# Patient Record
Sex: FEMALE | Race: WHITE | NOT HISPANIC OR LATINO | Employment: OTHER | URBAN - METROPOLITAN AREA
[De-identification: names, ages, dates, MRNs, and addresses within clinical notes are randomized per-mention and may not be internally consistent; named-entity substitution may affect disease eponyms.]

---

## 2017-01-30 ENCOUNTER — OFFICE VISIT (OUTPATIENT)
Dept: ORTHOPEDICS | Facility: CLINIC | Age: 77
End: 2017-01-30
Payer: MEDICARE

## 2017-01-30 VITALS
WEIGHT: 144 LBS | BODY MASS INDEX: 26.5 KG/M2 | DIASTOLIC BLOOD PRESSURE: 67 MMHG | SYSTOLIC BLOOD PRESSURE: 140 MMHG | HEIGHT: 62 IN | HEART RATE: 86 BPM

## 2017-01-30 DIAGNOSIS — M17.12 ARTHRITIS OF KNEE, LEFT: Primary | ICD-10-CM

## 2017-01-30 DIAGNOSIS — M17.11 ARTHRITIS OF KNEE, RIGHT: ICD-10-CM

## 2017-01-30 PROCEDURE — 20610 DRAIN/INJ JOINT/BURSA W/O US: CPT | Mod: 50,S$GLB,, | Performed by: ORTHOPAEDIC SURGERY

## 2017-01-30 PROCEDURE — 99213 OFFICE O/P EST LOW 20 MIN: CPT | Mod: 25,S$GLB,, | Performed by: ORTHOPAEDIC SURGERY

## 2017-01-30 RX ORDER — TRIAMCINOLONE ACETONIDE 40 MG/ML
40 INJECTION, SUSPENSION INTRA-ARTICULAR; INTRAMUSCULAR
Status: DISCONTINUED | OUTPATIENT
Start: 2017-01-30 | End: 2017-01-30 | Stop reason: HOSPADM

## 2017-01-30 RX ADMIN — TRIAMCINOLONE ACETONIDE 40 MG: 40 INJECTION, SUSPENSION INTRA-ARTICULAR; INTRAMUSCULAR at 04:01

## 2017-01-30 NOTE — PROGRESS NOTES
Past Medical History   Diagnosis Date    Breast cancer     Cancer      BREAST     Diabetes mellitus, type 2     GERD (gastroesophageal reflux disease)     Hypertension     Recurrent urinary tract infection     Vertigo        Past Surgical History   Procedure Laterality Date    Hemorrhoid surgery  1968    Hysterectomy  1970    Tumor removal Left 1994     EAR    Mastectomy Right 1994    Mastectomy Left 2013     BREAST CANCER       Current Outpatient Prescriptions   Medication Sig    alprazolam (XANAX) 0.5 MG tablet Take 0.5 mg by mouth 3 (three) times daily.    amlodipine (NORVASC) 5 MG tablet Take 5 mg by mouth once daily.    atenolol (TENORMIN) 50 MG tablet Take 50 mg by mouth once daily.    mirabegron (MYRBETRIQ) 50 mg Tb24 Take 1 tablet (50 mg total) by mouth once daily.    SIMVASTATIN (ZOCOR ORAL) Take by mouth.    TRIAMTERENE-HYDROCHLOROTHIAZID ORAL Take 75 mg by mouth once daily.    warfarin (COUMADIN) 1 MG tablet Take 1 mg by mouth once daily.     No current facility-administered medications for this visit.        Review of patient's allergies indicates:   Allergen Reactions    Iodine and iodide containing products     Ditropan [oxybutynin chloride] Palpitations and Other (See Comments)     Made patient weak       Family History   Problem Relation Age of Onset    Cancer Mother     Cancer Sister     Heart disease Brother     Cancer Maternal Grandmother        Social History     Social History    Marital status:      Spouse name: N/A    Number of children: N/A    Years of education: N/A     Occupational History    Not on file.     Social History Main Topics    Smoking status: Never Smoker    Smokeless tobacco: Never Used    Alcohol use No    Drug use: No    Sexual activity: Not on file     Other Topics Concern    Not on file     Social History Narrative       Chief Complaint:   Chief Complaint   Patient presents with    Left Knee - Pain    Right Knee - Pain        Consulting Physician: No ref. provider found    History of present illness: This is a 68-year-old woman with a complaint of bilateral knee pain.  We completed her Euflexxa series and she reports she was feeling better but is starting to have some pain again. As much as 7/10 with activities. Both equal.    Review of Systems:    Constitution: Denies chills, fever, and sweats.    HENT: Denies headaches or blurry vision.    Cardiovascular: Denies chest pain or irregular heart beat.    Respiratory: Denies cough or shortness of breath.    Gastrointestinal: Denies abdominal pain, nausea, or vomiting.    Musculoskeletal:  Denies muscle cramps.    Neurological: Denies dizziness or focal weakness.    Psychiatric/Behavioral: Normal mental status.    Hematologic/Lymphatic: Denies bleeding problem or easy bruising/bleeding.    Skin: Denies rash or suspicious lesions.      Examination:    Vital Signs:    Vitals:    01/30/17 0951   BP: (!) 140/67   Pulse: 86       Body mass index is 26.34 kg/(m^2).    This a well-developed, well nourished patient in no acute distress.    Alert and oriented and cooperative to examination.       Physical Exam: Left Knee Exam    Gait   Near normal    Skin  Rash:   None  Scars:   None    Inspection  Erythema:  None  Ecchymosis:  None  Effusion:  None  Masses:  None  Lymphadenopathy: None    Range of Motion: 0 to 130°    Medial Joint : mild  Lateral Joint : No    Patellofemoral Tenderness: Yes  Patellofemoral Crepitus: Yes    Strength:  5/5    Pulses:  Palpable  Sensation:  Intact    Right Knee Exam    Gait   Near normal    Skin  Rash:   None  Scars:   None    Inspection  Erythema:  None  Ecchymosis:  None  Effusion:  None  Masses:  None  Lymphadenopathy: None    Range of Motion: 0 to 130°    Medial Joint : mild  Lateral Joint : No     Patellofemoral Tenderness: Yes  Patellofemoral  Crepitus: Yes    Strength:  5/5    Pulses:  Palpable  Sensation:  Intact          Imaging:     Assessment: Arthritis of knee, left  -     Large Joint Aspiration/Injection    Arthritis of knee, right  -     Large Joint Aspiration/Injection        Plan:  Euflexxa series was successful in relieving her pain.  We will go ahead and give her a steroid injection today so she can attend a [employer] [place] ball this weekend.  We'll then see her back in 2 weeks' time to start another Euflexxa series.    DISCLAIMER: This note may have been dictated using voice recognition software and may contain grammatical errors.     NOTE: Consult report sent to referring provider via [employer] EMR.

## 2017-01-30 NOTE — MR AVS SNAPSHOT
[facility]  [address] [place] [address]  Phone: [phone]  Fax: [phone]                  Ursula Rodríguez   2017 10:00 AM   Office Visit    Description:  Female : 1940   Provider:  Gus Rajput MD   Department:  [facility]           Reason for Visit     Left Knee - Pain     Right Knee - Pain           Diagnoses this Visit        Comments    Arthritis of knee, left    -  Primary     Arthritis of knee, right                To Do List           Future Appointments        Provider Department Dept Phone    2017 8:30 AM Gus Rajput MD [facility] [phone]      Goals (5 Years of Data)     None      [facility]     [facility] On Call Nurse Care Line -  Assistance  Registered nurses in the [facility] On Call Center provide clinical advisement, health education, appointment booking, and other advisory services.  Call for this free service at [phone].             Medications           Message regarding Medications     Verify the changes and/or additions to your medication regime listed below are the same as discussed with your clinician today.  If any of these changes or additions are incorrect, please notify your healthcare provider.             Verify that the below list of medications is an accurate representation of the medications you are currently taking.  If none reported, the list may be blank. If incorrect, please contact your healthcare provider. Carry this list with you in case of emergency.           Current Medications     alprazolam (XANAX) 0.5 MG tablet Take 0.5 mg by mouth 3 (three) times daily.    amlodipine (NORVASC) 5 MG tablet Take 5 mg by mouth once daily.    atenolol (TENORMIN) 50 MG tablet Take 50 mg by mouth once daily.    mirabegron (MYRBETRIQ) 50 mg Tb24 Take 1 tablet (50 mg total) by mouth once daily.    SIMVASTATIN (ZOCOR ORAL) Take by mouth.    TRIAMTERENE-HYDROCHLOROTHIAZID  "ORAL Take 75 mg by mouth once daily.    warfarin (COUMADIN) 1 MG tablet Take 1 mg by mouth once daily.           Clinical Reference Information           Vital Signs - Last Recorded  Most recent update: 1/30/2017  9:52 AM by Brenda Hill LPN    BP Pulse Ht Wt BMI    (!) 140/67 86 5' 2" (1.575 m) 65.3 kg (144 lb) 26.34 kg/m2      Blood Pressure          Most Recent Value    BP  (!)  140/67      Allergies as of 1/30/2017     Iodine And Iodide Containing Products    Ditropan [Oxybutynin Chloride]      Immunizations Administered on Date of Encounter - 1/30/2017     None      Orders Placed During Today's Visit      Normal Orders This Visit    Large Joint Aspiration/Injection       "

## 2017-01-30 NOTE — PROCEDURES
Large Joint Aspiration/Injection  Date/Time: 1/30/2017 4:13 PM  Performed by: PALOMO RODRIGUEZ  Authorized by: PALOMO RODRIGUEZ     Consent Done?:  Yes (Verbal)  Indications:  Pain  Timeout: Prior to procedure the correct patient, procedure, and site was verified      Location:  Knee  Site:  R knee and L knee  Prep: Patient was prepped and draped in usual sterile fashion    Approach:  Anteromedial  Medications:  40 mg triamcinolone acetonide 40 mg/mL; 40 mg triamcinolone acetonide 40 mg/mL

## 2017-02-13 ENCOUNTER — OFFICE VISIT (OUTPATIENT)
Dept: ORTHOPEDICS | Facility: CLINIC | Age: 77
End: 2017-02-13
Payer: MEDICARE

## 2017-02-13 VITALS
WEIGHT: 144 LBS | SYSTOLIC BLOOD PRESSURE: 143 MMHG | HEIGHT: 62 IN | HEART RATE: 73 BPM | DIASTOLIC BLOOD PRESSURE: 82 MMHG | BODY MASS INDEX: 26.5 KG/M2

## 2017-02-13 DIAGNOSIS — M17.12 ARTHRITIS OF KNEE, LEFT: Primary | ICD-10-CM

## 2017-02-13 DIAGNOSIS — M17.11 ARTHRITIS OF KNEE, RIGHT: ICD-10-CM

## 2017-02-13 PROCEDURE — 99499 UNLISTED E&M SERVICE: CPT | Mod: S$GLB,,, | Performed by: ORTHOPAEDIC SURGERY

## 2017-02-13 PROCEDURE — 20610 DRAIN/INJ JOINT/BURSA W/O US: CPT | Mod: 50,S$GLB,, | Performed by: ORTHOPAEDIC SURGERY

## 2017-02-13 RX ORDER — HYALURONATE SODIUM 20 MG/2 ML
20 SYRINGE (ML) INTRAARTICULAR
Status: DISCONTINUED | OUTPATIENT
Start: 2017-02-13 | End: 2017-02-13 | Stop reason: HOSPADM

## 2017-02-13 RX ADMIN — Medication 20 MG: at 09:02

## 2017-02-13 NOTE — PROGRESS NOTES
Past Medical History   Diagnosis Date    Breast cancer     Cancer      BREAST     Diabetes mellitus, type 2     GERD (gastroesophageal reflux disease)     Hypertension     Recurrent urinary tract infection     Vertigo        Past Surgical History   Procedure Laterality Date    Hemorrhoid surgery  1968    Hysterectomy  1970    Tumor removal Left 1994     EAR    Mastectomy Right 1994    Mastectomy Left 2013     BREAST CANCER       Current Outpatient Prescriptions   Medication Sig    alprazolam (XANAX) 0.5 MG tablet Take 0.5 mg by mouth 3 (three) times daily.    amlodipine (NORVASC) 5 MG tablet Take 5 mg by mouth once daily.    atenolol (TENORMIN) 50 MG tablet Take 50 mg by mouth once daily.    mirabegron (MYRBETRIQ) 50 mg Tb24 Take 1 tablet (50 mg total) by mouth once daily.    SIMVASTATIN (ZOCOR ORAL) Take by mouth.    TRIAMTERENE-HYDROCHLOROTHIAZID ORAL Take 75 mg by mouth once daily.    warfarin (COUMADIN) 1 MG tablet Take 1 mg by mouth once daily.     No current facility-administered medications for this visit.        Review of patient's allergies indicates:   Allergen Reactions    Iodine and iodide containing products     Ditropan [oxybutynin chloride] Palpitations and Other (See Comments)     Made patient weak       Family History   Problem Relation Age of Onset    Cancer Mother     Cancer Sister     Heart disease Brother     Cancer Maternal Grandmother        Social History     Social History    Marital status:      Spouse name: N/A    Number of children: N/A    Years of education: N/A     Occupational History    Not on file.     Social History Main Topics    Smoking status: Never Smoker    Smokeless tobacco: Never Used    Alcohol use No    Drug use: No    Sexual activity: Not on file     Other Topics Concern    Not on file     Social History Narrative       Chief Complaint:   No chief complaint on file.      Consulting Physician: Gus Rajput MD    History of  present illness: This is a 68-year-old woman with a complaint of bilateral knee pain.  We completed her previous Euflexxa series and she reports she was feeling better. 1/10 today after steroid.    Review of Systems:    Constitution: Denies chills, fever, and sweats.    HENT: Denies headaches or blurry vision.    Cardiovascular: Denies chest pain or irregular heart beat.    Respiratory: Denies cough or shortness of breath.    Gastrointestinal: Denies abdominal pain, nausea, or vomiting.    Musculoskeletal:  Denies muscle cramps.    Neurological: Denies dizziness or focal weakness.    Psychiatric/Behavioral: Normal mental status.    Hematologic/Lymphatic: Denies bleeding problem or easy bruising/bleeding.    Skin: Denies rash or suspicious lesions.      Examination:    Vital Signs:    Vitals:    02/13/17 0848   BP: (!) 143/82   Pulse: 73       Body mass index is 26.34 kg/(m^2).    This a well-developed, well nourished patient in no acute distress.    Alert and oriented and cooperative to examination.       Physical Exam: Left Knee Exam    Gait   Near normal    Skin  Rash:   None  Scars:   None    Inspection  Erythema:  None  Ecchymosis:  None  Effusion:  None  Masses:  None  Lymphadenopathy: None    Range of Motion: 0 to 130°    Medial Joint : mild  Lateral Joint : No    Patellofemoral Tenderness: Yes  Patellofemoral Crepitus: Yes    Strength:  5/5    Pulses:  Palpable  Sensation:  Intact    Right Knee Exam    Gait   Near normal    Skin  Rash:   None  Scars:   None    Inspection  Erythema:  None  Ecchymosis:  None  Effusion:  None  Masses:  None  Lymphadenopathy: None    Range of Motion: 0 to 130°    Medial Joint : mild  Lateral Joint : No     Patellofemoral Tenderness: Yes  Patellofemoral Crepitus: Yes    Strength:  5/5    Pulses:  Palpable  Sensation:  Intact          Imaging:     Assessment: Arthritis of knee, left  -     Large Joint Aspiration/Injection    Arthritis of  knee, right  -     Large Joint Aspiration/Injection        Plan:  Euflexxa series.    DISCLAIMER: This note may have been dictated using voice recognition software and may contain grammatical errors.     NOTE: Consult report sent to referring provider via [employer] EMR.

## 2017-02-13 NOTE — MR AVS SNAPSHOT
[facility]  [address] [place] [address]  Phone: [phone]  Fax: [phone]                  Ursula Rodríguez   2017 8:30 AM   Office Visit    Description:  Female : 1940   Provider:  Gus Rajput MD   Department:  [facility]           Diagnoses this Visit        Comments    Arthritis of knee, left    -  Primary     Arthritis of knee, right                To Do List           Future Appointments        Provider Department Dept Phone    2017 8:45 AM Gus Rajput MD [facility] [phone]      Goals (5 Years of Data)     None      [facility]     [facility] Nurse Care Line -  Assistance  Registered nurses in the [facility] On Call Center provide clinical advisement, health education, appointment booking, and other advisory services.  Call for this free service at [phone].             Medications           Message regarding Medications     Verify the changes and/or additions to your medication regime listed below are the same as discussed with your clinician today.  If any of these changes or additions are incorrect, please notify your healthcare provider.             Verify that the below list of medications is an accurate representation of the medications you are currently taking.  If none reported, the list may be blank. If incorrect, please contact your healthcare provider. Carry this list with you in case of emergency.           Current Medications     alprazolam (XANAX) 0.5 MG tablet Take 0.5 mg by mouth 3 (three) times daily.    amlodipine (NORVASC) 5 MG tablet Take 5 mg by mouth once daily.    atenolol (TENORMIN) 50 MG tablet Take 50 mg by mouth once daily.    mirabegron (MYRBETRIQ) 50 mg Tb24 Take 1 tablet (50 mg total) by mouth once daily.    SIMVASTATIN (ZOCOR ORAL) Take by mouth.    TRIAMTERENE-HYDROCHLOROTHIAZID ORAL Take 75 mg by mouth once daily.    warfarin (COUMADIN) 1 MG tablet  "Take 1 mg by mouth once daily.           Clinical Reference Information           Your Vitals Were     BP Pulse Height Weight BMI    143/82 73 5' 2" (1.575 m) 65.3 kg (144 lb) 26.34 kg/m2      Blood Pressure          Most Recent Value    BP  (!)  143/82      Allergies as of 2/13/2017     Iodine And Iodide Containing Products    Ditropan [Oxybutynin Chloride]      Immunizations Administered on Date of Encounter - 2/13/2017     None      Orders Placed During Today's Visit      Normal Orders This Visit    Large Joint Aspiration/Injection       Language Assistance Services     ATTENTION: Language assistance services are available, free of charge. Please call [phone].      ATENCIÓN: Si habla español, tiene a ziegler disposición servicios gratuitos de asistencia lingüística. Llame al [phone].     CHÚ Ý: N?u b?n nói Ti?ng Vi?t, có các d?ch v? h? tr? ngôn ng? mi?n phí dành cho b?n. G?i s? [phone].         [facility] complies with applicable Federal civil rights laws and does not discriminate on the basis of race, color, national origin, age, disability, or sex.        "

## 2017-02-13 NOTE — PROCEDURES
Large Joint Aspiration/Injection  Date/Time: 2/13/2017 9:48 AM  Performed by: PALOMO RODRIGUEZ  Authorized by: PALOMO RODRIGUEZ     Consent Done?:  Yes (Verbal)  Indications:  Pain  Timeout: Prior to procedure the correct patient, procedure, and site was verified      Location:  Knee  Site:  R knee and L knee  Prep: Patient was prepped and draped in usual sterile fashion    Approach:  Anterolateral  Medications:  20 mg EUFLEXXA 10 mg/mL; 20 mg EUFLEXXA 10 mg/mL

## 2017-02-20 ENCOUNTER — OFFICE VISIT (OUTPATIENT)
Dept: ORTHOPEDICS | Facility: CLINIC | Age: 77
End: 2017-02-20
Payer: MEDICARE

## 2017-02-20 VITALS — HEIGHT: 62 IN | BODY MASS INDEX: 26.5 KG/M2 | WEIGHT: 144 LBS

## 2017-02-20 DIAGNOSIS — M17.12 ARTHRITIS OF KNEE, LEFT: Primary | ICD-10-CM

## 2017-02-20 DIAGNOSIS — M17.11 ARTHRITIS OF KNEE, RIGHT: ICD-10-CM

## 2017-02-20 PROCEDURE — 99499 UNLISTED E&M SERVICE: CPT | Mod: S$GLB,,, | Performed by: ORTHOPAEDIC SURGERY

## 2017-02-20 PROCEDURE — 20610 DRAIN/INJ JOINT/BURSA W/O US: CPT | Mod: 50,S$GLB,, | Performed by: ORTHOPAEDIC SURGERY

## 2017-02-20 RX ORDER — HYALURONATE SODIUM 20 MG/2 ML
20 SYRINGE (ML) INTRAARTICULAR
Status: DISCONTINUED | OUTPATIENT
Start: 2017-02-20 | End: 2017-02-20 | Stop reason: HOSPADM

## 2017-02-20 RX ADMIN — Medication 20 MG: at 09:02

## 2017-02-20 NOTE — PROCEDURES
Large Joint Aspiration/Injection  Date/Time: 2/20/2017 9:47 AM  Performed by: PALOMO RODRIGUEZ  Authorized by: PALOMO RODRIGUEZ     Consent Done?:  Yes (Verbal)  Indications:  Pain  Timeout: Prior to procedure the correct patient, procedure, and site was verified      Location:  Knee  Site:  R knee and L knee  Prep: Patient was prepped and draped in usual sterile fashion    Approach:  Anterolateral  Medications:  20 mg EUFLEXXA 10 mg/mL; 20 mg EUFLEXXA 10 mg/mL

## 2017-02-20 NOTE — PROGRESS NOTES
Past Medical History   Diagnosis Date    Breast cancer     Cancer      BREAST     Diabetes mellitus, type 2     GERD (gastroesophageal reflux disease)     Hypertension     Recurrent urinary tract infection     Vertigo        Past Surgical History   Procedure Laterality Date    Hemorrhoid surgery  1968    Hysterectomy  1970    Tumor removal Left 1994     EAR    Mastectomy Right 1994    Mastectomy Left 2013     BREAST CANCER       Current Outpatient Prescriptions   Medication Sig    alprazolam (XANAX) 0.5 MG tablet Take 0.5 mg by mouth 3 (three) times daily.    amlodipine (NORVASC) 5 MG tablet Take 5 mg by mouth once daily.    atenolol (TENORMIN) 50 MG tablet Take 50 mg by mouth once daily.    mirabegron (MYRBETRIQ) 50 mg Tb24 Take 1 tablet (50 mg total) by mouth once daily.    SIMVASTATIN (ZOCOR ORAL) Take by mouth.    TRIAMTERENE-HYDROCHLOROTHIAZID ORAL Take 75 mg by mouth once daily.    warfarin (COUMADIN) 1 MG tablet Take 1 mg by mouth once daily.     No current facility-administered medications for this visit.        Review of patient's allergies indicates:   Allergen Reactions    Iodine and iodide containing products     Ditropan [oxybutynin chloride] Palpitations and Other (See Comments)     Made patient weak       Family History   Problem Relation Age of Onset    Cancer Mother     Cancer Sister     Heart disease Brother     Cancer Maternal Grandmother        Social History     Social History    Marital status:      Spouse name: N/A    Number of children: N/A    Years of education: N/A     Occupational History    Not on file.     Social History Main Topics    Smoking status: Never Smoker    Smokeless tobacco: Never Used    Alcohol use No    Drug use: No    Sexual activity: Not on file     Other Topics Concern    Not on file     Social History Narrative       Chief Complaint:   Chief Complaint   Patient presents with    Left Knee - Pain    Right Knee - Pain    Knee  Pain     EUFLEXXA 2 OF 3 BILAT KNEE    Knee Pain       Consulting Physician: No ref. provider found    History of present illness: This is a 68-year-old woman with a complaint of bilateral knee pain.  We completed her previous Euflexxa series and she reports she was feeling better. Pain is 0/10 today.    Review of Systems:    Constitution: Denies chills, fever, and sweats.    HENT: Denies headaches or blurry vision.    Cardiovascular: Denies chest pain or irregular heart beat.    Respiratory: Denies cough or shortness of breath.    Gastrointestinal: Denies abdominal pain, nausea, or vomiting.    Musculoskeletal:  Denies muscle cramps.    Neurological: Denies dizziness or focal weakness.    Psychiatric/Behavioral: Normal mental status.    Hematologic/Lymphatic: Denies bleeding problem or easy bruising/bleeding.    Skin: Denies rash or suspicious lesions.      Examination:    Vital Signs:    There were no vitals filed for this visit.    Body mass index is 26.34 kg/(m^2).    This a well-developed, well nourished patient in no acute distress.    Alert and oriented and cooperative to examination.       Physical Exam: Left Knee Exam    Gait   Near normal    Skin  Rash:   None  Scars:   None    Inspection  Erythema:  None  Ecchymosis:  None  Effusion:  None  Masses:  None  Lymphadenopathy: None    Range of Motion: 0 to 130°    Medial Joint : mild  Lateral Joint : No    Patellofemoral Tenderness: Yes  Patellofemoral Crepitus: Yes    Strength:  5/5    Pulses:  Palpable  Sensation:  Intact    Right Knee Exam    Gait   Near normal    Skin  Rash:   None  Scars:   None    Inspection  Erythema:  None  Ecchymosis:  None  Effusion:  None  Masses:  None  Lymphadenopathy: None    Range of Motion: 0 to 130°    Medial Joint : mild  Lateral Joint : No     Patellofemoral Tenderness: Yes  Patellofemoral Crepitus: Yes    Strength:  5/5    Pulses:  Palpable  Sensation:  Intact          Imaging:      Assessment: Arthritis of knee, left  -     Large Joint Aspiration/Injection    Arthritis of knee, right  -     Large Joint Aspiration/Injection        Plan:  Euflexxa series.    DISCLAIMER: This note may have been dictated using voice recognition software and may contain grammatical errors.     NOTE: Consult report sent to referring provider via [employer] EMR.

## 2017-02-27 ENCOUNTER — OFFICE VISIT (OUTPATIENT)
Dept: ORTHOPEDICS | Facility: CLINIC | Age: 77
End: 2017-02-27
Payer: MEDICARE

## 2017-02-27 VITALS — HEIGHT: 62 IN | BODY MASS INDEX: 26.5 KG/M2 | WEIGHT: 144 LBS

## 2017-02-27 DIAGNOSIS — M17.11 ARTHRITIS OF KNEE, RIGHT: Primary | ICD-10-CM

## 2017-02-27 DIAGNOSIS — M17.12 ARTHRITIS OF KNEE, LEFT: ICD-10-CM

## 2017-02-27 PROCEDURE — 99499 UNLISTED E&M SERVICE: CPT | Mod: S$GLB,,, | Performed by: ORTHOPAEDIC SURGERY

## 2017-02-27 PROCEDURE — 20610 DRAIN/INJ JOINT/BURSA W/O US: CPT | Mod: 50,S$GLB,, | Performed by: ORTHOPAEDIC SURGERY

## 2017-02-27 RX ORDER — HYALURONATE SODIUM 20 MG/2 ML
20 SYRINGE (ML) INTRAARTICULAR
Status: DISCONTINUED | OUTPATIENT
Start: 2017-02-27 | End: 2017-02-27 | Stop reason: HOSPADM

## 2017-02-27 RX ADMIN — Medication 20 MG: at 10:02

## 2017-02-27 NOTE — PROCEDURES
Large Joint Aspiration/Injection  Date/Time: 2/27/2017 10:10 AM  Performed by: PALOMO RODRIGUEZ  Authorized by: PALOMO RODRIGUEZ     Consent Done?:  Yes (Verbal)  Indications:  Pain  Timeout: Prior to procedure the correct patient, procedure, and site was verified      Location:  Knee  Site:  R knee and L knee  Prep: Patient was prepped and draped in usual sterile fashion    Approach:  Anterolateral  Medications:  20 mg EUFLEXXA 10 mg/mL; 20 mg EUFLEXXA 10 mg/mL

## 2017-02-27 NOTE — PROGRESS NOTES
Past Medical History:   Diagnosis Date    Breast cancer     Cancer     BREAST     Diabetes mellitus, type 2     GERD (gastroesophageal reflux disease)     Hypertension     Recurrent urinary tract infection     Vertigo        Past Surgical History:   Procedure Laterality Date    HEMORRHOID SURGERY  1968    HYSTERECTOMY  1970    MASTECTOMY Right 1994    MASTECTOMY Left 2013    BREAST CANCER    TUMOR REMOVAL Left 1994    EAR       Current Outpatient Prescriptions   Medication Sig    alprazolam (XANAX) 0.5 MG tablet Take 0.5 mg by mouth 3 (three) times daily.    amlodipine (NORVASC) 5 MG tablet Take 5 mg by mouth once daily.    atenolol (TENORMIN) 50 MG tablet Take 50 mg by mouth once daily.    mirabegron (MYRBETRIQ) 50 mg Tb24 Take 1 tablet (50 mg total) by mouth once daily.    SIMVASTATIN (ZOCOR ORAL) Take by mouth.    TRIAMTERENE-HYDROCHLOROTHIAZID ORAL Take 75 mg by mouth once daily.    warfarin (COUMADIN) 1 MG tablet Take 1 mg by mouth once daily.     No current facility-administered medications for this visit.        Review of patient's allergies indicates:   Allergen Reactions    Iodine and iodide containing products     Ditropan [oxybutynin chloride] Palpitations and Other (See Comments)     Made patient weak       Family History   Problem Relation Age of Onset    Cancer Mother     Cancer Sister     Heart disease Brother     Cancer Maternal Grandmother        Social History     Social History    Marital status:      Spouse name: N/A    Number of children: N/A    Years of education: N/A     Occupational History    Not on file.     Social History Main Topics    Smoking status: Never Smoker    Smokeless tobacco: Never Used    Alcohol use No    Drug use: No    Sexual activity: Not on file     Other Topics Concern    Not on file     Social History Narrative       Chief Complaint:   Chief Complaint   Patient presents with    Knee Pain     euflexxa 3 of 3 bilat knees        Consulting Physician: No ref. provider found    History of present illness: This is a 68-year-old woman with a complaint of bilateral knee pain.  We completed her previous Euflexxa series and she reports she was feeling better. Pain is 0/10 today.    Review of Systems:    Constitution: Denies chills, fever, and sweats.    HENT: Denies headaches or blurry vision.    Cardiovascular: Denies chest pain or irregular heart beat.    Respiratory: Denies cough or shortness of breath.    Gastrointestinal: Denies abdominal pain, nausea, or vomiting.    Musculoskeletal:  Denies muscle cramps.    Neurological: Denies dizziness or focal weakness.    Psychiatric/Behavioral: Normal mental status.    Hematologic/Lymphatic: Denies bleeding problem or easy bruising/bleeding.    Skin: Denies rash or suspicious lesions.      Examination:    Vital Signs:    There were no vitals filed for this visit.    Body mass index is 26.34 kg/(m^2).    This a well-developed, well nourished patient in no acute distress.    Alert and oriented and cooperative to examination.       Physical Exam: Left Knee Exam    Gait   Near normal    Skin  Rash:   None  Scars:   None    Inspection  Erythema:  None  Ecchymosis:  None  Effusion:  None  Masses:  None  Lymphadenopathy: None    Range of Motion: 0 to 130°    Medial Joint : mild  Lateral Joint : No    Patellofemoral Tenderness: Yes  Patellofemoral Crepitus: Yes    Strength:  5/5    Pulses:  Palpable  Sensation:  Intact    Right Knee Exam    Gait   Near normal    Skin  Rash:   None  Scars:   None    Inspection  Erythema:  None  Ecchymosis:  None  Effusion:  None  Masses:  None  Lymphadenopathy: None    Range of Motion: 0 to 130°    Medial Joint : mild  Lateral Joint : No     Patellofemoral Tenderness: Yes  Patellofemoral Crepitus: Yes    Strength:  5/5    Pulses:  Palpable  Sensation:  Intact          Imaging:     Assessment: Arthritis of knee, right  -      Large Joint Aspiration/Injection    Arthritis of knee, left  -     Large Joint Aspiration/Injection        Plan:  Euflexxa series.    DISCLAIMER: This note may have been dictated using voice recognition software and may contain grammatical errors.     NOTE: Consult report sent to referring provider via [employer] EMR.

## 2017-06-01 ENCOUNTER — TELEPHONE (OUTPATIENT)
Dept: ORTHOPEDICS | Facility: CLINIC | Age: 77
End: 2017-06-01

## 2017-06-01 NOTE — TELEPHONE ENCOUNTER
----- Message from Rhett Lozoya sent at 6/1/2017  8:14 AM CDT -----  Contact: patient walked in   Patient is requesting an appointment for an injection as soon as possible.  (Due to the restraints of the upcoming BSL schedule) is hoping to be squeezed in on Monday.  She will be in the hospital for blood work on Monday morning. Plz call.

## 2017-06-01 NOTE — TELEPHONE ENCOUNTER
Called and left voicemail advising that I would work her into Monday's clinic at 9am.  Appt scheduled.

## 2017-06-02 ENCOUNTER — TELEPHONE (OUTPATIENT)
Dept: ORTHOPEDICS | Facility: CLINIC | Age: 77
End: 2017-06-02

## 2017-06-02 NOTE — TELEPHONE ENCOUNTER
Spoke to patient and confirmed that she has an appt for 6/5/17 at 915am at the [facility]. Patient stated understanding.

## 2017-06-02 NOTE — TELEPHONE ENCOUNTER
----- Message from Urvashi Echevarria sent at 6/2/2017  2:38 PM CDT -----  Please call patient in reference to an injection in her knee on Monday.  Call [phone].

## 2017-06-05 ENCOUNTER — OFFICE VISIT (OUTPATIENT)
Dept: ORTHOPEDICS | Facility: CLINIC | Age: 77
End: 2017-06-05
Payer: MEDICARE

## 2017-06-05 VITALS
DIASTOLIC BLOOD PRESSURE: 75 MMHG | WEIGHT: 143.94 LBS | HEART RATE: 75 BPM | HEIGHT: 62 IN | SYSTOLIC BLOOD PRESSURE: 129 MMHG | BODY MASS INDEX: 26.49 KG/M2

## 2017-06-05 DIAGNOSIS — M17.11 ARTHRITIS OF KNEE, RIGHT: Primary | ICD-10-CM

## 2017-06-05 DIAGNOSIS — M17.12 ARTHRITIS OF KNEE, LEFT: ICD-10-CM

## 2017-06-05 PROCEDURE — 20610 DRAIN/INJ JOINT/BURSA W/O US: CPT | Mod: 50,S$GLB,, | Performed by: ORTHOPAEDIC SURGERY

## 2017-06-05 PROCEDURE — 99213 OFFICE O/P EST LOW 20 MIN: CPT | Mod: 25,S$GLB,, | Performed by: ORTHOPAEDIC SURGERY

## 2017-06-05 RX ORDER — TRIAMCINOLONE ACETONIDE 40 MG/ML
60 INJECTION, SUSPENSION INTRA-ARTICULAR; INTRAMUSCULAR
Status: DISCONTINUED | OUTPATIENT
Start: 2017-06-05 | End: 2017-06-05 | Stop reason: HOSPADM

## 2017-06-05 RX ORDER — TRIAMCINOLONE ACETONIDE 40 MG/ML
40 INJECTION, SUSPENSION INTRA-ARTICULAR; INTRAMUSCULAR
Status: DISCONTINUED | OUTPATIENT
Start: 2017-06-05 | End: 2017-06-05 | Stop reason: HOSPADM

## 2017-06-05 RX ADMIN — TRIAMCINOLONE ACETONIDE 60 MG: 40 INJECTION, SUSPENSION INTRA-ARTICULAR; INTRAMUSCULAR at 05:06

## 2017-06-05 RX ADMIN — TRIAMCINOLONE ACETONIDE 40 MG: 40 INJECTION, SUSPENSION INTRA-ARTICULAR; INTRAMUSCULAR at 05:06

## 2017-06-05 NOTE — PROGRESS NOTES
Past Medical History:   Diagnosis Date    Breast cancer     Cancer     BREAST     Diabetes mellitus, type 2     GERD (gastroesophageal reflux disease)     Hypertension     Recurrent urinary tract infection     Vertigo        Past Surgical History:   Procedure Laterality Date    HEMORRHOID SURGERY  1968    HYSTERECTOMY  1970    MASTECTOMY Right 1994    MASTECTOMY Left 2013    BREAST CANCER    TUMOR REMOVAL Left 1994    EAR       Current Outpatient Prescriptions   Medication Sig    alprazolam (XANAX) 0.5 MG tablet Take 0.5 mg by mouth 3 (three) times daily.    amlodipine (NORVASC) 5 MG tablet Take 5 mg by mouth once daily.    atenolol (TENORMIN) 50 MG tablet Take 50 mg by mouth once daily.    mirabegron (MYRBETRIQ) 50 mg Tb24 Take 1 tablet (50 mg total) by mouth once daily.    SIMVASTATIN (ZOCOR ORAL) Take by mouth.    TRIAMTERENE-HYDROCHLOROTHIAZID ORAL Take 75 mg by mouth once daily.    warfarin (COUMADIN) 1 MG tablet Take 1 mg by mouth once daily.     No current facility-administered medications for this visit.        Review of patient's allergies indicates:   Allergen Reactions    Iodine and iodide containing products     Ditropan [oxybutynin chloride] Palpitations and Other (See Comments)     Made patient weak       Family History   Problem Relation Age of Onset    Cancer Mother     Cancer Sister     Heart disease Brother     Cancer Maternal Grandmother        Social History     Social History    Marital status:      Spouse name: N/A    Number of children: N/A    Years of education: N/A     Occupational History    Not on file.     Social History Main Topics    Smoking status: Never Smoker    Smokeless tobacco: Never Used    Alcohol use No    Drug use: No    Sexual activity: Not on file     Other Topics Concern    Not on file     Social History Narrative    No narrative on file       Chief Complaint:   Chief Complaint   Patient presents with    Left Knee - Pain    Right  Knee - Pain       Consulting Physician: No ref. provider found    History of present illness: This is a 68-year-old woman with a complaint of bilateral knee pain.  We completed her previous Euflexxa series and she reports she was feeling better.  She puts her pain at a 10 out of 10 today since the injections wore off.      Review of Systems:    Constitution: Denies chills, fever, and sweats.    HENT: Denies headaches or blurry vision.    Cardiovascular: Denies chest pain or irregular heart beat.    Respiratory: Denies cough or shortness of breath.    Gastrointestinal: Denies abdominal pain, nausea, or vomiting.    Musculoskeletal:  Denies muscle cramps.    Neurological: Denies dizziness or focal weakness.    Psychiatric/Behavioral: Normal mental status.    Hematologic/Lymphatic: Denies bleeding problem or easy bruising/bleeding.    Skin: Denies rash or suspicious lesions.      Examination:    Vital Signs:    Vitals:    06/05/17 0920   BP: 129/75   Pulse: 75       Body mass index is 26.33 kg/m².    This a well-developed, well nourished patient in no acute distress.    Alert and oriented and cooperative to examination.       Physical Exam: Left Knee Exam    Gait   Near normal    Skin  Rash:   None  Scars:   None    Inspection  Erythema:  None  Ecchymosis:  None  Effusion:  None  Masses:  None  Lymphadenopathy: None    Range of Motion: 0 to 130°    Medial Joint : mild  Lateral Joint : No    Patellofemoral Tenderness: Yes  Patellofemoral Crepitus: Yes    Strength:  5/5    Pulses:  Palpable  Sensation:  Intact    Right Knee Exam    Gait   Near normal    Skin  Rash:   None  Scars:   None    Inspection  Erythema:  None  Ecchymosis:  None  Effusion:  None  Masses:  None  Lymphadenopathy: None    Range of Motion: 0 to 130°    Medial Joint : mild  Lateral Joint : No     Patellofemoral Tenderness: Yes  Patellofemoral  Crepitus: Yes    Strength:  5/5    Pulses:  Palpable  Sensation:  Intact          Imaging:     Assessment: Arthritis of knee, right  -     Large Joint Aspiration/Injection    Arthritis of knee, left  -     Large Joint Aspiration/Injection        Plan: We will give her bilateral steroid injections today to hopefully control her pain until she can have her next Euflexxa series.    DISCLAIMER: This note may have been dictated using voice recognition software and may contain grammatical errors.     NOTE: Consult report sent to referring provider via [employer] EMR.

## 2017-06-05 NOTE — PROCEDURES
Large Joint Aspiration/Injection  Date/Time: 6/5/2017 5:12 PM  Performed by: PALOMO RODRIGUEZ  Authorized by: PALOMO RODRIGUEZ     Consent Done?:  Yes (Verbal)  Indications:  Pain  Timeout: Prior to procedure the correct patient, procedure, and site was verified      Location:  Knee  Site:  R knee and L knee  Prep: Patient was prepped and draped in usual sterile fashion    Approach:  Anteromedial  Medications:  40 mg triamcinolone acetonide 40 mg/mL; 60 mg triamcinolone acetonide 40 mg/mL

## 2017-08-29 ENCOUNTER — TELEPHONE (OUTPATIENT)
Dept: ORTHOPEDICS | Facility: CLINIC | Age: 77
End: 2017-08-29

## 2017-08-29 NOTE — TELEPHONE ENCOUNTER
----- Message from Melanie Palacios sent at 8/29/2017  4:09 PM CDT -----  Contact: pt, [phone]  Would like to reschedule inj appt had Monday  Call back on # [phone]  thanks

## 2017-09-11 ENCOUNTER — OFFICE VISIT (OUTPATIENT)
Dept: ORTHOPEDICS | Facility: CLINIC | Age: 77
End: 2017-09-11
Payer: MEDICARE

## 2017-09-11 VITALS — BODY MASS INDEX: 26.49 KG/M2 | WEIGHT: 143.94 LBS | HEIGHT: 62 IN

## 2017-09-11 DIAGNOSIS — M17.11 ARTHRITIS OF KNEE, RIGHT: Primary | ICD-10-CM

## 2017-09-11 DIAGNOSIS — M17.12 ARTHRITIS OF KNEE, LEFT: ICD-10-CM

## 2017-09-11 PROCEDURE — 1159F MED LIST DOCD IN RCRD: CPT | Mod: S$GLB,,, | Performed by: ORTHOPAEDIC SURGERY

## 2017-09-11 PROCEDURE — 99213 OFFICE O/P EST LOW 20 MIN: CPT | Mod: 25,S$GLB,, | Performed by: ORTHOPAEDIC SURGERY

## 2017-09-11 PROCEDURE — 20610 DRAIN/INJ JOINT/BURSA W/O US: CPT | Mod: 50,S$GLB,, | Performed by: ORTHOPAEDIC SURGERY

## 2017-09-11 PROCEDURE — 1125F AMNT PAIN NOTED PAIN PRSNT: CPT | Mod: S$GLB,,, | Performed by: ORTHOPAEDIC SURGERY

## 2017-09-11 RX ADMIN — Medication 20 MG: at 10:09

## 2017-09-14 RX ORDER — HYALURONATE SODIUM 20 MG/2 ML
20 SYRINGE (ML) INTRAARTICULAR
Status: DISCONTINUED | OUTPATIENT
Start: 2017-09-11 | End: 2017-09-15 | Stop reason: HOSPADM

## 2017-09-15 NOTE — PROGRESS NOTES
Past Medical History:   Diagnosis Date    Breast cancer     Cancer     BREAST     Diabetes mellitus, type 2     GERD (gastroesophageal reflux disease)     Hypertension     Recurrent urinary tract infection     Vertigo        Past Surgical History:   Procedure Laterality Date    HEMORRHOID SURGERY  1968    HYSTERECTOMY  1970    MASTECTOMY Right 1994    MASTECTOMY Left 2013    BREAST CANCER    TUMOR REMOVAL Left 1994    EAR       Current Outpatient Prescriptions   Medication Sig    alprazolam (XANAX) 0.5 MG tablet Take 0.5 mg by mouth 3 (three) times daily.    amlodipine (NORVASC) 5 MG tablet Take 5 mg by mouth once daily.    atenolol (TENORMIN) 50 MG tablet Take 50 mg by mouth once daily.    mirabegron (MYRBETRIQ) 50 mg Tb24 Take 1 tablet (50 mg total) by mouth once daily.    SIMVASTATIN (ZOCOR ORAL) Take by mouth.    TRIAMTERENE-HYDROCHLOROTHIAZID ORAL Take 75 mg by mouth once daily.    warfarin (COUMADIN) 1 MG tablet Take 1 mg by mouth once daily.     No current facility-administered medications for this visit.        Review of patient's allergies indicates:   Allergen Reactions    Iodine and iodide containing products     Ditropan [oxybutynin chloride] Palpitations and Other (See Comments)     Made patient weak       Family History   Problem Relation Age of Onset    Cancer Mother     Cancer Sister     Heart disease Brother     Cancer Maternal Grandmother        Social History     Social History    Marital status:      Spouse name: N/A    Number of children: N/A    Years of education: N/A     Occupational History    Not on file.     Social History Main Topics    Smoking status: Never Smoker    Smokeless tobacco: Never Used    Alcohol use No    Drug use: No    Sexual activity: Not on file     Other Topics Concern    Not on file     Social History Narrative    No narrative on file       Chief Complaint:   Chief Complaint   Patient presents with    Injections     Euflexxa #1  Bilateral knee       Consulting Physician: No ref. provider found    History of present illness: This is a 68-year-old woman with a complaint of bilateral knee pain.  We completed her previous Euflexxa series and she reports she was feeling better.  She puts her pain at a 8 out of 10 today since the injections wore off.      Review of Systems:    Constitution: Denies chills, fever, and sweats.    HENT: Denies headaches or blurry vision.    Cardiovascular: Denies chest pain or irregular heart beat.    Respiratory: Denies cough or shortness of breath.    Gastrointestinal: Denies abdominal pain, nausea, or vomiting.    Musculoskeletal:  Denies muscle cramps.    Neurological: Denies dizziness or focal weakness.    Psychiatric/Behavioral: Normal mental status.    Hematologic/Lymphatic: Denies bleeding problem or easy bruising/bleeding.    Skin: Denies rash or suspicious lesions.      Examination:    Vital Signs:    There were no vitals filed for this visit.    Body mass index is 26.33 kg/m².    This a well-developed, well nourished patient in no acute distress.    Alert and oriented and cooperative to examination.       Physical Exam: Left Knee Exam    Gait   Near normal    Skin  Rash:   None  Scars:   None    Inspection  Erythema:  None  Ecchymosis:  None  Effusion:  None  Masses:  None  Lymphadenopathy: None    Range of Motion: 0 to 130°    Medial Joint : mild  Lateral Joint : No    Patellofemoral Tenderness: Yes  Patellofemoral Crepitus: Yes    Strength:  5/5    Pulses:  Palpable  Sensation:  Intact    Right Knee Exam    Gait   Near normal    Skin  Rash:   None  Scars:   None    Inspection  Erythema:  None  Ecchymosis:  None  Effusion:  None  Masses:  None  Lymphadenopathy: None    Range of Motion: 0 to 130°    Medial Joint : mild  Lateral Joint : No     Patellofemoral Tenderness: Yes  Patellofemoral  Crepitus: Yes    Strength:  5/5    Pulses:  Palpable  Sensation:  Intact          Imaging:     Assessment: Arthritis of knee, right  -     Large Joint Aspiration/Injection    Arthritis of knee, left  -     Large Joint Aspiration/Injection        Plan: Euflexxa series.    DISCLAIMER: This note may have been dictated using voice recognition software and may contain grammatical errors.     NOTE: Consult report sent to referring provider via [employer] EMR.

## 2017-09-15 NOTE — PROCEDURES
Large Joint Aspiration/Injection  Date/Time: 9/11/2017 10:59 PM  Performed by: PALOMO RODRIGUEZ  Authorized by: PALOMO RODRIGUEZ     Consent Done?:  Yes (Verbal)  Indications:  Pain  Timeout: Prior to procedure the correct patient, procedure, and site was verified      Location:  Knee  Site:  R knee and L knee  Prep: Patient was prepped and draped in usual sterile fashion    Approach:  Anteromedial  Medications:  20 mg EUFLEXXA 10 mg/mL(mw 2.4 -3.6 million); 20 mg EUFLEXXA 10 mg/mL(mw 2.4 -3.6 million)

## 2017-09-18 ENCOUNTER — OFFICE VISIT (OUTPATIENT)
Dept: ORTHOPEDICS | Facility: CLINIC | Age: 77
End: 2017-09-18
Payer: MEDICARE

## 2017-09-18 VITALS — WEIGHT: 143.94 LBS | BODY MASS INDEX: 26.49 KG/M2 | HEIGHT: 62 IN

## 2017-09-18 DIAGNOSIS — M17.12 ARTHRITIS OF KNEE, LEFT: ICD-10-CM

## 2017-09-18 DIAGNOSIS — M17.11 ARTHRITIS OF KNEE, RIGHT: Primary | ICD-10-CM

## 2017-09-18 PROCEDURE — 99499 UNLISTED E&M SERVICE: CPT | Mod: S$GLB,,, | Performed by: ORTHOPAEDIC SURGERY

## 2017-09-18 PROCEDURE — 20610 DRAIN/INJ JOINT/BURSA W/O US: CPT | Mod: 50,S$GLB,, | Performed by: ORTHOPAEDIC SURGERY

## 2017-09-18 RX ORDER — HYALURONATE SODIUM 20 MG/2 ML
20 SYRINGE (ML) INTRAARTICULAR
Status: DISCONTINUED | OUTPATIENT
Start: 2017-09-18 | End: 2017-09-19 | Stop reason: HOSPADM

## 2017-09-18 RX ADMIN — Medication 20 MG: at 11:09

## 2017-09-19 NOTE — PROCEDURES
Large Joint Aspiration/Injection  Date/Time: 9/18/2017 11:16 PM  Performed by: PALOMO RODRIGUEZ  Authorized by: PALOMO RODRIGUEZ     Consent Done?:  Yes (Verbal)  Indications:  Pain  Timeout: Prior to procedure the correct patient, procedure, and site was verified      Location:  Knee  Site:  R knee and L knee  Prep: Patient was prepped and draped in usual sterile fashion    Approach:  Anterolateral  Medications:  20 mg EUFLEXXA 10 mg/mL(mw 2.4 -3.6 million); 20 mg EUFLEXXA 10 mg/mL(mw 2.4 -3.6 million)

## 2017-09-19 NOTE — PROGRESS NOTES
Past Medical History:   Diagnosis Date    Breast cancer     Cancer     BREAST     Diabetes mellitus, type 2     GERD (gastroesophageal reflux disease)     Hypertension     Recurrent urinary tract infection     Vertigo        Past Surgical History:   Procedure Laterality Date    HEMORRHOID SURGERY  1968    HYSTERECTOMY  1970    MASTECTOMY Right 1994    MASTECTOMY Left 2013    BREAST CANCER    TUMOR REMOVAL Left 1994    EAR       Current Outpatient Prescriptions   Medication Sig    alprazolam (XANAX) 0.5 MG tablet Take 0.5 mg by mouth 3 (three) times daily.    amlodipine (NORVASC) 5 MG tablet Take 5 mg by mouth once daily.    atenolol (TENORMIN) 50 MG tablet Take 50 mg by mouth once daily.    mirabegron (MYRBETRIQ) 50 mg Tb24 Take 1 tablet (50 mg total) by mouth once daily.    SIMVASTATIN (ZOCOR ORAL) Take by mouth.    TRIAMTERENE-HYDROCHLOROTHIAZID ORAL Take 75 mg by mouth once daily.    warfarin (COUMADIN) 1 MG tablet Take 1 mg by mouth once daily.     No current facility-administered medications for this visit.        Review of patient's allergies indicates:   Allergen Reactions    Iodine and iodide containing products     Ditropan [oxybutynin chloride] Palpitations and Other (See Comments)     Made patient weak       Family History   Problem Relation Age of Onset    Cancer Mother     Cancer Sister     Heart disease Brother     Cancer Maternal Grandmother        Social History     Social History    Marital status:      Spouse name: N/A    Number of children: N/A    Years of education: N/A     Occupational History    Not on file.     Social History Main Topics    Smoking status: Never Smoker    Smokeless tobacco: Never Used    Alcohol use No    Drug use: No    Sexual activity: Not on file     Other Topics Concern    Not on file     Social History Narrative    No narrative on file       Chief Complaint:   Chief Complaint   Patient presents with    Injections     EUFLEXXA  2/3 BILATERAL KNEE       Consulting Physician: No ref. provider found    History of present illness: This is a 68-year-old woman with a complaint of bilateral knee pain.    Review of Systems:    Constitution: Denies chills, fever, and sweats.    HENT: Denies headaches or blurry vision.    Cardiovascular: Denies chest pain or irregular heart beat.    Respiratory: Denies cough or shortness of breath.    Gastrointestinal: Denies abdominal pain, nausea, or vomiting.    Musculoskeletal:  Denies muscle cramps.    Neurological: Denies dizziness or focal weakness.    Psychiatric/Behavioral: Normal mental status.    Hematologic/Lymphatic: Denies bleeding problem or easy bruising/bleeding.    Skin: Denies rash or suspicious lesions.      Examination:    Vital Signs:    There were no vitals filed for this visit.    Body mass index is 26.33 kg/m².    This a well-developed, well nourished patient in no acute distress.    Alert and oriented and cooperative to examination.       Physical Exam: Left Knee Exam    Gait   Near normal    Skin  Rash:   None  Scars:   None    Inspection  Erythema:  None  Ecchymosis:  None  Effusion:  None  Masses:  None  Lymphadenopathy: None    Range of Motion: 0 to 130°    Medial Joint : mild  Lateral Joint : No    Patellofemoral Tenderness: Yes  Patellofemoral Crepitus: Yes    Strength:  5/5    Pulses:  Palpable  Sensation:  Intact    Right Knee Exam    Gait   Near normal    Skin  Rash:   None  Scars:   None    Inspection  Erythema:  None  Ecchymosis:  None  Effusion:  None  Masses:  None  Lymphadenopathy: None    Range of Motion: 0 to 130°    Medial Joint : mild  Lateral Joint : No     Patellofemoral Tenderness: Yes  Patellofemoral Crepitus: Yes    Strength:  5/5    Pulses:  Palpable  Sensation:  Intact          Imaging:     Assessment: Arthritis of knee, right  -     Large Joint Aspiration/Injection    Arthritis of knee, left  -     Large Joint  Aspiration/Injection        Plan: Euflexxa series.    DISCLAIMER: This note may have been dictated using voice recognition software and may contain grammatical errors.     NOTE: Consult report sent to referring provider via [employer] EMR.

## 2017-09-25 ENCOUNTER — OFFICE VISIT (OUTPATIENT)
Dept: ORTHOPEDICS | Facility: CLINIC | Age: 77
End: 2017-09-25
Payer: MEDICARE

## 2017-09-25 VITALS — WEIGHT: 143.94 LBS | HEIGHT: 62 IN | BODY MASS INDEX: 26.49 KG/M2

## 2017-09-25 DIAGNOSIS — M17.12 ARTHRITIS OF KNEE, LEFT: ICD-10-CM

## 2017-09-25 DIAGNOSIS — M17.11 ARTHRITIS OF KNEE, RIGHT: Primary | ICD-10-CM

## 2017-09-25 PROCEDURE — 99499 UNLISTED E&M SERVICE: CPT | Mod: S$GLB,,, | Performed by: ORTHOPAEDIC SURGERY

## 2017-09-25 PROCEDURE — 20610 DRAIN/INJ JOINT/BURSA W/O US: CPT | Mod: 50,S$GLB,, | Performed by: ORTHOPAEDIC SURGERY

## 2017-09-25 RX ADMIN — Medication 20 MG: at 10:09

## 2017-09-27 RX ORDER — HYALURONATE SODIUM 20 MG/2 ML
20 SYRINGE (ML) INTRAARTICULAR
Status: DISCONTINUED | OUTPATIENT
Start: 2017-09-25 | End: 2017-09-28 | Stop reason: HOSPADM

## 2017-09-28 NOTE — PROGRESS NOTES
Past Medical History:   Diagnosis Date    Breast cancer     Cancer     BREAST     Diabetes mellitus, type 2     GERD (gastroesophageal reflux disease)     Hypertension     Recurrent urinary tract infection     Vertigo        Past Surgical History:   Procedure Laterality Date    HEMORRHOID SURGERY  1968    HYSTERECTOMY  1970    MASTECTOMY Right 1994    MASTECTOMY Left 2013    BREAST CANCER    TUMOR REMOVAL Left 1994    EAR       Current Outpatient Prescriptions   Medication Sig    alprazolam (XANAX) 0.5 MG tablet Take 0.5 mg by mouth 3 (three) times daily.    amlodipine (NORVASC) 5 MG tablet Take 5 mg by mouth once daily.    atenolol (TENORMIN) 50 MG tablet Take 50 mg by mouth once daily.    mirabegron (MYRBETRIQ) 50 mg Tb24 Take 1 tablet (50 mg total) by mouth once daily.    SIMVASTATIN (ZOCOR ORAL) Take by mouth.    TRIAMTERENE-HYDROCHLOROTHIAZID ORAL Take 75 mg by mouth once daily.    warfarin (COUMADIN) 1 MG tablet Take 1 mg by mouth once daily.     No current facility-administered medications for this visit.        Review of patient's allergies indicates:   Allergen Reactions    Iodine and iodide containing products     Ditropan [oxybutynin chloride] Palpitations and Other (See Comments)     Made patient weak       Family History   Problem Relation Age of Onset    Cancer Mother     Cancer Sister     Heart disease Brother     Cancer Maternal Grandmother        Social History     Social History    Marital status:      Spouse name: N/A    Number of children: N/A    Years of education: N/A     Occupational History    Not on file.     Social History Main Topics    Smoking status: Never Smoker    Smokeless tobacco: Never Used    Alcohol use No    Drug use: No    Sexual activity: Not on file     Other Topics Concern    Not on file     Social History Narrative    No narrative on file       Chief Complaint:   Chief Complaint   Patient presents with    Injections     EUFLEXXA  3/3 BILATERAL KNEE       Consulting Physician: No ref. provider found    History of present illness: This is a 68-year-old woman with a complaint of bilateral knee pain.    Review of Systems:    Constitution: Denies chills, fever, and sweats.    HENT: Denies headaches or blurry vision.    Cardiovascular: Denies chest pain or irregular heart beat.    Respiratory: Denies cough or shortness of breath.    Gastrointestinal: Denies abdominal pain, nausea, or vomiting.    Musculoskeletal:  Denies muscle cramps.    Neurological: Denies dizziness or focal weakness.    Psychiatric/Behavioral: Normal mental status.    Hematologic/Lymphatic: Denies bleeding problem or easy bruising/bleeding.    Skin: Denies rash or suspicious lesions.      Examination:    Vital Signs:    There were no vitals filed for this visit.    Body mass index is 26.33 kg/m².    This a well-developed, well nourished patient in no acute distress.    Alert and oriented and cooperative to examination.       Physical Exam: Left Knee Exam    Gait   Near normal    Skin  Rash:   None  Scars:   None    Inspection  Erythema:  None  Ecchymosis:  None  Effusion:  None  Masses:  None  Lymphadenopathy: None    Range of Motion: 0 to 130°    Medial Joint : mild  Lateral Joint : No    Patellofemoral Tenderness: Yes  Patellofemoral Crepitus: Yes    Strength:  5/5    Pulses:  Palpable  Sensation:  Intact    Right Knee Exam    Gait   Near normal    Skin  Rash:   None  Scars:   None    Inspection  Erythema:  None  Ecchymosis:  None  Effusion:  None  Masses:  None  Lymphadenopathy: None    Range of Motion: 0 to 130°    Medial Joint : mild  Lateral Joint : No     Patellofemoral Tenderness: Yes  Patellofemoral Crepitus: Yes    Strength:  5/5    Pulses:  Palpable  Sensation:  Intact          Imaging:     Assessment: Arthritis of knee, right  -     Large Joint Aspiration/Injection    Arthritis of knee, left  -     Large Joint  Aspiration/Injection        Plan: Euflexxa series.    DISCLAIMER: This note may have been dictated using voice recognition software and may contain grammatical errors.     NOTE: Consult report sent to referring provider via [employer] EMR.

## 2017-09-28 NOTE — PROCEDURES
Large Joint Aspiration/Injection  Date/Time: 9/25/2017 10:31 PM  Performed by: PALOMO RODRIGUEZ  Authorized by: PALOMO RODRIGUEZ     Consent Done?:  Yes (Verbal)  Indications:  Pain  Timeout: Prior to procedure the correct patient, procedure, and site was verified      Location:  Knee  Site:  R knee and L knee  Prep: Patient was prepped and draped in usual sterile fashion    Approach:  Anteromedial  Medications:  20 mg EUFLEXXA 10 mg/mL(mw 2.4 -3.6 million); 20 mg EUFLEXXA 10 mg/mL(mw 2.4 -3.6 million)

## 2017-12-11 ENCOUNTER — TELEPHONE (OUTPATIENT)
Dept: HEMATOLOGY/ONCOLOGY | Facility: CLINIC | Age: 77
End: 2017-12-11

## 2017-12-11 DIAGNOSIS — R45.89 ANXIETY ABOUT HEALTH: ICD-10-CM

## 2017-12-11 DIAGNOSIS — R45.89 ANXIETY ABOUT HEALTH: Primary | ICD-10-CM

## 2017-12-11 RX ORDER — ALPRAZOLAM 0.5 MG/1
TABLET ORAL
Qty: 60 TABLET | Refills: 5 | Status: SHIPPED | OUTPATIENT
Start: 2017-12-11 | End: 2017-12-11 | Stop reason: SDUPTHER

## 2017-12-11 RX ORDER — ALPRAZOLAM 0.5 MG/1
TABLET ORAL
Qty: 60 TABLET | Refills: 5 | Status: SHIPPED | OUTPATIENT
Start: 2017-12-11 | End: 2019-08-28

## 2018-02-22 DIAGNOSIS — M25.562 PAIN IN BOTH KNEES, UNSPECIFIED CHRONICITY: Primary | ICD-10-CM

## 2018-02-22 DIAGNOSIS — M25.561 PAIN IN BOTH KNEES, UNSPECIFIED CHRONICITY: Primary | ICD-10-CM

## 2018-02-26 ENCOUNTER — OFFICE VISIT (OUTPATIENT)
Dept: ORTHOPEDICS | Facility: CLINIC | Age: 78
End: 2018-02-26
Payer: MEDICARE

## 2018-02-26 VITALS — HEIGHT: 62 IN | WEIGHT: 143.94 LBS | BODY MASS INDEX: 26.49 KG/M2

## 2018-02-26 DIAGNOSIS — M17.11 ARTHRITIS OF KNEE, RIGHT: Primary | ICD-10-CM

## 2018-02-26 DIAGNOSIS — M17.12 ARTHRITIS OF KNEE, LEFT: ICD-10-CM

## 2018-02-26 PROCEDURE — 20610 DRAIN/INJ JOINT/BURSA W/O US: CPT | Mod: 50,S$GLB,, | Performed by: ORTHOPAEDIC SURGERY

## 2018-02-26 PROCEDURE — 99213 OFFICE O/P EST LOW 20 MIN: CPT | Mod: 25,S$GLB,, | Performed by: ORTHOPAEDIC SURGERY

## 2018-02-26 PROCEDURE — 1159F MED LIST DOCD IN RCRD: CPT | Mod: S$GLB,,, | Performed by: ORTHOPAEDIC SURGERY

## 2018-02-26 RX ORDER — TRIAMCINOLONE ACETONIDE 40 MG/ML
40 INJECTION, SUSPENSION INTRA-ARTICULAR; INTRAMUSCULAR
Status: DISCONTINUED | OUTPATIENT
Start: 2018-02-26 | End: 2018-02-26 | Stop reason: HOSPADM

## 2018-02-26 RX ADMIN — TRIAMCINOLONE ACETONIDE 40 MG: 40 INJECTION, SUSPENSION INTRA-ARTICULAR; INTRAMUSCULAR at 12:02

## 2018-02-26 NOTE — PROCEDURES
Large Joint Aspiration/Injection  Date/Time: 2/26/2018 12:03 PM  Performed by: PALOMO RODRIGUEZ  Authorized by: PALOMO RODRIGUEZ     Consent Done?:  Yes (Verbal)  Indications:  Pain  Timeout: Prior to procedure the correct patient, procedure, and site was verified      Location:  Knee  Site:  R knee and L knee  Prep: Patient was prepped and draped in usual sterile fashion    Approach:  Anteromedial  Medications:  40 mg triamcinolone acetonide 40 mg/mL; 40 mg triamcinolone acetonide 40 mg/mL

## 2018-02-26 NOTE — PROGRESS NOTES
Past Medical History:   Diagnosis Date    Breast cancer     Cancer     BREAST     Diabetes mellitus, type 2     GERD (gastroesophageal reflux disease)     Hypertension     Recurrent urinary tract infection     Vertigo        Past Surgical History:   Procedure Laterality Date    HEMORRHOID SURGERY  1968    HYSTERECTOMY  1970    MASTECTOMY Right 1994    MASTECTOMY Left 2013    BREAST CANCER    TUMOR REMOVAL Left 1994    EAR       Current Outpatient Prescriptions   Medication Sig    amlodipine (NORVASC) 5 MG tablet Take 5 mg by mouth once daily.    atenolol (TENORMIN) 50 MG tablet Take 50 mg by mouth once daily.    SIMVASTATIN (ZOCOR ORAL) Take by mouth.    TRIAMTERENE-HYDROCHLOROTHIAZID ORAL Take 75 mg by mouth once daily.    warfarin (COUMADIN) 1 MG tablet Take 1 mg by mouth once daily.    ALPRAZolam (XANAX) 0.5 MG tablet TAKE ONE TABLET BY MOUTH TWICE DAILY AS NEEDED FOR ANXIETY    mirabegron (MYRBETRIQ) 50 mg Tb24 Take 1 tablet (50 mg total) by mouth once daily.     No current facility-administered medications for this visit.        Review of patient's allergies indicates:   Allergen Reactions    Iodine and iodide containing products     Ditropan [oxybutynin chloride] Palpitations and Other (See Comments)     Made patient weak       Family History   Problem Relation Age of Onset    Cancer Mother     Cancer Sister     Heart disease Brother     Cancer Maternal Grandmother        Social History     Social History    Marital status:      Spouse name: N/A    Number of children: N/A    Years of education: N/A     Occupational History    Not on file.     Social History Main Topics    Smoking status: Never Smoker    Smokeless tobacco: Never Used    Alcohol use No    Drug use: No    Sexual activity: Not on file     Other Topics Concern    Not on file     Social History Narrative    No narrative on file       Chief Complaint:   Chief Complaint   Patient presents with    Knee Pain      BILATERAL KNEE PAIN       Consulting Physician: No ref. provider found    History of present illness: This is a 68-year-old woman with a complaint of bilateral knee pain. Previous injections helped. Pain has started to return 8/10. No injury. Worse with use.    Review of Systems:    Constitution: Denies chills, fever, and sweats.    HENT: Denies headaches or blurry vision.    Cardiovascular: Denies chest pain or irregular heart beat.    Respiratory: Denies cough or shortness of breath.    Gastrointestinal: Denies abdominal pain, nausea, or vomiting.    Musculoskeletal:  Denies muscle cramps.    Neurological: Denies dizziness or focal weakness.    Psychiatric/Behavioral: Normal mental status.    Hematologic/Lymphatic: Denies bleeding problem or easy bruising/bleeding.    Skin: Denies rash or suspicious lesions.      Examination:    Vital Signs:    There were no vitals filed for this visit.    Body mass index is 26.33 kg/m².    This a well-developed, well nourished patient in no acute distress.    Alert and oriented and cooperative to examination.       Physical Exam: Left Knee Exam    Gait   Near normal    Skin  Rash:   None  Scars:   None    Inspection  Erythema:  None  Ecchymosis:  None  Effusion:  None  Masses:  None  Lymphadenopathy: None    Range of Motion: 0 to 130°    Medial Joint : mild  Lateral Joint : No    Patellofemoral Tenderness: Yes  Patellofemoral Crepitus: Yes    Strength:  5/5    Pulses:  Palpable  Sensation:  Intact    Right Knee Exam    Gait   Near normal    Skin  Rash:   None  Scars:   None    Inspection  Erythema:  None  Ecchymosis:  None  Effusion:  None  Masses:  None  Lymphadenopathy: None    Range of Motion: 0 to 130°    Medial Joint : mild  Lateral Joint : No     Patellofemoral Tenderness: Yes  Patellofemoral Crepitus: Yes    Strength:  5/5    Pulses:  Palpable  Sensation:  Intact          Imaging: XR ordered and reviewed personally today  shows moderate to severe DJD changes bilaterally.     Assessment: Arthritis of knee, right  -     Large Joint Aspiration/Injection    Arthritis of knee, left  -     Large Joint Aspiration/Injection        Plan: We will do steroid injections today and start PT. Back in 6 weeks for Euflexxa series.    DISCLAIMER: This note may have been dictated using voice recognition software and may contain grammatical errors.     NOTE: Consult report sent to referring provider via [employer].

## 2018-02-28 ENCOUNTER — OFFICE VISIT (OUTPATIENT)
Dept: HEMATOLOGY/ONCOLOGY | Facility: CLINIC | Age: 78
End: 2018-02-28
Payer: MEDICARE

## 2018-02-28 ENCOUNTER — TELEPHONE (OUTPATIENT)
Dept: HEMATOLOGY/ONCOLOGY | Facility: CLINIC | Age: 78
End: 2018-02-28

## 2018-02-28 VITALS
WEIGHT: 138.88 LBS | RESPIRATION RATE: 18 BRPM | TEMPERATURE: 99 F | SYSTOLIC BLOOD PRESSURE: 130 MMHG | BODY MASS INDEX: 25.41 KG/M2 | DIASTOLIC BLOOD PRESSURE: 77 MMHG | HEART RATE: 58 BPM

## 2018-02-28 DIAGNOSIS — Z85.3 HISTORY OF BILATERAL BREAST CANCER: Primary | ICD-10-CM

## 2018-02-28 PROCEDURE — 99214 OFFICE O/P EST MOD 30 MIN: CPT | Mod: ,,, | Performed by: INTERNAL MEDICINE

## 2018-02-28 RX ORDER — LISINOPRIL 10 MG/1
10 TABLET ORAL DAILY
COMMUNITY
End: 2019-08-28

## 2018-03-01 NOTE — PROGRESS NOTES
[facility] History & Physical    Subjective:      Patient ID:   Ursula Rodríguez  78 y.o. female  1940  [place]      Chief Complaint:   Breast cancer follow up    HPI:  78 y.o. female with diagnosis of R Breast cancer,2013.  R breast partial mastectomy followed by [facility].  Sentinal node +.  Stage II dx.  CTA x's 3/4, arimidex, Tamoxifen 2015.    Hx of L breast cancer , had partial mastectomy and reconstruction.    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  .  On coumadin prophylaxis, Dr. Dunlap.    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  Hx HTN, DM, GERD,cholesterol, DJD, CVA, TIA.    Appendectomy, Partial hystectomy, L knee surgery several times, hernia repair, Tumor removed L ear drum .    Allergy iodine, IVP dye.    Dad COPD, DM.  Mom Breast cancer, DM, increased HR.  Sister DM.  Brother DM, PVD.  Sister colon cancer, heart dx.  Sister Parkinson's Dx.  Daughter colon cancer.    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   LN +.  M3    ROS:   GEN: normal without any fever, night sweats or weight loss  HEENT: See HPI  CV: normal with no CP, SOB, PND, BUSTOS or orthopnea  PULM: normal with no SOB, cough, hemoptysis, sputum or pleuritic pain  GI: normal with no abdominal pain, nausea, vomiting, constipation, diarrhea, melanotic stools, BRBPR, or hematemesis  : normal with no hematuria, dysuria  BREAST: See HPI  SKIN: normal with no rash, erythema, bruising, or swelling     Past Medical History:   Diagnosis Date    Breast cancer     Cancer     BREAST     Diabetes mellitus, type 2     GERD (gastroesophageal reflux disease)     Hypertension     Recurrent urinary tract infection     Vertigo      Past Surgical History:   Procedure Laterality Date    HEMORRHOID SURGERY  1968    HYSTERECTOMY  1970    MASTECTOMY Right 1994    MASTECTOMY Left     BREAST CANCER    TUMOR REMOVAL Left     EAR       Review of patient's allergies indicates:   Allergen Reactions    Iodine and iodide containing products     Ditropan  [oxybutynin chloride] Palpitations and Other (See Comments)     Made patient weak     Social History     Social History    Marital status:      Spouse name: N/A    Number of children: N/A    Years of education: N/A     Occupational History    Not on file.     Social History Main Topics    Smoking status: Never Smoker    Smokeless tobacco: Never Used    Alcohol use No    Drug use: No    Sexual activity: Not on file     Other Topics Concern    Not on file     Social History Narrative    No narrative on file         Current Outpatient Prescriptions:     lisinopril 10 MG tablet, Take 10 mg by mouth once daily., Disp: , Rfl:     ALPRAZolam (XANAX) 0.5 MG tablet, TAKE ONE TABLET BY MOUTH TWICE DAILY AS NEEDED FOR ANXIETY, Disp: 60 tablet, Rfl: 5    amlodipine (NORVASC) 5 MG tablet, Take 5 mg by mouth once daily., Disp: , Rfl:     atenolol (TENORMIN) 50 MG tablet, Take 50 mg by mouth once daily., Disp: , Rfl:     mirabegron (MYRBETRIQ) 50 mg Tb24, Take 1 tablet (50 mg total) by mouth once daily., Disp: 30 tablet, Rfl: 11    SIMVASTATIN (ZOCOR ORAL), Take by mouth., Disp: , Rfl:     TRIAMTERENE-HYDROCHLOROTHIAZID ORAL, Take 75 mg by mouth once daily., Disp: , Rfl:     warfarin (COUMADIN) 1 MG tablet, Take 1 mg by mouth once daily., Disp: , Rfl:           Objective:   Vitals:  Blood pressure 130/77, pulse (!) 58, temperature 98.6 °F (37 °C), resp. rate 18, weight 63 kg (138 lb 14.4 oz).    Physical Examination:   GEN: no apparent distress, comfortable  HEAD: atraumatic and normocephalic  EYES: no pallor, no icterus  ENT:  no pharyngeal erythema, external ears WNL; no nasal discharge  NECK: no masses, thyroid normal, trachea midline, no LAD/LN's, supple  CV: RRR with no murmur; normal pulse; normal S1 and S2; no pedal edema  CHEST: Normal respiratory effort; CTAB; normal breath sounds; no wheeze or crackles  ABDOM: nontender and nondistended; soft; normal bowel sounds; no  "rebound/guarding  MUSC/Skeletal: ROM normal; no crepitus; joints normal; no deformities   EXTREM: no clubbing, cyanosis, inflammation or swelling  SKIN: no rashes, lesions, ulcers, petechiae   : no león  NEURO: grossly intact; motor/sensory WNL;  no tremors  PSYCH: normal mood, affect and behavior  LYMPH: normal cervical, supraclavicular, axillary and groin LN's  BREASTS: L 'breast" post op change, without palpable mass.  R chest NT, post operative change, no palpable mass      Radiology/Diagnostic Studies:    Call [facility] for last mamm.      Assessment:   (1) 78 y.o. female with diagnosis of  Bilateral breast cancer, S/P treatment as above.  Observe for now.  RTC 9 months.                   "

## 2018-03-21 DIAGNOSIS — M25.562 KNEE PAIN, LEFT: Primary | ICD-10-CM

## 2018-03-21 DIAGNOSIS — M25.561 KNEE PAIN, RIGHT: ICD-10-CM

## 2018-03-21 DIAGNOSIS — M19.90 ARTHRITIS: ICD-10-CM

## 2018-03-26 ENCOUNTER — OFFICE VISIT (OUTPATIENT)
Dept: ORTHOPEDICS | Facility: CLINIC | Age: 78
End: 2018-03-26
Payer: MEDICARE

## 2018-03-26 VITALS — BODY MASS INDEX: 25.55 KG/M2 | WEIGHT: 138.88 LBS | HEIGHT: 62 IN

## 2018-03-26 DIAGNOSIS — M17.11 ARTHRITIS OF KNEE, RIGHT: Primary | ICD-10-CM

## 2018-03-26 DIAGNOSIS — M17.12 ARTHRITIS OF KNEE, LEFT: ICD-10-CM

## 2018-03-26 PROCEDURE — 20610 DRAIN/INJ JOINT/BURSA W/O US: CPT | Mod: 50,S$GLB,, | Performed by: ORTHOPAEDIC SURGERY

## 2018-03-26 PROCEDURE — 99499 UNLISTED E&M SERVICE: CPT | Mod: S$GLB,,, | Performed by: ORTHOPAEDIC SURGERY

## 2018-03-26 RX ORDER — HYALURONATE SODIUM 20 MG/2 ML
20 SYRINGE (ML) INTRAARTICULAR
Status: DISCONTINUED | OUTPATIENT
Start: 2018-03-26 | End: 2018-03-26 | Stop reason: HOSPADM

## 2018-03-26 RX ADMIN — Medication 20 MG: at 02:03

## 2018-03-26 NOTE — PROGRESS NOTES
Past Medical History:   Diagnosis Date    Breast cancer     Cancer     BREAST     Diabetes mellitus, type 2     GERD (gastroesophageal reflux disease)     Hypertension     Recurrent urinary tract infection     Vertigo        Past Surgical History:   Procedure Laterality Date    HEMORRHOID SURGERY  1968    HYSTERECTOMY  1970    MASTECTOMY Right 1994    MASTECTOMY Left 2013    BREAST CANCER    TUMOR REMOVAL Left 1994    EAR       Current Outpatient Prescriptions   Medication Sig    amlodipine (NORVASC) 5 MG tablet Take 5 mg by mouth once daily.    atenolol (TENORMIN) 50 MG tablet Take 50 mg by mouth once daily.    lisinopril 10 MG tablet Take 10 mg by mouth once daily.    SIMVASTATIN (ZOCOR ORAL) Take by mouth.    TRIAMTERENE-HYDROCHLOROTHIAZID ORAL Take 75 mg by mouth once daily.    warfarin (COUMADIN) 1 MG tablet Take 1 mg by mouth once daily.    ALPRAZolam (XANAX) 0.5 MG tablet TAKE ONE TABLET BY MOUTH TWICE DAILY AS NEEDED FOR ANXIETY    mirabegron (MYRBETRIQ) 50 mg Tb24 Take 1 tablet (50 mg total) by mouth once daily.     No current facility-administered medications for this visit.        Review of patient's allergies indicates:   Allergen Reactions    Iodine and iodide containing products     Ditropan [oxybutynin chloride] Palpitations and Other (See Comments)     Made patient weak       Family History   Problem Relation Age of Onset    Cancer Mother     Cancer Sister     Heart disease Brother     Cancer Maternal Grandmother        Social History     Social History    Marital status:      Spouse name: N/A    Number of children: N/A    Years of education: N/A     Occupational History    Not on file.     Social History Main Topics    Smoking status: Never Smoker    Smokeless tobacco: Never Used    Alcohol use No    Drug use: No    Sexual activity: Not on file     Other Topics Concern    Not on file     Social History Narrative    No narrative on file       Chief  Complaint:   Chief Complaint   Patient presents with    Injections     EUFLEXXA 1/3 BILATERAL KNEE       Consulting Physician: No ref. provider found    History of present illness: This is a 68-year-old woman with a complaint of bilateral knee pain. Previous injections helped. Pain has started to return 8/10. No injury. Worse with use.    Review of Systems:    Constitution: Denies chills, fever, and sweats.    HENT: Denies headaches or blurry vision.    Cardiovascular: Denies chest pain or irregular heart beat.    Respiratory: Denies cough or shortness of breath.    Gastrointestinal: Denies abdominal pain, nausea, or vomiting.    Musculoskeletal:  Denies muscle cramps.    Neurological: Denies dizziness or focal weakness.    Psychiatric/Behavioral: Normal mental status.    Hematologic/Lymphatic: Denies bleeding problem or easy bruising/bleeding.    Skin: Denies rash or suspicious lesions.      Examination:    Vital Signs:    There were no vitals filed for this visit.    Body mass index is 25.4 kg/m².    This a well-developed, well nourished patient in no acute distress.    Alert and oriented and cooperative to examination.       Physical Exam: Left Knee Exam    Gait   Near normal    Skin  Rash:   None  Scars:   None    Inspection  Erythema:  None  Ecchymosis:  None  Effusion:  None  Masses:  None  Lymphadenopathy: None    Range of Motion: 0 to 130°    Medial Joint : mild  Lateral Joint : No    Patellofemoral Tenderness: Yes  Patellofemoral Crepitus: Yes    Strength:  5/5    Pulses:  Palpable  Sensation:  Intact    Right Knee Exam    Gait   Near normal    Skin  Rash:   None  Scars:   None    Inspection  Erythema:  None  Ecchymosis:  None  Effusion:  None  Masses:  None  Lymphadenopathy: None    Range of Motion: 0 to 130°    Medial Joint : mild  Lateral Joint : No     Patellofemoral Tenderness: Yes  Patellofemoral  Crepitus: Yes    Strength:  5/5    Pulses:  Palpable  Sensation:  Intact          Imaging: XR ordered and reviewed personally today shows moderate to severe DJD changes bilaterally.     Assessment: Arthritis of knee, right  -     Large Joint Aspiration/Injection    Arthritis of knee, left  -     Large Joint Aspiration/Injection        Plan: Euflexxa series.    DISCLAIMER: This note may have been dictated using voice recognition software and may contain grammatical errors.     NOTE: Consult report sent to referring provider via [employer].

## 2018-04-03 ENCOUNTER — CLINICAL SUPPORT (OUTPATIENT)
Dept: REHABILITATION | Facility: HOSPITAL | Age: 78
End: 2018-04-03
Attending: ORTHOPAEDIC SURGERY
Payer: MEDICARE

## 2018-04-03 DIAGNOSIS — M25.561 CHRONIC PAIN OF BOTH KNEES: ICD-10-CM

## 2018-04-03 DIAGNOSIS — M25.669 DECREASED RANGE OF MOTION (ROM) OF KNEE: ICD-10-CM

## 2018-04-03 DIAGNOSIS — M25.562 CHRONIC PAIN OF BOTH KNEES: ICD-10-CM

## 2018-04-03 DIAGNOSIS — G89.29 CHRONIC PAIN OF BOTH KNEES: ICD-10-CM

## 2018-04-03 PROCEDURE — 97110 THERAPEUTIC EXERCISES: CPT

## 2018-04-03 NOTE — PROGRESS NOTES
"                                                    Physical Therapy Daily Treatment Note   Name: Ursula Rodríguez  Clinic Number: [id number]    Evaluation Date: 3-6-18  Visit #:8 / 12  Authorization period Expiration: 12/31/18  Plan of Care Expiration: 4-  Precautions: falls    Time In: 8:26 AM  Time Out: 9:15am  Total 1:1 Treatment Time: 45 min    Diagnosis:   Encounter Diagnoses   Name Primary?    Decreased range of motion (ROM) of knee     Chronic pain of both knees      Physician: Gus Rajput MD  Treatment Orders: PT Eval and Treat    Subjective   Pt reports: she was able to ride in [employer] to and from [place] over holidays without pain.    Pain Scale:  0/10 currently in bilat. knees      Objective     Discussed Plan of Care with patient: Yes    Ursula received therapeutic exercises to develop strength and ROM for 45  minutes including:  QS 2 x 15  SAQ 2 x 15, 1.1/2 #  SLR's 2 x 15, 1.1/2#  Heel slides 2 x 10  Isometric Add x 30 with ball  Stand: Hip Abd-Add with 1/1/2#, Hip Ext., Ham Curls 2 x 15  Seated: knee ext, with 1 1/2 # 2 x 15  Step ups/downs 4# step x 10  Nustep, Lvl 1 x 15 min.    AROM: R knee 0-122, L knee 0-115  Strength: 5/5 with MMT for Quads/Hamstrings     Written Home Exercises Provided: Reviewed HEP with additional 1/2 #, Issued and reviewed HEP including Qs, Hs, SAQ, SLR, Isometric Add., sit and supine, Standing: Ham curls, Hip Extension, Hip Abduction with suggested with resistive weights.  Pt demo good understanding of the education provided. Ursula demonstrated good return demonstration of activities.       Education provided re: Reviewed maintaining full knee extension with SLR's, use of hand rails for steps, Updated HEP.  No spiritual or educational barriers to learning provided      Assessment   Patient was able to progress with 4" steps without pain or instability noted. Pain has reduced with standing exercises and ROM activities. She is keeping up with her HEP well with " good compliance, suggested increased resistance at home with her program to match her therapy.  Patient in agreement.  Ursula is progressing well towards her goals and no updates to goals at this time. Will benefit from con't skilled care.    Anticipated barriers to physical therapy: none  Pt's spiritual, cultural and educational needs considered and pt agreeable to plan of care and goals.    Short Term:  1. Improved Left knee AROM -10 to 0.  Met 4/3/2018  2. Reduction of pain from 5/10 to 0/10. Met 4/3/2018    Long Term:  1. Improved stability bilat. Knees with steps with use of railing.  (progressing not met)  2. Independent HEP with good compliance. Met 4/3/2018    Plan   Continue with established Plan of Care towards Physical Therapy goals.       RO CAICEDO

## 2018-04-05 ENCOUNTER — LAB VISIT (OUTPATIENT)
Dept: LAB | Facility: HOSPITAL | Age: 78
End: 2018-04-05
Attending: INTERNAL MEDICINE
Payer: MEDICARE

## 2018-04-05 ENCOUNTER — CLINICAL SUPPORT (OUTPATIENT)
Dept: INFUSION THERAPY | Facility: HOSPITAL | Age: 78
End: 2018-04-05
Attending: INTERNAL MEDICINE
Payer: MEDICARE

## 2018-04-05 VITALS
TEMPERATURE: 97 F | DIASTOLIC BLOOD PRESSURE: 65 MMHG | WEIGHT: 138 LBS | SYSTOLIC BLOOD PRESSURE: 145 MMHG | BODY MASS INDEX: 25.24 KG/M2 | HEART RATE: 65 BPM

## 2018-04-05 DIAGNOSIS — Z79.01 ANTICOAGULATED ON COUMADIN: ICD-10-CM

## 2018-04-05 DIAGNOSIS — C50.912 MALIGNANT NEOPLASM OF BOTH BREASTS IN FEMALE, ESTROGEN RECEPTOR NEGATIVE, UNSPECIFIED SITE OF BREAST: ICD-10-CM

## 2018-04-05 DIAGNOSIS — Z17.1 MALIGNANT NEOPLASM OF BOTH BREASTS IN FEMALE, ESTROGEN RECEPTOR NEGATIVE, UNSPECIFIED SITE OF BREAST: Primary | ICD-10-CM

## 2018-04-05 DIAGNOSIS — Z17.1 MALIGNANT NEOPLASM OF BOTH BREASTS IN FEMALE, ESTROGEN RECEPTOR NEGATIVE, UNSPECIFIED SITE OF BREAST: ICD-10-CM

## 2018-04-05 DIAGNOSIS — C50.911 MALIGNANT NEOPLASM OF BOTH BREASTS IN FEMALE, ESTROGEN RECEPTOR NEGATIVE, UNSPECIFIED SITE OF BREAST: Primary | ICD-10-CM

## 2018-04-05 DIAGNOSIS — C50.911 MALIGNANT NEOPLASM OF BOTH BREASTS IN FEMALE, ESTROGEN RECEPTOR NEGATIVE, UNSPECIFIED SITE OF BREAST: ICD-10-CM

## 2018-04-05 DIAGNOSIS — C50.912 MALIGNANT NEOPLASM OF BOTH BREASTS IN FEMALE, ESTROGEN RECEPTOR NEGATIVE, UNSPECIFIED SITE OF BREAST: Primary | ICD-10-CM

## 2018-04-05 LAB
BASOPHILS # BLD AUTO: 0.03 K/UL
BASOPHILS NFR BLD: 0.4 %
DIFFERENTIAL METHOD: ABNORMAL
EOSINOPHIL # BLD AUTO: 0.1 K/UL
EOSINOPHIL NFR BLD: 1.4 %
ERYTHROCYTE [DISTWIDTH] IN BLOOD BY AUTOMATED COUNT: 15.2 %
HCT VFR BLD AUTO: 42.1 %
HGB BLD-MCNC: 13.9 G/DL
IMM GRANULOCYTES # BLD AUTO: 0.05 K/UL
IMM GRANULOCYTES NFR BLD AUTO: 0.6 %
INR PPP: 1.4
LYMPHOCYTES # BLD AUTO: 0.8 K/UL
LYMPHOCYTES NFR BLD: 10 %
MCH RBC QN AUTO: 27.7 PG
MCHC RBC AUTO-ENTMCNC: 33 G/DL
MCV RBC AUTO: 84 FL
MONOCYTES # BLD AUTO: 0.5 K/UL
MONOCYTES NFR BLD: 6.3 %
NEUTROPHILS # BLD AUTO: 6.7 K/UL
NEUTROPHILS NFR BLD: 81.3 %
NRBC BLD-RTO: 0 /100 WBC
PLATELET # BLD AUTO: 178 K/UL
PMV BLD AUTO: 9.7 FL
PROTHROMBIN TIME: 16.4 SEC
RBC # BLD AUTO: 5.02 M/UL
WBC # BLD AUTO: 8.28 K/UL

## 2018-04-05 PROCEDURE — 85025 COMPLETE CBC W/AUTO DIFF WBC: CPT

## 2018-04-05 PROCEDURE — 36592 COLLECT BLOOD FROM PICC: CPT

## 2018-04-05 PROCEDURE — 36415 COLL VENOUS BLD VENIPUNCTURE: CPT

## 2018-04-05 PROCEDURE — 85610 PROTHROMBIN TIME: CPT

## 2018-04-05 PROCEDURE — 96523 IRRIG DRUG DELIVERY DEVICE: CPT

## 2018-04-05 RX ORDER — HEPARIN SODIUM,PORCINE/PF 10 UNIT/ML
5 SYRINGE (ML) INTRAVENOUS
Status: DISCONTINUED | OUTPATIENT
Start: 2018-04-05 | End: 2019-08-28

## 2018-04-05 RX ORDER — HEPARIN SODIUM,PORCINE/PF 10 UNIT/ML
SYRINGE (ML) INTRAVENOUS
Status: DISCONTINUED
Start: 2018-04-05 | End: 2018-04-05 | Stop reason: HOSPADM

## 2018-04-05 RX ORDER — SODIUM CHLORIDE 0.9 % (FLUSH) 0.9 %
10 SYRINGE (ML) INJECTION
Status: DISCONTINUED | OUTPATIENT
Start: 2018-04-05 | End: 2019-08-28

## 2018-04-05 RX ADMIN — Medication 10 ML: at 09:04

## 2018-04-05 RX ADMIN — Medication 50 UNITS: at 09:04

## 2018-04-05 NOTE — LETTER
April 5, 2018      Gus Rajput MD  [address] [place] [id number]           [facility]  [address] [place] [address]  Phone: [phone]  Fax: [phone]          Patient: Ursula Rodríguez   MR Number: [id number]   YOB: 1940   Date of Visit: 4/5/2018       Dear Dr. Gus Rajput:    Thank you for referring Ursula Rodríguez to me for evaluation. Attached you will find relevant portions of my assessment and plan of care.    If you have questions, please do not hesitate to call me. I look forward to following Ursula Rodríguez along with you.    Sincerely,    Anju Jacobson RN    Enclosure  CC:  No Recipients    If you would like to receive this communication electronically, please contact [email or web address] or [phone] to request more information on [employer] [employer] access.    For providers and/or their staff who would like to refer a patient to [facility], please contact us through our one-stop-shop provider referral line, [facility], at [phone].    If you feel you have received this communication in error or would no longer like to receive these types of communications, please e-mail [email or web address]

## 2018-04-05 NOTE — PROGRESS NOTES
0830 Patient ambulated to opt vital signs taken. Accessed left chest infusaport with lyles neede and using steril techinque flushed with 10cc heplock flush good blood return labs drawn for pt inr and cbc. Flushed with 10cc heplock flush and 5 cc heparin. Removed lyles needle dressing applied to site. pateint left ambulating to private auto by self 1100

## 2018-04-06 ENCOUNTER — CLINICAL SUPPORT (OUTPATIENT)
Dept: REHABILITATION | Facility: HOSPITAL | Age: 78
End: 2018-04-06
Attending: ORTHOPAEDIC SURGERY
Payer: MEDICARE

## 2018-04-06 DIAGNOSIS — G89.29 CHRONIC PAIN OF BOTH KNEES: ICD-10-CM

## 2018-04-06 DIAGNOSIS — M25.561 CHRONIC PAIN OF BOTH KNEES: ICD-10-CM

## 2018-04-06 DIAGNOSIS — M25.562 CHRONIC PAIN OF BOTH KNEES: ICD-10-CM

## 2018-04-06 DIAGNOSIS — M25.669 DECREASED RANGE OF MOTION (ROM) OF KNEE: ICD-10-CM

## 2018-04-06 PROCEDURE — 97110 THERAPEUTIC EXERCISES: CPT

## 2018-04-06 NOTE — PROGRESS NOTES
Physical Therapy Daily Treatment Note   Name: Ursula Rodríguez  Clinic Number: [id number]    Treatment Date: 4-6-2018  Visit #:9 / 12  Authorization period Expiration: 12/31/18  Plan of Care Expiration: 4-  Precautions: falls    Time In: 8:00 AM  Time Out: 9:55  Total 1:1 Treatment Time: 55 min    Diagnosis:   Encounter Diagnoses   Name Primary?    Decreased range of motion (ROM) of knee     Chronic pain of both knees      Physician: Gus Rajput MD  Treatment Orders: PT Eval and Treat    Subjective   Patient reports min twinge in Right knee.    Pain Scale:  1/10 currently in bilat. Knees pre therapy; post therapy 0/10      Objective     Discussed Plan of Care with patient: Yes    Ursula received therapeutic exercises to develop strength and ROM for 45  minutes including:  QS 2 x 15  SAQ 2 x 15, 1.1/2 #  SLR's 2 x 15, 1.1/2#  Heel slides 2 x 10  Isometric Add x 30 with ball  Stand: Hip Abd-Add with 1/1/2#, Hip Ext., Ham Curls 2 x 15  Seated: knee ext, with 1 1/2 # 2 x 15  Step ups/downs 4# step x 10  Nustep, Lvl 2  x 15 min.    AROM: R knee 0-125 degrees, L knee 0-115 degrees  Strength: 5/5 with MMT for Quads/Hamstrings     Written Home Exercises Provided: Reviewed HEP with additional 1/2 #, Issued and reviewed HEP including Qs, Hs, SAQ, SLR, Isometric Add., sit and supine, Standing: Ham curls, Hip Extension, Hip Abduction with suggested with resistive weights.  Pt demo good understanding of the education provided. Ursula demonstrated good return demonstration of activities.     Education provided re: Reviewed maintaining full knee extension with SLR's, use of hand rails for steps, Updated HEP.  No spiritual or educational barriers to learning provided      Assessment   Ursula is making good progress with decreased pain with stairs at home per patient. She continues to have bilat. Shortened step lengths while walking, however, patient states it is  because of her vestibular problem and that she is walking to maintain head more neutral position and is being careful not to move too quickly.    Anticipated barriers to physical therapy: none  Pt's spiritual, cultural and educational needs considered and pt agreeable to plan of care and goals.    Short Term:  1. Improved Left knee AROM -10 to 0.  Met 4/3/2018  2. Reduction of pain from 5/10 to 0/10. Met 4/3/2018  3. AROM 0-125 degrees   Long Term:  1. Improved stability bilat. Knees with steps with use of railing.  (progressing not met)  2. Independent HEP with good compliance. Met 4/3/2018    Plan   Continue with established Plan of Care towards Physical Therapy goals.   Preparing for discharge next week. Patient returns to [facility] Mon. 4/9/2018. Will fax progress note.  Hali Conley PT    Plan   Continue with established Plan of Care towards Physical Therapy goals.   Discussed Plan of Care with patient: Yes    Hali Conley PT

## 2018-04-09 ENCOUNTER — OFFICE VISIT (OUTPATIENT)
Dept: ORTHOPEDICS | Facility: CLINIC | Age: 78
End: 2018-04-09
Payer: MEDICARE

## 2018-04-09 VITALS — BODY MASS INDEX: 25.4 KG/M2 | WEIGHT: 138 LBS | HEIGHT: 62 IN

## 2018-04-09 DIAGNOSIS — M17.12 ARTHRITIS OF KNEE, LEFT: ICD-10-CM

## 2018-04-09 DIAGNOSIS — M17.11 ARTHRITIS OF KNEE, RIGHT: Primary | ICD-10-CM

## 2018-04-09 PROCEDURE — 99499 UNLISTED E&M SERVICE: CPT | Mod: S$PBB,,, | Performed by: ORTHOPAEDIC SURGERY

## 2018-04-09 PROCEDURE — 99212 OFFICE O/P EST SF 10 MIN: CPT | Mod: 25,PBBFAC | Performed by: ORTHOPAEDIC SURGERY

## 2018-04-09 PROCEDURE — 20610 DRAIN/INJ JOINT/BURSA W/O US: CPT | Mod: 51,S$PBB,RT, | Performed by: ORTHOPAEDIC SURGERY

## 2018-04-09 PROCEDURE — 20610 DRAIN/INJ JOINT/BURSA W/O US: CPT | Mod: 50,PBBFAC | Performed by: ORTHOPAEDIC SURGERY

## 2018-04-09 PROCEDURE — 99999 PR PBB SHADOW E&M-EST. PATIENT-LVL II: CPT | Mod: 25,PBBFAC,, | Performed by: ORTHOPAEDIC SURGERY

## 2018-04-09 RX ADMIN — Medication 20 MG: at 11:04

## 2018-04-10 ENCOUNTER — CLINICAL SUPPORT (OUTPATIENT)
Dept: REHABILITATION | Facility: HOSPITAL | Age: 78
End: 2018-04-10
Attending: ORTHOPAEDIC SURGERY
Payer: MEDICARE

## 2018-04-10 DIAGNOSIS — M25.669 DECREASED RANGE OF MOTION (ROM) OF KNEE: ICD-10-CM

## 2018-04-10 DIAGNOSIS — M25.561 CHRONIC PAIN OF BOTH KNEES: ICD-10-CM

## 2018-04-10 DIAGNOSIS — G89.29 CHRONIC PAIN OF BOTH KNEES: ICD-10-CM

## 2018-04-10 DIAGNOSIS — M25.562 CHRONIC PAIN OF BOTH KNEES: ICD-10-CM

## 2018-04-10 PROCEDURE — 97110 THERAPEUTIC EXERCISES: CPT

## 2018-04-10 NOTE — PROGRESS NOTES
Physical Therapy Daily Treatment Note   Name: Ursula Rodríguez  Clinic Number: [id number]    Treatment Date: 4-  Visit #:10 / 12  Authorization period Expiration: 12/31/18  Plan of Care Expiration: 4-  Precautions: falls    Time In: 9:03 AM  Time Out: 10:05  Total 1:1 Treatment Time: 60    Diagnosis:   Encounter Diagnoses   Name Primary?    Decreased range of motion (ROM) of knee     Chronic pain of both knees      Physician: Gus Rajput MD  Treatment Orders: PT Eval and Treat    Subjective   Patient reports knees feel pretty good this morning; due for her knee injections on Mon.    Pain Scale: 0/10     Objective     Discussed Plan of Care with patient: Yes    Ursula received therapeutic exercises to develop strength and ROM for 45  minutes including:  QS 2 x 15  SAQ 2 x 15, 1.1/2 #  SLR's 2 x 15, 1.1/2#  Heel slides 2 x 10  Isometric Add x 30 with ball  Stand: Hip Abd-Add with 1/1/2#, Hip Ext., Ham Curls 2 x 15  Seated: knee ext, with 1 1/2 # 2 x 15  Step ups/downs 4# step x 10  Nustep, Lvl 2  x 15 min.    AROM: R knee 0-120 degrees L knee  0-120 degrees  Strength: 5/5 with MMT for Quads/Hamstrings     Written Home Exercises Provided: Reviewed HEP with additional 1/2 #, Issued and reviewed HEP including Qs, Hs, SAQ, SLR, Isometric Add., sit and supine, Standing: Ham curls, Hip Extension, Hip Abduction with suggested with resistive weights.  Pt demo good understanding of the education provided. Ursula demonstrated good return demonstration of activities.     Education provided re: Reviewed maintaining full knee extension with SLR's, use of hand rails for steps, Updated HEP.  No spiritual or educational barriers to learning provided      Assessment   Ursula is making good progress with decreased pain with stairs at home per patient. She continues to have bilat. Shortened step lengths while walking, however, patient states it is because of her  vestibular problem and that she is walking to maintain head more neutral position and is being careful not to move too quickly.    Anticipated barriers to physical therapy: none  Pt's spiritual, cultural and educational needs considered and pt agreeable to plan of care and goals.    Short Term:  1. Improved Left knee AROM -10 to 0.  Met 4/3/2018  2. Reduction of pain from 5/10 to 0/10. Met 4/3/2018  3. AROM 0-125 degrees bilat. Knees in 2 weeks  Long Term:  1. Improved stability bilat. Knees with steps with use of railing.  (progressing not met)  2. Independent HEP with good compliance. Met 4/3/2018    Plan   Continue with established Plan of Care towards Physical Therapy goals.   Preparing for discharge next week. Patient returns to MD Mon. 4/9/2018. Will fax progress note.  Hali Conley PT    Plan   Continue with established Plan of Care towards Physical Therapy goals.   Discussed Plan of Care with patient: Yes    Hali Conley PT

## 2018-04-12 RX ORDER — HYALURONATE SODIUM 20 MG/2 ML
20 SYRINGE (ML) INTRAARTICULAR
Status: DISCONTINUED | OUTPATIENT
Start: 2018-04-09 | End: 2018-04-12 | Stop reason: HOSPADM

## 2018-04-12 NOTE — PROGRESS NOTES
Past Medical History:   Diagnosis Date    Breast cancer     Cancer     BREAST     Diabetes mellitus, type 2     GERD (gastroesophageal reflux disease)     Hypertension     Recurrent urinary tract infection     Vertigo        Past Surgical History:   Procedure Laterality Date    HEMORRHOID SURGERY  1968    HYSTERECTOMY  1970    MASTECTOMY Right 1994    MASTECTOMY Left 2013    BREAST CANCER    TUMOR REMOVAL Left 1994    EAR       Current Outpatient Prescriptions   Medication Sig    amlodipine (NORVASC) 5 MG tablet Take 5 mg by mouth once daily.    atenolol (TENORMIN) 50 MG tablet Take 50 mg by mouth once daily.    lisinopril 10 MG tablet Take 10 mg by mouth once daily.    SIMVASTATIN (ZOCOR ORAL) Take by mouth.    TRIAMTERENE-HYDROCHLOROTHIAZID ORAL Take 75 mg by mouth once daily.    warfarin (COUMADIN) 1 MG tablet Take 1 mg by mouth once daily.    ALPRAZolam (XANAX) 0.5 MG tablet TAKE ONE TABLET BY MOUTH TWICE DAILY AS NEEDED FOR ANXIETY    mirabegron (MYRBETRIQ) 50 mg Tb24 Take 1 tablet (50 mg total) by mouth once daily.     Current Facility-Administered Medications   Medication    heparin, porcine (PF) injection 50 Units    sodium chloride 0.9% flush 10 mL       Review of patient's allergies indicates:   Allergen Reactions    Iodine and iodide containing products     Ditropan [oxybutynin chloride] Palpitations and Other (See Comments)     Made patient weak       Family History   Problem Relation Age of Onset    Cancer Mother     Cancer Sister     Heart disease Brother     Cancer Maternal Grandmother        Social History     Social History    Marital status:      Spouse name: N/A    Number of children: N/A    Years of education: N/A     Occupational History    Not on file.     Social History Main Topics    Smoking status: Never Smoker    Smokeless tobacco: Never Used    Alcohol use No    Drug use: No    Sexual activity: Not on file     Other Topics Concern    Not on file      Social History Narrative    No narrative on file       Chief Complaint:   Chief Complaint   Patient presents with    Injections     EUFLEXXA 2/3 BILATERAL KNEE       Consulting Physician: No ref. provider found    History of present illness: This is a 68-year-old woman with a complaint of bilateral knee pain. Previous injections helped. Pain has started to return 8/10. No injury. Worse with use.    Review of Systems:    Constitution: Denies chills, fever, and sweats.    HENT: Denies headaches or blurry vision.    Cardiovascular: Denies chest pain or irregular heart beat.    Respiratory: Denies cough or shortness of breath.    Gastrointestinal: Denies abdominal pain, nausea, or vomiting.    Musculoskeletal:  Denies muscle cramps.    Neurological: Denies dizziness or focal weakness.    Psychiatric/Behavioral: Normal mental status.    Hematologic/Lymphatic: Denies bleeding problem or easy bruising/bleeding.    Skin: Denies rash or suspicious lesions.      Examination:    Vital Signs:    There were no vitals filed for this visit.    Body mass index is 25.24 kg/m².    This a well-developed, well nourished patient in no acute distress.    Alert and oriented and cooperative to examination.       Physical Exam: Left Knee Exam    Gait   Near normal    Skin  Rash:   None  Scars:   None    Inspection  Erythema:  None  Ecchymosis:  None  Effusion:  None  Masses:  None  Lymphadenopathy: None    Range of Motion: 0 to 130°    Medial Joint : mild  Lateral Joint : No    Patellofemoral Tenderness: Yes  Patellofemoral Crepitus: Yes    Strength:  5/5    Pulses:  Palpable  Sensation:  Intact    Right Knee Exam    Gait   Near normal    Skin  Rash:   None  Scars:   None    Inspection  Erythema:  None  Ecchymosis:  None  Effusion:  None  Masses:  None  Lymphadenopathy: None    Range of Motion: 0 to 130°    Medial Joint : mild  Lateral Joint : No     Patellofemoral  Tenderness: Yes  Patellofemoral Crepitus: Yes    Strength:  5/5    Pulses:  Palpable  Sensation:  Intact          Imaging: 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     Assessment: Arthritis of knee, right  -     Large Joint Aspiration/Injection    Arthritis of knee, left  -     Large Joint Aspiration/Injection        Plan: Euflexxa series.    DISCLAIMER: This note may have been dictated using voice recognition software and may contain grammatical errors.     NOTE: Consult report sent to referring provider via [employer] EMR.

## 2018-04-12 NOTE — PROCEDURES
Large Joint Aspiration/Injection  Date/Time: 4/9/2018 11:20 AM  Performed by: PALOMO RODRIGUEZ  Authorized by: PALOMO RODRIGUEZ     Consent Done?:  Yes (Verbal)  Indications:  Pain  Timeout: Prior to procedure the correct patient, procedure, and site was verified      Location:  Knee  Site:  R knee and L knee  Prep: Patient was prepped and draped in usual sterile fashion    Approach:  Anteromedial  Medications:  20 mg EUFLEXXA 10 mg/mL(mw 2.4 -3.6 million); 20 mg EUFLEXXA 10 mg/mL(mw 2.4 -3.6 million)

## 2018-04-16 ENCOUNTER — OFFICE VISIT (OUTPATIENT)
Dept: ORTHOPEDICS | Facility: CLINIC | Age: 78
End: 2018-04-16
Payer: MEDICARE

## 2018-04-16 VITALS — HEIGHT: 62 IN | BODY MASS INDEX: 25.4 KG/M2 | WEIGHT: 138 LBS

## 2018-04-16 DIAGNOSIS — M17.12 ARTHRITIS OF KNEE, LEFT: ICD-10-CM

## 2018-04-16 DIAGNOSIS — M17.11 ARTHRITIS OF KNEE, RIGHT: Primary | ICD-10-CM

## 2018-04-16 PROCEDURE — 20610 DRAIN/INJ JOINT/BURSA W/O US: CPT | Mod: 50,PBBFAC | Performed by: ORTHOPAEDIC SURGERY

## 2018-04-16 PROCEDURE — 99999 PR PBB SHADOW E&M-EST. PATIENT-LVL II: CPT | Mod: 25,PBBFAC,, | Performed by: ORTHOPAEDIC SURGERY

## 2018-04-16 PROCEDURE — 99499 UNLISTED E&M SERVICE: CPT | Mod: S$PBB,,, | Performed by: ORTHOPAEDIC SURGERY

## 2018-04-16 PROCEDURE — 99212 OFFICE O/P EST SF 10 MIN: CPT | Mod: 25,PBBFAC | Performed by: ORTHOPAEDIC SURGERY

## 2018-04-16 RX ORDER — HYALURONATE SODIUM 20 MG/2 ML
20 SYRINGE (ML) INTRAARTICULAR
Status: DISCONTINUED | OUTPATIENT
Start: 2018-04-16 | End: 2018-04-16 | Stop reason: HOSPADM

## 2018-04-16 RX ADMIN — Medication 20 MG: at 10:04

## 2018-04-16 NOTE — PROGRESS NOTES
Past Medical History:   Diagnosis Date    Breast cancer     Cancer     BREAST     Diabetes mellitus, type 2     GERD (gastroesophageal reflux disease)     Hypertension     Recurrent urinary tract infection     Vertigo        Past Surgical History:   Procedure Laterality Date    HEMORRHOID SURGERY  1968    HYSTERECTOMY  1970    MASTECTOMY Right 1994    MASTECTOMY Left 2013    BREAST CANCER    TUMOR REMOVAL Left 1994    EAR       Current Outpatient Prescriptions   Medication Sig    amlodipine (NORVASC) 5 MG tablet Take 5 mg by mouth once daily.    atenolol (TENORMIN) 50 MG tablet Take 50 mg by mouth once daily.    lisinopril 10 MG tablet Take 10 mg by mouth once daily.    SIMVASTATIN (ZOCOR ORAL) Take by mouth.    TRIAMTERENE-HYDROCHLOROTHIAZID ORAL Take 75 mg by mouth once daily.    warfarin (COUMADIN) 1 MG tablet Take 1 mg by mouth once daily.    ALPRAZolam (XANAX) 0.5 MG tablet TAKE ONE TABLET BY MOUTH TWICE DAILY AS NEEDED FOR ANXIETY    mirabegron (MYRBETRIQ) 50 mg Tb24 Take 1 tablet (50 mg total) by mouth once daily.     Current Facility-Administered Medications   Medication    heparin, porcine (PF) injection 50 Units    sodium chloride 0.9% flush 10 mL       Review of patient's allergies indicates:   Allergen Reactions    Iodine and iodide containing products     Ditropan [oxybutynin chloride] Palpitations and Other (See Comments)     Made patient weak       Family History   Problem Relation Age of Onset    Cancer Mother     Cancer Sister     Heart disease Brother     Cancer Maternal Grandmother        Social History     Social History    Marital status:      Spouse name: N/A    Number of children: N/A    Years of education: N/A     Occupational History    Not on file.     Social History Main Topics    Smoking status: Never Smoker    Smokeless tobacco: Never Used    Alcohol use No    Drug use: No    Sexual activity: Not on file     Other Topics Concern    Not on file      Social History Narrative    No narrative on file       Chief Complaint:   Chief Complaint   Patient presents with    Injections     EUFLEXXA 3/3 BILATERAL KNEE       Consulting Physician: No ref. provider found    History of present illness: This is a 68-year-old woman with a complaint of bilateral knee pain. Previous injections helped. Pain has started to return 8/10. No injury. Worse with use.    Review of Systems:    Constitution: Denies chills, fever, and sweats.    HENT: Denies headaches or blurry vision.    Cardiovascular: Denies chest pain or irregular heart beat.    Respiratory: Denies cough or shortness of breath.    Gastrointestinal: Denies abdominal pain, nausea, or vomiting.    Musculoskeletal:  Denies muscle cramps.    Neurological: Denies dizziness or focal weakness.    Psychiatric/Behavioral: Normal mental status.    Hematologic/Lymphatic: Denies bleeding problem or easy bruising/bleeding.    Skin: Denies rash or suspicious lesions.      Examination:    Vital Signs:    There were no vitals filed for this visit.    Body mass index is 25.24 kg/m².    This a well-developed, well nourished patient in no acute distress.    Alert and oriented and cooperative to examination.       Physical Exam: Left Knee Exam    Gait   Near normal    Skin  Rash:   None  Scars:   None    Inspection  Erythema:  None  Ecchymosis:  None  Effusion:  None  Masses:  None  Lymphadenopathy: None    Range of Motion: 0 to 130°    Medial Joint : mild  Lateral Joint : No    Patellofemoral Tenderness: Yes  Patellofemoral Crepitus: Yes    Strength:  5/5    Pulses:  Palpable  Sensation:  Intact    Right Knee Exam    Gait   Near normal    Skin  Rash:   None  Scars:   None    Inspection  Erythema:  None  Ecchymosis:  None  Effusion:  None  Masses:  None  Lymphadenopathy: None    Range of Motion: 0 to 130°    Medial Joint : mild  Lateral Joint : No     Patellofemoral  Tenderness: Yes  Patellofemoral Crepitus: Yes    Strength:  5/5    Pulses:  Palpable  Sensation:  Intact          Imaging: 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     Assessment: Arthritis of knee, right  -     Large Joint Aspiration/Injection    Arthritis of knee, left  -     Large Joint Aspiration/Injection        Plan: Euflexxa series. PT helping so will continue.    DISCLAIMER: This note may have been dictated using voice recognition software and may contain grammatical errors.     NOTE: Consult report sent to referring provider via [employer] EMR.

## 2018-04-16 NOTE — PROCEDURES
Large Joint Aspiration/Injection  Date/Time: 4/16/2018 10:16 AM  Performed by: PALOMO RODRIGUEZ  Authorized by: PALOMO RODRIGUEZ     Consent Done?:  Yes (Verbal)  Indications:  Pain  Timeout: Prior to procedure the correct patient, procedure, and site was verified      Location:  Knee  Site:  R knee and L knee  Prep: Patient was prepped and draped in usual sterile fashion    Approach:  Anteromedial  Medications:  20 mg EUFLEXXA 10 mg/mL(mw 2.4 -3.6 million); 20 mg EUFLEXXA 10 mg/mL(mw 2.4 -3.6 million)

## 2018-04-17 ENCOUNTER — CLINICAL SUPPORT (OUTPATIENT)
Dept: REHABILITATION | Facility: HOSPITAL | Age: 78
End: 2018-04-17
Attending: ORTHOPAEDIC SURGERY
Payer: MEDICARE

## 2018-04-17 DIAGNOSIS — M25.669 DECREASED RANGE OF MOTION (ROM) OF KNEE: ICD-10-CM

## 2018-04-17 PROCEDURE — 97110 THERAPEUTIC EXERCISES: CPT

## 2018-04-17 NOTE — PROGRESS NOTES
Physical Therapy Daily Treatment Note   Name: Ursula Rodríguez  Clinic Number: [id number]    Treatment Date: 4-  Visit #:11 / 12  Authorization period Expiration: 12/31/18  Plan of Care Expiration: 4-  Precautions: falls    Time In: 9:00 AM  Time Out: 9:45  Total 1:1 Treatment Time: 45 min    Diagnosis:   Encounter Diagnosis   Name Primary?    Decreased range of motion (ROM) of knee      Physician: Gus Rajput MD  Treatment Orders: PT Eval and Treat    Subjective   Patient reports  Receiving injection to both knees yesterday; today; reports being able to walk up steps alternating feet now vs one at a time.  Pain Scale: 0/10     Objective     Discussed Plan of Care with patient: Yes    Ursula received therapeutic exercises to develop strength and ROM for 45  minutes including:  QS 2 x 15  SAQ 2 x 15, 1.1/2 #  SLR's 2 x 15, 1.1/2#  Heel slides 2 x 10  Isometric Add x 30 with ball  Stand: Hip Abd-Add with 1/1/2#, Hip Ext., Ham Curls 2 x 15  Seated: knee ext, with 1 1/2 # 2 x 15  Step ups/downs 4# step x 10  Nustep, Lvl 2  x 15 min.    Reassesed edema margarito. Circumferentially Right midpatella 40 cm vs. Left 38.5 cm    AROM: R knee 0-120 degrees L knee  0-120 degrees  Strength: 5/5 with MMT for Quads/Hamstrings     Written Home Exercises Provided: Reviewed HEP with additional 1/2 #, Issued and reviewed HEP including Qs, HS, SAQ, SLR, Isometric Add., sit and supine, Standing: Ham curls, Hip Extension, Hip Abduction with suggested with resistive weights.  Pt demo good understanding of the education provided. Ursula demonstrated good return demonstration of activities.     Education provided re: Reviewed maintaining full knee extension with SLR's, use of hand rails for steps, Updated HEP.  No spiritual or educational barriers to learning provided      Assessment   Ursula is making good progress with decreased pain with stairs at home per patient. She  continues to have bilat. Shortened step lengths while walking, however, patient states it is because of her vestibular problem and that she is walking to maintain head more neutral position and is being careful not to move too quickly. Encouraged patient to exercise her left leg as well as she has arthritis in both knees.  Patient verbalized understanding. She still is search for resistive weights for home use. Noting min edema remaining (see above).    Anticipated barriers to physical therapy: none  Pt's spiritual, cultural and educational needs considered and pt agreeable to plan of care and goals.    Short Term:  1. Improved Left knee AROM -10 to 0.  Met 4/3/2018  2. Reduction of pain from 5/10 to 0/10. Met 4/3/2018  3. AROM 0-125 degrees bilat. Knees in 2 weeks  4. Resolution of edema in Right knee in 3 weeks.  Long Term:  1. Improved stability bilat. Knees with steps with use of railing.  (progressing not met)  2. Independent HEP with good compliance. Met 4/3/2018    Plan   Continue with established Plan of Care towards Physical Therapy goals.   Received new orders from Dr. Rajput for continued PT for 6 additional weeks at 1-2 times weekly.  Hali Conley PT    Plan   Continue with established Plan of Care towards Physical Therapy goals.   Discussed Plan of Care with patient: Yes    Hali Conley PT

## 2018-04-20 ENCOUNTER — CLINICAL SUPPORT (OUTPATIENT)
Dept: REHABILITATION | Facility: HOSPITAL | Age: 78
End: 2018-04-20
Attending: ORTHOPAEDIC SURGERY
Payer: MEDICARE

## 2018-04-20 DIAGNOSIS — M25.561 CHRONIC PAIN OF BOTH KNEES: ICD-10-CM

## 2018-04-20 DIAGNOSIS — M25.562 CHRONIC PAIN OF BOTH KNEES: ICD-10-CM

## 2018-04-20 DIAGNOSIS — M25.669 DECREASED RANGE OF MOTION (ROM) OF KNEE: ICD-10-CM

## 2018-04-20 DIAGNOSIS — G89.29 CHRONIC PAIN OF BOTH KNEES: ICD-10-CM

## 2018-04-20 PROCEDURE — 97110 THERAPEUTIC EXERCISES: CPT

## 2018-04-20 NOTE — PROGRESS NOTES
Physical Therapy Daily Treatment Note   Name: Ursula Rodríguez  Clinic Number: [id number]    Treatment Date: 4-  Visit #:12 / 12  Authorization period Expiration: 12/31/18  Plan of Care Expiration: 4-  Precautions: falls    Time In: 9:00 AM  Time Out: 9:45  Total 1:1 Treatment Time: 45 min    Diagnosis:   Encounter Diagnoses   Name Primary?    Decreased range of motion (ROM) of knee     Chronic pain of both knees      Physician: Gus Rajput MD  Treatment Orders: PT Eval and Treat    Subjective   Patient reports  No pain i'm feeling just fine today. Every no and then my legs will swell  Pain Scale: 0/10     Objective     Discussed Plan of Care with patient: Yes    Ursula received therapeutic exercises to develop strength and ROM for 45  minutes including:  QS 2 x 15  SAQ 2 x 15, 1.1/2 #  SLR's 2 x 15, 1.1/2#  Heel slides 2 x 10  Isometric Add x 30 with ball  Stand: Hip Abd-Add with 1/1/2#, Hip Ext., Ham Curls 2 x 15  Seated: knee ext, with 1 1/2 # 2 x 15  Step ups/downs 4# step x 10  Nustep, Lvl 2  x 15 min.      Education provided re: Reviewed maintaining full knee extension with SLR's, use of hand rails for steps, Updated HEP.  No spiritual or educational barriers to learning provided      Assessment   Ursula was able to complete all therex without complaints of pain or complications with B knees. Pt states no pain post treatment session. Min VC's for correct technique. Gait appeared stable throughout course of treatment continue to progress per pt tolerance.     Anticipated barriers to physical therapy: none  Pt's spiritual, cultural and educational needs considered and pt agreeable to plan of care and goals.    Short Term:  1. Improved Left knee AROM -10 to 0.  Met 4/3/2018  2. Reduction of pain from 5/10 to 0/10. Met 4/3/2018  3. AROM 0-125 degrees bilat. Knees in 2 weeks  4. Resolution of edema in Right knee in 3 weeks.  Long Term:  1.  Improved stability bilat. Knees with steps with use of railing.  (progressing not met)  2. Independent HEP with good compliance. Met 4/3/2018    Plan   Continue with established Plan of Care towards Physical Therapy goals.   Received new orders from Dr. Rajput for continued PT for 6 additional weeks at 1-2 times weekly.  Emir Carlos PTA    Plan   Continue with established Plan of Care towards Physical Therapy goals.   Discussed Plan of Care with patient: Yes    Emir Carlos PTA

## 2018-04-25 ENCOUNTER — CLINICAL SUPPORT (OUTPATIENT)
Dept: REHABILITATION | Facility: HOSPITAL | Age: 78
End: 2018-04-25
Attending: ORTHOPAEDIC SURGERY
Payer: MEDICARE

## 2018-04-25 DIAGNOSIS — G89.29 CHRONIC PAIN OF BOTH KNEES: ICD-10-CM

## 2018-04-25 DIAGNOSIS — M25.562 CHRONIC PAIN OF BOTH KNEES: ICD-10-CM

## 2018-04-25 DIAGNOSIS — M25.561 CHRONIC PAIN OF BOTH KNEES: ICD-10-CM

## 2018-04-25 PROCEDURE — 97110 THERAPEUTIC EXERCISES: CPT

## 2018-04-25 NOTE — PROGRESS NOTES
Physical Therapy Daily Treatment Note   Name: Ursula Rodríguez  Clinic Number: [id number]    Treatment Date: 4-  Visit #:12 / 12  Authorization period Expiration: 12/31/18  Plan of Care Expiration: 4-  Precautions: falls    Time In: 8:00 AM  Time Out: 9:05  Total 1:1 Treatment Time: 45 min    Diagnosis:   No diagnosis found.  Physician: Gus Rajput MD  Treatment Orders: PT Eval and Treat    Subjective   Patient reports  A little pain/weakness feeling in R knee. Nothing new, it's overall better than it used to be.   Pain Scale: 0/10     Objective     Discussed Plan of Care with patient: Yes    Ursula received therapeutic exercises to develop strength and ROM for 45  minutes including:  QS 2 x 15  SAQ 2 x 15, 1.1/2 #  SLR's 2 x 15, 1.1/2#  Heel slides 2 x 10  Isometric Add x 30 with ball  Stand: Hip Abd-Add with 1/1/2#, Hip Ext., Ham Curls 2 x 15  Seated: knee ext, with 1 1/2 # 2 x 15  Step ups/downs 4# step x 10  Nustep, Lvl 2  x 15 min.      Education provided re: Reviewed maintaining full knee extension with SLR's, use of hand rails for steps, Updated HEP.  No spiritual or educational barriers to learning provided      Assessment   Ursula was able to complete all therex without complaints of pain or complications with B knees. Pt states no pain post treatment session. Min VC's for correct technique. Gait appeared stable throughout course of treatment continue to progress per pt tolerance. However, during step ups on R LE pt did report fatigue/weakness feeling in quad. Pt states no pain upon departure.     Anticipated barriers to physical therapy: none  Pt's spiritual, cultural and educational needs considered and pt agreeable to plan of care and goals.    Short Term:  1. Improved Left knee AROM -10 to 0.  Met 4/3/2018  2. Reduction of pain from 5/10 to 0/10. Met 4/3/2018  3. AROM 0-125 degrees bilat. Knees in 2 weeks  4. Resolution of edema in  Right knee in 3 weeks.  Long Term:  1. Improved stability bilat. Knees with steps with use of railing.  (progressing not met)  2. Independent HEP with good compliance. Met 4/3/2018    Plan   Continue with established Plan of Care towards Physical Therapy goals.   Received new orders from Dr. Rajput for continued PT for 6 additional weeks at 1-2 times weekly.  Emir Carlos PTA    Plan   Continue with established Plan of Care towards Physical Therapy goals.   Discussed Plan of Care with patient: Yes    Emir Carlos PTA

## 2018-04-30 ENCOUNTER — CLINICAL SUPPORT (OUTPATIENT)
Dept: REHABILITATION | Facility: HOSPITAL | Age: 78
End: 2018-04-30
Attending: ORTHOPAEDIC SURGERY
Payer: MEDICARE

## 2018-04-30 DIAGNOSIS — G89.29 CHRONIC PAIN OF BOTH KNEES: ICD-10-CM

## 2018-04-30 DIAGNOSIS — M25.561 CHRONIC PAIN OF BOTH KNEES: ICD-10-CM

## 2018-04-30 DIAGNOSIS — M25.562 CHRONIC PAIN OF BOTH KNEES: ICD-10-CM

## 2018-04-30 DIAGNOSIS — M25.669 DECREASED RANGE OF MOTION (ROM) OF KNEE: ICD-10-CM

## 2018-04-30 PROCEDURE — 97110 THERAPEUTIC EXERCISES: CPT

## 2018-04-30 NOTE — PROGRESS NOTES
Physical Therapy Daily Treatment Note   Name: Ursula Rodríguez  Clinic Number: [id number]    Treatment Date: 4-  Visit #:  Authorization period Expiration: 12/31/18  Plan of Care Expiration: 4-  Precautions: falls    Time In: 9:00 AM  Time Out: 10:05  Total 1:1 Treatment Time: 50 min    Diagnosis:   Encounter Diagnoses   Name Primary?    Decreased range of motion (ROM) of knee     Chronic pain of both knees      Physician: Gus Rajput MD  Treatment Orders: PT Eval and Treat    Subjective   Patient reports  A little pain/weakness feeling in R knee. Nothing new, it's overall better than it used to be.   Pain Scale: 0/10     Objective     Discussed Plan of Care with patient: Yes    Ursula received therapeutic exercises to develop strength and ROM for 50  minutes including:  QS 2 x 15  SAQ 2 x 15, 1.1/2 #  SLR's 2 x 15, 1.1/2#  Heel slides 2 x 10  Isometric Add x 30 with ball  Stand: Hip Abd-Add with 1/1/2#, Hip Ext., Ham Curls 2 x 15  Seated: knee ext, with 1 1/2 # 2 x 15  Step ups/downs 4# step 2 x 10  Nustep, Lvl 2  x 15 min.  Squats at mat: 2 x 10        Education provided re: Reviewed maintaining full knee extension with SLR's, use of hand rails for steps, Updated HEP.  No spiritual or educational barriers to learning provided      Assessment   Ursula reports no pain with exercises or with daily activities. Able to increase reps with step ups and progress with squats at mat. VC's for correct technique with therex no complications or pain reported post treatment session. B ROM appears WNL with no apparent deficits.     Anticipated barriers to physical therapy: none  Pt's spiritual, cultural and educational needs considered and pt agreeable to plan of care and goals.    Short Term:  1. Improved Left knee AROM -10 to 0.  Met 4/3/2018  2. Reduction of pain from 5/10 to 0/10. Met 4/3/2018  3. AROM 0-125 degrees bilat. Knees in 2 weeks  4.  Resolution of edema in Right knee in 3 weeks.  Long Term:  1. Improved stability bilat. Knees with steps with use of railing.  (progressing not met)  2. Independent HEP with good compliance. Met 4/3/2018    Plan   Continue with established Plan of Care towards Physical Therapy goals.   Received new orders from Dr. Rajput for continued PT for 6 additional weeks at 1-2 times weekly.  Emir Carlos PTA    Plan   Continue with established Plan of Care towards Physical Therapy goals.   Discussed Plan of Care with patient: Yes    Emir Carlos PTA

## 2018-05-03 ENCOUNTER — INFUSION (OUTPATIENT)
Dept: INFUSION THERAPY | Facility: HOSPITAL | Age: 78
End: 2018-05-03
Attending: ORTHOPAEDIC SURGERY
Payer: MEDICARE

## 2018-05-03 VITALS
HEART RATE: 64 BPM | HEIGHT: 61 IN | TEMPERATURE: 98 F | SYSTOLIC BLOOD PRESSURE: 147 MMHG | DIASTOLIC BLOOD PRESSURE: 65 MMHG | WEIGHT: 138 LBS | BODY MASS INDEX: 26.06 KG/M2

## 2018-05-03 DIAGNOSIS — Z79.01 ANTICOAGULATED ON COUMADIN: Primary | ICD-10-CM

## 2018-05-03 LAB
INR PPP: 2.1
PROTHROMBIN TIME: 23.9 SEC

## 2018-05-03 PROCEDURE — 96523 IRRIG DRUG DELIVERY DEVICE: CPT

## 2018-05-03 PROCEDURE — 36592 COLLECT BLOOD FROM PICC: CPT

## 2018-05-03 PROCEDURE — 96375 TX/PRO/DX INJ NEW DRUG ADDON: CPT

## 2018-05-03 PROCEDURE — 85610 PROTHROMBIN TIME: CPT

## 2018-05-03 PROCEDURE — 96374 THER/PROPH/DIAG INJ IV PUSH: CPT

## 2018-05-03 RX ORDER — HEPARIN 100 UNIT/ML
5 SYRINGE INTRAVENOUS
Status: DISCONTINUED | OUTPATIENT
Start: 2018-05-03 | End: 2019-08-28

## 2018-05-03 NOTE — PROGRESS NOTES
0850 PATIENT AMBULATED TO OPT VITAL SIGNS TAKEN. MYRANDA RN  0900 PATIENT HERE FOR LABS AND INFUSAPORT FLUSH. ACCESSED LEFT CHEST INFUSAPORT WITH STERILE TECHNIQUE AND MONSALVE NEEDLE GOOD BLOOD RETURN FLUSHED WITH 10 CC HEPLOCK FLUSH. LABS DRAWN FLUSHED WITH 10 CC HEPLOCK FLUSH AND AND 5CC HEPARIN. REMOVED MONSALVE NEEDLE AND DRESSING PLACED TO SITE. MYRANDA RN  0919 PATIENT LEFT AMBULATING TO PRIVATE AUTO ALONE. MYRANDA FISCHER

## 2018-05-03 NOTE — PROGRESS NOTES
"Physical Therapy Plan Of Care      Ursula Rodríguez 1940  MRN: [id number]    Today's date: 5/3/2018  Diagnosis: OA bilateral knees  Onset Date: Referred 4/6/2018    Area To Be Treated: bilat. knees  Precautions: none    Treatment Plan:    Frequency: 1-2 x/week    Duration: 4-6 weeks    Recheck: 30 days    Discussed Plan of Care with patient: Yes    Active Range of Motion: Right Knee 0 to 125 degrees Left knee 0-120 degrees      Lower Extremity Strength:  Right Quads 4/5 Hamstrings 4/5; Left Quads 4/5 Hamstrings 4/5      Gait: Patient ambulates with wide based gait without assistive devices secondary to vestibular problem she has. Have discussed this with her and she feels more stable maintaining her gait wider than normal to assist with her intermittant vertigo.      Goals:  Short Term (to be achieved in 3 weeks):  1. Improve strength of bilateral Quads, Hamstrings to 4+/5 with improved stair ascension/descension with 1 handed railing support  2. Improved dynamic stability on smooth and uneven surfaces.  3. Independent HEP with good compliance,    Long Term (to be achieved by 6 weeks)  1. Improved strength of bilat. Quads/Hamstrings to 5/5 for normalized gait with reciprocal stairs  2. Improved Left knee AROM 0-125 degrees with improved functional ADL's  3. Return to PLOA including ADL's at discharge    Assessment:  Patient continues to be motivated with her therapy at clinic. She continues to have abnormal gait secondary to her intermittant vertigo with increased base of support and lateral shifting to both sides.  Requires single leg steps with multiple stairs secondary to feeling "unsure of her Left knee support"      Hali Conley, PT  Signature of Therapist    I certify the need for these services furnished under this plan of treatment and while under my care.______________________________                           Physician/Referring Practitioner  Date of Signature:    "

## 2018-05-04 ENCOUNTER — CLINICAL SUPPORT (OUTPATIENT)
Dept: REHABILITATION | Facility: HOSPITAL | Age: 78
End: 2018-05-04
Attending: ORTHOPAEDIC SURGERY
Payer: MEDICARE

## 2018-05-04 DIAGNOSIS — G89.29 CHRONIC PAIN OF BOTH KNEES: ICD-10-CM

## 2018-05-04 DIAGNOSIS — M25.561 CHRONIC PAIN OF BOTH KNEES: ICD-10-CM

## 2018-05-04 DIAGNOSIS — M25.562 CHRONIC PAIN OF BOTH KNEES: ICD-10-CM

## 2018-05-04 PROCEDURE — 97110 THERAPEUTIC EXERCISES: CPT

## 2018-05-04 NOTE — PROGRESS NOTES
Physical Therapy Daily Treatment Note   Name: Ursula Rodríguez  Clinic Number: [id number]    Treatment Date: 4-  Visit #:2/12  Authorization period Expiration: 12/31/18  Plan of Care Expiration: 4-  Precautions: falls    Time In: 8:50 AM  Time Out: 09:50  Total 1:1 Treatment Time: 50 min    Diagnosis:   No diagnosis found.  Physician: Gus Rajput MD  Treatment Orders: PT Eval and Treat    Subjective   Patient reports  A little pain/weakness feeling in R knee. Nothing new, it's overall better than it used to be.   Pain Scale: 0/10     Objective     Discussed Plan of Care with patient: Yes    Ursula received therapeutic exercises to develop strength and ROM for 50  minutes including:  QS 2 x 15  SAQ 2 x 15, 1.1/2 #  SLR's 2 x 15, 1.1/2#  Heel slides 2 x 10  Isometric Add x 30 with ball  Stand: Hip Abd-Add with 1/1/2#, Hip Ext., Ham Curls, SLR  2 x 15  Seated: knee ext, with 1 1/2 # 2 x 15  Step ups/downs 4# step and Lateral 2 x 10  Nustep, Lvl 2  x 15 min.  Squats at mat: 2 x 10  Resisted abd with RTB in hooklying: 2 x 10        Education provided re: Reviewed maintaining full knee extension with SLR's, use of hand rails for steps, Updated HEP.  No spiritual or educational barriers to learning provided      Assessment   Ursula reports no pain with exercises or with daily activities. Able to progress PT with lateral step ups, SLR in standing, and resisted abd with no complications or reports of pain. Pt progressing well with PT showing increase in strength and stability. Pt reports decreased pain and instability with stairs. No deficits noted.     Anticipated barriers to physical therapy: none  Pt's spiritual, cultural and educational needs considered and pt agreeable to plan of care and goals.    Short Term:  1. Improved Left knee AROM -10 to 0.  Met 4/3/2018  2. Reduction of pain from 5/10 to 0/10. Met 4/3/2018  3. AROM 0-125 degrees bilat. Knees  in 2 weeks  4. Resolution of edema in Right knee in 3 weeks.  Long Term:  1. Improved stability bilat. Knees with steps with use of railing.  (progressing not met)  2. Independent HEP with good compliance. Met 4/3/2018    Plan   Continue with established Plan of Care towards Physical Therapy goals.   Received new orders from Dr. Rajput for continued PT for 6 additional weeks at 1-2 times weekly.  Emir Carlos PTA    Plan   Continue with established Plan of Care towards Physical Therapy goals.   Discussed Plan of Care with patient: Yes    Emir Carlos PTA

## 2018-05-07 ENCOUNTER — CLINICAL SUPPORT (OUTPATIENT)
Dept: REHABILITATION | Facility: HOSPITAL | Age: 78
End: 2018-05-07
Attending: ORTHOPAEDIC SURGERY
Payer: MEDICARE

## 2018-05-07 DIAGNOSIS — G89.29 CHRONIC PAIN OF BOTH KNEES: ICD-10-CM

## 2018-05-07 DIAGNOSIS — M25.561 CHRONIC PAIN OF BOTH KNEES: ICD-10-CM

## 2018-05-07 DIAGNOSIS — M25.562 CHRONIC PAIN OF BOTH KNEES: ICD-10-CM

## 2018-05-07 PROCEDURE — 97110 THERAPEUTIC EXERCISES: CPT

## 2018-05-07 NOTE — PROGRESS NOTES
Physical Therapy Daily Treatment Note   Name: Ursula Rodríguez  Clinic Number: [id number]    Treatment Date: 4-  Visit #:3/12  Authorization period Expiration: 12/31/18  Plan of Care Expiration: 4-  Precautions: falls    Time In: 8:55 AM  Time Out: 09:50  Total 1:1 Treatment Time: 50 min    Diagnosis:   No diagnosis found.  Physician: Gus Rajput MD  Treatment Orders: PT Eval and Treat    Subjective   Patient reports no pain and no complications coming off steps on porch and no pain functionally throughout the day. Doing much better.   Pain Scale: 0/10     Objective     Discussed Plan of Care with patient: Yes    Ursula received therapeutic exercises to develop strength and ROM for 50  minutes including:  QS 2 x 15  SAQ 2 x 15, 2.1/2 #  SLR's 2 x 15, 1.1/2#  Heel slides 2 x 10  Isometric Add x 30 with ball  Stand: Hip Abd-Add with 1/1/2#, Hip Ext., Ham Curls, SLR  2 x 15  Seated: knee ext, with 2 1/2 # 2 x 15  Step ups/downs 4# step and Lateral 2 x 10  Nustep, Lvl 2  x 15 min.  Squats at mat: 2 x 10  Resisted abd with RTB in hooklying: 2 x 10        Education provided re: Reviewed maintaining full knee extension with SLR's, use of hand rails for steps, Updated HEP.  No spiritual or educational barriers to learning provided      Assessment   Ursula reports no pain with treatment session or with daily functional activities. Able to complete all therex without complications, fatigue, or complaints of pain. Able to increase resistance with select therex see flow sheet above. Continue to progress per pt tolerance. All therex done bilaterally.     Anticipated barriers to physical therapy: none  Pt's spiritual, cultural and educational needs considered and pt agreeable to plan of care and goals.    Short Term:  1. Improved Left knee AROM -10 to 0.  Met 4/3/2018  2. Reduction of pain from 5/10 to 0/10. Met 4/3/2018  3. AROM 0-125 degrees bilat. Knees in  2 weeks  4. Resolution of edema in Right knee in 3 weeks.  Long Term:  1. Improved stability bilat. Knees with steps with use of railing.  (progressing not met)  2. Independent HEP with good compliance. Met 4/3/2018    Plan   Continue with established Plan of Care towards Physical Therapy goals.   Received new orders from Dr. Rajput for continued PT for 6 additional weeks at 1-2 times weekly.  Emir Carlos PTA    Plan   Continue with established Plan of Care towards Physical Therapy goals.   Discussed Plan of Care with patient: Yes    Emir Carlos PTA

## 2018-05-11 ENCOUNTER — CLINICAL SUPPORT (OUTPATIENT)
Dept: REHABILITATION | Facility: HOSPITAL | Age: 78
End: 2018-05-11
Attending: ORTHOPAEDIC SURGERY
Payer: MEDICARE

## 2018-05-11 DIAGNOSIS — M25.562 CHRONIC PAIN OF BOTH KNEES: ICD-10-CM

## 2018-05-11 DIAGNOSIS — M25.561 CHRONIC PAIN OF BOTH KNEES: ICD-10-CM

## 2018-05-11 DIAGNOSIS — G89.29 CHRONIC PAIN OF BOTH KNEES: ICD-10-CM

## 2018-05-11 PROCEDURE — 97110 THERAPEUTIC EXERCISES: CPT

## 2018-05-11 NOTE — PROGRESS NOTES
Physical Therapy Daily Treatment Note   Name: Ursula Rodríguez  Clinic Number: [id number]    Treatment Date: 4-  Visit #:4/12  Authorization period Expiration: 12/31/18  Plan of Care Expiration: 4-  Precautions: falls    Time In: 8:55 AM  Time Out: 09:50  Total 1:1 Treatment Time: 50 min    Diagnosis:   No diagnosis found.  Physician: Gus Rajput MD  Treatment Orders: PT Eval and Treat    Subjective   Patient reports no pain and no complications coming off steps on porch and no pain functionally throughout the day. Doing much better.   Pain Scale: 0/10     Objective     Discussed Plan of Care with patient: Yes    Ursula received therapeutic exercises to develop strength and ROM for 50  minutes including:  QS 2 x 15  SAQ 2 x 15, 2.1/2 #  SLR's 2 x 15, 1.1/2#  Heel slides 2 x 10  Isometric Add x 30 with ball  Stand: Hip Abd-Add with 1/1/2#, Hip Ext., Ham Curls, SLR  2 x 15  Seated: knee ext, with 2 1/2 # 2 x 15  Step ups/downs 4' step and Lateral 2 x 10 with 4#  Nustep, Lvl 2  x 15 min.  Squats at mat: 2 x 10  Resisted abd with RTB in hooklying: 2 x 15         Education provided re: Reviewed maintaining full knee extension with SLR's, use of hand rails for steps, Updated HEP.  No spiritual or educational barriers to learning provided      Assessment   Ursula able to complete all aforementioned exercises without complaints or reports of pain. VC's to promote correct technique with therex. Pt able to progress with exercises increasing resistance and reps with select therex see flow sheet above. Pt reports being pain free upon departure.     Anticipated barriers to physical therapy: none  Pt's spiritual, cultural and educational needs considered and pt agreeable to plan of care and goals.    Short Term:  1. Improved Left knee AROM -10 to 0.  Met 4/3/2018  2. Reduction of pain from 5/10 to 0/10. Met 4/3/2018  3. AROM 0-125 degrees bilat. Knees in 2  weeks  4. Resolution of edema in Right knee in 3 weeks.  Long Term:  1. Improved stability bilat. Knees with steps with use of railing.  (progressing not met)  2. Independent HEP with good compliance. Met 4/3/2018    Plan   Continue with established Plan of Care towards Physical Therapy goals.   Received new orders from Dr. Rajput for continued PT for 6 additional weeks at 1-2 times weekly.  Emir Carlos PTA    Plan   Continue with established Plan of Care towards Physical Therapy goals.   Discussed Plan of Care with patient: Yes    Emir Carlos PTA

## 2018-05-14 ENCOUNTER — CLINICAL SUPPORT (OUTPATIENT)
Dept: REHABILITATION | Facility: HOSPITAL | Age: 78
End: 2018-05-14
Attending: ORTHOPAEDIC SURGERY
Payer: MEDICARE

## 2018-05-14 DIAGNOSIS — M25.561 CHRONIC PAIN OF BOTH KNEES: ICD-10-CM

## 2018-05-14 DIAGNOSIS — G89.29 CHRONIC PAIN OF BOTH KNEES: ICD-10-CM

## 2018-05-14 DIAGNOSIS — M25.562 CHRONIC PAIN OF BOTH KNEES: ICD-10-CM

## 2018-05-14 PROCEDURE — 97110 THERAPEUTIC EXERCISES: CPT

## 2018-05-14 NOTE — PROGRESS NOTES
Physical Therapy Daily Treatment Note   Name: Ursula Rodríguez  Clinic Number: [id number]    Treatment Date: 4-  Visit #:5/12  Authorization period Expiration: 12/31/18  Plan of Care Expiration: 4-  Precautions: falls    Time In: 8:55 AM  Time Out: 09:50  Total 1:1 Treatment Time: 50 min    Diagnosis:   No diagnosis found.  Physician: Gus Rajput MD  Treatment Orders: PT Eval and Treat    Subjective   Patient states no pain throughout the day, went grocery shopping this morning so im a little tired but no pain and legs are feeling stronger.   Pain Scale: 0/10     Objective     Discussed Plan of Care with patient: Yes    Ursula received therapeutic exercises to develop strength and ROM for 50  minutes including:  QS 2 x 15  SAQ 2 x 15, 2.1/2 #  SLR's 2 x 15, 1.1/2#  Heel slides 2 x 10  Isometric Add x 30 with ball  Stand: Hip Abd-Add with 1/1/2#, Hip Ext., Ham Curls, SLR  2 x 15  Seated: knee ext, with 2 1/2 # 2 x 15  Step ups/downs 4' step and Lateral 2 x 10 with 4#  Nustep, Lvl 2  x 15 min.  Squats at mat: 2 x 10  Resisted abd with RTB in hooklying: 2 x 15         Education provided re: Reviewed maintaining full knee extension with SLR's, use of hand rails for steps, Updated HEP.  No spiritual or educational barriers to learning provided      Assessment   Ursula has shown great improvement with PT meeting established goals and experiencing no symptoms or complications with ADL's or BDL's throughout her daily routine. Continue to progress as tolerated and should be reassessed by supervising PT.     Anticipated barriers to physical therapy: none  Pt's spiritual, cultural and educational needs considered and pt agreeable to plan of care and goals.    Short Term:  1. Improved Left knee AROM -10 to 0.  Met 4/3/2018  2. Reduction of pain from 5/10 to 0/10. Met 4/3/2018  3. AROM 0-125 degrees bilat. Knees in 2 weeks  4. Resolution of edema in Right  knee in 3 weeks.  Long Term:  1. Improved stability bilat. Knees with steps with use of railing.  (progressing not met)  2. Independent HEP with good compliance. Met 4/3/2018    Plan   Continue with established Plan of Care towards Physical Therapy goals.   Received new orders from Dr. Rajput for continued PT for 6 additional weeks at 1-2 times weekly.  Emir Carlos PTA    Plan   Continue with established Plan of Care towards Physical Therapy goals.   Discussed Plan of Care with patient: Yes    Emir Carlos PTA

## 2018-05-18 ENCOUNTER — CLINICAL SUPPORT (OUTPATIENT)
Dept: REHABILITATION | Facility: HOSPITAL | Age: 78
End: 2018-05-18
Attending: ORTHOPAEDIC SURGERY
Payer: MEDICARE

## 2018-05-18 DIAGNOSIS — G89.29 CHRONIC PAIN OF BOTH KNEES: ICD-10-CM

## 2018-05-18 DIAGNOSIS — M25.562 CHRONIC PAIN OF BOTH KNEES: ICD-10-CM

## 2018-05-18 DIAGNOSIS — M25.561 CHRONIC PAIN OF BOTH KNEES: ICD-10-CM

## 2018-05-18 DIAGNOSIS — M25.669 DECREASED RANGE OF MOTION (ROM) OF KNEE: ICD-10-CM

## 2018-05-18 PROCEDURE — 97110 THERAPEUTIC EXERCISES: CPT

## 2018-05-18 NOTE — PROGRESS NOTES
Physical Therapy Daily Treatment Note   Name: Ursula Rodríguez  Clinic Number: [id number]    Treatment Date: 4-  Visit #:6/12  Authorization period Expiration: 12/31/18  Plan of Care Expiration: 4-  Precautions: falls    Time In: 8:05 AM  Time Out: 09:00  Total 1:1 Treatment Time: 50 min    Diagnosis:   Encounter Diagnoses   Name Primary?    Decreased range of motion (ROM) of knee     Chronic pain of both knees      Physician: Gus Rajput MD  Treatment Orders: PT Eval and Treat    Subjective   Patient states: feel good, would like to keep coming because it's making me feel better and stronger. havent had any knee buckling or anything.   Pain Scale: 0/10     Objective     Discussed Plan of Care with patient: Yes    Ursula received therapeutic exercises to develop strength and ROM for 50  minutes including:  QS 2 x 15  SAQ 2 x 15, 2.1/2 #  SLR's 2 x 15, 1.1/2#  Heel slides 2 x 10  Isometric Add x 30 with ball  Stand: Hip Abd-Add with 1/1/2#, Hip Ext., Ham Curls, SLR  2 x 15  Seated: knee ext, with 2 1/2 # 2 x 15  Step ups/downs 4' step and Lateral 2 x 10 with 4#  Nustep, Lvl 2  x 15 min.  Squats at mat: 2 x 10  Resisted abd with RTB in hooklying: 2 x 15         Education provided re: Reviewed maintaining full knee extension with SLR's, use of hand rails for steps, Updated HEP.  No spiritual or educational barriers to learning provided      Assessment   Ursula able to complete all therex without complications or reports of pain. Continues to require min VC's for technique with exercises. Pt is progressing well toward established goals.     Anticipated barriers to physical therapy: none  Pt's spiritual, cultural and educational needs considered and pt agreeable to plan of care and goals.    Short Term:  1. Improved Left knee AROM -10 to 0.  Met 4/3/2018  2. Reduction of pain from 5/10 to 0/10. Met 4/3/2018  3. AROM 0-125 degrees bilat. Knees in 2  weeks  4. Resolution of edema in Right knee in 3 weeks.  Long Term:  1. Improved stability bilat. Knees with steps with use of railing.  (progressing not met)  2. Independent HEP with good compliance. Met 4/3/2018        Plan   Continue with established Plan of Care towards Physical Therapy goals.   Discussed Plan of Care with patient: Yes    Emir Carlos, PTA

## 2018-05-21 ENCOUNTER — CLINICAL SUPPORT (OUTPATIENT)
Dept: REHABILITATION | Facility: HOSPITAL | Age: 78
End: 2018-05-21
Attending: ORTHOPAEDIC SURGERY
Payer: MEDICARE

## 2018-05-21 DIAGNOSIS — M25.669 DECREASED RANGE OF MOTION (ROM) OF KNEE: ICD-10-CM

## 2018-05-21 DIAGNOSIS — G89.29 CHRONIC PAIN OF BOTH KNEES: ICD-10-CM

## 2018-05-21 DIAGNOSIS — M25.562 CHRONIC PAIN OF BOTH KNEES: ICD-10-CM

## 2018-05-21 DIAGNOSIS — M25.561 CHRONIC PAIN OF BOTH KNEES: ICD-10-CM

## 2018-05-21 NOTE — PROGRESS NOTES
Physical Therapy Daily Treatment Note   Name: Ursula Rodríguez  Clinic Number: [id number]    Treatment Date: 5/  Visit #:7/12  Authorization period Expiration: 12/31/18  Plan of Care Expiration: 6/08/-2018  Precautions: falls    Time In: 10:40 AM  Time Out: 11:30 am  Total 1:1 Treatment Time: 70min    Diagnosis:   Encounter Diagnoses   Name Primary?    Decreased range of motion (ROM) of knee     Chronic pain of both knees      Physician: Gus Rajput MD  Treatment Orders: PT Eval and Treat    Subjective     Pain Scale: 0/10     Objective     Discussed Plan of Care with patient: Yes    Ursula received therapeutic exercises to develop strength and ROM for 50  minutes including:  QS 2 x 15  SAQ 2 x 15, 2.1/2 #  SLR's 2 x 15, 1.1/2#  Heel slides 2 x 10  Isometric Add x 30 with ball  Stand: Hip Abd-Add with 1/1/2#, Hip Ext., Ham Curls, SLR  2 x 15  Seated: knee ext, with 2 1/2 # 2 x 15  Step ups/downs 4' step and Lateral 2 x 10 with 4#  Nustep, Lvl 2  x 15 min.  Squats at mat: 2 x 10  Resisted abd with RTB in hooklying: 2 x 15         Education provided re: Reviewed maintaining full knee extension with SLR's, use of hand rails for steps, Updated HEP.  No spiritual or educational barriers to learning provided      Assessment   Patient able to take alternate steps now ascending and descending steps with min bilateral hand supports on railings without pain or strain. AROM of bilaeral knees is 0-120 degrees. No edema noted; no warmth noted. 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    Anticipated barriers to physical therapy: none  Pt's spiritual, cultural and educational needs considered and pt agreeable to plan of care and goals.    Short Term:  1. Improved Left knee AROM -10 to 0.  Met 4/3/2018  2. Reduction of pain from 5/10 to 0/10. Met 4/3/2018  3.  AROM 0-125 degrees bilat. Knees in 2 weeks  4. Resolution of edema in Right knee in 3 weeks. Met 5/21/2018  Long Term:  1. Improved stability bilat. Knees with steps with use of railing.  (progressing not met)  2. Independent HEP with good compliance. Met 4/3/2018        Plan   Continue with established Plan of Care towards Physical Therapy goals.   Discussed Plan of Care with patient: Yes    Hali Conley, PT

## 2018-06-01 ENCOUNTER — INFUSION (OUTPATIENT)
Dept: INFUSION THERAPY | Facility: HOSPITAL | Age: 78
End: 2018-06-01
Attending: ORTHOPAEDIC SURGERY
Payer: MEDICARE

## 2018-06-01 VITALS
RESPIRATION RATE: 18 BRPM | DIASTOLIC BLOOD PRESSURE: 60 MMHG | SYSTOLIC BLOOD PRESSURE: 134 MMHG | HEART RATE: 68 BPM | TEMPERATURE: 97 F

## 2018-06-01 DIAGNOSIS — D68.318: Primary | ICD-10-CM

## 2018-06-01 LAB
BASOPHILS # BLD AUTO: 0.03 K/UL
BASOPHILS NFR BLD: 0.4 %
DIFFERENTIAL METHOD: ABNORMAL
EOSINOPHIL # BLD AUTO: 0.3 K/UL
EOSINOPHIL NFR BLD: 3.5 %
ERYTHROCYTE [DISTWIDTH] IN BLOOD BY AUTOMATED COUNT: 13.6 %
HCT VFR BLD AUTO: 37.7 %
HGB BLD-MCNC: 12.8 G/DL
IMM GRANULOCYTES # BLD AUTO: 0.03 K/UL
IMM GRANULOCYTES NFR BLD AUTO: 0.4 %
INR PPP: 2
LYMPHOCYTES # BLD AUTO: 1 K/UL
LYMPHOCYTES NFR BLD: 13.3 %
MCH RBC QN AUTO: 29 PG
MCHC RBC AUTO-ENTMCNC: 34 G/DL
MCV RBC AUTO: 86 FL
MONOCYTES # BLD AUTO: 0.6 K/UL
MONOCYTES NFR BLD: 8.1 %
NEUTROPHILS # BLD AUTO: 5.3 K/UL
NEUTROPHILS NFR BLD: 74.3 %
NRBC BLD-RTO: 0 /100 WBC
PLATELET # BLD AUTO: 164 K/UL
PMV BLD AUTO: 10.1 FL
PROTHROMBIN TIME: 22.9 SEC
RBC # BLD AUTO: 4.41 M/UL
WBC # BLD AUTO: 7.14 K/UL

## 2018-06-01 PROCEDURE — 36591 DRAW BLOOD OFF VENOUS DEVICE: CPT

## 2018-06-01 PROCEDURE — 85025 COMPLETE CBC W/AUTO DIFF WBC: CPT

## 2018-06-01 PROCEDURE — 36415 COLL VENOUS BLD VENIPUNCTURE: CPT

## 2018-06-01 PROCEDURE — 99211 OFF/OP EST MAY X REQ PHY/QHP: CPT | Mod: 25

## 2018-06-01 PROCEDURE — 85610 PROTHROMBIN TIME: CPT

## 2018-06-01 RX ORDER — HEPARIN SODIUM,PORCINE/PF 10 UNIT/ML
5 SYRINGE (ML) INTRAVENOUS
Status: DISCONTINUED | OUTPATIENT
Start: 2018-06-01 | End: 2019-08-28

## 2018-06-01 NOTE — PROGRESS NOTES
0938 PT PRESENTED TO THE [facility] FOR BLOOD DRAW AND PORT FLUSH. PT IS AWAKE ALERT AND ORIENTED x3, COOPERATIVE WITH STAFF.  1016 LCW PORT ACCESSED PER PROTOCOL/FLUSHED WITH 10CC NS, 10CC BLOOD WITHDREW AND DISCARDED/10CC BLOOD WITHDREW AND HANDED OFF TO /PORT FLUSHED WITH 10CC NS THEN 5 CC HEP FLUSH, 10U/1ML/MONSALVE NEEDLE REMOVED AND DRSG APPLIED TO SITE. PT LUIS WELL.  1026 PT LEFT THE [facility]PT VIA AMB TO POV/HOME.

## 2018-07-02 ENCOUNTER — INFUSION (OUTPATIENT)
Dept: INFUSION THERAPY | Facility: HOSPITAL | Age: 78
End: 2018-07-02
Attending: ORTHOPAEDIC SURGERY
Payer: MEDICARE

## 2018-07-02 VITALS
TEMPERATURE: 97 F | SYSTOLIC BLOOD PRESSURE: 134 MMHG | OXYGEN SATURATION: 100 % | HEART RATE: 63 BPM | RESPIRATION RATE: 18 BRPM | DIASTOLIC BLOOD PRESSURE: 63 MMHG

## 2018-07-02 DIAGNOSIS — C50.911 BILATERAL MALIGNANT NEOPLASM OF BREAST IN FEMALE, UNSPECIFIED ESTROGEN RECEPTOR STATUS, UNSPECIFIED SITE OF BREAST: ICD-10-CM

## 2018-07-02 DIAGNOSIS — M25.561 PAIN IN BOTH KNEES, UNSPECIFIED CHRONICITY: ICD-10-CM

## 2018-07-02 DIAGNOSIS — Z79.01 ANTICOAGULATED ON COUMADIN: Primary | ICD-10-CM

## 2018-07-02 DIAGNOSIS — C50.912 BILATERAL MALIGNANT NEOPLASM OF BREAST IN FEMALE, UNSPECIFIED ESTROGEN RECEPTOR STATUS, UNSPECIFIED SITE OF BREAST: ICD-10-CM

## 2018-07-02 DIAGNOSIS — M25.562 PAIN IN BOTH KNEES, UNSPECIFIED CHRONICITY: ICD-10-CM

## 2018-07-02 LAB
BASOPHILS # BLD AUTO: 0.02 K/UL
BASOPHILS NFR BLD: 0.3 %
DIFFERENTIAL METHOD: ABNORMAL
EOSINOPHIL # BLD AUTO: 0.2 K/UL
EOSINOPHIL NFR BLD: 2.5 %
ERYTHROCYTE [DISTWIDTH] IN BLOOD BY AUTOMATED COUNT: 12.8 %
HCT VFR BLD AUTO: 39.4 %
HGB BLD-MCNC: 13.5 G/DL
IMM GRANULOCYTES # BLD AUTO: 0.03 K/UL
IMM GRANULOCYTES NFR BLD AUTO: 0.5 %
INR PPP: 2.9
LYMPHOCYTES # BLD AUTO: 0.8 K/UL
LYMPHOCYTES NFR BLD: 14.2 %
MCH RBC QN AUTO: 28.4 PG
MCHC RBC AUTO-ENTMCNC: 34.3 G/DL
MCV RBC AUTO: 83 FL
MONOCYTES # BLD AUTO: 0.5 K/UL
MONOCYTES NFR BLD: 8.6 %
NEUTROPHILS # BLD AUTO: 4.4 K/UL
NEUTROPHILS NFR BLD: 73.9 %
NRBC BLD-RTO: 0 /100 WBC
PLATELET # BLD AUTO: 173 K/UL
PMV BLD AUTO: 10.1 FL
PROTHROMBIN TIME: 32.7 SEC
RBC # BLD AUTO: 4.75 M/UL
WBC # BLD AUTO: 5.93 K/UL

## 2018-07-02 PROCEDURE — 99211 OFF/OP EST MAY X REQ PHY/QHP: CPT | Mod: 25

## 2018-07-02 PROCEDURE — 85610 PROTHROMBIN TIME: CPT

## 2018-07-02 PROCEDURE — 85025 COMPLETE CBC W/AUTO DIFF WBC: CPT

## 2018-07-02 PROCEDURE — 36591 DRAW BLOOD OFF VENOUS DEVICE: CPT

## 2018-07-02 NOTE — PROGRESS NOTES
1055 TO [facility]LO[facility] AMBULATORY FOR LT. CHESTWALL PORT FLUSH AND LAB (CBC,  PT/INR) AAO X 3. DENIES PROBLEMS AT PRESENT. AALIYAH ROSS  1113 LT. CHESTWALL PORT ACCESSED AS PER PROTOCOL AND LAB COLLECTED. FLUSHED AND MONSALVE REMOVED. TOLERATED WELL. CODY RN  1115 D/C HOME VIA POV. CONDITION GOOD. DENIES PROBLEMS UPON D/C. AALIYAH ROSS

## 2018-07-05 DIAGNOSIS — M54.50 LOW BACK PAIN: ICD-10-CM

## 2018-07-10 ENCOUNTER — HOSPITAL ENCOUNTER (OUTPATIENT)
Dept: RADIOLOGY | Facility: HOSPITAL | Age: 78
Discharge: HOME | End: 2018-07-10
Attending: INTERNAL MEDICINE
Payer: MEDICARE

## 2018-07-10 DIAGNOSIS — M54.50 LOW BACK PAIN: ICD-10-CM

## 2018-07-10 PROCEDURE — 72148 MRI LUMBAR SPINE W/O DYE: CPT | Mod: 26,,, | Performed by: RADIOLOGY

## 2018-07-10 PROCEDURE — 72148 MRI LUMBAR SPINE W/O DYE: CPT | Mod: TC

## 2018-08-01 ENCOUNTER — INFUSION (OUTPATIENT)
Dept: INFUSION THERAPY | Facility: HOSPITAL | Age: 78
End: 2018-08-01
Attending: ORTHOPAEDIC SURGERY
Payer: MEDICARE

## 2018-08-01 ENCOUNTER — LAB VISIT (OUTPATIENT)
Dept: LAB | Facility: HOSPITAL | Age: 78
End: 2018-08-01
Attending: INTERNAL MEDICINE
Payer: MEDICARE

## 2018-08-01 DIAGNOSIS — Z95.828 PORT-A-CATH IN PLACE: Primary | ICD-10-CM

## 2018-08-01 DIAGNOSIS — Z95.828 PORT-A-CATH IN PLACE: ICD-10-CM

## 2018-08-01 DIAGNOSIS — Z79.01 LONG TERM (CURRENT) USE OF ANTICOAGULANTS: Primary | ICD-10-CM

## 2018-08-01 LAB
BASOPHILS # BLD AUTO: 0.04 K/UL
BASOPHILS NFR BLD: 0.6 %
DIFFERENTIAL METHOD: ABNORMAL
EOSINOPHIL # BLD AUTO: 0.2 K/UL
EOSINOPHIL NFR BLD: 2.2 %
ERYTHROCYTE [DISTWIDTH] IN BLOOD BY AUTOMATED COUNT: 12.5 %
HCT VFR BLD AUTO: 40.7 %
HGB BLD-MCNC: 13.8 G/DL
IMM GRANULOCYTES # BLD AUTO: 0.02 K/UL
IMM GRANULOCYTES NFR BLD AUTO: 0.3 %
INR PPP: 3.2
LYMPHOCYTES # BLD AUTO: 0.8 K/UL
LYMPHOCYTES NFR BLD: 10.9 %
MCH RBC QN AUTO: 27.9 PG
MCHC RBC AUTO-ENTMCNC: 33.9 G/DL
MCV RBC AUTO: 82 FL
MONOCYTES # BLD AUTO: 0.5 K/UL
MONOCYTES NFR BLD: 7.3 %
NEUTROPHILS # BLD AUTO: 5.6 K/UL
NEUTROPHILS NFR BLD: 78.7 %
NRBC BLD-RTO: 0 /100 WBC
PLATELET # BLD AUTO: 172 K/UL
PMV BLD AUTO: 10.4 FL
PROTHROMBIN TIME: 35.9 SEC
RBC # BLD AUTO: 4.94 M/UL
WBC # BLD AUTO: 7.16 K/UL

## 2018-08-01 PROCEDURE — 85025 COMPLETE CBC W/AUTO DIFF WBC: CPT

## 2018-08-01 PROCEDURE — 96523 IRRIG DRUG DELIVERY DEVICE: CPT

## 2018-08-01 PROCEDURE — 36415 COLL VENOUS BLD VENIPUNCTURE: CPT

## 2018-08-01 PROCEDURE — 85610 PROTHROMBIN TIME: CPT

## 2018-08-01 PROCEDURE — 99211 OFF/OP EST MAY X REQ PHY/QHP: CPT | Mod: 25

## 2018-08-06 ENCOUNTER — TELEPHONE (OUTPATIENT)
Dept: ORTHOPEDICS | Facility: CLINIC | Age: 78
End: 2018-08-06

## 2018-08-06 NOTE — TELEPHONE ENCOUNTER
----- Message from Domenico Singer sent at 8/6/2018  8:59 AM CDT -----  Contact: Pt  The Pt is requesting a call back regarding knees are bothersome and wants shots in knees. Please call Pt to advise.     Call Back#: [phone]  Thanks

## 2018-08-06 NOTE — TELEPHONE ENCOUNTER
RETURNED PATIENT'S CALL. APPT SCHEDULED. PATIENT VOICED UNDERSTANDING OF APPT DATE, TIME, AND LOCATION.

## 2018-08-07 DIAGNOSIS — M25.562 PAIN IN BOTH KNEES, UNSPECIFIED CHRONICITY: Primary | ICD-10-CM

## 2018-08-07 DIAGNOSIS — M25.561 PAIN IN BOTH KNEES, UNSPECIFIED CHRONICITY: Primary | ICD-10-CM

## 2018-08-08 ENCOUNTER — OFFICE VISIT (OUTPATIENT)
Dept: ORTHOPEDICS | Facility: CLINIC | Age: 78
End: 2018-08-08
Payer: MEDICARE

## 2018-08-08 ENCOUNTER — HOSPITAL ENCOUNTER (OUTPATIENT)
Dept: RADIOLOGY | Facility: HOSPITAL | Age: 78
Discharge: HOME | End: 2018-08-08
Attending: ORTHOPAEDIC SURGERY
Payer: MEDICARE

## 2018-08-08 VITALS
HEIGHT: 61 IN | SYSTOLIC BLOOD PRESSURE: 153 MMHG | WEIGHT: 138 LBS | BODY MASS INDEX: 26.06 KG/M2 | HEART RATE: 75 BPM | DIASTOLIC BLOOD PRESSURE: 67 MMHG

## 2018-08-08 DIAGNOSIS — M25.562 PAIN IN BOTH KNEES, UNSPECIFIED CHRONICITY: ICD-10-CM

## 2018-08-08 DIAGNOSIS — M70.51 PES ANSERINUS BURSITIS OF BOTH KNEES: ICD-10-CM

## 2018-08-08 DIAGNOSIS — M71.21 BAKER CYST, RIGHT: ICD-10-CM

## 2018-08-08 DIAGNOSIS — M70.52 PES ANSERINUS BURSITIS OF BOTH KNEES: ICD-10-CM

## 2018-08-08 DIAGNOSIS — M17.12 PRIMARY LOCALIZED OSTEOARTHROSIS OF LEFT LOWER LEG: ICD-10-CM

## 2018-08-08 DIAGNOSIS — M25.561 PAIN IN BOTH KNEES, UNSPECIFIED CHRONICITY: ICD-10-CM

## 2018-08-08 DIAGNOSIS — M17.11 PRIMARY LOCALIZED OSTEOARTHROSIS OF RIGHT LOWER LEG: Primary | ICD-10-CM

## 2018-08-08 PROCEDURE — 99213 OFFICE O/P EST LOW 20 MIN: CPT | Mod: 25,PBBFAC | Performed by: ORTHOPAEDIC SURGERY

## 2018-08-08 PROCEDURE — 20551 NJX 1 TENDON ORIGIN/INSJ: CPT | Mod: 59,S$PBB,RT, | Performed by: ORTHOPAEDIC SURGERY

## 2018-08-08 PROCEDURE — 20610 DRAIN/INJ JOINT/BURSA W/O US: CPT | Mod: 50,PBBFAC | Performed by: ORTHOPAEDIC SURGERY

## 2018-08-08 PROCEDURE — 99999 PR PBB SHADOW E&M-EST. PATIENT-LVL III: CPT | Mod: 25,PBBFAC,, | Performed by: ORTHOPAEDIC SURGERY

## 2018-08-08 PROCEDURE — 73564 X-RAY EXAM KNEE 4 OR MORE: CPT | Mod: TC,50,FY

## 2018-08-08 PROCEDURE — 20551 NJX 1 TENDON ORIGIN/INSJ: CPT | Mod: PBBFAC | Performed by: ORTHOPAEDIC SURGERY

## 2018-08-08 PROCEDURE — 73564 X-RAY EXAM KNEE 4 OR MORE: CPT | Mod: 26,50,, | Performed by: RADIOLOGY

## 2018-08-08 PROCEDURE — 99214 OFFICE O/P EST MOD 30 MIN: CPT | Mod: 25,S$PBB,, | Performed by: ORTHOPAEDIC SURGERY

## 2018-08-08 RX ORDER — TRIAMCINOLONE ACETONIDE 40 MG/ML
40 INJECTION, SUSPENSION INTRA-ARTICULAR; INTRAMUSCULAR
Status: DISCONTINUED | OUTPATIENT
Start: 2018-08-08 | End: 2018-08-08 | Stop reason: HOSPADM

## 2018-08-08 RX ADMIN — Medication 20 MG: at 10:08

## 2018-08-08 RX ADMIN — TRIAMCINOLONE ACETONIDE 40 MG: 40 INJECTION, SUSPENSION INTRA-ARTICULAR; INTRAMUSCULAR at 10:08

## 2018-08-08 NOTE — PROCEDURES
Tendon Origin  Date/Time: 8/8/2018 10:21 AM  Performed by: SUJEY PIERRE  Authorized by: SUJEY PIERRE     Timeout: prior to procedure the correct patient, procedure, and site was verified    Indications:  Pain and diagnostic evaluation  Site marked: the procedure site was marked    Timeout: prior to procedure the correct patient, procedure, and site was verified    Prep: patient was prepped and draped in usual sterile fashion    Ultrasonic Guidance for Needle Placement?: No    Needle size:  22 G  Approach:  Anteromedial  Medications:  40 mg triamcinolone acetonide 40 mg/mL  Patient tolerance:  Patient tolerated the procedure well with no immediate complications    Pes anserine insertion left knee injection.

## 2018-08-08 NOTE — PROCEDURES
Tendon Origin  Date/Time: 8/8/2018 10:20 AM  Performed by: SUJEY PIERRE  Authorized by: SUJEY PIERRE     Timeout: prior to procedure the correct patient, procedure, and site was verified    Indications:  Pain and diagnostic evaluation  Site marked: the procedure site was marked    Timeout: prior to procedure the correct patient, procedure, and site was verified    Prep: patient was prepped and draped in usual sterile fashion    Ultrasonic Guidance for Needle Placement?: No    Needle size:  22 G  Approach:  Anteromedial  Medications:  40 mg triamcinolone acetonide 40 mg/mL  Patient tolerance:  Patient tolerated the procedure well with no immediate complications    Pes insertion right knee injection.

## 2018-08-08 NOTE — PROGRESS NOTES
"Subjective:      Patient ID: Ursula Rodríguez is a 78 y.o. female.    Chief Complaint: Pain of the Left Knee and Pain of the Right Knee    HPI:  Ms. Rodríguez is an 78-year-old female who returns today with complaints of recurrent pain in both of her knees, right greater than left.  She stated her symptoms exacerbated approximately 1 week ago.  She has a long history of knee pain since 2009 of insidious onset.  Flexing her knee increases the pain. She also gets start-up pain.  Choco Barrera improves the symptoms. She denied swelling or locking but stated she has giving way.  She stated, When I get symptomatic my knee feels unstable and wants to give way."  She cannot take NSAIDs as she is on chronic anti coagulant.  She has done physical therapy in the past with help.  She does not wear brace.  She has had injections the last 1 in February.  She stated she usually gets viscosupplementation.    ROS:  New diagnosis/surgery/prescriptions since last visit on 04/16/2018:  Degenerative spine.      Objective:    Physical Exam:  General: AAOx3.  No acute distress  Vascular:  Pulses intact and equal bilaterally.  Capillary refill at 3 seconds and equal bilaterally  Neurologic:  Pinprick and soft touch intact and equal bilaterally  Integment:  No ecchymosis, no errythema  Extremity:  Knee:  Extension/flexion equal bilaterally 0/118 degrees. Mild varus alignment both knees.  Mild effusion both knees.  Crepitus with motion both knees.  Mildly positive patellar load/compression both knees.  Negative patella apprehension/relocation both knees.  Mild increased excursion with valgus stressing both knees right greater than left.  Baker cyst right knee. Excursion equal bilaterally with Lachman/drawer.  Positive joint line tenderness both knees right greater than left.  Mild tenderness over the MCL both knees.  Rebekah negative both knees.  Toughkenamon positive both knees.  Tender at the anserine insertion both knees.  Swelling at the " anserine insertion both knees.  Radiography:  Personally reviewed x-rays of both knees completed on 08/08/2018 which showed severe tricompartmental arthritic changes with the right knee worse than the left and mild depression of the medial tibial plateau of the right knee.  There is also bone-on-bone articulation with osteophyte formation.        Assessment:       Impression:     1. Primary localized osteoarthrosis of right lower leg    2. Primary localized osteoarthrosis of left lower leg    3. Pes anserinus bursitis of both knees    4.      Baker cyst, right knee.      Plan:       1.  Discussed physical examination and radiographic findings with the patient. Ursula understands that she has advanced arthritis in both of her knees and pes anserine bursitis.  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    2.  Since the patient gets viscosupplementation offered Euflexxa to both knees, she elected to proceed.  3.  Steroid injection to the anserine insertion of both knees, she elected proceed.  4.  Arthritis braces to both knees, a referral was given to the patient.  5.  Discussed possible referral to physical therapy since she recently finished it would prefer to wait until she becomes more symptomatic.  6.  Home exercises were reinforced.  7.  Would not recommend NSAIDs as the patient is on chronic anticoagulants.  8.  Follow up in 1 week for Euflexxa injection 2 to both knees.

## 2018-08-08 NOTE — PROCEDURES
Large Joint Aspiration/Injection  Date/Time: 8/8/2018 10:18 AM  Performed by: SUJEY PIERRE  Authorized by: SUJEY PIERRE     Consent Done?:  Yes (Verbal)  Indications:  Pain, joint swelling and diagnostic evaluation  Procedure site marked: Yes    Timeout: Prior to procedure the correct patient, procedure, and site was verified      Location:  Knee  Site:  R knee and L knee  Prep: Patient was prepped and draped in usual sterile fashion    Ultrasonic Guidance for needle placement: No  Needle size:  22 G  Approach:  Anterolateral  Medications:  20 mg sodium hyaluronate (EUFLEXXA) 10 mg/mL(mw 2.4 -3.6 million)  Patient tolerance:  Patient tolerated the procedure well with no immediate complications

## 2018-08-13 ENCOUNTER — OFFICE VISIT (OUTPATIENT)
Dept: ORTHOPEDICS | Facility: CLINIC | Age: 78
End: 2018-08-13
Payer: MEDICARE

## 2018-08-13 DIAGNOSIS — M17.11 PRIMARY OSTEOARTHRITIS OF RIGHT KNEE: Primary | ICD-10-CM

## 2018-08-13 DIAGNOSIS — M17.12 PRIMARY OSTEOARTHRITIS OF LEFT KNEE: ICD-10-CM

## 2018-08-13 PROCEDURE — 99999 PR PBB SHADOW E&M-EST. PATIENT-LVL II: CPT | Mod: PBBFAC,,, | Performed by: ORTHOPAEDIC SURGERY

## 2018-08-13 PROCEDURE — 20610 DRAIN/INJ JOINT/BURSA W/O US: CPT | Mod: 50,PBBFAC | Performed by: ORTHOPAEDIC SURGERY

## 2018-08-13 PROCEDURE — 99212 OFFICE O/P EST SF 10 MIN: CPT | Mod: PBBFAC,25 | Performed by: ORTHOPAEDIC SURGERY

## 2018-08-13 PROCEDURE — 99499 UNLISTED E&M SERVICE: CPT | Mod: S$PBB,,, | Performed by: ORTHOPAEDIC SURGERY

## 2018-08-13 RX ADMIN — Medication 20 MG: at 10:08

## 2018-08-16 PROBLEM — M17.11 PRIMARY OSTEOARTHRITIS OF RIGHT KNEE: Status: ACTIVE | Noted: 2018-08-16

## 2018-08-16 PROBLEM — M17.12 PRIMARY OSTEOARTHRITIS OF LEFT KNEE: Status: ACTIVE | Noted: 2018-08-16

## 2018-08-16 NOTE — PROGRESS NOTES
Subjective:      Patient ID: Ursula Rodríguez is a 78 y.o. female.    Chief Complaint: Pain of the Left Knee and Pain of the Right Knee    HPI:  Ms. Rodríguez returns today for injection #2 of Euflexxa in a series of 3.  She stated that the injections given on 08/08/2018 were helping.    ROS:  No new diagnosis/surgery/prescriptions since last visit on 08/08/2018.      Objective:    Physical Exam:  General: AAOx3.  No acute distress  Vascular:  Pulses intact and equal bilaterally.  Capillary refill less than 3 seconds and equal bilaterally  Neurologic:  Pinprick and soft touch intact and equal bilaterally  Integment:  No ecchymosis, no errythema  Extremity: Knee:  Extension/flexion equal bilaterally 0/118 degrees. Mild varus alignment both knees.  Mild effusion both knees.  Crepitus with motion both knees.  Mildly positive patellar load/compression both knees.  Negative patella apprehension/relocation both knees.  Mild increased excursion with valgus stressing both knees right greater than left.  Baker cyst right knee. Excursion equal bilaterally with Lachman/drawer.  Mild joint line tenderness both knees right greater than left.  Mild tenderness over the MCL both knees.  Rebekah negative both knees.  Ciaran mildly6 positive both knees.  Nontender at the anserine insertion both knees.  Radiography:  No new x-rays done today.        Assessment:       Impression:     1. Primary osteoarthritis of right knee    2. Primary osteoarthritis of left knee          Plan:       1.  Discussed physical examination  findings with the patient. Ursula understands that she she is here for her 2nd Euflexxa injection to both her knees in a series of 3.  2. Offered Euflexxa to both knees, she elected to proceed.  3.  Continue with home exercises.  4.  Continue with NSAIDs per PCM.  5.  Brace were discussed again.  6.  Follow-up in 1 week for 3rd Euflexxa injection into each knee in her series of 3.

## 2018-08-16 NOTE — PROCEDURES
Large Joint Aspiration/Injection: R knee, L knee  Date/Time: 8/13/2018 10:19 AM  Performed by: Valerio Chew DO  Authorized by: Valerio Chew, DO     Consent Done?:  Yes (Verbal)  Indications:  Pain, joint swelling and diagnostic evaluation  Procedure site marked: Yes    Timeout: Prior to procedure the correct patient, procedure, and site was verified      Location:  Knee  Site:  R knee and L knee  Prep: Patient was prepped and draped in usual sterile fashion    Ultrasonic Guidance for needle placement: No  Needle size:  22 G  Approach:  Anterolateral  Medications:  20 mg sodium hyaluronate (EUFLEXXA) 10 mg/mL(mw 2.4 -3.6 million)  Patient tolerance:  Patient tolerated the procedure well with no immediate complications

## 2018-08-20 ENCOUNTER — OFFICE VISIT (OUTPATIENT)
Dept: ORTHOPEDICS | Facility: CLINIC | Age: 78
End: 2018-08-20
Payer: MEDICARE

## 2018-08-20 VITALS — HEIGHT: 61 IN | BODY MASS INDEX: 26.06 KG/M2 | WEIGHT: 138 LBS

## 2018-08-20 DIAGNOSIS — M17.12 PRIMARY OSTEOARTHRITIS OF LEFT KNEE: Primary | ICD-10-CM

## 2018-08-20 DIAGNOSIS — M17.11 PRIMARY OSTEOARTHRITIS OF RIGHT KNEE: ICD-10-CM

## 2018-08-20 PROCEDURE — 20610 DRAIN/INJ JOINT/BURSA W/O US: CPT | Mod: 50,PBBFAC | Performed by: ORTHOPAEDIC SURGERY

## 2018-08-20 PROCEDURE — 99212 OFFICE O/P EST SF 10 MIN: CPT | Mod: PBBFAC | Performed by: ORTHOPAEDIC SURGERY

## 2018-08-20 PROCEDURE — 99499 UNLISTED E&M SERVICE: CPT | Mod: S$PBB,,, | Performed by: ORTHOPAEDIC SURGERY

## 2018-08-20 PROCEDURE — 99999 PR PBB SHADOW E&M-EST. PATIENT-LVL II: CPT | Mod: PBBFAC,,, | Performed by: ORTHOPAEDIC SURGERY

## 2018-08-20 RX ADMIN — Medication 20 MG: at 10:08

## 2018-08-23 NOTE — PROGRESS NOTES
Subjective:      Patient ID: Ursula Rodríguez is a 78 y.o. female.    Chief Complaint: Injections (EUFLEXXA 3/3 BILATERAL KNEE)    HPI:  Ms. Rodríguez returns today for her 3rd Euflexxa injection to both of her knees.  She stated that her knees are feeling better and her less painful.  The injection is appear to be helping her.    ROS:  No new diagnosis/surgery/prescriptions since last visit on 08/13/2018.      Objective:    Physical Exam:  General: AAOx3.  No acute distress  Vascular:  Pulses intact and equal bilaterally.  Capillary refill less than 3 seconds and equal bilaterally  Neurologic:  Pinprick and soft touch intact and equal bilaterally  Integment:  No ecchymosis, no errythema  Extremity:  Knee:  Extension/flexion equal bilaterally 0/118 degrees. Mild varus alignment both knees.  Mild effusion both knees.  Crepitus with motion both knees.  Mildly positive patellar load/compression both knees.  Negative patella apprehension/relocation both knees.  Mild increased excursion with valgus stressing both knees right greater than left.  Baker cyst right knee. Excursion equal bilaterally with Lachman/drawer.  Mild joint line tenderness both knees right greater than left.  Mild tenderness over the MCL both knees.  Rebekah negative both knees.  Smith mildly positive both knees.  Nontender at the anserine insertion both knees.  Radiography:  No x-ray done today.        Assessment:       Impression:   1.  Tricompartment arthritis, right knee.  2.  Tricompartmental arthritis, left knee.      Plan:       1.  Discussed physical examination with the patient and since she is improving with her previous Euflexxa injections will offer the 3rd injection in the series of 3.     2.  Offered 3rd Euflexxa injection in series of 3 to both the patient's knees, she elected to proceed.  3.  Continue with bracing.  4.  Continue with home exercises.  5.  Follow up p.r.n..

## 2018-08-23 NOTE — PROCEDURES
Large Joint Aspiration/Injection: R knee, L knee  Date/Time: 8/20/2018 10:44 AM  Performed by: Valerio Chew DO  Authorized by: Valerio Chew, DO     Consent Done?:  Yes (Verbal)  Indications:  Pain, joint swelling and diagnostic evaluation  Procedure site marked: Yes    Timeout: Prior to procedure the correct patient, procedure, and site was verified      Location:  Knee  Site:  R knee and L knee  Prep: Patient was prepped and draped in usual sterile fashion    Ultrasonic Guidance for needle placement: No  Needle size:  22 G  Approach:  Anterolateral  Medications:  20 mg sodium hyaluronate (EUFLEXXA) 10 mg/mL(mw 2.4 -3.6 million)  Patient tolerance:  Patient tolerated the procedure well with no immediate complications

## 2018-08-31 ENCOUNTER — LAB VISIT (OUTPATIENT)
Dept: LAB | Facility: HOSPITAL | Age: 78
End: 2018-08-31
Attending: INTERNAL MEDICINE
Payer: MEDICARE

## 2018-08-31 ENCOUNTER — INFUSION (OUTPATIENT)
Dept: INFUSION THERAPY | Facility: HOSPITAL | Age: 78
End: 2018-08-31
Attending: ORTHOPAEDIC SURGERY
Payer: MEDICARE

## 2018-08-31 VITALS
SYSTOLIC BLOOD PRESSURE: 150 MMHG | TEMPERATURE: 96 F | HEART RATE: 65 BPM | OXYGEN SATURATION: 100 % | WEIGHT: 135 LBS | RESPIRATION RATE: 17 BRPM | BODY MASS INDEX: 25.49 KG/M2 | HEIGHT: 61 IN | DIASTOLIC BLOOD PRESSURE: 70 MMHG

## 2018-08-31 DIAGNOSIS — D68.318 FACTOR VIII INHIBITOR DISORDER: Primary | ICD-10-CM

## 2018-08-31 DIAGNOSIS — Z79.01 LONG TERM (CURRENT) USE OF ANTICOAGULANTS: ICD-10-CM

## 2018-08-31 LAB
ERYTHROCYTE [DISTWIDTH] IN BLOOD BY AUTOMATED COUNT: 13.2 %
HCT VFR BLD AUTO: 41.8 %
HGB BLD-MCNC: 14.5 G/DL
INR PPP: 1.9
MCH RBC QN AUTO: 28.2 PG
MCHC RBC AUTO-ENTMCNC: 34.7 G/DL
MCV RBC AUTO: 81 FL
PLATELET # BLD AUTO: 147 K/UL
PMV BLD AUTO: 9.7 FL
PROTHROMBIN TIME: 21 SEC
RBC # BLD AUTO: 5.15 M/UL
WBC # BLD AUTO: 9.27 K/UL

## 2018-08-31 PROCEDURE — 85027 COMPLETE CBC AUTOMATED: CPT

## 2018-08-31 PROCEDURE — 36415 COLL VENOUS BLD VENIPUNCTURE: CPT

## 2018-08-31 PROCEDURE — 85610 PROTHROMBIN TIME: CPT

## 2018-08-31 NOTE — PROGRESS NOTES
1052 PT PRESENTED TO THE [facility] FOR PORT ACCESS/LAB DRAW AND PORT FLUSH. PT IS AAOx3/COOPERATIVE AND PLEASANT.  1135 LAB PRESENT/10CC WASTE OBTAINED FROM PORT AND DISCARDED, BLOOD OBTAINED AND HANDED OFF TO [facility]/LAB. PORT FLUSHED PER PROTOCOL/LINE CLAMPED AND MONSALVE NEEDLE REMOVED/TIP INTACT. BANDAID PLACED OVER SITE. PT LUIS WELL. PT LEFT THE [facility] DEPT VIA AMB TO POV/HOME.

## 2018-09-10 DIAGNOSIS — M81.0 OSTEOPOROSIS: Primary | ICD-10-CM

## 2018-09-11 ENCOUNTER — HOSPITAL ENCOUNTER (OUTPATIENT)
Dept: RADIOLOGY | Facility: HOSPITAL | Age: 78
Discharge: HOME | End: 2018-09-11
Attending: INTERNAL MEDICINE
Payer: MEDICARE

## 2018-09-11 DIAGNOSIS — M81.0 OSTEOPOROSIS: ICD-10-CM

## 2018-09-11 PROCEDURE — 77080 DXA BONE DENSITY AXIAL: CPT | Mod: TC

## 2018-09-11 PROCEDURE — 77080 DXA BONE DENSITY AXIAL: CPT | Mod: 26,,, | Performed by: RADIOLOGY

## 2018-10-05 ENCOUNTER — INFUSION (OUTPATIENT)
Dept: INFUSION THERAPY | Facility: HOSPITAL | Age: 78
End: 2018-10-05
Attending: INTERNAL MEDICINE
Payer: MEDICARE

## 2018-10-05 VITALS — BODY MASS INDEX: 24.84 KG/M2 | HEIGHT: 62 IN | WEIGHT: 135 LBS

## 2018-10-05 VITALS
OXYGEN SATURATION: 98 % | DIASTOLIC BLOOD PRESSURE: 68 MMHG | HEART RATE: 76 BPM | SYSTOLIC BLOOD PRESSURE: 145 MMHG | RESPIRATION RATE: 20 BRPM | TEMPERATURE: 97 F

## 2018-10-05 DIAGNOSIS — D63.0: ICD-10-CM

## 2018-10-05 DIAGNOSIS — Z85.3 HISTORY OF BILATERAL BREAST CANCER: ICD-10-CM

## 2018-10-05 DIAGNOSIS — D05.10: ICD-10-CM

## 2018-10-05 DIAGNOSIS — D68.318 FACTOR VIII INHIBITOR DISORDER: Primary | ICD-10-CM

## 2018-10-05 LAB
BASOPHILS # BLD AUTO: 0.04 K/UL
BASOPHILS NFR BLD: 0.5 %
DIFFERENTIAL METHOD: ABNORMAL
EOSINOPHIL # BLD AUTO: 0.4 K/UL
EOSINOPHIL NFR BLD: 4.5 %
ERYTHROCYTE [DISTWIDTH] IN BLOOD BY AUTOMATED COUNT: 14.1 %
HCT VFR BLD AUTO: 40 %
HGB BLD-MCNC: 14 G/DL
IMM GRANULOCYTES # BLD AUTO: 0.06 K/UL
IMM GRANULOCYTES NFR BLD AUTO: 0.8 %
INR PPP: 2.1
LYMPHOCYTES # BLD AUTO: 0.8 K/UL
LYMPHOCYTES NFR BLD: 9.9 %
MCH RBC QN AUTO: 28.1 PG
MCHC RBC AUTO-ENTMCNC: 35 G/DL
MCV RBC AUTO: 80 FL
MONOCYTES # BLD AUTO: 0.8 K/UL
MONOCYTES NFR BLD: 10.4 %
NEUTROPHILS # BLD AUTO: 5.9 K/UL
NEUTROPHILS NFR BLD: 73.9 %
NRBC BLD-RTO: 0 /100 WBC
PLATELET # BLD AUTO: 152 K/UL
PMV BLD AUTO: 9.4 FL
PROTHROMBIN TIME: 23.3 SEC
RBC # BLD AUTO: 4.98 M/UL
WBC # BLD AUTO: 7.95 K/UL

## 2018-10-05 PROCEDURE — 99211 OFF/OP EST MAY X REQ PHY/QHP: CPT | Mod: 25

## 2018-10-05 PROCEDURE — 85025 COMPLETE CBC W/AUTO DIFF WBC: CPT

## 2018-10-05 PROCEDURE — A4216 STERILE WATER/SALINE, 10 ML: HCPCS | Performed by: INTERNAL MEDICINE

## 2018-10-05 PROCEDURE — 85610 PROTHROMBIN TIME: CPT

## 2018-10-05 PROCEDURE — 36592 COLLECT BLOOD FROM PICC: CPT

## 2018-10-05 PROCEDURE — 96523 IRRIG DRUG DELIVERY DEVICE: CPT

## 2018-10-05 PROCEDURE — 25000003 PHARM REV CODE 250: Performed by: INTERNAL MEDICINE

## 2018-10-05 PROCEDURE — 63600175 PHARM REV CODE 636 W HCPCS: Performed by: ORTHOPAEDIC SURGERY

## 2018-10-05 RX ORDER — SODIUM CHLORIDE 0.9 % (FLUSH) 0.9 %
10 SYRINGE (ML) INJECTION
Status: DISCONTINUED | OUTPATIENT
Start: 2018-10-05 | End: 2018-10-05 | Stop reason: HOSPADM

## 2018-10-05 RX ORDER — HEPARIN SODIUM,PORCINE/PF 10 UNIT/ML
10 SYRINGE (ML) INTRAVENOUS
Status: CANCELLED
Start: 2018-10-05

## 2018-10-05 RX ORDER — HEPARIN SODIUM,PORCINE/PF 10 UNIT/ML
10 SYRINGE (ML) INTRAVENOUS
Status: DISCONTINUED | OUTPATIENT
Start: 2018-10-05 | End: 2018-10-05 | Stop reason: HOSPADM

## 2018-10-05 RX ORDER — SODIUM CHLORIDE 0.9 % (FLUSH) 0.9 %
10 SYRINGE (ML) INJECTION
Status: CANCELLED | OUTPATIENT
Start: 2018-10-05

## 2018-10-05 RX ADMIN — HEPARIN, PORCINE (PF) 10 UNIT/ML INTRAVENOUS SYRINGE 50 UNITS: at 09:10

## 2018-10-05 RX ADMIN — Medication 10 ML: at 09:10

## 2018-10-05 NOTE — PROGRESS NOTES
PT TO [facility] FOR PORT FLUSH. VS STABLE. NAD NOTED.   0920 LEFT CW  PORT ACCESSED X 1 ATTEMPT USING STERILE TECHNIQUE. IMMEDIATE BLOOD RETURN NOTED. 10ML BLOOD WASTED PRIOR TO DRAWING LABS. 10ML BLOOD COLLECTED FOR CBC/PT/INR. FOLLOWED BY 10ML SALINE FLUSH AND 5O UNIT HEPARIN FLUSH. PT TOLERATED WELL. BANDAID APPLIED TO SITE.

## 2018-10-08 DIAGNOSIS — R41.3 MEMORY LOSS: Primary | ICD-10-CM

## 2018-10-12 ENCOUNTER — HOSPITAL ENCOUNTER (OUTPATIENT)
Dept: RADIOLOGY | Facility: HOSPITAL | Age: 78
Discharge: HOME | End: 2018-10-12
Attending: INTERNAL MEDICINE
Payer: MEDICARE

## 2018-10-12 DIAGNOSIS — R41.3 MEMORY LOSS: ICD-10-CM

## 2018-10-12 PROCEDURE — 70450 CT HEAD/BRAIN W/O DYE: CPT | Mod: TC

## 2018-10-12 PROCEDURE — 70450 CT HEAD/BRAIN W/O DYE: CPT | Mod: 26,,, | Performed by: RADIOLOGY

## 2018-11-09 ENCOUNTER — INFUSION (OUTPATIENT)
Dept: INFUSION THERAPY | Facility: HOSPITAL | Age: 78
End: 2018-11-09
Payer: MEDICARE

## 2018-11-09 ENCOUNTER — LAB VISIT (OUTPATIENT)
Dept: LAB | Facility: HOSPITAL | Age: 78
End: 2018-11-09
Attending: INTERNAL MEDICINE
Payer: MEDICARE

## 2018-11-09 VITALS
SYSTOLIC BLOOD PRESSURE: 136 MMHG | HEART RATE: 76 BPM | RESPIRATION RATE: 16 BRPM | DIASTOLIC BLOOD PRESSURE: 63 MMHG | TEMPERATURE: 99 F

## 2018-11-09 DIAGNOSIS — Z85.3 HISTORY OF BILATERAL BREAST CANCER: ICD-10-CM

## 2018-11-09 DIAGNOSIS — Z79.01 ANTICOAGULATED ON COUMADIN: ICD-10-CM

## 2018-11-09 DIAGNOSIS — Z85.3 HISTORY OF BILATERAL BREAST CANCER: Primary | ICD-10-CM

## 2018-11-09 LAB
BASOPHILS # BLD AUTO: 0.03 K/UL
BASOPHILS NFR BLD: 0.4 %
DIFFERENTIAL METHOD: ABNORMAL
EOSINOPHIL # BLD AUTO: 0.2 K/UL
EOSINOPHIL NFR BLD: 2.6 %
ERYTHROCYTE [DISTWIDTH] IN BLOOD BY AUTOMATED COUNT: 14.6 %
HCT VFR BLD AUTO: 40.6 %
HGB BLD-MCNC: 13.6 G/DL
IMM GRANULOCYTES # BLD AUTO: 0.04 K/UL
IMM GRANULOCYTES NFR BLD AUTO: 0.5 %
INR PPP: 2.4
LYMPHOCYTES # BLD AUTO: 0.8 K/UL
LYMPHOCYTES NFR BLD: 10.8 %
MCH RBC QN AUTO: 27.6 PG
MCHC RBC AUTO-ENTMCNC: 33.5 G/DL
MCV RBC AUTO: 83 FL
MONOCYTES # BLD AUTO: 0.7 K/UL
MONOCYTES NFR BLD: 9 %
NEUTROPHILS # BLD AUTO: 5.7 K/UL
NEUTROPHILS NFR BLD: 76.7 %
NRBC BLD-RTO: 0 /100 WBC
PLATELET # BLD AUTO: 188 K/UL
PMV BLD AUTO: 9.9 FL
PROTHROMBIN TIME: 26.4 SEC
RBC # BLD AUTO: 4.92 M/UL
WBC # BLD AUTO: 7.43 K/UL

## 2018-11-09 PROCEDURE — 85610 PROTHROMBIN TIME: CPT

## 2018-11-09 PROCEDURE — 85025 COMPLETE CBC W/AUTO DIFF WBC: CPT

## 2018-11-09 PROCEDURE — 36592 COLLECT BLOOD FROM PICC: CPT

## 2018-11-09 PROCEDURE — 96523 IRRIG DRUG DELIVERY DEVICE: CPT

## 2018-11-09 PROCEDURE — 36415 COLL VENOUS BLD VENIPUNCTURE: CPT

## 2018-11-09 NOTE — PROGRESS NOTES
0930: Patient arrived to [facility] ambulatory, alert and alone.  No pain or other complaints today.  Patient is here for a port flush and lab draw today.  See flowsheets for documentation.

## 2018-12-14 ENCOUNTER — INFUSION (OUTPATIENT)
Dept: INFUSION THERAPY | Facility: HOSPITAL | Age: 78
End: 2018-12-14
Payer: MEDICARE

## 2018-12-14 VITALS
DIASTOLIC BLOOD PRESSURE: 68 MMHG | HEART RATE: 68 BPM | BODY MASS INDEX: 24.83 KG/M2 | WEIGHT: 134.94 LBS | RESPIRATION RATE: 21 BRPM | TEMPERATURE: 98 F | HEIGHT: 62 IN | SYSTOLIC BLOOD PRESSURE: 151 MMHG

## 2018-12-14 DIAGNOSIS — Z79.01 ANTICOAGULANT LONG-TERM USE: Primary | ICD-10-CM

## 2018-12-14 DIAGNOSIS — C50.919 MALIGNANT NEOPLASM OF FEMALE BREAST, UNSPECIFIED ESTROGEN RECEPTOR STATUS, UNSPECIFIED LATERALITY, UNSPECIFIED SITE OF BREAST: Primary | ICD-10-CM

## 2018-12-14 LAB
INR PPP: 2.1
PROTHROMBIN TIME: 23.7 SEC

## 2018-12-14 PROCEDURE — 85610 PROTHROMBIN TIME: CPT

## 2018-12-14 PROCEDURE — 36591 DRAW BLOOD OFF VENOUS DEVICE: CPT

## 2018-12-14 PROCEDURE — 36415 COLL VENOUS BLD VENIPUNCTURE: CPT

## 2018-12-14 RX ORDER — HEPARIN SODIUM,PORCINE/PF 10 UNIT/ML
50 SYRINGE (ML) INTRAVENOUS
Status: ACTIVE | OUTPATIENT
Start: 2018-12-14 | End: 2019-05-13

## 2019-01-25 ENCOUNTER — INFUSION (OUTPATIENT)
Dept: INFUSION THERAPY | Facility: HOSPITAL | Age: 79
End: 2019-01-25
Payer: MEDICARE

## 2019-01-25 ENCOUNTER — LAB VISIT (OUTPATIENT)
Dept: LAB | Facility: HOSPITAL | Age: 79
End: 2019-01-25
Attending: INTERNAL MEDICINE
Payer: MEDICARE

## 2019-01-25 VITALS
SYSTOLIC BLOOD PRESSURE: 128 MMHG | HEIGHT: 62 IN | TEMPERATURE: 98 F | BODY MASS INDEX: 24.83 KG/M2 | HEART RATE: 64 BPM | RESPIRATION RATE: 19 BRPM | DIASTOLIC BLOOD PRESSURE: 61 MMHG | WEIGHT: 134.94 LBS

## 2019-01-25 DIAGNOSIS — D68.9 BLOOD COAGULATION DISORDER: ICD-10-CM

## 2019-01-25 DIAGNOSIS — Z85.3 HISTORY OF BILATERAL BREAST CANCER: Primary | ICD-10-CM

## 2019-01-25 DIAGNOSIS — D68.318 CIRCULATING ANTICOAGULANT DISORDER: Primary | ICD-10-CM

## 2019-01-25 LAB
BASOPHILS # BLD AUTO: 0.03 K/UL
BASOPHILS NFR BLD: 0.4 %
DIFFERENTIAL METHOD: ABNORMAL
EOSINOPHIL # BLD AUTO: 0.2 K/UL
EOSINOPHIL NFR BLD: 1.9 %
ERYTHROCYTE [DISTWIDTH] IN BLOOD BY AUTOMATED COUNT: 12.9 %
HCT VFR BLD AUTO: 42.7 %
HGB BLD-MCNC: 14.5 G/DL
IMM GRANULOCYTES # BLD AUTO: 0.03 K/UL
IMM GRANULOCYTES NFR BLD AUTO: 0.4 %
INR PPP: 2.3
LYMPHOCYTES # BLD AUTO: 1 K/UL
LYMPHOCYTES NFR BLD: 12.5 %
MCH RBC QN AUTO: 27.4 PG
MCHC RBC AUTO-ENTMCNC: 34 G/DL
MCV RBC AUTO: 81 FL
MONOCYTES # BLD AUTO: 0.6 K/UL
MONOCYTES NFR BLD: 7.6 %
NEUTROPHILS # BLD AUTO: 6.5 K/UL
NEUTROPHILS NFR BLD: 77.2 %
NRBC BLD-RTO: 0 /100 WBC
PLATELET # BLD AUTO: 189 K/UL
PMV BLD AUTO: 10 FL
PROTHROMBIN TIME: 26.3 SEC
RBC # BLD AUTO: 5.29 M/UL
WBC # BLD AUTO: 8.34 K/UL

## 2019-01-25 PROCEDURE — 63600175 PHARM REV CODE 636 W HCPCS: Performed by: INTERNAL MEDICINE

## 2019-01-25 PROCEDURE — 85025 COMPLETE CBC W/AUTO DIFF WBC: CPT

## 2019-01-25 PROCEDURE — 36415 COLL VENOUS BLD VENIPUNCTURE: CPT

## 2019-01-25 PROCEDURE — 36591 DRAW BLOOD OFF VENOUS DEVICE: CPT

## 2019-01-25 PROCEDURE — 85610 PROTHROMBIN TIME: CPT

## 2019-01-25 RX ORDER — HEPARIN SODIUM,PORCINE/PF 10 UNIT/ML
10 SYRINGE (ML) INTRAVENOUS
Status: DISCONTINUED | OUTPATIENT
Start: 2019-01-25 | End: 2019-08-28

## 2019-01-25 RX ADMIN — HEPARIN, PORCINE (PF) 10 UNIT/ML INTRAVENOUS SYRINGE 50 UNITS: at 09:01

## 2019-01-30 ENCOUNTER — OFFICE VISIT (OUTPATIENT)
Dept: HEMATOLOGY/ONCOLOGY | Facility: CLINIC | Age: 79
End: 2019-01-30
Payer: MEDICARE

## 2019-01-30 VITALS
HEART RATE: 75 BPM | BODY MASS INDEX: 26.06 KG/M2 | DIASTOLIC BLOOD PRESSURE: 62 MMHG | RESPIRATION RATE: 20 BRPM | WEIGHT: 142.5 LBS | SYSTOLIC BLOOD PRESSURE: 147 MMHG | TEMPERATURE: 97 F

## 2019-01-30 DIAGNOSIS — G63 NEUROPATHY DUE TO MEDICAL CONDITION: ICD-10-CM

## 2019-01-30 DIAGNOSIS — D50.9 MICROCYTIC HYPOCHROMIC ANEMIA: ICD-10-CM

## 2019-01-30 DIAGNOSIS — R41.3 MEMORY CHANGE: Primary | ICD-10-CM

## 2019-01-30 DIAGNOSIS — E53.1 PYRIDOXINE DEFICIENCY: ICD-10-CM

## 2019-01-30 PROCEDURE — 99214 OFFICE O/P EST MOD 30 MIN: CPT | Mod: ,,, | Performed by: INTERNAL MEDICINE

## 2019-01-30 PROCEDURE — 99214 PR OFFICE/OUTPT VISIT, EST, LEVL IV, 30-39 MIN: ICD-10-PCS | Mod: ,,, | Performed by: INTERNAL MEDICINE

## 2019-01-30 NOTE — LETTER
January 30, 2019      Jameel Dunlap MD  [address]  [facility] [place] [address]           [facility]  [address] [place] [address]  [address] [place] [id number]  Phone: [phone]  Fax: [phone]          Patient: Ursula Rodríguez   MR Number: [id number]   YOB: 1940   Date of Visit: 1/30/2019       Dear Dr. Jameel Dunlap:    Thank you for referring Ursula Rodríguez to me for evaluation. Attached you will find relevant portions of my assessment and plan of care.    If you have questions, please do not hesitate to call me. I look forward to following Ursula Rodríguez along with you.    Sincerely,    STEFFI Garcia MD    Enclosure  CC:  No Recipients    If you would like to receive this communication electronically, please contact [email or web address] or [phone] to request more information on [facility] access.    For providers and/or their staff who would like to refer a patient to [facility], please contact us through our one-stop-shop provider referral line, [facility], at [phone].    If you feel you have received this communication in error or would no longer like to receive these types of communications, please e-mail [email or web address]

## 2019-01-31 ENCOUNTER — TELEPHONE (OUTPATIENT)
Dept: ORTHOPEDICS | Facility: CLINIC | Age: 79
End: 2019-01-31

## 2019-01-31 ENCOUNTER — NURSE TRIAGE (OUTPATIENT)
Dept: ADMINISTRATIVE | Facility: CLINIC | Age: 79
End: 2019-01-31

## 2019-01-31 NOTE — TELEPHONE ENCOUNTER
----- Message from Keely Rodríguez sent at 1/31/2019 12:14 PM CST -----  Contact: Patient  Patient is calling to see if she can come in to have injections done in her knees.  Call Back#[phone]  Thanks

## 2019-01-31 NOTE — PROGRESS NOTES
[facility] History & Physical    Subjective:      Patient ID:   Ursula Rodríguez  79 y.o. female  1940  [place]      Chief Complaint:   Breast cancer follow up    HPI:  79 y.o. female with diagnosis of R Breast cancer,2013.  R breast partial mastectomy followed by [facility].  Sentinal node +.  Stage II dx.  CTA x's 3/4, arimidex, Tamoxifen 2015.    Hx of L breast cancer , had partial mastectomy and reconstruction.    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  .  On coumadin prophylaxis, Dr. Dunlap.    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  Hx HTN, DM, GERD,cholesterol, DJD, CVA, TIA.    Appendectomy, Partial hystectomy, L knee surgery several times, hernia repair, Tumor removed L ear drum .    Allergy iodine, IVP dye.    Dad COPD, DM.  Mom Breast cancer, DM, increased HR.  Sister DM.  Brother DM, PVD.  Sister colon cancer, heart dx.  Sister Parkinson's Dx.  Daughter colon cancer.    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   LN +.  M3    ROS:   GEN: normal without any fever, night sweats or weight loss  HEENT: See HPI  CV: normal with no CP, SOB, PND, BUSTOS or orthopnea  PULM: normal with no SOB, cough, hemoptysis, sputum or pleuritic pain  GI: normal with no abdominal pain, nausea, vomiting, constipation, diarrhea, melanotic stools, BRBPR, or hematemesis  : normal with no hematuria, dysuria  BREAST: See HPI  SKIN: normal with no rash, erythema, bruising, or swelling     Past Medical History:   Diagnosis Date    Breast cancer     Cancer     BREAST     Diabetes mellitus, type 2     GERD (gastroesophageal reflux disease)     Hypertension     Recurrent urinary tract infection     Vertigo      Past Surgical History:   Procedure Laterality Date    HEMORRHOID SURGERY  1968    HYSTERECTOMY  1970    MASTECTOMY Right 1994    MASTECTOMY Left     BREAST CANCER    TUMOR REMOVAL Left     EAR       Review of patient's allergies indicates:   Allergen Reactions    Iodine and iodide containing products     Ditropan  [oxybutynin chloride] Palpitations and Other (See Comments)     Made patient weak     Social History     Socioeconomic History    Marital status:      Spouse name: Not on file    Number of children: Not on file    Years of education: Not on file    Highest education level: Not on file   Social Needs    Financial resource strain: Not on file    Food insecurity - worry: Not on file    Food insecurity - inability: Not on file    Transportation needs - medical: Not on file    Transportation needs - non-medical: Not on file   Occupational History    Not on file   Tobacco Use    Smoking status: Never Smoker    Smokeless tobacco: Never Used   Substance and Sexual Activity    Alcohol use: No    Drug use: No    Sexual activity: Not on file   Other Topics Concern    Not on file   Social History Narrative    Not on file         Current Outpatient Medications:     amlodipine (NORVASC) 5 MG tablet, Take 5 mg by mouth once daily., Disp: , Rfl:     atenolol (TENORMIN) 50 MG tablet, Take 50 mg by mouth once daily., Disp: , Rfl:     lisinopril 10 MG tablet, Take 10 mg by mouth once daily., Disp: , Rfl:     multivitamin capsule, Take 1 capsule by mouth once daily., Disp: , Rfl:     SIMVASTATIN (ZOCOR ORAL), Take by mouth., Disp: , Rfl:     TRIAMTERENE-HYDROCHLOROTHIAZID ORAL, Take 75 mg by mouth once daily., Disp: , Rfl:     warfarin (COUMADIN) 1 MG tablet, Take 1 mg by mouth once daily., Disp: , Rfl:     ALPRAZolam (XANAX) 0.5 MG tablet, TAKE ONE TABLET BY MOUTH TWICE DAILY AS NEEDED FOR ANXIETY, Disp: 60 tablet, Rfl: 5    mirabegron (MYRBETRIQ) 50 mg Tb24, Take 1 tablet (50 mg total) by mouth once daily., Disp: 30 tablet, Rfl: 11    Current Facility-Administered Medications:     heparin, porcine (PF) 100 unit/mL injection flush 500 Units, 5 mL, Intravenous, PRN, Gus Rajput MD    heparin, porcine (PF) injection 10 Units, 10 Units, Intravenous, Q30 Days, Jameel Dunlap MD, 50 Units at  "01/25/19 0916    heparin, porcine (PF) injection 50 Units, 5 mL, Intravenous, PRN, Jameel Dunlap MD, 50 Units at 04/05/18 0955    heparin, porcine (PF) injection 50 Units, 5 mL, Intravenous, PRN, Gus Rajput MD, 50 Units at 10/05/18 0921    heparin, porcine (PF) injection 50 Units, 50 Units, Intravenous, Q30 Days, Dejon Villaseñor MD    sodium chloride 0.9% flush 10 mL, 10 mL, Intravenous, PRN, Jameel Dunlap MD, 10 mL at 10/05/18 0920          Objective:   Vitals:  Blood pressure (!) 147/62, pulse 75, temperature 97.3 °F (36.3 °C), resp. rate 20, weight 64.6 kg (142 lb 8 oz).    Physical Examination:   GEN: no apparent distress, comfortable  HEAD: atraumatic and normocephalic  EYES: no pallor, no icterus  ENT:  no pharyngeal erythema, external ears WNL; no nasal discharge  NECK: no masses, thyroid normal, trachea midline, no LAD/LN's, supple  CV: RRR with no murmur; normal pulse; normal S1 and S2; no pedal edema  CHEST: Normal respiratory effort; CTAB; normal breath sounds; no wheeze or crackles  ABDOM: nontender and nondistended; soft;  no rebound/guarding  MUSC/Skeletal: ROM normal; no crepitus; joints normal; no deformities   EXTREM: no clubbing, cyanosis, inflammation or swelling  SKIN: no rashes, lesions, ulcers, petechiae   : no cvat  NEURO: grossly intact; motor/sensory WNL;  no tremors  PSYCH: normal mood, affect and behavior  LYMPH: normal cervical, supraclavicular, axillary and groin LN's  BREASTS: L 'breast" post op change, without palpable mass.  R chest NT, post operative change, no palpable mass    CAT head small vessel dx.  MRI of L/S spine DDD, CARLITAD.      Assessment:   (1) 79 y.o. female with diagnosis of  Bilateral breast cancer, S/P treatment as above.  Observe for now.  RTC 9 months.                   "

## 2019-01-31 NOTE — TELEPHONE ENCOUNTER
Pt called, appointment made with Dr. Chew for bilateral knee pain for Monday Feb 11th @ 0900. Pt verbalizes understanding of date, time and place

## 2019-02-01 ENCOUNTER — HOSPITAL ENCOUNTER (EMERGENCY)
Facility: HOSPITAL | Age: 79
Discharge: HOME | End: 2019-02-01
Attending: INTERNAL MEDICINE
Payer: MEDICARE

## 2019-02-01 VITALS
BODY MASS INDEX: 24.84 KG/M2 | DIASTOLIC BLOOD PRESSURE: 73 MMHG | HEIGHT: 62 IN | SYSTOLIC BLOOD PRESSURE: 143 MMHG | WEIGHT: 135 LBS | OXYGEN SATURATION: 97 % | RESPIRATION RATE: 20 BRPM | HEART RATE: 78 BPM | TEMPERATURE: 98 F

## 2019-02-01 DIAGNOSIS — L03.032 ABSCESS OR CELLULITIS, TOE, LEFT: Primary | ICD-10-CM

## 2019-02-01 DIAGNOSIS — L02.612 ABSCESS OR CELLULITIS, TOE, LEFT: Primary | ICD-10-CM

## 2019-02-01 DIAGNOSIS — R52 PAIN: ICD-10-CM

## 2019-02-01 PROCEDURE — 96372 THER/PROPH/DIAG INJ SC/IM: CPT

## 2019-02-01 PROCEDURE — 87186 SC STD MICRODIL/AGAR DIL: CPT

## 2019-02-01 PROCEDURE — 87077 CULTURE AEROBIC IDENTIFY: CPT

## 2019-02-01 PROCEDURE — 63600175 PHARM REV CODE 636 W HCPCS: Performed by: INTERNAL MEDICINE

## 2019-02-01 PROCEDURE — 73630 X-RAY EXAM OF FOOT: CPT | Mod: 26,LT,, | Performed by: RADIOLOGY

## 2019-02-01 PROCEDURE — 73630 X-RAY EXAM OF FOOT: CPT | Mod: TC,FY,LT

## 2019-02-01 PROCEDURE — 87070 CULTURE OTHR SPECIMN AEROBIC: CPT

## 2019-02-01 PROCEDURE — 99283 EMERGENCY DEPT VISIT LOW MDM: CPT | Mod: 25

## 2019-02-01 PROCEDURE — 73630 XR FOOT COMPLETE 3 VIEW LEFT: ICD-10-PCS | Mod: 26,LT,, | Performed by: RADIOLOGY

## 2019-02-01 RX ORDER — CEFTRIAXONE 1 G/1
1 INJECTION, POWDER, FOR SOLUTION INTRAMUSCULAR; INTRAVENOUS
Status: COMPLETED | OUTPATIENT
Start: 2019-02-01 | End: 2019-02-01

## 2019-02-01 RX ORDER — TRAMADOL HYDROCHLORIDE 50 MG/1
50 TABLET ORAL EVERY 6 HOURS PRN
Qty: 12 TABLET | Refills: 0 | Status: SHIPPED | OUTPATIENT
Start: 2019-02-01 | End: 2019-08-28

## 2019-02-01 RX ADMIN — CEFTRIAXONE SODIUM 1 G: 1 INJECTION, POWDER, FOR SOLUTION INTRAMUSCULAR; INTRAVENOUS at 09:02

## 2019-02-01 NOTE — ED PROVIDER NOTES
Encounter Date: 2/1/2019       History     Chief Complaint   Patient presents with    Foot Pain     Patient presents with redness tenderness swelling and drainage from her distal left 5th toe.  She has seen her primary care doctor who placed her on antibiotics which she started yesterday.  She came in today due to the drainage. No fever chills sweats no pain or redness going up her foot or leg.          Review of patient's allergies indicates:   Allergen Reactions    Iodine and iodide containing products     Ditropan [oxybutynin chloride] Palpitations and Other (See Comments)     Made patient weak     Past Medical History:   Diagnosis Date    Breast cancer     Cancer     BREAST     Diabetes mellitus, type 2     GERD (gastroesophageal reflux disease)     Hypertension     Recurrent urinary tract infection     Vertigo      Past Surgical History:   Procedure Laterality Date    HEMORRHOID SURGERY  1968    HYSTERECTOMY  1970    MASTECTOMY Right 1994    MASTECTOMY Left 2013    BREAST CANCER    TUMOR REMOVAL Left 1994    EAR     Family History   Problem Relation Age of Onset    Cancer Mother     Hypertension Mother     Heart disease Mother     No Known Problems Sister     Diabetes Brother     Hypertension Brother     Cancer Maternal Grandmother     Lung disease Father     Heart disease Father     Diabetes Father     Cancer Brother     Diabetes Brother     Hypertension Brother     Stroke Brother     Hypertension Brother     Stroke Brother      Social History     Tobacco Use    Smoking status: Never Smoker    Smokeless tobacco: Never Used   Substance Use Topics    Alcohol use: No    Drug use: No     Review of Systems   Constitutional: Negative for fever.   HENT: Negative for sore throat.    Respiratory: Negative for shortness of breath.    Cardiovascular: Negative for chest pain.   Gastrointestinal: Negative for nausea.   Genitourinary: Negative for dysuria.   Musculoskeletal: Negative for  back pain.   Skin: Negative for rash.   Neurological: Negative for weakness.   Hematological: Does not bruise/bleed easily.   All other systems reviewed and are negative.      Physical Exam     Initial Vitals [02/01/19 0916]   BP Pulse Resp Temp SpO2   (!) 143/73 78 20 98.4 °F (36.9 °C) 97 %      MAP       --         Physical Exam    Nursing note and vitals reviewed.  Constitutional: Vital signs are normal. She appears well-developed and well-nourished. She is active and cooperative.   HENT:   Head: Normocephalic and atraumatic.   Eyes: Conjunctivae, EOM and lids are normal. Pupils are equal, round, and reactive to light. Lids are everted and swept, no foreign bodies found.   Neck: Trachea normal, normal range of motion and full passive range of motion without pain. Neck supple.   Cardiovascular: Normal rate, regular rhythm, S1 normal, S2 normal, normal heart sounds, intact distal pulses and normal pulses.  No extrasystoles are present.    Pulmonary/Chest: Breath sounds normal.   Abdominal: Soft. Normal appearance and bowel sounds are normal.   Musculoskeletal: Normal range of motion.        Feet:    Left toe is red hot swollen tender primarily from the distal joint to the tip.  She is draining a purulent discharge from the subungual area.  Further pus was not able to be expressed.  She has no lymphangitis or cellulitis of the foot.   Neurological: She is alert. She has normal reflexes. GCS eye subscore is 4. GCS verbal subscore is 5. GCS motor subscore is 6.   Skin: Skin is warm, dry and intact. Capillary refill takes less than 2 seconds.   Psychiatric: She has a normal mood and affect. Her speech is normal and behavior is normal. Cognition and memory are normal.         ED Course   Procedures  Labs Reviewed   CULTURE, AEROBIC  (SPECIFY SOURCE)          Imaging Results          X-Ray Foot Complete Left (Final result)  Result time 02/01/19 10:33:30    Final result by Kailash Acosta MD (02/01/19 10:33:30)                  Impression:      1. Soft tissue swelling of the 5th toe without underlying bony fracture.  2. Mild bone demineralization.      Electronically signed by: Kailash Acosta  Date:    02/01/2019  Time:    10:33             Narrative:    EXAMINATION:  XR FOOT COMPLETE 3 VIEW LEFT    CLINICAL HISTORY:  . Pain, unspecified    TECHNIQUE:  AP, lateral, and oblique views of the right foot were performed.    COMPARISON:  None    FINDINGS:  Mild soft tissue swelling of the 5th toe.  No underlying bony fracture.    The joint spaces are preserved.  Suspected small bony ossicle adjacent to the proximal head of the proximal phalanx of the 1st toe.    Tarsal bones are intact.  Normal tarsometatarsal alignment.    Mild bone demineralization.                              X-Rays:   Independently Interpreted Readings:   Other Readings:  X-ray shows no sign of bony infection or fracture.    Medical Decision Making:   Clinical Tests:   Radiological Study: Ordered and Reviewed  ED Management:  Patient presents with cellulitis of the left toe.  X-rays were negative. She was given Rocephin.  Culture was taken.  She is continue her antibiotics from her PCP in follow-up.                      Clinical Impression:   The primary encounter diagnosis was Abscess or cellulitis, toe, left. A diagnosis of Pain was also pertinent to this visit.      Disposition:   Disposition: Discharged  Condition: Stable                        Mario Payne MD  02/01/19 1300

## 2019-02-01 NOTE — TELEPHONE ENCOUNTER
Reason for Disposition   Caller has medication question, adult has minor symptoms, caller declines triage, and triager answers question    Answer Assessment - Initial Assessment Questions  Daughter reported pt cut her toe while trying to cut toenails a couple of days ago. Today toe was red/looked infected.  ordered antibiotic over the phone. Tonight toe throbbing/painful. She is on coumadin. Wants to know if she can take norco with the coumadin.    Protocols used: ST MEDICATION QUESTION CALL-A-AH

## 2019-02-04 LAB — BACTERIA SPEC AEROBE CULT: NORMAL

## 2019-02-11 ENCOUNTER — OFFICE VISIT (OUTPATIENT)
Dept: ORTHOPEDICS | Facility: CLINIC | Age: 79
End: 2019-02-11
Payer: MEDICARE

## 2019-02-11 VITALS — HEIGHT: 62 IN | WEIGHT: 134.94 LBS | BODY MASS INDEX: 24.83 KG/M2

## 2019-02-11 DIAGNOSIS — M23.52 CHRONIC INSTABILITY OF LEFT KNEE: ICD-10-CM

## 2019-02-11 DIAGNOSIS — M17.11 PRIMARY OSTEOARTHRITIS OF RIGHT KNEE: Primary | ICD-10-CM

## 2019-02-11 DIAGNOSIS — M17.12 PRIMARY OSTEOARTHRITIS OF LEFT KNEE: ICD-10-CM

## 2019-02-11 DIAGNOSIS — M23.51 CHRONIC INSTABILITY OF RIGHT KNEE: ICD-10-CM

## 2019-02-11 DIAGNOSIS — M71.20 BAKER CYST, UNSPECIFIED LATERALITY: ICD-10-CM

## 2019-02-11 DIAGNOSIS — M17.0 PRIMARY OSTEOARTHRITIS OF BOTH KNEES: Primary | ICD-10-CM

## 2019-02-11 PROCEDURE — 99214 PR OFFICE/OUTPT VISIT, EST, LEVL IV, 30-39 MIN: ICD-10-PCS | Mod: 25,S$PBB,, | Performed by: ORTHOPAEDIC SURGERY

## 2019-02-11 PROCEDURE — 99999 PR PBB SHADOW E&M-EST. PATIENT-LVL II: ICD-10-PCS | Mod: PBBFAC,,, | Performed by: ORTHOPAEDIC SURGERY

## 2019-02-11 PROCEDURE — 20610 LARGE JOINT ASPIRATION/INJECTION: R KNEE, L KNEE: ICD-10-PCS | Mod: 50,S$PBB,, | Performed by: ORTHOPAEDIC SURGERY

## 2019-02-11 PROCEDURE — 20610 DRAIN/INJ JOINT/BURSA W/O US: CPT | Mod: 50,PBBFAC | Performed by: ORTHOPAEDIC SURGERY

## 2019-02-11 PROCEDURE — 99999 PR PBB SHADOW E&M-EST. PATIENT-LVL II: CPT | Mod: PBBFAC,,, | Performed by: ORTHOPAEDIC SURGERY

## 2019-02-11 PROCEDURE — 99214 OFFICE O/P EST MOD 30 MIN: CPT | Mod: 25,S$PBB,, | Performed by: ORTHOPAEDIC SURGERY

## 2019-02-11 PROCEDURE — 99212 OFFICE O/P EST SF 10 MIN: CPT | Mod: PBBFAC,25 | Performed by: ORTHOPAEDIC SURGERY

## 2019-02-11 RX ORDER — TRIAMCINOLONE ACETONIDE 40 MG/ML
40 INJECTION, SUSPENSION INTRA-ARTICULAR; INTRAMUSCULAR
Status: DISCONTINUED | OUTPATIENT
Start: 2019-02-11 | End: 2019-02-11 | Stop reason: HOSPADM

## 2019-02-11 RX ADMIN — TRIAMCINOLONE ACETONIDE 40 MG: 40 INJECTION, SUSPENSION INTRA-ARTICULAR; INTRAMUSCULAR at 09:02

## 2019-02-11 NOTE — PROGRESS NOTES
Subjective:      Patient ID: Ursula Rodríguez is a 79 y.o. female.    Chief Complaint: Pain of the Left Knee and Pain of the Right Knee    HPI: Ms. Rodríguez returns today with complaints of recurrent pain in both of her knees.  She was given Euflexxa on 08/20/2018 which gave her relief until approximately 3 weeks ago.  She is now having recurrent pain and swelling in her knees.  She is not wearing braces.  She cannot take NSAIDs because she is on chronic anticoagulation.  Walking exacerbates her pain.    ROS:  New diagnosis/surgery/prescriptions since last office visit on 08/20/2018 left great toe ulcer.  Review of Systems   Constitution: Negative for chills and fever.   HENT: Negative for congestion.    Eyes: Negative for blurred vision.   Cardiovascular: Negative for chest pain.   Respiratory: Negative for cough.    Endocrine: Negative for polydipsia.   Hematologic/Lymphatic: Bruises/bleeds easily.   Skin: Negative for itching.   Musculoskeletal: Positive for joint pain.   Gastrointestinal: Negative for constipation and diarrhea.   Genitourinary: Negative for nocturia.   Neurological: Negative for seizures.   Psychiatric/Behavioral: Negative for substance abuse.   Allergic/Immunologic: Negative for environmental allergies.         Objective:      Physical Exam:   General: AAOx3.  No acute distress  Vascular:  Pulses intact and equal bilaterally.  Capillary refill less than 3 seconds and equal bilaterally  Neurologic:  Pinprick and soft touch intact and equal bilaterally  Integment:  No ecchymosis, no errythema  Extremity:  Knee:  Extension/flexion equal bilaterally 1/118 degrees. Crepitus with motion both knees.  Mild effusion both knees.  Mildly positive patellar load/compression both knees.  Negative patella apprehension/relocation both knees.  Varus/valgus stressing equal bilaterally with endpoint.  Lachman's/drawer equal bilaterally with endpoint.  Small Baker cyst both knees.  Positive joint line tenderness  both knees.  Rebekah negative both knees.  Ciaran positive both knees.  Nontender at the anserine insertion both knees.  No swelling at the anserine insertion both knees.  Radiography:  No new x-ray done today.      Assessment:       Impression:   1.  Arthritis, both knees.  2.  Baker cyst, both knees.      Plan:       1.  Discussed physical examination with the patient. She understands she has arthritis in both of her knees.  She could consider either steroid or viscosupplementation, but with viscosupplementation she will need to be submitted for preauthorization before can be given. She stated she would like a steroid injection until viscosupplementation is available because viscosupplementation gives her good long-term relief.  Explained the patient will have the office submit for preauthorization for viscosupplementation.  2.  Offered a steroid injection of both knees, she elected to proceed.  3.  Brace wear discussed with the patient.  4.  Home exercises to include quadriceps and hamstring strengthening were reinforced with the patient.  5.  Discussed possible physical therapy she will hold off for now.  6.  Would not recommend this patient take NSAIDs as she is on chronic anticoagulants.  7.  [facility] portal was discussed with the patient and information was given.  The patient was encouraged to use the [facility] portal for all future encounters.  8.  Follow up when viscosupplementation is approved/preauthorized for injection.

## 2019-02-11 NOTE — PROCEDURES
Large Joint Aspiration/Injection: R knee, L knee  Date/Time: 2/11/2019 9:39 AM  Performed by: Valerio Chew DO  Authorized by: Valerio Chew, DO     Consent Done?:  Yes (Verbal)  Indications:  Pain, joint swelling and diagnostic evaluation  Procedure site marked: Yes    Timeout: Prior to procedure the correct patient, procedure, and site was verified      Location:  Knee  Site:  R knee and L knee  Prep: Patient was prepped and draped in usual sterile fashion    Ultrasonic Guidance for needle placement: No  Needle size:  22 G  Approach:  Anterolateral  Medications:  40 mg triamcinolone acetonide 40 mg/mL; 40 mg triamcinolone acetonide 40 mg/mL  Patient tolerance:  Patient tolerated the procedure well with no immediate complications

## 2019-02-19 ENCOUNTER — OFFICE VISIT (OUTPATIENT)
Dept: PODIATRY | Facility: CLINIC | Age: 79
End: 2019-02-19
Payer: MEDICARE

## 2019-02-19 VITALS
TEMPERATURE: 97 F | SYSTOLIC BLOOD PRESSURE: 141 MMHG | HEIGHT: 62 IN | RESPIRATION RATE: 18 BRPM | DIASTOLIC BLOOD PRESSURE: 77 MMHG | BODY MASS INDEX: 24.84 KG/M2 | HEART RATE: 67 BPM | WEIGHT: 135 LBS | OXYGEN SATURATION: 99 %

## 2019-02-19 DIAGNOSIS — Z92.29 HISTORY OF COUMADIN THERAPY: ICD-10-CM

## 2019-02-19 DIAGNOSIS — S90.222A SUBUNGUAL HEMATOMA OF FIFTH TOE OF LEFT FOOT, INITIAL ENCOUNTER: Primary | ICD-10-CM

## 2019-02-19 PROCEDURE — 99213 OFFICE O/P EST LOW 20 MIN: CPT | Mod: PBBFAC | Performed by: PODIATRIST

## 2019-02-19 PROCEDURE — 99999 PR PBB SHADOW E&M-EST. PATIENT-LVL III: CPT | Mod: PBBFAC,,, | Performed by: PODIATRIST

## 2019-02-19 PROCEDURE — 99999 PR PBB SHADOW E&M-EST. PATIENT-LVL III: ICD-10-PCS | Mod: PBBFAC,,, | Performed by: PODIATRIST

## 2019-02-19 PROCEDURE — 99203 PR OFFICE/OUTPT VISIT, NEW, LEVL III, 30-44 MIN: ICD-10-PCS | Mod: S$PBB,,, | Performed by: PODIATRIST

## 2019-02-19 PROCEDURE — 99203 OFFICE O/P NEW LOW 30 MIN: CPT | Mod: S$PBB,,, | Performed by: PODIATRIST

## 2019-02-23 NOTE — PROGRESS NOTES
Subjective:      Patient ID: Ursula Rodríguez is a 79 y.o. female.    Chief Complaint: Follow-up (hospital follow up)  Patient presents with friend/family member for follow-up infection left 5th digit.  She was referred to our office by the ED, visit 02/01.  She states about a week prior to that visit she had done a lot of walking, did not realize there was any injury to the toe until it was draining.  [place] started her on antibiotics. She then had increased pain and after not sleeping for 3 nights went to the ED. States they took x-rays, culture and gave her a shot of Rocephin.  Since then she completed antibiotics from her PCP.  They are concerned the toe is still black on the tip, but drainage has resolved, swelling has gone down little to no pain.   Patient reports she takes Coumadin and PCP has been having a difficult time getting it adjusted, have been high for a few months      ROS     Constitutional    Pleasant, well-nourished, no distress, well oriented    Eyes         GLASSES    Cardiovascular          No chest pain, no shortness of breath    Respiratory           No cough, no congestion     Musculoskeletal        No muscle aches, no arthralgias/joint pain, no back pain, no swelling in the extremities            Objective:      Physical Exam  Vascular         Arterial Pulses Right: posterior tibialis 1/4, dorsalis pedis 1/4, normal CFT   Arterial Pulses Left: posterior tibialis 1/4, dorsalis pedis 1/4, normal CFT   No lower extremity edema bilateral   Pedal skin temperature and color are normal bilateral     Integumentary   Distal 5th digit left foot including nail and under this area is black due to dried blood.  Patient had a subungual hematoma  due to prolonged walking which eventually drained, no bogginess or fluctuance today.  There is discolored hyperkeratotic tissue encompassing the distal 5th digit.  When debriding this area there is healthy pink tissue underneath, nail remains well attached  for now, no further evidence of abscess or cellulitis at this time.  Skin is in good condition        Neurological   Gross sensation intact, denies burning or tingling in feet    Musculoskeletal   Muscle Strength/Testing and Tone:  Intact-excellent for age,  normal tone bilateral   Joints, Bones, and Muscles: Limited ROM midfoot   Consistent with osteoarthritis related to age.  Mild arthritic and degenerative changes        Walks well unassisted    Radiographs  X-Ray Foot Complete Left   Details     Reading Physician Reading Date Result Priority   Kailash Acosta MD 2/1/2019       Narrative     EXAMINATION:  XR FOOT COMPLETE 3 VIEW LEFT    CLINICAL HISTORY:  . Pain, unspecified    TECHNIQUE:  AP, lateral, and oblique views of the right foot were performed.    COMPARISON:  None    FINDINGS:  Mild soft tissue swelling of the 5th toe.  No underlying bony fracture.    The joint spaces are preserved.  Suspected small bony ossicle adjacent to the proximal head of the proximal phalanx of the 1st toe.    Tarsal bones are intact.  Normal tarsometatarsal alignment.    Mild bone demineralization.      Impression       1. Soft tissue swelling of the 5th toe without underlying bony fracture.  2. Mild bone demineralization.                 Contains abnormal data Protime-INR    Ref Range & Units 4wk ago 2mo ago 3mo ago   Prothrombin Time 9.0 - 12.5 sec 26.3 Abnormally high   23.7 Abnormally high   26.4 Abnormally high     INR 0.8 - 1.2 2.3 Abnormally high   2.1 Abnormally high  CM 2.4 Abnormally high                   Assessment:       Encounter Diagnoses   Name Primary?    Subungual hematoma of fifth toe of left foot, initial encounter Yes    History of Coumadin therapy          Plan:       Ursula was seen today for follow-up.    Diagnoses and all orders for this visit:    Subungual hematoma of fifth toe of left foot, initial encounter    History of Coumadin therapy        Reviewed x-rays and reassured patient no fracture or  bony injury.  Explained due to a prolonged walking week prior to noticing infection she most likely developed a blood blister under the nail, which happens quickly on the feet and commonly fills with blood especially if PT/INR are evelvted on coumadin.   Explained this is the reason for dark/jackson color. Advised when it eventually drained and she was placed on oral antibiotics as well as Rocephin injection the abscsess and infection resolved well.  Pointed out to patient there is evidence of good healing and healthy tissue underneath, however since this involves the nail I cannot all be debrided or remove all the discoloration, will need to be done slowly over time.  Explained nail is attached at this time but may come off on its own.  Instructed patient to check daily, keep the area clean and dry, no wrapping, no antibiotic ointment.  Explained this area may need to be trimmed or debrided in a few weeks similar to a callus once it starts to peel, otherwise allow it to come off on its own and contact the office immediately with any concerns.  Patient was in understanding and agreement with treatment plan  I counseled the patient on her conditions, their implications and medical management.  Ingrown nails were removed hallux/bilateral.  Bacitracin, gauze, Coban applied.  Instructed patient to leave dressing intact until the morning, if sore apply antibiotic ointment and a soft dressing during the day, soak in warm water and Epsom salts in the evening.  Instructed patient to perform this treatment daily until any remaining redness, swelling or pain have resolved.  Explained condition should be completely resolved and pain-free in a few days.  Instructed patient to contact the office with worsening symptoms or not pain-free in 1 week.  Total face-to-face time, exam, assessment, treatment, discussion, documentation 30 minutes, more than half this time spent on consultation and coordination of care.  Follow up as  needed      This note was created using [employer] voice recognition software that occasionally misinterpreted phrases or words.

## 2019-03-04 ENCOUNTER — OFFICE VISIT (OUTPATIENT)
Dept: ORTHOPEDICS | Facility: CLINIC | Age: 79
End: 2019-03-04
Payer: MEDICARE

## 2019-03-04 ENCOUNTER — LAB VISIT (OUTPATIENT)
Dept: LAB | Facility: HOSPITAL | Age: 79
End: 2019-03-04
Attending: INTERNAL MEDICINE
Payer: MEDICARE

## 2019-03-04 ENCOUNTER — INFUSION (OUTPATIENT)
Dept: INFUSION THERAPY | Facility: HOSPITAL | Age: 79
End: 2019-03-04
Attending: INTERNAL MEDICINE
Payer: MEDICARE

## 2019-03-04 ENCOUNTER — TELEPHONE (OUTPATIENT)
Dept: HEMATOLOGY/ONCOLOGY | Facility: CLINIC | Age: 79
End: 2019-03-04

## 2019-03-04 VITALS
TEMPERATURE: 97 F | SYSTOLIC BLOOD PRESSURE: 144 MMHG | RESPIRATION RATE: 20 BRPM | HEART RATE: 69 BPM | DIASTOLIC BLOOD PRESSURE: 67 MMHG

## 2019-03-04 VITALS — BODY MASS INDEX: 24.83 KG/M2 | WEIGHT: 134.94 LBS | HEIGHT: 62 IN

## 2019-03-04 DIAGNOSIS — D50.9 IRON DEFICIENCY ANEMIA, UNSPECIFIED: ICD-10-CM

## 2019-03-04 DIAGNOSIS — G63 POLYNEUROPATHY IN OTHER DISEASES CLASSIFIED ELSEWHERE: ICD-10-CM

## 2019-03-04 DIAGNOSIS — Z85.3 HISTORY OF BILATERAL BREAST CANCER: Primary | ICD-10-CM

## 2019-03-04 DIAGNOSIS — M17.0 PRIMARY OSTEOARTHRITIS OF BOTH KNEES: Primary | ICD-10-CM

## 2019-03-04 DIAGNOSIS — E53.1 VITAMIN B6 DEFICIENCY: ICD-10-CM

## 2019-03-04 DIAGNOSIS — R41.3 MEMORY LOSS: Primary | ICD-10-CM

## 2019-03-04 LAB
BASOPHILS # BLD AUTO: 0.03 K/UL
BASOPHILS NFR BLD: 0.5 %
DIFFERENTIAL METHOD: ABNORMAL
EOSINOPHIL # BLD AUTO: 0.2 K/UL
EOSINOPHIL NFR BLD: 2.9 %
ERYTHROCYTE [DISTWIDTH] IN BLOOD BY AUTOMATED COUNT: 14 %
FERRITIN SERPL-MCNC: 287 NG/ML
FOLATE SERPL-MCNC: 15.1 NG/ML
HCT VFR BLD AUTO: 39.4 %
HGB BLD-MCNC: 13.3 G/DL
IMM GRANULOCYTES # BLD AUTO: 0.04 K/UL
IMM GRANULOCYTES NFR BLD AUTO: 0.6 %
INR PPP: 3.2
LYMPHOCYTES # BLD AUTO: 1.1 K/UL
LYMPHOCYTES NFR BLD: 15.8 %
MCH RBC QN AUTO: 27.6 PG
MCHC RBC AUTO-ENTMCNC: 33.8 G/DL
MCV RBC AUTO: 82 FL
MONOCYTES # BLD AUTO: 0.6 K/UL
MONOCYTES NFR BLD: 9.6 %
NEUTROPHILS # BLD AUTO: 4.7 K/UL
NEUTROPHILS NFR BLD: 70.6 %
NRBC BLD-RTO: 0 /100 WBC
PLATELET # BLD AUTO: 173 K/UL
PMV BLD AUTO: 9.8 FL
PROTHROMBIN TIME: 35.5 SEC
RBC # BLD AUTO: 4.82 M/UL
VIT B12 SERPL-MCNC: 836 PG/ML
WBC # BLD AUTO: 6.64 K/UL

## 2019-03-04 PROCEDURE — 82607 VITAMIN B-12: CPT

## 2019-03-04 PROCEDURE — 20610 LARGE JOINT ASPIRATION/INJECTION: R KNEE, L KNEE: ICD-10-PCS | Mod: 50,S$PBB,, | Performed by: ORTHOPAEDIC SURGERY

## 2019-03-04 PROCEDURE — 85610 PROTHROMBIN TIME: CPT

## 2019-03-04 PROCEDURE — 20610 DRAIN/INJ JOINT/BURSA W/O US: CPT | Mod: 50,PBBFAC | Performed by: ORTHOPAEDIC SURGERY

## 2019-03-04 PROCEDURE — 96523 IRRIG DRUG DELIVERY DEVICE: CPT

## 2019-03-04 PROCEDURE — 25000003 PHARM REV CODE 250: Performed by: INTERNAL MEDICINE

## 2019-03-04 PROCEDURE — 82728 ASSAY OF FERRITIN: CPT

## 2019-03-04 PROCEDURE — A4216 STERILE WATER/SALINE, 10 ML: HCPCS | Performed by: INTERNAL MEDICINE

## 2019-03-04 PROCEDURE — 99999 PR PBB SHADOW E&M-EST. PATIENT-LVL II: CPT | Mod: PBBFAC,,, | Performed by: ORTHOPAEDIC SURGERY

## 2019-03-04 PROCEDURE — 85025 COMPLETE CBC W/AUTO DIFF WBC: CPT

## 2019-03-04 PROCEDURE — 99499 NO LOS: ICD-10-PCS | Mod: S$PBB,,, | Performed by: ORTHOPAEDIC SURGERY

## 2019-03-04 PROCEDURE — 99212 OFFICE O/P EST SF 10 MIN: CPT | Mod: PBBFAC,25 | Performed by: ORTHOPAEDIC SURGERY

## 2019-03-04 PROCEDURE — 36415 COLL VENOUS BLD VENIPUNCTURE: CPT

## 2019-03-04 PROCEDURE — 84207 ASSAY OF VITAMIN B-6: CPT

## 2019-03-04 PROCEDURE — 99999 PR PBB SHADOW E&M-EST. PATIENT-LVL II: ICD-10-PCS | Mod: PBBFAC,,, | Performed by: ORTHOPAEDIC SURGERY

## 2019-03-04 PROCEDURE — 82746 ASSAY OF FOLIC ACID SERUM: CPT

## 2019-03-04 PROCEDURE — 63600175 PHARM REV CODE 636 W HCPCS: Performed by: INTERNAL MEDICINE

## 2019-03-04 PROCEDURE — 99499 UNLISTED E&M SERVICE: CPT | Mod: S$PBB,,, | Performed by: ORTHOPAEDIC SURGERY

## 2019-03-04 RX ORDER — SODIUM CHLORIDE 0.9 % (FLUSH) 0.9 %
10 SYRINGE (ML) INJECTION
Status: CANCELLED | OUTPATIENT
Start: 2019-03-04

## 2019-03-04 RX ORDER — SODIUM CHLORIDE 0.9 % (FLUSH) 0.9 %
10 SYRINGE (ML) INJECTION
Status: COMPLETED | OUTPATIENT
Start: 2019-03-04 | End: 2019-03-04

## 2019-03-04 RX ORDER — HEPARIN SODIUM,PORCINE/PF 10 UNIT/ML
10 SYRINGE (ML) INTRAVENOUS
Status: DISCONTINUED | OUTPATIENT
Start: 2019-03-04 | End: 2019-03-04 | Stop reason: HOSPADM

## 2019-03-04 RX ORDER — HEPARIN SODIUM,PORCINE/PF 10 UNIT/ML
10 SYRINGE (ML) INTRAVENOUS
Status: CANCELLED
Start: 2019-03-04

## 2019-03-04 RX ADMIN — Medication 10 ML: at 10:03

## 2019-03-04 RX ADMIN — Medication 48 MG: at 12:03

## 2019-03-04 RX ADMIN — HEPARIN, PORCINE (PF) 10 UNIT/ML INTRAVENOUS SYRINGE 10 UNITS: at 10:03

## 2019-03-04 NOTE — PROCEDURES
Large Joint Aspiration/Injection: R knee, L knee  Date/Time: 3/4/2019 12:43 PM  Performed by: Valerio Chew DO  Authorized by: Valerio Chew, DO     Consent Done?:  Yes (Verbal)  Indications:  Pain, joint swelling and diagnostic evaluation  Procedure site marked: Yes    Timeout: Prior to procedure the correct patient, procedure, and site was verified      Location:  Knee  Site:  R knee and L knee  Prep: Patient was prepped and draped in usual sterile fashion    Ultrasonic Guidance for needle placement: No  Needle size:  22 G  Approach:  Anterolateral  Medications:  48 mg hylan g-f 20 48 mg/6 mL; 48 mg hylan g-f 20 48 mg/6 mL  Patient tolerance:  Patient tolerated the procedure well with no immediate complications

## 2019-03-04 NOTE — PROGRESS NOTES
Subjective:      Patient ID: Ursula Rodríguez is a 79 y.o. female.    Chief Complaint: Injections (Synvisc-One Bilateral Knees)    HPI: Ms. Rodríguez returns today with complaints of recurrent pain in both of her knees.  She stated her symptoms have been present for approximately 1 week.  At her last visit on 02/11/2019 she was given a steroid injection which has helped.  She typically gets viscosupplementation which gives her 6 months of relief.  She is here today to proceed with viscosupplementation to both of her knees.    ROS:  No new diagnosis/surgery/prescriptions since last office visit on 02/11/2019.  Constitution: Negative for chills and fever.   HENT: Negative for congestion.    Eyes: Negative for blurred vision.   Cardiovascular: Negative for chest pain.   Respiratory: Negative for cough.    Endocrine: Negative for polydipsia.   Hematologic/Lymphatic: Bruises/bleeds easily.   Skin: Negative for itching.   Musculoskeletal: Positive for joint pain.   Gastrointestinal: Negative for constipation and diarrhea.   Genitourinary: Negative for nocturia.   Neurological: Negative for seizures.   Psychiatric/Behavioral: Negative for substance abuse.   Allergic/Immunologic: Negative for environmental allergies.       Objective:      Physical Exam:   General: AAOx3.  No acute distress  Vascular:  Pulses intact and equal bilaterally.  Capillary refill less than 3 seconds and equal bilaterally  Neurologic:  Pinprick and soft touch intact and equal bilaterally  Integment:  No ecchymosis, no errythema  Extremity: Knee:  Extension/flexion equal bilaterally 1/118 degrees. Crepitus with motion both knees.  Mild effusion both knees.  Mildly positive patellar load/compression both knees.  Negative patella apprehension/relocation both knees.  Varus/valgus stressing equal bilaterally with endpoint.  Lachman's/drawer equal bilaterally with endpoint.  Small Baker cyst both knees.  Positive joint line tenderness both knees.   Rebekah negative both knees.  Dallam positive both knees.  Nontender at the anserine insertion both knees.  No swelling at the anserine insertion both knees.  Radiography:  No new x-rays done today.      Assessment:       Impression:  Tricompartmental arthritis, both knees.      Plan:       1.  Discussed physical examination with the patient. She can continue with conservative management or consider surgery. She stated that since viscosupplementation helps she would prefer to have viscosupplementation again.  2.  Offered Synvisc one to both knees, she elected to proceed.  3.  Take NSAIDs as tolerated and allowed by PCM.  4.  Continue with home exercises previously discussed.  5.  [facility] portal was discussed with the patient and information was given.  The patient was encouraged to use the [facility] portal for all future encounters.  6.  Follow up p.r.n.

## 2019-03-05 NOTE — TELEPHONE ENCOUNTER
Tell her inr is slightly high at 3.2, Dr. Dunlap regulates her coumadin.  Send or call the INR to Dr. Dunlap.  Hold her coumadin x's 1 day, till she hears from Dr. Dunlap.    Also blood counts and vitamen B12, folate and iron levels   Returned NL.  B6 is pending.    RTC 9 months as planned.

## 2019-03-06 ENCOUNTER — TELEPHONE (OUTPATIENT)
Dept: HEMATOLOGY/ONCOLOGY | Facility: CLINIC | Age: 79
End: 2019-03-06

## 2019-03-06 NOTE — TELEPHONE ENCOUNTER
Spoke to pt letting her know inr is slightly high at 3.2, Dr. Dunlap regulates her coumadin.  Send or call the INR to Dr. Dunlap.  Hold her coumadin x's 1 day, till she hears from Dr. Dunlap.     Also blood counts and vitamen B12, folate and iron levels   Returned NL.  B6 is pending.     RTC 9 months as planned.

## 2019-03-11 LAB — PYRIDOXAL SERPL-MCNC: 17 UG/L (ref 5–50)

## 2019-03-31 ENCOUNTER — HOSPITAL ENCOUNTER (EMERGENCY)
Facility: HOSPITAL | Age: 79
Discharge: HOME | End: 2019-04-01
Attending: FAMILY MEDICINE
Payer: MEDICARE

## 2019-03-31 DIAGNOSIS — E03.9 HYPOTHYROIDISM, UNSPECIFIED TYPE: ICD-10-CM

## 2019-03-31 DIAGNOSIS — E87.6 HYPOKALEMIA: Primary | ICD-10-CM

## 2019-03-31 LAB
ANION GAP SERPL CALC-SCNC: 10 MMOL/L (ref 8–16)
BUN SERPL-MCNC: 34 MG/DL (ref 8–23)
CALCIUM SERPL-MCNC: 8.8 MG/DL (ref 8.7–10.5)
CHLORIDE SERPL-SCNC: 100 MMOL/L (ref 95–110)
CO2 SERPL-SCNC: 25 MMOL/L (ref 23–29)
CREAT SERPL-MCNC: 1.5 MG/DL (ref 0.5–1.4)
EST. GFR  (AFRICAN AMERICAN): 37.4 ML/MIN/1.73 M^2
EST. GFR  (NON AFRICAN AMERICAN): 32.4 ML/MIN/1.73 M^2
GLUCOSE SERPL-MCNC: 160 MG/DL (ref 70–110)
INR PPP: 1.8 (ref 0.8–1.2)
MAGNESIUM SERPL-MCNC: 1.6 MG/DL (ref 1.6–2.6)
POTASSIUM SERPL-SCNC: 2.6 MMOL/L (ref 3.5–5.1)
PROTHROMBIN TIME: 20.5 SEC (ref 9–12.5)
SODIUM SERPL-SCNC: 135 MMOL/L (ref 136–145)
T4 FREE SERPL-MCNC: 1.11 NG/DL (ref 0.71–1.51)
TSH SERPL DL<=0.005 MIU/L-ACNC: 8.06 UIU/ML (ref 0.34–5.6)

## 2019-03-31 PROCEDURE — 80048 BASIC METABOLIC PNL TOTAL CA: CPT

## 2019-03-31 PROCEDURE — 85610 PROTHROMBIN TIME: CPT

## 2019-03-31 PROCEDURE — 84443 ASSAY THYROID STIM HORMONE: CPT

## 2019-03-31 PROCEDURE — 96365 THER/PROPH/DIAG IV INF INIT: CPT

## 2019-03-31 PROCEDURE — 96366 THER/PROPH/DIAG IV INF ADDON: CPT

## 2019-03-31 PROCEDURE — 96361 HYDRATE IV INFUSION ADD-ON: CPT

## 2019-03-31 PROCEDURE — 63600175 PHARM REV CODE 636 W HCPCS: Performed by: FAMILY MEDICINE

## 2019-03-31 PROCEDURE — 25000003 PHARM REV CODE 250

## 2019-03-31 PROCEDURE — 96375 TX/PRO/DX INJ NEW DRUG ADDON: CPT

## 2019-03-31 PROCEDURE — 84439 ASSAY OF FREE THYROXINE: CPT

## 2019-03-31 PROCEDURE — 25000003 PHARM REV CODE 250: Performed by: FAMILY MEDICINE

## 2019-03-31 PROCEDURE — 99284 EMERGENCY DEPT VISIT MOD MDM: CPT | Mod: 25

## 2019-03-31 PROCEDURE — 83735 ASSAY OF MAGNESIUM: CPT

## 2019-03-31 RX ORDER — POTASSIUM CHLORIDE 7.45 MG/ML
10 INJECTION INTRAVENOUS
Status: COMPLETED | OUTPATIENT
Start: 2019-03-31 | End: 2019-03-31

## 2019-03-31 RX ORDER — POTASSIUM CHLORIDE 7.45 MG/ML
10 INJECTION INTRAVENOUS
Status: COMPLETED | OUTPATIENT
Start: 2019-03-31 | End: 2019-04-01

## 2019-03-31 RX ORDER — LANOLIN ALCOHOL/MO/W.PET/CERES
CREAM (GRAM) TOPICAL
Status: COMPLETED
Start: 2019-03-31 | End: 2019-03-31

## 2019-03-31 RX ORDER — VITS A,C,E/LUTEIN/MINERALS 300MCG-200
200 TABLET ORAL
Status: COMPLETED | OUTPATIENT
Start: 2019-03-31 | End: 2019-03-31

## 2019-03-31 RX ORDER — POTASSIUM CHLORIDE 14.9 MG/ML
20 INJECTION INTRAVENOUS
Status: DISCONTINUED | OUTPATIENT
Start: 2019-03-31 | End: 2019-03-31

## 2019-03-31 RX ADMIN — SODIUM CHLORIDE 250 ML: 9 INJECTION, SOLUTION INTRAVENOUS at 09:03

## 2019-03-31 RX ADMIN — Medication 200 MG: at 11:03

## 2019-03-31 RX ADMIN — MAGNESIUM OXIDE TAB 400 MG (241.3 MG ELEMENTAL MG) 200 MG: 400 (241.3 MG) TAB at 11:03

## 2019-03-31 RX ADMIN — POTASSIUM CHLORIDE 10 MEQ: 7.46 INJECTION, SOLUTION INTRAVENOUS at 11:03

## 2019-03-31 RX ADMIN — POTASSIUM CHLORIDE 10 MEQ: 7.46 INJECTION, SOLUTION INTRAVENOUS at 10:03

## 2019-04-01 VITALS
BODY MASS INDEX: 24.66 KG/M2 | DIASTOLIC BLOOD PRESSURE: 65 MMHG | RESPIRATION RATE: 12 BRPM | TEMPERATURE: 98 F | WEIGHT: 134 LBS | SYSTOLIC BLOOD PRESSURE: 119 MMHG | OXYGEN SATURATION: 97 % | HEART RATE: 57 BPM | HEIGHT: 62 IN

## 2019-04-01 VITALS
HEART RATE: 75 BPM | RESPIRATION RATE: 18 BRPM | TEMPERATURE: 98 F | HEIGHT: 62 IN | DIASTOLIC BLOOD PRESSURE: 85 MMHG | BODY MASS INDEX: 24.84 KG/M2 | OXYGEN SATURATION: 99 % | WEIGHT: 135 LBS | SYSTOLIC BLOOD PRESSURE: 146 MMHG

## 2019-04-01 DIAGNOSIS — R13.10 SWALLOWING PROBLEM: Primary | ICD-10-CM

## 2019-04-01 LAB
ALBUMIN SERPL BCP-MCNC: 4.6 G/DL (ref 3.5–5.2)
ALP SERPL-CCNC: 74 U/L (ref 55–135)
ALT SERPL W/O P-5'-P-CCNC: 26 U/L (ref 10–44)
ANION GAP SERPL CALC-SCNC: 15 MMOL/L (ref 8–16)
AST SERPL-CCNC: 33 U/L (ref 10–40)
BILIRUB SERPL-MCNC: 0.7 MG/DL (ref 0.1–1)
BUN SERPL-MCNC: 32 MG/DL (ref 8–23)
CALCIUM SERPL-MCNC: 9.3 MG/DL (ref 8.7–10.5)
CHLORIDE SERPL-SCNC: 99 MMOL/L (ref 95–110)
CO2 SERPL-SCNC: 20 MMOL/L (ref 23–29)
CREAT SERPL-MCNC: 1.6 MG/DL (ref 0.5–1.4)
EST. GFR  (AFRICAN AMERICAN): 34.6 ML/MIN/1.73 M^2
EST. GFR  (NON AFRICAN AMERICAN): 30 ML/MIN/1.73 M^2
GLUCOSE SERPL-MCNC: 174 MG/DL (ref 70–110)
MAGNESIUM SERPL-MCNC: 1.8 MG/DL (ref 1.6–2.6)
POTASSIUM SERPL-SCNC: 3 MMOL/L (ref 3.5–5.1)
POTASSIUM SERPL-SCNC: 3.2 MMOL/L (ref 3.5–5.1)
PROT SERPL-MCNC: 7.1 G/DL (ref 6–8.4)
SODIUM SERPL-SCNC: 134 MMOL/L (ref 136–145)

## 2019-04-01 PROCEDURE — 83735 ASSAY OF MAGNESIUM: CPT

## 2019-04-01 PROCEDURE — 36415 COLL VENOUS BLD VENIPUNCTURE: CPT

## 2019-04-01 PROCEDURE — 80053 COMPREHEN METABOLIC PANEL: CPT

## 2019-04-01 PROCEDURE — 99283 EMERGENCY DEPT VISIT LOW MDM: CPT

## 2019-04-01 PROCEDURE — 63600175 PHARM REV CODE 636 W HCPCS: Performed by: FAMILY MEDICINE

## 2019-04-01 PROCEDURE — 84132 ASSAY OF SERUM POTASSIUM: CPT

## 2019-04-01 RX ORDER — HEPARIN SODIUM,PORCINE/PF 10 UNIT/ML
SYRINGE (ML) INTRAVENOUS
Status: DISCONTINUED
Start: 2019-04-01 | End: 2019-04-01 | Stop reason: HOSPADM

## 2019-04-01 RX ORDER — HEPARIN SODIUM,PORCINE/PF 10 UNIT/ML
10 SYRINGE (ML) INTRAVENOUS
Status: COMPLETED | OUTPATIENT
Start: 2019-04-01 | End: 2019-04-01

## 2019-04-01 RX ORDER — POTASSIUM CHLORIDE 750 MG/1
10 TABLET, EXTENDED RELEASE ORAL DAILY
Qty: 14 TABLET | Refills: 0 | Status: SHIPPED | OUTPATIENT
Start: 2019-04-01 | End: 2019-09-23

## 2019-04-01 RX ADMIN — HEPARIN, PORCINE (PF) 10 UNIT/ML INTRAVENOUS SYRINGE 10 UNITS: at 02:04

## 2019-04-01 NOTE — ED NOTES
Spoke with House Supervisor and EDMD regarding Heplock order. EDMD states she will look into it. Pt unsure of the dose of Heparin used

## 2019-04-01 NOTE — ED NOTES
Patient is resting comfortably. resp e/u. sr per cm. Potassium infusing w/o s/s of iv related complications. nad noted. Awaiting dipso. Will continue to monitor

## 2019-04-01 NOTE — ED NOTES
Port-a-cath accessed using sterile technique.  Pt tolerated well. Good flush and blood return noted.

## 2019-04-01 NOTE — ED PROVIDER NOTES
Encounter Date: 4/1/2019       History     Chief Complaint   Patient presents with    Dysphagia     PATIENT REPORTS DIFFICULTY SWALLOWING TODAY, STATED HERE LAST NIGHT FOR SIMILAR SYMPTOMS     HPI  Review of patient's allergies indicates:   Allergen Reactions    Iodine and iodide containing products     Ditropan [oxybutynin chloride] Palpitations and Other (See Comments)     Made patient weak     Past Medical History:   Diagnosis Date    Breast cancer     Cancer     BREAST     Diabetes mellitus, type 2     Factor V Leiden     GERD (gastroesophageal reflux disease)     Hypertension     Recurrent urinary tract infection     Vertigo      Past Surgical History:   Procedure Laterality Date    HEMORRHOID SURGERY  1968    HYSTERECTOMY  1970    MASTECTOMY Right 1994    MASTECTOMY Left 2013    BREAST CANCER    TUMOR REMOVAL Left 1994    EAR     Family History   Problem Relation Age of Onset    Cancer Mother     Hypertension Mother     Heart disease Mother     No Known Problems Sister     Diabetes Brother     Hypertension Brother     Cancer Maternal Grandmother     Lung disease Father     Heart disease Father     Diabetes Father     Cancer Brother     Diabetes Brother     Hypertension Brother     Stroke Brother     Hypertension Brother     Stroke Brother      Social History     Tobacco Use    Smoking status: Never Smoker    Smokeless tobacco: Never Used   Substance Use Topics    Alcohol use: No    Drug use: No     Review of Systems    Physical Exam     Initial Vitals [04/01/19 1538]   BP Pulse Resp Temp SpO2   (!) 146/85 75 18 98.1 °F (36.7 °C) 99 %      MAP       --         Physical Exam    ED Course   Procedures  Labs Reviewed   COMPREHENSIVE METABOLIC PANEL - Abnormal; Notable for the following components:       Result Value    Sodium 134 (*)     Potassium 3.0 (*)     CO2 20 (*)     Glucose 174 (*)     BUN, Bld 32 (*)     Creatinine 1.6 (*)     eGFR if  34.6 (*)     eGFR  if non  30.0 (*)     All other components within normal limits   MAGNESIUM          Imaging Results    None                               Clinical Impression:   {Add your Clinical Impression here. If you haven't documented one yet, please pend the note, finalize a Clinical Impression, and refresh your note before signing.:[id number]}

## 2019-04-01 NOTE — ED NOTES
Pt ambulatory to restroom with assistance. nad noted. No complaints verbalized at this time. Will continue to monitor

## 2019-04-01 NOTE — ED PROVIDER NOTES
Encounter Date: 3/31/2019       History     Chief Complaint   Patient presents with    Hypertension     reports 180/82 at home, took Clonidine at 1630, B/P 124/80, Patient thinks she has been slurring her speach, dry mouth, tingling in face, feels like she has a helmet on head, no pain     Pt states her BP was elevated today so she began taking clonidine 0.1 every 2 hours for a total of 4 pills over the course of the day. She now has severe dry mouth and dizziness.         Review of patient's allergies indicates:   Allergen Reactions    Iodine and iodide containing products     Ditropan [oxybutynin chloride] Palpitations and Other (See Comments)     Made patient weak     Past Medical History:   Diagnosis Date    Breast cancer     Cancer     BREAST     Diabetes mellitus, type 2     GERD (gastroesophageal reflux disease)     Hypertension     Recurrent urinary tract infection     Vertigo      Past Surgical History:   Procedure Laterality Date    HEMORRHOID SURGERY  1968    HYSTERECTOMY  1970    MASTECTOMY Right 1994    MASTECTOMY Left 2013    BREAST CANCER    TUMOR REMOVAL Left 1994    EAR     Family History   Problem Relation Age of Onset    Cancer Mother     Hypertension Mother     Heart disease Mother     No Known Problems Sister     Diabetes Brother     Hypertension Brother     Cancer Maternal Grandmother     Lung disease Father     Heart disease Father     Diabetes Father     Cancer Brother     Diabetes Brother     Hypertension Brother     Stroke Brother     Hypertension Brother     Stroke Brother      Social History     Tobacco Use    Smoking status: Never Smoker    Smokeless tobacco: Never Used   Substance Use Topics    Alcohol use: No    Drug use: No     Review of Systems   Constitutional: Negative.    HENT:        Dry mouth   Respiratory: Negative.    Cardiovascular: Negative.    Gastrointestinal: Negative.    Neurological: Positive for dizziness.   Psychiatric/Behavioral:  Negative.        Physical Exam     Initial Vitals [03/31/19 2027]   BP Pulse Resp Temp SpO2   124/80 68 20 98.1 °F (36.7 °C) 98 %      MAP       --         Physical Exam    Nursing note and vitals reviewed.  Constitutional: She appears well-developed and well-nourished. She is not diaphoretic. No distress.   HENT:   Head: Normocephalic and atraumatic.   Mucus membranes dry   Eyes: Conjunctivae and EOM are normal. Pupils are equal, round, and reactive to light.   Neck: Normal range of motion. Neck supple.   Cardiovascular: Normal rate, regular rhythm, normal heart sounds and intact distal pulses. Exam reveals no gallop and no friction rub.    No murmur heard.  Pulmonary/Chest: Breath sounds normal. No respiratory distress. She has no wheezes. She has no rales.   Abdominal: Soft. Bowel sounds are normal. She exhibits no distension. There is no tenderness.   Musculoskeletal: Normal range of motion. She exhibits no edema.   Neurological: She is alert and oriented to person, place, and time. She has normal strength.   Skin: Skin is warm and dry. Capillary refill takes less than 2 seconds. No rash noted. No erythema.   Psychiatric: She has a normal mood and affect. Her behavior is normal. Judgment and thought content normal.         ED Course   Procedures  Labs Reviewed   PROTIME-INR   BASIC METABOLIC PANEL         Labs Reviewed   PROTIME-INR - Abnormal; Notable for the following components:       Result Value    Prothrombin Time 20.5 (*)     INR 1.8 (*)     All other components within normal limits   BASIC METABOLIC PANEL - Abnormal; Notable for the following components:    Sodium 135 (*)     Potassium 2.6 (*)     Glucose 160 (*)     BUN, Bld 34 (*)     Creatinine 1.5 (*)     eGFR if  37.4 (*)     eGFR if non  32.4 (*)     All other components within normal limits    Narrative:       potassium of 2.6 critical result(s) called and verbal readback   obtained from Deepika Angulo [email or web address],  03/31/2019 22:04   TSH - Abnormal; Notable for the following components:    TSH 8.065 (*)     All other components within normal limits   POTASSIUM - Abnormal; Notable for the following components:    Potassium 3.2 (*)     All other components within normal limits   MAGNESIUM   MAGNESIUM   TSH   T4, FREE       Imaging Results    None                               Clinical Impression:       ICD-10-CM ICD-9-CM   1. Hypokalemia E87.6 276.8   2. Hypothyroidism, unspecified type E03.9 244.9                                Bruna Byrne MD  04/01/19 0131

## 2019-04-01 NOTE — DISCHARGE INSTRUCTIONS
Follow up with Dr. Dunlap in the next couple days to have potassium and magnesium rechecked. You will also need to discuss with him your TSH (thyroid test) and start medication for this.   Return to the ER at any time if condition changes, worsens, or if new symptoms develop. If you have to take clonidine for elevated blood pressure, do not take more than 3 doses per day.

## 2019-04-02 NOTE — ED PROVIDER NOTES
"Encounter Date: 4/1/2019       History     Chief Complaint   Patient presents with    Dysphagia     PATIENT REPORTS DIFFICULTY SWALLOWING TODAY, STATED HERE LAST NIGHT FOR SIMILAR SYMPTOMS     Ursula Rodríguez is a 79 y.o female with PMHx including DM, GERD, hypertension, recurrent UTI and vertigo. She presents to ED with episode of difficulty swallowing.   Patient was at attempting to eat cornbread and drinking milk. She states "I had a hard time getting it down". She has not experienced another episode.  She denies difficulty swallowing at this time. She does not have foreign body sensation in throat.  Patient was seen in ED last night for elevated BP, dizziness and dry mouth. She was diagnosed with Hypokalemia. She was given Mag and Potassium prior to discharge  Patient was concerned that the episode of difficulty swallow may be a side effect of the mediations she was given        Review of patient's allergies indicates:   Allergen Reactions    Iodine and iodide containing products     Ditropan [oxybutynin chloride] Palpitations and Other (See Comments)     Made patient weak     Past Medical History:   Diagnosis Date    Breast cancer     Cancer     BREAST     Diabetes mellitus, type 2     Factor V Leiden     GERD (gastroesophageal reflux disease)     Hypertension     Recurrent urinary tract infection     Vertigo      Past Surgical History:   Procedure Laterality Date    HEMORRHOID SURGERY  1968    HYSTERECTOMY  1970    MASTECTOMY Right 1994    MASTECTOMY Left 2013    BREAST CANCER    TUMOR REMOVAL Left 1994    EAR     Family History   Problem Relation Age of Onset    Cancer Mother     Hypertension Mother     Heart disease Mother     No Known Problems Sister     Diabetes Brother     Hypertension Brother     Cancer Maternal Grandmother     Lung disease Father     Heart disease Father     Diabetes Father     Cancer Brother     Diabetes Brother     Hypertension Brother     Stroke Brother  "    Hypertension Brother     Stroke Brother      Social History     Tobacco Use    Smoking status: Never Smoker    Smokeless tobacco: Never Used   Substance Use Topics    Alcohol use: No    Drug use: No     Review of Systems   Constitutional: Negative for activity change, appetite change, chills, diaphoresis, fatigue, fever and unexpected weight change.   HENT: Negative for congestion, ear discharge, facial swelling, postnasal drip, rhinorrhea, sinus pressure, sinus pain, sneezing and sore throat.    Respiratory: Negative for cough and shortness of breath.    Cardiovascular: Negative for chest pain.   Gastrointestinal: Negative for abdominal distention, abdominal pain and nausea.   Genitourinary: Negative for dysuria.   Musculoskeletal: Negative for back pain.   Skin: Negative for rash.   Neurological: Negative for dizziness, facial asymmetry, weakness, light-headedness, numbness and headaches.   Hematological: Does not bruise/bleed easily.   All other systems reviewed and are negative.      Physical Exam     Initial Vitals [04/01/19 1538]   BP Pulse Resp Temp SpO2   (!) 146/85 75 18 98.1 °F (36.7 °C) 99 %      MAP       --         Physical Exam    Nursing note and vitals reviewed.  Constitutional: She appears well-developed and well-nourished.   HENT:   Head: Normocephalic.   Right Ear: External ear normal.   Left Ear: External ear normal.   Mouth/Throat: Oropharynx is clear and moist.   Eyes: Pupils are equal, round, and reactive to light.   Neck: Normal range of motion.   Cardiovascular: Normal rate, regular rhythm and normal heart sounds.   Pulmonary/Chest: Breath sounds normal.   Musculoskeletal: Normal range of motion.   Neurological: She is alert and oriented to person, place, and time.   Skin: Skin is warm and dry.   Psychiatric: She has a normal mood and affect. Her behavior is normal. Judgment and thought content normal.         ED Course   Procedures  Labs Reviewed   COMPREHENSIVE METABOLIC PANEL -  "Abnormal; Notable for the following components:       Result Value    Sodium 134 (*)     Potassium 3.0 (*)     CO2 20 (*)     Glucose 174 (*)     BUN, Bld 32 (*)     Creatinine 1.6 (*)     eGFR if  34.6 (*)     eGFR if non  30.0 (*)     All other components within normal limits   MAGNESIUM          Imaging Results    None          Medical Decision Making:   Initial Assessment:   Patient with episode ofdifficulty swallowing.   Patient was at attempting to eat cornbread and drinking milk. She states "I had a hard time getting it down". She has not experienced another episode.  She denies difficulty swallowing at this time. She does not have foreign body sensation in throat.  Patient was seen in ED last night for elevated BP, dizziness and dry mouth. She was diagnosed with Hypokalemia. She was given Mag and Potassium prior to discharge  Patient was concerned that the episode of difficulty swallow may be a side effect of the mediations she was given    Patient asymptomatic at this time. Normal examination findings      Differential Diagnosis:   Food bolus  Medication side effect  Hypokalemia  Allergic reaction  Clinical Tests:   Lab Tests: Ordered  ED Management:  Labs obtained    Patient is talkative. She is drinking fluids without diff    Discussed physical exam findings with patient  No acute emergent medical condition identified at this time to warrant further testing/diagnostics.  Plan to discharge patient home with instructions per AVS.  Follow-up with PCP in 2-5 days or return to ED if symptoms worsen.  Patient verbalized agreement to discharge treatment plan.                      Clinical Impression:       ICD-10-CM ICD-9-CM   1. Swallowing problem R13.10 787.20                                Margret Walton NP  04/02/19 1738    "

## 2019-04-03 ENCOUNTER — INFUSION (OUTPATIENT)
Dept: INFUSION THERAPY | Facility: HOSPITAL | Age: 79
End: 2019-04-03
Attending: INTERNAL MEDICINE
Payer: MEDICARE

## 2019-04-03 ENCOUNTER — LAB VISIT (OUTPATIENT)
Dept: LAB | Facility: HOSPITAL | Age: 79
End: 2019-04-03
Attending: INTERNAL MEDICINE
Payer: MEDICARE

## 2019-04-03 VITALS
DIASTOLIC BLOOD PRESSURE: 62 MMHG | SYSTOLIC BLOOD PRESSURE: 135 MMHG | HEART RATE: 97 BPM | TEMPERATURE: 97 F | RESPIRATION RATE: 20 BRPM

## 2019-04-03 DIAGNOSIS — R41.3 MEMORY LOSS: ICD-10-CM

## 2019-04-03 DIAGNOSIS — D50.9 IRON DEFICIENCY ANEMIA, UNSPECIFIED: ICD-10-CM

## 2019-04-03 DIAGNOSIS — C50.919 MALIGNANT NEOPLASM OF BREAST (FEMALE): Primary | ICD-10-CM

## 2019-04-03 LAB
BASOPHILS # BLD AUTO: 0.04 K/UL (ref 0–0.2)
BASOPHILS NFR BLD: 0.6 % (ref 0–1.9)
DIFFERENTIAL METHOD: ABNORMAL
EOSINOPHIL # BLD AUTO: 0.2 K/UL (ref 0–0.5)
EOSINOPHIL NFR BLD: 2.5 % (ref 0–8)
ERYTHROCYTE [DISTWIDTH] IN BLOOD BY AUTOMATED COUNT: 13.9 % (ref 11.5–14.5)
FERRITIN SERPL-MCNC: 232 NG/ML (ref 20–300)
HCT VFR BLD AUTO: 38.4 % (ref 37–48.5)
HGB BLD-MCNC: 13.1 G/DL (ref 12–16)
IMM GRANULOCYTES # BLD AUTO: 0.03 K/UL (ref 0–0.04)
IMM GRANULOCYTES NFR BLD AUTO: 0.4 % (ref 0–0.5)
INR PPP: 2.4 (ref 0.8–1.2)
LYMPHOCYTES # BLD AUTO: 0.8 K/UL (ref 1–4.8)
LYMPHOCYTES NFR BLD: 12 % (ref 18–48)
MAGNESIUM SERPL-MCNC: 1.7 MG/DL (ref 1.6–2.6)
MCH RBC QN AUTO: 28.2 PG (ref 27–31)
MCHC RBC AUTO-ENTMCNC: 34.1 G/DL (ref 32–36)
MCV RBC AUTO: 83 FL (ref 82–98)
MONOCYTES # BLD AUTO: 0.5 K/UL (ref 0.3–1)
MONOCYTES NFR BLD: 7.6 % (ref 4–15)
NEUTROPHILS # BLD AUTO: 5.2 K/UL (ref 1.8–7.7)
NEUTROPHILS NFR BLD: 76.9 % (ref 38–73)
NRBC BLD-RTO: 0 /100 WBC
PLATELET # BLD AUTO: 207 K/UL (ref 150–350)
PMV BLD AUTO: 10.1 FL (ref 9.2–12.9)
POTASSIUM SERPL-SCNC: 3.1 MMOL/L (ref 3.5–5.1)
PROTHROMBIN TIME: 26.6 SEC (ref 9–12.5)
RBC # BLD AUTO: 4.65 M/UL (ref 4–5.4)
WBC # BLD AUTO: 6.82 K/UL (ref 3.9–12.7)

## 2019-04-03 PROCEDURE — 25000003 PHARM REV CODE 250: Performed by: INTERNAL MEDICINE

## 2019-04-03 PROCEDURE — 83735 ASSAY OF MAGNESIUM: CPT

## 2019-04-03 PROCEDURE — 63600175 PHARM REV CODE 636 W HCPCS: Performed by: ORTHOPAEDIC SURGERY

## 2019-04-03 PROCEDURE — 82728 ASSAY OF FERRITIN: CPT

## 2019-04-03 PROCEDURE — 84132 ASSAY OF SERUM POTASSIUM: CPT

## 2019-04-03 PROCEDURE — 85025 COMPLETE CBC W/AUTO DIFF WBC: CPT

## 2019-04-03 PROCEDURE — 36592 COLLECT BLOOD FROM PICC: CPT

## 2019-04-03 PROCEDURE — 36415 COLL VENOUS BLD VENIPUNCTURE: CPT

## 2019-04-03 PROCEDURE — A4216 STERILE WATER/SALINE, 10 ML: HCPCS | Performed by: INTERNAL MEDICINE

## 2019-04-03 PROCEDURE — 85610 PROTHROMBIN TIME: CPT

## 2019-04-03 RX ADMIN — HEPARIN, PORCINE (PF) 10 UNIT/ML INTRAVENOUS SYRINGE 50 UNITS: at 09:04

## 2019-04-03 RX ADMIN — Medication 10 ML: at 09:04

## 2019-04-05 ENCOUNTER — INFUSION (OUTPATIENT)
Dept: INFUSION THERAPY | Facility: HOSPITAL | Age: 79
End: 2019-04-05
Attending: INTERNAL MEDICINE
Payer: MEDICARE

## 2019-04-05 VITALS
OXYGEN SATURATION: 100 % | HEART RATE: 83 BPM | TEMPERATURE: 97 F | DIASTOLIC BLOOD PRESSURE: 68 MMHG | SYSTOLIC BLOOD PRESSURE: 155 MMHG | RESPIRATION RATE: 17 BRPM

## 2019-04-05 DIAGNOSIS — E87.6 HYPOKALEMIA DUE TO LOSS OF POTASSIUM: Primary | ICD-10-CM

## 2019-04-05 LAB
ANION GAP SERPL CALC-SCNC: 11 MMOL/L (ref 8–16)
BUN SERPL-MCNC: 26 MG/DL (ref 8–23)
CALCIUM SERPL-MCNC: 9.2 MG/DL (ref 8.7–10.5)
CHLORIDE SERPL-SCNC: 103 MMOL/L (ref 95–110)
CO2 SERPL-SCNC: 23 MMOL/L (ref 23–29)
CREAT SERPL-MCNC: 1.4 MG/DL (ref 0.5–1.4)
EST. GFR  (AFRICAN AMERICAN): 40.6 ML/MIN/1.73 M^2
EST. GFR  (NON AFRICAN AMERICAN): 35.3 ML/MIN/1.73 M^2
GLUCOSE SERPL-MCNC: 163 MG/DL (ref 70–110)
POTASSIUM SERPL-SCNC: 3.2 MMOL/L (ref 3.5–5.1)
SODIUM SERPL-SCNC: 137 MMOL/L (ref 136–145)

## 2019-04-05 PROCEDURE — 36415 COLL VENOUS BLD VENIPUNCTURE: CPT

## 2019-04-05 PROCEDURE — 80048 BASIC METABOLIC PNL TOTAL CA: CPT

## 2019-04-05 PROCEDURE — 96523 IRRIG DRUG DELIVERY DEVICE: CPT

## 2019-04-05 PROCEDURE — 36591 DRAW BLOOD OFF VENOUS DEVICE: CPT

## 2019-04-05 NOTE — NURSING
0822 patient to opt vital signs taken RB RN  08 accessed left chest infusaport using sterile technique with lyles needle. Good blood return, wasted 10cc. Received 3cc blood for labs. Blood sent to lab. Flushed infusaport with 10cc heplock flush and 5cc heparin removed lyles needle and dressing applied to site. RB RN  0876 Patient left ambulating alone to private auto. MYRANDA WHITMORE

## 2019-05-03 ENCOUNTER — INFUSION (OUTPATIENT)
Dept: INFUSION THERAPY | Facility: HOSPITAL | Age: 79
End: 2019-05-03
Attending: INTERNAL MEDICINE
Payer: MEDICARE

## 2019-05-03 ENCOUNTER — LAB VISIT (OUTPATIENT)
Dept: LAB | Facility: HOSPITAL | Age: 79
End: 2019-05-03
Attending: INTERNAL MEDICINE
Payer: MEDICARE

## 2019-05-03 VITALS
TEMPERATURE: 98 F | HEART RATE: 66 BPM | DIASTOLIC BLOOD PRESSURE: 64 MMHG | SYSTOLIC BLOOD PRESSURE: 141 MMHG | RESPIRATION RATE: 20 BRPM

## 2019-05-03 DIAGNOSIS — Z79.899 NEED FOR PROPHYLACTIC CHEMOTHERAPY: ICD-10-CM

## 2019-05-03 DIAGNOSIS — I51.9 MYXEDEMA HEART DISEASE: ICD-10-CM

## 2019-05-03 DIAGNOSIS — M25.50 PAIN IN JOINT, MULTIPLE SITES: ICD-10-CM

## 2019-05-03 DIAGNOSIS — E03.9 MYXEDEMA HEART DISEASE: ICD-10-CM

## 2019-05-03 DIAGNOSIS — R53.83 FATIGUE: Primary | ICD-10-CM

## 2019-05-03 DIAGNOSIS — Q24.9: ICD-10-CM

## 2019-05-03 DIAGNOSIS — Z01.811 PRE-OP CHEST EXAM: ICD-10-CM

## 2019-05-03 DIAGNOSIS — Z85.3 HISTORY OF BILATERAL BREAST CANCER: Primary | ICD-10-CM

## 2019-05-03 LAB
ANION GAP SERPL CALC-SCNC: 11 MMOL/L (ref 8–16)
BASOPHILS # BLD AUTO: 0.05 K/UL (ref 0–0.2)
BASOPHILS NFR BLD: 0.6 % (ref 0–1.9)
BUN SERPL-MCNC: 29 MG/DL (ref 8–23)
CALCIUM SERPL-MCNC: 10.2 MG/DL (ref 8.7–10.5)
CHLORIDE SERPL-SCNC: 104 MMOL/L (ref 95–110)
CO2 SERPL-SCNC: 21 MMOL/L (ref 23–29)
CREAT SERPL-MCNC: 1.3 MG/DL (ref 0.5–1.4)
DIFFERENTIAL METHOD: ABNORMAL
EOSINOPHIL # BLD AUTO: 0.2 K/UL (ref 0–0.5)
EOSINOPHIL NFR BLD: 2.8 % (ref 0–8)
ERYTHROCYTE [DISTWIDTH] IN BLOOD BY AUTOMATED COUNT: 13.2 % (ref 11.5–14.5)
EST. GFR  (AFRICAN AMERICAN): 44.5 ML/MIN/1.73 M^2
EST. GFR  (NON AFRICAN AMERICAN): 38.6 ML/MIN/1.73 M^2
GLUCOSE SERPL-MCNC: 149 MG/DL (ref 70–110)
HCT VFR BLD AUTO: 41.9 % (ref 37–48.5)
HGB BLD-MCNC: 13.8 G/DL (ref 12–16)
IMM GRANULOCYTES # BLD AUTO: 0.05 K/UL (ref 0–0.04)
IMM GRANULOCYTES NFR BLD AUTO: 0.6 % (ref 0–0.5)
INR PPP: 2.5 (ref 0.8–1.2)
LYMPHOCYTES # BLD AUTO: 0.7 K/UL (ref 1–4.8)
LYMPHOCYTES NFR BLD: 9.1 % (ref 18–48)
MCH RBC QN AUTO: 27.8 PG (ref 27–31)
MCHC RBC AUTO-ENTMCNC: 32.9 G/DL (ref 32–36)
MCV RBC AUTO: 85 FL (ref 82–98)
MONOCYTES # BLD AUTO: 0.6 K/UL (ref 0.3–1)
MONOCYTES NFR BLD: 7.9 % (ref 4–15)
NEUTROPHILS # BLD AUTO: 6.3 K/UL (ref 1.8–7.7)
NEUTROPHILS NFR BLD: 79 % (ref 38–73)
NRBC BLD-RTO: 0 /100 WBC
PLATELET # BLD AUTO: 193 K/UL (ref 150–350)
PMV BLD AUTO: 10.2 FL (ref 9.2–12.9)
POTASSIUM SERPL-SCNC: 4.3 MMOL/L (ref 3.5–5.1)
PROTHROMBIN TIME: 28.1 SEC (ref 9–12.5)
RBC # BLD AUTO: 4.96 M/UL (ref 4–5.4)
SODIUM SERPL-SCNC: 136 MMOL/L (ref 136–145)
T4 FREE SERPL-MCNC: 2.08 NG/DL (ref 0.71–1.51)
TSH SERPL DL<=0.005 MIU/L-ACNC: 0.35 UIU/ML (ref 0.34–5.6)
WBC # BLD AUTO: 7.93 K/UL (ref 3.9–12.7)

## 2019-05-03 PROCEDURE — 36415 COLL VENOUS BLD VENIPUNCTURE: CPT

## 2019-05-03 PROCEDURE — A4216 STERILE WATER/SALINE, 10 ML: HCPCS | Performed by: INTERNAL MEDICINE

## 2019-05-03 PROCEDURE — 36591 DRAW BLOOD OFF VENOUS DEVICE: CPT

## 2019-05-03 PROCEDURE — 85025 COMPLETE CBC W/AUTO DIFF WBC: CPT

## 2019-05-03 PROCEDURE — 63600175 PHARM REV CODE 636 W HCPCS: Performed by: ORTHOPAEDIC SURGERY

## 2019-05-03 PROCEDURE — 25000003 PHARM REV CODE 250: Performed by: INTERNAL MEDICINE

## 2019-05-03 PROCEDURE — 85610 PROTHROMBIN TIME: CPT

## 2019-05-03 PROCEDURE — 84443 ASSAY THYROID STIM HORMONE: CPT

## 2019-05-03 PROCEDURE — 84439 ASSAY OF FREE THYROXINE: CPT

## 2019-05-03 PROCEDURE — 80048 BASIC METABOLIC PNL TOTAL CA: CPT

## 2019-05-03 RX ORDER — SODIUM CHLORIDE 0.9 % (FLUSH) 0.9 %
10 SYRINGE (ML) INJECTION
Status: COMPLETED | OUTPATIENT
Start: 2019-05-03 | End: 2019-05-03

## 2019-05-03 RX ORDER — HEPARIN SODIUM,PORCINE/PF 10 UNIT/ML
10 SYRINGE (ML) INTRAVENOUS
Status: DISCONTINUED | OUTPATIENT
Start: 2019-05-03 | End: 2019-05-03 | Stop reason: HOSPADM

## 2019-05-03 RX ORDER — SODIUM CHLORIDE 0.9 % (FLUSH) 0.9 %
10 SYRINGE (ML) INJECTION
Status: CANCELLED | OUTPATIENT
Start: 2019-05-03

## 2019-05-03 RX ORDER — HEPARIN SODIUM,PORCINE/PF 10 UNIT/ML
10 SYRINGE (ML) INTRAVENOUS
Status: CANCELLED
Start: 2019-05-03

## 2019-05-03 RX ADMIN — Medication 10 ML: at 08:05

## 2019-05-03 RX ADMIN — HEPARIN, PORCINE (PF) 10 UNIT/ML INTRAVENOUS SYRINGE 50 UNITS: at 08:05

## 2019-06-03 ENCOUNTER — INFUSION (OUTPATIENT)
Dept: INFUSION THERAPY | Facility: HOSPITAL | Age: 79
End: 2019-06-03
Attending: INTERNAL MEDICINE
Payer: MEDICARE

## 2019-06-03 ENCOUNTER — LAB VISIT (OUTPATIENT)
Dept: LAB | Facility: HOSPITAL | Age: 79
End: 2019-06-03
Attending: INTERNAL MEDICINE
Payer: MEDICARE

## 2019-06-03 VITALS
SYSTOLIC BLOOD PRESSURE: 113 MMHG | HEART RATE: 65 BPM | DIASTOLIC BLOOD PRESSURE: 54 MMHG | RESPIRATION RATE: 18 BRPM | TEMPERATURE: 97 F

## 2019-06-03 DIAGNOSIS — Z85.3 HISTORY OF BILATERAL BREAST CANCER: Primary | ICD-10-CM

## 2019-06-03 DIAGNOSIS — C50.919 MALIGNANT NEOPLASM OF BREAST (FEMALE): Primary | ICD-10-CM

## 2019-06-03 DIAGNOSIS — E11.9 DIABETES MELLITUS WITHOUT COMPLICATION: ICD-10-CM

## 2019-06-03 DIAGNOSIS — J44.89 OBSTRUCTIVE CHRONIC BRONCHITIS WITHOUT EXACERBATION: ICD-10-CM

## 2019-06-03 DIAGNOSIS — D64.9 ANEMIA, UNSPECIFIED: ICD-10-CM

## 2019-06-03 DIAGNOSIS — I48.91 ATRIAL FIBRILLATION: ICD-10-CM

## 2019-06-03 LAB
BASOPHILS # BLD AUTO: 0.03 K/UL (ref 0–0.2)
BASOPHILS NFR BLD: 0.5 % (ref 0–1.9)
DIFFERENTIAL METHOD: ABNORMAL
EOSINOPHIL # BLD AUTO: 0.2 K/UL (ref 0–0.5)
EOSINOPHIL NFR BLD: 3.7 % (ref 0–8)
ERYTHROCYTE [DISTWIDTH] IN BLOOD BY AUTOMATED COUNT: 12.9 % (ref 11.5–14.5)
HCT VFR BLD AUTO: 40.7 % (ref 37–48.5)
HGB BLD-MCNC: 13.2 G/DL (ref 12–16)
IMM GRANULOCYTES # BLD AUTO: 0.02 K/UL (ref 0–0.04)
IMM GRANULOCYTES NFR BLD AUTO: 0.3 % (ref 0–0.5)
INR PPP: 2.2 (ref 0.8–1.2)
LYMPHOCYTES # BLD AUTO: 0.6 K/UL (ref 1–4.8)
LYMPHOCYTES NFR BLD: 9.1 % (ref 18–48)
MCH RBC QN AUTO: 27.6 PG (ref 27–31)
MCHC RBC AUTO-ENTMCNC: 32.4 G/DL (ref 32–36)
MCV RBC AUTO: 85 FL (ref 82–98)
MONOCYTES # BLD AUTO: 0.5 K/UL (ref 0.3–1)
MONOCYTES NFR BLD: 8.3 % (ref 4–15)
NEUTROPHILS # BLD AUTO: 4.9 K/UL (ref 1.8–7.7)
NEUTROPHILS NFR BLD: 78.1 % (ref 38–73)
NRBC BLD-RTO: 0 /100 WBC
PLATELET # BLD AUTO: 187 K/UL (ref 150–350)
PMV BLD AUTO: 10.1 FL (ref 9.2–12.9)
PROTHROMBIN TIME: 24.2 SEC (ref 9–12.5)
RBC # BLD AUTO: 4.79 M/UL (ref 4–5.4)
WBC # BLD AUTO: 6.23 K/UL (ref 3.9–12.7)

## 2019-06-03 PROCEDURE — 85025 COMPLETE CBC W/AUTO DIFF WBC: CPT

## 2019-06-03 PROCEDURE — 25000003 PHARM REV CODE 250: Performed by: INTERNAL MEDICINE

## 2019-06-03 PROCEDURE — A4216 STERILE WATER/SALINE, 10 ML: HCPCS | Performed by: INTERNAL MEDICINE

## 2019-06-03 PROCEDURE — 96523 IRRIG DRUG DELIVERY DEVICE: CPT

## 2019-06-03 PROCEDURE — 63600175 PHARM REV CODE 636 W HCPCS: Performed by: INTERNAL MEDICINE

## 2019-06-03 PROCEDURE — 85610 PROTHROMBIN TIME: CPT

## 2019-06-03 RX ORDER — HEPARIN SODIUM,PORCINE/PF 10 UNIT/ML
10 SYRINGE (ML) INTRAVENOUS
Status: CANCELLED
Start: 2019-06-03

## 2019-06-03 RX ORDER — SODIUM CHLORIDE 0.9 % (FLUSH) 0.9 %
10 SYRINGE (ML) INJECTION
Status: CANCELLED | OUTPATIENT
Start: 2019-06-03

## 2019-06-03 RX ORDER — SODIUM CHLORIDE 0.9 % (FLUSH) 0.9 %
10 SYRINGE (ML) INJECTION
Status: COMPLETED | OUTPATIENT
Start: 2019-06-03 | End: 2019-06-03

## 2019-06-03 RX ORDER — HEPARIN SODIUM,PORCINE/PF 10 UNIT/ML
10 SYRINGE (ML) INTRAVENOUS
Status: DISCONTINUED | OUTPATIENT
Start: 2019-06-03 | End: 2019-06-03 | Stop reason: HOSPADM

## 2019-06-03 RX ADMIN — Medication 10 ML: at 09:06

## 2019-06-03 RX ADMIN — HEPARIN, PORCINE (PF) 10 UNIT/ML INTRAVENOUS SYRINGE 50 UNITS: at 09:06

## 2019-06-26 ENCOUNTER — LAB VISIT (OUTPATIENT)
Dept: LAB | Facility: HOSPITAL | Age: 79
End: 2019-06-26
Attending: INTERNAL MEDICINE
Payer: MEDICARE

## 2019-06-26 DIAGNOSIS — R19.7 DIARRHEA OF PRESUMED INFECTIOUS ORIGIN: Primary | ICD-10-CM

## 2019-06-26 LAB
C DIFF GDH STL QL: NEGATIVE
C DIFF TOX A+B STL QL IA: NEGATIVE

## 2019-06-26 PROCEDURE — 87449 NOS EACH ORGANISM AG IA: CPT

## 2019-07-01 ENCOUNTER — INFUSION (OUTPATIENT)
Dept: INFUSION THERAPY | Facility: HOSPITAL | Age: 79
End: 2019-07-01
Payer: MEDICARE

## 2019-07-01 ENCOUNTER — LAB VISIT (OUTPATIENT)
Dept: LAB | Facility: HOSPITAL | Age: 79
End: 2019-07-01
Attending: INTERNAL MEDICINE
Payer: MEDICARE

## 2019-07-01 VITALS
RESPIRATION RATE: 18 BRPM | HEART RATE: 68 BPM | TEMPERATURE: 98 F | DIASTOLIC BLOOD PRESSURE: 60 MMHG | SYSTOLIC BLOOD PRESSURE: 127 MMHG

## 2019-07-01 DIAGNOSIS — Z85.3 HISTORY OF BILATERAL BREAST CANCER: Primary | ICD-10-CM

## 2019-07-01 DIAGNOSIS — D68.318 HEMORRHAGIC DISORDER DUE TO INTRINSIC CIRCULATING ANTICOAGULANTS: Primary | ICD-10-CM

## 2019-07-01 DIAGNOSIS — Z79.899 HISTORY OF LONG-TERM TREATMENT WITH HIGH-RISK MEDICATION: ICD-10-CM

## 2019-07-01 LAB
INR PPP: 1.8 (ref 0.8–1.2)
PROTHROMBIN TIME: 20.1 SEC (ref 9–12.5)
T3 SERPL-MCNC: 109 NG/DL (ref 60–180)
T4 FREE SERPL-MCNC: 1.81 NG/DL (ref 0.71–1.51)
TSH SERPL DL<=0.005 MIU/L-ACNC: 0.04 UIU/ML (ref 0.34–5.6)

## 2019-07-01 PROCEDURE — A4216 STERILE WATER/SALINE, 10 ML: HCPCS | Performed by: INTERNAL MEDICINE

## 2019-07-01 PROCEDURE — 25000003 PHARM REV CODE 250: Performed by: INTERNAL MEDICINE

## 2019-07-01 PROCEDURE — 36415 COLL VENOUS BLD VENIPUNCTURE: CPT

## 2019-07-01 PROCEDURE — 85610 PROTHROMBIN TIME: CPT

## 2019-07-01 PROCEDURE — 84439 ASSAY OF FREE THYROXINE: CPT

## 2019-07-01 PROCEDURE — 99211 OFF/OP EST MAY X REQ PHY/QHP: CPT

## 2019-07-01 PROCEDURE — 63600175 PHARM REV CODE 636 W HCPCS: Performed by: ORTHOPAEDIC SURGERY

## 2019-07-01 PROCEDURE — 96523 IRRIG DRUG DELIVERY DEVICE: CPT

## 2019-07-01 PROCEDURE — 84480 ASSAY TRIIODOTHYRONINE (T3): CPT

## 2019-07-01 PROCEDURE — 84443 ASSAY THYROID STIM HORMONE: CPT

## 2019-07-01 RX ORDER — SODIUM CHLORIDE 0.9 % (FLUSH) 0.9 %
10 SYRINGE (ML) INJECTION
Status: COMPLETED | OUTPATIENT
Start: 2019-07-01 | End: 2019-07-01

## 2019-07-01 RX ORDER — SODIUM CHLORIDE 0.9 % (FLUSH) 0.9 %
10 SYRINGE (ML) INJECTION
Status: CANCELLED | OUTPATIENT
Start: 2019-07-01

## 2019-07-01 RX ORDER — HEPARIN SODIUM,PORCINE/PF 10 UNIT/ML
10 SYRINGE (ML) INTRAVENOUS
Status: DISCONTINUED | OUTPATIENT
Start: 2019-07-01 | End: 2019-07-01 | Stop reason: HOSPADM

## 2019-07-01 RX ORDER — HEPARIN SODIUM,PORCINE/PF 10 UNIT/ML
10 SYRINGE (ML) INTRAVENOUS
Status: CANCELLED
Start: 2019-07-01

## 2019-07-01 RX ADMIN — HEPARIN, PORCINE (PF) 10 UNIT/ML INTRAVENOUS SYRINGE 50 UNITS: at 09:07

## 2019-07-01 RX ADMIN — Medication 10 ML: at 09:07

## 2019-07-04 ENCOUNTER — HOSPITAL ENCOUNTER (EMERGENCY)
Facility: HOSPITAL | Age: 79
Discharge: HOME | End: 2019-07-04
Attending: FAMILY MEDICINE
Payer: MEDICARE

## 2019-07-04 VITALS
WEIGHT: 139.13 LBS | TEMPERATURE: 98 F | RESPIRATION RATE: 19 BRPM | HEIGHT: 62 IN | HEART RATE: 66 BPM | BODY MASS INDEX: 25.6 KG/M2 | OXYGEN SATURATION: 97 % | SYSTOLIC BLOOD PRESSURE: 129 MMHG | DIASTOLIC BLOOD PRESSURE: 58 MMHG

## 2019-07-04 DIAGNOSIS — R07.9 CHEST PAIN: ICD-10-CM

## 2019-07-04 DIAGNOSIS — E83.42 HYPOMAGNESEMIA: Primary | ICD-10-CM

## 2019-07-04 LAB
ALBUMIN SERPL BCP-MCNC: 4 G/DL (ref 3.5–5.2)
ALP SERPL-CCNC: 56 U/L (ref 55–135)
ALT SERPL W/O P-5'-P-CCNC: 24 U/L (ref 10–44)
ANION GAP SERPL CALC-SCNC: 9 MMOL/L (ref 8–16)
AST SERPL-CCNC: 24 U/L (ref 10–40)
BASOPHILS # BLD AUTO: 0.02 K/UL (ref 0–0.2)
BASOPHILS NFR BLD: 0.2 % (ref 0–1.9)
BILIRUB SERPL-MCNC: 0.8 MG/DL (ref 0.1–1)
BNP SERPL-MCNC: 45 PG/ML (ref 0–99)
BUN SERPL-MCNC: 49 MG/DL (ref 8–23)
CALCIUM SERPL-MCNC: 9.8 MG/DL (ref 8.7–10.5)
CHLORIDE SERPL-SCNC: 107 MMOL/L (ref 95–110)
CO2 SERPL-SCNC: 21 MMOL/L (ref 23–29)
CREAT SERPL-MCNC: 1.7 MG/DL (ref 0.5–1.4)
DIFFERENTIAL METHOD: ABNORMAL
EOSINOPHIL # BLD AUTO: 0.3 K/UL (ref 0–0.5)
EOSINOPHIL NFR BLD: 3.1 % (ref 0–8)
ERYTHROCYTE [DISTWIDTH] IN BLOOD BY AUTOMATED COUNT: 13.2 % (ref 11.5–14.5)
EST. GFR  (AFRICAN AMERICAN): 32.1 ML/MIN/1.73 M^2
EST. GFR  (NON AFRICAN AMERICAN): 27.9 ML/MIN/1.73 M^2
GLUCOSE SERPL-MCNC: 145 MG/DL (ref 70–110)
HCT VFR BLD AUTO: 36.6 % (ref 37–48.5)
HGB BLD-MCNC: 12.1 G/DL (ref 12–16)
IMM GRANULOCYTES # BLD AUTO: 0.03 K/UL (ref 0–0.04)
IMM GRANULOCYTES NFR BLD AUTO: 0.3 % (ref 0–0.5)
INR PPP: 1.5 (ref 0.8–1.2)
LYMPHOCYTES # BLD AUTO: 0.7 K/UL (ref 1–4.8)
LYMPHOCYTES NFR BLD: 8.1 % (ref 18–48)
MAGNESIUM SERPL-MCNC: 1.5 MG/DL (ref 1.6–2.6)
MCH RBC QN AUTO: 27.6 PG (ref 27–31)
MCHC RBC AUTO-ENTMCNC: 33.1 G/DL (ref 32–36)
MCV RBC AUTO: 84 FL (ref 82–98)
MONOCYTES # BLD AUTO: 0.6 K/UL (ref 0.3–1)
MONOCYTES NFR BLD: 6.5 % (ref 4–15)
NEUTROPHILS # BLD AUTO: 7 K/UL (ref 1.8–7.7)
NEUTROPHILS NFR BLD: 81.8 % (ref 38–73)
NRBC BLD-RTO: 0 /100 WBC
PLATELET # BLD AUTO: 174 K/UL (ref 150–350)
PMV BLD AUTO: 9.7 FL (ref 9.2–12.9)
POTASSIUM SERPL-SCNC: 4.3 MMOL/L (ref 3.5–5.1)
PROT SERPL-MCNC: 6.4 G/DL (ref 6–8.4)
PROTHROMBIN TIME: 17.3 SEC (ref 9–12.5)
RBC # BLD AUTO: 4.38 M/UL (ref 4–5.4)
SODIUM SERPL-SCNC: 137 MMOL/L (ref 136–145)
T4 FREE SERPL-MCNC: 1.88 NG/DL (ref 0.71–1.51)
TROPONIN I SERPL DL<=0.01 NG/ML-MCNC: 0.02 NG/ML (ref 0.02–0.5)
TSH SERPL DL<=0.005 MIU/L-ACNC: 0.06 UIU/ML (ref 0.34–5.6)
WBC # BLD AUTO: 8.6 K/UL (ref 3.9–12.7)

## 2019-07-04 PROCEDURE — 85610 PROTHROMBIN TIME: CPT

## 2019-07-04 PROCEDURE — 80053 COMPREHEN METABOLIC PANEL: CPT

## 2019-07-04 PROCEDURE — 71045 X-RAY EXAM CHEST 1 VIEW: CPT | Mod: 26,,, | Performed by: RADIOLOGY

## 2019-07-04 PROCEDURE — 25000003 PHARM REV CODE 250: Performed by: FAMILY MEDICINE

## 2019-07-04 PROCEDURE — 71045 X-RAY EXAM CHEST 1 VIEW: CPT | Mod: TC,FY

## 2019-07-04 PROCEDURE — 96365 THER/PROPH/DIAG IV INF INIT: CPT

## 2019-07-04 PROCEDURE — 96366 THER/PROPH/DIAG IV INF ADDON: CPT

## 2019-07-04 PROCEDURE — 84484 ASSAY OF TROPONIN QUANT: CPT

## 2019-07-04 PROCEDURE — 83880 ASSAY OF NATRIURETIC PEPTIDE: CPT

## 2019-07-04 PROCEDURE — 85025 COMPLETE CBC W/AUTO DIFF WBC: CPT

## 2019-07-04 PROCEDURE — 93010 EKG 12-LEAD: ICD-10-PCS | Mod: ,,, | Performed by: INTERNAL MEDICINE

## 2019-07-04 PROCEDURE — 93010 ELECTROCARDIOGRAM REPORT: CPT | Mod: ,,, | Performed by: INTERNAL MEDICINE

## 2019-07-04 PROCEDURE — 96361 HYDRATE IV INFUSION ADD-ON: CPT

## 2019-07-04 PROCEDURE — 99285 EMERGENCY DEPT VISIT HI MDM: CPT | Mod: 25

## 2019-07-04 PROCEDURE — 71045 XR CHEST AP PORTABLE: ICD-10-PCS | Mod: 26,,, | Performed by: RADIOLOGY

## 2019-07-04 PROCEDURE — 93005 ELECTROCARDIOGRAM TRACING: CPT

## 2019-07-04 PROCEDURE — 84439 ASSAY OF FREE THYROXINE: CPT

## 2019-07-04 PROCEDURE — 63600175 PHARM REV CODE 636 W HCPCS: Performed by: FAMILY MEDICINE

## 2019-07-04 PROCEDURE — 84443 ASSAY THYROID STIM HORMONE: CPT

## 2019-07-04 PROCEDURE — 83735 ASSAY OF MAGNESIUM: CPT

## 2019-07-04 RX ORDER — MAGNESIUM SULFATE HEPTAHYDRATE 40 MG/ML
2 INJECTION, SOLUTION INTRAVENOUS
Status: COMPLETED | OUTPATIENT
Start: 2019-07-04 | End: 2019-07-04

## 2019-07-04 RX ORDER — MAGNESIUM SULFATE HEPTAHYDRATE 40 MG/ML
INJECTION, SOLUTION INTRAVENOUS
Status: COMPLETED
Start: 2019-07-04 | End: 2019-07-04

## 2019-07-04 RX ORDER — ASPIRIN 325 MG
325 TABLET ORAL
Status: DISCONTINUED | OUTPATIENT
Start: 2019-07-04 | End: 2019-07-04

## 2019-07-04 RX ADMIN — SODIUM CHLORIDE 250 ML: 9 INJECTION, SOLUTION INTRAVENOUS at 04:07

## 2019-07-04 RX ADMIN — MAGNESIUM SULFATE IN WATER 2 G: 40 INJECTION, SOLUTION INTRAVENOUS at 05:07

## 2019-07-04 NOTE — ED PROVIDER NOTES
"Encounter Date: 7/4/2019       History     Chief Complaint   Patient presents with    Palpitations    Loss of Consciousness     Patient complains of 2 episodes of syncope this evening hours associated with palpitations and heart pain.  Patient states she has had syncope in the past, she attributes this to her recent diagnosis of hypothyroidism. States that she recently had an adjustment up of her levothyroxine. States that the syncopal episodes were very brief, preceeded by chest pain and palpitations. States she felt "weird" upon awakening, cannot further elaborate. Pt is currently asymptomatic in the ED. Denies any diaphoresis or nausea. Denies any history of CAD.        Review of patient's allergies indicates:   Allergen Reactions    Iodine and iodide containing products     Ditropan [oxybutynin chloride] Palpitations and Other (See Comments)     Made patient weak     Past Medical History:   Diagnosis Date    Breast cancer     Cancer     BREAST     Diabetes mellitus, type 2     Factor V Leiden     GERD (gastroesophageal reflux disease)     Hypertension     Recurrent urinary tract infection     Vertigo      Past Surgical History:   Procedure Laterality Date    HEMORRHOID SURGERY  1968    HYSTERECTOMY  1970    MASTECTOMY Right 1994    MASTECTOMY Left 2013    BREAST CANCER    TUMOR REMOVAL Left 1994    EAR     Family History   Problem Relation Age of Onset    Cancer Mother     Hypertension Mother     Heart disease Mother     No Known Problems Sister     Diabetes Brother     Hypertension Brother     Cancer Maternal Grandmother     Lung disease Father     Heart disease Father     Diabetes Father     Cancer Brother     Diabetes Brother     Hypertension Brother     Stroke Brother     Hypertension Brother     Stroke Brother      Social History     Tobacco Use    Smoking status: Never Smoker    Smokeless tobacco: Never Used   Substance Use Topics    Alcohol use: No    Drug use: No "     Review of Systems   Respiratory: Negative for shortness of breath.    Cardiovascular: Positive for chest pain and palpitations.   Neurological: Positive for syncope.   All other systems reviewed and are negative.      Physical Exam     Initial Vitals [07/04/19 0147]   BP Pulse Resp Temp SpO2   (!) 115/57 70 19 97.9 °F (36.6 °C) 100 %      MAP       --         Physical Exam    Nursing note and vitals reviewed.  Constitutional: She appears well-developed and well-nourished. She is not diaphoretic. No distress.   HENT:   Head: Normocephalic and atraumatic.   Eyes: Conjunctivae and EOM are normal. Pupils are equal, round, and reactive to light.   Neck: Normal range of motion. Neck supple.   Cardiovascular: Normal rate, regular rhythm, normal heart sounds and intact distal pulses. Exam reveals no gallop and no friction rub.    No murmur heard.  Pulmonary/Chest: Breath sounds normal. No respiratory distress. She has no wheezes. She has no rales.   Abdominal: Soft. Bowel sounds are normal. She exhibits no distension. There is no tenderness.   Musculoskeletal: Normal range of motion. She exhibits no edema.   Neurological: She is alert and oriented to person, place, and time. She has normal strength.   Skin: Skin is warm and dry. Capillary refill takes less than 2 seconds. No rash noted. No erythema.   Psychiatric: She has a normal mood and affect. Her behavior is normal. Judgment and thought content normal.         ED Course   Procedures  Labs Reviewed - No data to display  EKG Readings: (Independently Interpreted)   Initial Reading: No STEMI. Rhythm: Normal Sinus Rhythm. Ectopy: No Ectopy. Conduction: LBBB. Other Impression: no old EKG available for comparison       Imaging Results    None          Medical Decision Making:   ED Management:  Care of this patient was transferred to Dr. Payne at 0600. Disposition per him.         Labs Reviewed   CBC W/ AUTO DIFFERENTIAL - Abnormal; Notable for the following components:        Result Value    Hematocrit 36.6 (*)     Lymph # 0.7 (*)     Gran% 81.8 (*)     Lymph% 8.1 (*)     All other components within normal limits   COMPREHENSIVE METABOLIC PANEL - Abnormal; Notable for the following components:    CO2 21 (*)     Glucose 145 (*)     BUN, Bld 49 (*)     Creatinine 1.7 (*)     eGFR if  32.1 (*)     eGFR if non  27.9 (*)     All other components within normal limits   TSH - Abnormal; Notable for the following components:    TSH 0.055 (*)     All other components within normal limits   MAGNESIUM - Abnormal; Notable for the following components:    Magnesium 1.5 (*)     All other components within normal limits   PROTIME-INR - Abnormal; Notable for the following components:    Prothrombin Time 17.3 (*)     INR 1.5 (*)     All other components within normal limits   T4, FREE - Abnormal; Notable for the following components:    Free T4 1.88 (*)     All other components within normal limits   TROPONIN I   B-TYPE NATRIURETIC PEPTIDE                 ED Course as of Jul 04 1802   Thu Jul 04, 2019   0453 Results of labs d/w pt and family member, she has had no further symptoms since arrival. States she drinks on average 3 bottles of water daily, states she has increased UOP due to medications.  States she is feeling some better after fluid bolus.   Will do orthostatics and ambulate her, replace mag     [MD]   8011 Pt ambulated in hallway, did experience dizziness. Will proceed with orthostatics.     [MD]      ED Course User Index  [MD] Bruna Byrne MD     Clinical Impression:       ICD-10-CM ICD-9-CM   1. Hypomagnesemia E83.42 275.2   2. Chest pain R07.9 786.50                                Bruna Byrne MD  07/04/19 1802

## 2019-07-04 NOTE — ED TRIAGE NOTES
"Pt to ED with c/o "passing out" x 2 episodes within a 20 minute time frame. Pt reports palpitations & belching a lot while trying to go to sleep.   "

## 2019-07-29 ENCOUNTER — HOSPITAL ENCOUNTER (EMERGENCY)
Facility: HOSPITAL | Age: 79
Discharge: HOME | End: 2019-07-29
Attending: EMERGENCY MEDICINE
Payer: MEDICARE

## 2019-07-29 VITALS
HEIGHT: 62 IN | OXYGEN SATURATION: 99 % | TEMPERATURE: 98 F | SYSTOLIC BLOOD PRESSURE: 135 MMHG | HEART RATE: 68 BPM | DIASTOLIC BLOOD PRESSURE: 60 MMHG | WEIGHT: 142 LBS | BODY MASS INDEX: 26.13 KG/M2 | RESPIRATION RATE: 13 BRPM

## 2019-07-29 DIAGNOSIS — R55 SYNCOPE: Primary | ICD-10-CM

## 2019-07-29 DIAGNOSIS — Z79.01 CHRONIC ANTICOAGULATION: ICD-10-CM

## 2019-07-29 DIAGNOSIS — R82.81 BACTERIURIA WITH PYURIA: ICD-10-CM

## 2019-07-29 DIAGNOSIS — R82.71 BACTERIURIA WITH PYURIA: ICD-10-CM

## 2019-07-29 LAB
ALBUMIN SERPL BCP-MCNC: 4.1 G/DL (ref 3.5–5.2)
ALP SERPL-CCNC: 67 U/L (ref 55–135)
ALT SERPL W/O P-5'-P-CCNC: 22 U/L (ref 10–44)
ANION GAP SERPL CALC-SCNC: 10 MMOL/L (ref 8–16)
AST SERPL-CCNC: 24 U/L (ref 10–40)
BACTERIA #/AREA URNS HPF: ABNORMAL /HPF
BASOPHILS # BLD AUTO: 0.04 K/UL (ref 0–0.2)
BASOPHILS NFR BLD: 0.4 % (ref 0–1.9)
BILIRUB SERPL-MCNC: 0.7 MG/DL (ref 0.1–1)
BILIRUB UR QL STRIP: NEGATIVE
BUN SERPL-MCNC: 45 MG/DL (ref 8–23)
CALCIUM SERPL-MCNC: 9.7 MG/DL (ref 8.7–10.5)
CHLORIDE SERPL-SCNC: 101 MMOL/L (ref 95–110)
CLARITY UR: CLEAR
CO2 SERPL-SCNC: 20 MMOL/L (ref 23–29)
COLOR UR: YELLOW
CREAT SERPL-MCNC: 1.9 MG/DL (ref 0.5–1.4)
DIFFERENTIAL METHOD: ABNORMAL
EOSINOPHIL # BLD AUTO: 0.3 K/UL (ref 0–0.5)
EOSINOPHIL NFR BLD: 3.4 % (ref 0–8)
ERYTHROCYTE [DISTWIDTH] IN BLOOD BY AUTOMATED COUNT: 13.8 % (ref 11.5–14.5)
EST. GFR  (AFRICAN AMERICAN): 28.1 ML/MIN/1.73 M^2
EST. GFR  (NON AFRICAN AMERICAN): 24.4 ML/MIN/1.73 M^2
GLUCOSE SERPL-MCNC: 182 MG/DL (ref 70–110)
GLUCOSE UR QL STRIP: NEGATIVE
HCT VFR BLD AUTO: 36.3 % (ref 37–48.5)
HGB BLD-MCNC: 12.1 G/DL (ref 12–16)
HGB UR QL STRIP: NEGATIVE
IMM GRANULOCYTES # BLD AUTO: 0.04 K/UL (ref 0–0.04)
IMM GRANULOCYTES NFR BLD AUTO: 0.4 % (ref 0–0.5)
INR PPP: 1.5 (ref 0.8–1.2)
KETONES UR QL STRIP: NEGATIVE
LEUKOCYTE ESTERASE UR QL STRIP: ABNORMAL
LYMPHOCYTES # BLD AUTO: 0.7 K/UL (ref 1–4.8)
LYMPHOCYTES NFR BLD: 7.1 % (ref 18–48)
MAGNESIUM SERPL-MCNC: 1.8 MG/DL (ref 1.6–2.6)
MCH RBC QN AUTO: 27.3 PG (ref 27–31)
MCHC RBC AUTO-ENTMCNC: 33.3 G/DL (ref 32–36)
MCV RBC AUTO: 82 FL (ref 82–98)
MICROSCOPIC COMMENT: ABNORMAL
MONOCYTES # BLD AUTO: 0.7 K/UL (ref 0.3–1)
MONOCYTES NFR BLD: 7.2 % (ref 4–15)
NEUTROPHILS # BLD AUTO: 7.5 K/UL (ref 1.8–7.7)
NEUTROPHILS NFR BLD: 81.5 % (ref 38–73)
NITRITE UR QL STRIP: NEGATIVE
NRBC BLD-RTO: 0 /100 WBC
PH UR STRIP: 7 [PH] (ref 5–8)
PLATELET # BLD AUTO: 203 K/UL (ref 150–350)
PMV BLD AUTO: 9.5 FL (ref 9.2–12.9)
POTASSIUM SERPL-SCNC: 4.3 MMOL/L (ref 3.5–5.1)
PROT SERPL-MCNC: 6.6 G/DL (ref 6–8.4)
PROT UR QL STRIP: NEGATIVE
PROTHROMBIN TIME: 16.9 SEC (ref 9–12.5)
RBC # BLD AUTO: 4.43 M/UL (ref 4–5.4)
RBC #/AREA URNS HPF: 2 /HPF (ref 0–4)
SODIUM SERPL-SCNC: 131 MMOL/L (ref 136–145)
SP GR UR STRIP: 1.01 (ref 1–1.03)
SQUAMOUS #/AREA URNS HPF: 2 /HPF
URN SPEC COLLECT METH UR: ABNORMAL
UROBILINOGEN UR STRIP-ACNC: NEGATIVE EU/DL
WBC # BLD AUTO: 9.16 K/UL (ref 3.9–12.7)
WBC #/AREA URNS HPF: 8 /HPF (ref 0–5)

## 2019-07-29 PROCEDURE — 63600175 PHARM REV CODE 636 W HCPCS: Performed by: EMERGENCY MEDICINE

## 2019-07-29 PROCEDURE — 83735 ASSAY OF MAGNESIUM: CPT

## 2019-07-29 PROCEDURE — 99284 EMERGENCY DEPT VISIT MOD MDM: CPT | Mod: 25

## 2019-07-29 PROCEDURE — 85610 PROTHROMBIN TIME: CPT

## 2019-07-29 PROCEDURE — 93005 ELECTROCARDIOGRAM TRACING: CPT

## 2019-07-29 PROCEDURE — 85025 COMPLETE CBC W/AUTO DIFF WBC: CPT

## 2019-07-29 PROCEDURE — 96374 THER/PROPH/DIAG INJ IV PUSH: CPT

## 2019-07-29 PROCEDURE — 80053 COMPREHEN METABOLIC PANEL: CPT

## 2019-07-29 PROCEDURE — 81000 URINALYSIS NONAUTO W/SCOPE: CPT

## 2019-07-29 RX ORDER — CEPHALEXIN 500 MG/1
500 CAPSULE ORAL EVERY 8 HOURS
Qty: 21 CAPSULE | Refills: 0 | Status: SHIPPED | OUTPATIENT
Start: 2019-07-29 | End: 2019-08-05

## 2019-07-29 RX ORDER — HEPARIN SODIUM 5000 [USP'U]/ML
5000 INJECTION, SOLUTION INTRAVENOUS; SUBCUTANEOUS
Status: COMPLETED | OUTPATIENT
Start: 2019-07-29 | End: 2019-07-29

## 2019-07-29 RX ADMIN — HEPARIN SODIUM 5000 UNITS: 5000 INJECTION, SOLUTION INTRAVENOUS; SUBCUTANEOUS at 10:07

## 2019-07-30 ENCOUNTER — HOSPITAL ENCOUNTER (OUTPATIENT)
Dept: CARDIOLOGY | Facility: HOSPITAL | Age: 79
Discharge: HOME | End: 2019-07-30
Attending: INTERNAL MEDICINE
Payer: MEDICARE

## 2019-07-30 DIAGNOSIS — R55 SYNCOPE AND COLLAPSE: ICD-10-CM

## 2019-07-30 DIAGNOSIS — R41.3 MEMORY LOSS: Primary | ICD-10-CM

## 2019-07-30 DIAGNOSIS — R00.1 BRADYCARDIA: ICD-10-CM

## 2019-07-30 DIAGNOSIS — R00.1 BRADYCARDIA: Primary | ICD-10-CM

## 2019-07-30 DIAGNOSIS — G93.40 ENCEPHALOPATHY: ICD-10-CM

## 2019-07-30 PROCEDURE — 93225 XTRNL ECG REC<48 HRS REC: CPT

## 2019-07-30 NOTE — DISCHARGE INSTRUCTIONS
Potassium 4.3  Magnesium 1.8  Urine was suggestive of potential infection  Treatment with Keflex 500 mg 3 times daily for 7 days recommended      Discussed with Dr. Dunlap    Follow-up in the next days    Return as needed for any acute worsening

## 2019-07-30 NOTE — ED PROVIDER NOTES
Encounter Date: 7/29/2019       History     Chief Complaint   Patient presents with    Loss of Consciousness     79-year-old female reports an episode in around the 6:00 p.m. time frame where while sitting in a recliner she became unresponsive for moments and had some sensation of a squeezing sensation about the top of her body -   The episode resolved without her leaving the recliner - and without any noted seizure activity by the spouse  She did not have any urinary or bowel incontinence  She denies any trauma  Denies any antecedent chest pain shortness of breath palpitations    She is anticoagulated chronically for factor 5 Leiden  Denies any blood loss  Reports loose stool  Denies any obvious melena  Denies hematuria or dysuria  Denies nausea vomiting    She denies any change in strength sensation mentation or gait    She does take any evening dose beta blocker - however the above-noted episode was not timed with the intake of the beta-blocker    She is placed in bed 3  She is on the cardiac monitor revealed normal sinus rhythm without ectopy - in the 70s  She is normotensive at this time        Current Facility-Administered Medications:     heparin, porcine (PF) 100 unit/mL injection flush 500 Units, 5 mL, Intravenous, PRN, Gus Rajput MD    heparin, porcine (PF) injection 10 Units, 10 Units, Intravenous, Q30 Days, Jameel Dunlap MD, 50 Units at 01/25/19 0916    heparin, porcine (PF) injection 50 Units, 5 mL, Intravenous, PRN, Jameel Dunlap MD, 50 Units at 04/05/18 0955    heparin, porcine (PF) injection 50 Units, 5 mL, Intravenous, PRN, Gus Rajput MD, 50 Units at 07/01/19 0915    sodium chloride 0.9% flush 10 mL, 10 mL, Intravenous, PRN, Jameel Dunlap MD, 10 mL at 04/03/19 0920    Current Outpatient Medications:     ALPRAZolam (XANAX) 0.5 MG tablet, TAKE ONE TABLET BY MOUTH TWICE DAILY AS NEEDED FOR ANXIETY, Disp: 60 tablet, Rfl: 5    amlodipine (NORVASC) 5 MG tablet,  Take 5 mg by mouth once daily., Disp: , Rfl:     atenolol (TENORMIN) 50 MG tablet, Take 50 mg by mouth once daily., Disp: , Rfl:     cephALEXin (KEFLEX) 500 MG capsule, Take 1 capsule (500 mg total) by mouth every 8 (eight) hours. for 7 days, Disp: 21 capsule, Rfl: 0    lisinopril 10 MG tablet, Take 10 mg by mouth once daily., Disp: , Rfl:     mirabegron (MYRBETRIQ) 50 mg Tb24, Take 1 tablet (50 mg total) by mouth once daily., Disp: 30 tablet, Rfl: 11    multivitamin capsule, Take 1 capsule by mouth once daily., Disp: , Rfl:     potassium chloride (KLOR-CON) 10 MEQ TbSR, Take 1 tablet (10 mEq total) by mouth once daily., Disp: 14 tablet, Rfl: 0    SIMVASTATIN (ZOCOR ORAL), Take by mouth., Disp: , Rfl:     traMADol (ULTRAM) 50 mg tablet, Take 1 tablet (50 mg total) by mouth every 6 (six) hours as needed for Pain., Disp: 12 tablet, Rfl: 0    TRIAMTERENE-HYDROCHLOROTHIAZID ORAL, Take 75 mg by mouth once daily., Disp: , Rfl:     warfarin (COUMADIN) 1 MG tablet, Take 1 mg by mouth once daily., Disp: , Rfl:     Review of patient's allergies indicates:   Allergen Reactions    Iodine and iodide containing products     Ditropan [oxybutynin chloride] Palpitations and Other (See Comments)     Made patient weak     Past Medical History:   Diagnosis Date    Breast cancer     Cancer     BREAST     Diabetes mellitus, type 2     Factor V Leiden     GERD (gastroesophageal reflux disease)     Hypertension     Recurrent urinary tract infection     Vertigo      Past Surgical History:   Procedure Laterality Date    HEMORRHOID SURGERY  1968    HYSTERECTOMY  1970    MASTECTOMY Right 1994    MASTECTOMY Left 2013    BREAST CANCER    TUMOR REMOVAL Left 1994    EAR     Family History   Problem Relation Age of Onset    Cancer Mother     Hypertension Mother     Heart disease Mother     No Known Problems Sister     Diabetes Brother     Hypertension Brother     Cancer Maternal Grandmother     Lung disease Father      Heart disease Father     Diabetes Father     Cancer Brother     Diabetes Brother     Hypertension Brother     Stroke Brother     Hypertension Brother     Stroke Brother      Social History     Tobacco Use    Smoking status: Never Smoker    Smokeless tobacco: Never Used   Substance Use Topics    Alcohol use: No    Drug use: No     Review of Systems   Constitutional: Negative.    HENT: Negative.    Respiratory: Negative.    Cardiovascular: Negative.    Gastrointestinal: Negative.    Musculoskeletal: Negative.    Skin: Negative.    Neurological: Positive for syncope. Negative for dizziness, tremors, seizures, facial asymmetry, speech difficulty, weakness, light-headedness, numbness and headaches.   Psychiatric/Behavioral: Negative for confusion.   All other systems reviewed and are negative.      Physical Exam     Initial Vitals [07/29/19 1841]   BP Pulse Resp Temp SpO2   119/86 80 20 98.2 °F (36.8 °C) 97 %      MAP       --         Physical Exam    Nursing note and vitals reviewed.  Constitutional: She appears well-developed and well-nourished. She is not diaphoretic. No distress.   HENT:   Head: Normocephalic and atraumatic.   Nose: Nose normal.   Mouth/Throat: Oropharynx is clear and moist. No oropharyngeal exudate.   Eyes: Conjunctivae and EOM are normal. Pupils are equal, round, and reactive to light. Right eye exhibits no discharge. Left eye exhibits no discharge. No scleral icterus.   Neck: Normal range of motion. Neck supple.   Cardiovascular: Normal rate, regular rhythm, normal heart sounds and intact distal pulses. Exam reveals no gallop and no friction rub.    No murmur heard.  Pulmonary/Chest: Breath sounds normal. No respiratory distress. She has no wheezes. She has no rhonchi. She has no rales.   Abdominal: Soft. Bowel sounds are normal. She exhibits no distension and no mass. There is no tenderness. There is no rebound and no guarding.   Musculoskeletal: Normal range of motion. She  exhibits no edema or tenderness.   Lymphadenopathy:     She has no cervical adenopathy.   Neurological: She is alert and oriented to person, place, and time. She has normal strength. No cranial nerve deficit or sensory deficit.   Skin: Skin is warm and dry. Capillary refill takes less than 2 seconds. No rash noted. No erythema. No pallor.   Psychiatric: She has a normal mood and affect. Her behavior is normal. Judgment and thought content normal.         ED Course   Procedures  Labs Reviewed   CBC W/ AUTO DIFFERENTIAL - Abnormal; Notable for the following components:       Result Value    Hematocrit 36.3 (*)     Lymph # 0.7 (*)     Gran% 81.5 (*)     Lymph% 7.1 (*)     All other components within normal limits   COMPREHENSIVE METABOLIC PANEL - Abnormal; Notable for the following components:    Sodium 131 (*)     CO2 20 (*)     Glucose 182 (*)     BUN, Bld 45 (*)     Creatinine 1.9 (*)     eGFR if  28.1 (*)     eGFR if non  24.4 (*)     All other components within normal limits   PROTIME-INR - Abnormal; Notable for the following components:    Prothrombin Time 16.9 (*)     INR 1.5 (*)     All other components within normal limits   URINALYSIS, REFLEX TO URINE CULTURE - Abnormal; Notable for the following components:    Leukocytes, UA 1+ (*)     All other components within normal limits    Narrative:     Preferred Collection Type->Urine, Clean Catch   URINALYSIS MICROSCOPIC - Abnormal; Notable for the following components:    WBC, UA 8 (*)     All other components within normal limits    Narrative:     Preferred Collection Type->Urine, Clean Catch   MAGNESIUM   MAGNESIUM     Admission on 07/29/2019, Discharged on 07/29/2019   Component Date Value Ref Range Status    WBC 07/29/2019 9.16  3.90 - 12.70 K/uL Final    RBC 07/29/2019 4.43  4.00 - 5.40 M/uL Final    Hemoglobin 07/29/2019 12.1  12.0 - 16.0 g/dL Final    Hematocrit 07/29/2019 36.3* 37.0 - 48.5 % Final    Mean Corpuscular  Volume 07/29/2019 82  82 - 98 fL Final    Mean Corpuscular Hemoglobin 07/29/2019 27.3  27.0 - 31.0 pg Final    Mean Corpuscular Hemoglobin Conc 07/29/2019 33.3  32.0 - 36.0 g/dL Final    RDW 07/29/2019 13.8  11.5 - 14.5 % Final    Platelets 07/29/2019 203  150 - 350 K/uL Final    MPV 07/29/2019 9.5  9.2 - 12.9 fL Final    Immature Granulocytes 07/29/2019 0.4  0.0 - 0.5 % Final    Gran # (ANC) 07/29/2019 7.5  1.8 - 7.7 K/uL Final    Immature Grans (Abs) 07/29/2019 0.04  0.00 - 0.04 K/uL Final    Comment: Mild elevation in immature granulocytes is non specific and   can be seen in a variety of conditions including stress response,   acute inflammation, trauma and pregnancy. Correlation with other   laboratory and clinical findings is essential.      Lymph # 07/29/2019 0.7* 1.0 - 4.8 K/uL Final    Mono # 07/29/2019 0.7  0.3 - 1.0 K/uL Final    Eos # 07/29/2019 0.3  0.0 - 0.5 K/uL Final    Baso # 07/29/2019 0.04  0.00 - 0.20 K/uL Final    nRBC 07/29/2019 0  0 /100 WBC Final    Gran% 07/29/2019 81.5* 38.0 - 73.0 % Final    Lymph% 07/29/2019 7.1* 18.0 - 48.0 % Final    Mono% 07/29/2019 7.2  4.0 - 15.0 % Final    Eosinophil% 07/29/2019 3.4  0.0 - 8.0 % Final    Basophil% 07/29/2019 0.4  0.0 - 1.9 % Final    Differential Method 07/29/2019 Automated   Final    Sodium 07/29/2019 131* 136 - 145 mmol/L Final    Potassium 07/29/2019 4.3  3.5 - 5.1 mmol/L Final    Chloride 07/29/2019 101  95 - 110 mmol/L Final    CO2 07/29/2019 20* 23 - 29 mmol/L Final    Glucose 07/29/2019 182* 70 - 110 mg/dL Final    BUN, Bld 07/29/2019 45* 8 - 23 mg/dL Final    Creatinine 07/29/2019 1.9* 0.5 - 1.4 mg/dL Final    Calcium 07/29/2019 9.7  8.7 - 10.5 mg/dL Final    Total Protein 07/29/2019 6.6  6.0 - 8.4 g/dL Final    Albumin 07/29/2019 4.1  3.5 - 5.2 g/dL Final    Total Bilirubin 07/29/2019 0.7  0.1 - 1.0 mg/dL Final    Comment: For infants and newborns, interpretation of results should be based  on gestational age,  weight and in agreement with clinical  observations.  Premature Infant recommended reference ranges:  Up to 24 hours.............<8.0 mg/dL  Up to 48 hours............<12.0 mg/dL  3-5 days..................<15.0 mg/dL  6-29 days.................<15.0 mg/dL      Alkaline Phosphatase 07/29/2019 67  55 - 135 U/L Final    AST 07/29/2019 24  10 - 40 U/L Final    ALT 07/29/2019 22  10 - 44 U/L Final    Anion Gap 07/29/2019 10  8 - 16 mmol/L Final    eGFR if  07/29/2019 28.1* >60 mL/min/1.73 m^2 Final    eGFR if non African American 07/29/2019 24.4* >60 mL/min/1.73 m^2 Final    Comment: Calculation used to obtain the estimated glomerular filtration  rate (eGFR) is the CKD-EPI equation.       Prothrombin Time 07/29/2019 16.9* 9.0 - 12.5 sec Final    INR 07/29/2019 1.5* 0.8 - 1.2 Final    Comment: Coumadin Therapy:  2.0 - 3.0 for INR for all indicators except mechanical heart valves  and antiphospholipid syndromes which should use 2.5 - 3.5.      Specimen UA 07/29/2019 Urine, Unspecified   Final    Color, UA 07/29/2019 Yellow  Yellow, Straw, Breanne Final    Appearance, UA 07/29/2019 Clear  Clear Final    pH, UA 07/29/2019 7.0  5.0 - 8.0 Final    Specific Gravity, UA 07/29/2019 1.010  1.005 - 1.030 Final    Protein, UA 07/29/2019 Negative  Negative Final    Comment: Recommend a 24 hour urine protein or a urine   protein/creatinine ratio if globulin induced proteinuria is  clinically suspected.      Glucose, UA 07/29/2019 Negative  Negative Final    Ketones, UA 07/29/2019 Negative  Negative Final    Bilirubin (UA) 07/29/2019 Negative  Negative Final    Occult Blood UA 07/29/2019 Negative  Negative Final    Nitrite, UA 07/29/2019 Negative  Negative Final    Urobilinogen, UA 07/29/2019 Negative  Negative EU/dL Final    Leukocytes, UA 07/29/2019 1+* Negative Final    RBC, UA 07/29/2019 2  0 - 4 /hpf Final    WBC, UA 07/29/2019 8* 0 - 5 /hpf Final    Bacteria 07/29/2019 Occasional  None-Occ  /hpf Final    Squam Epithel, UA 07/29/2019 2  /hpf Final    Microscopic Comment 07/29/2019 SEE COMMENT   Final    Comment: Other formed elements not mentioned in the report are not   present in the microscopic examination.       Magnesium 07/29/2019 1.8  1.6 - 2.6 mg/dL Final       EKG Readings: (Independently Interpreted)   Previous EKG: Compared with most recent EKG Previous EKG Date: 7/4/2019.   1941    Normal sinus rhythm 77 beats per minute  Left axis deviation  Left bundle branch block  No change from that of July 4, 2019       Imaging Results    None          Medical Decision Making:   Clinical Tests:   Lab Tests: Ordered and Reviewed  Medical Tests: Ordered and Reviewed  ED Management:  Patient has stable exam  Stable EKG  Unremarkable labs  Normal cardiac monitoring during her 3 hr ER observation    She was found to have incidental potential UTI    I have discussed her care and evaluation with her primary care provider Dr. Dunlap    At this time patient appears to be stable for discharge -     She will contact Dr. Dunlap in the morning for close follow-up understanding to return should she have any acute worsening    Other:   I have discussed this case with another health care provider.       <> Summary of the Discussion: Dr. Dunlap                      Clinical Impression:       ICD-10-CM ICD-9-CM   1. Syncope R55 780.2   2. Bacteriuria with pyuria N39.0 791.9   3. Chronic anticoagulation Z79.01 V58.61                                Gus Mcdonald MD  07/29/19 2216

## 2019-07-31 ENCOUNTER — HOSPITAL ENCOUNTER (OUTPATIENT)
Dept: RADIOLOGY | Facility: HOSPITAL | Age: 79
Discharge: HOME | End: 2019-07-31
Attending: INTERNAL MEDICINE
Payer: MEDICARE

## 2019-07-31 DIAGNOSIS — R41.3 MEMORY LOSS: ICD-10-CM

## 2019-07-31 DIAGNOSIS — R55 SYNCOPE AND COLLAPSE: ICD-10-CM

## 2019-07-31 PROCEDURE — 70551 MRI BRAIN WITHOUT CONTRAST: ICD-10-PCS | Mod: 26,,, | Performed by: RADIOLOGY

## 2019-07-31 PROCEDURE — 70551 MRI BRAIN STEM W/O DYE: CPT | Mod: 26,,, | Performed by: RADIOLOGY

## 2019-07-31 PROCEDURE — 70551 MRI BRAIN STEM W/O DYE: CPT | Mod: TC

## 2019-08-05 ENCOUNTER — LAB VISIT (OUTPATIENT)
Dept: LAB | Facility: HOSPITAL | Age: 79
End: 2019-08-05
Attending: INTERNAL MEDICINE
Payer: MEDICARE

## 2019-08-05 ENCOUNTER — INFUSION (OUTPATIENT)
Dept: INFUSION THERAPY | Facility: HOSPITAL | Age: 79
End: 2019-08-05
Payer: MEDICARE

## 2019-08-05 VITALS
HEART RATE: 73 BPM | DIASTOLIC BLOOD PRESSURE: 54 MMHG | RESPIRATION RATE: 16 BRPM | SYSTOLIC BLOOD PRESSURE: 108 MMHG | OXYGEN SATURATION: 100 % | TEMPERATURE: 96 F

## 2019-08-05 DIAGNOSIS — E03.9 HYPOTHYROIDISM: Primary | ICD-10-CM

## 2019-08-05 LAB
T4 FREE SERPL-MCNC: 1.45 NG/DL (ref 0.71–1.51)
TSH SERPL DL<=0.005 MIU/L-ACNC: 0.26 UIU/ML (ref 0.34–5.6)

## 2019-08-05 PROCEDURE — 96523 IRRIG DRUG DELIVERY DEVICE: CPT

## 2019-08-05 PROCEDURE — 84443 ASSAY THYROID STIM HORMONE: CPT

## 2019-08-05 PROCEDURE — 36415 COLL VENOUS BLD VENIPUNCTURE: CPT

## 2019-08-05 PROCEDURE — 36592 COLLECT BLOOD FROM PICC: CPT

## 2019-08-05 PROCEDURE — 84439 ASSAY OF FREE THYROXINE: CPT

## 2019-08-05 NOTE — NURSING
0800 PT PRESENTED TO THE OPT DEPT FOR LAB DRAW AND PORT FLUSH/PT IS AAOx3/COOPERATIVE AND PLEASANT WITH STAFF. PT STATES THAT SHE WAS IN OUT EMERGENCY ROOM Monday OF LAST WEEK AND HAD A CBC DRAWN/PT USUALLY HAS A CBC DONE Q3MO. T3 TOTAL WILL BE DONE TODAY. DBRN.

## 2019-08-09 NOTE — PROCEDURES
DATE OF STUDY:  08/08/2019    CLINICAL INDICATIONS:  Palpitations and syncope.    FINDINGS:  Holter monitor analysis shows a minimum heart rate of 54,  maximum heart rate of 108, average heart rate of 68.  There were 35 PVCs.   There were 53 SVCs.  There was no significant pause, but the longest R-R  was 2 seconds.    INTERPRETATION:  1.  Mild sinus bradycardia and mild sinus tachycardia.  2.  No significant PVCs were noted with a history of the patient having  palpitations.  They were only 35.  3.  Basically normal Holter monitor.        ROSMERY/IN dd: 08/08/2019 07:05:35 (CDT)   td: 08/08/2019 16:28:58 (CDT)  Doc ID #[id number]   Job ID #[id number]    CC:

## 2019-08-28 ENCOUNTER — OFFICE VISIT (OUTPATIENT)
Dept: FAMILY MEDICINE | Facility: CLINIC | Age: 79
End: 2019-08-28
Payer: MEDICARE

## 2019-08-28 VITALS
HEART RATE: 107 BPM | SYSTOLIC BLOOD PRESSURE: 137 MMHG | TEMPERATURE: 99 F | WEIGHT: 139.38 LBS | RESPIRATION RATE: 12 BRPM | DIASTOLIC BLOOD PRESSURE: 67 MMHG | BODY MASS INDEX: 25.65 KG/M2 | HEIGHT: 62 IN | OXYGEN SATURATION: 93 %

## 2019-08-28 DIAGNOSIS — R73.09 ELEVATED GLUCOSE: ICD-10-CM

## 2019-08-28 DIAGNOSIS — E78.2 MIXED HYPERLIPIDEMIA: ICD-10-CM

## 2019-08-28 DIAGNOSIS — Z79.899 HIGH RISK MEDICATION USE: ICD-10-CM

## 2019-08-28 DIAGNOSIS — R53.1 WEAKNESS: ICD-10-CM

## 2019-08-28 DIAGNOSIS — E03.9 ACQUIRED HYPOTHYROIDISM: ICD-10-CM

## 2019-08-28 DIAGNOSIS — I10 ESSENTIAL HYPERTENSION: ICD-10-CM

## 2019-08-28 DIAGNOSIS — N39.0 URINARY TRACT INFECTION WITHOUT HEMATURIA, SITE UNSPECIFIED: ICD-10-CM

## 2019-08-28 DIAGNOSIS — Z76.89 ENCOUNTER TO ESTABLISH CARE: Primary | ICD-10-CM

## 2019-08-28 DIAGNOSIS — R00.0 RAPID HEART BEAT: ICD-10-CM

## 2019-08-28 DIAGNOSIS — D64.9 LOW HEMOGLOBIN: ICD-10-CM

## 2019-08-28 PROCEDURE — 99205 OFFICE O/P NEW HI 60 MIN: CPT | Mod: S$GLB,,, | Performed by: NURSE PRACTITIONER

## 2019-08-28 PROCEDURE — 99205 PR OFFICE/OUTPT VISIT, NEW, LEVL V, 60-74 MIN: ICD-10-PCS | Mod: S$GLB,,, | Performed by: NURSE PRACTITIONER

## 2019-08-28 RX ORDER — TRIAMTERENE/HYDROCHLOROTHIAZID 37.5-25 MG
1 TABLET ORAL DAILY
Qty: 30 TABLET | Refills: 11 | Status: SHIPPED | OUTPATIENT
Start: 2019-08-28 | End: 2019-10-14

## 2019-08-28 RX ORDER — METOPROLOL TARTRATE 25 MG/1
25 TABLET, FILM COATED ORAL 2 TIMES DAILY
Qty: 60 TABLET | Refills: 11 | Status: SHIPPED | OUTPATIENT
Start: 2019-08-28 | End: 2020-06-25 | Stop reason: ALTCHOICE

## 2019-08-28 RX ORDER — LOSARTAN POTASSIUM 50 MG/1
50 TABLET ORAL DAILY
Refills: 3 | COMMUNITY
Start: 2019-08-11 | End: 2019-08-28

## 2019-08-28 RX ORDER — SPIRONOLACTONE 50 MG/1
50 TABLET, FILM COATED ORAL EVERY MORNING
Refills: 3 | COMMUNITY
Start: 2019-08-06 | End: 2019-08-28

## 2019-08-28 RX ORDER — ESZOPICLONE 2 MG/1
1 TABLET, FILM COATED ORAL NIGHTLY PRN
Refills: 5 | COMMUNITY
Start: 2019-08-22 | End: 2019-09-17

## 2019-08-28 RX ORDER — TRAZODONE HYDROCHLORIDE 100 MG/1
100 TABLET ORAL NIGHTLY
Refills: 3 | COMMUNITY
Start: 2019-08-13 | End: 2019-08-28

## 2019-08-28 RX ORDER — LEVOTHYROXINE SODIUM 100 UG/1
100 TABLET ORAL DAILY
COMMUNITY
End: 2019-08-28

## 2019-08-28 RX ORDER — LEVOTHYROXINE SODIUM 100 UG/1
50 CAPSULE ORAL DAILY
COMMUNITY
End: 2019-10-14 | Stop reason: SDUPTHER

## 2019-08-28 NOTE — PROGRESS NOTES
Subjective:       Patient ID: Ursula Rodríguez is a 79 y.o. female.    Chief Complaint: Establish Care (Patient states she feels unwell; is experiencing itching and skin darkness as side-effect of medication since March 31 2019.); Extremity Weakness (Dehydration); Tremors; and Sore Throat (Sometimes hurts to swallow (not constant) - since March 2019)      New patient presents to [facility]. Reports feeling bad the past few months with a lot of medication changes made by previous PCP. Prior to med changes felt good, unknown reasons for changes. Reports weakness, dizzy and feeling dehydrated. C/o very dry mouth and difficulty swallowing sometimes all x 3 months. Chronic coumadin therapy for factor V with INR not theraputic. Does report heart palpations with chest pressure when HR is elevated, denies h/o afib    Med changes made with pt requesting to resume previous regimen. Added metoprolol for palpations.     Past Medical History:   Diagnosis Date    Anemia 1995    Breast cancer 1995    LEFT DIAGNOSED FIRST    Breast cancer 2013    Right     Factor V Leiden 1994    Hyperlipidemia 2017    Hypertension 2017    Hypothyroidism 2019    Osteoporosis     UNKNOWN DATE OF DX    Recurrent urinary tract infection     Vertigo 1994       Past Surgical History:   Procedure Laterality Date    HEMORRHOID SURGERY  1968    HYSTERECTOMY  1970    MASTECTOMY Right 1994    MASTECTOMY Left 2013    BREAST CANCER    TUMOR REMOVAL Left 1994    EAR        Social History     Socioeconomic History    Marital status:      Spouse name: Not on file    Number of children: Not on file    Years of education: Not on file    Highest education level: Master's degree (e.g., MA, MS, [place], MEd, MSW, ANDRE)   Occupational History    Occupation: Phd x 3-   Social Needs    Financial resource strain: Not on file    Food insecurity:     Worry: Not on file     Inability: Not on file    Transportation needs:     Medical: Not on file      Non-medical: Not on file   Tobacco Use    Smoking status: Never Smoker    Smokeless tobacco: Never Used   Substance and Sexual Activity    Alcohol use: No     Comment: SPECIAL OCCASIONS LIKE ZACHERY    Drug use: No    Sexual activity: Not Currently   Lifestyle    Physical activity:     Days per week: Not on file     Minutes per session: Not on file    Stress: Not on file   Relationships    Social connections:     Talks on phone: Not on file     Gets together: Not on file     Attends Confucianism service: Not on file     Active member of club or organization: Not on file     Attends meetings of clubs or organizations: Not on file     Relationship status: Not on file   Other Topics Concern    Not on file   Social History Narrative    Not on file       Family History   Problem Relation Age of Onset    Cancer Mother     Hypertension Mother     Heart disease Mother     No Known Problems Sister     Diabetes Brother     Hypertension Brother     Cancer Maternal Grandmother     Lung disease Father     Heart disease Father     Diabetes Father     Cancer Brother     Diabetes Brother     Hypertension Brother     Stroke Brother     Hypertension Brother     Stroke Brother        Review of patient's allergies indicates:   Allergen Reactions    Iodine and iodide containing products     Ditropan [oxybutynin chloride] Palpitations and Other (See Comments)     Made patient weak          Current Outpatient Medications:     amlodipine (NORVASC) 5 MG tablet, Take 5 mg by mouth once daily., Disp: , Rfl:     eszopiclone (LUNESTA) 2 MG Tab, TAKE 1 TABLET BY MOUTH EVERY DAY AT BEDTIME AS NEEDED FOR SLEEP, Disp: , Rfl: 5    levothyroxine (SYNTHROID) 50 MCG tablet, Take 50 mcg by mouth once daily., Disp: , Rfl:     multivitamin capsule, Take 1 capsule by mouth once daily., Disp: , Rfl:     potassium chloride (KLOR-CON) 10 MEQ TbSR, Take 1 tablet (10 mEq total) by mouth once daily. (Patient taking differently:  Take 10 mEq by mouth once daily. ), Disp: 14 tablet, Rfl: 0    simvastatin (ZOCOR) 20 MG tablet, Take 20 mg by mouth every evening. , Disp: , Rfl:     warfarin (COUMADIN) 1 MG tablet, Take 1 mg by mouth 2 (two) times daily., Disp: , Rfl:     metoprolol tartrate (LOPRESSOR) 25 MG tablet, Take 1 tablet (25 mg total) by mouth 2 (two) times daily., Disp: 60 tablet, Rfl: 11    triamterene-hydrochlorothiazide 37.5-25 mg (MAXZIDE-25) 37.5-25 mg per tablet, Take 1 tablet by mouth once daily., Disp: 30 tablet, Rfl: 11  No current facility-administered medications for this visit.     HPI  Review of Systems   Constitutional: Positive for fatigue.        Dehydration   HENT: Positive for trouble swallowing.    Eyes: Negative.    Respiratory: Negative.  Negative for shortness of breath.    Cardiovascular: Positive for chest pain, palpitations and leg swelling.   Gastrointestinal: Negative.    Endocrine: Negative.    Genitourinary: Positive for dysuria and frequency.   Musculoskeletal: Negative.    Skin: Negative.    Allergic/Immunologic: Negative.    Neurological: Positive for syncope.        Balance problems    Hematological: Negative.    Psychiatric/Behavioral: Negative.        Objective:      Physical Exam   Constitutional: She is oriented to person, place, and time. She appears well-developed and well-nourished.   HENT:   Head: Normocephalic and atraumatic.   Mouth/Throat: Oropharynx is clear and moist.   Eyes: Pupils are equal, round, and reactive to light. Conjunctivae are normal. No scleral icterus.   Neck: Normal range of motion. Neck supple. No thyromegaly present.   Cardiovascular: Normal rate and regular rhythm.   Murmur heard.  Pulmonary/Chest: Effort normal and breath sounds normal. No respiratory distress.   Abdominal: Soft. Bowel sounds are normal. She exhibits no distension. There is no tenderness.   Musculoskeletal: Normal range of motion. She exhibits no edema.   Neurological: She is alert and oriented to  person, place, and time. No sensory deficit. She exhibits normal muscle tone.   Skin: Skin is warm. Capillary refill takes less than 2 seconds. No rash noted.   Psychiatric: She has a normal mood and affect. Her behavior is normal. Judgment and thought content normal.   Nursing note and vitals reviewed.      Assessment:       1. Encounter to establish care    2. Low hemoglobin    3. Weakness    4. Essential hypertension    5. Rapid heart beat    6. Elevated glucose    7. Acquired hypothyroidism    8. High risk medication use    9. Mixed hyperlipidemia    10. Urinary tract infection without hematuria, site unspecified        Plan:     1- stop aldactone and resume Maxzide 37.5/25  2- stop atenolol and start metoprolol twice daily for rapid heart sensation and blood pressure  3- INR with dose most likely to be increased  4- stop cozaar  5- log home BP   6- fasting labs on  8/2/19 and occult for trending downward H/H     Encounter to establish care    Low hemoglobin  -     Occult blood x 1, stool; Future; Expected date: 08/28/2019  -     CBC auto differential; Future; Expected date: 08/28/2019    Weakness  -     Occult blood x 1, stool; Future; Expected date: 08/28/2019  -     CBC auto differential; Future; Expected date: 08/28/2019    Essential hypertension  -     Cancel: CBC auto differential; Future; Expected date: 08/28/2019  -     Cancel: Comprehensive metabolic panel; Future; Expected date: 08/28/2019  -     Magnesium; Future; Expected date: 08/28/2019    Rapid heart beat  -     Cancel: CBC auto differential; Future; Expected date: 08/28/2019  -     Cancel: Comprehensive metabolic panel; Future; Expected date: 08/28/2019    Elevated glucose  -     Hemoglobin A1c; Future; Expected date: 08/28/2019  -     Cancel: CBC auto differential; Future; Expected date: 08/28/2019  -     Cancel: Comprehensive metabolic panel; Future; Expected date: 08/28/2019    Acquired hypothyroidism    High risk medication use  -     Cancel:  CBC auto differential; Future; Expected date: 08/28/2019  -     Cancel: Comprehensive metabolic panel; Future; Expected date: 08/28/2019  -     Vitamin D; Future; Expected date: 08/28/2019    Mixed hyperlipidemia  -     Cancel: CBC auto differential; Future; Expected date: 08/28/2019  -     Cancel: Comprehensive metabolic panel; Future; Expected date: 08/28/2019  -     Lipid panel; Future; Expected date: 08/28/2019    Urinary tract infection without hematuria, site unspecified  -     Urine culture; Future; Expected date: 08/28/2019    Other orders  -     triamterene-hydrochlorothiazide 37.5-25 mg (MAXZIDE-25) 37.5-25 mg per tablet; Take 1 tablet by mouth once daily.  Dispense: 30 tablet; Refill: 11  -     metoprolol tartrate (LOPRESSOR) 25 MG tablet; Take 1 tablet (25 mg total) by mouth 2 (two) times daily.  Dispense: 60 tablet; Refill: 11        Risks, benefits, and side effects were discussed with the patient. All questions were answered to the fullest satisfaction of the patient, and pt verbalized understanding and agreement to treatment plan. Pt was to call with any new or worsening symptoms, or present to the ER.

## 2019-08-28 NOTE — PATIENT INSTRUCTIONS
1- stop aldactone and resume Maxzide 37.5/25  2- stop atenolol and start metoprolol twice daily for rapid heart sensation and blood pressure  3- INR with dose most likely to be increased  4- stop cozaar  5- log home BP   6- fasting labs on  8/2/19 and occult for trending downward H/H

## 2019-09-03 ENCOUNTER — INFUSION (OUTPATIENT)
Dept: INFUSION THERAPY | Facility: HOSPITAL | Age: 79
End: 2019-09-03
Payer: MEDICARE

## 2019-09-03 ENCOUNTER — LAB VISIT (OUTPATIENT)
Dept: LAB | Facility: HOSPITAL | Age: 79
End: 2019-09-03
Attending: NURSE PRACTITIONER
Payer: MEDICARE

## 2019-09-03 ENCOUNTER — TELEPHONE (OUTPATIENT)
Dept: FAMILY MEDICINE | Facility: CLINIC | Age: 79
End: 2019-09-03

## 2019-09-03 VITALS
BODY MASS INDEX: 25.4 KG/M2 | HEART RATE: 75 BPM | SYSTOLIC BLOOD PRESSURE: 124 MMHG | TEMPERATURE: 98 F | OXYGEN SATURATION: 98 % | DIASTOLIC BLOOD PRESSURE: 87 MMHG | HEIGHT: 62 IN | WEIGHT: 138 LBS | RESPIRATION RATE: 18 BRPM

## 2019-09-03 DIAGNOSIS — R73.09 ELEVATED GLUCOSE: ICD-10-CM

## 2019-09-03 DIAGNOSIS — R53.1 WEAKNESS: ICD-10-CM

## 2019-09-03 DIAGNOSIS — D68.51 FACTOR V LEIDEN: ICD-10-CM

## 2019-09-03 DIAGNOSIS — Z92.29 HISTORY OF COUMADIN THERAPY: Primary | ICD-10-CM

## 2019-09-03 DIAGNOSIS — N39.0 URINARY TRACT INFECTION WITHOUT HEMATURIA, SITE UNSPECIFIED: ICD-10-CM

## 2019-09-03 DIAGNOSIS — E78.2 MIXED HYPERLIPIDEMIA: ICD-10-CM

## 2019-09-03 DIAGNOSIS — Z85.3 HISTORY OF BILATERAL BREAST CANCER: Primary | ICD-10-CM

## 2019-09-03 DIAGNOSIS — Z79.899 HIGH RISK MEDICATION USE: ICD-10-CM

## 2019-09-03 DIAGNOSIS — D64.9 LOW HEMOGLOBIN: ICD-10-CM

## 2019-09-03 DIAGNOSIS — I10 ESSENTIAL HYPERTENSION: ICD-10-CM

## 2019-09-03 LAB
25(OH)D3+25(OH)D2 SERPL-MCNC: 46 NG/ML (ref 30–96)
BASOPHILS # BLD AUTO: 0.02 K/UL (ref 0–0.2)
BASOPHILS NFR BLD: 0.3 % (ref 0–1.9)
CHOLEST SERPL-MCNC: 128 MG/DL (ref 120–199)
CHOLEST/HDLC SERPL: 3.4 {RATIO} (ref 2–5)
DIFFERENTIAL METHOD: ABNORMAL
EOSINOPHIL # BLD AUTO: 0.3 K/UL (ref 0–0.5)
EOSINOPHIL NFR BLD: 4.8 % (ref 0–8)
ERYTHROCYTE [DISTWIDTH] IN BLOOD BY AUTOMATED COUNT: 14 % (ref 11.5–14.5)
ESTIMATED AVG GLUCOSE: 111 MG/DL (ref 68–131)
HBA1C MFR BLD HPLC: 5.5 % (ref 4.5–6.2)
HCT VFR BLD AUTO: 37.2 % (ref 37–48.5)
HDLC SERPL-MCNC: 38 MG/DL (ref 40–75)
HDLC SERPL: 29.7 % (ref 20–50)
HGB BLD-MCNC: 12.6 G/DL (ref 12–16)
IMM GRANULOCYTES # BLD AUTO: 0.02 K/UL (ref 0–0.04)
IMM GRANULOCYTES NFR BLD AUTO: 0.3 % (ref 0–0.5)
LDLC SERPL CALC-MCNC: 62.2 MG/DL (ref 63–159)
LYMPHOCYTES # BLD AUTO: 0.5 K/UL (ref 1–4.8)
LYMPHOCYTES NFR BLD: 8.6 % (ref 18–48)
MAGNESIUM SERPL-MCNC: 1.6 MG/DL (ref 1.6–2.6)
MCH RBC QN AUTO: 27.6 PG (ref 27–31)
MCHC RBC AUTO-ENTMCNC: 33.9 G/DL (ref 32–36)
MCV RBC AUTO: 82 FL (ref 82–98)
MONOCYTES # BLD AUTO: 0.7 K/UL (ref 0.3–1)
MONOCYTES NFR BLD: 11.3 % (ref 4–15)
NEUTROPHILS # BLD AUTO: 4.5 K/UL (ref 1.8–7.7)
NEUTROPHILS NFR BLD: 74.7 % (ref 38–73)
NONHDLC SERPL-MCNC: 90 MG/DL
NRBC BLD-RTO: 0 /100 WBC
PLATELET # BLD AUTO: 199 K/UL (ref 150–350)
PMV BLD AUTO: 9.3 FL (ref 9.2–12.9)
RBC # BLD AUTO: 4.56 M/UL (ref 4–5.4)
TRIGL SERPL-MCNC: 139 MG/DL (ref 30–150)
WBC # BLD AUTO: 6.04 K/UL (ref 3.9–12.7)

## 2019-09-03 PROCEDURE — 82306 VITAMIN D 25 HYDROXY: CPT

## 2019-09-03 PROCEDURE — 87086 URINE CULTURE/COLONY COUNT: CPT

## 2019-09-03 PROCEDURE — 36592 COLLECT BLOOD FROM PICC: CPT

## 2019-09-03 PROCEDURE — 36415 COLL VENOUS BLD VENIPUNCTURE: CPT

## 2019-09-03 PROCEDURE — A4216 STERILE WATER/SALINE, 10 ML: HCPCS | Performed by: INTERNAL MEDICINE

## 2019-09-03 PROCEDURE — 80061 LIPID PANEL: CPT

## 2019-09-03 PROCEDURE — 25000003 PHARM REV CODE 250: Performed by: INTERNAL MEDICINE

## 2019-09-03 PROCEDURE — 83735 ASSAY OF MAGNESIUM: CPT

## 2019-09-03 PROCEDURE — 63600175 PHARM REV CODE 636 W HCPCS: Performed by: INTERNAL MEDICINE

## 2019-09-03 PROCEDURE — 96523 IRRIG DRUG DELIVERY DEVICE: CPT

## 2019-09-03 PROCEDURE — 85025 COMPLETE CBC W/AUTO DIFF WBC: CPT

## 2019-09-03 PROCEDURE — 83036 HEMOGLOBIN GLYCOSYLATED A1C: CPT

## 2019-09-03 RX ORDER — HEPARIN SODIUM,PORCINE/PF 10 UNIT/ML
10 SYRINGE (ML) INTRAVENOUS
Status: CANCELLED
Start: 2019-09-03

## 2019-09-03 RX ORDER — SODIUM CHLORIDE 0.9 % (FLUSH) 0.9 %
10 SYRINGE (ML) INJECTION
Status: CANCELLED | OUTPATIENT
Start: 2019-09-03

## 2019-09-03 RX ORDER — HEPARIN SODIUM,PORCINE/PF 10 UNIT/ML
10 SYRINGE (ML) INTRAVENOUS
Status: DISCONTINUED | OUTPATIENT
Start: 2019-09-03 | End: 2019-09-03 | Stop reason: HOSPADM

## 2019-09-03 RX ORDER — SODIUM CHLORIDE 0.9 % (FLUSH) 0.9 %
10 SYRINGE (ML) INJECTION
Status: COMPLETED | OUTPATIENT
Start: 2019-09-03 | End: 2019-09-03

## 2019-09-03 RX ADMIN — Medication 10 ML: at 09:09

## 2019-09-03 RX ADMIN — HEPARIN, PORCINE (PF) 10 UNIT/ML INTRAVENOUS SYRINGE 10 UNITS: at 09:09

## 2019-09-03 NOTE — NURSING
0907 Lcw port accessed maintaining sterile technique x 2 attempts. 1st attempt failure related to needle positioning. 20 g 3/4 inch lyles placed on second attempt with positive blood return. Labs collected and 10 ml saline flushed followed by 1 ml/10 units of heparin. Site unremarkable. No swelling or redness noted.   0920 20 g lyles deaccessed from lcw port. Bandaid placed over site. AMI   0953 Pt walked to lab for urine collection and discharge. AMI

## 2019-09-03 NOTE — TELEPHONE ENCOUNTER
"Spoke with pt. Informed pt lab results have not been resulted yet. Pt verbalized an understanding. States "Would you please let her know that I am taking my coumadin. And I am taking it M, W, F 3mg/day T, Th, Sat, Sun 2mg/day. I just want to make sure I am okay and I am taking the correct dose". Informed NP would be notified.     ----- Message from Kiki Rodriguez sent at 9/3/2019  3:40 PM CDT -----  Type:  Test Results    Who Called:  self  Name of Test (Lab/Mammo/Etc):  Labs   Date of Test:  09/03   Ordering Provider:  Mirtha   Where the test was performed:  [facility]   Best Call Back Number:  [phone] (home)     Additional Information:  Requesting results      "

## 2019-09-04 ENCOUNTER — TELEPHONE (OUTPATIENT)
Dept: INFUSION THERAPY | Facility: HOSPITAL | Age: 79
End: 2019-09-04

## 2019-09-04 LAB
BACTERIA UR CULT: NORMAL
BACTERIA UR CULT: NORMAL

## 2019-09-04 NOTE — TELEPHONE ENCOUNTER
"Received phone call regarding pt/inr that wasn't drawn yesterday. Pt states,"There is a standing order from Dr. De La Rosa." Explained to patient that there were no orders for a pt/inr during her appointment,however, her new pcp Mirtha placed a standing order last night for every two weeks. Pt verbalizes understanding and wishes to come in the am for a lab draw.   "

## 2019-09-04 NOTE — TELEPHONE ENCOUNTER
Notified patient of results and INR ordered. She stated another doctor ordered it. Also stated her skin has been itching, her heart rate is elevated and feels weak.     BP this morning -   6am   -- /70  and  HR 80  10am -- /75  and  HR 96  11am -- /75  and  HR 95    Patient feels like it might be Synthroid mixed with BP meds causing this.  She is asking what should she do?  Please advise ty

## 2019-09-04 NOTE — TELEPHONE ENCOUNTER
Please notify pt labs drawn 9/3 are all stable and her occult blood was negative.    Please apologize to her but they did not draw her INR. I deeply apologize. I have entered a standing order for her INR for one year.

## 2019-09-04 NOTE — TELEPHONE ENCOUNTER
Please let pt know her VSS and we did make medication changes. I do not believe her itching is from an interaction of synthroid and BP medication.     Normal BP , 130/80  Normal HR ,     Who is managing her INR??

## 2019-09-05 ENCOUNTER — INFUSION (OUTPATIENT)
Dept: INFUSION THERAPY | Facility: HOSPITAL | Age: 79
End: 2019-09-05
Payer: MEDICARE

## 2019-09-05 ENCOUNTER — LAB VISIT (OUTPATIENT)
Dept: LAB | Facility: HOSPITAL | Age: 79
End: 2019-09-05
Attending: NURSE PRACTITIONER
Payer: MEDICARE

## 2019-09-05 ENCOUNTER — TELEPHONE (OUTPATIENT)
Dept: HEMATOLOGY/ONCOLOGY | Facility: CLINIC | Age: 79
End: 2019-09-05

## 2019-09-05 DIAGNOSIS — Z79.01 LONG TERM (CURRENT) USE OF ANTICOAGULANTS: Primary | ICD-10-CM

## 2019-09-05 DIAGNOSIS — D68.51 FACTOR V LEIDEN: ICD-10-CM

## 2019-09-05 DIAGNOSIS — Z92.29 HISTORY OF COUMADIN THERAPY: ICD-10-CM

## 2019-09-05 LAB
INR PPP: 2.2 (ref 0.8–1.2)
PROTHROMBIN TIME: 23.6 SEC (ref 9–12.5)

## 2019-09-05 PROCEDURE — 36592 COLLECT BLOOD FROM PICC: CPT

## 2019-09-05 PROCEDURE — 36415 COLL VENOUS BLD VENIPUNCTURE: CPT

## 2019-09-05 PROCEDURE — 85610 PROTHROMBIN TIME: CPT

## 2019-09-05 NOTE — NURSING
0758 Pt ambulatory to infusion chair 11 for pt/inr lab draw from port. Pt denies any complaints. Pt reports feeling well this am.   0805 Rt chest wall port accessed maintaining sterile technique with 20 g 3/4 in lyles needle. Mask,gown,gloves,and hair net worn for procedure. Pt placed in mask. Positive blood return noted. 10 ml saline flushed then 10 ml blood discarded. Lab collected. Line flushed with additional 10 ml saline followed by 10 units of heparin locking solution. DS  0806 Port edges stabilized and 20 g lyles needle removed without difficulty. No bleeding noted. Band aid placed over site. DS  0808 Future appointments printed and discussed with patient and lab order provided for future reference. Explained to patient I would contact her new pcp regarding monthly lab orders. Infusion clinic number provided to patient in case she needed assistance or had further questions throughout her treatment.  Pt verbalizes understanding. DS  0810 Pt ambulated from infusion clinic for discharge with . AMI

## 2019-09-05 NOTE — TELEPHONE ENCOUNTER
Please notify pt I would like to manage her INR here at [facility]. INR is WNL continue same dose of MWF3mg nightly and /[id number] nightly    Carissa, can you please contact Anatoly Porter and ask how do I enter a standing order to have her port flushed after each INR draw?  I only entered for [id number] in the event we would be testing more often yet she will return next month.

## 2019-09-05 NOTE — TELEPHONE ENCOUNTER
----- Message from Jazmyne Covarrubias sent at 9/5/2019 10:46 AM CDT -----  Pt called and stated Dr. Dunlap is no longer managing her coumadin.  He has made too many errrors she stated.  She wanted to know if Dr. Garcia could manage.  She had lab done today and results are in  Current dosage, MWF 3mg per day , T, TH and S and SPANN 2mg per day    Pharmacy:  [facility]# [phone]

## 2019-09-05 NOTE — TELEPHONE ENCOUNTER
Pt notified of response and voiced understanding.  Pt states Dr. Dunlap was managing her INR. However, she is no longer seeing this provider.  Pt asked if you could manage her INR or does she need to ask Dr. Garcia, her oncologist.  Please advise.  Pt had INR drawn this  morning.

## 2019-09-05 NOTE — TELEPHONE ENCOUNTER
----- Message from Vicenta Barron sent at 9/5/2019  2:49 PM CDT -----  Pt had pt/inr today and is supposed to get them done every 2 weeks but her next one is esvin at [place] just shy of 2 weeks out on the 18th. Should the pt keep that or grisel all her appts to be exactly 2 weeks apart?

## 2019-09-06 ENCOUNTER — TELEPHONE (OUTPATIENT)
Dept: FAMILY MEDICINE | Facility: CLINIC | Age: 79
End: 2019-09-06

## 2019-09-06 RX ORDER — SIMVASTATIN 20 MG/1
20 TABLET, FILM COATED ORAL NIGHTLY
Qty: 90 TABLET | Refills: 3 | Status: SHIPPED | OUTPATIENT
Start: 2019-09-06 | End: 2020-08-17

## 2019-09-06 RX ORDER — ESZOPICLONE 3 MG/1
3 TABLET, FILM COATED ORAL NIGHTLY
Qty: 30 TABLET | Refills: 0 | OUTPATIENT
Start: 2019-09-06 | End: 2019-10-06

## 2019-09-06 NOTE — TELEPHONE ENCOUNTER
Spoke to patient this morning regarding INR.  Advised she had spoke to Dr. Padgett who will see her in October and already told her to continue the same dosage until his visit.

## 2019-09-06 NOTE — TELEPHONE ENCOUNTER
Please notify pt refill sent for simvastatin yet request to increase lunesta denied d/t age. I do recommend melatonin gummies OTC as these may help.

## 2019-09-06 NOTE — TELEPHONE ENCOUNTER
Called patient to discuss information sent by HARINDER Shannon, including how to proceed with taking her INR.  Patient stated that she has an appointment with Dr. Padgett, Oncologist, in October 2019 and he is going to go ahead and monitor her INR, as well as order the port flushing to be done after every draw.  Patient did request refill of Simvastatin and a refill/increase of Lunesta to assist her in sleeping through the night.  Advised patient that I would let HARINDER Shannon, know and advise her if I need any other information from her to get the medications sent to the pharmacy.  Patient verbalized understanding.

## 2019-09-06 NOTE — TELEPHONE ENCOUNTER
----- Message from Yesenia Richter NP sent at 9/5/2019  5:37 PM CDT -----  Report INR therapeutic WNL continue same dose and repeat in one month.

## 2019-09-06 NOTE — NURSING
Contacted pt pcp Mirtha Holland and Carissa Segundo via [employer] regarding future therapy plan. Informed that pt was getting monthly labs and port flushes prior to switching providers. Staff will look into situation and update plan accordingly.

## 2019-09-10 LAB
OHS CV EVENT MONITOR DAY: 0
OHS CV HOLTER LENGTH DECIMAL HOURS: 23.98
OHS CV HOLTER LENGTH HOURS: 23
OHS CV HOLTER LENGTH MINUTES: 59

## 2019-09-11 ENCOUNTER — PATIENT OUTREACH (OUTPATIENT)
Dept: ADMINISTRATIVE | Facility: HOSPITAL | Age: 79
End: 2019-09-11

## 2019-09-16 ENCOUNTER — INFUSION (OUTPATIENT)
Dept: INFUSION THERAPY | Facility: HOSPITAL | Age: 79
End: 2019-09-16
Attending: INTERNAL MEDICINE
Payer: MEDICARE

## 2019-09-16 ENCOUNTER — LAB VISIT (OUTPATIENT)
Dept: LAB | Facility: HOSPITAL | Age: 79
End: 2019-09-16
Attending: INTERNAL MEDICINE
Payer: MEDICARE

## 2019-09-16 VITALS
DIASTOLIC BLOOD PRESSURE: 63 MMHG | OXYGEN SATURATION: 100 % | HEART RATE: 80 BPM | TEMPERATURE: 97 F | RESPIRATION RATE: 18 BRPM | SYSTOLIC BLOOD PRESSURE: 142 MMHG

## 2019-09-16 DIAGNOSIS — D68.51 FACTOR V LEIDEN: ICD-10-CM

## 2019-09-16 DIAGNOSIS — Z92.29 HISTORY OF COUMADIN THERAPY: ICD-10-CM

## 2019-09-16 DIAGNOSIS — Z85.3 HISTORY OF BILATERAL BREAST CANCER: Primary | ICD-10-CM

## 2019-09-16 LAB
INR PPP: 2.5 (ref 0.8–1.2)
PROTHROMBIN TIME: 26.8 SEC (ref 9–12.5)

## 2019-09-16 PROCEDURE — 36592 COLLECT BLOOD FROM PICC: CPT

## 2019-09-16 PROCEDURE — 25000003 PHARM REV CODE 250: Performed by: INTERNAL MEDICINE

## 2019-09-16 PROCEDURE — 36415 COLL VENOUS BLD VENIPUNCTURE: CPT

## 2019-09-16 PROCEDURE — A4216 STERILE WATER/SALINE, 10 ML: HCPCS | Performed by: INTERNAL MEDICINE

## 2019-09-16 PROCEDURE — 85610 PROTHROMBIN TIME: CPT

## 2019-09-16 PROCEDURE — 63600175 PHARM REV CODE 636 W HCPCS: Performed by: INTERNAL MEDICINE

## 2019-09-16 RX ORDER — HEPARIN SODIUM,PORCINE/PF 10 UNIT/ML
10 SYRINGE (ML) INTRAVENOUS
Status: CANCELLED
Start: 2019-09-16

## 2019-09-16 RX ORDER — SODIUM CHLORIDE 0.9 % (FLUSH) 0.9 %
10 SYRINGE (ML) INJECTION
Status: CANCELLED | OUTPATIENT
Start: 2019-09-16

## 2019-09-16 RX ORDER — SODIUM CHLORIDE 0.9 % (FLUSH) 0.9 %
10 SYRINGE (ML) INJECTION
Status: COMPLETED | OUTPATIENT
Start: 2019-09-16 | End: 2019-09-16

## 2019-09-16 RX ORDER — HEPARIN SODIUM,PORCINE/PF 10 UNIT/ML
10 SYRINGE (ML) INTRAVENOUS
Status: DISCONTINUED | OUTPATIENT
Start: 2019-09-16 | End: 2019-09-16 | Stop reason: HOSPADM

## 2019-09-16 RX ADMIN — HEPARIN, PORCINE (PF) 10 UNIT/ML INTRAVENOUS SYRINGE 10 UNITS: at 07:09

## 2019-09-16 RX ADMIN — Medication 10 ML: at 07:09

## 2019-09-17 ENCOUNTER — TELEPHONE (OUTPATIENT)
Dept: HEMATOLOGY/ONCOLOGY | Facility: CLINIC | Age: 79
End: 2019-09-17

## 2019-09-17 ENCOUNTER — PATIENT MESSAGE (OUTPATIENT)
Dept: FAMILY MEDICINE | Facility: CLINIC | Age: 79
End: 2019-09-17

## 2019-09-17 DIAGNOSIS — Z79.01 ENCOUNTER FOR MONITORING COUMADIN THERAPY: Primary | ICD-10-CM

## 2019-09-17 DIAGNOSIS — Z51.81 ENCOUNTER FOR MONITORING COUMADIN THERAPY: Primary | ICD-10-CM

## 2019-09-17 DIAGNOSIS — E03.9 ACQUIRED HYPOTHYROIDISM: ICD-10-CM

## 2019-09-17 RX ORDER — ESZOPICLONE 2 MG/1
2 TABLET, FILM COATED ORAL NIGHTLY
COMMUNITY
End: 2019-09-23

## 2019-09-17 NOTE — TELEPHONE ENCOUNTER
----- Message from Michelle Gottlieb sent at 9/17/2019  9:03 AM CDT -----  The patient called to get the results of her PT INR that was done yesterday at [place]. Please call her back at [phone].

## 2019-09-17 NOTE — TELEPHONE ENCOUNTER
NP talked with patient about why she repeated her INR. Standing order for INR changed to monthly and TSH/T4 added to be done with the next INR in October. MAR updated instructed to continue to norvasc bid and request to increase lunesta denied d/t age and recommended melatonin.

## 2019-09-23 ENCOUNTER — OFFICE VISIT (OUTPATIENT)
Dept: FAMILY MEDICINE | Facility: CLINIC | Age: 79
End: 2019-09-23
Payer: MEDICARE

## 2019-09-23 ENCOUNTER — NURSE TRIAGE (OUTPATIENT)
Dept: ADMINISTRATIVE | Facility: CLINIC | Age: 79
End: 2019-09-23

## 2019-09-23 ENCOUNTER — LAB VISIT (OUTPATIENT)
Dept: LAB | Facility: HOSPITAL | Age: 79
End: 2019-09-23
Attending: FAMILY MEDICINE
Payer: MEDICARE

## 2019-09-23 ENCOUNTER — TELEPHONE (OUTPATIENT)
Dept: FAMILY MEDICINE | Facility: CLINIC | Age: 79
End: 2019-09-23

## 2019-09-23 ENCOUNTER — INFUSION (OUTPATIENT)
Dept: INFUSION THERAPY | Facility: HOSPITAL | Age: 79
End: 2019-09-23
Payer: MEDICARE

## 2019-09-23 VITALS
WEIGHT: 135.38 LBS | OXYGEN SATURATION: 94 % | HEIGHT: 62 IN | HEART RATE: 101 BPM | SYSTOLIC BLOOD PRESSURE: 154 MMHG | RESPIRATION RATE: 13 BRPM | TEMPERATURE: 98 F | DIASTOLIC BLOOD PRESSURE: 81 MMHG | BODY MASS INDEX: 24.91 KG/M2

## 2019-09-23 DIAGNOSIS — Z76.89 ENCOUNTER TO ESTABLISH CARE: Primary | ICD-10-CM

## 2019-09-23 DIAGNOSIS — R53.1 WEAKNESS: ICD-10-CM

## 2019-09-23 DIAGNOSIS — E03.9 ACQUIRED HYPOTHYROIDISM: ICD-10-CM

## 2019-09-23 DIAGNOSIS — R20.2 TINGLING: ICD-10-CM

## 2019-09-23 DIAGNOSIS — E87.6 HYPOKALEMIA: Primary | ICD-10-CM

## 2019-09-23 LAB
ANION GAP SERPL CALC-SCNC: 14 MMOL/L (ref 8–16)
BASOPHILS # BLD AUTO: 0.03 K/UL (ref 0–0.2)
BASOPHILS NFR BLD: 0.3 % (ref 0–1.9)
BUN SERPL-MCNC: 24 MG/DL (ref 8–23)
CALCIUM SERPL-MCNC: 9.7 MG/DL (ref 8.7–10.5)
CHLORIDE SERPL-SCNC: 96 MMOL/L (ref 95–110)
CO2 SERPL-SCNC: 26 MMOL/L (ref 23–29)
CREAT SERPL-MCNC: 1.1 MG/DL (ref 0.5–1.4)
DIFFERENTIAL METHOD: ABNORMAL
EOSINOPHIL # BLD AUTO: 0.1 K/UL (ref 0–0.5)
EOSINOPHIL NFR BLD: 1.6 % (ref 0–8)
ERYTHROCYTE [DISTWIDTH] IN BLOOD BY AUTOMATED COUNT: 13.3 % (ref 11.5–14.5)
EST. GFR  (AFRICAN AMERICAN): 54.4 ML/MIN/1.73 M^2
EST. GFR  (NON AFRICAN AMERICAN): 47.2 ML/MIN/1.73 M^2
GLUCOSE SERPL-MCNC: 166 MG/DL (ref 70–110)
HCT VFR BLD AUTO: 40.7 % (ref 37–48.5)
HGB BLD-MCNC: 13.9 G/DL (ref 12–16)
IMM GRANULOCYTES # BLD AUTO: 0.05 K/UL (ref 0–0.04)
IMM GRANULOCYTES NFR BLD AUTO: 0.6 % (ref 0–0.5)
LYMPHOCYTES # BLD AUTO: 0.7 K/UL (ref 1–4.8)
LYMPHOCYTES NFR BLD: 8.1 % (ref 18–48)
MAGNESIUM SERPL-MCNC: 1.6 MG/DL (ref 1.6–2.6)
MCH RBC QN AUTO: 27.7 PG (ref 27–31)
MCHC RBC AUTO-ENTMCNC: 34.2 G/DL (ref 32–36)
MCV RBC AUTO: 81 FL (ref 82–98)
MONOCYTES # BLD AUTO: 0.7 K/UL (ref 0.3–1)
MONOCYTES NFR BLD: 8.1 % (ref 4–15)
NEUTROPHILS # BLD AUTO: 7.3 K/UL (ref 1.8–7.7)
NEUTROPHILS NFR BLD: 81.3 % (ref 38–73)
NRBC BLD-RTO: 0 /100 WBC
PLATELET # BLD AUTO: 241 K/UL (ref 150–350)
PMV BLD AUTO: 9.7 FL (ref 9.2–12.9)
POTASSIUM SERPL-SCNC: 2.6 MMOL/L (ref 3.5–5.1)
RBC # BLD AUTO: 5.02 M/UL (ref 4–5.4)
SODIUM SERPL-SCNC: 136 MMOL/L (ref 136–145)
T4 FREE SERPL-MCNC: 1.85 NG/DL (ref 0.71–1.51)
TSH SERPL DL<=0.005 MIU/L-ACNC: 0.97 UIU/ML (ref 0.34–5.6)
WBC # BLD AUTO: 9.01 K/UL (ref 3.9–12.7)

## 2019-09-23 PROCEDURE — 84439 ASSAY OF FREE THYROXINE: CPT

## 2019-09-23 PROCEDURE — 99214 PR OFFICE/OUTPT VISIT, EST, LEVL IV, 30-39 MIN: ICD-10-PCS | Mod: S$GLB,,, | Performed by: FAMILY MEDICINE

## 2019-09-23 PROCEDURE — 36592 COLLECT BLOOD FROM PICC: CPT

## 2019-09-23 PROCEDURE — 36415 COLL VENOUS BLD VENIPUNCTURE: CPT

## 2019-09-23 PROCEDURE — 85025 COMPLETE CBC W/AUTO DIFF WBC: CPT

## 2019-09-23 PROCEDURE — 99214 OFFICE O/P EST MOD 30 MIN: CPT | Mod: S$GLB,,, | Performed by: FAMILY MEDICINE

## 2019-09-23 PROCEDURE — 80048 BASIC METABOLIC PNL TOTAL CA: CPT

## 2019-09-23 PROCEDURE — 84443 ASSAY THYROID STIM HORMONE: CPT

## 2019-09-23 PROCEDURE — 83735 ASSAY OF MAGNESIUM: CPT

## 2019-09-23 RX ORDER — POTASSIUM CHLORIDE 3 G/15ML
40 SOLUTION ORAL DAILY
Qty: 100 ML | Refills: 0 | Status: SHIPPED | OUTPATIENT
Start: 2019-09-23 | End: 2019-09-24 | Stop reason: CLARIF

## 2019-09-23 NOTE — PATIENT INSTRUCTIONS
1. Stop maxzide and potassium  2. Check BP daily, record   3. If BP is >150/90 consistently, let us know

## 2019-09-23 NOTE — NURSING
1010 Pt arrived for lab draw from port. Pt sent from upstairs clinic. Lcw port accessed using sterile technique with 20 g 3/4 in lyles needle. 10 ml blood wasted and then 10ml blood drawn for lab. Pt tolerated well. DS

## 2019-09-23 NOTE — PROGRESS NOTES
"[facility] - Clinic Note    Subjective      Ms. Rodríguez is a 79 y.o. female who presents to clinic for Establish Care (Dry mouth) and Tingling (Body tingling and keeping patient up at night.)    Patient is new to me.  She comes to establish care.  She has a history of breast cancer twice in 1995 and 2013.  She has had bilateral mastectomies.  She also has a history of acquired hypothyroidism that was discovered in March of 2019.  She also has a history of hypertension and has had several medication changes over the last month.  She is currently taking amlodipine 5 mg b.i.d., Maxzide 37.5-25 daily, metoprolol.  She also has a history of factor 5 Leiden and she is on warfarin.  Her INR is therapeutic and it was checked on 09/16/2019.    Patient reports that for the last several months she has had issues with weakness, fatigue, a "vibration" felt in her body as almost like tingling.  She has had several medication changes for the last several months.  Her levothyroxine was dropped to 50 mcg because her TSH was low.  She has had issues with electrolytes also in the past and has had to have replacement.  She has had dry mouth for the last several months as well.  She also had issues with diarrhea but this has stopped over the last week.  Now she is having some issues of constipation.    [facility] Ursula has a past medical history of Anemia (1995), Breast cancer (1995), Breast cancer (2013), Factor V Leiden (1994), Hyperlipidemia (2017), Hypertension (2017), Hypothyroidism (2019), Osteoporosis, Recurrent urinary tract infection, and Vertigo (1994).   PSX Ursula has a past surgical history that includes Hemorrhoid surgery (1968); Hysterectomy (1970); Tumor removal (Left, 1994); Mastectomy (Right, 1994); and Mastectomy (Left, 2013).    Ursula's family history includes Cancer in her brother, maternal grandmother, and mother; Diabetes in her brother, brother, and father; Heart disease in her father and mother; " "Hypertension in her brother, brother, brother, and mother; Lung disease in her father; No Known Problems in her sister; Stroke in her brother and brother.   ANUJ Vergara reports that she has never smoked. She has never used smokeless tobacco. She reports that she does not drink alcohol or use drugs.   JAC Vergara is allergic to iodine and iodide containing products and ditropan [oxybutynin chloride].   JAYME Vergara has a current medication list which includes the following prescription(s): amlodipine, levothyroxine, metoprolol tartrate, multivitamin, potassium chloride, simvastatin, triamterene-hydrochlorothiazide 37.5-25 mg, and warfarin.     Review of Systems   Constitutional: Negative for chills and fever.   Respiratory: Negative for cough and shortness of breath.    Cardiovascular: Negative for chest pain.   Gastrointestinal: Positive for constipation. Negative for abdominal pain, blood in stool, diarrhea, nausea and vomiting.   Musculoskeletal: Negative for myalgias.   Neurological: Positive for weakness. Negative for headaches.     Objective     BP (!) 154/81 (BP Location: Left arm, Patient Position: Sitting, BP Method: Medium (Automatic))   Pulse 101   Temp 98 °F (36.7 °C) (Oral)   Resp 13   Ht 5' 2" (1.575 m)   Wt 61.4 kg (135 lb 6.4 oz)   SpO2 (!) 94%   BMI 24.76 kg/m²     Physical Exam   Constitutional: She appears well-developed and well-nourished. No distress.   HENT:   Mouth/Throat: Mucous membranes are dry.   Eyes: Right eye exhibits no discharge. Left eye exhibits no discharge.   Cardiovascular: Normal rate, regular rhythm and normal heart sounds.   Pulmonary/Chest: Effort normal and breath sounds normal. She has no wheezes. She has no rales.   Neurological: She is alert.   Skin: Skin is warm and dry.   Psychiatric: She has a normal mood and affect.   Nursing note and vitals reviewed.     Assessment/Plan     1. Encounter to establish care     2. Weakness  CBC auto differential    Basic metabolic panel "    Magnesium   3. Tingling  CBC auto differential    Basic metabolic panel    Magnesium   4. Acquired hypothyroidism  TSH    T4, free     Dry mucous membranes, weakness, tingling.  Could be related to electrolyte disturbance versus dehydration from diarrhea and diuretic use.  Blood pressure at home is reportedly in the 120s to 130s systolic.  Will stop Maxzide and have patient check blood pressures at home and report back.  Will also check CBC, BMP, magnesium.  Some symptoms may be related to thyroid dysfunction.  Will check TSH and free T4 to assess use of Synthroid.  Patient is currently taking 50 mcg.    Follow up in about 1 week (around 9/30/2019) for BP follow up.    Future Appointments   Date Time Provider Department Center   9/23/2019 12:45 PM [facility], LABORATORY [facility]   9/30/2019  8:00 AM [facility] OP [facility]UT[facility]   9/30/2019  9:40 AM Debra Sutton MD [facility]   10/7/2019  9:15 AM [facility], OP THERAPEUTICS [facility]   10/14/2019  8:00 AM [facility], OP THERAPEUTICS 10 [facility]   10/28/2019  8:00 AM [facility]   10/31/2019 10:15 AM STEFFI Garcia MD [facility] P HEMON [facility]   11/4/2019  9:15 AM [facility], OP THERAPEUTICS 10 [facility]   11/11/2019  8:00 [facility]   12/2/2019  9:15 [facility]       Vicente Sutton MD  Family Medicine [facility]

## 2019-09-23 NOTE — TELEPHONE ENCOUNTER
Patient's voice mail full.  Spoke with daughter who is emergency contact.  Potassium is low at 2.6.  Will replace with 40 mEq daily x4 days and recheck on Friday.  TSH has normalized.  Will continue levothyroxine for now.

## 2019-09-23 NOTE — TELEPHONE ENCOUNTER
----- Message from Michelle Bernal sent at 9/23/2019  2:16 PM CDT -----  Type: Needs Medical Advice    Who Called:  Patient  Best Call Back Number: [phone]  Additional Information: Patient stated [facility] does not have medication: potassium chloride 40 mEq/15 mL Liqd/needs advice/patient has been without for two weeks/please call back to advise.

## 2019-09-24 RX ORDER — POTASSIUM CHLORIDE 20 MEQ/1
40 TABLET, EXTENDED RELEASE ORAL DAILY
Qty: 8 TABLET | Refills: 0 | Status: SHIPPED | OUTPATIENT
Start: 2019-09-24 | End: 2019-09-28

## 2019-09-24 NOTE — TELEPHONE ENCOUNTER
Patient states she doen't know If I can do anything for her now and hung up.    Reason for Disposition   Caller hangs up   Caller hangs up    Additional Information   Negative: Caller is angry or rude (e.g., hangs up, verbally abusive, yelling)   Negative: Violent behavior, or threatening to physically hurt or kill someone   Negative: [1] Caller is very confused AND [2] no other adult (e.g., friend or family member) available   Negative: Sounds like a life-threatening emergency to the triager   Negative: Child abuse suspected   Negative: Suicide Concerns   Negative: Patient sounds very sick or weak to the triager   Negative: [1] [place] worried caller AND [2] second call within 4 hours about the same medical problem   Negative: [1] [place] worried caller AND [2] third call within 48 hours about the same medical problem   Negative: [1] [place] worried caller AND [2] can't be reassured by triager   Negative: [1] Caller demands to speak with the PCP AND [2] about sick adult (or sick caller)   Negative: [1] Angry or rude caller AND [2] doesn't respond to 5 minutes of triager counseling AND [3] sick adult (or caller)   Negative: [1] Caller mainly has complaints about past medical care, billing, etc AND [2] has mild symptoms or is well   Negative: Difficult caller responded to phone counseling   Negative: Caller is requesting health information that triager feels is unethical or illegal   Negative: [1] Caller continues to be verbally abusive AND [2] after triager sets BOUNDARIES AND [3] Triager ends call    Protocols used: NO CONTACT OR DUPLICATE CONTACT CALL-A-AH, DIFFICULT CALLER-A-AH

## 2019-09-24 NOTE — TELEPHONE ENCOUNTER
I spoke to the patient and informed her that her medication was changed from liquid to pills (potassium). She stated the she will go to the pharmacy to  her medication on Thursday.

## 2019-09-27 ENCOUNTER — LAB VISIT (OUTPATIENT)
Dept: LAB | Facility: HOSPITAL | Age: 79
End: 2019-09-27
Attending: FAMILY MEDICINE
Payer: MEDICARE

## 2019-09-27 ENCOUNTER — TELEPHONE (OUTPATIENT)
Dept: FAMILY MEDICINE | Facility: CLINIC | Age: 79
End: 2019-09-27

## 2019-09-27 ENCOUNTER — INFUSION (OUTPATIENT)
Dept: INFUSION THERAPY | Facility: HOSPITAL | Age: 79
End: 2019-09-27
Payer: MEDICARE

## 2019-09-27 DIAGNOSIS — E87.6 HYPOKALEMIA: ICD-10-CM

## 2019-09-27 DIAGNOSIS — Z51.81 ENCOUNTER FOR MONITORING COUMADIN THERAPY: Primary | ICD-10-CM

## 2019-09-27 DIAGNOSIS — Z79.899 HIGH RISK MEDICATIONS (NOT ANTICOAGULANTS) LONG-TERM USE: Primary | ICD-10-CM

## 2019-09-27 DIAGNOSIS — Z79.01 LONG TERM (CURRENT) USE OF ANTICOAGULANTS: ICD-10-CM

## 2019-09-27 DIAGNOSIS — Z85.3 HISTORY OF BILATERAL BREAST CANCER: ICD-10-CM

## 2019-09-27 DIAGNOSIS — Z79.01 ENCOUNTER FOR MONITORING COUMADIN THERAPY: Primary | ICD-10-CM

## 2019-09-27 DIAGNOSIS — Z79.01 LONG TERM CURRENT USE OF ANTICOAGULANT: ICD-10-CM

## 2019-09-27 LAB
INR PPP: 5 (ref 0.8–1.2)
POTASSIUM SERPL-SCNC: 3.6 MMOL/L (ref 3.5–5.1)
PROTHROMBIN TIME: 52 SEC (ref 9–12.5)

## 2019-09-27 PROCEDURE — 85610 PROTHROMBIN TIME: CPT

## 2019-09-27 PROCEDURE — 36592 COLLECT BLOOD FROM PICC: CPT

## 2019-09-27 PROCEDURE — 84132 ASSAY OF SERUM POTASSIUM: CPT

## 2019-09-27 PROCEDURE — 36415 COLL VENOUS BLD VENIPUNCTURE: CPT

## 2019-09-27 RX ORDER — SODIUM CHLORIDE 0.9 % (FLUSH) 0.9 %
10 SYRINGE (ML) INJECTION
Status: DISCONTINUED | OUTPATIENT
Start: 2019-09-27 | End: 2019-09-27 | Stop reason: HOSPADM

## 2019-09-27 RX ORDER — HEPARIN SODIUM,PORCINE/PF 10 UNIT/ML
10 SYRINGE (ML) INTRAVENOUS
Status: CANCELLED
Start: 2019-09-27

## 2019-09-27 RX ORDER — SODIUM CHLORIDE 0.9 % (FLUSH) 0.9 %
10 SYRINGE (ML) INJECTION
Status: CANCELLED | OUTPATIENT
Start: 2019-09-27

## 2019-09-27 NOTE — TELEPHONE ENCOUNTER
Patient notified of PT/INR results.   PT 52  INR 5    Per Dr. Sutton, patient is to hold Coumadin today (Friday 9/27/19) and tomorrow (Saturday 9/28/19)  Start Sunday 9/29/19 with regular dose of 2mg coumadin. She is to have PT/INR blood drawn again Monday 9/30/2019 at [facility] lab. Patient voiced understanding.

## 2019-09-27 NOTE — TELEPHONE ENCOUNTER
Patient stated she is not bleeding except a minor nose bleed. She stated her iron and RBC were low before. She stated she has bowel problems and dehydrated. She asked if she needed to do anything about her potassium level? Patient is concerned because of it being Friday and wants to make sure she is ok. Thank you

## 2019-09-27 NOTE — TELEPHONE ENCOUNTER
----- Message from Yousuf Babcock sent at 9/27/2019  1:40 PM CDT -----  Type:  Patient Returning Call    Who Called:  Patient   Who Left Message for Patient:  Yisel  Does the patient know what this is regarding?:  Test results  Best Call Back Number:  [phone]  Additional Information:

## 2019-09-30 ENCOUNTER — INFUSION (OUTPATIENT)
Dept: INFUSION THERAPY | Facility: HOSPITAL | Age: 79
End: 2019-09-30
Attending: INTERNAL MEDICINE
Payer: MEDICARE

## 2019-09-30 ENCOUNTER — LAB VISIT (OUTPATIENT)
Dept: LAB | Facility: HOSPITAL | Age: 79
End: 2019-09-30
Attending: NURSE PRACTITIONER
Payer: MEDICARE

## 2019-09-30 ENCOUNTER — OFFICE VISIT (OUTPATIENT)
Dept: FAMILY MEDICINE | Facility: CLINIC | Age: 79
End: 2019-09-30
Payer: MEDICARE

## 2019-09-30 VITALS
HEIGHT: 62 IN | HEART RATE: 100 BPM | DIASTOLIC BLOOD PRESSURE: 78 MMHG | RESPIRATION RATE: 16 BRPM | WEIGHT: 135.19 LBS | OXYGEN SATURATION: 96 % | SYSTOLIC BLOOD PRESSURE: 158 MMHG | BODY MASS INDEX: 24.88 KG/M2

## 2019-09-30 DIAGNOSIS — Z51.81 ENCOUNTER FOR MONITORING COUMADIN THERAPY: ICD-10-CM

## 2019-09-30 DIAGNOSIS — R53.1 WEAKNESS: ICD-10-CM

## 2019-09-30 DIAGNOSIS — R30.0 DYSURIA: ICD-10-CM

## 2019-09-30 DIAGNOSIS — Z12.11 SCREENING FOR COLON CANCER: ICD-10-CM

## 2019-09-30 DIAGNOSIS — R20.2 TINGLING: Primary | ICD-10-CM

## 2019-09-30 DIAGNOSIS — Z79.01 ENCOUNTER FOR MONITORING COUMADIN THERAPY: ICD-10-CM

## 2019-09-30 LAB
INR PPP: 2.1 (ref 0.8–1.2)
PROTHROMBIN TIME: 22.1 SEC (ref 9–12.5)

## 2019-09-30 PROCEDURE — 99214 OFFICE O/P EST MOD 30 MIN: CPT | Mod: S$GLB,,, | Performed by: FAMILY MEDICINE

## 2019-09-30 PROCEDURE — 36592 COLLECT BLOOD FROM PICC: CPT

## 2019-09-30 PROCEDURE — 99214 PR OFFICE/OUTPT VISIT, EST, LEVL IV, 30-39 MIN: ICD-10-PCS | Mod: S$GLB,,, | Performed by: FAMILY MEDICINE

## 2019-09-30 PROCEDURE — 36415 COLL VENOUS BLD VENIPUNCTURE: CPT

## 2019-09-30 PROCEDURE — 85610 PROTHROMBIN TIME: CPT

## 2019-09-30 NOTE — PROGRESS NOTES
[facility] - Clinic Note    Subjective      Ms. Rodríguez is a 79 y.o. female who presents to clinic for Hypertension    Patient presents for blood pressure follow-up.  She was seen in clinic 1 week ago.  She was having issues with electrolyte disturbance.  Her potassium was low.  Her Maxzide was stopped.  She reports that her blood pressure has been up and down since her last visit.  Nothing above 140s systolic but she has been having some issues with her heart rate going up.  She was supposed to be taking metoprolol but she has not started taking this.    A potassium recheck on Friday was within normal limits after taking potassium supplementation.  Her INR however was supratherapeutic at 5.  She held her Friday and Saturday doses and resumed her normal dose yesterday.  INR checked today is 2.1.    She also reports that she is feeling some dysuria as well as some of the tingling sensation from last week has returned.  This had resolved after her potassium supplementation.    [facility] Ursula has a past medical history of Anemia (1995), Breast cancer (1995), Breast cancer (2013), Factor V Leiden (1994), Hyperlipidemia (2017), Hypertension (2017), Hypothyroidism (2019), Osteoporosis, Recurrent urinary tract infection, and Vertigo (1994).   [facility] Ursula has a past surgical history that includes Hemorrhoid surgery (1968); Hysterectomy (1970); Tumor removal (Left, 1994); Mastectomy (Right, 1994); and Mastectomy (Left, 2013).    Ursula's family history includes Cancer in her brother, maternal grandmother, and mother; Diabetes in her brother, brother, and father; Heart disease in her father and mother; Hypertension in her brother, brother, brother, and mother; Lung disease in her father; No Known Problems in her sister; Stroke in her brother and brother.    Ursula reports that she has never smoked. She has never used smokeless tobacco. She reports that she does not drink alcohol or use drugs.   JAC Vergara is allergic  "to iodine and iodide containing products and ditropan [oxybutynin chloride].   JAYME Vergara has a current medication list which includes the following prescription(s): amlodipine, levothyroxine, multivitamin, simvastatin, triamterene-hydrochlorothiazide 37.5-25 mg, warfarin, and metoprolol tartrate.     Review of Systems   Constitutional: Negative for chills and fever.   Eyes: Negative for visual disturbance.   Cardiovascular: Negative for chest pain.   Genitourinary: Positive for dysuria.   Neurological: Negative for headaches.     Objective     BP (!) 158/78 (BP Location: Right arm, Patient Position: Sitting, BP Method: Large (Automatic))   Pulse 100   Resp 16   Ht 5' 2" (1.575 m)   Wt 61.3 kg (135 lb 3.2 oz)   SpO2 96%   BMI 24.73 kg/m²     Physical Exam   Constitutional: She appears well-developed and well-nourished. No distress.   Eyes: Right eye exhibits no discharge. Left eye exhibits no discharge.   Cardiovascular: Normal rate, regular rhythm and normal heart sounds.   Pulmonary/Chest: Effort normal and breath sounds normal. She has no wheezes. She has no rales.   Neurological: She is alert.   Skin: Skin is warm and dry.   Psychiatric: She has a normal mood and affect.   Nursing note and vitals reviewed.     Assessment/Plan     1. Tingling  Magnesium    CBC auto differential    Basic metabolic panel   2. Weakness  CBC auto differential    Basic metabolic panel   3. Screening for colon cancer  Ambulatory referral to General Surgery   4. Dysuria  Urinalysis, Reflex to Urine Culture Urine, Clean Catch    Urinalysis, Reflex to Urine Culture Urine, Clean Catch     1. Resume normal dosage of warfarin (3 mg MWF, 2 mg TuThSS)  2. Start metoprolol twice a day  3. Continue monitoring BP  4. Check INR on Friday (10/4)  5. Check urinalysis  6. Check potassium, magnesium, kidney function  7.  Referral placed for colon cancer screening    Follow up in about 2 weeks (around 10/14/2019) for Follow-up.    Future " Appointments   Date Time Provider Department Center   10/1/2019  9:50 AM [facility], LABORATORY [facility] LAB [facility]   10/7/2019  9:15 AM [facility], OP [facility]UTICS [facility]   10[facility]14/2019  [facility]   10/14/2019  9:00 AM Debra Sutton MD [facility]   10/28/2019  8:00 AM [facility], OP THERAPEUTICS 10 [facility]   10/31/2019 10:15 AM STEFFI Garcia MD [facility] P HEMON [facility]   11/4/2019  9:15 AM [facility], OP THERAPEUTICS 10 [facility]   11/11/2019  8:00 [facility]   12/2/2019  9:15 AM [facility], OP THERAPEUTICS 10 [facility]       Vicente Sutton MD  Family Medicine [facility]

## 2019-09-30 NOTE — PATIENT INSTRUCTIONS
1. Resume normal dosage of warfarin (3 mg MWF, 2 mg TuThSS)  2. Start metoprolol twice a day  3. Continue monitoring BP  4. Check INR on Friday (10/4)  5. Check urinalysis

## 2019-10-01 ENCOUNTER — LAB VISIT (OUTPATIENT)
Dept: LAB | Facility: HOSPITAL | Age: 79
End: 2019-10-01
Attending: FAMILY MEDICINE
Payer: MEDICARE

## 2019-10-01 ENCOUNTER — TELEPHONE (OUTPATIENT)
Dept: FAMILY MEDICINE | Facility: CLINIC | Age: 79
End: 2019-10-01

## 2019-10-01 ENCOUNTER — INFUSION (OUTPATIENT)
Dept: INFUSION THERAPY | Facility: HOSPITAL | Age: 79
End: 2019-10-01
Attending: INTERNAL MEDICINE
Payer: MEDICARE

## 2019-10-01 VITALS
SYSTOLIC BLOOD PRESSURE: 146 MMHG | HEART RATE: 70 BPM | RESPIRATION RATE: 16 BRPM | OXYGEN SATURATION: 100 % | DIASTOLIC BLOOD PRESSURE: 68 MMHG | TEMPERATURE: 97 F

## 2019-10-01 DIAGNOSIS — E87.6 HYPOKALEMIA: Primary | ICD-10-CM

## 2019-10-01 DIAGNOSIS — R20.2 TINGLING: ICD-10-CM

## 2019-10-01 DIAGNOSIS — R53.1 WEAKNESS: ICD-10-CM

## 2019-10-01 LAB
ANION GAP SERPL CALC-SCNC: 12 MMOL/L (ref 8–16)
BASOPHILS # BLD AUTO: 0.04 K/UL (ref 0–0.2)
BASOPHILS NFR BLD: 0.6 % (ref 0–1.9)
BUN SERPL-MCNC: 20 MG/DL (ref 8–23)
CALCIUM SERPL-MCNC: 9.4 MG/DL (ref 8.7–10.5)
CHLORIDE SERPL-SCNC: 95 MMOL/L (ref 95–110)
CO2 SERPL-SCNC: 25 MMOL/L (ref 23–29)
CREAT SERPL-MCNC: 1.3 MG/DL (ref 0.5–1.4)
DIFFERENTIAL METHOD: ABNORMAL
EOSINOPHIL # BLD AUTO: 0.3 K/UL (ref 0–0.5)
EOSINOPHIL NFR BLD: 4.9 % (ref 0–8)
ERYTHROCYTE [DISTWIDTH] IN BLOOD BY AUTOMATED COUNT: 13.3 % (ref 11.5–14.5)
EST. GFR  (AFRICAN AMERICAN): 44.5 ML/MIN/1.73 M^2
EST. GFR  (NON AFRICAN AMERICAN): 38.6 ML/MIN/1.73 M^2
GLUCOSE SERPL-MCNC: 125 MG/DL (ref 70–110)
HCT VFR BLD AUTO: 39.9 % (ref 37–48.5)
HGB BLD-MCNC: 13.4 G/DL (ref 12–16)
IMM GRANULOCYTES # BLD AUTO: 0.03 K/UL (ref 0–0.04)
IMM GRANULOCYTES NFR BLD AUTO: 0.5 % (ref 0–0.5)
LYMPHOCYTES # BLD AUTO: 0.8 K/UL (ref 1–4.8)
LYMPHOCYTES NFR BLD: 12.2 % (ref 18–48)
MAGNESIUM SERPL-MCNC: 1.5 MG/DL (ref 1.6–2.6)
MCH RBC QN AUTO: 27.2 PG (ref 27–31)
MCHC RBC AUTO-ENTMCNC: 33.6 G/DL (ref 32–36)
MCV RBC AUTO: 81 FL (ref 82–98)
MONOCYTES # BLD AUTO: 0.6 K/UL (ref 0.3–1)
MONOCYTES NFR BLD: 8.8 % (ref 4–15)
NEUTROPHILS # BLD AUTO: 4.7 K/UL (ref 1.8–7.7)
NEUTROPHILS NFR BLD: 73 % (ref 38–73)
NRBC BLD-RTO: 0 /100 WBC
PLATELET # BLD AUTO: 226 K/UL (ref 150–350)
PMV BLD AUTO: 9.2 FL (ref 9.2–12.9)
POTASSIUM SERPL-SCNC: 2.8 MMOL/L (ref 3.5–5.1)
RBC # BLD AUTO: 4.92 M/UL (ref 4–5.4)
SODIUM SERPL-SCNC: 132 MMOL/L (ref 136–145)
WBC # BLD AUTO: 6.49 K/UL (ref 3.9–12.7)

## 2019-10-01 PROCEDURE — 83735 ASSAY OF MAGNESIUM: CPT

## 2019-10-01 PROCEDURE — 85025 COMPLETE CBC W/AUTO DIFF WBC: CPT

## 2019-10-01 PROCEDURE — 80048 BASIC METABOLIC PNL TOTAL CA: CPT

## 2019-10-01 PROCEDURE — 36592 COLLECT BLOOD FROM PICC: CPT

## 2019-10-01 PROCEDURE — 36415 COLL VENOUS BLD VENIPUNCTURE: CPT

## 2019-10-01 RX ORDER — POTASSIUM CHLORIDE 20 MEQ/1
TABLET, EXTENDED RELEASE ORAL
Qty: 30 TABLET | Refills: 0 | Status: SHIPPED | OUTPATIENT
Start: 2019-10-01 | End: 2019-10-07 | Stop reason: SDUPTHER

## 2019-10-01 NOTE — NURSING
"0956 PT PRESENTED TO THE OPT DEPT VIA AMB FOR FASTING BLOOD WORK TO B E DRAWN FROM [facility] PORT/PT IS AAOx3/PLEASANT AND COOPERATIVE WITH STAFF. DBRN.  1032 PORT ACCESSED PER POLICY WITH 20G 1" MONSALVE NEEDLE/PORT FLUSHED WITH 10CC NS THEN A 10CC WASTE WAS REMOVED FROM PORT/DISCARDED/5CC BLOOD WITHDREW AND HANDED OFF TO BAKARI/PHLEBOTOMIST FROM LAB/LINE FLUSHED WITH 10CC NS THEN 5CC HEP FLUSH/LINE LOCKED AND MONSALVE NEEDLE WITHDREW AND DISCARDED/BANDAID PLACED OVER PORT. PT LUIS WELL. DBRN.  1033 PT LEFT THE OPT DEPT VIA AMB TO POV/HOME. DBRN  "

## 2019-10-01 NOTE — TELEPHONE ENCOUNTER
----- Message from Debra Sutton MD sent at 10/1/2019 12:37 PM CDT -----  Potassium is low again. Magnesium is also low. Replace potassium with 40 mEq x 4 days then continue 20 mEq daily. I will send this to the pharmacy. Will recheck on Monday with INR check. Also take OTC multivitamin that contains magnesium.

## 2019-10-07 ENCOUNTER — TELEPHONE (OUTPATIENT)
Dept: FAMILY MEDICINE | Facility: CLINIC | Age: 79
End: 2019-10-07

## 2019-10-07 ENCOUNTER — LAB VISIT (OUTPATIENT)
Dept: LAB | Facility: HOSPITAL | Age: 79
End: 2019-10-07
Attending: FAMILY MEDICINE
Payer: MEDICARE

## 2019-10-07 ENCOUNTER — INFUSION (OUTPATIENT)
Dept: INFUSION THERAPY | Facility: HOSPITAL | Age: 79
End: 2019-10-07
Payer: MEDICARE

## 2019-10-07 DIAGNOSIS — Z79.01 ENCOUNTER FOR MONITORING COUMADIN THERAPY: Primary | ICD-10-CM

## 2019-10-07 DIAGNOSIS — E87.6 HYPOKALEMIA: ICD-10-CM

## 2019-10-07 DIAGNOSIS — Z51.81 ENCOUNTER FOR MONITORING COUMADIN THERAPY: Primary | ICD-10-CM

## 2019-10-07 DIAGNOSIS — R30.0 DYSURIA: ICD-10-CM

## 2019-10-07 DIAGNOSIS — Z79.01 ENCOUNTER FOR MONITORING COUMADIN THERAPY: ICD-10-CM

## 2019-10-07 DIAGNOSIS — Z51.81 ENCOUNTER FOR MONITORING COUMADIN THERAPY: ICD-10-CM

## 2019-10-07 DIAGNOSIS — R30.0 DYSURIA: Primary | ICD-10-CM

## 2019-10-07 LAB
BILIRUB UR QL STRIP: NEGATIVE
CLARITY UR: ABNORMAL
COLOR UR: YELLOW
GLUCOSE UR QL STRIP: NEGATIVE
HGB UR QL STRIP: NEGATIVE
INR PPP: 4.1 (ref 0.8–1.2)
KETONES UR QL STRIP: NEGATIVE
LEUKOCYTE ESTERASE UR QL STRIP: ABNORMAL
MICROSCOPIC COMMENT: NORMAL
NITRITE UR QL STRIP: NEGATIVE
PH UR STRIP: 7 [PH] (ref 5–8)
POTASSIUM SERPL-SCNC: 3.3 MMOL/L (ref 3.5–5.1)
PROT UR QL STRIP: NEGATIVE
PROTHROMBIN TIME: 42.8 SEC (ref 9–12.5)
SP GR UR STRIP: 1.01 (ref 1–1.03)
SQUAMOUS #/AREA URNS HPF: 2 /HPF
URN SPEC COLLECT METH UR: ABNORMAL
UROBILINOGEN UR STRIP-ACNC: NEGATIVE EU/DL
WBC #/AREA URNS HPF: 3 /HPF (ref 0–5)

## 2019-10-07 PROCEDURE — 84132 ASSAY OF SERUM POTASSIUM: CPT

## 2019-10-07 PROCEDURE — 36592 COLLECT BLOOD FROM PICC: CPT

## 2019-10-07 PROCEDURE — 85610 PROTHROMBIN TIME: CPT

## 2019-10-07 PROCEDURE — 36415 COLL VENOUS BLD VENIPUNCTURE: CPT

## 2019-10-07 PROCEDURE — 81000 URINALYSIS NONAUTO W/SCOPE: CPT

## 2019-10-07 RX ORDER — POTASSIUM CHLORIDE 20 MEQ/1
TABLET, EXTENDED RELEASE ORAL
Qty: 30 TABLET | Refills: 0 | Status: SHIPPED | OUTPATIENT
Start: 2019-10-07 | End: 2019-11-14

## 2019-10-07 NOTE — TELEPHONE ENCOUNTER
----- Message from Debra Sutton MD sent at 10/7/2019  1:15 PM CDT -----  Urinalysis within normal limits.

## 2019-10-07 NOTE — PROGRESS NOTES
Pt in clinic for blood draw from port to check pt/inr and potassium. Port site unremarkable. Sterile technique maintained. Pt tolerated well. Pt has no further questions or concerns at this time. shannan

## 2019-10-07 NOTE — TELEPHONE ENCOUNTER
----- Message from Debra Sutton MD sent at 10/7/2019  1:14 PM CDT -----  INR still elevated. Hold today's dose. Restart tomorrow.  New schedule: Sunday 2mg, Monday 2mg, Tues 2mg, Wednesday 2mg, Thurs 2mg, Friday 3mg, Saturday 2 mg.  Continue potassium 20 meq daily.  Recheck INR on Friday.  Will discuss at next visit

## 2019-10-11 ENCOUNTER — INFUSION (OUTPATIENT)
Dept: INFUSION THERAPY | Facility: HOSPITAL | Age: 79
End: 2019-10-11
Attending: INTERNAL MEDICINE
Payer: MEDICARE

## 2019-10-11 ENCOUNTER — LAB VISIT (OUTPATIENT)
Dept: LAB | Facility: HOSPITAL | Age: 79
End: 2019-10-11
Attending: FAMILY MEDICINE
Payer: MEDICARE

## 2019-10-11 DIAGNOSIS — Z79.01 ENCOUNTER FOR MONITORING COUMADIN THERAPY: ICD-10-CM

## 2019-10-11 DIAGNOSIS — Z51.81 ENCOUNTER FOR MONITORING COUMADIN THERAPY: ICD-10-CM

## 2019-10-11 LAB
INR PPP: 2.1 (ref 0.8–1.2)
PROTHROMBIN TIME: 22.5 SEC (ref 9–12.5)

## 2019-10-11 PROCEDURE — 36415 COLL VENOUS BLD VENIPUNCTURE: CPT

## 2019-10-11 PROCEDURE — 85610 PROTHROMBIN TIME: CPT

## 2019-10-11 NOTE — NURSING
0815 Pt in opt for lab draw for pt/inr per dr. Oliveros. Pt reports Dr. Garcia instructed her not to have blood drawn from any veins in either arms. Left chest wall port accessed maintaining sterile technique with 20 g lyles. Blood return noted. 10 ml saline flushed followed by 10 ml blood wasted. 6 ml blood drawn for pt/inr. 10 ml pulsatile saline flush performed after lab draw. Lyles needle removed and intact. Bandaid placed over site. Pt tolerated well. Site unremarkable. Mask, gown, gloves, cap removed.

## 2019-10-14 ENCOUNTER — OFFICE VISIT (OUTPATIENT)
Dept: FAMILY MEDICINE | Facility: CLINIC | Age: 79
End: 2019-10-14
Payer: MEDICARE

## 2019-10-14 ENCOUNTER — INFUSION (OUTPATIENT)
Dept: INFUSION THERAPY | Facility: HOSPITAL | Age: 79
End: 2019-10-14
Attending: INTERNAL MEDICINE
Payer: MEDICARE

## 2019-10-14 ENCOUNTER — LAB VISIT (OUTPATIENT)
Dept: LAB | Facility: HOSPITAL | Age: 79
End: 2019-10-14
Attending: FAMILY MEDICINE
Payer: MEDICARE

## 2019-10-14 ENCOUNTER — TELEPHONE (OUTPATIENT)
Dept: FAMILY MEDICINE | Facility: CLINIC | Age: 79
End: 2019-10-14

## 2019-10-14 VITALS
HEART RATE: 93 BPM | OXYGEN SATURATION: 99 % | RESPIRATION RATE: 13 BRPM | DIASTOLIC BLOOD PRESSURE: 72 MMHG | WEIGHT: 136.63 LBS | HEIGHT: 62 IN | TEMPERATURE: 99 F | BODY MASS INDEX: 25.14 KG/M2 | SYSTOLIC BLOOD PRESSURE: 149 MMHG

## 2019-10-14 DIAGNOSIS — Z51.81 ENCOUNTER FOR MONITORING COUMADIN THERAPY: ICD-10-CM

## 2019-10-14 DIAGNOSIS — R20.2 TINGLING: ICD-10-CM

## 2019-10-14 DIAGNOSIS — E03.9 ACQUIRED HYPOTHYROIDISM: ICD-10-CM

## 2019-10-14 DIAGNOSIS — I10 ESSENTIAL HYPERTENSION: Primary | ICD-10-CM

## 2019-10-14 DIAGNOSIS — Z79.01 ENCOUNTER FOR MONITORING COUMADIN THERAPY: ICD-10-CM

## 2019-10-14 LAB
ANION GAP SERPL CALC-SCNC: 12 MMOL/L (ref 8–16)
BUN SERPL-MCNC: 21 MG/DL (ref 8–23)
CALCIUM SERPL-MCNC: 10 MG/DL (ref 8.7–10.5)
CHLORIDE SERPL-SCNC: 101 MMOL/L (ref 95–110)
CO2 SERPL-SCNC: 26 MMOL/L (ref 23–29)
CREAT SERPL-MCNC: 1.2 MG/DL (ref 0.5–1.4)
EST. GFR  (AFRICAN AMERICAN): 49 ML/MIN/1.73 M^2
EST. GFR  (NON AFRICAN AMERICAN): 42.5 ML/MIN/1.73 M^2
GLUCOSE SERPL-MCNC: 127 MG/DL (ref 70–110)
INR PPP: 3.3 (ref 0.8–1.2)
POTASSIUM SERPL-SCNC: 3.1 MMOL/L (ref 3.5–5.1)
PROTHROMBIN TIME: 35 SEC (ref 9–12.5)
SODIUM SERPL-SCNC: 139 MMOL/L (ref 136–145)
T4 FREE SERPL-MCNC: 1.42 NG/DL (ref 0.71–1.51)
TSH SERPL DL<=0.005 MIU/L-ACNC: 0.92 UIU/ML (ref 0.34–5.6)

## 2019-10-14 PROCEDURE — 85610 PROTHROMBIN TIME: CPT

## 2019-10-14 PROCEDURE — 84443 ASSAY THYROID STIM HORMONE: CPT

## 2019-10-14 PROCEDURE — 99214 PR OFFICE/OUTPT VISIT, EST, LEVL IV, 30-39 MIN: ICD-10-PCS | Mod: S$GLB,,, | Performed by: FAMILY MEDICINE

## 2019-10-14 PROCEDURE — 80048 BASIC METABOLIC PNL TOTAL CA: CPT

## 2019-10-14 PROCEDURE — 99214 OFFICE O/P EST MOD 30 MIN: CPT | Mod: S$GLB,,, | Performed by: FAMILY MEDICINE

## 2019-10-14 PROCEDURE — 36415 COLL VENOUS BLD VENIPUNCTURE: CPT

## 2019-10-14 PROCEDURE — 84439 ASSAY OF FREE THYROXINE: CPT

## 2019-10-14 RX ORDER — SPIRONOLACTONE 25 MG/1
12.5 TABLET ORAL DAILY
Qty: 30 TABLET | Refills: 2 | Status: SHIPPED | OUTPATIENT
Start: 2019-10-14 | End: 2020-03-04

## 2019-10-14 RX ORDER — LEVOTHYROXINE SODIUM 100 UG/1
50 CAPSULE ORAL DAILY
Qty: 30 CAPSULE | Refills: 3 | Status: CANCELLED | OUTPATIENT
Start: 2019-10-14

## 2019-10-14 RX ORDER — LEVOTHYROXINE SODIUM 100 UG/1
50 CAPSULE ORAL DAILY
Qty: 30 CAPSULE | Refills: 3 | Status: SHIPPED | OUTPATIENT
Start: 2019-10-14 | End: 2019-10-14 | Stop reason: DRUGHIGH

## 2019-10-14 RX ORDER — LEVOTHYROXINE SODIUM 50 UG/1
50 TABLET ORAL DAILY
Qty: 90 TABLET | Refills: 3 | Status: SHIPPED | OUTPATIENT
Start: 2019-10-14 | End: 2020-10-14 | Stop reason: SDUPTHER

## 2019-10-14 NOTE — TELEPHONE ENCOUNTER
----- Message from Vicente Sutton MD sent at 10/14/2019 12:05 PM CDT -----  Your thyroid function is within normal limits which is great. Continue the current dose of levothyroxine (50 mcg) daily. Your potassium is still low so i'm going to change your medication. Stop taking the potassium supplement and the triamterene-hydrochlorothiazide. We will start spironolactone 12.5 mg daily which should help with the potassium and blood pressure. The INR was also a little elevated at 3.3. We will change the dose schedule to 2mg every day. Recheck INR next Monday unless there is any bleeding then come in or go to the ED. These orders have been placed.

## 2019-10-14 NOTE — TELEPHONE ENCOUNTER
Notified patient of lab results and recommendations. Patient voiced understanding.    Patient stated she will need a refill on Synthroid.   ERROR- note was signed on accident

## 2019-10-14 NOTE — NURSING
0957 Pt in opt for blood draw from port. Pt reports PCP placed several new labs to be drawn today. Pt placed in infusion chair 11 and orders verified. shannan

## 2019-10-14 NOTE — PROGRESS NOTES
[facility] - Clinic Note    Subjective      Ms. Rodríguez is a 79 y.o. female who presents to clinic for Follow-up (HTN); Fatigue; Insomnia; Muscle Pain (Cramping); Anxiety; Other (Swelling near left elbow); Immunizations (Flu shot in office today; Shingles, Pneumo, and Tetanus to be done at patient's pharmacy.); and Dehydration    Patient presents for follow-up.  Her blood pressure has been running in the 130s to 150s over 80s.  She denies any headaches, blurry vision, chest pain. She has been taking metoprolol 25 mg twice a day, amlodipine 5 mg twice a day, triamterene-hydrochlorothiazide 37.5-25 daily.  She has had some issues with her INR for the last couple weeks.  She was started on levothyroxine a few months ago.  She has been taking 50 mcg of levothyroxine daily.  She has had some issues tingling and hypokalemia as well.  She also reports some swelling above her right elbow.  She has a history of bilateral mastectomy.  It is not bothering her but she was just wondering what it was.    [facility] Ursula has a past medical history of Anemia (1995), Breast cancer (1995), Breast cancer (2013), Factor V Leiden (1994), Hyperlipidemia (2017), Hypertension (2017), Hypothyroidism (2019), Osteoporosis, Recurrent urinary tract infection, and Vertigo (1994).   X Ursula has a past surgical history that includes Hemorrhoid surgery (1968); Hysterectomy (1970); Tumor removal (Left, 1994); Mastectomy (Right, 1994); and Mastectomy (Left, 2013).    Ursula's family history includes Cancer in her brother, maternal grandmother, and mother; Diabetes in her brother, brother, and father; Heart disease in her father and mother; Hypertension in her brother, brother, brother, and mother; Lung disease in her father; No Known Problems in her sister; Stroke in her brother and brother.    Ursula reports that she has never smoked. She has never used smokeless tobacco. She reports that she does not drink alcohol or use drugs.   ALG  "Ursula is allergic to iodine and iodide containing products and ditropan [oxybutynin chloride].   JAYME Vergara has a current medication list which includes the following prescription(s): amlodipine, levothyroxine, metoprolol tartrate, multivitamin, potassium chloride sa, simvastatin, triamterene-hydrochlorothiazide 37.5-25 mg, and warfarin.     Review of Systems   Constitutional: Negative for chills and fever.   Eyes: Negative for visual disturbance.   Cardiovascular: Negative for chest pain.   Neurological: Negative for headaches.     Objective     BP (!) 149/72 (BP Location: Left arm, Patient Position: Sitting, BP Method: Medium (Automatic))   Pulse 93   Temp 98.6 °F (37 °C) (Oral)   Resp 13   Ht 5' 2" (1.575 m)   Wt 62 kg (136 lb 9.6 oz)   SpO2 99%   BMI 24.98 kg/m²     Physical Exam   Constitutional: She appears well-developed and well-nourished. No distress.   HENT:   Right Ear: External ear normal.   Left Ear: External ear normal.   Eyes: Right eye exhibits no discharge. Left eye exhibits no discharge.   Neck: Neck supple. No thyromegaly present.   Cardiovascular: Normal rate, regular rhythm and normal heart sounds.   Pulmonary/Chest: Effort normal and breath sounds normal. She has no wheezes. She has no rales.   Neurological: She is alert.   Skin: Skin is warm and dry.   Psychiatric: She has a normal mood and affect.   Nursing note and vitals reviewed.     Assessment/Plan     1. Essential hypertension     2. Acquired hypothyroidism  TSH    T4, free   3. Tingling  Basic metabolic panel   4. Encounter for monitoring Coumadin therapy  Protime-INR     For control of blood pressure.  Continue medications for now.  Will check TSH and T4 for history of hypothyroidism.  Will also check BMP given history of hyperkalemia and tingling.  Will check INR to assess for control.  Swelling is likely secondary to lymphedema.  Will continue to monitor.  Use compression sleeve if tolerated.  Otherwise keep elevated.    Follow " up in about 4 weeks (around 11/11/2019) for Follow-up.    Future Appointments   Date Time Provider Department Center   10/14/2019 10:05 AM [facility], OP THERAPEUTICS 3 [facility]   10/14/2019  1:45 PM [facility], LABORATORY [facility] LAB [facility]   10/28/2019  8:00 AM [facility], OP [facility]UTICS [facility]   11/4/2019  9:15 AM [facility], OP [facility]APEUTICS 10 [facility]   11/11/2019  8:00 AM [facility], OP THERAPEUTICS 10 [facility]   11/13/2019  8:30 AM Carl Conrad MD [facility]   11/14/2019  8:20 AM Vicente Sutton MD [facility] FAMMED [facility]   12/2/2019  9:15 AM [facility], OP THERAPEUTICS 10 [facility]       Vicente Sutton MD  Family Medicine  [facility]

## 2019-10-14 NOTE — TELEPHONE ENCOUNTER
Notified patient of lab results and recommendations. Patient voiced understanding.     Patient stated she will need a refill on Synthroid.

## 2019-10-21 ENCOUNTER — INFUSION (OUTPATIENT)
Dept: INFUSION THERAPY | Facility: HOSPITAL | Age: 79
End: 2019-10-21
Attending: INTERNAL MEDICINE
Payer: MEDICARE

## 2019-10-21 ENCOUNTER — TELEPHONE (OUTPATIENT)
Dept: FAMILY MEDICINE | Facility: CLINIC | Age: 79
End: 2019-10-21

## 2019-10-21 DIAGNOSIS — Z85.3 HISTORY OF BILATERAL BREAST CANCER: Primary | ICD-10-CM

## 2019-10-21 DIAGNOSIS — Z79.01 ENCOUNTER FOR MONITORING COUMADIN THERAPY: Primary | ICD-10-CM

## 2019-10-21 DIAGNOSIS — Z51.81 ENCOUNTER FOR MONITORING COUMADIN THERAPY: Primary | ICD-10-CM

## 2019-10-21 DIAGNOSIS — R20.2 TINGLING: ICD-10-CM

## 2019-10-21 DIAGNOSIS — Z79.01 ENCOUNTER FOR MONITORING COUMADIN THERAPY: ICD-10-CM

## 2019-10-21 DIAGNOSIS — Z51.81 ENCOUNTER FOR MONITORING COUMADIN THERAPY: ICD-10-CM

## 2019-10-21 LAB
ANION GAP SERPL CALC-SCNC: 13 MMOL/L (ref 8–16)
BUN SERPL-MCNC: 17 MG/DL (ref 8–23)
CALCIUM SERPL-MCNC: 9.6 MG/DL (ref 8.7–10.5)
CHLORIDE SERPL-SCNC: 104 MMOL/L (ref 95–110)
CO2 SERPL-SCNC: 23 MMOL/L (ref 23–29)
CREAT SERPL-MCNC: 1.1 MG/DL (ref 0.5–1.4)
EST. GFR  (AFRICAN AMERICAN): 54.4 ML/MIN/1.73 M^2
EST. GFR  (NON AFRICAN AMERICAN): 47.2 ML/MIN/1.73 M^2
GLUCOSE SERPL-MCNC: 128 MG/DL (ref 70–110)
INR PPP: 3 (ref 0.8–1.2)
POTASSIUM SERPL-SCNC: 3.7 MMOL/L (ref 3.5–5.1)
PROTHROMBIN TIME: 31.8 SEC (ref 9–12.5)
SODIUM SERPL-SCNC: 140 MMOL/L (ref 136–145)

## 2019-10-21 PROCEDURE — 36592 COLLECT BLOOD FROM PICC: CPT

## 2019-10-21 PROCEDURE — 25000003 PHARM REV CODE 250: Performed by: INTERNAL MEDICINE

## 2019-10-21 PROCEDURE — 80048 BASIC METABOLIC PNL TOTAL CA: CPT

## 2019-10-21 PROCEDURE — 85610 PROTHROMBIN TIME: CPT

## 2019-10-21 PROCEDURE — A4216 STERILE WATER/SALINE, 10 ML: HCPCS | Performed by: INTERNAL MEDICINE

## 2019-10-21 RX ORDER — HEPARIN SODIUM,PORCINE/PF 10 UNIT/ML
10 SYRINGE (ML) INTRAVENOUS
Status: DISCONTINUED | OUTPATIENT
Start: 2019-10-21 | End: 2019-10-21 | Stop reason: HOSPADM

## 2019-10-21 RX ORDER — SODIUM CHLORIDE 0.9 % (FLUSH) 0.9 %
10 SYRINGE (ML) INJECTION
Status: COMPLETED | OUTPATIENT
Start: 2019-10-21 | End: 2019-10-21

## 2019-10-21 RX ADMIN — Medication 10 ML: at 08:10

## 2019-10-21 NOTE — TELEPHONE ENCOUNTER
Pt. Notified of her result, [facility] filling her norvasc and waiting for Dr Dunlap to sign warfarin orders.

## 2019-10-28 RX ORDER — WARFARIN 1 MG/1
2 TABLET ORAL DAILY
Qty: 180 TABLET | Refills: 0 | Status: SHIPPED | OUTPATIENT
Start: 2019-10-28 | End: 2020-02-10

## 2019-10-31 ENCOUNTER — PATIENT OUTREACH (OUTPATIENT)
Dept: ADMINISTRATIVE | Facility: HOSPITAL | Age: 79
End: 2019-10-31

## 2019-11-04 ENCOUNTER — LAB VISIT (OUTPATIENT)
Dept: LAB | Facility: HOSPITAL | Age: 79
End: 2019-11-04
Attending: FAMILY MEDICINE
Payer: MEDICARE

## 2019-11-04 ENCOUNTER — INFUSION (OUTPATIENT)
Dept: INFUSION THERAPY | Facility: HOSPITAL | Age: 79
End: 2019-11-04
Payer: MEDICARE

## 2019-11-04 DIAGNOSIS — Z79.01 ENCOUNTER FOR MONITORING COUMADIN THERAPY: ICD-10-CM

## 2019-11-04 DIAGNOSIS — Z85.3 HISTORY OF BILATERAL BREAST CANCER: Primary | ICD-10-CM

## 2019-11-04 DIAGNOSIS — Z51.81 ENCOUNTER FOR MONITORING COUMADIN THERAPY: ICD-10-CM

## 2019-11-04 LAB
INR PPP: 3.5 (ref 0.8–1.2)
PROTHROMBIN TIME: 36.7 SEC (ref 9–12.5)

## 2019-11-04 PROCEDURE — 85610 PROTHROMBIN TIME: CPT

## 2019-11-04 PROCEDURE — 36415 COLL VENOUS BLD VENIPUNCTURE: CPT

## 2019-11-04 PROCEDURE — A4216 STERILE WATER/SALINE, 10 ML: HCPCS | Performed by: INTERNAL MEDICINE

## 2019-11-04 PROCEDURE — 36591 DRAW BLOOD OFF VENOUS DEVICE: CPT

## 2019-11-04 PROCEDURE — 25000003 PHARM REV CODE 250: Performed by: INTERNAL MEDICINE

## 2019-11-04 PROCEDURE — 63600175 PHARM REV CODE 636 W HCPCS: Performed by: INTERNAL MEDICINE

## 2019-11-04 RX ORDER — SODIUM CHLORIDE 0.9 % (FLUSH) 0.9 %
10 SYRINGE (ML) INJECTION
Status: CANCELLED | OUTPATIENT
Start: 2019-11-04

## 2019-11-04 RX ORDER — HEPARIN SODIUM,PORCINE/PF 10 UNIT/ML
10 SYRINGE (ML) INTRAVENOUS
Status: CANCELLED
Start: 2019-11-04

## 2019-11-04 RX ORDER — HEPARIN SODIUM,PORCINE/PF 10 UNIT/ML
10 SYRINGE (ML) INTRAVENOUS
Status: DISCONTINUED | OUTPATIENT
Start: 2019-11-04 | End: 2019-11-04 | Stop reason: HOSPADM

## 2019-11-04 RX ORDER — SODIUM CHLORIDE 0.9 % (FLUSH) 0.9 %
10 SYRINGE (ML) INJECTION
Status: COMPLETED | OUTPATIENT
Start: 2019-11-04 | End: 2019-11-04

## 2019-11-04 RX ADMIN — Medication 10 ML: at 08:11

## 2019-11-04 RX ADMIN — HEPARIN, PORCINE (PF) 10 UNIT/ML INTRAVENOUS SYRINGE 10 UNITS: at 08:11

## 2019-11-04 NOTE — PLAN OF CARE
Problem: Adult Inpatient Plan of Care  Goal: Plan of Care Review  Outcome: Ongoing, Progressing     Patient here today for port flush and lab draw. No complaints from patient. Port accessed without difficulty. Labs drawn. Flushed with heparin and deaccessed.

## 2019-11-05 ENCOUNTER — PATIENT MESSAGE (OUTPATIENT)
Dept: FAMILY MEDICINE | Facility: CLINIC | Age: 79
End: 2019-11-05

## 2019-11-05 DIAGNOSIS — Z51.81 ENCOUNTER FOR MONITORING COUMADIN THERAPY: Primary | ICD-10-CM

## 2019-11-05 DIAGNOSIS — Z79.01 ENCOUNTER FOR MONITORING COUMADIN THERAPY: Primary | ICD-10-CM

## 2019-11-08 ENCOUNTER — TELEPHONE (OUTPATIENT)
Dept: FAMILY MEDICINE | Facility: CLINIC | Age: 79
End: 2019-11-08

## 2019-11-08 DIAGNOSIS — Z51.81 ENCOUNTER FOR MONITORING COUMADIN THERAPY: Primary | ICD-10-CM

## 2019-11-08 DIAGNOSIS — Z79.01 ENCOUNTER FOR MONITORING COUMADIN THERAPY: Primary | ICD-10-CM

## 2019-11-08 NOTE — TELEPHONE ENCOUNTER
----- Message from Flavia Dsouza RN sent at 11/8/2019  8:01 AM CST -----  Good morning. Pt has appt at [facility] outpatient therapeutics for 11/11 for lab draw for PT/INR. Pt reports she typically has CBC monthly as well; however, there do not seem to be orders for CBC.

## 2019-11-08 NOTE — TELEPHONE ENCOUNTER
Informed patient lab was ordered and linked to other orders so that all would be drawn at same time. Verbalized understanding, no other questions or concerns at this time.

## 2019-11-11 ENCOUNTER — INFUSION (OUTPATIENT)
Dept: INFUSION THERAPY | Facility: HOSPITAL | Age: 79
End: 2019-11-11
Payer: MEDICARE

## 2019-11-11 VITALS — HEART RATE: 70 BPM | SYSTOLIC BLOOD PRESSURE: 150 MMHG | DIASTOLIC BLOOD PRESSURE: 65 MMHG | TEMPERATURE: 96 F

## 2019-11-11 DIAGNOSIS — Z79.01 LONG TERM (CURRENT) USE OF ANTICOAGULANTS: ICD-10-CM

## 2019-11-11 DIAGNOSIS — Z85.3 HISTORY OF BILATERAL BREAST CANCER: Primary | ICD-10-CM

## 2019-11-11 LAB
INR PPP: 2.3 (ref 0.8–1.2)
PROTHROMBIN TIME: 24.6 SEC (ref 9–12.5)

## 2019-11-11 PROCEDURE — 63600175 PHARM REV CODE 636 W HCPCS: Performed by: INTERNAL MEDICINE

## 2019-11-11 PROCEDURE — A4216 STERILE WATER/SALINE, 10 ML: HCPCS | Performed by: INTERNAL MEDICINE

## 2019-11-11 PROCEDURE — 25000003 PHARM REV CODE 250: Performed by: INTERNAL MEDICINE

## 2019-11-11 PROCEDURE — 85610 PROTHROMBIN TIME: CPT

## 2019-11-11 PROCEDURE — 96523 IRRIG DRUG DELIVERY DEVICE: CPT

## 2019-11-11 RX ORDER — HEPARIN 100 UNIT/ML
500 SYRINGE INTRAVENOUS
Status: CANCELLED | OUTPATIENT
Start: 2019-11-11

## 2019-11-11 RX ORDER — SODIUM CHLORIDE 0.9 % (FLUSH) 0.9 %
10 SYRINGE (ML) INJECTION
Status: COMPLETED | OUTPATIENT
Start: 2019-11-11 | End: 2019-11-11

## 2019-11-11 RX ORDER — SODIUM CHLORIDE 0.9 % (FLUSH) 0.9 %
10 SYRINGE (ML) INJECTION
Status: CANCELLED | OUTPATIENT
Start: 2019-11-11

## 2019-11-11 RX ORDER — HEPARIN SODIUM,PORCINE/PF 10 UNIT/ML
10 SYRINGE (ML) INTRAVENOUS
Status: DISCONTINUED | OUTPATIENT
Start: 2019-11-11 | End: 2019-11-11 | Stop reason: HOSPADM

## 2019-11-11 RX ADMIN — Medication 10 ML: at 08:11

## 2019-11-11 RX ADMIN — HEPARIN, PORCINE (PF) 10 UNIT/ML INTRAVENOUS SYRINGE 10 UNITS: at 08:11

## 2019-11-11 NOTE — PLAN OF CARE
Problem: Adult Inpatient Plan of Care  Goal: Plan of Care Review  Outcome: Ongoing, Progressing    BP (!) 150/65 (Patient Position: Sitting)   Pulse 70   Temp 96.4 °F (35.8 °C)   Pt here today for port flush and labs. VSS. Port accessed without difficulty. Patent with blood return. Labs drawn. Port hep-locked and deaccessed. Ambulates without difficulty.

## 2019-11-12 ENCOUNTER — TELEPHONE (OUTPATIENT)
Dept: HEMATOLOGY/ONCOLOGY | Facility: CLINIC | Age: 79
End: 2019-11-12

## 2019-11-13 ENCOUNTER — OFFICE VISIT (OUTPATIENT)
Dept: SURGERY | Facility: CLINIC | Age: 79
End: 2019-11-13
Payer: MEDICARE

## 2019-11-13 VITALS
HEIGHT: 62 IN | RESPIRATION RATE: 12 BRPM | OXYGEN SATURATION: 96 % | DIASTOLIC BLOOD PRESSURE: 72 MMHG | SYSTOLIC BLOOD PRESSURE: 140 MMHG | BODY MASS INDEX: 25.13 KG/M2 | HEART RATE: 78 BPM | TEMPERATURE: 98 F | WEIGHT: 136.56 LBS

## 2019-11-13 DIAGNOSIS — Z01.818 PRE-OP TESTING: ICD-10-CM

## 2019-11-13 DIAGNOSIS — Z85.3 HISTORY OF BREAST CANCER: ICD-10-CM

## 2019-11-13 DIAGNOSIS — Z80.0 FAMILY HISTORY OF COLON CANCER: Primary | ICD-10-CM

## 2019-11-13 PROCEDURE — 99203 PR OFFICE/OUTPT VISIT, NEW, LEVL III, 30-44 MIN: ICD-10-PCS | Mod: S$GLB,,, | Performed by: SURGERY

## 2019-11-13 PROCEDURE — 99203 OFFICE O/P NEW LOW 30 MIN: CPT | Mod: S$GLB,,, | Performed by: SURGERY

## 2019-11-13 NOTE — PROGRESS NOTES
[facility] H&P Note    Subjective:       Patient ID: Ursula Rodríguez is a 79 y.o. female.    Chief Complaint: Consult (Referral- [facility]- Screening Colonoscopy)    HPI:  Ursula Rodríguez is a 79 y.o. female with history of anemia, breast cancer bilateral status post mastectomies, factor 5 Leiden currently on anticoagulants, hyperlipidemia, hypertension, hypothyroidism presents today as a new patient referral from Vicente Sutton MD for evaluation of family history of colon cancer and a personal history of breast cancer.  Patient stated that she had her last colonoscopy in 2009.  Colonoscopy report reveals no evidence of colon polyps or cancers.  Patient does have a significant family history of colon cancers in a brother as well as a daughter.  Patient has not seen any blood in the stool.  No changes in bowel habits.  No unexplained weight loss.  Patient was told previously that she need a colonoscopy back in the mid 2000 10s, like 2015.  She did not undergo this procedure. She has had 4 previous colonic scopes, last in 2009 which showed no evidence of colon polyps or neoplasms.  Patient had additional colonoscopy 2007 as well as a colonoscopy in 2004.  Patient now presents today as a new patient referral for evaluation of possible colonoscopy.    Past Medical History:   Diagnosis Date    Anemia 1995    Breast cancer 1995    LEFT DIAGNOSED FIRST    Breast cancer 2013    Right     Factor V Leiden 1994    Hyperlipidemia 2017    Hypertension 2017    Hypothyroidism 2019    Osteoporosis     UNKNOWN DATE OF DX    Recurrent urinary tract infection     Vertigo 1994     Past Surgical History:   Procedure Laterality Date    HEMORRHOID SURGERY  1968    HYSTERECTOMY  1970    MASTECTOMY Right 1994    MASTECTOMY Left 2013    BREAST CANCER    TUMOR REMOVAL Left 1994    EAR     Family History   Problem Relation Age of Onset    Cancer Mother     Hypertension Mother     Heart disease Mother      No Known Problems Sister     Diabetes Brother     Hypertension Brother     Cancer Maternal Grandmother     Lung disease Father     Heart disease Father     Diabetes Father     Cancer Brother     Diabetes Brother     Hypertension Brother     Stroke Brother     Hypertension Brother     Stroke Brother      Social History     Socioeconomic History    Marital status:      Spouse name: Not on file    Number of children: Not on file    Years of education: Not on file    Highest education level: Master's degree (e.g., MA, MS, [place], MEd, MSW, ANDRE)   Occupational History    Occupation: Phd x 3-   Social Needs    Financial resource strain: Not on file    Food insecurity:     Worry: Not on file     Inability: Not on file    Transportation needs:     Medical: Not on file     Non-medical: Not on file   Tobacco Use    Smoking status: Never Smoker    Smokeless tobacco: Never Used   Substance and Sexual Activity    Alcohol use: No     Comment: SPECIAL OCCASIONS LIKE ZACHERY    Drug use: No    Sexual activity: Not Currently   Lifestyle    Physical activity:     Days per week: Not on file     Minutes per session: Not on file    Stress: Not on file   Relationships    Social connections:     Talks on phone: Not on file     Gets together: Not on file     Attends Shinto service: Not on file     Active member of club or organization: Not on file     Attends meetings of clubs or organizations: Not on file     Relationship status: Not on file   Other Topics Concern    Not on file   Social History Narrative    Not on file       Current Outpatient Medications   Medication Sig Dispense Refill    amlodipine (NORVASC) 5 MG tablet Take 5 mg by mouth 2 (two) times daily.      levothyroxine (SYNTHROID) 50 MCG tablet Take 1 tablet (50 mcg total) by mouth once daily. 90 tablet 3    metoprolol tartrate (LOPRESSOR) 25 MG tablet Take 1 tablet (25 mg total) by mouth 2 (two) times daily. 60 tablet 11     multivitamin capsule Take 1 capsule by mouth once daily.      simvastatin (ZOCOR) 20 MG tablet Take 1 tablet (20 mg total) by mouth every evening. 90 tablet 3    spironolactone (ALDACTONE) 25 MG tablet Take 0.5 tablets (12.5 mg total) by mouth once daily. 30 tablet 2    warfarin (COUMADIN) 1 MG tablet Take 2 tablets (2 mg total) by mouth Daily. Take 1 tab twice a day 180 tablet 0    potassium chloride SA (K-DUR,KLOR-CON) 20 MEQ tablet Take 1 tablet daily. (Patient not taking: Reported on 11/13/2019) 30 tablet 0     No current facility-administered medications for this visit.      Review of patient's allergies indicates:   Allergen Reactions    Iodine and iodide containing products     Ditropan [oxybutynin chloride] Palpitations and Other (See Comments)     Made patient weak       Review of Systems   Constitutional: Negative for activity change, appetite change, chills and fever.   HENT: Negative for congestion, dental problem and ear discharge.    Eyes: Negative for discharge and itching.   Respiratory: Negative for apnea, choking and chest tightness.    Cardiovascular: Negative for chest pain and leg swelling.   Gastrointestinal: Negative for abdominal distention, abdominal pain, anal bleeding, constipation, diarrhea and nausea.   Endocrine: Negative for cold intolerance and heat intolerance.   Genitourinary: Negative for difficulty urinating and dyspareunia.   Musculoskeletal: Negative for arthralgias and back pain.   Skin: Negative for color change and pallor.   Neurological: Negative for dizziness and facial asymmetry.   Hematological: Negative for adenopathy. Does not bruise/bleed easily.   Psychiatric/Behavioral: Negative for agitation and behavioral problems.       Objective:      Vitals:    11/13/19 0832   BP: (!) 140/72   BP Location: Left arm   Patient Position: Sitting   BP Method: Large (Automatic)   Pulse: 78   Resp: 12   Temp: 98.3 °F (36.8 °C)   TempSrc: Oral   SpO2: 96%   Weight: 62 kg (136 lb  "9.2 oz)   Height: 5' 2" (1.575 m)     Physical Exam   Constitutional: She is oriented to person, place, and time. She appears well-developed and well-nourished. No distress.   HENT:   Head: Normocephalic and atraumatic.   Eyes: Pupils are equal, round, and reactive to light. EOM are normal.   Neck: Normal range of motion. Neck supple. No thyromegaly present.   Cardiovascular: Normal rate and regular rhythm.   Pulmonary/Chest: Effort normal and breath sounds normal.   Abdominal: Soft. Bowel sounds are normal. She exhibits no distension. There is no tenderness.   Musculoskeletal: Normal range of motion. She exhibits no edema or deformity.   Neurological: She is alert and oriented to person, place, and time. No cranial nerve deficit.   Skin: Skin is warm. Capillary refill takes less than 2 seconds. No erythema.   Psychiatric: She has a normal mood and affect. Her behavior is normal.        Assessment:       1. Family history of colon cancer    2. Pre-op testing    3. History of breast cancer        Plan:   Family history of colon cancer    Pre-op testing    History of breast cancer        Medical Decision Making/Counseling:  New patient.    Family history of colon cancer.  Patient with significant family history of colon cancer, and brother as well as a daughter who were diagnosed with colon or rectal cancer.  Patient's last colonoscopy was in 2009.  No symptoms suggesting need for diagnostic colonoscopy at this time.  Long discussion in clinic today regarding her needs/wishes for repeat colonoscopy.  Discussed with the patient that screening colonoscopy is a routine indicated those patients 45 years of age to 80 years of age.  Patient is now beyond the age of 78 years of age.  No symptoms suggesting need for proceeding with diagnostic endoscopy.  Patient was offered colonoscopy today versus continued observation.  I discussed with the patient that ultimately completely her choice at this point.  I discussed with the " patient that she would need to decide whether, if we found anything during colonoscopy, whether she would want treated.  She voiced uncertainties regarding this.  Discussed with the patient to go home, discuss colonoscopy with her family, and call my clinic if she desires for scheduling of colonoscopy.  If this would be the case, patient would need a Lovenox bridge prior to colonoscopy and discontinuation of Coumadin as well as an INR the day of the procedure.    Total clinic time today, face-to-face interaction with patient was 30 min, of which greater than half of that time was spent in face-to-face counseling.    Patient instructed that best way to communicate with my office staff is for patient to get on the [facility] epic patient portal to expedite communication and communication issues that may occur.  Patient was given instructions on how to get on the portal.  I encouraged patient to obtain portal access as well.  Ultimately it is up to the patient to obtain access.  Patient voiced understanding.

## 2019-11-13 NOTE — LETTER
November 13, 2019      Vicente Sutton MD  [address] [place] [address]           [facility]  [address] [place] [address]  Phone: [phone]  Fax: [phone]          Patient: Ursula Rodríguez   MR Number: [id number]   YOB: 1940   Date of Visit: 11/13/2019       Dear Dr. Vicente Sutton:    Thank you for referring Ursula Rodríguez to me for evaluation. Attached you will find relevant portions of my assessment and plan of care.    If you have questions, please do not hesitate to call me. I look forward to following Ursula Rodríguez along with you.    Sincerely,    Carl Conrad MD    Enclosure  CC:  No Recipients    If you would like to receive this communication electronically, please contact [email or web address] or [phone] to request more information on [employer] Link access.    For providers and/or their staff who would like to refer a patient to [facility], please contact us through our one-stop-shop provider referral line, [facility], at [phone].    If you feel you have received this communication in error or would no longer like to receive these types of communications, please e-mail [email or web address]

## 2019-11-14 ENCOUNTER — OFFICE VISIT (OUTPATIENT)
Dept: FAMILY MEDICINE | Facility: CLINIC | Age: 79
End: 2019-11-14
Payer: MEDICARE

## 2019-11-14 VITALS
BODY MASS INDEX: 25.58 KG/M2 | HEIGHT: 62 IN | DIASTOLIC BLOOD PRESSURE: 71 MMHG | WEIGHT: 139 LBS | HEART RATE: 91 BPM | OXYGEN SATURATION: 96 % | SYSTOLIC BLOOD PRESSURE: 129 MMHG | RESPIRATION RATE: 17 BRPM

## 2019-11-14 DIAGNOSIS — Z79.01 ENCOUNTER FOR MONITORING COUMADIN THERAPY: Primary | ICD-10-CM

## 2019-11-14 DIAGNOSIS — Z51.81 ENCOUNTER FOR MONITORING COUMADIN THERAPY: Primary | ICD-10-CM

## 2019-11-14 DIAGNOSIS — M77.9 INFLAMMATION AROUND JOINT: ICD-10-CM

## 2019-11-14 DIAGNOSIS — E87.6 HYPOKALEMIA: ICD-10-CM

## 2019-11-14 PROCEDURE — 99214 OFFICE O/P EST MOD 30 MIN: CPT | Mod: S$GLB,,, | Performed by: FAMILY MEDICINE

## 2019-11-14 PROCEDURE — 99214 PR OFFICE/OUTPT VISIT, EST, LEVL IV, 30-39 MIN: ICD-10-PCS | Mod: S$GLB,,, | Performed by: FAMILY MEDICINE

## 2019-11-14 RX ORDER — LANOLIN ALCOHOL/MO/W.PET/CERES
400 CREAM (GRAM) TOPICAL DAILY
COMMUNITY

## 2019-11-14 RX ORDER — DICLOFENAC SODIUM 10 MG/G
2 GEL TOPICAL 4 TIMES DAILY PRN
Qty: 100 G | Refills: 0 | Status: SHIPPED | OUTPATIENT
Start: 2019-11-14 | End: 2020-02-14

## 2019-11-14 NOTE — PROGRESS NOTES
[facility] - Clinic Note    Subjective      Ms. Rodríguez is a 79 y.o. female who presents to clinic for Follow-up (1 MO )    Patient has been doing well overall.  Her Coumadin levels are now normal.  Will recheck in 2 weeks.  She is taking 2 mg every day except for Sunday which she is taking 1 mg.  She is overall feeling well.  She is taking an over-the-counter potassium and magnesium supplement.  She followed up with Dr. Conrad yesterday about screening colonoscopy.  She elected to monitor instead of undergoing the screening colonoscopy.  She also reports some swelling in her digits.  Denies any pain.    [facility] Ursula has a past medical history of Anemia (1995), Breast cancer (1995), Breast cancer (2013), Factor V Leiden (1994), Hyperlipidemia (2017), Hypertension (2017), Hypothyroidism (2019), Osteoporosis, Recurrent urinary tract infection, and Vertigo (1994).   PSX Ursula has a past surgical history that includes Hemorrhoid surgery (1968); Hysterectomy (1970); Tumor removal (Left, 1994); Mastectomy (Right, 1994); and Mastectomy (Left, 2013).    Ursula's family history includes Cancer in her brother, maternal grandmother, and mother; Diabetes in her brother, brother, and father; Heart disease in her father and mother; Hypertension in her brother, brother, brother, and mother; Lung disease in her father; No Known Problems in her sister; Stroke in her brother and brother.    Ursula reports that she has never smoked. She has never used smokeless tobacco. She reports that she does not drink alcohol or use drugs.   JAC Vergara is allergic to iodine and iodide containing products and ditropan [oxybutynin chloride].   MED Ursula has a current medication list which includes the following prescription(s): amlodipine, levothyroxine, magnesium oxide, metoprolol tartrate, multivitamin, simvastatin, spironolactone, warfarin, and diclofenac sodium.     Review of Systems   Constitutional: Negative for chills and  "fever.   Eyes: Negative for visual disturbance.   Cardiovascular: Negative for chest pain.   Neurological: Negative for dizziness and headaches.     Objective     /71 (BP Location: Left arm, Patient Position: Sitting, BP Method: Medium (Automatic))   Pulse 91   Resp 17   Ht 5' 2" (1.575 m)   Wt 63 kg (139 lb)   SpO2 96%   BMI 25.42 kg/m²     Physical Exam   Constitutional: She appears well-developed and well-nourished. No distress.   HENT:   Right Ear: External ear normal.   Left Ear: External ear normal.   Eyes: Right eye exhibits no discharge. Left eye exhibits no discharge.   Cardiovascular: Normal rate, regular rhythm and normal heart sounds.   Pulmonary/Chest: Effort normal and breath sounds normal. She has no wheezes. She has no rales.   Musculoskeletal:        Right hand: She exhibits normal range of motion. Normal sensation noted. Normal strength noted.        Hands:  Neurological: She is alert.   Skin: Skin is warm and dry.   Psychiatric: She has a normal mood and affect.   Nursing note and vitals reviewed.     Assessment/Plan     1. Encounter for monitoring Coumadin therapy  CBC auto differential    Protime-INR    Protime-INR   2. Hypokalemia  Basic metabolic panel   3. Inflammation around joint  diclofenac sodium (VOLTAREN) 1 % Gel     Overall doing well.  Scheduled INR for 2 weeks.  Then will go to every month.  Will also check CBC and BMP every 3 months.  Will try Voltaren gel and icing for swelling in her fingers.    Follow up in about 3 months (around 2/14/2020) for follow up.    Future Appointments   Date Time Provider Department Center   11/25/2019  8:45 AM Valerio Chew DO [facility] ORTHO[address] [place] [address]   12/2/2019  9:15 AM [facility], OP THERAPEUTICS [facility]   2/14/2020  8:20 AM Vicente Sutton MD [facility]         Vicente Sutton MD  Family Medicine  [facility]          "

## 2019-11-19 DIAGNOSIS — M25.561 PAIN IN BOTH KNEES, UNSPECIFIED CHRONICITY: Primary | ICD-10-CM

## 2019-11-19 DIAGNOSIS — M25.562 PAIN IN BOTH KNEES, UNSPECIFIED CHRONICITY: Primary | ICD-10-CM

## 2019-11-25 ENCOUNTER — INFUSION (OUTPATIENT)
Dept: INFUSION THERAPY | Facility: HOSPITAL | Age: 79
End: 2019-11-25
Attending: FAMILY MEDICINE
Payer: MEDICARE

## 2019-11-25 ENCOUNTER — HOSPITAL ENCOUNTER (OUTPATIENT)
Dept: RADIOLOGY | Facility: HOSPITAL | Age: 79
Discharge: HOME | End: 2019-11-25
Attending: ORTHOPAEDIC SURGERY
Payer: MEDICARE

## 2019-11-25 ENCOUNTER — OFFICE VISIT (OUTPATIENT)
Dept: ORTHOPEDICS | Facility: CLINIC | Age: 79
End: 2019-11-25
Payer: MEDICARE

## 2019-11-25 VITALS
HEART RATE: 79 BPM | SYSTOLIC BLOOD PRESSURE: 134 MMHG | DIASTOLIC BLOOD PRESSURE: 77 MMHG | WEIGHT: 139 LBS | BODY MASS INDEX: 25.58 KG/M2 | HEIGHT: 62 IN

## 2019-11-25 VITALS
TEMPERATURE: 97 F | SYSTOLIC BLOOD PRESSURE: 152 MMHG | OXYGEN SATURATION: 99 % | RESPIRATION RATE: 16 BRPM | DIASTOLIC BLOOD PRESSURE: 69 MMHG | HEART RATE: 85 BPM

## 2019-11-25 DIAGNOSIS — M25.561 PAIN IN BOTH KNEES, UNSPECIFIED CHRONICITY: ICD-10-CM

## 2019-11-25 DIAGNOSIS — Z85.3 HISTORY OF BILATERAL BREAST CANCER: ICD-10-CM

## 2019-11-25 DIAGNOSIS — Z79.01 ENCOUNTER FOR MONITORING COUMADIN THERAPY: Primary | ICD-10-CM

## 2019-11-25 DIAGNOSIS — M25.562 PAIN IN BOTH KNEES, UNSPECIFIED CHRONICITY: ICD-10-CM

## 2019-11-25 DIAGNOSIS — M17.0 PRIMARY OSTEOARTHRITIS OF BOTH KNEES: ICD-10-CM

## 2019-11-25 DIAGNOSIS — M17.0 PRIMARY OSTEOARTHRITIS OF BOTH KNEES: Primary | ICD-10-CM

## 2019-11-25 DIAGNOSIS — Z51.81 ENCOUNTER FOR MONITORING COUMADIN THERAPY: Primary | ICD-10-CM

## 2019-11-25 DIAGNOSIS — Z01.818 PRE-OP EXAM: Primary | ICD-10-CM

## 2019-11-25 LAB
INR PPP: 3.1 (ref 0.8–1.2)
PROTHROMBIN TIME: 32.9 SEC (ref 9–12.5)

## 2019-11-25 PROCEDURE — 99214 PR OFFICE/OUTPT VISIT, EST, LEVL IV, 30-39 MIN: ICD-10-PCS | Mod: 25,S$PBB,, | Performed by: ORTHOPAEDIC SURGERY

## 2019-11-25 PROCEDURE — 63600175 PHARM REV CODE 636 W HCPCS: Performed by: FAMILY MEDICINE

## 2019-11-25 PROCEDURE — 99214 OFFICE O/P EST MOD 30 MIN: CPT | Mod: 25,S$PBB,, | Performed by: ORTHOPAEDIC SURGERY

## 2019-11-25 PROCEDURE — 1159F MED LIST DOCD IN RCRD: CPT | Mod: ,,, | Performed by: ORTHOPAEDIC SURGERY

## 2019-11-25 PROCEDURE — 20610 LARGE JOINT ASPIRATION/INJECTION: R KNEE, L KNEE: ICD-10-PCS | Mod: 50,S$PBB,, | Performed by: ORTHOPAEDIC SURGERY

## 2019-11-25 PROCEDURE — 99999 PR PBB SHADOW E&M-EST. PATIENT-LVL III: CPT | Mod: PBBFAC,,, | Performed by: ORTHOPAEDIC SURGERY

## 2019-11-25 PROCEDURE — 1125F AMNT PAIN NOTED PAIN PRSNT: CPT | Mod: ,,, | Performed by: ORTHOPAEDIC SURGERY

## 2019-11-25 PROCEDURE — 1159F PR MEDICATION LIST DOCUMENTED IN MEDICAL RECORD: ICD-10-PCS | Mod: ,,, | Performed by: ORTHOPAEDIC SURGERY

## 2019-11-25 PROCEDURE — 99213 OFFICE O/P EST LOW 20 MIN: CPT | Mod: PBBFAC,25 | Performed by: ORTHOPAEDIC SURGERY

## 2019-11-25 PROCEDURE — 73562 X-RAY EXAM OF KNEE 3: CPT | Mod: 26,50,, | Performed by: RADIOLOGY

## 2019-11-25 PROCEDURE — 25000003 PHARM REV CODE 250: Performed by: FAMILY MEDICINE

## 2019-11-25 PROCEDURE — 20610 DRAIN/INJ JOINT/BURSA W/O US: CPT | Mod: 50,PBBFAC | Performed by: ORTHOPAEDIC SURGERY

## 2019-11-25 PROCEDURE — 99999 PR PBB SHADOW E&M-EST. PATIENT-LVL III: ICD-10-PCS | Mod: PBBFAC,,, | Performed by: ORTHOPAEDIC SURGERY

## 2019-11-25 PROCEDURE — 85610 PROTHROMBIN TIME: CPT

## 2019-11-25 PROCEDURE — A4216 STERILE WATER/SALINE, 10 ML: HCPCS | Performed by: FAMILY MEDICINE

## 2019-11-25 PROCEDURE — 73562 XR KNEE 3 VIEW BILATERAL: ICD-10-PCS | Mod: 26,50,, | Performed by: RADIOLOGY

## 2019-11-25 PROCEDURE — 36592 COLLECT BLOOD FROM PICC: CPT

## 2019-11-25 PROCEDURE — 73562 X-RAY EXAM OF KNEE 3: CPT | Mod: 50,TC,FY

## 2019-11-25 PROCEDURE — 1125F PR PAIN SEVERITY QUANTIFIED, PAIN PRESENT: ICD-10-PCS | Mod: ,,, | Performed by: ORTHOPAEDIC SURGERY

## 2019-11-25 RX ORDER — HEPARIN 100 UNIT/ML
500 SYRINGE INTRAVENOUS
Status: COMPLETED | OUTPATIENT
Start: 2019-11-25 | End: 2019-11-25

## 2019-11-25 RX ORDER — SODIUM CHLORIDE 0.9 % (FLUSH) 0.9 %
10 SYRINGE (ML) INJECTION
Status: CANCELLED | OUTPATIENT
Start: 2019-11-25

## 2019-11-25 RX ORDER — SODIUM CHLORIDE 0.9 % (FLUSH) 0.9 %
10 SYRINGE (ML) INJECTION
Status: COMPLETED | OUTPATIENT
Start: 2019-11-25 | End: 2019-11-25

## 2019-11-25 RX ORDER — HEPARIN 100 UNIT/ML
500 SYRINGE INTRAVENOUS
Status: CANCELLED | OUTPATIENT
Start: 2019-11-25

## 2019-11-25 RX ADMIN — Medication 48 MG: at 08:11

## 2019-11-25 RX ADMIN — HEPARIN 500 UNITS: 100 SYRINGE at 08:11

## 2019-11-25 RX ADMIN — Medication 10 ML: at 08:11

## 2019-11-26 NOTE — PROCEDURES
Large Joint Aspiration/Injection: R knee, L knee  Date/Time: 11/25/2019 8:45 AM  Performed by: Valerio Chew DO  Authorized by: Valerio Chew, DO     Consent Done?:  Yes (Verbal)  Indications:  Pain, joint swelling and diagnostic evaluation  Procedure site marked: Yes    Timeout: Prior to procedure the correct patient, procedure, and site was verified      Location:  Knee  Site:  R knee and L knee  Prep: Patient was prepped and draped in usual sterile fashion    Needle size:  22 G  Ultrasonic Guidance for needle placement: No  Approach:  Anterolateral  Medications:  48 mg hylan g-f 20 48 mg/6 mL; 48 mg hylan g-f 20 48 mg/6 mL  Patient tolerance:  Patient tolerated the procedure well with no immediate complications

## 2019-11-26 NOTE — PROGRESS NOTES
Subjective:      Patient ID: Ursula Rodríguez is a 79 y.o. female.    Chief Complaint: Pain of the Left Knee and Pain of the Right Knee      HPI: Ms. Rodríguez returns today with complaints of recurrent pain in both her knees.  She stated that the pain began approximately 1 month ago.  She has been getting some giving way also.  She states she has been getting some swelling. Not been wearing a brace and cannot take NSAIDs because she is on blood thinners.  She stated that since her last visit on 03/04/2019 she has had multiple emergency room visits for hypo coli me a and hypomagnesemia.  She stated she is having it worked up by her PCM.    ROS:  New diagnosis/surgery/prescriptions since last office visit on 03/04/2019:  Hypokalemia/hypomagnesemia.  Constitution: Negative for chills and fever.   HENT: Negative for congestion.    Eyes: Negative for blurred vision.   Cardiovascular: Negative for chest pain.   Respiratory: Negative for cough.    Endocrine: Negative for polydipsia.   Hematologic/Lymphatic: Bruises/bleeds easily.   Skin: Negative for itching.   Musculoskeletal: Positive for joint pain.   Gastrointestinal: Negative for constipation and diarrhea.   Genitourinary: Negative for nocturia.   Neurological: Negative for seizures.   Psychiatric/Behavioral: Negative for substance abuse.   Allergic/Immunologic: Negative for environmental allergies.       Objective:      Physical Exam:   General: AAOx3.  No acute distress  Vascular:  Pulses intact and equal bilaterally.  Capillary refill less than 3 seconds and equal bilaterally  Neurologic:  Pinprick and soft touch intact and equal bilaterally  Integment:  No ecchymosis, no errythema  Extremity: Knee:  Extension/flexion equal bilaterally 0/118 degrees. Mild varus alignment both knees.  Mild effusion both knees.  Crepitus with motion both knees.  Mildly positive patellar load/compression both knees.  Negative patella apprehension/relocation both knees.  Mild  increased excursion with valgus stressing both knees right greater than left.  Baker cyst right knee. Excursion equal bilaterally with Lachman/drawer.  Positive joint line tenderness both knees right greater than left.  Mild tenderness over the MCL both knees.  Rebekah negative both knees.  [place] positive both knees.  Nontender at the anserine insertion both knees.   No swelling at the anserine insertion both knees.  Radiography:  Personally reviewed x-rays of both knees completed on 11/25/2019  showed severe tricompartmental arthritic changes with the right knee worse than the left and mild depression of the medial tibial plateau of the right knee.  There is also bone-on-bone articulation with osteophyte formation.      Assessment:       Impression:  Symptomatic tricompartment arthritis both knees      Plan:       1.  Discussed physical examination and radiographic findings with the patient. Ursula understands that she has tricompartmental arthritis in both of her knees.  She could continue with conservative management or consider surgical intervention. She stated she would prefer to continue with conservative management.  2.  Offered Synvisc One to both knees, she elected to proceed.  3.  Brace wear was discussed with the patient.  4.  All NSAIDs per PCM.  5.  Home exercises to include quadriceps and hamstring strengthening were shown discussed with the patient.  6.  Offered referred to physical therapy declined for now.  7.  Treatment of hypokalemia and hypomagnesemia per patient's internist.  8.  [facility] portal was discussed with the patient and information was given.  The patient was encouraged to use the portal for future encounters.  9.  Follow up p.r.n.

## 2019-12-04 ENCOUNTER — OFFICE VISIT (OUTPATIENT)
Dept: FAMILY MEDICINE | Facility: CLINIC | Age: 79
End: 2019-12-04
Payer: MEDICARE

## 2019-12-04 VITALS
DIASTOLIC BLOOD PRESSURE: 75 MMHG | WEIGHT: 139.19 LBS | OXYGEN SATURATION: 97 % | BODY MASS INDEX: 25.62 KG/M2 | RESPIRATION RATE: 16 BRPM | TEMPERATURE: 98 F | HEART RATE: 77 BPM | SYSTOLIC BLOOD PRESSURE: 134 MMHG | HEIGHT: 62 IN

## 2019-12-04 DIAGNOSIS — L30.9 DERMATITIS: Primary | ICD-10-CM

## 2019-12-04 PROCEDURE — 1159F MED LIST DOCD IN RCRD: CPT | Mod: S$GLB,,, | Performed by: FAMILY MEDICINE

## 2019-12-04 PROCEDURE — 1126F AMNT PAIN NOTED NONE PRSNT: CPT | Mod: S$GLB,,, | Performed by: FAMILY MEDICINE

## 2019-12-04 PROCEDURE — 1159F PR MEDICATION LIST DOCUMENTED IN MEDICAL RECORD: ICD-10-PCS | Mod: S$GLB,,, | Performed by: FAMILY MEDICINE

## 2019-12-04 PROCEDURE — 99213 OFFICE O/P EST LOW 20 MIN: CPT | Mod: S$GLB,,, | Performed by: FAMILY MEDICINE

## 2019-12-04 PROCEDURE — 1126F PR PAIN SEVERITY QUANTIFIED, NO PAIN PRESENT: ICD-10-PCS | Mod: S$GLB,,, | Performed by: FAMILY MEDICINE

## 2019-12-04 PROCEDURE — 99213 PR OFFICE/OUTPT VISIT, EST, LEVL III, 20-29 MIN: ICD-10-PCS | Mod: S$GLB,,, | Performed by: FAMILY MEDICINE

## 2019-12-04 RX ORDER — MAG HYDROX/ALUMINUM HYD/SIMETH 200-200-20
SUSPENSION, ORAL (FINAL DOSE FORM) ORAL 2 TIMES DAILY PRN
Qty: 30 G | Refills: 0 | Status: SHIPPED | OUTPATIENT
Start: 2019-12-04 | End: 2020-02-14

## 2019-12-04 NOTE — PROGRESS NOTES
[facility] - Clinic Note    Subjective      Ms. Rodríguez is a 79 y.o. female who presents to clinic for Rash (to face area x a few days)    Patient reports a rash on her scalp for the last few weeks.  This has been itchy.  She has been scratching the area.  She also reports 2 other lesions on her face 1 on her right cheek and 1 on her forehead.  None of these lesions are painful or ulcerated.    [facility] Ursula has a past medical history of Anemia (1995), Breast cancer (1995), Breast cancer (2013), Factor V Leiden (1994), Hyperlipidemia (2017), Hypertension (2017), Hypothyroidism (2019), Osteoporosis, Recurrent urinary tract infection, and Vertigo (1994).   PSXH Ursula has a past surgical history that includes Hemorrhoid surgery (1968); Hysterectomy (1970); Tumor removal (Left, 1994); Mastectomy (Right, 1994); and Mastectomy (Left, 2013).    Ursula's family history includes Cancer in her brother, maternal grandmother, and mother; Diabetes in her brother, brother, and father; Heart disease in her father and mother; Hypertension in her brother, brother, brother, and mother; Lung disease in her father; No Known Problems in her sister; Stroke in her brother and brother.   ANUJ Vergara reports that she has never smoked. She has never used smokeless tobacco. She reports that she does not drink alcohol or use drugs.   JAC Vergara is allergic to iodine and iodide containing products and ditropan [oxybutynin chloride].   JAYME Vergara has a current medication list which includes the following prescription(s): amlodipine, diclofenac sodium, levothyroxine, magnesium oxide, metoprolol tartrate, multivitamin, potassium, simvastatin, spironolactone, warfarin, and hydrocortisone.     Review of Systems   Constitutional: Negative for chills and fever.   Skin: Positive for rash.     Objective     /75 (BP Location: Left arm, Patient Position: Sitting, BP Method: Large (Automatic))   Pulse 77   Temp 98.1 °F (36.7 °C) (Oral)   Resp  "16   Ht 5' 2" (1.575 m)   Wt 63.1 kg (139 lb 3.2 oz)   SpO2 97%   BMI 25.46 kg/m²     Physical Exam   Constitutional: She appears well-developed and well-nourished. No distress.   HENT:   Right Ear: External ear normal.   Left Ear: External ear normal.   Eyes: Right eye exhibits no discharge. Left eye exhibits no discharge.   Cardiovascular: Normal rate, regular rhythm and normal heart sounds.   Pulmonary/Chest: Effort normal and breath sounds normal. She has no wheezes. She has no rales.   Neurological: She is alert.   Skin: Skin is warm and dry.        Psychiatric: She has a normal mood and affect.   Nursing note and vitals reviewed.     Assessment/Plan     1. Dermatitis  hydrocortisone 1 % ointment     Appears to have a dermatitis.  Will treat with hydrocortisone ointment.  Recommended the patient follow up with her dermatologist that she has already established with.    Follow up if symptoms worsen or fail to improve.    Future Appointments   Date Time Provider Department Center   12/9/2019  8:00 AM [facility]   12/23/2019  8:00 AM [facility]   2/14/2020  8:20 AM Vicente Sutton MD [facility]         Vicente Sutton MD  Family Medicine  [facility]          "

## 2019-12-09 ENCOUNTER — INFUSION (OUTPATIENT)
Dept: INFUSION THERAPY | Facility: HOSPITAL | Age: 79
End: 2019-12-09
Attending: FAMILY MEDICINE
Payer: MEDICARE

## 2019-12-09 ENCOUNTER — TELEPHONE (OUTPATIENT)
Dept: FAMILY MEDICINE | Facility: CLINIC | Age: 79
End: 2019-12-09

## 2019-12-09 VITALS — RESPIRATION RATE: 18 BRPM | HEART RATE: 70 BPM | DIASTOLIC BLOOD PRESSURE: 67 MMHG | SYSTOLIC BLOOD PRESSURE: 139 MMHG

## 2019-12-09 DIAGNOSIS — Z51.81 ENCOUNTER FOR MONITORING COUMADIN THERAPY: ICD-10-CM

## 2019-12-09 DIAGNOSIS — Z79.01 ENCOUNTER FOR MONITORING COUMADIN THERAPY: ICD-10-CM

## 2019-12-09 DIAGNOSIS — Z85.3 HISTORY OF BILATERAL BREAST CANCER: Primary | ICD-10-CM

## 2019-12-09 LAB
INR PPP: 3 (ref 0.8–1.2)
PROTHROMBIN TIME: 31.5 SEC (ref 9–12.5)

## 2019-12-09 PROCEDURE — 63600175 PHARM REV CODE 636 W HCPCS: Performed by: FAMILY MEDICINE

## 2019-12-09 PROCEDURE — 85610 PROTHROMBIN TIME: CPT

## 2019-12-09 PROCEDURE — 36591 DRAW BLOOD OFF VENOUS DEVICE: CPT

## 2019-12-09 PROCEDURE — A4216 STERILE WATER/SALINE, 10 ML: HCPCS | Performed by: FAMILY MEDICINE

## 2019-12-09 PROCEDURE — 25000003 PHARM REV CODE 250: Performed by: FAMILY MEDICINE

## 2019-12-09 RX ORDER — SODIUM CHLORIDE 0.9 % (FLUSH) 0.9 %
10 SYRINGE (ML) INJECTION
Status: COMPLETED | OUTPATIENT
Start: 2019-12-09 | End: 2019-12-09

## 2019-12-09 RX ORDER — HEPARIN 100 UNIT/ML
500 SYRINGE INTRAVENOUS
Status: CANCELLED | OUTPATIENT
Start: 2019-12-09

## 2019-12-09 RX ORDER — SODIUM CHLORIDE 0.9 % (FLUSH) 0.9 %
10 SYRINGE (ML) INJECTION
Status: CANCELLED | OUTPATIENT
Start: 2019-12-09

## 2019-12-09 RX ORDER — HEPARIN 100 UNIT/ML
500 SYRINGE INTRAVENOUS
Status: COMPLETED | OUTPATIENT
Start: 2019-12-09 | End: 2019-12-09

## 2019-12-09 RX ADMIN — HEPARIN 500 UNITS: 100 SYRINGE at 08:12

## 2019-12-09 RX ADMIN — Medication 10 ML: at 08:12

## 2019-12-09 NOTE — PLAN OF CARE
Problem: Adult Inpatient Plan of Care  Goal: Plan of Care Review  Outcome: Ongoing, Progressing    /67   Pulse 70   Resp 18   Patient arrived today for port flush. Port located on left chest. Accessed without difficulty, blood return noted and patent. Saline flushed, lab collected, heparin locked. Deaccessed and covered with bandaid. AVS provided. Ambulates per self.   RTC in two weeks for repeat labs.

## 2019-12-09 NOTE — TELEPHONE ENCOUNTER
See lab result note.     ----- Message from Shantel Zaragoza sent at 12/9/2019  9:44 AM CST -----  Contact: Patient  Type:  Patient Returning Call    Who Called:  Patient  Who Left Message for Patient:  n/a  Does the patient know what this is regarding?:  Test results  Best Call Back Number: [phone]

## 2019-12-10 ENCOUNTER — TELEPHONE (OUTPATIENT)
Dept: FAMILY MEDICINE | Facility: CLINIC | Age: 79
End: 2019-12-10

## 2019-12-10 NOTE — TELEPHONE ENCOUNTER
Received message pt was concerned regarding PT/INR lab work that needs to be done. Will contact pt via PP.     ----- Message from Flavia Dsouza RN sent at 12/9/2019  4:10 PM CST -----  My apologies. This is the pt I was referring to earlier.

## 2019-12-23 ENCOUNTER — PATIENT MESSAGE (OUTPATIENT)
Dept: FAMILY MEDICINE | Facility: CLINIC | Age: 79
End: 2019-12-23

## 2019-12-23 ENCOUNTER — INFUSION (OUTPATIENT)
Dept: INFUSION THERAPY | Facility: HOSPITAL | Age: 79
End: 2019-12-23
Attending: FAMILY MEDICINE
Payer: MEDICARE

## 2019-12-23 VITALS
DIASTOLIC BLOOD PRESSURE: 60 MMHG | TEMPERATURE: 98 F | HEART RATE: 73 BPM | SYSTOLIC BLOOD PRESSURE: 138 MMHG | RESPIRATION RATE: 16 BRPM

## 2019-12-23 DIAGNOSIS — Z79.01 ENCOUNTER FOR MONITORING COUMADIN THERAPY: ICD-10-CM

## 2019-12-23 DIAGNOSIS — Z85.3 HISTORY OF BILATERAL BREAST CANCER: ICD-10-CM

## 2019-12-23 DIAGNOSIS — Z79.01 LONG TERM (CURRENT) USE OF ANTICOAGULANTS: Primary | ICD-10-CM

## 2019-12-23 DIAGNOSIS — Z51.81 ENCOUNTER FOR MONITORING COUMADIN THERAPY: ICD-10-CM

## 2019-12-23 LAB
BASOPHILS # BLD AUTO: 0.04 K/UL (ref 0–0.2)
BASOPHILS NFR BLD: 0.6 % (ref 0–1.9)
DIFFERENTIAL METHOD: ABNORMAL
EOSINOPHIL # BLD AUTO: 0.2 K/UL (ref 0–0.5)
EOSINOPHIL NFR BLD: 2.8 % (ref 0–8)
ERYTHROCYTE [DISTWIDTH] IN BLOOD BY AUTOMATED COUNT: 14.2 % (ref 11.5–14.5)
HCT VFR BLD AUTO: 40.6 % (ref 37–48.5)
HGB BLD-MCNC: 13.2 G/DL (ref 12–16)
IMM GRANULOCYTES # BLD AUTO: 0.02 K/UL (ref 0–0.04)
IMM GRANULOCYTES NFR BLD AUTO: 0.3 % (ref 0–0.5)
INR PPP: 2 (ref 0.8–1.2)
LYMPHOCYTES # BLD AUTO: 0.6 K/UL (ref 1–4.8)
LYMPHOCYTES NFR BLD: 9.2 % (ref 18–48)
MCH RBC QN AUTO: 26.3 PG (ref 27–31)
MCHC RBC AUTO-ENTMCNC: 32.5 G/DL (ref 32–36)
MCV RBC AUTO: 81 FL (ref 82–98)
MONOCYTES # BLD AUTO: 0.6 K/UL (ref 0.3–1)
MONOCYTES NFR BLD: 8.4 % (ref 4–15)
NEUTROPHILS # BLD AUTO: 5.4 K/UL (ref 1.8–7.7)
NEUTROPHILS NFR BLD: 78.7 % (ref 38–73)
NRBC BLD-RTO: 0 /100 WBC
PLATELET # BLD AUTO: 196 K/UL (ref 150–350)
PMV BLD AUTO: 9.7 FL (ref 9.2–12.9)
PROTHROMBIN TIME: 21.2 SEC (ref 9–12.5)
RBC # BLD AUTO: 5.01 M/UL (ref 4–5.4)
WBC # BLD AUTO: 6.88 K/UL (ref 3.9–12.7)

## 2019-12-23 PROCEDURE — 63600175 PHARM REV CODE 636 W HCPCS: Performed by: FAMILY MEDICINE

## 2019-12-23 PROCEDURE — 85025 COMPLETE CBC W/AUTO DIFF WBC: CPT

## 2019-12-23 PROCEDURE — A4216 STERILE WATER/SALINE, 10 ML: HCPCS | Performed by: FAMILY MEDICINE

## 2019-12-23 PROCEDURE — 36591 DRAW BLOOD OFF VENOUS DEVICE: CPT

## 2019-12-23 PROCEDURE — 25000003 PHARM REV CODE 250: Performed by: FAMILY MEDICINE

## 2019-12-23 PROCEDURE — 85610 PROTHROMBIN TIME: CPT

## 2019-12-23 RX ORDER — SODIUM CHLORIDE 0.9 % (FLUSH) 0.9 %
10 SYRINGE (ML) INJECTION
Status: CANCELLED | OUTPATIENT
Start: 2019-12-23

## 2019-12-23 RX ORDER — HEPARIN 100 UNIT/ML
500 SYRINGE INTRAVENOUS
Status: CANCELLED | OUTPATIENT
Start: 2019-12-23

## 2019-12-23 RX ORDER — HEPARIN 100 UNIT/ML
500 SYRINGE INTRAVENOUS
Status: COMPLETED | OUTPATIENT
Start: 2019-12-23 | End: 2019-12-23

## 2019-12-23 RX ORDER — SODIUM CHLORIDE 0.9 % (FLUSH) 0.9 %
10 SYRINGE (ML) INJECTION
Status: COMPLETED | OUTPATIENT
Start: 2019-12-23 | End: 2019-12-23

## 2019-12-23 RX ADMIN — Medication 10 ML: at 08:12

## 2019-12-23 RX ADMIN — HEPARIN 500 UNITS: 100 SYRINGE at 08:12

## 2019-12-23 NOTE — PLAN OF CARE
Problem: Adult Inpatient Plan of Care  Goal: Plan of Care Review  Outcome: Ongoing, Progressing    /60 (Patient Position: Sitting)   Pulse 73   Temp 98.1 °F (36.7 °C)   Resp 16   Patient arrived today for port flush. Port located on left chest. Accessed without difficulty, blood return noted and patent. Labs collected. Saline flushed and heparin locked.. Deaccessed and covered with bandaid. AVS provided. Ambulates per self.    Next appt 1/6/20 for PT/INR.

## 2019-12-25 ENCOUNTER — TELEPHONE (OUTPATIENT)
Dept: HEMATOLOGY/ONCOLOGY | Facility: CLINIC | Age: 79
End: 2019-12-25

## 2019-12-26 ENCOUNTER — TELEPHONE (OUTPATIENT)
Dept: HEMATOLOGY/ONCOLOGY | Facility: CLINIC | Age: 79
End: 2019-12-26

## 2020-01-03 ENCOUNTER — TELEPHONE (OUTPATIENT)
Dept: FAMILY MEDICINE | Facility: CLINIC | Age: 80
End: 2020-01-03

## 2020-01-03 NOTE — TELEPHONE ENCOUNTER
----- Message from Flavai Dsouza RN sent at 1/2/2020  4:13 PM CST -----  Will need new referral for OPT. Referral for port flush exp 12/31/19. Thanks!

## 2020-01-06 ENCOUNTER — INFUSION (OUTPATIENT)
Dept: INFUSION THERAPY | Facility: HOSPITAL | Age: 80
End: 2020-01-06
Attending: FAMILY MEDICINE
Payer: MEDICARE

## 2020-01-06 ENCOUNTER — TELEPHONE (OUTPATIENT)
Dept: FAMILY MEDICINE | Facility: CLINIC | Age: 80
End: 2020-01-06

## 2020-01-06 VITALS
SYSTOLIC BLOOD PRESSURE: 138 MMHG | RESPIRATION RATE: 16 BRPM | HEART RATE: 73 BPM | DIASTOLIC BLOOD PRESSURE: 63 MMHG | TEMPERATURE: 98 F

## 2020-01-06 DIAGNOSIS — Z85.3 HISTORY OF BILATERAL BREAST CANCER: ICD-10-CM

## 2020-01-06 DIAGNOSIS — Z79.01 LONG TERM (CURRENT) USE OF ANTICOAGULANTS: Primary | ICD-10-CM

## 2020-01-06 LAB
INR PPP: 2.9 (ref 0.8–1.2)
PROTHROMBIN TIME: 30.9 SEC (ref 9–12.5)

## 2020-01-06 PROCEDURE — 63600175 PHARM REV CODE 636 W HCPCS: Performed by: FAMILY MEDICINE

## 2020-01-06 PROCEDURE — 25000003 PHARM REV CODE 250: Performed by: FAMILY MEDICINE

## 2020-01-06 PROCEDURE — 36591 DRAW BLOOD OFF VENOUS DEVICE: CPT

## 2020-01-06 PROCEDURE — 96523 IRRIG DRUG DELIVERY DEVICE: CPT

## 2020-01-06 PROCEDURE — A4216 STERILE WATER/SALINE, 10 ML: HCPCS | Performed by: FAMILY MEDICINE

## 2020-01-06 PROCEDURE — 85610 PROTHROMBIN TIME: CPT

## 2020-01-06 RX ORDER — HEPARIN 100 UNIT/ML
500 SYRINGE INTRAVENOUS
Status: COMPLETED | OUTPATIENT
Start: 2020-01-06 | End: 2020-01-06

## 2020-01-06 RX ORDER — SODIUM CHLORIDE 0.9 % (FLUSH) 0.9 %
10 SYRINGE (ML) INJECTION
Status: COMPLETED | OUTPATIENT
Start: 2020-01-06 | End: 2020-01-06

## 2020-01-06 RX ORDER — HEPARIN 100 UNIT/ML
500 SYRINGE INTRAVENOUS
Status: CANCELLED | OUTPATIENT
Start: 2020-01-06

## 2020-01-06 RX ORDER — SODIUM CHLORIDE 0.9 % (FLUSH) 0.9 %
10 SYRINGE (ML) INJECTION
Status: CANCELLED | OUTPATIENT
Start: 2020-01-06

## 2020-01-06 RX ADMIN — HEPARIN 500 UNITS: 100 SYRINGE at 08:01

## 2020-01-06 RX ADMIN — Medication 10 ML: at 08:01

## 2020-01-06 NOTE — PLAN OF CARE
Problem: Adult Inpatient Plan of Care  Goal: Plan of Care Review  Outcome: Ongoing, Progressing    /63 (Patient Position: Sitting)   Pulse 73   Temp 97.6 °F (36.4 °C)   Resp 16   Patient arrived today for port flush. Port located on left. Accessed without difficulty, blood return noted and patent. Labs drawn, saline flushed, and heparin locked. Deaccessed and covered with gauze and tape. AVS provided. Ambulates per self.

## 2020-01-06 NOTE — TELEPHONE ENCOUNTER
I called the nurse nurse, Ranjana, and she stated she needs a referral for [facility] OPT for Port Flush. Thanks

## 2020-01-06 NOTE — TELEPHONE ENCOUNTER
Last time they sent me the orders and I signed it. Can we call down to OPT and have them put in the orders? Thanks

## 2020-01-10 ENCOUNTER — TELEPHONE (OUTPATIENT)
Dept: HEMATOLOGY/ONCOLOGY | Facility: CLINIC | Age: 80
End: 2020-01-10

## 2020-02-04 ENCOUNTER — INFUSION (OUTPATIENT)
Dept: INFUSION THERAPY | Facility: HOSPITAL | Age: 80
End: 2020-02-04
Attending: FAMILY MEDICINE
Payer: MEDICARE

## 2020-02-04 VITALS
HEART RATE: 77 BPM | OXYGEN SATURATION: 98 % | RESPIRATION RATE: 18 BRPM | DIASTOLIC BLOOD PRESSURE: 67 MMHG | SYSTOLIC BLOOD PRESSURE: 146 MMHG

## 2020-02-04 DIAGNOSIS — Z51.81 ENCOUNTER FOR MONITORING COUMADIN THERAPY: Primary | ICD-10-CM

## 2020-02-04 DIAGNOSIS — E87.6 HYPOKALEMIA: ICD-10-CM

## 2020-02-04 DIAGNOSIS — Z85.3 HISTORY OF BILATERAL BREAST CANCER: ICD-10-CM

## 2020-02-04 DIAGNOSIS — R73.09 ELEVATED GLUCOSE LEVEL: Primary | ICD-10-CM

## 2020-02-04 DIAGNOSIS — Z79.01 ENCOUNTER FOR MONITORING COUMADIN THERAPY: Primary | ICD-10-CM

## 2020-02-04 LAB
ANION GAP SERPL CALC-SCNC: 8 MMOL/L (ref 8–16)
BASOPHILS # BLD AUTO: 0.04 K/UL (ref 0–0.2)
BASOPHILS NFR BLD: 0.6 % (ref 0–1.9)
BUN SERPL-MCNC: 20 MG/DL (ref 8–23)
CALCIUM SERPL-MCNC: 9.5 MG/DL (ref 8.7–10.5)
CHLORIDE SERPL-SCNC: 107 MMOL/L (ref 95–110)
CO2 SERPL-SCNC: 24 MMOL/L (ref 23–29)
CREAT SERPL-MCNC: 1.1 MG/DL (ref 0.5–1.4)
DIFFERENTIAL METHOD: ABNORMAL
EOSINOPHIL # BLD AUTO: 0.2 K/UL (ref 0–0.5)
EOSINOPHIL NFR BLD: 2.8 % (ref 0–8)
ERYTHROCYTE [DISTWIDTH] IN BLOOD BY AUTOMATED COUNT: 14.5 % (ref 11.5–14.5)
EST. GFR  (AFRICAN AMERICAN): 54 ML/MIN/1.73 M^2
EST. GFR  (NON AFRICAN AMERICAN): 46.9 ML/MIN/1.73 M^2
GLUCOSE SERPL-MCNC: 148 MG/DL (ref 70–110)
HCT VFR BLD AUTO: 41.9 % (ref 37–48.5)
HGB BLD-MCNC: 13.6 G/DL (ref 12–16)
IMM GRANULOCYTES # BLD AUTO: 0.03 K/UL (ref 0–0.04)
IMM GRANULOCYTES NFR BLD AUTO: 0.4 % (ref 0–0.5)
INR PPP: 1.5 (ref 0.8–1.2)
LYMPHOCYTES # BLD AUTO: 0.8 K/UL (ref 1–4.8)
LYMPHOCYTES NFR BLD: 11.4 % (ref 18–48)
MCH RBC QN AUTO: 26.2 PG (ref 27–31)
MCHC RBC AUTO-ENTMCNC: 32.5 G/DL (ref 32–36)
MCV RBC AUTO: 81 FL (ref 82–98)
MONOCYTES # BLD AUTO: 0.5 K/UL (ref 0.3–1)
MONOCYTES NFR BLD: 7.4 % (ref 4–15)
NEUTROPHILS # BLD AUTO: 5.6 K/UL (ref 1.8–7.7)
NEUTROPHILS NFR BLD: 77.4 % (ref 38–73)
NRBC BLD-RTO: 0 /100 WBC
PLATELET # BLD AUTO: 191 K/UL (ref 150–350)
PMV BLD AUTO: 9.7 FL (ref 9.2–12.9)
POTASSIUM SERPL-SCNC: 3.8 MMOL/L (ref 3.5–5.1)
PROTHROMBIN TIME: 16.6 SEC (ref 9–12.5)
RBC # BLD AUTO: 5.19 M/UL (ref 4–5.4)
SODIUM SERPL-SCNC: 139 MMOL/L (ref 136–145)
WBC # BLD AUTO: 7.17 K/UL (ref 3.9–12.7)

## 2020-02-04 PROCEDURE — 36592 COLLECT BLOOD FROM PICC: CPT

## 2020-02-04 PROCEDURE — A4216 STERILE WATER/SALINE, 10 ML: HCPCS | Performed by: FAMILY MEDICINE

## 2020-02-04 PROCEDURE — 63600175 PHARM REV CODE 636 W HCPCS: Performed by: FAMILY MEDICINE

## 2020-02-04 PROCEDURE — 25000003 PHARM REV CODE 250: Performed by: FAMILY MEDICINE

## 2020-02-04 PROCEDURE — 80048 BASIC METABOLIC PNL TOTAL CA: CPT

## 2020-02-04 PROCEDURE — 85610 PROTHROMBIN TIME: CPT

## 2020-02-04 PROCEDURE — 96523 IRRIG DRUG DELIVERY DEVICE: CPT

## 2020-02-04 PROCEDURE — 85025 COMPLETE CBC W/AUTO DIFF WBC: CPT

## 2020-02-04 RX ORDER — HEPARIN 100 UNIT/ML
500 SYRINGE INTRAVENOUS
Status: CANCELLED | OUTPATIENT
Start: 2020-02-04

## 2020-02-04 RX ORDER — HEPARIN 100 UNIT/ML
500 SYRINGE INTRAVENOUS
Status: COMPLETED | OUTPATIENT
Start: 2020-02-04 | End: 2020-02-04

## 2020-02-04 RX ORDER — SODIUM CHLORIDE 0.9 % (FLUSH) 0.9 %
10 SYRINGE (ML) INJECTION
Status: COMPLETED | OUTPATIENT
Start: 2020-02-04 | End: 2020-02-04

## 2020-02-04 RX ORDER — SODIUM CHLORIDE 0.9 % (FLUSH) 0.9 %
10 SYRINGE (ML) INJECTION
Status: CANCELLED | OUTPATIENT
Start: 2020-02-04

## 2020-02-04 RX ADMIN — Medication 10 ML: at 08:02

## 2020-02-04 RX ADMIN — Medication 500 UNITS: at 08:02

## 2020-02-04 NOTE — PLAN OF CARE
Problem: Adult Inpatient Plan of Care  Goal: Plan of Care Review  Outcome: Ongoing, Progressing  Flowsheets (Taken 2/4/2020 0911)  Plan of Care Reviewed With: patient     Problem: Adult Inpatient Plan of Care  Goal: Patient-Specific Goal (Individualization)  Outcome: Ongoing, Progressing  Flowsheets (Taken 2/4/2020 0911)  Individualized Care Needs: none  Anxieties, Fears or Concerns: none  Patient-Specific Goals (Include Timeframe): Pt remain free of infection   Pt tolerated lab draw and port flush. NAD. Site unremarkable. Pt ambulated from  clinic for discharge.

## 2020-02-05 ENCOUNTER — PATIENT MESSAGE (OUTPATIENT)
Dept: FAMILY MEDICINE | Facility: CLINIC | Age: 80
End: 2020-02-05

## 2020-02-05 NOTE — TELEPHONE ENCOUNTER
02/04/2020  2:57pm--- Patient stated she would like you to manage her Coumadin. This is how she is taking it:    ------1 mg Coumadin tablets------    --Friday 2 tablets  --Saturday 2 tablets  --Sunday 3 tablets     Please advise. Thank you

## 2020-02-07 ENCOUNTER — TELEPHONE (OUTPATIENT)
Dept: FAMILY MEDICINE | Facility: CLINIC | Age: 80
End: 2020-02-07

## 2020-02-07 NOTE — TELEPHONE ENCOUNTER
----- Message from Carissa Cespedes MA sent at 2/7/2020  8:51 AM CST -----  You had sent a portal message to this patient regarding her Coumadin.    ----- Message -----  From: Jorge Gonzalez  Sent: 2/7/2020   8:28 AM CST  To: Herman [facility] Staff    Type: Needs Medical Advice    Who Called:  Self   Symptoms (please be specific):  NA   How long has patient had these symptoms:  NA  Pharmacy name and phone #:  Best Call Back Number:  [phone] Additional Information: Patient called asking for an update for dosage changes for coumadin medication.

## 2020-02-10 RX ORDER — WARFARIN 1 MG/1
TABLET ORAL
Qty: 180 TABLET | Refills: 3 | Status: SHIPPED | OUTPATIENT
Start: 2020-02-10 | End: 2020-05-25

## 2020-02-14 ENCOUNTER — OFFICE VISIT (OUTPATIENT)
Dept: FAMILY MEDICINE | Facility: CLINIC | Age: 80
End: 2020-02-14
Payer: MEDICARE

## 2020-02-14 VITALS
BODY MASS INDEX: 25.33 KG/M2 | HEART RATE: 74 BPM | OXYGEN SATURATION: 97 % | SYSTOLIC BLOOD PRESSURE: 127 MMHG | TEMPERATURE: 98 F | RESPIRATION RATE: 16 BRPM | DIASTOLIC BLOOD PRESSURE: 67 MMHG | HEIGHT: 62 IN | WEIGHT: 137.63 LBS

## 2020-02-14 DIAGNOSIS — Z79.01 ENCOUNTER FOR MONITORING COUMADIN THERAPY: ICD-10-CM

## 2020-02-14 DIAGNOSIS — I10 ESSENTIAL HYPERTENSION: Primary | ICD-10-CM

## 2020-02-14 DIAGNOSIS — Z51.81 ENCOUNTER FOR MONITORING COUMADIN THERAPY: ICD-10-CM

## 2020-02-14 DIAGNOSIS — R20.2 TINGLING: ICD-10-CM

## 2020-02-14 PROCEDURE — 99214 PR OFFICE/OUTPT VISIT, EST, LEVL IV, 30-39 MIN: ICD-10-PCS | Mod: S$GLB,,, | Performed by: FAMILY MEDICINE

## 2020-02-14 PROCEDURE — 99214 OFFICE O/P EST MOD 30 MIN: CPT | Mod: S$GLB,,, | Performed by: FAMILY MEDICINE

## 2020-02-14 NOTE — PROGRESS NOTES
[facility] - Clinic Note    Subjective      Ms. Rodríguez is a 80 y.o. female who presents to clinic for Follow-up    Patient reports that overall she is doing well.  Her last INR was 1.5.  She is currently taking 2 mg of Coumadin Monday, Wednesday, Friday, Sunday.  She has taking 3 mg of Coumadin Tuesday, Thursday, Saturday.  She reports that she had some bleeding with her gums this morning with brushing her teeth but that has resolved.  She reports some tingling has returned.  Her last potassium was within normal limits.  She also reports that some of her hair is falling out.    [facility] Ursula has a past medical history of Anemia (1995), Breast cancer (1995), Breast cancer (2013), Factor V Leiden (1994), Hyperlipidemia (2017), Hypertension (2017), Hypothyroidism (2019), Osteoporosis, Recurrent urinary tract infection, and Vertigo (1994).   [facility] Ursula has a past surgical history that includes Hemorrhoid surgery (1968); Hysterectomy (1970); Tumor removal (Left, 1994); Mastectomy (Right, 1994); and Mastectomy (Left, 2013).    Ursula's family history includes Cancer in her brother, maternal grandmother, and mother; Diabetes in her brother, brother, and father; Heart disease in her father and mother; Hypertension in her brother, brother, brother, and mother; Lung disease in her father; No Known Problems in her sister; Stroke in her brother and brother.    Ursula reports that she has never smoked. She has never used smokeless tobacco. She reports that she does not drink alcohol or use drugs.   JAC Vergara is allergic to iodine and iodide containing products and ditropan [oxybutynin chloride].   JAYME Vergara has a current medication list which includes the following prescription(s): amlodipine, levothyroxine, magnesium oxide, metoprolol tartrate, multivitamin, potassium, simvastatin, spironolactone, and warfarin.     Review of Systems   Constitutional: Negative for chills and fever.   Eyes: Negative for visual  "disturbance.   Cardiovascular: Negative for chest pain.   Neurological: Negative for dizziness and headaches.     Objective     /67 (BP Location: Left arm, Patient Position: Sitting, BP Method: Large (Automatic))   Pulse 74   Temp 97.8 °F (36.6 °C) (Oral)   Resp 16   Ht 5' 2" (1.575 m)   Wt 62.4 kg (137 lb 9.6 oz)   SpO2 97%   BMI 25.17 kg/m²     Physical Exam   Constitutional: She appears well-developed and well-nourished. No distress.   HENT:   Right Ear: External ear normal.   Left Ear: External ear normal.   Eyes: Right eye exhibits no discharge. Left eye exhibits no discharge.   Cardiovascular: Normal rate, regular rhythm and normal heart sounds.   Pulmonary/Chest: Effort normal and breath sounds normal. She has no wheezes. She has no rales.   Neurological: She is alert.   Skin: Skin is warm and dry.   Psychiatric: She has a normal mood and affect.   Nursing note and vitals reviewed.     Assessment/Plan     1. Essential hypertension     2. Tingling  TSH    T4, free    Basic metabolic panel   3. Encounter for monitoring Coumadin therapy  Protime-INR     Blood pressure well controlled.  Continue current medications as prescribed.  Patient will come on Monday to have lab work checked as above.    Follow up in about 3 months (around 5/14/2020) for follow up.    Future Appointments   Date Time Provider Department Center   3/2/2020  8:00 AM [facility], OP THERAPEUTICS 10 [facility] OPTHERA [facility]   3/16/2020 11:30 AM STEFFI Garcia MD [facility] HEM ONC [facility]   5/14/2020  8:20 AM Vicente Sutton MD [facility] FAMMED [facility]         Vicente Sutton MD  Family Medicine  [facility]            "

## 2020-02-17 ENCOUNTER — INFUSION (OUTPATIENT)
Dept: INFUSION THERAPY | Facility: HOSPITAL | Age: 80
End: 2020-02-17
Attending: FAMILY MEDICINE
Payer: MEDICARE

## 2020-02-17 DIAGNOSIS — Z79.01 ENCOUNTER FOR MONITORING COUMADIN THERAPY: ICD-10-CM

## 2020-02-17 DIAGNOSIS — R73.09 ELEVATED GLUCOSE LEVEL: Primary | ICD-10-CM

## 2020-02-17 DIAGNOSIS — Z85.3 HISTORY OF BILATERAL BREAST CANCER: ICD-10-CM

## 2020-02-17 DIAGNOSIS — Z51.81 ENCOUNTER FOR MONITORING COUMADIN THERAPY: ICD-10-CM

## 2020-02-17 DIAGNOSIS — R20.2 TINGLING: ICD-10-CM

## 2020-02-17 LAB
ANION GAP SERPL CALC-SCNC: 10 MMOL/L (ref 8–16)
BUN SERPL-MCNC: 20 MG/DL (ref 8–23)
CALCIUM SERPL-MCNC: 9.6 MG/DL (ref 8.7–10.5)
CHLORIDE SERPL-SCNC: 104 MMOL/L (ref 95–110)
CO2 SERPL-SCNC: 25 MMOL/L (ref 23–29)
CREAT SERPL-MCNC: 1.3 MG/DL (ref 0.5–1.4)
EST. GFR  (AFRICAN AMERICAN): 44.1 ML/MIN/1.73 M^2
EST. GFR  (NON AFRICAN AMERICAN): 38.3 ML/MIN/1.73 M^2
ESTIMATED AVG GLUCOSE: 123 MG/DL (ref 68–131)
GLUCOSE SERPL-MCNC: 119 MG/DL (ref 70–110)
HBA1C MFR BLD HPLC: 5.9 % (ref 4.5–6.2)
INR PPP: 4 (ref 0.8–1.2)
POTASSIUM SERPL-SCNC: 4.2 MMOL/L (ref 3.5–5.1)
PROTHROMBIN TIME: 42.3 SEC (ref 9–12.5)
SODIUM SERPL-SCNC: 139 MMOL/L (ref 136–145)
T4 FREE SERPL-MCNC: 1.33 NG/DL (ref 0.71–1.51)
TSH SERPL DL<=0.005 MIU/L-ACNC: 2.03 UIU/ML (ref 0.34–5.6)

## 2020-02-17 PROCEDURE — 80048 BASIC METABOLIC PNL TOTAL CA: CPT

## 2020-02-17 PROCEDURE — 63600175 PHARM REV CODE 636 W HCPCS: Performed by: FAMILY MEDICINE

## 2020-02-17 PROCEDURE — 84443 ASSAY THYROID STIM HORMONE: CPT

## 2020-02-17 PROCEDURE — 83036 HEMOGLOBIN GLYCOSYLATED A1C: CPT

## 2020-02-17 PROCEDURE — 85610 PROTHROMBIN TIME: CPT

## 2020-02-17 PROCEDURE — A4216 STERILE WATER/SALINE, 10 ML: HCPCS | Performed by: FAMILY MEDICINE

## 2020-02-17 PROCEDURE — 84439 ASSAY OF FREE THYROXINE: CPT

## 2020-02-17 PROCEDURE — 25000003 PHARM REV CODE 250: Performed by: FAMILY MEDICINE

## 2020-02-17 PROCEDURE — 36591 DRAW BLOOD OFF VENOUS DEVICE: CPT

## 2020-02-17 RX ORDER — SODIUM CHLORIDE 0.9 % (FLUSH) 0.9 %
10 SYRINGE (ML) INJECTION
Status: COMPLETED | OUTPATIENT
Start: 2020-02-17 | End: 2020-02-17

## 2020-02-17 RX ORDER — HEPARIN 100 UNIT/ML
500 SYRINGE INTRAVENOUS
Status: COMPLETED | OUTPATIENT
Start: 2020-02-17 | End: 2020-02-17

## 2020-02-17 RX ORDER — SODIUM CHLORIDE 0.9 % (FLUSH) 0.9 %
10 SYRINGE (ML) INJECTION
Status: CANCELLED | OUTPATIENT
Start: 2020-02-17

## 2020-02-17 RX ORDER — HEPARIN 100 UNIT/ML
500 SYRINGE INTRAVENOUS
Status: CANCELLED | OUTPATIENT
Start: 2020-02-17

## 2020-02-17 RX ADMIN — Medication 10 ML: at 10:02

## 2020-02-17 RX ADMIN — HEPARIN 500 UNITS: 100 SYRINGE at 10:02

## 2020-02-17 NOTE — PLAN OF CARE
Problem: Adult Inpatient Plan of Care  Goal: Plan of Care Review  Outcome: Ongoing, Progressing  Flowsheets (Taken 2/17/2020 1054)  Plan of Care Reviewed With: patient    Patient arrived today for port flush. Port located on left chest. Accessed without difficulty, blood return noted and patent. Labs collected. Heparin flushed. Deaccessed and covered with bandaid. AVS provided. Ambulates per self.

## 2020-02-18 ENCOUNTER — PATIENT MESSAGE (OUTPATIENT)
Dept: FAMILY MEDICINE | Facility: CLINIC | Age: 80
End: 2020-02-18

## 2020-02-18 ENCOUNTER — TELEPHONE (OUTPATIENT)
Dept: FAMILY MEDICINE | Facility: CLINIC | Age: 80
End: 2020-02-18

## 2020-02-18 DIAGNOSIS — Z79.01 ENCOUNTER FOR MONITORING COUMADIN THERAPY: Primary | ICD-10-CM

## 2020-02-18 DIAGNOSIS — Z51.81 ENCOUNTER FOR MONITORING COUMADIN THERAPY: Primary | ICD-10-CM

## 2020-02-18 NOTE — TELEPHONE ENCOUNTER
02/18/2020 4:29PM Notified patient of results. She is asking when does she need to recheck INR lab? Please advise. Thanks

## 2020-03-02 ENCOUNTER — PATIENT MESSAGE (OUTPATIENT)
Dept: FAMILY MEDICINE | Facility: CLINIC | Age: 80
End: 2020-03-02

## 2020-03-02 ENCOUNTER — INFUSION (OUTPATIENT)
Dept: INFUSION THERAPY | Facility: HOSPITAL | Age: 80
End: 2020-03-02
Attending: FAMILY MEDICINE
Payer: MEDICARE

## 2020-03-02 VITALS
RESPIRATION RATE: 18 BRPM | SYSTOLIC BLOOD PRESSURE: 143 MMHG | OXYGEN SATURATION: 98 % | HEART RATE: 67 BPM | TEMPERATURE: 98 F | DIASTOLIC BLOOD PRESSURE: 67 MMHG

## 2020-03-02 DIAGNOSIS — Z79.01 ENCOUNTER FOR MONITORING COUMADIN THERAPY: Primary | ICD-10-CM

## 2020-03-02 DIAGNOSIS — E87.6 HYPOKALEMIA: ICD-10-CM

## 2020-03-02 DIAGNOSIS — Z51.81 ENCOUNTER FOR MONITORING COUMADIN THERAPY: Primary | ICD-10-CM

## 2020-03-02 DIAGNOSIS — Z85.3 HISTORY OF BILATERAL BREAST CANCER: ICD-10-CM

## 2020-03-02 LAB
BASOPHILS # BLD AUTO: 0.04 K/UL (ref 0–0.2)
BASOPHILS NFR BLD: 0.5 % (ref 0–1.9)
DIFFERENTIAL METHOD: ABNORMAL
EOSINOPHIL # BLD AUTO: 0.2 K/UL (ref 0–0.5)
EOSINOPHIL NFR BLD: 2.8 % (ref 0–8)
ERYTHROCYTE [DISTWIDTH] IN BLOOD BY AUTOMATED COUNT: 14.6 % (ref 11.5–14.5)
HCT VFR BLD AUTO: 43.1 % (ref 37–48.5)
HGB BLD-MCNC: 14 G/DL (ref 12–16)
IMM GRANULOCYTES # BLD AUTO: 0.03 K/UL (ref 0–0.04)
IMM GRANULOCYTES NFR BLD AUTO: 0.4 % (ref 0–0.5)
INR PPP: 4.4 (ref 0.8–1.2)
LYMPHOCYTES # BLD AUTO: 0.7 K/UL (ref 1–4.8)
LYMPHOCYTES NFR BLD: 9.9 % (ref 18–48)
MCH RBC QN AUTO: 26.4 PG (ref 27–31)
MCHC RBC AUTO-ENTMCNC: 32.5 G/DL (ref 32–36)
MCV RBC AUTO: 81 FL (ref 82–98)
MONOCYTES # BLD AUTO: 0.5 K/UL (ref 0.3–1)
MONOCYTES NFR BLD: 7 % (ref 4–15)
NEUTROPHILS # BLD AUTO: 5.9 K/UL (ref 1.8–7.7)
NEUTROPHILS NFR BLD: 79.4 % (ref 38–73)
NRBC BLD-RTO: 0 /100 WBC
PLATELET # BLD AUTO: 217 K/UL (ref 150–350)
PMV BLD AUTO: 9.6 FL (ref 9.2–12.9)
PROTHROMBIN TIME: 46.2 SEC (ref 9–12.5)
RBC # BLD AUTO: 5.3 M/UL (ref 4–5.4)
WBC # BLD AUTO: 7.41 K/UL (ref 3.9–12.7)

## 2020-03-02 PROCEDURE — 63600175 PHARM REV CODE 636 W HCPCS: Performed by: FAMILY MEDICINE

## 2020-03-02 PROCEDURE — 25000003 PHARM REV CODE 250: Performed by: FAMILY MEDICINE

## 2020-03-02 PROCEDURE — 36591 DRAW BLOOD OFF VENOUS DEVICE: CPT

## 2020-03-02 PROCEDURE — 85610 PROTHROMBIN TIME: CPT

## 2020-03-02 PROCEDURE — 96523 IRRIG DRUG DELIVERY DEVICE: CPT

## 2020-03-02 PROCEDURE — A4216 STERILE WATER/SALINE, 10 ML: HCPCS | Performed by: FAMILY MEDICINE

## 2020-03-02 PROCEDURE — 85025 COMPLETE CBC W/AUTO DIFF WBC: CPT

## 2020-03-02 RX ORDER — HEPARIN 100 UNIT/ML
500 SYRINGE INTRAVENOUS
Status: CANCELLED | OUTPATIENT
Start: 2020-03-02

## 2020-03-02 RX ORDER — HEPARIN 100 UNIT/ML
500 SYRINGE INTRAVENOUS
Status: COMPLETED | OUTPATIENT
Start: 2020-03-02 | End: 2020-03-02

## 2020-03-02 RX ORDER — SODIUM CHLORIDE 0.9 % (FLUSH) 0.9 %
10 SYRINGE (ML) INJECTION
Status: CANCELLED | OUTPATIENT
Start: 2020-03-02

## 2020-03-02 RX ORDER — SODIUM CHLORIDE 0.9 % (FLUSH) 0.9 %
10 SYRINGE (ML) INJECTION
Status: COMPLETED | OUTPATIENT
Start: 2020-03-02 | End: 2020-03-02

## 2020-03-02 RX ADMIN — Medication 10 ML: at 08:03

## 2020-03-02 RX ADMIN — HEPARIN 500 UNITS: 100 SYRINGE at 08:03

## 2020-03-02 NOTE — PLAN OF CARE
Pt tolerated lab draw and port flush w/o difficulty. Lcw port site unremarkable. NAD. Pt ambulatory for discharge at this time with no further questions or concerns.

## 2020-03-03 ENCOUNTER — TELEPHONE (OUTPATIENT)
Dept: FAMILY MEDICINE | Facility: CLINIC | Age: 80
End: 2020-03-03

## 2020-03-04 DIAGNOSIS — I10 ESSENTIAL HYPERTENSION: ICD-10-CM

## 2020-03-04 RX ORDER — SPIRONOLACTONE 25 MG/1
12.5 TABLET ORAL DAILY
Qty: 90 TABLET | Refills: 3 | Status: SHIPPED | OUTPATIENT
Start: 2020-03-04 | End: 2020-05-11 | Stop reason: SDUPTHER

## 2020-03-13 ENCOUNTER — TELEPHONE (OUTPATIENT)
Dept: FAMILY MEDICINE | Facility: CLINIC | Age: 80
End: 2020-03-13

## 2020-03-13 ENCOUNTER — INFUSION (OUTPATIENT)
Dept: INFUSION THERAPY | Facility: HOSPITAL | Age: 80
End: 2020-03-13
Attending: FAMILY MEDICINE
Payer: MEDICARE

## 2020-03-13 ENCOUNTER — DOCUMENTATION ONLY (OUTPATIENT)
Dept: INFUSION THERAPY | Facility: HOSPITAL | Age: 80
End: 2020-03-13

## 2020-03-13 DIAGNOSIS — Z85.3 HISTORY OF BILATERAL BREAST CANCER: ICD-10-CM

## 2020-03-13 DIAGNOSIS — Z51.81 ENCOUNTER FOR MONITORING COUMADIN THERAPY: Primary | ICD-10-CM

## 2020-03-13 DIAGNOSIS — Z79.01 ENCOUNTER FOR MONITORING COUMADIN THERAPY: Primary | ICD-10-CM

## 2020-03-13 LAB
INR PPP: 1.2 (ref 0.8–1.2)
PROTHROMBIN TIME: 13.1 SEC (ref 9–12.5)

## 2020-03-13 PROCEDURE — 36592 COLLECT BLOOD FROM PICC: CPT

## 2020-03-13 PROCEDURE — A4216 STERILE WATER/SALINE, 10 ML: HCPCS | Performed by: FAMILY MEDICINE

## 2020-03-13 PROCEDURE — 85610 PROTHROMBIN TIME: CPT

## 2020-03-13 PROCEDURE — 25000003 PHARM REV CODE 250: Performed by: FAMILY MEDICINE

## 2020-03-13 RX ORDER — SODIUM CHLORIDE 0.9 % (FLUSH) 0.9 %
10 SYRINGE (ML) INJECTION
Status: COMPLETED | OUTPATIENT
Start: 2020-03-13 | End: 2020-03-13

## 2020-03-13 RX ORDER — SODIUM CHLORIDE 0.9 % (FLUSH) 0.9 %
10 SYRINGE (ML) INJECTION
Status: CANCELLED | OUTPATIENT
Start: 2020-03-13

## 2020-03-13 RX ORDER — HEPARIN 100 UNIT/ML
500 SYRINGE INTRAVENOUS
Status: DISCONTINUED | OUTPATIENT
Start: 2020-03-13 | End: 2020-03-13 | Stop reason: HOSPADM

## 2020-03-13 RX ORDER — HEPARIN 100 UNIT/ML
500 SYRINGE INTRAVENOUS
Status: CANCELLED | OUTPATIENT
Start: 2020-03-13

## 2020-03-13 RX ADMIN — Medication 10 ML: at 11:03

## 2020-03-13 NOTE — TELEPHONE ENCOUNTER
Inform pt of her lab the provider has ordered for today. Pt states will come in and will leave paperwork  For clearance for surgery with me.

## 2020-03-13 NOTE — TELEPHONE ENCOUNTER
----- Message from Yisel Rubio LPN sent at 3/12/2020  6:14 PM CDT -----      ----- Message -----  From: Michelle Bernal  Sent: 3/12/2020   2:13 PM CDT  To: [facility] Staff    Type: Needs Medical Advice    Who Called:  Patient  Best Call Back Number: [phone]  Additional Information: Patient requesting to speak with nurse (Nnao)has question concerning paperwork for Cataract Surgery and INR/please call back to advise.

## 2020-03-13 NOTE — PROGRESS NOTES
"I sure hate to bother you with this lol     mrn [id number]     She just called zelda out because she claims she has tried reaching you guys for days regarding her "bleeding" and elevated inr. Would you please have someone call and give her further instructions.      No problem       "

## 2020-03-18 ENCOUNTER — PATIENT MESSAGE (OUTPATIENT)
Dept: FAMILY MEDICINE | Facility: CLINIC | Age: 80
End: 2020-03-18

## 2020-03-20 ENCOUNTER — INFUSION (OUTPATIENT)
Dept: INFUSION THERAPY | Facility: HOSPITAL | Age: 80
End: 2020-03-20
Attending: FAMILY MEDICINE
Payer: MEDICARE

## 2020-03-20 VITALS
DIASTOLIC BLOOD PRESSURE: 62 MMHG | RESPIRATION RATE: 16 BRPM | TEMPERATURE: 98 F | HEART RATE: 68 BPM | SYSTOLIC BLOOD PRESSURE: 132 MMHG

## 2020-03-20 DIAGNOSIS — Z85.3 HISTORY OF BILATERAL BREAST CANCER: ICD-10-CM

## 2020-03-20 DIAGNOSIS — Z51.81 ENCOUNTER FOR MONITORING COUMADIN THERAPY: Primary | ICD-10-CM

## 2020-03-20 DIAGNOSIS — Z79.01 ENCOUNTER FOR MONITORING COUMADIN THERAPY: Primary | ICD-10-CM

## 2020-03-20 LAB
INR PPP: 3.5 (ref 0.8–1.2)
PROTHROMBIN TIME: 37.7 SEC (ref 9–12.5)

## 2020-03-20 PROCEDURE — 25000003 PHARM REV CODE 250: Performed by: FAMILY MEDICINE

## 2020-03-20 PROCEDURE — 36591 DRAW BLOOD OFF VENOUS DEVICE: CPT

## 2020-03-20 PROCEDURE — A4216 STERILE WATER/SALINE, 10 ML: HCPCS | Performed by: FAMILY MEDICINE

## 2020-03-20 PROCEDURE — 63600175 PHARM REV CODE 636 W HCPCS: Performed by: FAMILY MEDICINE

## 2020-03-20 PROCEDURE — 85610 PROTHROMBIN TIME: CPT

## 2020-03-20 RX ORDER — SODIUM CHLORIDE 0.9 % (FLUSH) 0.9 %
10 SYRINGE (ML) INJECTION
Status: CANCELLED | OUTPATIENT
Start: 2020-03-20

## 2020-03-20 RX ORDER — HEPARIN 100 UNIT/ML
500 SYRINGE INTRAVENOUS
Status: COMPLETED | OUTPATIENT
Start: 2020-03-20 | End: 2020-03-20

## 2020-03-20 RX ORDER — HEPARIN 100 UNIT/ML
500 SYRINGE INTRAVENOUS
Status: CANCELLED | OUTPATIENT
Start: 2020-03-20

## 2020-03-20 RX ORDER — SODIUM CHLORIDE 0.9 % (FLUSH) 0.9 %
10 SYRINGE (ML) INJECTION
Status: COMPLETED | OUTPATIENT
Start: 2020-03-20 | End: 2020-03-20

## 2020-03-20 RX ADMIN — Medication 10 ML: at 07:03

## 2020-03-20 RX ADMIN — HEPARIN 500 UNITS: 100 SYRINGE at 07:03

## 2020-03-20 NOTE — PLAN OF CARE
Problem: Adult Inpatient Plan of Care  Goal: Plan of Care Review  Outcome: Ongoing, Progressing    /62 (Patient Position: Sitting)   Pulse 68   Temp 98.4 °F (36.9 °C) (Oral)   Resp 16   Patient arrived today for port flush. Port located on left chest. Accessed without difficulty, blood return noted and patent. Labs collected. Saline flushed and heparin locked. Deaccessed and covered with gauze, tape. Ambulates per self.

## 2020-03-27 ENCOUNTER — PATIENT MESSAGE (OUTPATIENT)
Dept: FAMILY MEDICINE | Facility: CLINIC | Age: 80
End: 2020-03-27

## 2020-03-30 ENCOUNTER — INFUSION (OUTPATIENT)
Dept: INFUSION THERAPY | Facility: HOSPITAL | Age: 80
End: 2020-03-30
Attending: FAMILY MEDICINE
Payer: MEDICARE

## 2020-03-30 VITALS
TEMPERATURE: 98 F | DIASTOLIC BLOOD PRESSURE: 65 MMHG | RESPIRATION RATE: 18 BRPM | SYSTOLIC BLOOD PRESSURE: 145 MMHG | HEART RATE: 72 BPM | OXYGEN SATURATION: 97 %

## 2020-03-30 DIAGNOSIS — E87.6 HYPOKALEMIA: Primary | ICD-10-CM

## 2020-03-30 DIAGNOSIS — Z79.01 ENCOUNTER FOR MONITORING COUMADIN THERAPY: ICD-10-CM

## 2020-03-30 DIAGNOSIS — Z85.3 HISTORY OF BILATERAL BREAST CANCER: ICD-10-CM

## 2020-03-30 DIAGNOSIS — Z51.81 ENCOUNTER FOR MONITORING COUMADIN THERAPY: ICD-10-CM

## 2020-03-30 LAB
ANION GAP SERPL CALC-SCNC: 9 MMOL/L (ref 8–16)
BUN SERPL-MCNC: 24 MG/DL (ref 8–23)
CALCIUM SERPL-MCNC: 9.6 MG/DL (ref 8.7–10.5)
CHLORIDE SERPL-SCNC: 104 MMOL/L (ref 95–110)
CO2 SERPL-SCNC: 23 MMOL/L (ref 23–29)
CREAT SERPL-MCNC: 1.1 MG/DL (ref 0.5–1.4)
EST. GFR  (AFRICAN AMERICAN): 54 ML/MIN/1.73 M^2
EST. GFR  (NON AFRICAN AMERICAN): 46.9 ML/MIN/1.73 M^2
GLUCOSE SERPL-MCNC: 128 MG/DL (ref 70–110)
INR PPP: 3.4 (ref 0.8–1.2)
POTASSIUM SERPL-SCNC: 3.8 MMOL/L (ref 3.5–5.1)
PROTHROMBIN TIME: 36.6 SEC (ref 9–12.5)
SODIUM SERPL-SCNC: 136 MMOL/L (ref 136–145)

## 2020-03-30 PROCEDURE — 85610 PROTHROMBIN TIME: CPT

## 2020-03-30 PROCEDURE — 25000003 PHARM REV CODE 250: Performed by: FAMILY MEDICINE

## 2020-03-30 PROCEDURE — 63600175 PHARM REV CODE 636 W HCPCS: Performed by: FAMILY MEDICINE

## 2020-03-30 PROCEDURE — A4216 STERILE WATER/SALINE, 10 ML: HCPCS | Performed by: FAMILY MEDICINE

## 2020-03-30 PROCEDURE — 36592 COLLECT BLOOD FROM PICC: CPT

## 2020-03-30 PROCEDURE — 80048 BASIC METABOLIC PNL TOTAL CA: CPT

## 2020-03-30 RX ORDER — HEPARIN 100 UNIT/ML
500 SYRINGE INTRAVENOUS
Status: CANCELLED | OUTPATIENT
Start: 2020-03-30

## 2020-03-30 RX ORDER — SODIUM CHLORIDE 0.9 % (FLUSH) 0.9 %
10 SYRINGE (ML) INJECTION
Status: CANCELLED | OUTPATIENT
Start: 2020-03-30

## 2020-03-30 RX ORDER — HEPARIN 100 UNIT/ML
500 SYRINGE INTRAVENOUS
Status: COMPLETED | OUTPATIENT
Start: 2020-03-30 | End: 2020-03-30

## 2020-03-30 RX ORDER — SODIUM CHLORIDE 0.9 % (FLUSH) 0.9 %
10 SYRINGE (ML) INJECTION
Status: COMPLETED | OUTPATIENT
Start: 2020-03-30 | End: 2020-03-30

## 2020-03-30 RX ADMIN — Medication 10 ML: at 08:03

## 2020-03-30 RX ADMIN — HEPARIN 500 UNITS: 100 SYRINGE at 08:03

## 2020-03-30 NOTE — PLAN OF CARE
PT tolerated blood draw from lcw port. Positive blood return noted. Site unremarkable. Heparin locked after lab draw. Pt has no further questions or concerns. Pt ambulated from unit in nad.

## 2020-03-31 NOTE — TELEPHONE ENCOUNTER
Discussed coumadin dosing with patient. New schedule: 1mg on T/Th, 2mg on MWFSS. Will recheck next Monday at home.

## 2020-04-01 ENCOUNTER — TELEPHONE (OUTPATIENT)
Dept: FAMILY MEDICINE | Facility: CLINIC | Age: 80
End: 2020-04-01

## 2020-04-01 NOTE — TELEPHONE ENCOUNTER
04/01/2020 4:19PM-- Spoke with patient and she stated she did receive Dr Sutton's message and understands the dose change.        ----- Message from Velma Gillis sent at 4/1/2020 11:58 AM CDT -----  Contact: Mary Anne TUCKER  Type: Needs medical advice      Who Called: [place]  Best Call Back Number:  [phone]  Additional Information: Virginia stated pt INR lab from 3/31/20 is out of range (1.3). Stated to contact patient in regards to result.       Please advise. Thank you

## 2020-04-08 ENCOUNTER — PATIENT MESSAGE (OUTPATIENT)
Dept: FAMILY MEDICINE | Facility: CLINIC | Age: 80
End: 2020-04-08

## 2020-04-13 ENCOUNTER — PATIENT MESSAGE (OUTPATIENT)
Dept: ADMINISTRATIVE | Facility: HOSPITAL | Age: 80
End: 2020-04-13

## 2020-04-14 ENCOUNTER — TELEPHONE (OUTPATIENT)
Dept: ADMINISTRATIVE | Facility: HOSPITAL | Age: 80
End: 2020-04-14

## 2020-04-14 NOTE — TELEPHONE ENCOUNTER
Spoke with pt about request sent through her portal. She was concerned that a 4 week pt/inr with flush had not been scheduled being that it was a standing order. Appt made per standing orders for 04/27/20 @ 0835. Offered a Virtual Visit for her may appt with Dr Sutton. Pt stated that she did not have a smart phone or tablet. I told her a Virtual Video appt could be done. She stated that she would like that. May 14ths appt changed to a Virtual Video appt at this time.

## 2020-04-20 ENCOUNTER — TELEPHONE (OUTPATIENT)
Dept: FAMILY MEDICINE | Facility: CLINIC | Age: 80
End: 2020-04-20

## 2020-04-20 DIAGNOSIS — I10 ESSENTIAL HYPERTENSION: Primary | ICD-10-CM

## 2020-04-20 NOTE — TELEPHONE ENCOUNTER
Patient called via phone and states her INR 1.7 and PT 20.6. Patient states she checks this during the middle of the month and for the past 2 weeks her B/P has been running a bit high. Would like to see if she can get an MRI and EKG done.     --- Message from Malka Bennett sent at 4/20/2020 11:05 AM CDT -----  Contact: self  Type: Needs Medical Advice  Who Called:  self  Symptoms (please be specific):    How long has patient had these symptoms:    Pharmacy name and phone #:    Best Call Back Number: [phone] (home)   Additional Information: Patient is calling to give doctor the results of her INR. Please call patient. Thanks!

## 2020-04-21 RX ORDER — LISINOPRIL 10 MG/1
10 TABLET ORAL DAILY
Qty: 90 TABLET | Refills: 3 | Status: SHIPPED | OUTPATIENT
Start: 2020-04-21 | End: 2020-05-11

## 2020-04-21 NOTE — TELEPHONE ENCOUNTER
The headache could be related to the blood pressure being elevated. You have been taking amlodipine 5 mg twice a day; we probably should add another blood pressure medication to improve your blood pressure and see if the headaches get better before getting an MRI, especially now with the coronavirus. One option we have is lisinopril; have you had any issues with that in the past? I

## 2020-04-21 NOTE — TELEPHONE ENCOUNTER
INR is 1.7 - should go to 13 mg/week ( 1mg on Tuesday, 2mg on MWThFSS). How high has the BP been? What does she want an MRI of?

## 2020-04-21 NOTE — TELEPHONE ENCOUNTER
Spoke with patient, Patient has taken lisinopril in the past with no side effects. States that would be ok to take, uses [facility] [place] pharmacy .  Please advise.

## 2020-04-21 NOTE — TELEPHONE ENCOUNTER
Called patient, states that she has been having a headache on the right side of her head. Requesting MRI to make sure there isnt a problem with her head. BP still running high 153/72 was BP today. Please advise.

## 2020-04-22 ENCOUNTER — NURSE TRIAGE (OUTPATIENT)
Dept: ADMINISTRATIVE | Facility: CLINIC | Age: 80
End: 2020-04-22

## 2020-04-22 NOTE — TELEPHONE ENCOUNTER
Spoke with pt and current B/P reading is 139/78.  Pt had daughter go and p/u medication for B/P stabilization.  Pt states that she will call back if her condition worsens but feels stable at the present time.    No other concerns voiced during this telephone encounter.

## 2020-04-22 NOTE — TELEPHONE ENCOUNTER
Patient called stating her B/P fluctuations 156/8, 154/79, Pulse 80 and that she still has a pressure headache.  Has not taken new medication yet as prescribed by MD yesterday. States she is about to go get medication and would like a call back to see if her symptoms are better or worse. Advised per protocol. Understanding verbalized.    Reason for Disposition   Nursing judgment    Protocols used: ST NO PROTOCOL AVAILABLE - INFORMATION ONLY-A-[place]

## 2020-04-23 DIAGNOSIS — Z79.01 ENCOUNTER FOR MONITORING COUMADIN THERAPY: Primary | ICD-10-CM

## 2020-04-23 DIAGNOSIS — Z51.81 ENCOUNTER FOR MONITORING COUMADIN THERAPY: Primary | ICD-10-CM

## 2020-04-24 ENCOUNTER — LAB VISIT (OUTPATIENT)
Dept: FAMILY MEDICINE | Facility: CLINIC | Age: 80
End: 2020-04-24
Payer: MEDICARE

## 2020-04-24 DIAGNOSIS — Z79.01 ENCOUNTER FOR MONITORING COUMADIN THERAPY: ICD-10-CM

## 2020-04-24 DIAGNOSIS — Z51.81 ENCOUNTER FOR MONITORING COUMADIN THERAPY: ICD-10-CM

## 2020-04-24 PROCEDURE — U0002 COVID-19 LAB TEST NON-CDC: HCPCS

## 2020-04-25 LAB — SARS-COV-2 RNA RESP QL NAA+PROBE: NOT DETECTED

## 2020-04-27 ENCOUNTER — INFUSION (OUTPATIENT)
Dept: INFUSION THERAPY | Facility: HOSPITAL | Age: 80
End: 2020-04-27
Attending: FAMILY MEDICINE
Payer: MEDICARE

## 2020-04-27 VITALS
DIASTOLIC BLOOD PRESSURE: 73 MMHG | SYSTOLIC BLOOD PRESSURE: 169 MMHG | RESPIRATION RATE: 16 BRPM | HEART RATE: 71 BPM | TEMPERATURE: 98 F | OXYGEN SATURATION: 98 %

## 2020-04-27 DIAGNOSIS — E87.6 HYPOKALEMIA: ICD-10-CM

## 2020-04-27 DIAGNOSIS — Z85.3 HISTORY OF BILATERAL BREAST CANCER: ICD-10-CM

## 2020-04-27 DIAGNOSIS — Z51.81 ENCOUNTER FOR MONITORING COUMADIN THERAPY: Primary | ICD-10-CM

## 2020-04-27 DIAGNOSIS — Z79.01 ENCOUNTER FOR MONITORING COUMADIN THERAPY: Primary | ICD-10-CM

## 2020-04-27 LAB
ANION GAP SERPL CALC-SCNC: 8 MMOL/L (ref 8–16)
BUN SERPL-MCNC: 30 MG/DL (ref 8–23)
CALCIUM SERPL-MCNC: 9.5 MG/DL (ref 8.7–10.5)
CHLORIDE SERPL-SCNC: 105 MMOL/L (ref 95–110)
CO2 SERPL-SCNC: 23 MMOL/L (ref 23–29)
CREAT SERPL-MCNC: 1.3 MG/DL (ref 0.5–1.4)
EST. GFR  (AFRICAN AMERICAN): 44.1 ML/MIN/1.73 M^2
EST. GFR  (NON AFRICAN AMERICAN): 38.3 ML/MIN/1.73 M^2
GLUCOSE SERPL-MCNC: 215 MG/DL (ref 70–110)
INR PPP: 3.4 (ref 0.8–1.2)
POTASSIUM SERPL-SCNC: 4.1 MMOL/L (ref 3.5–5.1)
PROTHROMBIN TIME: 36.3 SEC (ref 9–12.5)
SODIUM SERPL-SCNC: 136 MMOL/L (ref 136–145)

## 2020-04-27 PROCEDURE — 63600175 PHARM REV CODE 636 W HCPCS: Performed by: FAMILY MEDICINE

## 2020-04-27 PROCEDURE — 25000003 PHARM REV CODE 250: Performed by: FAMILY MEDICINE

## 2020-04-27 PROCEDURE — A4216 STERILE WATER/SALINE, 10 ML: HCPCS | Performed by: FAMILY MEDICINE

## 2020-04-27 PROCEDURE — 85610 PROTHROMBIN TIME: CPT

## 2020-04-27 PROCEDURE — 80048 BASIC METABOLIC PNL TOTAL CA: CPT

## 2020-04-27 PROCEDURE — 96523 IRRIG DRUG DELIVERY DEVICE: CPT

## 2020-04-27 RX ORDER — SODIUM CHLORIDE 0.9 % (FLUSH) 0.9 %
10 SYRINGE (ML) INJECTION
Status: CANCELLED | OUTPATIENT
Start: 2020-04-27

## 2020-04-27 RX ORDER — SODIUM CHLORIDE 0.9 % (FLUSH) 0.9 %
10 SYRINGE (ML) INJECTION
Status: COMPLETED | OUTPATIENT
Start: 2020-04-27 | End: 2020-04-27

## 2020-04-27 RX ORDER — HEPARIN 100 UNIT/ML
500 SYRINGE INTRAVENOUS
Status: COMPLETED | OUTPATIENT
Start: 2020-04-27 | End: 2020-04-27

## 2020-04-27 RX ORDER — HEPARIN 100 UNIT/ML
500 SYRINGE INTRAVENOUS
Status: CANCELLED | OUTPATIENT
Start: 2020-04-27

## 2020-04-27 RX ADMIN — Medication 10 ML: at 08:04

## 2020-04-27 RX ADMIN — Medication 500 UNITS: at 08:04

## 2020-04-27 NOTE — PLAN OF CARE
Problem: Adult Inpatient Plan of Care  Goal: Plan of Care Review  Outcome: Ongoing, Progressing  Goal: Patient-Specific Goal (Individualization)  Outcome: Ongoing, Progressing  Goal: Absence of Hospital-Acquired Illness or Injury  Outcome: Ongoing, Progressing  Goal: Optimal Comfort and Wellbeing  Outcome: Ongoing, Progressing  Goal: Readiness for Transition of Care  Outcome: Ongoing, Progressing  Goal: Rounds/Family Conference  Outcome: Ongoing, Progressing    BP (!) 169/73 (BP Location: Left arm, Patient Position: Sitting)   Pulse 71   Temp 97.8 °F (36.6 °C) (Oral)   Resp 16   SpO2 98%       Pt arrived ambulatory to OPT for labs/port flush.  Orders reviewed.  Supplies gathered.  LCW port accessed per protocol with 20 gauge, 1 inch [place] needle.  Blood return noted.  10 mL wasted.  Labs collected.  Flushed well with NS and Heparin per protocol.   Needle removed intact.  Bandaid applied to site.  Pt tolerated well.      Pt to RTC in 1 month on 5/25/2020 for repeat labs/port flush.  Pt active on patient portal for appointment information.  Pt ambulatory out of OPT to POV at this time with NAD noted.

## 2020-04-30 ENCOUNTER — PATIENT OUTREACH (OUTPATIENT)
Dept: ADMINISTRATIVE | Facility: HOSPITAL | Age: 80
End: 2020-04-30

## 2020-04-30 NOTE — LETTER
May 11, 2020    Ursula Rodríguez  [address] [place] [address]             [facility] [address]  [facility] [address]  Phone: [phone] Dear Myrna [facility] is committed to your overall health and would like to ensure that you are up to date on your recommended test and/or procedures.   Vicente Sutton MD  has found that your chart shows you may be due for the following:     TETANUS VACCINE due on 01[birth year]03/1956   Shingles Vaccine(1 of 2) due on 01/03/1988   Pneumococcal Vaccine (65+ High/Highest Risk)(1 of 2 - PCV13) due on 01/03/2003     If you have had any of the above done at another facility, please let us know so that we may obtain copies from that facility.  If you have a copy of these records, please provide a copy for us to scan into your chart.  You are welcome to request that the report be faxed to us at  ([phone]).     Otherwise, please schedule these appointments at your earliest convenience by calling [phone] or going to [email or web address].     If you have an upcoming scheduled appointment for the item above, please disregard this letter.     Sincerely,   Your [facility]   MD Ana María Godoy L.P.N. Clinical Care Coordinator   [address] [place] [address], [place] [address] [phone]

## 2020-05-05 ENCOUNTER — PATIENT MESSAGE (OUTPATIENT)
Dept: ADMINISTRATIVE | Facility: HOSPITAL | Age: 80
End: 2020-05-05

## 2020-05-06 ENCOUNTER — PATIENT MESSAGE (OUTPATIENT)
Dept: FAMILY MEDICINE | Facility: CLINIC | Age: 80
End: 2020-05-06

## 2020-05-07 ENCOUNTER — TELEPHONE (OUTPATIENT)
Dept: FAMILY MEDICINE | Facility: CLINIC | Age: 80
End: 2020-05-07

## 2020-05-07 DIAGNOSIS — I10 ESSENTIAL HYPERTENSION: ICD-10-CM

## 2020-05-07 DIAGNOSIS — Z79.01 ENCOUNTER FOR MONITORING COUMADIN THERAPY: Primary | ICD-10-CM

## 2020-05-07 DIAGNOSIS — R20.2 TINGLING: ICD-10-CM

## 2020-05-07 DIAGNOSIS — Z51.81 ENCOUNTER FOR MONITORING COUMADIN THERAPY: Primary | ICD-10-CM

## 2020-05-07 NOTE — TELEPHONE ENCOUNTER
Called patient, patient states that someone from the office returned her call and scheduled her appointment. She has no new concerns at this time.

## 2020-05-11 ENCOUNTER — TELEPHONE (OUTPATIENT)
Dept: FAMILY MEDICINE | Facility: CLINIC | Age: 80
End: 2020-05-11

## 2020-05-11 ENCOUNTER — LAB VISIT (OUTPATIENT)
Dept: LAB | Facility: HOSPITAL | Age: 80
End: 2020-05-11
Attending: FAMILY MEDICINE
Payer: MEDICARE

## 2020-05-11 ENCOUNTER — OFFICE VISIT (OUTPATIENT)
Dept: FAMILY MEDICINE | Facility: CLINIC | Age: 80
End: 2020-05-11
Payer: MEDICARE

## 2020-05-11 ENCOUNTER — INFUSION (OUTPATIENT)
Dept: INFUSION THERAPY | Facility: HOSPITAL | Age: 80
End: 2020-05-11
Attending: FAMILY MEDICINE
Payer: MEDICARE

## 2020-05-11 VITALS
TEMPERATURE: 98 F | DIASTOLIC BLOOD PRESSURE: 71 MMHG | OXYGEN SATURATION: 98 % | HEART RATE: 80 BPM | SYSTOLIC BLOOD PRESSURE: 156 MMHG | RESPIRATION RATE: 15 BRPM | WEIGHT: 138 LBS | BODY MASS INDEX: 25.4 KG/M2 | HEIGHT: 62 IN

## 2020-05-11 DIAGNOSIS — R20.2 TINGLING: ICD-10-CM

## 2020-05-11 DIAGNOSIS — I10 ESSENTIAL HYPERTENSION: ICD-10-CM

## 2020-05-11 DIAGNOSIS — Z79.01 ENCOUNTER FOR MONITORING COUMADIN THERAPY: Primary | ICD-10-CM

## 2020-05-11 DIAGNOSIS — M17.0 PRIMARY OSTEOARTHRITIS OF BOTH KNEES: Primary | ICD-10-CM

## 2020-05-11 DIAGNOSIS — Z51.81 ENCOUNTER FOR MONITORING COUMADIN THERAPY: Primary | ICD-10-CM

## 2020-05-11 DIAGNOSIS — Z79.01 ENCOUNTER FOR MONITORING COUMADIN THERAPY: ICD-10-CM

## 2020-05-11 DIAGNOSIS — Z51.81 ENCOUNTER FOR MONITORING COUMADIN THERAPY: ICD-10-CM

## 2020-05-11 DIAGNOSIS — G44.89 CHRONIC MIXED HEADACHE SYNDROME: ICD-10-CM

## 2020-05-11 LAB
ANION GAP SERPL CALC-SCNC: 10 MMOL/L (ref 8–16)
BASOPHILS # BLD AUTO: 0.05 K/UL (ref 0–0.2)
BASOPHILS NFR BLD: 0.5 % (ref 0–1.9)
BUN SERPL-MCNC: 29 MG/DL (ref 8–23)
CALCIUM SERPL-MCNC: 9.5 MG/DL (ref 8.7–10.5)
CHLORIDE SERPL-SCNC: 103 MMOL/L (ref 95–110)
CO2 SERPL-SCNC: 22 MMOL/L (ref 23–29)
CREAT SERPL-MCNC: 1.3 MG/DL (ref 0.5–1.4)
DIFFERENTIAL METHOD: ABNORMAL
EOSINOPHIL # BLD AUTO: 0.2 K/UL (ref 0–0.5)
EOSINOPHIL NFR BLD: 2.3 % (ref 0–8)
ERYTHROCYTE [DISTWIDTH] IN BLOOD BY AUTOMATED COUNT: 14.5 % (ref 11.5–14.5)
EST. GFR  (AFRICAN AMERICAN): 44.1 ML/MIN/1.73 M^2
EST. GFR  (NON AFRICAN AMERICAN): 38.3 ML/MIN/1.73 M^2
GLUCOSE SERPL-MCNC: 145 MG/DL (ref 70–110)
HCT VFR BLD AUTO: 42.9 % (ref 37–48.5)
HGB BLD-MCNC: 14.4 G/DL (ref 12–16)
IMM GRANULOCYTES # BLD AUTO: 0.02 K/UL (ref 0–0.04)
IMM GRANULOCYTES NFR BLD AUTO: 0.2 % (ref 0–0.5)
INR PPP: 2.6 (ref 0.8–1.2)
INR PPP: 2.6 (ref 0.8–1.2)
LYMPHOCYTES # BLD AUTO: 0.9 K/UL (ref 1–4.8)
LYMPHOCYTES NFR BLD: 9.8 % (ref 18–48)
MCH RBC QN AUTO: 27.5 PG (ref 27–31)
MCHC RBC AUTO-ENTMCNC: 33.6 G/DL (ref 32–36)
MCV RBC AUTO: 82 FL (ref 82–98)
MONOCYTES # BLD AUTO: 0.6 K/UL (ref 0.3–1)
MONOCYTES NFR BLD: 6.5 % (ref 4–15)
NEUTROPHILS # BLD AUTO: 7.5 K/UL (ref 1.8–7.7)
NEUTROPHILS NFR BLD: 80.7 % (ref 38–73)
NRBC BLD-RTO: 0 /100 WBC
PLATELET # BLD AUTO: 214 K/UL (ref 150–350)
PMV BLD AUTO: 9.8 FL (ref 9.2–12.9)
POTASSIUM SERPL-SCNC: 4.2 MMOL/L (ref 3.5–5.1)
PROTHROMBIN TIME: 27.8 SEC (ref 9–12.5)
PROTHROMBIN TIME: 27.8 SEC (ref 9–12.5)
RBC # BLD AUTO: 5.24 M/UL (ref 4–5.4)
SODIUM SERPL-SCNC: 135 MMOL/L (ref 136–145)
TSH SERPL DL<=0.005 MIU/L-ACNC: 2.59 UIU/ML (ref 0.34–5.6)
WBC # BLD AUTO: 9.26 K/UL (ref 3.9–12.7)

## 2020-05-11 PROCEDURE — 85025 COMPLETE CBC W/AUTO DIFF WBC: CPT

## 2020-05-11 PROCEDURE — 80048 BASIC METABOLIC PNL TOTAL CA: CPT

## 2020-05-11 PROCEDURE — 99214 OFFICE O/P EST MOD 30 MIN: CPT | Mod: S$GLB,,, | Performed by: FAMILY MEDICINE

## 2020-05-11 PROCEDURE — 85610 PROTHROMBIN TIME: CPT

## 2020-05-11 PROCEDURE — 36592 COLLECT BLOOD FROM PICC: CPT

## 2020-05-11 PROCEDURE — 99214 PR OFFICE/OUTPT VISIT, EST, LEVL IV, 30-39 MIN: ICD-10-PCS | Mod: S$GLB,,, | Performed by: FAMILY MEDICINE

## 2020-05-11 PROCEDURE — 36415 COLL VENOUS BLD VENIPUNCTURE: CPT

## 2020-05-11 PROCEDURE — 84443 ASSAY THYROID STIM HORMONE: CPT

## 2020-05-11 RX ORDER — SPIRONOLACTONE 25 MG/1
12.5 TABLET ORAL 2 TIMES DAILY
Qty: 90 TABLET | Refills: 3 | Status: SHIPPED | OUTPATIENT
Start: 2020-05-11 | End: 2020-07-22 | Stop reason: SDUPTHER

## 2020-05-11 NOTE — TELEPHONE ENCOUNTER
----- Message from Karla Lindsey sent at 5/11/2020  2:00 PM CDT -----  Contact: pt  Type: Needs Medical Advice  Who Called:  pt  Symptoms (please be specific):    How long has patient had these symptoms:    Pharmacy name and phone #:   Best Call Back Number:[phone] (home)     Additional Information: pt called to report INR  was 1.9, she also stated she would like to speak with someone in provider office because prescriptions where sent to the wrong pharmacy. Changed to [facility] [place] [address]  Phone: [phone]

## 2020-05-11 NOTE — NURSING
Pt arrived for appt to check pt/inr venous vs port draw. Verified with Dr. Sutton. Pt/inr drawn from port and venous.

## 2020-05-13 NOTE — PROGRESS NOTES
[facility] - Clinic Note    Subjective      Ms. Rodríguez is a 80 y.o. female who presents to clinic for Follow-up (Med discussion )    Have been having trouble lately with warfarin titration.  INR has been in a pin down based on different readings from the clinic versus her home fingerstick monitor.  She is currently on 12 mg weekly with 1 mg on Monday and Friday and 2 mg every other day.  She has not been eating differently.  She is not having any other medications other than lisinopril which will be stopping today.  Her dose of Synthroid has been stable.  This morning she had blood drawn from a vein and blood drawn from report to check the INR to see if there is any difference.  When she goes home she will check with her monitor to see any difference.  Her blood pressure has been slightly elevated.  She has been taking the amlodipine 5 mg twice a day.  She continues to have right-sided headache with some right-sided numbness for the last month.  She had an MRI about a year ago which was within normal limits.  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    [facility] Ursula has a past medical history of Anemia (1995), Breast cancer (1995), Breast cancer (2013), Factor V Leiden (1994), Hyperlipidemia (2017), Hypertension (2017), Hypothyroidism (2019), Osteoporosis, Recurrent urinary tract infection, and Vertigo (1994).   [facility] Ursula has a past surgical history that includes Hemorrhoid surgery (1968); Hysterectomy (1970); Tumor removal (Left, 1994); Mastectomy (Right, 1994); and Mastectomy (Left, 2013).    Ursula's family history includes Cancer in her brother, maternal grandmother, and mother; Diabetes in her brother, brother, and father; Heart disease in her father and mother; Hypertension in her brother, brother, brother, and mother; Lung disease in her father; No Known Problems in her sister; Stroke in her brother and brother.    Ursula reports that she has never smoked. She has never used smokeless tobacco.  "She reports that she does not drink alcohol or use drugs.   JAC Vergara is allergic to iodine and iodide containing products and ditropan [oxybutynin chloride].   JAYME Vergara has a current medication list which includes the following prescription(s): amlodipine, levothyroxine, magnesium oxide, metoprolol tartrate, multivitamin, potassium, simvastatin, spironolactone, and warfarin.     Review of Systems   Constitutional: Negative for chills and fever.   Respiratory: Negative for cough.    Cardiovascular: Negative for chest pain.   Neurological: Positive for numbness and headaches.     Objective     BP (!) 156/71 (BP Location: Left arm, Patient Position: Sitting, BP Method: Medium (Automatic))   Pulse 80   Temp 97.7 °F (36.5 °C) (Oral)   Resp 15   Ht 5' 2" (1.575 m)   Wt 62.6 kg (138 lb)   SpO2 98%   BMI 25.24 kg/m²     Physical Exam   Constitutional: She appears well-developed and well-nourished. No distress.   HENT:   Head: Normocephalic and atraumatic.   Right Ear: External ear normal.   Left Ear: External ear normal.   Eyes: Right eye exhibits no discharge. Left eye exhibits no discharge.   Cardiovascular: Normal rate, regular rhythm and normal heart sounds.   Pulmonary/Chest: Effort normal and breath sounds normal. She has no wheezes. She has no rales.   Neurological: She is alert.   Skin: Skin is warm and dry.   Psychiatric: She has a normal mood and affect.   Nursing note and vitals reviewed.     Assessment/Plan     1. Encounter for monitoring Coumadin therapy  Protime-INR   2. Essential hypertension  spironolactone (ALDACTONE) 25 MG tablet   3. Chronic mixed headache syndrome  MRI Brain Without Contrast     Given elevated blood pressure will increase spironolactone to 12.5 mg twice a day and stop lisinopril as patient has not been tolerating this medication.  Given has chronicity of headache and numbness will obtain MRI of her brain for further evaluation.  Will adjust Coumadin dosing based on INR " readings.    Follow up in about 2 months (around 7/11/2020) for follow up.    Future Appointments   Date Time Provider Department Center   5/15/2020  9:00 AM [facility] MRI1 350 LB LIMIT [facility] MRI [facility]   5/18/2020  8:40 AM [facility], LABORATORY [facility] LAB [facility]   5/25/2020  8:00 AM [facility], OP THERAPEUTICS 2 [facility] OPTHERA [facility]   6/1/2020 11:30 AM Valerio Chew,  [facility] ORTHO1 [facility]         Vicente Sutton MD  Family Medicine  [facility]

## 2020-05-14 ENCOUNTER — TELEPHONE (OUTPATIENT)
Dept: FAMILY MEDICINE | Facility: CLINIC | Age: 80
End: 2020-05-14

## 2020-05-14 NOTE — TELEPHONE ENCOUNTER
Dr Sutton, can you clarify the orders for this patient please thank you.        ----- Message from Flavia Dsouza RN sent at 5/14/2020  3:12 PM CDT -----  Good afternoon,   I would like to verify her lab schedule with Dr Sutton. The orders I see are for PT/INR every 4 weeks. CBC every 12 weeks.   For some reason we collected on 5/11 and I have her scheduled for another lab on 5/25.    Thank you!

## 2020-05-15 ENCOUNTER — HOSPITAL ENCOUNTER (OUTPATIENT)
Dept: RADIOLOGY | Facility: HOSPITAL | Age: 80
Discharge: HOME | End: 2020-05-15
Attending: FAMILY MEDICINE
Payer: MEDICARE

## 2020-05-15 DIAGNOSIS — G44.89 CHRONIC MIXED HEADACHE SYNDROME: ICD-10-CM

## 2020-05-15 PROCEDURE — 70551 MRI BRAIN WITHOUT CONTRAST: ICD-10-PCS | Mod: 26,,, | Performed by: RADIOLOGY

## 2020-05-15 PROCEDURE — 70551 MRI BRAIN STEM W/O DYE: CPT | Mod: 26,,, | Performed by: RADIOLOGY

## 2020-05-15 PROCEDURE — 70551 MRI BRAIN STEM W/O DYE: CPT | Mod: TC

## 2020-05-15 NOTE — TELEPHONE ENCOUNTER
It depends on the next INR - we have been having some trouble recently. When it stabilizes it will be once a month.

## 2020-05-18 ENCOUNTER — LAB VISIT (OUTPATIENT)
Dept: LAB | Facility: HOSPITAL | Age: 80
End: 2020-05-18
Attending: FAMILY MEDICINE
Payer: MEDICARE

## 2020-05-18 ENCOUNTER — INFUSION (OUTPATIENT)
Dept: INFUSION THERAPY | Facility: HOSPITAL | Age: 80
End: 2020-05-18
Attending: FAMILY MEDICINE
Payer: MEDICARE

## 2020-05-18 VITALS
SYSTOLIC BLOOD PRESSURE: 169 MMHG | BODY MASS INDEX: 25.4 KG/M2 | RESPIRATION RATE: 16 BRPM | HEIGHT: 62 IN | OXYGEN SATURATION: 98 % | WEIGHT: 138 LBS | HEART RATE: 71 BPM | TEMPERATURE: 98 F | DIASTOLIC BLOOD PRESSURE: 73 MMHG

## 2020-05-18 DIAGNOSIS — Z79.01 ENCOUNTER FOR MONITORING COUMADIN THERAPY: Primary | ICD-10-CM

## 2020-05-18 DIAGNOSIS — Z79.01 ENCOUNTER FOR MONITORING COUMADIN THERAPY: ICD-10-CM

## 2020-05-18 DIAGNOSIS — Z85.3 HISTORY OF BILATERAL BREAST CANCER: ICD-10-CM

## 2020-05-18 DIAGNOSIS — Z51.81 ENCOUNTER FOR MONITORING COUMADIN THERAPY: ICD-10-CM

## 2020-05-18 DIAGNOSIS — Z51.81 ENCOUNTER FOR MONITORING COUMADIN THERAPY: Primary | ICD-10-CM

## 2020-05-18 LAB
INR PPP: 2.6 (ref 0.8–1.2)
INR PPP: 2.9 (ref 0.8–1.2)
PROTHROMBIN TIME: 28.1 SEC (ref 9–12.5)
PROTHROMBIN TIME: 30.6 SEC (ref 9–12.5)
SARS-COV-2 RDRP RESP QL NAA+PROBE: NEGATIVE

## 2020-05-18 PROCEDURE — 25000003 PHARM REV CODE 250: Performed by: FAMILY MEDICINE

## 2020-05-18 PROCEDURE — A4216 STERILE WATER/SALINE, 10 ML: HCPCS | Performed by: FAMILY MEDICINE

## 2020-05-18 PROCEDURE — U0002 COVID-19 LAB TEST NON-CDC: HCPCS

## 2020-05-18 PROCEDURE — 85610 PROTHROMBIN TIME: CPT | Mod: 91

## 2020-05-18 PROCEDURE — 36592 COLLECT BLOOD FROM PICC: CPT

## 2020-05-18 PROCEDURE — 63600175 PHARM REV CODE 636 W HCPCS: Performed by: FAMILY MEDICINE

## 2020-05-18 PROCEDURE — 36415 COLL VENOUS BLD VENIPUNCTURE: CPT

## 2020-05-18 PROCEDURE — 85610 PROTHROMBIN TIME: CPT

## 2020-05-18 RX ORDER — HEPARIN 100 UNIT/ML
500 SYRINGE INTRAVENOUS
Status: CANCELLED | OUTPATIENT
Start: 2020-05-18

## 2020-05-18 RX ORDER — SODIUM CHLORIDE 0.9 % (FLUSH) 0.9 %
10 SYRINGE (ML) INJECTION
Status: CANCELLED | OUTPATIENT
Start: 2020-05-18

## 2020-05-18 RX ORDER — HEPARIN 100 UNIT/ML
500 SYRINGE INTRAVENOUS
Status: COMPLETED | OUTPATIENT
Start: 2020-05-18 | End: 2020-05-18

## 2020-05-18 RX ORDER — SODIUM CHLORIDE 0.9 % (FLUSH) 0.9 %
10 SYRINGE (ML) INJECTION
Status: COMPLETED | OUTPATIENT
Start: 2020-05-18 | End: 2020-05-18

## 2020-05-18 RX ADMIN — Medication 10 ML: at 09:05

## 2020-05-18 RX ADMIN — HEPARIN 500 UNITS: 100 SYRINGE at 09:05

## 2020-05-18 NOTE — PLAN OF CARE
Pt arrived ambulatory to [facility] for lab draw/port flush.  Orders reviewed.  Supplies gathered.  LCW port accessed with 20 gauge, 1 in [place] needle per sterile technique.  Flushed well with 10 mL NS.  Blood return noted.  10 mL blood wasted per protocol.  Additional 10 mL blood drawn for labs.  Needle flushed with 10 mL NS and Heparin per protocol.  Port de-accessed with needle intact.  Bandaid secured to site. Pt tolerated well.  Pt ambulatory out of OPT to POV at this time with all her belongings and NAD noted.

## 2020-05-19 DIAGNOSIS — Z85.3 HISTORY OF BILATERAL BREAST CANCER: Primary | ICD-10-CM

## 2020-05-20 ENCOUNTER — PATIENT MESSAGE (OUTPATIENT)
Dept: ADMINISTRATIVE | Facility: HOSPITAL | Age: 80
End: 2020-05-20

## 2020-05-23 ENCOUNTER — LAB VISIT (OUTPATIENT)
Dept: FAMILY MEDICINE | Facility: CLINIC | Age: 80
End: 2020-05-23
Payer: MEDICARE

## 2020-05-23 DIAGNOSIS — Z85.3 HISTORY OF BILATERAL BREAST CANCER: ICD-10-CM

## 2020-05-23 PROCEDURE — U0003 INFECTIOUS AGENT DETECTION BY NUCLEIC ACID (DNA OR RNA); SEVERE ACUTE RESPIRATORY SYNDROME CORONAVIRUS 2 (SARS-COV-2) (CORONAVIRUS DISEASE [COVID-19]), AMPLIFIED PROBE TECHNIQUE, MAKING USE OF HIGH THROUGHPUT TECHNOLOGIES AS DESCRIBED BY CMS-2020-01-R: HCPCS

## 2020-05-24 LAB — SARS-COV-2 RNA RESP QL NAA+PROBE: NOT DETECTED

## 2020-05-25 ENCOUNTER — INFUSION (OUTPATIENT)
Dept: INFUSION THERAPY | Facility: HOSPITAL | Age: 80
End: 2020-05-25
Attending: FAMILY MEDICINE
Payer: MEDICARE

## 2020-05-25 ENCOUNTER — TELEPHONE (OUTPATIENT)
Dept: FAMILY MEDICINE | Facility: CLINIC | Age: 80
End: 2020-05-25

## 2020-05-25 DIAGNOSIS — Z51.81 ENCOUNTER FOR MONITORING COUMADIN THERAPY: ICD-10-CM

## 2020-05-25 DIAGNOSIS — Z79.01 ENCOUNTER FOR MONITORING COUMADIN THERAPY: ICD-10-CM

## 2020-05-25 LAB
BASOPHILS # BLD AUTO: 0.03 K/UL (ref 0–0.2)
BASOPHILS NFR BLD: 0.4 % (ref 0–1.9)
DIFFERENTIAL METHOD: ABNORMAL
EOSINOPHIL # BLD AUTO: 0.3 K/UL (ref 0–0.5)
EOSINOPHIL NFR BLD: 4.1 % (ref 0–8)
ERYTHROCYTE [DISTWIDTH] IN BLOOD BY AUTOMATED COUNT: 14.4 % (ref 11.5–14.5)
HCT VFR BLD AUTO: 38.7 % (ref 37–48.5)
HGB BLD-MCNC: 13 G/DL (ref 12–16)
IMM GRANULOCYTES # BLD AUTO: 0.04 K/UL (ref 0–0.04)
IMM GRANULOCYTES NFR BLD AUTO: 0.6 % (ref 0–0.5)
INR PPP: 3.3 (ref 0.8–1.2)
LYMPHOCYTES # BLD AUTO: 0.8 K/UL (ref 1–4.8)
LYMPHOCYTES NFR BLD: 11 % (ref 18–48)
MCH RBC QN AUTO: 27.4 PG (ref 27–31)
MCHC RBC AUTO-ENTMCNC: 33.6 G/DL (ref 32–36)
MCV RBC AUTO: 82 FL (ref 82–98)
MONOCYTES # BLD AUTO: 0.6 K/UL (ref 0.3–1)
MONOCYTES NFR BLD: 8.7 % (ref 4–15)
NEUTROPHILS # BLD AUTO: 5.2 K/UL (ref 1.8–7.7)
NEUTROPHILS NFR BLD: 75.2 % (ref 38–73)
NRBC BLD-RTO: 0 /100 WBC
PLATELET # BLD AUTO: 184 K/UL (ref 150–350)
PMV BLD AUTO: 9.3 FL (ref 9.2–12.9)
PROTHROMBIN TIME: 35.1 SEC (ref 9–12.5)
RBC # BLD AUTO: 4.75 M/UL (ref 4–5.4)
WBC # BLD AUTO: 6.9 K/UL (ref 3.9–12.7)

## 2020-05-25 PROCEDURE — 85025 COMPLETE CBC W/AUTO DIFF WBC: CPT

## 2020-05-25 PROCEDURE — 36592 COLLECT BLOOD FROM PICC: CPT

## 2020-05-25 PROCEDURE — 85610 PROTHROMBIN TIME: CPT

## 2020-05-25 RX ORDER — WARFARIN 1 MG/1
TABLET ORAL
Qty: 180 TABLET | Refills: 3 | Status: SHIPPED | OUTPATIENT
Start: 2020-05-25 | End: 2020-06-22 | Stop reason: SDUPTHER

## 2020-05-25 NOTE — TELEPHONE ENCOUNTER
Discussed lab results with patient.  INR at home was 0.8.  INR here is 3.3.  Blood is thick at home.  May have falsely elevated INR here due to port draw.  Will increase dosage to 13 mg per week.  2 mg Monday Wednesday Friday, 1 mg Tuesday Thursday Saturday Sunday.  Recheck INR on Thursday at home.  Will continue to monitor blood pressure.  Patient has been taking clonidine 0.1 mg as needed for blood pressures greater than 160 systolic.

## 2020-05-26 ENCOUNTER — TELEPHONE (OUTPATIENT)
Dept: FAMILY MEDICINE | Facility: CLINIC | Age: 80
End: 2020-05-26

## 2020-06-02 ENCOUNTER — PATIENT OUTREACH (OUTPATIENT)
Dept: ADMINISTRATIVE | Facility: OTHER | Age: 80
End: 2020-06-02

## 2020-06-05 ENCOUNTER — TELEPHONE (OUTPATIENT)
Dept: ORTHOPEDICS | Facility: CLINIC | Age: 80
End: 2020-06-05

## 2020-06-05 NOTE — TELEPHONE ENCOUNTER
----- Message from Maurizio Felipe sent at 6/5/2020  2:17 PM CDT -----  Contact: pt - [phone]  Looking for shots spoke with Rhett

## 2020-06-08 ENCOUNTER — TELEPHONE (OUTPATIENT)
Dept: FAMILY MEDICINE | Facility: CLINIC | Age: 80
End: 2020-06-08

## 2020-06-08 NOTE — TELEPHONE ENCOUNTER
----- Message from Halima Sims sent at 6/8/2020 10:23 AM CDT -----  Contact: pt  Pt states that she needs to talk to the Dr,does blood work at home and it is all out of wack....[phone] (home)

## 2020-06-08 NOTE — TELEPHONE ENCOUNTER
Called pt via phone. Pt states her INR was 4.2 today. Pt states she no longer gets her medication from [facility] but now gets it from [facility]. Patient states she is taking Coumadin 1mg tabs- 2 M,W,F,Sun then taking 3 tabs T, TH, Sat. Pt current med directions is 2mg Monday Wednesday Friday, 1 mg Tuesday Thursday Saturday Sunday. Pt states she will recheck her INR tomorrow.

## 2020-06-09 ENCOUNTER — TELEPHONE (OUTPATIENT)
Dept: FAMILY MEDICINE | Facility: CLINIC | Age: 80
End: 2020-06-09

## 2020-06-09 NOTE — TELEPHONE ENCOUNTER
Patient stated her PT/INR on  06/08/2020 Mon. was 4.2 PT/IRN - held med Mon night  06/09/2020 Tues was 3.7    Coumadin schedule normally is - -   M-W-F-Sat 2mg  T-Th-Sun 3mg    Blood Pressure Today:  8am      169/76    74  9:21am 151/171  84   1/2 klonidine, spironolactone, and metoprolol  Please advise. Thanks  -----------------------------------------------------------------------    ----- Message from Nella Mueller sent at 6/9/2020  9:34 AM CDT -----  Type: Needs Medical Advice  Who Called:  Patient  Best Call Back Number: [phone] (home)     Additional Information: Needs to talk to office concerning the blood work she did this morning. Please call for the details.

## 2020-06-10 ENCOUNTER — PATIENT OUTREACH (OUTPATIENT)
Dept: ADMINISTRATIVE | Facility: OTHER | Age: 80
End: 2020-06-10

## 2020-06-11 ENCOUNTER — TELEPHONE (OUTPATIENT)
Dept: FAMILY MEDICINE | Facility: CLINIC | Age: 80
End: 2020-06-11

## 2020-06-11 RX ORDER — CLONIDINE HYDROCHLORIDE 0.1 MG/1
0.1 TABLET ORAL 2 TIMES DAILY PRN
Qty: 60 TABLET | Refills: 11 | Status: SHIPPED | OUTPATIENT
Start: 2020-06-11 | End: 2021-10-04 | Stop reason: SDUPTHER

## 2020-06-11 NOTE — TELEPHONE ENCOUNTER
----- Message from Phuong Davis LPN sent at 6/11/2020 10:18 AM CDT -----  Contact: patient  Please Advise  ----- Message -----  From: Kia Carrasquillo  Sent: 6/11/2020  10:07 AM CDT  To: [facility] Staff    Type: Needs Medical Advice  Who Called:  patient  Best Call Back Number: [phone]  Additional Information: Patient states Dr. Sutton asked her to call today to speak with him about the thickening of her blood 1.9 reading of INR and the PT is 23.3.  She states she needs to speak only with the doctor.  Please call to advise.  Thanks!

## 2020-06-11 NOTE — TELEPHONE ENCOUNTER
Spoke to patient about INR results.  INR today 1.9.  Will continue current dose of 2 mg every day.  Recheck on Monday.  Patient needs refill of clonidine.

## 2020-06-12 ENCOUNTER — OFFICE VISIT (OUTPATIENT)
Dept: ORTHOPEDICS | Facility: CLINIC | Age: 80
End: 2020-06-12
Payer: MEDICARE

## 2020-06-12 VITALS
RESPIRATION RATE: 18 BRPM | WEIGHT: 138 LBS | TEMPERATURE: 98 F | OXYGEN SATURATION: 97 % | HEIGHT: 62 IN | BODY MASS INDEX: 25.4 KG/M2 | HEART RATE: 86 BPM

## 2020-06-12 DIAGNOSIS — M23.52 CHRONIC INSTABILITY OF LEFT KNEE: ICD-10-CM

## 2020-06-12 DIAGNOSIS — M25.569 KNEE PAIN, UNSPECIFIED CHRONICITY, UNSPECIFIED LATERALITY: ICD-10-CM

## 2020-06-12 DIAGNOSIS — M17.0 PRIMARY OSTEOARTHRITIS OF BOTH KNEES: Primary | ICD-10-CM

## 2020-06-12 DIAGNOSIS — M23.51 CHRONIC INSTABILITY OF RIGHT KNEE: ICD-10-CM

## 2020-06-12 PROCEDURE — 20610 DRAIN/INJ JOINT/BURSA W/O US: CPT | Mod: 50,PBBFAC | Performed by: ORTHOPAEDIC SURGERY

## 2020-06-12 PROCEDURE — 99999 PR PBB SHADOW E&M-EST. PATIENT-LVL III: CPT | Mod: PBBFAC,,, | Performed by: ORTHOPAEDIC SURGERY

## 2020-06-12 PROCEDURE — 99499 UNLISTED E&M SERVICE: CPT | Mod: S$PBB,,, | Performed by: ORTHOPAEDIC SURGERY

## 2020-06-12 PROCEDURE — 99999 PR PBB SHADOW E&M-EST. PATIENT-LVL III: ICD-10-PCS | Mod: PBBFAC,,, | Performed by: ORTHOPAEDIC SURGERY

## 2020-06-12 PROCEDURE — 99499 NO LOS: ICD-10-PCS | Mod: S$PBB,,, | Performed by: ORTHOPAEDIC SURGERY

## 2020-06-12 PROCEDURE — 20610 LARGE JOINT ASPIRATION/INJECTION: BILATERAL KNEE: ICD-10-PCS | Mod: 50,S$PBB,, | Performed by: ORTHOPAEDIC SURGERY

## 2020-06-12 PROCEDURE — 99213 OFFICE O/P EST LOW 20 MIN: CPT | Mod: PBBFAC | Performed by: ORTHOPAEDIC SURGERY

## 2020-06-12 RX ADMIN — Medication 48 MG: at 08:06

## 2020-06-15 NOTE — PROGRESS NOTES
Subjective:      Patient ID: Ursula Rodríguez is a 80 y.o. female.    Chief Complaint: Injections of the Right Knee and Injections of the Left Knee      HPI: Ms. Rodríguez returns today with complaints of recurrent pain in both of her knees.  She stated that the pain began approximately 1 month ago.  She now also has a feeling of giving way which is caused her to fall several times.  She is having some trouble getting up from the toilet due to the tenderness.  She has taken some NSAIDs with help.  She has not recently done physical therapy.  She has not worn braces.  He had injections in the past which helped.    ROS:  No new diagnosis/surgery/prescriptions since last office visit on 11/26/2019  Constitution: Negative for chills and fever.   HENT: Negative for congestion.    Eyes: Negative for blurred vision.   Cardiovascular: Negative for chest pain.   Respiratory: Negative for cough.    Endocrine: Negative for polydipsia.   Hematologic/Lymphatic: Bruises/bleeds easily.   Skin: Negative for itching.   Musculoskeletal: Positive for joint pain.   Gastrointestinal: Negative for constipation and diarrhea.   Genitourinary: Negative for nocturia.   Neurological: Negative for seizures.   Psychiatric/Behavioral: Negative for substance abuse.   Allergic/Immunologic: Negative for environmental allergies.       Objective:      Physical Exam:   General: AAOx3.  No acute distress  Vascular:  Pulses intact and equal bilaterally.  Capillary refill less than 3 seconds and equal bilaterally  Neurologic:  Pinprick and soft touch intact and equal bilaterally  Integment:  No ecchymosis, no errythema  Extremity: Knee:  Extension/flexion equal bilaterally 0/118 degrees. Mild varus alignment both knees.  Mild effusion both knees.  Crepitus with motion both knees.  Mildly positive patellar load/compression both knees.  Negative patella apprehension/relocation both knees.  Mild increased excursion with valgus stressing both knees right  greater than left.  Baker cyst right knee. Excursion equal bilaterally with Lachman/drawer.  Positive joint line tenderness both knees right greater than left.  Mild tenderness over the MCL both knees.  Rebekah negative both knees.  Auglaize positive both knees.  Nontender at the anserine insertion both knees.   No swelling at the anserine insertion both knees.  Radiography:  No new x-rays done today.      Assessment:       Impression:     1. Tricompartmental arthritis, both knees   2. Instability both knees.   3. Bilateral knee pain.         Plan:       1.  Discussed physical examination and radiographic findings with the patient. Ursula understands that she has arthritis in her knees which is symptomatic.  Since she does well with conservative management she could continue with conservative care and when she fails conservative care then consider surgical intervention.  2.  Offered Synvisc-One to both knees, she elected to proceed.  3.  Breg freestyle OA brace, both knees, prescription for the patient to purchase them was given to her.  4.  All NSAIDs per PCM.  5.  Home exercises to include quadriceps and hamstring strengthening were discussed with the patient.  6.  Offered referred to physical therapy she declined.  7.  [facility] portal was discussed with the patient and information was given.  The patient was encouraged to use the portal for future encounters.  8.  Follow up p.r.n..

## 2020-06-15 NOTE — PROCEDURES
Large Joint Aspiration/Injection: bilateral knee    Date/Time: 6/12/2020 8:00 AM  Performed by: Valerio Chew DO  Authorized by: Valerio Chew, DO     Consent Done?:  Yes (Verbal)  Indications:  Diagnostic evaluation, pain and joint swelling  Site marked: the procedure site was marked    Timeout: prior to procedure the correct patient, procedure, and site was verified    Prep: patient was prepped and draped in usual sterile fashion      Details:  Needle Size:  22 G  Ultrasonic Guidance for needle placement?: No    Approach:  Anterolateral  Location:  Knee  Laterality:  Bilateral  Site:  Bilateral knee  Medications (Right):  48 mg hylan g-f 20 48 mg/6 mL  Medications (Left):  48 mg hylan g-f 20 48 mg/6 mL  Patient tolerance:  Patient tolerated the procedure well with no immediate complications

## 2020-06-21 DIAGNOSIS — D68.59 HYPERCOAGULOPATHY: Primary | ICD-10-CM

## 2020-06-22 ENCOUNTER — PATIENT MESSAGE (OUTPATIENT)
Dept: FAMILY MEDICINE | Facility: CLINIC | Age: 80
End: 2020-06-22

## 2020-06-22 RX ORDER — WARFARIN 1 MG/1
TABLET ORAL
Qty: 180 TABLET | Refills: 3 | Status: SHIPPED | OUTPATIENT
Start: 2020-06-22 | End: 2021-01-04

## 2020-06-25 ENCOUNTER — TELEPHONE (OUTPATIENT)
Dept: FAMILY MEDICINE | Facility: CLINIC | Age: 80
End: 2020-06-25

## 2020-06-25 DIAGNOSIS — I10 ESSENTIAL HYPERTENSION: Primary | ICD-10-CM

## 2020-06-25 RX ORDER — NIFEDIPINE 90 MG/1
90 TABLET, EXTENDED RELEASE ORAL DAILY
Qty: 30 TABLET | Refills: 3 | Status: SHIPPED | OUTPATIENT
Start: 2020-06-25 | End: 2020-07-02

## 2020-06-25 NOTE — PROGRESS NOTES
Patient not reacting to metoprolol well. Reports this has happened in the past. States that she did not take the medication today and feels better. Needs BP control though. Will switch amlodipine to nifedipine 90mg. Will send to pharmacy.   Spoke to Dr. Sutton about the plan and he agrees.

## 2020-06-25 NOTE — TELEPHONE ENCOUNTER
Notified patient of verbal from Dr Sutton to stop amlodipine and start nifedipine. Stop metoprolol. Patient voiced understanding        Patient of Dr Sutton's, She is taking Metoprolol and her arms are itching and have dark spots on them legs are swelling, breast are hurting and sore throat. Denies trouble breathing or fever. This has been going on for a while. She takes it BID, she did not take it this morning. Asking if she should take take it tonight? She is feeling better today. She said this happen once before and stopped med. But was put back on it. /74 p. 64, 167/81 p 84.  Please advise. Thanks      ----- Message from Elysia Sahu MA sent at 6/25/2020 10:09 AM CDT -----  Regarding: Call Back  PT is requesting a call back stated its Important. The nature of the call was not stated  Best Call Back # [phone]

## 2020-06-29 ENCOUNTER — INFUSION (OUTPATIENT)
Dept: INFUSION THERAPY | Facility: HOSPITAL | Age: 80
End: 2020-06-29
Attending: FAMILY MEDICINE
Payer: MEDICARE

## 2020-06-29 VITALS
HEART RATE: 91 BPM | TEMPERATURE: 98 F | SYSTOLIC BLOOD PRESSURE: 156 MMHG | DIASTOLIC BLOOD PRESSURE: 70 MMHG | OXYGEN SATURATION: 99 %

## 2020-06-29 DIAGNOSIS — Z79.01 ENCOUNTER FOR MONITORING COUMADIN THERAPY: Primary | ICD-10-CM

## 2020-06-29 DIAGNOSIS — D68.59 HYPERCOAGULOPATHY: ICD-10-CM

## 2020-06-29 DIAGNOSIS — Z85.3 HISTORY OF BILATERAL BREAST CANCER: ICD-10-CM

## 2020-06-29 DIAGNOSIS — Z51.81 ENCOUNTER FOR MONITORING COUMADIN THERAPY: Primary | ICD-10-CM

## 2020-06-29 LAB
INR PPP: 3.7 (ref 0.8–1.2)
PROTHROMBIN TIME: 39.5 SEC (ref 9–12.5)

## 2020-06-29 PROCEDURE — 36591 DRAW BLOOD OFF VENOUS DEVICE: CPT

## 2020-06-29 PROCEDURE — 85306 CLOT INHIBIT PROT S FREE: CPT

## 2020-06-29 PROCEDURE — A4216 STERILE WATER/SALINE, 10 ML: HCPCS | Performed by: FAMILY MEDICINE

## 2020-06-29 PROCEDURE — 63600175 PHARM REV CODE 636 W HCPCS: Performed by: FAMILY MEDICINE

## 2020-06-29 PROCEDURE — 85303 CLOT INHIBIT PROT C ACTIVITY: CPT

## 2020-06-29 PROCEDURE — 85300 ANTITHROMBIN III ACTIVITY: CPT

## 2020-06-29 PROCEDURE — 25000003 PHARM REV CODE 250: Performed by: FAMILY MEDICINE

## 2020-06-29 PROCEDURE — 85610 PROTHROMBIN TIME: CPT

## 2020-06-29 PROCEDURE — 85335 FACTOR INHIBITOR TEST: CPT

## 2020-06-29 RX ORDER — SODIUM CHLORIDE 0.9 % (FLUSH) 0.9 %
10 SYRINGE (ML) INJECTION
Status: CANCELLED | OUTPATIENT
Start: 2020-06-29

## 2020-06-29 RX ORDER — SODIUM CHLORIDE 0.9 % (FLUSH) 0.9 %
10 SYRINGE (ML) INJECTION
Status: COMPLETED | OUTPATIENT
Start: 2020-06-29 | End: 2020-06-29

## 2020-06-29 RX ORDER — HEPARIN 100 UNIT/ML
500 SYRINGE INTRAVENOUS
Status: CANCELLED | OUTPATIENT
Start: 2020-06-29

## 2020-06-29 RX ORDER — HEPARIN 100 UNIT/ML
500 SYRINGE INTRAVENOUS
Status: COMPLETED | OUTPATIENT
Start: 2020-06-29 | End: 2020-06-29

## 2020-06-29 RX ADMIN — Medication 10 ML: at 08:06

## 2020-06-29 RX ADMIN — HEPARIN 500 UNITS: 100 SYRINGE at 08:06

## 2020-06-29 NOTE — PLAN OF CARE
Problem: Adult Inpatient Plan of Care  Goal: Plan of Care Review  Outcome: Ongoing, Progressing  Flowsheets (Taken 6/29/2020 0805)  Plan of Care Reviewed With: patient    BP (!) 156/70   Pulse 91   Temp 98 °F (36.7 °C)   SpO2 99%   Patient arrived today for port flush. Port located on left chest. Accessed without difficulty, positive blood return noted and patent. Labs collected. Saline flushed and heparin locked. Deaccessed and covered with bandaid. AVS not provided. Uses pt portal. Ambulates per self.

## 2020-06-30 ENCOUNTER — TELEPHONE (OUTPATIENT)
Dept: FAMILY MEDICINE | Facility: CLINIC | Age: 80
End: 2020-06-30

## 2020-06-30 ENCOUNTER — PATIENT MESSAGE (OUTPATIENT)
Dept: FAMILY MEDICINE | Facility: CLINIC | Age: 80
End: 2020-06-30

## 2020-06-30 DIAGNOSIS — I10 ESSENTIAL HYPERTENSION: Primary | ICD-10-CM

## 2020-06-30 DIAGNOSIS — E03.9 ACQUIRED HYPOTHYROIDISM: ICD-10-CM

## 2020-06-30 NOTE — TELEPHONE ENCOUNTER
Spoke with Rocio at [facility]- the lab that was ordered was canceled because factor 8 inhibitor was negative so there was no need to do the other test. Thank you          ----- Message from Mars Arevalo sent at 6/30/2020  1:11 PM CDT -----  Contact: Rocio with [facility]  Type: Needs Medical Advice    Who Called: Rocio    Best Call Back Number: [phone]  Additional Information: needs to discuss orders for pt.

## 2020-07-02 LAB
AT III ACT/NOR PPP CHRO: 116 % (ref 83–118)
PROT S FREE AG ACT/NOR PPP IA: 40 % (ref 50–147)

## 2020-07-02 RX ORDER — HYDRALAZINE HYDROCHLORIDE 10 MG/1
10 TABLET, FILM COATED ORAL 3 TIMES DAILY
Qty: 90 TABLET | Refills: 11 | Status: SHIPPED | OUTPATIENT
Start: 2020-07-02 | End: 2020-07-22

## 2020-07-07 LAB
F2 GENE MUT ANL BLD/T: NORMAL
F5 P.R506Q BLD/T QL: ABNORMAL

## 2020-07-07 PROCEDURE — 81240 F2 GENE: CPT

## 2020-07-07 PROCEDURE — 81241 F5 GENE: CPT

## 2020-07-07 NOTE — TELEPHONE ENCOUNTER
Spoke with patient, not tolerating nifedipine.  Will trial hydralazine as well as amlodipine and spironolactone.

## 2020-07-10 LAB — APTT PROTEIN C ACTIVATOR+FV DP/APTT PPP: 15 % (ref 70–140)

## 2020-07-20 ENCOUNTER — PATIENT MESSAGE (OUTPATIENT)
Dept: FAMILY MEDICINE | Facility: CLINIC | Age: 80
End: 2020-07-20

## 2020-07-22 ENCOUNTER — PATIENT MESSAGE (OUTPATIENT)
Dept: FAMILY MEDICINE | Facility: CLINIC | Age: 80
End: 2020-07-22

## 2020-07-22 DIAGNOSIS — I10 ESSENTIAL HYPERTENSION: Primary | ICD-10-CM

## 2020-07-22 RX ORDER — SPIRONOLACTONE 25 MG/1
TABLET ORAL
Qty: 135 TABLET | Refills: 3 | Status: SHIPPED | OUTPATIENT
Start: 2020-07-22 | End: 2020-09-23

## 2020-07-27 ENCOUNTER — INFUSION (OUTPATIENT)
Dept: INFUSION THERAPY | Facility: HOSPITAL | Age: 80
End: 2020-07-27
Attending: FAMILY MEDICINE
Payer: MEDICARE

## 2020-07-27 VITALS
RESPIRATION RATE: 18 BRPM | SYSTOLIC BLOOD PRESSURE: 158 MMHG | TEMPERATURE: 98 F | OXYGEN SATURATION: 97 % | DIASTOLIC BLOOD PRESSURE: 76 MMHG | HEART RATE: 87 BPM

## 2020-07-27 DIAGNOSIS — Z51.81 ENCOUNTER FOR MONITORING COUMADIN THERAPY: Primary | ICD-10-CM

## 2020-07-27 DIAGNOSIS — E03.9 ACQUIRED HYPOTHYROIDISM: ICD-10-CM

## 2020-07-27 DIAGNOSIS — Z85.3 HISTORY OF BILATERAL BREAST CANCER: ICD-10-CM

## 2020-07-27 DIAGNOSIS — Z79.01 ENCOUNTER FOR MONITORING COUMADIN THERAPY: Primary | ICD-10-CM

## 2020-07-27 LAB
INR PPP: 2.9 (ref 0.8–1.2)
PROTHROMBIN TIME: 27.7 SEC (ref 9–12.5)
TSH SERPL DL<=0.005 MIU/L-ACNC: 1.71 UIU/ML (ref 0.34–5.6)

## 2020-07-27 PROCEDURE — 84443 ASSAY THYROID STIM HORMONE: CPT

## 2020-07-27 PROCEDURE — A4216 STERILE WATER/SALINE, 10 ML: HCPCS | Performed by: FAMILY MEDICINE

## 2020-07-27 PROCEDURE — 96523 IRRIG DRUG DELIVERY DEVICE: CPT

## 2020-07-27 PROCEDURE — 85610 PROTHROMBIN TIME: CPT

## 2020-07-27 PROCEDURE — 63600175 PHARM REV CODE 636 W HCPCS: Performed by: FAMILY MEDICINE

## 2020-07-27 PROCEDURE — 25000003 PHARM REV CODE 250: Performed by: FAMILY MEDICINE

## 2020-07-27 RX ORDER — SODIUM CHLORIDE 0.9 % (FLUSH) 0.9 %
10 SYRINGE (ML) INJECTION
Status: CANCELLED | OUTPATIENT
Start: 2020-07-27

## 2020-07-27 RX ORDER — HEPARIN 100 UNIT/ML
500 SYRINGE INTRAVENOUS
Status: CANCELLED | OUTPATIENT
Start: 2020-07-27

## 2020-07-27 RX ORDER — SODIUM CHLORIDE 0.9 % (FLUSH) 0.9 %
10 SYRINGE (ML) INJECTION
Status: COMPLETED | OUTPATIENT
Start: 2020-07-27 | End: 2020-07-27

## 2020-07-27 RX ORDER — HEPARIN 100 UNIT/ML
500 SYRINGE INTRAVENOUS
Status: COMPLETED | OUTPATIENT
Start: 2020-07-27 | End: 2020-07-27

## 2020-07-27 RX ADMIN — HEPARIN 500 UNITS: 100 SYRINGE at 08:07

## 2020-07-27 RX ADMIN — Medication 10 ML: at 08:07

## 2020-08-21 ENCOUNTER — PATIENT OUTREACH (OUTPATIENT)
Dept: OTHER | Facility: OTHER | Age: 80
End: 2020-08-21

## 2020-08-21 ENCOUNTER — PATIENT MESSAGE (OUTPATIENT)
Dept: OTHER | Facility: OTHER | Age: 80
End: 2020-08-21

## 2020-08-21 NOTE — LETTER
August 21, 2020     Ursula Rodríguez  [address] [place] [address]       Dear Ursula,    Welcome to [facility]! Our goal is to make care effective, proactive and convenient by using data you send us from home to better treat your chronic conditions.            My name is , and I am your dedicated Digital Medicine clinician. As an expert in medication management, I will help ensure that the medications you are taking continue to provide the intended benefits and help you reach your goals. You can reach me directly at  or by sending me a message directly through your [facility] account.      I am Arcelia Hwang and I will be your health . My job is to help you identify lifestyle changes to improve your disease control. We will talk about nutrition, exercise, and other ways you may be able to adjust your current habits to better your health. Additionally, we will help ensure you are completing the tests and screenings that are necessary to help manage your conditions. You can reach me directly at [phone] or by sending me a message directly through your [facility] account.    Most importantly, YOU are at the center of this team. Together, we will work to improve your overall health and encourage you to meet your goals for a healthier lifestyle.     What we expect from YOU:  · Please take frequent home blood pressure measurements. We ask that you take at least 1 blood pressure reading per week, but more information will better help us get you know you. Be sure you rest for a few minutes before taking the reading in a quiet, comfortable place.     Be available to receive phone calls or DripD[employer]art messages, when appropriate, from your care team. Please let us know if there are any specific days or times that work best for us to reach you via phone.     Complete routine tests and screenings. Dont worry, we will help keep you on track!           What you should expect from your  Digital Medicine Care Team:   We will work with you to create a personalized plan of care and provide you with encouragement and education, including regarding lifestyle changes, that could help you manage your disease states.     We will adjust your current medications, if needed, and continue to monitor your long-term progress.     We will provide you and your physician with monthly progress reports after you have been in the program for more than 30 days.     We will send you reminders through [employer] and text messages to help ensure you do not miss any testing deadlines to help manage your disease states.    You will be able to reach us by phone or through your [employer] account by clicking our names under Care Team on the right side of the home screen.    I look forward to working with you to achieve your blood pressure goals!    We look forward to working with you to help manage your health,    Sincerely,    Your Digital Medicine Team    Please visit our websites to learn more:   · Hypertension: [email or web address]      Remember, we are not available for emergencies. If you have an emergency, please contact your doctors office directly or call [facility] on-call ([phone] or [phone]) or [phone].

## 2020-08-21 NOTE — LETTER
August 21, 2020     Ursula Rodríguez  [address] [place] [address]       Dear Ursula,    Welcome to [facility]! Our goal is to make care effective, proactive and convenient by using data you send us from home to better treat your chronic conditions.              My name is Rosalio Viveros, and I am your dedicated Digital Medicine clinician. As an expert in medication management, I will help ensure that the medications you are taking continue to provide the intended benefits and help you reach your goals. You can reach me directly at [phone] or by sending me a message directly through your [facility] account.      I am Arcelia Hwang and I will be your health . My job is to help you identify lifestyle changes to improve your disease control. We will talk about nutrition, exercise, and other ways you may be able to adjust your current habits to better your health. Additionally, we will help ensure you are completing the tests and screenings that are necessary to help manage your conditions. You can reach me directly at [phone] or by sending me a message directly through your [facility] account.    Most importantly, YOU are at the center of this team. Together, we will work to improve your overall health and encourage you to meet your goals for a healthier lifestyle.     What we expect from YOU:  · Please take frequent home blood pressure measurements. We ask that you take at least 1 blood pressure reading per week, but more information will better help us get you know you. Be sure you rest for a few minutes before taking the reading in a quiet, comfortable place.     Be available to receive phone calls or [employer] messages, when appropriate, from your care team. Please let us know if there are any specific days or times that work best for us to reach you via phone.     Complete routine tests and screenings. Dont worry, we will help keep you on track!           What you  should expect from your Digital Medicine Care Team:   We will work with you to create a personalized plan of care and provide you with encouragement and education, including regarding lifestyle changes, that could help you manage your disease states.     We will adjust your current medications, if needed, and continue to monitor your long-term progress.     We will provide you and your physician with monthly progress reports after you have been in the program for more than 30 days.     We will send you reminders through [employer] and text messages to help ensure you do not miss any testing deadlines to help manage your disease states.    You will be able to reach us by phone or through your [employer] account by clicking our names under Care Team on the right side of the home screen.    I look forward to working with you to achieve your blood pressure goals!    We look forward to working with you to help manage your health,    Sincerely,    Your Digital Medicine Team    Please visit our websites to learn more:   · Hypertension: [email or web address]      Remember, we are not available for emergencies. If you have an emergency, please contact your doctors office directly or call [facility] on[facility]call ([phone] or [phone]) or [phone].

## 2020-08-24 ENCOUNTER — INFUSION (OUTPATIENT)
Dept: INFUSION THERAPY | Facility: HOSPITAL | Age: 80
End: 2020-08-24
Attending: FAMILY MEDICINE
Payer: MEDICARE

## 2020-08-24 DIAGNOSIS — Z79.01 ENCOUNTER FOR MONITORING COUMADIN THERAPY: Primary | ICD-10-CM

## 2020-08-24 DIAGNOSIS — Z51.81 ENCOUNTER FOR MONITORING COUMADIN THERAPY: Primary | ICD-10-CM

## 2020-08-24 DIAGNOSIS — Z85.3 HISTORY OF BILATERAL BREAST CANCER: ICD-10-CM

## 2020-08-24 LAB
BASOPHILS # BLD AUTO: 0.04 K/UL (ref 0–0.2)
BASOPHILS NFR BLD: 0.6 % (ref 0–1.9)
DIFFERENTIAL METHOD: ABNORMAL
EOSINOPHIL # BLD AUTO: 0.4 K/UL (ref 0–0.5)
EOSINOPHIL NFR BLD: 5.9 % (ref 0–8)
ERYTHROCYTE [DISTWIDTH] IN BLOOD BY AUTOMATED COUNT: 14.2 % (ref 11.5–14.5)
HCT VFR BLD AUTO: 43.6 % (ref 37–48.5)
HGB BLD-MCNC: 14.4 G/DL (ref 12–16)
IMM GRANULOCYTES # BLD AUTO: 0.02 K/UL (ref 0–0.04)
IMM GRANULOCYTES NFR BLD AUTO: 0.3 % (ref 0–0.5)
INR PPP: 2.9 (ref 0.8–1.2)
LYMPHOCYTES # BLD AUTO: 0.8 K/UL (ref 1–4.8)
LYMPHOCYTES NFR BLD: 11.2 % (ref 18–48)
MCH RBC QN AUTO: 26.8 PG (ref 27–31)
MCHC RBC AUTO-ENTMCNC: 33 G/DL (ref 32–36)
MCV RBC AUTO: 81 FL (ref 82–98)
MONOCYTES # BLD AUTO: 0.6 K/UL (ref 0.3–1)
MONOCYTES NFR BLD: 8.3 % (ref 4–15)
NEUTROPHILS # BLD AUTO: 5.2 K/UL (ref 1.8–7.7)
NEUTROPHILS NFR BLD: 73.7 % (ref 38–73)
NRBC BLD-RTO: 0 /100 WBC
PLATELET # BLD AUTO: 205 K/UL (ref 150–350)
PMV BLD AUTO: 10.7 FL (ref 9.2–12.9)
PROTHROMBIN TIME: 28.4 SEC (ref 9–12.5)
RBC # BLD AUTO: 5.38 M/UL (ref 4–5.4)
WBC # BLD AUTO: 7.07 K/UL (ref 3.9–12.7)

## 2020-08-24 PROCEDURE — 85610 PROTHROMBIN TIME: CPT

## 2020-08-24 PROCEDURE — 63600175 PHARM REV CODE 636 W HCPCS: Performed by: FAMILY MEDICINE

## 2020-08-24 PROCEDURE — 36592 COLLECT BLOOD FROM PICC: CPT

## 2020-08-24 PROCEDURE — 25000003 PHARM REV CODE 250: Performed by: FAMILY MEDICINE

## 2020-08-24 PROCEDURE — 85025 COMPLETE CBC W/AUTO DIFF WBC: CPT

## 2020-08-24 PROCEDURE — A4216 STERILE WATER/SALINE, 10 ML: HCPCS | Performed by: FAMILY MEDICINE

## 2020-08-24 RX ORDER — HEPARIN 100 UNIT/ML
500 SYRINGE INTRAVENOUS
Status: CANCELLED | OUTPATIENT
Start: 2020-08-24

## 2020-08-24 RX ORDER — SODIUM CHLORIDE 0.9 % (FLUSH) 0.9 %
10 SYRINGE (ML) INJECTION
Status: COMPLETED | OUTPATIENT
Start: 2020-08-24 | End: 2020-08-24

## 2020-08-24 RX ORDER — HEPARIN 100 UNIT/ML
500 SYRINGE INTRAVENOUS
Status: COMPLETED | OUTPATIENT
Start: 2020-08-24 | End: 2020-08-24

## 2020-08-24 RX ORDER — SODIUM CHLORIDE 0.9 % (FLUSH) 0.9 %
10 SYRINGE (ML) INJECTION
Status: CANCELLED | OUTPATIENT
Start: 2020-08-24

## 2020-08-24 RX ADMIN — Medication 10 ML: at 08:08

## 2020-08-24 RX ADMIN — HEPARIN 500 UNITS: 100 SYRINGE at 08:08

## 2020-08-24 NOTE — PLAN OF CARE
Problem: Adult Inpatient Plan of Care  Goal: Plan of Care Review  Outcome: Ongoing, Progressing  Flowsheets (Taken 8/24/2020 0905)  Plan of Care Reviewed With: patient    Patient arrived today for port flush. Port located on left chest. Accessed without difficulty, blood return noted and patent. Labs collected. Saline flushed and heparin locked. Deaccessed and covered with bandaid. AVS not provided; pt uses portal. Ambulates per self.

## 2020-09-11 ENCOUNTER — PATIENT OUTREACH (OUTPATIENT)
Dept: OTHER | Facility: OTHER | Age: 80
End: 2020-09-11

## 2020-09-11 DIAGNOSIS — I10 ESSENTIAL HYPERTENSION: Primary | ICD-10-CM

## 2020-09-11 RX ORDER — FELODIPINE 5 MG/1
5 TABLET, EXTENDED RELEASE ORAL DAILY
Qty: 30 TABLET | Refills: 1 | Status: SHIPPED | OUTPATIENT
Start: 2020-09-11 | End: 2020-09-11

## 2020-09-11 RX ORDER — NIFEDIPINE 30 MG/1
30 TABLET, EXTENDED RELEASE ORAL DAILY
Qty: 90 TABLET | Refills: 1 | Status: SHIPPED | OUTPATIENT
Start: 2020-09-11 | End: 2020-09-23

## 2020-09-11 NOTE — PROGRESS NOTES
Digital Medicine: Clinician Introduction    Ursula Rodríguez is a 80 y.o. female who is newly enrolled in the Digital Medicine Clinic. Pertinent PMH includes:     Past Medical History:   Diagnosis Date    Anemia 1995    Breast cancer 1995    LEFT DIAGNOSED FIRST    Breast cancer 2013    Right     Factor V Leiden 1994    Hyperlipidemia 2017    Hypertension 2017    Hypothyroidism 2019    Osteoporosis     UNKNOWN DATE OF DX    Recurrent urinary tract infection     Vertigo 1994     Called patient to complete PharmD introduction. Mrs. Rodríguez reports adherence to spironolactone daily and clonidine as needed if blood pressure is >160/90 mmHg. Spironolactone was increased to 25 mg in the morning and 12.5 mg in the evening by Dr. Herman MD on 7/22. Patient states that she has tried amlodipine, nifedipine, metoprolol, losartan, lisinopril and triamterene-hydrochlorothiazide in the past. She is willing to try another CCB as she feels that she tolerated that medication best in the past.     The history is provided by the patient. Clinician received high BP alert.        Review of patient's allergies indicates:   -- Iodine and iodide containing products    -- Ditropan (oxybutynin chloride) -- Palpitations and Other (See                            Comments)    --  Made patient weak  Completed Medication Reconciliation  Verified pharmacy information.    HYPERTENSION  Explained hypertension digital medicine goals including BP goal less than or equal to 130/80mmHg, improved convenience of BP management and reduced risk of heart attack, kidney failure, stroke, eye disease, dementia, and death.     Explained non-pharmacologic therapies like low salt diet and physical activity can reduce blood pressure.       Explained that we expect patient to submit several blood pressure readings per week at random times of the day, but at least 30 minutes after taking blood pressure medications. Instructed patient not to allow  anyone else to use their blood pressure monitor and phone as data submitted is directly entered into medical record. Reviewed and confirmed appropriate blood pressure monitoring technique.         Reviewed signs/symptoms of hypertension (headache, changes in vision, chest pain, shortness of breath)   Reviewed signs/symptoms of hypotension (lightheaded, dizziness, weakness)     Patient's BP goal is less than or equal to 130/80. Patients BP average is 163/81 mmHg, which is above goal, per 2017 ACC/AHA Hypertension Guidelines. Patient attributes current blood pressure to: medication regimen and thyroid.         Last 5 Patient Entered Readings                                      Current 30 Day Average: 163/81     Recent Readings 9/11/2020 9/11/2020 9/10/2020 9/10/2020 9/10/2020    SBP (mmHg) 165 159 172 184 164    DBP (mmHg) 80 79 77 81 81    Pulse 91 81 87 101 79              Depression Screening  Did not address depression screening.    Sleep Apnea Screening    Did not address sleep apnea screening.     Medication Affordability Screening  Did not address medication affordability screening.     Medication Adherence-Medication adherence was assessed.  Patient continue taking medication as prescribed.            ASSESSMENT(S)  Patients BP average is 163/81 mmHg, which is above goal. Patient's BP goal is less than or equal to 130/80 per 2017 ACC/AHA Hypertension Guidelines.     Medication titration has been difficult due to side effects.       Hypertension Plan  Medication change. Intially sent prescription for felodipine 5 mg however, medication not covered. Start nifedipine 30 mg nightly. Continue spironolactone and clonidine PRN.  Can titrate CCB as needed and tolerated. Interaction with amlodipine and simvastatin at doses of 40 mg however, will avoid that CCB for now as any increase in patients simvastatin could impact warfarin (potentiate supra therapeutic INR).      Addressed any questions or concerns and patient  has my contact information if needed prior to next outreach. Patient verbalizes understanding.      Explained the importance of self-monitoring and medication adherence. Encouraged the patient to communicate with their health  for lifestyle modifications to help improve or maintain a healthy lifestyle.        Sent link to [facility] webpages and my contact information via Lost My Name for future questions.        Explained to the patient that the Digital Medicine team is not available for emergencies. Advised patient call [facility] ([phone] or [phone]) or [phone] if needed.           Hypertension Medications             cloNIDine (CATAPRES) 0.1 MG tablet Take 1 tablet (0.1 mg total) by mouth 2 (two) times daily as needed (BP > 160/90).    NIFEdipine (PROCARDIA-XL) 30 MG (OSM) 24 hr tablet Take 1 tablet (30 mg total) by mouth once daily.    spironolactone (ALDACTONE) 25 MG tablet Take 1 tablet (25 mg total) by mouth every morning AND 0.5 tablets (12.5 mg total) every evening.

## 2020-09-21 ENCOUNTER — PATIENT MESSAGE (OUTPATIENT)
Dept: FAMILY MEDICINE | Facility: CLINIC | Age: 80
End: 2020-09-21

## 2020-09-21 ENCOUNTER — INFUSION (OUTPATIENT)
Dept: INFUSION THERAPY | Facility: HOSPITAL | Age: 80
End: 2020-09-21
Attending: FAMILY MEDICINE
Payer: MEDICARE

## 2020-09-21 VITALS
SYSTOLIC BLOOD PRESSURE: 143 MMHG | HEART RATE: 89 BPM | OXYGEN SATURATION: 98 % | RESPIRATION RATE: 18 BRPM | DIASTOLIC BLOOD PRESSURE: 74 MMHG

## 2020-09-21 DIAGNOSIS — Z51.81 ENCOUNTER FOR MONITORING COUMADIN THERAPY: Primary | ICD-10-CM

## 2020-09-21 DIAGNOSIS — I10 ESSENTIAL HYPERTENSION: ICD-10-CM

## 2020-09-21 DIAGNOSIS — Z79.01 ENCOUNTER FOR MONITORING COUMADIN THERAPY: Primary | ICD-10-CM

## 2020-09-21 DIAGNOSIS — Z85.3 HISTORY OF BILATERAL BREAST CANCER: ICD-10-CM

## 2020-09-21 LAB
CHOLEST SERPL-MCNC: 125 MG/DL (ref 120–199)
CHOLEST/HDLC SERPL: 2.8 {RATIO} (ref 2–5)
HDLC SERPL-MCNC: 44 MG/DL (ref 40–75)
HDLC SERPL: 35.2 % (ref 20–50)
INR PPP: 4.1 (ref 0.8–1.2)
LDLC SERPL CALC-MCNC: 53 MG/DL (ref 63–159)
NONHDLC SERPL-MCNC: 81 MG/DL
PROTHROMBIN TIME: 38.8 SEC (ref 9–12.5)
TRIGL SERPL-MCNC: 140 MG/DL (ref 30–150)

## 2020-09-21 PROCEDURE — 63600175 PHARM REV CODE 636 W HCPCS: Performed by: FAMILY MEDICINE

## 2020-09-21 PROCEDURE — 80061 LIPID PANEL: CPT

## 2020-09-21 PROCEDURE — 85610 PROTHROMBIN TIME: CPT

## 2020-09-21 PROCEDURE — 36592 COLLECT BLOOD FROM PICC: CPT

## 2020-09-21 PROCEDURE — 25000003 PHARM REV CODE 250: Performed by: FAMILY MEDICINE

## 2020-09-21 PROCEDURE — A4216 STERILE WATER/SALINE, 10 ML: HCPCS | Performed by: FAMILY MEDICINE

## 2020-09-21 RX ORDER — HEPARIN 100 UNIT/ML
500 SYRINGE INTRAVENOUS
Status: CANCELLED | OUTPATIENT
Start: 2020-09-21

## 2020-09-21 RX ORDER — SODIUM CHLORIDE 0.9 % (FLUSH) 0.9 %
10 SYRINGE (ML) INJECTION
Status: CANCELLED | OUTPATIENT
Start: 2020-09-21

## 2020-09-21 RX ORDER — SODIUM CHLORIDE 0.9 % (FLUSH) 0.9 %
10 SYRINGE (ML) INJECTION
Status: COMPLETED | OUTPATIENT
Start: 2020-09-21 | End: 2020-09-21

## 2020-09-21 RX ORDER — HEPARIN 100 UNIT/ML
500 SYRINGE INTRAVENOUS
Status: COMPLETED | OUTPATIENT
Start: 2020-09-21 | End: 2020-09-21

## 2020-09-21 RX ADMIN — HEPARIN 500 UNITS: 100 SYRINGE at 08:09

## 2020-09-21 RX ADMIN — Medication 10 ML: at 08:09

## 2020-09-21 NOTE — PLAN OF CARE
Problem: Adult Inpatient Plan of Care  Goal: Plan of Care Review  Outcome: Ongoing, Progressing  Flowsheets (Taken 9/21/2020 0843)  Plan of Care Reviewed With: patient    BP (!) 143/74   Pulse 89   Resp 18   SpO2 98%   Patient arrived today for port flush. Port located on left chest. Accessed without difficulty, positive blood return noted and patent. Labs collected as ordered. Saline flushed and heparin locked. Deaccessed and covered with bandaid. AVS not provided. Ambulates per self.

## 2020-09-23 ENCOUNTER — PATIENT MESSAGE (OUTPATIENT)
Dept: FAMILY MEDICINE | Facility: CLINIC | Age: 80
End: 2020-09-23

## 2020-09-23 ENCOUNTER — PATIENT OUTREACH (OUTPATIENT)
Dept: OTHER | Facility: OTHER | Age: 80
End: 2020-09-23

## 2020-09-23 DIAGNOSIS — I10 ESSENTIAL HYPERTENSION: ICD-10-CM

## 2020-09-23 RX ORDER — NIFEDIPINE 30 MG/1
30 TABLET, EXTENDED RELEASE ORAL 2 TIMES DAILY
Qty: 90 TABLET | Refills: 1
Start: 2020-09-23 | End: 2020-11-04 | Stop reason: SDUPTHER

## 2020-09-23 RX ORDER — SPIRONOLACTONE 25 MG/1
25 TABLET ORAL EVERY MORNING
Qty: 90 TABLET | Refills: 0
Start: 2020-09-23 | End: 2021-09-02 | Stop reason: SDUPTHER

## 2020-09-23 NOTE — PROGRESS NOTES
Digital Medicine: Clinician Follow-Up    Called patient for digital medicine follow up. Started nifedipine and is doing well. Ms. Rodríguez has noticed that blood pressure spikes by afternoon. Suspects that she needs more than just spironolactone in the morning or something mid-day. Discontinued evening dose of spironolactone secondary to complaints of frequent urination throughout the day.    The history is provided by the patient.      Review of patient's allergies indicates:   -- Iodine and iodide containing products    -- Ditropan (oxybutynin chloride) -- Palpitations and Other (See                            Comments)    --  Made patient weak  Follow-up reason(s): medication change follow-up.     Hypertension    Readings are trending down Patient is experiencing signs/symptoms of hypertension. Reports feeling flushed and facial warmth when SBP is >150 mmHg.      Patient did make medication change.    Is patient tolerating med change? yes          Last 5 Patient Entered Readings                                      Current 30 Day Average: 159/79     Recent Readings 9/23/2020 9/22/2020 9/22/2020 9/22/2020 9/21/2020    SBP (mmHg) 131 146 170 159 164    DBP (mmHg) 72 69 76 78 73    Pulse 94 80 87 68 81                 Depression Screening  Did not address depression screening.    Sleep Apnea Screening    Did not address sleep apnea screening.     Medication Affordability Screening  Did not address medication affordability screening.     Medication Adherence-Medication adherence was assessed.          ASSESSMENT(S)  Patients BP average is 159/79 mmHg, which is above goal. Patient's BP goal is less than or equal to 130/80 per 2017 ACC/AHA Hypertension Guidelines.     Hypertension Plan  Medication change. Increase nifedipine to 30 mg twice daily. Continue spironolactone 25 mg in the morning only.        Addressed patient questions and patient has my contact information if needed prior to next outreach. Patient  verbalizes understanding.            There are no preventive care reminders to display for this patient.  There are no preventive care reminders to display for this patient.    Hypertension Medications             cloNIDine (CATAPRES) 0.1 MG tablet Take 1 tablet (0.1 mg total) by mouth 2 (two) times daily as needed (BP > 160/90).    NIFEdipine (PROCARDIA-XL) 30 MG (OSM) 24 hr tablet Take 1 tablet (30 mg total) by mouth 2 (two) times daily.    spironolactone (ALDACTONE) 25 MG tablet Take 1 tablet (25 mg total) by mouth every morning.

## 2020-09-29 ENCOUNTER — PATIENT MESSAGE (OUTPATIENT)
Dept: FAMILY MEDICINE | Facility: CLINIC | Age: 80
End: 2020-09-29

## 2020-10-05 ENCOUNTER — TELEPHONE (OUTPATIENT)
Dept: FAMILY MEDICINE | Facility: CLINIC | Age: 80
End: 2020-10-05

## 2020-10-05 NOTE — TELEPHONE ENCOUNTER
Pt. States her INR 3.7 PT 39.8    B/P reading on 10/04/2020 157/73                                                 190/90 @ 3:00pm     172/90@3:15 pm     167/75 @ 9:15 pm      10/5/2020  148/76 @6:15 am    Lost wt. In arms and face pt thinks it may be her thyroid she is taking 50 mg of synthroid daily.      Please advise

## 2020-10-07 ENCOUNTER — PATIENT MESSAGE (OUTPATIENT)
Dept: FAMILY MEDICINE | Facility: CLINIC | Age: 80
End: 2020-10-07

## 2020-10-07 ENCOUNTER — PATIENT OUTREACH (OUTPATIENT)
Dept: OTHER | Facility: OTHER | Age: 80
End: 2020-10-07

## 2020-10-07 ENCOUNTER — TELEPHONE (OUTPATIENT)
Dept: FAMILY MEDICINE | Facility: CLINIC | Age: 80
End: 2020-10-07

## 2020-10-07 DIAGNOSIS — I10 ESSENTIAL HYPERTENSION: Primary | ICD-10-CM

## 2020-10-07 RX ORDER — CARVEDILOL 6.25 MG/1
6.25 TABLET ORAL 2 TIMES DAILY WITH MEALS
Qty: 60 TABLET | Refills: 5 | Status: SHIPPED | OUTPATIENT
Start: 2020-10-07 | End: 2021-04-09 | Stop reason: SDUPTHER

## 2020-10-07 NOTE — PROGRESS NOTES
Digital Medicine: Clinician Follow-Up    Called patient for digital medicine follow up. Tolerating increased dose of nifedipine, however blood pressure remains elevated. She is also concerned with increased pulse. States that she was on medication in the past, but it was discontinued due to itching.     The history is provided by the patient.      Review of patient's allergies indicates:   -- Iodine and iodide containing products    -- Ditropan (oxybutynin chloride) -- Palpitations and Other (See                            Comments)    --  Made patient weak  Follow-up reason(s): medication change follow-up.     Hypertension    Readings are trending up Patient is not experiencing signs/symptoms of hypertension.      Additional Follow-up details: Reports pulsating in ears earlier today at appointment, but this resolved after taking clonidine.     Patient did make medication change.    Is patient tolerating med change? yes            Last 5 Patient Entered Readings                                      Current 30 Day Average: 159/79     Recent Readings 10/7/2020 10/7/2020 10/6/2020 10/6/2020 10/6/2020    SBP (mmHg) 170 154 162 165 150    DBP (mmHg) 84 76 75 77 77    Pulse 95 88 96 97 86                 Depression Screening  Did not address depression screening.    Sleep Apnea Screening    Did not address sleep apnea screening.     Medication Affordability Screening  Did not address medication affordability screening.     Medication Adherence-Medication adherence was assessed.          ASSESSMENT(S)  Patients BP average is 159/79 mmHg, which is above goal. Patient's BP goal is less than or equal to 130/80.     Hypertension Plan  Medication change. Add carvedilol 6.25 mg twice daily for additional blood pressure control. Continue nifedipine and spironolactone as well as clonidine as needed. Would avoid further titration of nifedipine at this time due to heart rate.   Provided patient education. Discussed rationale for  medication adjustmen and discussed side effects.   Patient scheduled to follow up with PCP on 10/9.     Addressed patient questions and patient has my contact information if needed prior to next outreach. Patient verbalizes understanding.             There are no preventive care reminders to display for this patient.  There are no preventive care reminders to display for this patient.      Hypertension Medications             carvediloL (COREG) 6.25 MG tablet Take 1 tablet (6.25 mg total) by mouth 2 (two) times daily with meals.    cloNIDine (CATAPRES) 0.1 MG tablet Take 1 tablet (0.1 mg total) by mouth 2 (two) times daily as needed (BP > 160/90).    NIFEdipine (PROCARDIA-XL) 30 MG (OSM) 24 hr tablet Take 1 tablet (30 mg total) by mouth 2 (two) times daily.    spironolactone (ALDACTONE) 25 MG tablet Take 1 tablet (25 mg total) by mouth every morning.

## 2020-10-09 ENCOUNTER — OFFICE VISIT (OUTPATIENT)
Dept: FAMILY MEDICINE | Facility: CLINIC | Age: 80
End: 2020-10-09
Payer: MEDICARE

## 2020-10-09 VITALS
WEIGHT: 139 LBS | SYSTOLIC BLOOD PRESSURE: 144 MMHG | HEART RATE: 101 BPM | TEMPERATURE: 97 F | HEIGHT: 62 IN | BODY MASS INDEX: 25.58 KG/M2 | OXYGEN SATURATION: 97 % | DIASTOLIC BLOOD PRESSURE: 76 MMHG | RESPIRATION RATE: 16 BRPM

## 2020-10-09 DIAGNOSIS — Z23 NEED FOR PNEUMOCOCCAL VACCINATION: ICD-10-CM

## 2020-10-09 DIAGNOSIS — D68.59 HYPERCOAGULOPATHY: Primary | ICD-10-CM

## 2020-10-09 DIAGNOSIS — E03.9 ACQUIRED HYPOTHYROIDISM: ICD-10-CM

## 2020-10-09 DIAGNOSIS — I10 ESSENTIAL HYPERTENSION: ICD-10-CM

## 2020-10-09 PROCEDURE — 99214 PR OFFICE/OUTPT VISIT, EST, LEVL IV, 30-39 MIN: ICD-10-PCS | Mod: 25,S$GLB,, | Performed by: FAMILY MEDICINE

## 2020-10-09 PROCEDURE — 99214 OFFICE O/P EST MOD 30 MIN: CPT | Mod: 25,S$GLB,, | Performed by: FAMILY MEDICINE

## 2020-10-09 PROCEDURE — 90732 PNEUMOCOCCAL POLYSACCHARIDE VACCINE 23-VALENT =>2YO SQ IM: ICD-10-PCS | Mod: S$GLB,,, | Performed by: FAMILY MEDICINE

## 2020-10-09 PROCEDURE — G0009 ADMIN PNEUMOCOCCAL VACCINE: HCPCS | Mod: S$GLB,,, | Performed by: FAMILY MEDICINE

## 2020-10-09 PROCEDURE — G0009 PNEUMOCOCCAL POLYSACCHARIDE VACCINE 23-VALENT =>2YO SQ IM: ICD-10-PCS | Mod: S$GLB,,, | Performed by: FAMILY MEDICINE

## 2020-10-09 PROCEDURE — 90732 PPSV23 VACC 2 YRS+ SUBQ/IM: CPT | Mod: S$GLB,,, | Performed by: FAMILY MEDICINE

## 2020-10-09 NOTE — PROGRESS NOTES
[facility] - Clinic Note    Subjective      Ms. Rodríguez is a 80 y.o. female who presents to clinic for Follow-up (discuss labs)    Discussed INR that is going up in down.  Last INR was 1.8.  Blood pressure is still uncontrolled.  Carvedilol was added recently.    PMH Ursula has a past medical history of Anemia (1995), Breast cancer (1995), Breast cancer (2013), Factor V Leiden (1994), Hyperlipidemia (2017), Hypertension (2017), Hypothyroidism (2019), Osteoporosis, Recurrent urinary tract infection, and Vertigo (1994).   PSXH Ursula has a past surgical history that includes Hemorrhoid surgery (1968); Hysterectomy (1970); Tumor removal (Left, 1994); Mastectomy (Right, 1994); and Mastectomy (Left, 2013).    Ursula's family history includes Cancer in her brother, maternal grandmother, and mother; Diabetes in her brother, brother, and father; Heart disease in her father and mother; Hypertension in her brother, brother, brother, and mother; Lung disease in her father; No Known Problems in her sister; Stroke in her brother and brother.   ANUJ Vergara reports that she has never smoked. She has never used smokeless tobacco. She reports that she does not drink alcohol or use drugs.   JAC Vergara is allergic to iodine and iodide containing products and ditropan [oxybutynin chloride].   JAYME Vergara has a current medication list which includes the following prescription(s): carvedilol, clonidine, levothyroxine, magnesium oxide, multivitamin, nifedipine, potassium, simvastatin, spironolactone, and warfarin.     Review of Systems   Constitutional: Negative for chills and fever.   Eyes: Negative for visual disturbance.   Respiratory: Negative for shortness of breath.    Cardiovascular: Negative for chest pain.   Gastrointestinal: Negative for abdominal pain.   Neurological: Negative for headaches.     Objective     BP (!) 144/76 (BP Location: Left arm, Patient Position: Sitting, BP Method: Large (Automatic))   Pulse 101   Temp 97.3  "°F (36.3 °C) (Temporal)   Resp 16   Ht 5' 2" (1.575 m)   Wt 63 kg (139 lb)   SpO2 97%   BMI 25.42 kg/m²     Physical Exam  Vitals signs and nursing note reviewed.   Constitutional:       General: She is not in acute distress.     Appearance: Normal appearance. She is well-developed. She is not diaphoretic.   HENT:      Head: Normocephalic and atraumatic.      Right Ear: External ear normal.      Left Ear: External ear normal.   Eyes:      General:         Right eye: No discharge.         Left eye: No discharge.   Cardiovascular:      Rate and Rhythm: Normal rate and regular rhythm.      Heart sounds: Normal heart sounds.   Pulmonary:      Effort: Pulmonary effort is normal.      Breath sounds: Normal breath sounds. No wheezing or rales.   Skin:     General: Skin is warm and dry.   Neurological:      General: No focal deficit present.      Mental Status: She is alert and oriented to person, place, and time. Mental status is at baseline.   Psychiatric:         Mood and Affect: Mood normal.         Behavior: Behavior normal.         Thought Content: Thought content normal.         Judgment: Judgment normal.        Assessment/Plan     1. Hypercoagulopathy     2. Acquired hypothyroidism  TSH    Basic Metabolic Panel   3. Essential hypertension     4. Need for pneumococcal vaccination  (In Office Administered) Pneumococcal Polysaccharide Vaccine (23 Valent) (SQ/IM)     Change Coumadin schedule to 11 mg a week.  2 mg on Monday, Tuesday, Thursday, Friday.  1 mg on Saturday, Sunday, Wednesday.  Check TSH and BMP with next lab work.  Continue current medications for blood pressure control.  Pneumovax today.  Follow-up in 3 months.    Future Appointments   Date Time Provider Department Center   10/19/2020  8:15 AM [facility], OP THERAPEUTICS 2 [facility] OPTHERA [facility]   10/19/2020  9:20 AM [facility], LABORATORY [facility] LAB [facility]         Vicente Sutton MD  Family Medicine [facility]            "

## 2020-10-12 ENCOUNTER — PATIENT MESSAGE (OUTPATIENT)
Dept: FAMILY MEDICINE | Facility: CLINIC | Age: 80
End: 2020-10-12

## 2020-10-13 ENCOUNTER — TELEPHONE (OUTPATIENT)
Dept: FAMILY MEDICINE | Facility: CLINIC | Age: 80
End: 2020-10-13

## 2020-10-13 NOTE — TELEPHONE ENCOUNTER
----- Message from Dae Carlos sent at 10/13/2020 11:18 AM CDT -----  Regarding: bloodwork  Contact: patient  Patient want to speak with a nurse regarding results she sent on [employer] this morning please call back at [phone] (home)     Case number [id number]

## 2020-10-14 ENCOUNTER — TELEPHONE (OUTPATIENT)
Dept: FAMILY MEDICINE | Facility: CLINIC | Age: 80
End: 2020-10-14

## 2020-10-14 DIAGNOSIS — E03.9 ACQUIRED HYPOTHYROIDISM: ICD-10-CM

## 2020-10-14 RX ORDER — LEVOTHYROXINE SODIUM 50 UG/1
50 TABLET ORAL DAILY
Qty: 90 TABLET | Refills: 3 | Status: SHIPPED | OUTPATIENT
Start: 2020-10-14 | End: 2021-09-29

## 2020-10-14 NOTE — TELEPHONE ENCOUNTER
----- Message from Kari Garcia MA sent at 10/14/2020  2:41 PM CDT -----    ----- Message -----  From: Mary Conrad  Sent: 10/14/2020  12:23 PM CDT  To: Herman [facility]by Staff    Type:  RX Refill Request    Who Called:  Ursula  Refill or New Rx:  refill  RX Name and Strength:  levothyroxine (SYNTHROID) 50 MCG tablet  How is the patient currently taking it? (ex. 1XDay):  once daily  Is this a 30 day or 90 day RX:  90  Preferred Pharmacy with phone number:    [id number] - [place] [id number] AT [facility] [address] [address]  Phone: [phone] Fax: [phone]      Local or Mail Order:  Local  Ordering Provider:  Dr Sutton  Best Call Back Number:  [phone]  Additional Information:  thank you!

## 2020-10-15 ENCOUNTER — PATIENT MESSAGE (OUTPATIENT)
Dept: FAMILY MEDICINE | Facility: CLINIC | Age: 80
End: 2020-10-15

## 2020-10-19 ENCOUNTER — TELEPHONE (OUTPATIENT)
Dept: FAMILY MEDICINE | Facility: CLINIC | Age: 80
End: 2020-10-19

## 2020-10-19 ENCOUNTER — PATIENT MESSAGE (OUTPATIENT)
Dept: FAMILY MEDICINE | Facility: CLINIC | Age: 80
End: 2020-10-19

## 2020-10-19 ENCOUNTER — INFUSION (OUTPATIENT)
Dept: INFUSION THERAPY | Facility: HOSPITAL | Age: 80
End: 2020-10-19
Attending: FAMILY MEDICINE
Payer: MEDICARE

## 2020-10-19 ENCOUNTER — DOCUMENTATION ONLY (OUTPATIENT)
Dept: FAMILY MEDICINE | Facility: CLINIC | Age: 80
End: 2020-10-19

## 2020-10-19 ENCOUNTER — PATIENT OUTREACH (OUTPATIENT)
Dept: OTHER | Facility: OTHER | Age: 80
End: 2020-10-19

## 2020-10-19 VITALS
HEART RATE: 86 BPM | DIASTOLIC BLOOD PRESSURE: 70 MMHG | SYSTOLIC BLOOD PRESSURE: 153 MMHG | OXYGEN SATURATION: 98 % | RESPIRATION RATE: 18 BRPM

## 2020-10-19 DIAGNOSIS — R73.01 ELEVATED FASTING BLOOD SUGAR: Primary | ICD-10-CM

## 2020-10-19 DIAGNOSIS — Z85.3 HISTORY OF BILATERAL BREAST CANCER: ICD-10-CM

## 2020-10-19 DIAGNOSIS — E03.9 ACQUIRED HYPOTHYROIDISM: ICD-10-CM

## 2020-10-19 DIAGNOSIS — Z79.01 ENCOUNTER FOR MONITORING COUMADIN THERAPY: Primary | ICD-10-CM

## 2020-10-19 DIAGNOSIS — Z51.81 ENCOUNTER FOR MONITORING COUMADIN THERAPY: Primary | ICD-10-CM

## 2020-10-19 LAB
ANION GAP SERPL CALC-SCNC: 9 MMOL/L (ref 8–16)
BUN SERPL-MCNC: 21 MG/DL (ref 8–23)
CALCIUM SERPL-MCNC: 9.3 MG/DL (ref 8.7–10.5)
CHLORIDE SERPL-SCNC: 104 MMOL/L (ref 95–110)
CO2 SERPL-SCNC: 23 MMOL/L (ref 23–29)
CREAT SERPL-MCNC: 1.2 MG/DL (ref 0.5–1.4)
EST. GFR  (AFRICAN AMERICAN): 48.6 ML/MIN/1.73 M^2
EST. GFR  (NON AFRICAN AMERICAN): 42.2 ML/MIN/1.73 M^2
GLUCOSE SERPL-MCNC: 264 MG/DL (ref 70–110)
INR PPP: 3 (ref 0.8–1.2)
POTASSIUM SERPL-SCNC: 4 MMOL/L (ref 3.5–5.1)
PROTHROMBIN TIME: 28.6 SEC (ref 9–12.5)
SODIUM SERPL-SCNC: 136 MMOL/L (ref 136–145)
TSH SERPL DL<=0.005 MIU/L-ACNC: 1.36 UIU/ML (ref 0.34–5.6)

## 2020-10-19 PROCEDURE — 63600175 PHARM REV CODE 636 W HCPCS: Performed by: FAMILY MEDICINE

## 2020-10-19 PROCEDURE — A4216 STERILE WATER/SALINE, 10 ML: HCPCS | Performed by: FAMILY MEDICINE

## 2020-10-19 PROCEDURE — 25000003 PHARM REV CODE 250: Performed by: FAMILY MEDICINE

## 2020-10-19 PROCEDURE — 80048 BASIC METABOLIC PNL TOTAL CA: CPT

## 2020-10-19 PROCEDURE — 84443 ASSAY THYROID STIM HORMONE: CPT

## 2020-10-19 PROCEDURE — 36591 DRAW BLOOD OFF VENOUS DEVICE: CPT

## 2020-10-19 PROCEDURE — 85610 PROTHROMBIN TIME: CPT

## 2020-10-19 RX ORDER — SODIUM CHLORIDE 0.9 % (FLUSH) 0.9 %
10 SYRINGE (ML) INJECTION
Status: COMPLETED | OUTPATIENT
Start: 2020-10-19 | End: 2020-10-19

## 2020-10-19 RX ORDER — HEPARIN 100 UNIT/ML
500 SYRINGE INTRAVENOUS
Status: COMPLETED | OUTPATIENT
Start: 2020-10-19 | End: 2020-10-19

## 2020-10-19 RX ORDER — SODIUM CHLORIDE 0.9 % (FLUSH) 0.9 %
10 SYRINGE (ML) INJECTION
Status: CANCELLED | OUTPATIENT
Start: 2020-10-19

## 2020-10-19 RX ORDER — HEPARIN 100 UNIT/ML
500 SYRINGE INTRAVENOUS
Status: CANCELLED | OUTPATIENT
Start: 2020-10-19

## 2020-10-19 RX ADMIN — Medication 10 ML: at 08:10

## 2020-10-19 RX ADMIN — HEPARIN 500 UNITS: 100 SYRINGE at 08:10

## 2020-10-19 NOTE — PROGRESS NOTES
Digital Medicine: Clinician Follow-Up    Called patient for digital medicine follow up. Care team received alert for elevated blood pressure reading. She is unsure what caused spike in blood pressure, but is pleased with most recent readings. Taking carvedilol twice daily since last follow up and readings have been much improved outside of one outlier.     The history is provided by the patient.      Review of patient's allergies indicates:   -- Iodine and iodide containing products    -- Ditropan (oxybutynin chloride) -- Palpitations and Other (See                            Comments)    --  Made patient weak  Follow-up reason(s): medication change follow-up and Alert received.   Care Team received high BP alert.      Hypertension    Readings are trending down due to medication adherence.     Patient is not experiencing signs/symptoms of hypertension.      Patient did make medication change.    Is patient tolerating med change? yes            Last 5 Patient Entered Readings                                      Current 30 Day Average: 151/77     Recent Readings 10/19/2020 10/18/2020 10/18/2020 10/17/2020 10/17/2020    SBP (mmHg) 121 147 130 158 136    DBP (mmHg) 71 70 71 72 111    Pulse 78 76 77 79 75                    ASSESSMENT(S)  Patients BP average is 151/77 mmHg, which is above goal. Patient's BP goal is less than or equal to 130/80.     Hypertension Plan  Additional monitoring needed.  Continue current therapy.       Addressed patient questions and patient has my contact information if needed prior to next outreach.        There are no preventive care reminders to display for this patient.  There are no preventive care reminders to display for this patient.      Hypertension Medications             carvediloL (COREG) 6.25 MG tablet Take 1 tablet (6.25 mg total) by mouth 2 (two) times daily with meals.    cloNIDine (CATAPRES) 0.1 MG tablet Take 1 tablet (0.1 mg total) by mouth 2 (two) times daily as needed  (BP > 160/90).    NIFEdipine (PROCARDIA-XL) 30 MG (OSM) 24 hr tablet Take 1 tablet (30 mg total) by mouth 2 (two) times daily.    spironolactone (ALDACTONE) 25 MG tablet Take 1 tablet (25 mg total) by mouth every morning.

## 2020-10-19 NOTE — TELEPHONE ENCOUNTER
10/19/2020- Dr Wiley has added an A1c lab to patients blood draw done this morning. Patient had blood drawn via port in infusion clinic this morning. They only anuel enough blood for the orders they had this AM. Patient notified she will need to schedule another appt to have blood drawn for this test.        ----- Message from Vicente Sutton MD sent at 10/19/2020  9:47 AM CDT -----  Can we call to see if the hemoglobin A1c can be added to the blood work that was done this morning?  If not can we schedule another blood draw.

## 2020-10-19 NOTE — PROGRESS NOTES
10/19/2020- Dr Wiley has added an A1c lab to patients blood draw done this morning. Patient had blood drawn via port in infusion clinic this morning. They only anuel enough blood for the orders they had this AM. Patient notified she will need to schedule another appt to have blood drawn for this test.

## 2020-10-20 NOTE — TELEPHONE ENCOUNTER
10/20/2020 I informed patient of the A1c to be added. She stated she only gives blood via port. I have reached out to Flavia in the infusion clinic. She stated patient's next appt is 11/16/2020. Flavia is asking if there are any other standing order labs you would like to order? She will draw the A1c at her visit on 11/16.  Thank you

## 2020-10-21 ENCOUNTER — TELEPHONE (OUTPATIENT)
Dept: FAMILY MEDICINE | Facility: CLINIC | Age: 80
End: 2020-10-21

## 2020-10-21 NOTE — TELEPHONE ENCOUNTER
Pt returning my call. Spoke with her about an daily BP log. She does do one daily and gave me 4 days of readings. Updating the vs flowsheet with the average of the home readings below. Also scheduled a 3 month fu appt with Dr Sutton for 01/04/202 at [facility] per the last visit notes.    10/18/20--130/71  10/19/20--121/71  10/20/20--128/69  10/21/20--122/64

## 2020-10-23 ENCOUNTER — PATIENT OUTREACH (OUTPATIENT)
Dept: OTHER | Facility: OTHER | Age: 80
End: 2020-10-23

## 2020-10-23 NOTE — PROGRESS NOTES
Digital Medicine: Health  Follow-Up    The history is provided by the patient.             Reason for review: Blood pressure not at goal        Topics Covered on Call: medication    Additional Follow-up details: Patient's BP readings have been lowering recently, and she believes this to be due to her spacing out her medication. The patient is pleased with her readings. Commended patient for her recent BP readings. Patient and I also discussed her technique, since she mentioned she notices her readings being more elevated in the evenings. Encouraged patient to make sure she isn't eating too soon before taking a reading. The patient was receptive.             Diet-no change to diet    No change to diet.  Patient reports eating or drinking the following: Patient stated that nothing has changed with her recent eating habits.       Physical Activity-Not assessed    Medication Adherence-Medication adherence was asssessed.  Patient continue taking medication as prescribed.          Patient stated that she has been spacing out her BP medication, and thinks this is what is causing her readings to lower.       Additional monitoring needed.  Continue current diet/physical activity routine.  Provided patient education.  Reviewed Device Techniques.     Addressed patient questions and patient has my contact information if needed prior to next outreach. Patient verbalizes understanding.      Explained the importance of self-monitoring and medication adherence. Encouraged the patient to communicate with their health  for lifestyle modifications to help improve or maintain a healthy lifestyle.               There are no preventive care reminders to display for this patient.      Last 5 Patient Entered Readings                                      Current 30 Day Average: 151/76     Recent Readings 10/23/2020 10/22/2020 10/22/2020 10/21/2020 10/21/2020    SBP (mmHg) 140 143 134 144 122    DBP (mmHg) 71 64 68 68 64    Pulse  71 77 72 74 72

## 2020-10-26 ENCOUNTER — PATIENT MESSAGE (OUTPATIENT)
Dept: FAMILY MEDICINE | Facility: CLINIC | Age: 80
End: 2020-10-26

## 2020-10-28 ENCOUNTER — PATIENT MESSAGE (OUTPATIENT)
Dept: FAMILY MEDICINE | Facility: CLINIC | Age: 80
End: 2020-10-28

## 2020-11-02 ENCOUNTER — PATIENT MESSAGE (OUTPATIENT)
Dept: FAMILY MEDICINE | Facility: CLINIC | Age: 80
End: 2020-11-02

## 2020-11-06 ENCOUNTER — TELEPHONE (OUTPATIENT)
Dept: FAMILY MEDICINE | Facility: CLINIC | Age: 80
End: 2020-11-06

## 2020-11-06 DIAGNOSIS — I10 ESSENTIAL HYPERTENSION: ICD-10-CM

## 2020-11-06 RX ORDER — NIFEDIPINE 30 MG/1
30 TABLET, EXTENDED RELEASE ORAL 2 TIMES DAILY
Qty: 180 TABLET | Refills: 3 | Status: SHIPPED | OUTPATIENT
Start: 2020-11-06 | End: 2021-11-01

## 2020-11-06 NOTE — TELEPHONE ENCOUNTER
----- Message from Kari Garcia MA sent at 11/6/2020  4:50 PM CST -----  Regarding: FW: Refill  Contact: Patient  Please advise.  ----- Message -----  From: Dae Carlos  Sent: 11/6/2020   9:26 AM CST  To: Herman [facility]by Staff  Subject: Refill                                           Patient want to know if office can re-send PROCARDIA-XL to [facility], patient said pharmacy didn't received rx       [id number] [place] [id number] [address]  [address] [address]  Phone: [phone] Fax: [phone]

## 2020-11-09 ENCOUNTER — PATIENT MESSAGE (OUTPATIENT)
Dept: FAMILY MEDICINE | Facility: CLINIC | Age: 80
End: 2020-11-09

## 2020-11-09 ENCOUNTER — PATIENT OUTREACH (OUTPATIENT)
Dept: OTHER | Facility: OTHER | Age: 80
End: 2020-11-09

## 2020-11-09 NOTE — PROGRESS NOTES
Digital Medicine: Clinician Follow-Up    Called patient for digital medicine follow up. Patient states that she is doing well overall. Ms. Rodríguez feels that blood pressure is much more stable. Last used clonidine 2 weeks ago. Patient inquired about multivitamin use with warfarin.     The history is provided by the patient.   Follow-up reason(s): routine follow up.     Hypertension    Readings are trending down   Patient is not experiencing signs/symptoms of hypotension.  Patient is not experiencing signs/symptoms of hypertension.            Last 5 Patient Entered Readings                                      Current 30 Day Average: 142/70     Recent Readings 11/9/2020 11/8/2020 11/8/2020 11/7/2020 11/7/2020    SBP (mmHg) 131 135 138 154 140    DBP (mmHg) 74 56 70 67 62    Pulse 77 75 73 83 78                 Depression Screening  Did not address depression screening.    Sleep Apnea Screening    Did not address sleep apnea screening.     Medication Affordability Screening  Did not address medication affordability screening.     Medication Adherence-Medication adherence was assessed.          ASSESSMENT(S)  Patients BP average is 142/70 mmHg, which is above goal. Patient's BP goal is less than or equal to 130/80.     Not meeting goal but improved since last follow up 151/77 mmHg vs 142/70 mmHg.       Hypertension Plan  Continue current therapy.  Provided patient education. Advised top avoid vitamins K and E in supplement.   Carvedilol and nifedipine are both available for titration if needed.      Addressed patient questions and patient has my contact information if needed prior to next outreach. Patient verbalizes understanding.             There are no preventive care reminders to display for this patient.  There are no preventive care reminders to display for this patient.      Hypertension Medications             carvediloL (COREG) 6.25 MG tablet Take 1 tablet (6.25 mg total) by mouth 2 (two) times daily with  meals.    cloNIDine (CATAPRES) 0.1 MG tablet Take 1 tablet (0.1 mg total) by mouth 2 (two) times daily as needed (BP > 160/90).    NIFEdipine (PROCARDIA-XL) 30 MG (OSM) 24 hr tablet Take 1 tablet (30 mg total) by mouth 2 (two) times daily.    spironolactone (ALDACTONE) 25 MG tablet Take 1 tablet (25 mg total) by mouth every morning.

## 2020-11-13 ENCOUNTER — PATIENT MESSAGE (OUTPATIENT)
Dept: FAMILY MEDICINE | Facility: CLINIC | Age: 80
End: 2020-11-13

## 2020-11-16 ENCOUNTER — PATIENT MESSAGE (OUTPATIENT)
Dept: FAMILY MEDICINE | Facility: CLINIC | Age: 80
End: 2020-11-16

## 2020-11-16 ENCOUNTER — INFUSION (OUTPATIENT)
Dept: INFUSION THERAPY | Facility: HOSPITAL | Age: 80
End: 2020-11-16
Attending: FAMILY MEDICINE
Payer: MEDICARE

## 2020-11-16 VITALS
SYSTOLIC BLOOD PRESSURE: 130 MMHG | HEART RATE: 77 BPM | RESPIRATION RATE: 20 BRPM | DIASTOLIC BLOOD PRESSURE: 61 MMHG | OXYGEN SATURATION: 98 %

## 2020-11-16 DIAGNOSIS — R73.01 ELEVATED FASTING BLOOD SUGAR: Primary | ICD-10-CM

## 2020-11-16 DIAGNOSIS — Z85.3 HISTORY OF BILATERAL BREAST CANCER: ICD-10-CM

## 2020-11-16 LAB
ESTIMATED AVG GLUCOSE: 151 MG/DL (ref 68–131)
HBA1C MFR BLD HPLC: 6.9 % (ref 4.5–6.2)

## 2020-11-16 PROCEDURE — 83036 HEMOGLOBIN GLYCOSYLATED A1C: CPT

## 2020-11-16 PROCEDURE — 63600175 PHARM REV CODE 636 W HCPCS: Performed by: FAMILY MEDICINE

## 2020-11-16 PROCEDURE — 36592 COLLECT BLOOD FROM PICC: CPT

## 2020-11-16 PROCEDURE — 25000003 PHARM REV CODE 250: Performed by: FAMILY MEDICINE

## 2020-11-16 PROCEDURE — A4216 STERILE WATER/SALINE, 10 ML: HCPCS | Performed by: FAMILY MEDICINE

## 2020-11-16 RX ORDER — SODIUM CHLORIDE 0.9 % (FLUSH) 0.9 %
10 SYRINGE (ML) INJECTION
Status: CANCELLED | OUTPATIENT
Start: 2020-11-16

## 2020-11-16 RX ORDER — HEPARIN 100 UNIT/ML
500 SYRINGE INTRAVENOUS
Status: CANCELLED | OUTPATIENT
Start: 2020-11-16

## 2020-11-16 RX ORDER — HEPARIN 100 UNIT/ML
500 SYRINGE INTRAVENOUS
Status: COMPLETED | OUTPATIENT
Start: 2020-11-16 | End: 2020-11-16

## 2020-11-16 RX ORDER — SODIUM CHLORIDE 0.9 % (FLUSH) 0.9 %
10 SYRINGE (ML) INJECTION
Status: COMPLETED | OUTPATIENT
Start: 2020-11-16 | End: 2020-11-16

## 2020-11-16 RX ADMIN — Medication 10 ML: at 08:11

## 2020-11-16 RX ADMIN — HEPARIN 500 UNITS: 100 SYRINGE at 08:11

## 2020-11-16 NOTE — PLAN OF CARE
Problem: Adult Inpatient Plan of Care  Goal: Plan of Care Review  Outcome: Ongoing, Progressing    /61   Pulse 77   Resp 20   SpO2 98%   Patient arrived today for port flush. Port located on left chest. Accessed without difficulty, blood return noted and patent. Labs collected as ordered. Saline flushed and heparin locked. Deaccessed and covered with bandaid. AVS not provided; [facility]. Ambulates per self.

## 2020-11-18 ENCOUNTER — TELEPHONE (OUTPATIENT)
Dept: FAMILY MEDICINE | Facility: CLINIC | Age: 80
End: 2020-11-18

## 2020-11-18 ENCOUNTER — PATIENT MESSAGE (OUTPATIENT)
Dept: FAMILY MEDICINE | Facility: CLINIC | Age: 80
End: 2020-11-18

## 2020-11-18 DIAGNOSIS — I10 ESSENTIAL HYPERTENSION: Primary | ICD-10-CM

## 2020-11-18 DIAGNOSIS — D68.59 HYPERCOAGULOPATHY: ICD-10-CM

## 2020-11-18 NOTE — TELEPHONE ENCOUNTER
I spoke to the patient regarding her labs and told her that Dr. Sutton corresponded to Vandana with future lab orders.

## 2020-11-18 NOTE — TELEPHONE ENCOUNTER
----- Message from Kari Garcia MA sent at 11/17/2020  4:56 PM CST -----  The infusion appointment is on 12/14/20. Please advise, thanks   ----- Message -----  From: Flavia Dsouza RN  Sent: 11/17/2020   4:22 PM CST  To: [facility]    Good afternoon!    Patient scheduled for port flush in December. Typically, if there are any labs to be collected, I will collect them at that time. Are there any labs that Dr Sutton would like to order? Currently there are no labs to attach to the appt.    ThanksFlavia

## 2020-11-19 DIAGNOSIS — E11.9 TYPE 2 DIABETES MELLITUS WITHOUT COMPLICATION, WITHOUT LONG-TERM CURRENT USE OF INSULIN: Primary | ICD-10-CM

## 2020-11-19 RX ORDER — METFORMIN HYDROCHLORIDE 500 MG/1
TABLET, EXTENDED RELEASE ORAL
Qty: 180 TABLET | Refills: 3 | Status: SHIPPED | OUTPATIENT
Start: 2020-11-19 | End: 2021-04-05 | Stop reason: SDUPTHER

## 2020-11-23 ENCOUNTER — PATIENT MESSAGE (OUTPATIENT)
Dept: FAMILY MEDICINE | Facility: CLINIC | Age: 80
End: 2020-11-23

## 2020-11-30 ENCOUNTER — PATIENT MESSAGE (OUTPATIENT)
Dept: FAMILY MEDICINE | Facility: CLINIC | Age: 80
End: 2020-11-30

## 2020-12-07 ENCOUNTER — PATIENT MESSAGE (OUTPATIENT)
Dept: FAMILY MEDICINE | Facility: CLINIC | Age: 80
End: 2020-12-07

## 2020-12-10 ENCOUNTER — PATIENT MESSAGE (OUTPATIENT)
Dept: FAMILY MEDICINE | Facility: CLINIC | Age: 80
End: 2020-12-10

## 2020-12-10 DIAGNOSIS — Z20.822 EXPOSURE TO COVID-19 VIRUS: Primary | ICD-10-CM

## 2020-12-14 ENCOUNTER — PATIENT MESSAGE (OUTPATIENT)
Dept: FAMILY MEDICINE | Facility: CLINIC | Age: 80
End: 2020-12-14

## 2020-12-14 ENCOUNTER — INFUSION (OUTPATIENT)
Dept: INFUSION THERAPY | Facility: HOSPITAL | Age: 80
End: 2020-12-14
Attending: FAMILY MEDICINE
Payer: MEDICARE

## 2020-12-14 VITALS
RESPIRATION RATE: 18 BRPM | OXYGEN SATURATION: 97 % | TEMPERATURE: 98 F | SYSTOLIC BLOOD PRESSURE: 152 MMHG | HEART RATE: 81 BPM | DIASTOLIC BLOOD PRESSURE: 67 MMHG

## 2020-12-14 DIAGNOSIS — Z85.3 HISTORY OF BILATERAL BREAST CANCER: ICD-10-CM

## 2020-12-14 DIAGNOSIS — D68.59 HYPERCOAGULOPATHY: ICD-10-CM

## 2020-12-14 DIAGNOSIS — E11.9 TYPE 2 DIABETES MELLITUS WITHOUT COMPLICATION, WITHOUT LONG-TERM CURRENT USE OF INSULIN: Primary | ICD-10-CM

## 2020-12-14 DIAGNOSIS — I10 ESSENTIAL HYPERTENSION: Primary | ICD-10-CM

## 2020-12-14 LAB
ANION GAP SERPL CALC-SCNC: 7 MMOL/L (ref 8–16)
BASOPHILS # BLD AUTO: 0.02 K/UL (ref 0–0.2)
BASOPHILS NFR BLD: 0.3 % (ref 0–1.9)
BUN SERPL-MCNC: 22 MG/DL (ref 8–23)
CALCIUM SERPL-MCNC: 9.3 MG/DL (ref 8.7–10.5)
CHLORIDE SERPL-SCNC: 105 MMOL/L (ref 95–110)
CO2 SERPL-SCNC: 24 MMOL/L (ref 23–29)
CREAT SERPL-MCNC: 1.1 MG/DL (ref 0.5–1.4)
DIFFERENTIAL METHOD: ABNORMAL
EOSINOPHIL # BLD AUTO: 0.3 K/UL (ref 0–0.5)
EOSINOPHIL NFR BLD: 3.9 % (ref 0–8)
ERYTHROCYTE [DISTWIDTH] IN BLOOD BY AUTOMATED COUNT: 14.8 % (ref 11.5–14.5)
EST. GFR  (AFRICAN AMERICAN): 54 ML/MIN/1.73 M^2
EST. GFR  (NON AFRICAN AMERICAN): 46.9 ML/MIN/1.73 M^2
GLUCOSE SERPL-MCNC: 184 MG/DL (ref 70–110)
HCT VFR BLD AUTO: 42.6 % (ref 37–48.5)
HGB BLD-MCNC: 13.9 G/DL (ref 12–16)
IMM GRANULOCYTES # BLD AUTO: 0.02 K/UL (ref 0–0.04)
IMM GRANULOCYTES NFR BLD AUTO: 0.3 % (ref 0–0.5)
INR PPP: 3.3 (ref 0.8–1.2)
LYMPHOCYTES # BLD AUTO: 0.7 K/UL (ref 1–4.8)
LYMPHOCYTES NFR BLD: 11.1 % (ref 18–48)
MCH RBC QN AUTO: 27.3 PG (ref 27–31)
MCHC RBC AUTO-ENTMCNC: 32.6 G/DL (ref 32–36)
MCV RBC AUTO: 84 FL (ref 82–98)
MONOCYTES # BLD AUTO: 0.5 K/UL (ref 0.3–1)
MONOCYTES NFR BLD: 7.9 % (ref 4–15)
NEUTROPHILS # BLD AUTO: 4.9 K/UL (ref 1.8–7.7)
NEUTROPHILS NFR BLD: 76.5 % (ref 38–73)
NRBC BLD-RTO: 0 /100 WBC
PLATELET # BLD AUTO: 215 K/UL (ref 150–350)
PMV BLD AUTO: 10.1 FL (ref 9.2–12.9)
POTASSIUM SERPL-SCNC: 3.7 MMOL/L (ref 3.5–5.1)
PROTHROMBIN TIME: 32.6 SEC (ref 9–12.5)
RBC # BLD AUTO: 5.1 M/UL (ref 4–5.4)
SODIUM SERPL-SCNC: 136 MMOL/L (ref 136–145)
WBC # BLD AUTO: 6.42 K/UL (ref 3.9–12.7)

## 2020-12-14 PROCEDURE — 80048 BASIC METABOLIC PNL TOTAL CA: CPT

## 2020-12-14 PROCEDURE — 36592 COLLECT BLOOD FROM PICC: CPT

## 2020-12-14 PROCEDURE — 85610 PROTHROMBIN TIME: CPT

## 2020-12-14 PROCEDURE — 85025 COMPLETE CBC W/AUTO DIFF WBC: CPT

## 2020-12-14 RX ORDER — SODIUM CHLORIDE 0.9 % (FLUSH) 0.9 %
10 SYRINGE (ML) INJECTION
Status: DISCONTINUED | OUTPATIENT
Start: 2020-12-14 | End: 2020-12-14 | Stop reason: HOSPADM

## 2020-12-14 RX ORDER — HEPARIN 100 UNIT/ML
500 SYRINGE INTRAVENOUS
Status: DISCONTINUED | OUTPATIENT
Start: 2020-12-14 | End: 2020-12-14 | Stop reason: HOSPADM

## 2020-12-14 NOTE — PLAN OF CARE
PT AMB TO OPT AREA, PORT ACCESSED FOR LAB DRAW, PT LUIS WELL. PORT FLUSHED AND HEPARIN LOCKED, MONSALVE REMOVED WITH CATH INTACT, BANDAID APPLIED. PT AMB OUT TO POV. BLOOD TAKEN TO LAB.

## 2020-12-16 ENCOUNTER — PATIENT MESSAGE (OUTPATIENT)
Dept: FAMILY MEDICINE | Facility: CLINIC | Age: 80
End: 2020-12-16

## 2020-12-16 ENCOUNTER — TELEPHONE (OUTPATIENT)
Dept: FAMILY MEDICINE | Facility: CLINIC | Age: 80
End: 2020-12-16

## 2020-12-16 NOTE — TELEPHONE ENCOUNTER
Message completed in other encounter by Dr Sutton      ----- Message from Lorie Roldan, Patient Care Assistant sent at 12/16/2020 12:07 PM CST -----  Regarding: advice  Contact: pt  Type: Needs Medical Advice  Who Called:  pt   Pharmacy name and phone #:    Best Call Back Number: [phone] (home)   Additional Information: pt states she would like a callback regarding a form being completed for home testing kit for diabetes. Thanks!

## 2020-12-17 ENCOUNTER — PATIENT MESSAGE (OUTPATIENT)
Dept: FAMILY MEDICINE | Facility: CLINIC | Age: 80
End: 2020-12-17

## 2020-12-18 ENCOUNTER — LAB VISIT (OUTPATIENT)
Dept: FAMILY MEDICINE | Facility: CLINIC | Age: 80
End: 2020-12-18
Payer: MEDICARE

## 2020-12-18 DIAGNOSIS — Z01.818 PRE-OP TESTING: ICD-10-CM

## 2020-12-18 PROCEDURE — U0003 INFECTIOUS AGENT DETECTION BY NUCLEIC ACID (DNA OR RNA); SEVERE ACUTE RESPIRATORY SYNDROME CORONAVIRUS 2 (SARS-COV-2) (CORONAVIRUS DISEASE [COVID-19]), AMPLIFIED PROBE TECHNIQUE, MAKING USE OF HIGH THROUGHPUT TECHNOLOGIES AS DESCRIBED BY CMS-2020-01-R: HCPCS

## 2020-12-20 LAB — SARS-COV-2 RNA RESP QL NAA+PROBE: NOT DETECTED

## 2020-12-21 ENCOUNTER — PATIENT MESSAGE (OUTPATIENT)
Dept: FAMILY MEDICINE | Facility: CLINIC | Age: 80
End: 2020-12-21

## 2020-12-23 NOTE — TELEPHONE ENCOUNTER
"12/23/2020  The Medicare sends a form "CMN Certification of Medical Necessity" to the pharmacy. The pharmacy is faxing this form to us to fill out. Awaiting the fax  "

## 2020-12-24 NOTE — NURSING
0830 Pt in clinic for blood draw from port. Pt had abnormally high pt/inr and md wishes to retest due to possible contamination. DS   weight-bearing as tolerated

## 2020-12-28 ENCOUNTER — PATIENT MESSAGE (OUTPATIENT)
Dept: FAMILY MEDICINE | Facility: CLINIC | Age: 80
End: 2020-12-28

## 2021-01-04 ENCOUNTER — OFFICE VISIT (OUTPATIENT)
Dept: FAMILY MEDICINE | Facility: CLINIC | Age: 81
End: 2021-01-04
Payer: MEDICARE

## 2021-01-04 VITALS
SYSTOLIC BLOOD PRESSURE: 137 MMHG | HEART RATE: 81 BPM | RESPIRATION RATE: 14 BRPM | HEIGHT: 62 IN | WEIGHT: 137.81 LBS | BODY MASS INDEX: 25.36 KG/M2 | OXYGEN SATURATION: 96 % | TEMPERATURE: 98 F | DIASTOLIC BLOOD PRESSURE: 75 MMHG

## 2021-01-04 DIAGNOSIS — D68.59 HYPERCOAGULOPATHY: Primary | ICD-10-CM

## 2021-01-04 PROCEDURE — 99214 PR OFFICE/OUTPT VISIT, EST, LEVL IV, 30-39 MIN: ICD-10-PCS | Mod: S$GLB,,, | Performed by: FAMILY MEDICINE

## 2021-01-04 PROCEDURE — 99214 OFFICE O/P EST MOD 30 MIN: CPT | Mod: S$GLB,,, | Performed by: FAMILY MEDICINE

## 2021-01-07 ENCOUNTER — PATIENT MESSAGE (OUTPATIENT)
Dept: FAMILY MEDICINE | Facility: CLINIC | Age: 81
End: 2021-01-07

## 2021-01-07 ENCOUNTER — TELEPHONE (OUTPATIENT)
Dept: FAMILY MEDICINE | Facility: CLINIC | Age: 81
End: 2021-01-07

## 2021-01-07 RX ORDER — HEPARIN 100 UNIT/ML
500 SYRINGE INTRAVENOUS
Status: CANCELLED | OUTPATIENT
Start: 2021-01-07

## 2021-01-07 RX ORDER — SODIUM CHLORIDE 0.9 % (FLUSH) 0.9 %
10 SYRINGE (ML) INJECTION
Status: CANCELLED | OUTPATIENT
Start: 2021-01-07

## 2021-01-11 ENCOUNTER — INFUSION (OUTPATIENT)
Dept: INFUSION THERAPY | Facility: HOSPITAL | Age: 81
End: 2021-01-11
Attending: FAMILY MEDICINE
Payer: MEDICARE

## 2021-01-11 VITALS
DIASTOLIC BLOOD PRESSURE: 66 MMHG | SYSTOLIC BLOOD PRESSURE: 139 MMHG | HEART RATE: 77 BPM | RESPIRATION RATE: 18 BRPM | TEMPERATURE: 98 F | OXYGEN SATURATION: 98 %

## 2021-01-11 DIAGNOSIS — Z85.3 HISTORY OF BILATERAL BREAST CANCER: ICD-10-CM

## 2021-01-11 DIAGNOSIS — D68.59 HYPERCOAGULOPATHY: Primary | ICD-10-CM

## 2021-01-11 LAB
INR PPP: 1.1 (ref 0.8–1.2)
PROTHROMBIN TIME: 11.8 SEC (ref 9–12.5)

## 2021-01-11 PROCEDURE — 63600175 PHARM REV CODE 636 W HCPCS: Performed by: FAMILY MEDICINE

## 2021-01-11 PROCEDURE — 25000003 PHARM REV CODE 250: Performed by: FAMILY MEDICINE

## 2021-01-11 PROCEDURE — 85610 PROTHROMBIN TIME: CPT

## 2021-01-11 PROCEDURE — A4216 STERILE WATER/SALINE, 10 ML: HCPCS | Performed by: FAMILY MEDICINE

## 2021-01-11 PROCEDURE — 85260 CLOT FACTOR X STUART-POWER: CPT

## 2021-01-11 PROCEDURE — 36592 COLLECT BLOOD FROM PICC: CPT

## 2021-01-11 RX ORDER — HEPARIN 100 UNIT/ML
500 SYRINGE INTRAVENOUS
Status: CANCELLED | OUTPATIENT
Start: 2021-01-11

## 2021-01-11 RX ORDER — HEPARIN 100 UNIT/ML
500 SYRINGE INTRAVENOUS
Status: COMPLETED | OUTPATIENT
Start: 2021-01-11 | End: 2021-01-11

## 2021-01-11 RX ORDER — SODIUM CHLORIDE 0.9 % (FLUSH) 0.9 %
10 SYRINGE (ML) INJECTION
Status: CANCELLED | OUTPATIENT
Start: 2021-01-11

## 2021-01-11 RX ORDER — SODIUM CHLORIDE 0.9 % (FLUSH) 0.9 %
10 SYRINGE (ML) INJECTION
Status: COMPLETED | OUTPATIENT
Start: 2021-01-11 | End: 2021-01-11

## 2021-01-11 RX ADMIN — HEPARIN 500 UNITS: 100 SYRINGE at 08:01

## 2021-01-11 RX ADMIN — Medication 10 ML: at 08:01

## 2021-01-12 LAB — FACT X ACT/NOR PPP: 90 % (ref 75–196)

## 2021-01-13 ENCOUNTER — IMMUNIZATION (OUTPATIENT)
Dept: FAMILY MEDICINE | Facility: CLINIC | Age: 81
End: 2021-01-13
Payer: MEDICARE

## 2021-01-13 DIAGNOSIS — Z23 NEED FOR VACCINATION: ICD-10-CM

## 2021-01-13 PROCEDURE — 0011A COVID-19, MRNA, LNP-S, PF, 100 MCG/0.5 ML DOSE VACCINE: ICD-10-PCS | Mod: ,,, | Performed by: FAMILY MEDICINE

## 2021-01-13 PROCEDURE — 91301 COVID-19, MRNA, LNP-S, PF, 100 MCG/0.5 ML DOSE VACCINE: ICD-10-PCS | Mod: ,,, | Performed by: FAMILY MEDICINE

## 2021-01-13 PROCEDURE — 91301 COVID-19, MRNA, LNP-S, PF, 100 MCG/0.5 ML DOSE VACCINE: CPT | Mod: ,,, | Performed by: FAMILY MEDICINE

## 2021-01-13 PROCEDURE — 0011A COVID-19, MRNA, LNP-S, PF, 100 MCG/0.5 ML DOSE VACCINE: CPT | Mod: ,,, | Performed by: FAMILY MEDICINE

## 2021-01-15 ENCOUNTER — PATIENT MESSAGE (OUTPATIENT)
Dept: FAMILY MEDICINE | Facility: CLINIC | Age: 81
End: 2021-01-15

## 2021-01-20 ENCOUNTER — PATIENT MESSAGE (OUTPATIENT)
Dept: FAMILY MEDICINE | Facility: CLINIC | Age: 81
End: 2021-01-20

## 2021-01-20 DIAGNOSIS — R30.0 DYSURIA: Primary | ICD-10-CM

## 2021-01-20 RX ORDER — SULFAMETHOXAZOLE AND TRIMETHOPRIM 800; 160 MG/1; MG/1
1 TABLET ORAL 2 TIMES DAILY
Qty: 6 TABLET | Refills: 0 | Status: SHIPPED | OUTPATIENT
Start: 2021-01-20 | End: 2021-01-23

## 2021-02-01 ENCOUNTER — TELEPHONE (OUTPATIENT)
Dept: FAMILY MEDICINE | Facility: CLINIC | Age: 81
End: 2021-02-01

## 2021-02-01 DIAGNOSIS — Q30.9 NOSE ABNORMALITY: Primary | ICD-10-CM

## 2021-02-05 ENCOUNTER — TELEPHONE (OUTPATIENT)
Dept: FAMILY MEDICINE | Facility: CLINIC | Age: 81
End: 2021-02-05

## 2021-02-08 ENCOUNTER — INFUSION (OUTPATIENT)
Dept: INFUSION THERAPY | Facility: HOSPITAL | Age: 81
End: 2021-02-08
Attending: FAMILY MEDICINE
Payer: MEDICARE

## 2021-02-08 VITALS
SYSTOLIC BLOOD PRESSURE: 145 MMHG | HEART RATE: 84 BPM | TEMPERATURE: 98 F | DIASTOLIC BLOOD PRESSURE: 64 MMHG | RESPIRATION RATE: 18 BRPM | OXYGEN SATURATION: 96 %

## 2021-02-08 DIAGNOSIS — Z85.3 HISTORY OF BILATERAL BREAST CANCER: ICD-10-CM

## 2021-02-08 DIAGNOSIS — D68.59 HYPERCOAGULOPATHY: Primary | ICD-10-CM

## 2021-02-08 PROCEDURE — 25000003 PHARM REV CODE 250: Performed by: FAMILY MEDICINE

## 2021-02-08 PROCEDURE — 63600175 PHARM REV CODE 636 W HCPCS: Performed by: FAMILY MEDICINE

## 2021-02-08 PROCEDURE — 36591 DRAW BLOOD OFF VENOUS DEVICE: CPT

## 2021-02-08 PROCEDURE — 96523 IRRIG DRUG DELIVERY DEVICE: CPT

## 2021-02-08 PROCEDURE — A4216 STERILE WATER/SALINE, 10 ML: HCPCS | Performed by: FAMILY MEDICINE

## 2021-02-08 RX ORDER — SODIUM CHLORIDE 0.9 % (FLUSH) 0.9 %
10 SYRINGE (ML) INJECTION
Status: CANCELLED | OUTPATIENT
Start: 2021-02-08

## 2021-02-08 RX ORDER — HEPARIN 100 UNIT/ML
500 SYRINGE INTRAVENOUS
Status: CANCELLED | OUTPATIENT
Start: 2021-02-08

## 2021-02-08 RX ORDER — HEPARIN 100 UNIT/ML
500 SYRINGE INTRAVENOUS
Status: COMPLETED | OUTPATIENT
Start: 2021-02-08 | End: 2021-02-08

## 2021-02-08 RX ORDER — SODIUM CHLORIDE 0.9 % (FLUSH) 0.9 %
10 SYRINGE (ML) INJECTION
Status: COMPLETED | OUTPATIENT
Start: 2021-02-08 | End: 2021-02-08

## 2021-02-08 RX ADMIN — HEPARIN 500 UNITS: 100 SYRINGE at 08:02

## 2021-02-08 RX ADMIN — Medication 10 ML: at 08:02

## 2021-02-09 ENCOUNTER — TELEPHONE (OUTPATIENT)
Dept: FAMILY MEDICINE | Facility: CLINIC | Age: 81
End: 2021-02-09

## 2021-02-09 DIAGNOSIS — Z51.81 ENCOUNTER FOR MONITORING COUMADIN THERAPY: Primary | ICD-10-CM

## 2021-02-09 DIAGNOSIS — Z79.01 ENCOUNTER FOR MONITORING COUMADIN THERAPY: Primary | ICD-10-CM

## 2021-02-10 ENCOUNTER — IMMUNIZATION (OUTPATIENT)
Dept: FAMILY MEDICINE | Facility: CLINIC | Age: 81
End: 2021-02-10
Payer: MEDICARE

## 2021-02-10 DIAGNOSIS — Z23 NEED FOR VACCINATION: Primary | ICD-10-CM

## 2021-02-10 PROCEDURE — 0012A COVID-19, MRNA, LNP-S, PF, 100 MCG/0.5 ML DOSE VACCINE: CPT | Mod: CV19,S$GLB,, | Performed by: FAMILY MEDICINE

## 2021-02-10 PROCEDURE — 91301 COVID-19, MRNA, LNP-S, PF, 100 MCG/0.5 ML DOSE VACCINE: CPT | Mod: S$GLB,,, | Performed by: FAMILY MEDICINE

## 2021-02-10 PROCEDURE — 91301 COVID-19, MRNA, LNP-S, PF, 100 MCG/0.5 ML DOSE VACCINE: ICD-10-PCS | Mod: S$GLB,,, | Performed by: FAMILY MEDICINE

## 2021-02-10 PROCEDURE — 0012A COVID-19, MRNA, LNP-S, PF, 100 MCG/0.5 ML DOSE VACCINE: ICD-10-PCS | Mod: CV19,S$GLB,, | Performed by: FAMILY MEDICINE

## 2021-03-08 ENCOUNTER — INFUSION (OUTPATIENT)
Dept: INFUSION THERAPY | Facility: HOSPITAL | Age: 81
End: 2021-03-08
Attending: FAMILY MEDICINE
Payer: MEDICARE

## 2021-03-08 VITALS
TEMPERATURE: 97 F | HEART RATE: 77 BPM | OXYGEN SATURATION: 99 % | DIASTOLIC BLOOD PRESSURE: 81 MMHG | SYSTOLIC BLOOD PRESSURE: 134 MMHG | RESPIRATION RATE: 18 BRPM

## 2021-03-08 DIAGNOSIS — I10 ESSENTIAL HYPERTENSION: Primary | ICD-10-CM

## 2021-03-08 DIAGNOSIS — D68.59 HYPERCOAGULOPATHY: ICD-10-CM

## 2021-03-08 DIAGNOSIS — Z85.3 HISTORY OF BILATERAL BREAST CANCER: ICD-10-CM

## 2021-03-08 LAB
ANION GAP SERPL CALC-SCNC: 9 MMOL/L (ref 8–16)
BASOPHILS # BLD AUTO: 0.04 K/UL (ref 0–0.2)
BASOPHILS NFR BLD: 0.6 % (ref 0–1.9)
BUN SERPL-MCNC: 18 MG/DL (ref 8–23)
CALCIUM SERPL-MCNC: 9.5 MG/DL (ref 8.7–10.5)
CHLORIDE SERPL-SCNC: 104 MMOL/L (ref 95–110)
CO2 SERPL-SCNC: 24 MMOL/L (ref 23–29)
CREAT SERPL-MCNC: 1 MG/DL (ref 0.5–1.4)
DIFFERENTIAL METHOD: ABNORMAL
EOSINOPHIL # BLD AUTO: 0.3 K/UL (ref 0–0.5)
EOSINOPHIL NFR BLD: 4.1 % (ref 0–8)
ERYTHROCYTE [DISTWIDTH] IN BLOOD BY AUTOMATED COUNT: 13.6 % (ref 11.5–14.5)
EST. GFR  (AFRICAN AMERICAN): >60 ML/MIN/1.73 M^2
EST. GFR  (NON AFRICAN AMERICAN): 52.2 ML/MIN/1.73 M^2
GLUCOSE SERPL-MCNC: 189 MG/DL (ref 70–110)
HCT VFR BLD AUTO: 43.5 % (ref 37–48.5)
HGB BLD-MCNC: 14.1 G/DL (ref 12–16)
IMM GRANULOCYTES # BLD AUTO: 0.02 K/UL (ref 0–0.04)
IMM GRANULOCYTES NFR BLD AUTO: 0.3 % (ref 0–0.5)
INR PPP: 1.1 (ref 0.8–1.2)
LYMPHOCYTES # BLD AUTO: 0.8 K/UL (ref 1–4.8)
LYMPHOCYTES NFR BLD: 11.6 % (ref 18–48)
MCH RBC QN AUTO: 27.6 PG (ref 27–31)
MCHC RBC AUTO-ENTMCNC: 32.4 G/DL (ref 32–36)
MCV RBC AUTO: 85 FL (ref 82–98)
MONOCYTES # BLD AUTO: 0.5 K/UL (ref 0.3–1)
MONOCYTES NFR BLD: 7.2 % (ref 4–15)
NEUTROPHILS # BLD AUTO: 5.2 K/UL (ref 1.8–7.7)
NEUTROPHILS NFR BLD: 76.2 % (ref 38–73)
NRBC BLD-RTO: 0 /100 WBC
PLATELET # BLD AUTO: 180 K/UL (ref 150–350)
PMV BLD AUTO: 10.3 FL (ref 9.2–12.9)
POTASSIUM SERPL-SCNC: 4.2 MMOL/L (ref 3.5–5.1)
PROTHROMBIN TIME: 11.1 SEC (ref 9–12.5)
RBC # BLD AUTO: 5.1 M/UL (ref 4–5.4)
SODIUM SERPL-SCNC: 137 MMOL/L (ref 136–145)
WBC # BLD AUTO: 6.81 K/UL (ref 3.9–12.7)

## 2021-03-08 PROCEDURE — 25000003 PHARM REV CODE 250: Performed by: FAMILY MEDICINE

## 2021-03-08 PROCEDURE — 85610 PROTHROMBIN TIME: CPT | Performed by: FAMILY MEDICINE

## 2021-03-08 PROCEDURE — 80048 BASIC METABOLIC PNL TOTAL CA: CPT | Performed by: FAMILY MEDICINE

## 2021-03-08 PROCEDURE — 63600175 PHARM REV CODE 636 W HCPCS: Performed by: FAMILY MEDICINE

## 2021-03-08 PROCEDURE — A4216 STERILE WATER/SALINE, 10 ML: HCPCS | Performed by: FAMILY MEDICINE

## 2021-03-08 PROCEDURE — 85025 COMPLETE CBC W/AUTO DIFF WBC: CPT | Performed by: FAMILY MEDICINE

## 2021-03-08 PROCEDURE — 36591 DRAW BLOOD OFF VENOUS DEVICE: CPT

## 2021-03-08 RX ORDER — HEPARIN 100 UNIT/ML
500 SYRINGE INTRAVENOUS
Status: CANCELLED | OUTPATIENT
Start: 2021-03-08

## 2021-03-08 RX ORDER — HEPARIN 100 UNIT/ML
500 SYRINGE INTRAVENOUS
Status: COMPLETED | OUTPATIENT
Start: 2021-03-08 | End: 2021-03-08

## 2021-03-08 RX ORDER — SODIUM CHLORIDE 0.9 % (FLUSH) 0.9 %
10 SYRINGE (ML) INJECTION
Status: COMPLETED | OUTPATIENT
Start: 2021-03-08 | End: 2021-03-08

## 2021-03-08 RX ORDER — SODIUM CHLORIDE 0.9 % (FLUSH) 0.9 %
10 SYRINGE (ML) INJECTION
Status: CANCELLED | OUTPATIENT
Start: 2021-03-08

## 2021-03-08 RX ADMIN — HEPARIN 500 UNITS: 100 SYRINGE at 08:03

## 2021-03-08 RX ADMIN — Medication 10 ML: at 08:03

## 2021-03-11 ENCOUNTER — PATIENT MESSAGE (OUTPATIENT)
Dept: ADMINISTRATIVE | Facility: CLINIC | Age: 81
End: 2021-03-11

## 2021-03-11 ENCOUNTER — PATIENT MESSAGE (OUTPATIENT)
Dept: FAMILY MEDICINE | Facility: CLINIC | Age: 81
End: 2021-03-11

## 2021-03-16 ENCOUNTER — PATIENT MESSAGE (OUTPATIENT)
Dept: FAMILY MEDICINE | Facility: CLINIC | Age: 81
End: 2021-03-16

## 2021-03-16 ENCOUNTER — OFFICE VISIT (OUTPATIENT)
Dept: FAMILY MEDICINE | Facility: CLINIC | Age: 81
End: 2021-03-16
Payer: MEDICARE

## 2021-03-16 ENCOUNTER — TELEPHONE (OUTPATIENT)
Dept: FAMILY MEDICINE | Facility: CLINIC | Age: 81
End: 2021-03-16

## 2021-03-16 VITALS
HEART RATE: 92 BPM | OXYGEN SATURATION: 98 % | BODY MASS INDEX: 25.21 KG/M2 | DIASTOLIC BLOOD PRESSURE: 75 MMHG | WEIGHT: 137 LBS | HEIGHT: 62 IN | SYSTOLIC BLOOD PRESSURE: 122 MMHG | TEMPERATURE: 98 F | RESPIRATION RATE: 15 BRPM

## 2021-03-16 DIAGNOSIS — E03.9 ACQUIRED HYPOTHYROIDISM: Primary | ICD-10-CM

## 2021-03-16 DIAGNOSIS — E11.9 TYPE 2 DIABETES MELLITUS WITHOUT COMPLICATION, WITHOUT LONG-TERM CURRENT USE OF INSULIN: ICD-10-CM

## 2021-03-16 PROCEDURE — 99213 PR OFFICE/OUTPT VISIT, EST, LEVL III, 20-29 MIN: ICD-10-PCS | Mod: S$GLB,,, | Performed by: FAMILY MEDICINE

## 2021-03-16 PROCEDURE — 99213 OFFICE O/P EST LOW 20 MIN: CPT | Mod: S$GLB,,, | Performed by: FAMILY MEDICINE

## 2021-03-17 ENCOUNTER — INFUSION (OUTPATIENT)
Dept: INFUSION THERAPY | Facility: HOSPITAL | Age: 81
End: 2021-03-17
Attending: FAMILY MEDICINE
Payer: MEDICARE

## 2021-03-17 DIAGNOSIS — Z85.3 HISTORY OF BILATERAL BREAST CANCER: Primary | ICD-10-CM

## 2021-03-17 PROCEDURE — 36591 DRAW BLOOD OFF VENOUS DEVICE: CPT

## 2021-03-17 PROCEDURE — 63600175 PHARM REV CODE 636 W HCPCS: Performed by: FAMILY MEDICINE

## 2021-03-17 RX ORDER — SODIUM CHLORIDE 0.9 % (FLUSH) 0.9 %
10 SYRINGE (ML) INJECTION
Status: CANCELLED | OUTPATIENT
Start: 2021-03-17

## 2021-03-17 RX ORDER — HEPARIN 100 UNIT/ML
500 SYRINGE INTRAVENOUS
Status: CANCELLED | OUTPATIENT
Start: 2021-03-17

## 2021-03-17 RX ORDER — SODIUM CHLORIDE 0.9 % (FLUSH) 0.9 %
10 SYRINGE (ML) INJECTION
Status: DISCONTINUED | OUTPATIENT
Start: 2021-03-17 | End: 2021-03-17 | Stop reason: HOSPADM

## 2021-03-17 RX ORDER — HEPARIN 100 UNIT/ML
500 SYRINGE INTRAVENOUS
Status: COMPLETED | OUTPATIENT
Start: 2021-03-17 | End: 2021-03-17

## 2021-03-17 RX ADMIN — Medication 500 UNITS: at 08:03

## 2021-04-05 DIAGNOSIS — E11.9 TYPE 2 DIABETES MELLITUS WITHOUT COMPLICATION, WITHOUT LONG-TERM CURRENT USE OF INSULIN: ICD-10-CM

## 2021-04-06 ENCOUNTER — INFUSION (OUTPATIENT)
Dept: INFUSION THERAPY | Facility: HOSPITAL | Age: 81
End: 2021-04-06
Attending: FAMILY MEDICINE
Payer: MEDICARE

## 2021-04-06 ENCOUNTER — OFFICE VISIT (OUTPATIENT)
Dept: FAMILY MEDICINE | Facility: CLINIC | Age: 81
End: 2021-04-06
Payer: MEDICARE

## 2021-04-06 VITALS
OXYGEN SATURATION: 95 % | OXYGEN SATURATION: 97 % | WEIGHT: 134.81 LBS | TEMPERATURE: 97 F | DIASTOLIC BLOOD PRESSURE: 63 MMHG | TEMPERATURE: 98 F | RESPIRATION RATE: 14 BRPM | RESPIRATION RATE: 18 BRPM | SYSTOLIC BLOOD PRESSURE: 136 MMHG | DIASTOLIC BLOOD PRESSURE: 64 MMHG | BODY MASS INDEX: 24.81 KG/M2 | HEART RATE: 73 BPM | HEART RATE: 74 BPM | SYSTOLIC BLOOD PRESSURE: 135 MMHG | HEIGHT: 62 IN

## 2021-04-06 DIAGNOSIS — E11.9 TYPE 2 DIABETES MELLITUS WITHOUT COMPLICATION, WITHOUT LONG-TERM CURRENT USE OF INSULIN: Primary | ICD-10-CM

## 2021-04-06 DIAGNOSIS — Z85.3 HISTORY OF BILATERAL BREAST CANCER: ICD-10-CM

## 2021-04-06 DIAGNOSIS — D68.59 HYPERCOAGULOPATHY: Primary | ICD-10-CM

## 2021-04-06 LAB
INR PPP: 1.1 (ref 0.8–1.2)
PROTHROMBIN TIME: 11.3 SEC (ref 9–12.5)

## 2021-04-06 PROCEDURE — 63600175 PHARM REV CODE 636 W HCPCS: Performed by: FAMILY MEDICINE

## 2021-04-06 PROCEDURE — 99213 OFFICE O/P EST LOW 20 MIN: CPT | Mod: S$GLB,,, | Performed by: FAMILY MEDICINE

## 2021-04-06 PROCEDURE — 99213 PR OFFICE/OUTPT VISIT, EST, LEVL III, 20-29 MIN: ICD-10-PCS | Mod: S$GLB,,, | Performed by: FAMILY MEDICINE

## 2021-04-06 PROCEDURE — 36591 DRAW BLOOD OFF VENOUS DEVICE: CPT

## 2021-04-06 PROCEDURE — 25000003 PHARM REV CODE 250: Performed by: FAMILY MEDICINE

## 2021-04-06 PROCEDURE — 85610 PROTHROMBIN TIME: CPT | Performed by: FAMILY MEDICINE

## 2021-04-06 PROCEDURE — A4216 STERILE WATER/SALINE, 10 ML: HCPCS | Performed by: FAMILY MEDICINE

## 2021-04-06 RX ORDER — METFORMIN HYDROCHLORIDE 500 MG/1
TABLET, EXTENDED RELEASE ORAL
Qty: 180 TABLET | Refills: 3 | Status: SHIPPED | OUTPATIENT
Start: 2021-04-06 | End: 2022-03-21 | Stop reason: SDUPTHER

## 2021-04-06 RX ORDER — HEPARIN 100 UNIT/ML
500 SYRINGE INTRAVENOUS
Status: COMPLETED | OUTPATIENT
Start: 2021-04-06 | End: 2021-04-06

## 2021-04-06 RX ORDER — HEPARIN 100 UNIT/ML
500 SYRINGE INTRAVENOUS
Status: CANCELLED | OUTPATIENT
Start: 2021-04-06

## 2021-04-06 RX ORDER — SODIUM CHLORIDE 0.9 % (FLUSH) 0.9 %
10 SYRINGE (ML) INJECTION
Status: CANCELLED | OUTPATIENT
Start: 2021-04-06

## 2021-04-06 RX ORDER — SODIUM CHLORIDE 0.9 % (FLUSH) 0.9 %
10 SYRINGE (ML) INJECTION
Status: COMPLETED | OUTPATIENT
Start: 2021-04-06 | End: 2021-04-06

## 2021-04-06 RX ADMIN — HEPARIN 500 UNITS: 100 SYRINGE at 08:04

## 2021-04-06 RX ADMIN — Medication 10 ML: at 08:04

## 2021-04-07 ENCOUNTER — PATIENT MESSAGE (OUTPATIENT)
Dept: FAMILY MEDICINE | Facility: CLINIC | Age: 81
End: 2021-04-07

## 2021-04-08 ENCOUNTER — PATIENT MESSAGE (OUTPATIENT)
Dept: FAMILY MEDICINE | Facility: CLINIC | Age: 81
End: 2021-04-08

## 2021-04-09 ENCOUNTER — PATIENT MESSAGE (OUTPATIENT)
Dept: FAMILY MEDICINE | Facility: CLINIC | Age: 81
End: 2021-04-09

## 2021-04-09 ENCOUNTER — TELEPHONE (OUTPATIENT)
Dept: FAMILY MEDICINE | Facility: CLINIC | Age: 81
End: 2021-04-09

## 2021-04-09 DIAGNOSIS — I10 ESSENTIAL HYPERTENSION: ICD-10-CM

## 2021-04-09 RX ORDER — CARVEDILOL 6.25 MG/1
6.25 TABLET ORAL 2 TIMES DAILY WITH MEALS
Qty: 60 TABLET | Refills: 5 | Status: SHIPPED | OUTPATIENT
Start: 2021-04-09 | End: 2021-10-06

## 2021-04-09 RX ORDER — CARVEDILOL 6.25 MG/1
6.25 TABLET ORAL 2 TIMES DAILY WITH MEALS
Qty: 60 TABLET | Refills: 5 | Status: SHIPPED | OUTPATIENT
Start: 2021-04-09 | End: 2021-04-09 | Stop reason: SDUPTHER

## 2021-04-13 ENCOUNTER — PATIENT MESSAGE (OUTPATIENT)
Dept: FAMILY MEDICINE | Facility: CLINIC | Age: 81
End: 2021-04-13

## 2021-04-14 RX ORDER — CARVEDILOL 6.25 MG/1
6.25 TABLET ORAL 2 TIMES DAILY WITH MEALS
Qty: 60 TABLET | Refills: 11 | Status: CANCELLED | OUTPATIENT
Start: 2021-04-14 | End: 2022-04-14

## 2021-04-20 ENCOUNTER — HOSPITAL ENCOUNTER (OUTPATIENT)
Dept: RADIOLOGY | Facility: HOSPITAL | Age: 81
Discharge: HOME | End: 2021-04-20
Attending: FAMILY MEDICINE
Payer: MEDICARE

## 2021-04-20 ENCOUNTER — OFFICE VISIT (OUTPATIENT)
Dept: FAMILY MEDICINE | Facility: CLINIC | Age: 81
End: 2021-04-20
Payer: MEDICARE

## 2021-04-20 VITALS
HEART RATE: 81 BPM | SYSTOLIC BLOOD PRESSURE: 130 MMHG | HEIGHT: 62 IN | WEIGHT: 132.81 LBS | RESPIRATION RATE: 14 BRPM | BODY MASS INDEX: 24.44 KG/M2 | TEMPERATURE: 97 F | DIASTOLIC BLOOD PRESSURE: 70 MMHG | OXYGEN SATURATION: 97 %

## 2021-04-20 DIAGNOSIS — M54.2 NECK PAIN: Primary | ICD-10-CM

## 2021-04-20 DIAGNOSIS — M54.2 NECK PAIN: ICD-10-CM

## 2021-04-20 PROCEDURE — 72040 XR CERVICAL SPINE AP LATERAL: ICD-10-PCS | Mod: 26,,, | Performed by: RADIOLOGY

## 2021-04-20 PROCEDURE — 72040 X-RAY EXAM NECK SPINE 2-3 VW: CPT | Mod: TC,FY

## 2021-04-20 PROCEDURE — 99214 PR OFFICE/OUTPT VISIT, EST, LEVL IV, 30-39 MIN: ICD-10-PCS | Mod: S$GLB,,, | Performed by: FAMILY MEDICINE

## 2021-04-20 PROCEDURE — 99214 OFFICE O/P EST MOD 30 MIN: CPT | Mod: S$GLB,,, | Performed by: FAMILY MEDICINE

## 2021-04-20 PROCEDURE — 72040 X-RAY EXAM NECK SPINE 2-3 VW: CPT | Mod: 26,,, | Performed by: RADIOLOGY

## 2021-04-20 RX ORDER — METHOCARBAMOL 500 MG/1
500 TABLET, FILM COATED ORAL 2 TIMES DAILY PRN
Qty: 20 TABLET | Refills: 0 | Status: SHIPPED | OUTPATIENT
Start: 2021-04-20 | End: 2021-04-30

## 2021-05-03 ENCOUNTER — PATIENT MESSAGE (OUTPATIENT)
Dept: FAMILY MEDICINE | Facility: CLINIC | Age: 81
End: 2021-05-03

## 2021-05-04 ENCOUNTER — INFUSION (OUTPATIENT)
Dept: INFUSION THERAPY | Facility: HOSPITAL | Age: 81
End: 2021-05-04
Attending: FAMILY MEDICINE
Payer: MEDICARE

## 2021-05-04 ENCOUNTER — PATIENT MESSAGE (OUTPATIENT)
Dept: FAMILY MEDICINE | Facility: CLINIC | Age: 81
End: 2021-05-04

## 2021-05-04 VITALS
OXYGEN SATURATION: 98 % | RESPIRATION RATE: 18 BRPM | SYSTOLIC BLOOD PRESSURE: 137 MMHG | HEART RATE: 59 BPM | DIASTOLIC BLOOD PRESSURE: 64 MMHG

## 2021-05-04 DIAGNOSIS — G89.29 CHRONIC NONINTRACTABLE HEADACHE, UNSPECIFIED HEADACHE TYPE: Primary | ICD-10-CM

## 2021-05-04 DIAGNOSIS — R51.9 CHRONIC NONINTRACTABLE HEADACHE, UNSPECIFIED HEADACHE TYPE: Primary | ICD-10-CM

## 2021-05-04 DIAGNOSIS — D68.59 HYPERCOAGULOPATHY: Primary | ICD-10-CM

## 2021-05-04 DIAGNOSIS — Z85.3 HISTORY OF BILATERAL BREAST CANCER: ICD-10-CM

## 2021-05-04 LAB
INR PPP: 1 (ref 0.8–1.2)
PROTHROMBIN TIME: 11 SEC (ref 9–12.5)

## 2021-05-04 PROCEDURE — 63600175 PHARM REV CODE 636 W HCPCS: Performed by: FAMILY MEDICINE

## 2021-05-04 PROCEDURE — 36591 DRAW BLOOD OFF VENOUS DEVICE: CPT

## 2021-05-04 PROCEDURE — 25000003 PHARM REV CODE 250: Performed by: FAMILY MEDICINE

## 2021-05-04 PROCEDURE — 85610 PROTHROMBIN TIME: CPT | Performed by: FAMILY MEDICINE

## 2021-05-04 PROCEDURE — A4216 STERILE WATER/SALINE, 10 ML: HCPCS | Performed by: FAMILY MEDICINE

## 2021-05-04 RX ORDER — HEPARIN 100 UNIT/ML
500 SYRINGE INTRAVENOUS
Status: CANCELLED | OUTPATIENT
Start: 2021-05-04

## 2021-05-04 RX ORDER — HEPARIN 100 UNIT/ML
500 SYRINGE INTRAVENOUS
Status: COMPLETED | OUTPATIENT
Start: 2021-05-04 | End: 2021-05-04

## 2021-05-04 RX ORDER — SODIUM CHLORIDE 0.9 % (FLUSH) 0.9 %
10 SYRINGE (ML) INJECTION
Status: COMPLETED | OUTPATIENT
Start: 2021-05-04 | End: 2021-05-04

## 2021-05-04 RX ORDER — SODIUM CHLORIDE 0.9 % (FLUSH) 0.9 %
10 SYRINGE (ML) INJECTION
Status: CANCELLED | OUTPATIENT
Start: 2021-05-04

## 2021-05-04 RX ADMIN — Medication 10 ML: at 08:05

## 2021-05-04 RX ADMIN — HEPARIN 500 UNITS: 100 SYRINGE at 08:05

## 2021-05-05 ENCOUNTER — PATIENT MESSAGE (OUTPATIENT)
Dept: FAMILY MEDICINE | Facility: CLINIC | Age: 81
End: 2021-05-05

## 2021-05-05 ENCOUNTER — TELEPHONE (OUTPATIENT)
Dept: FAMILY MEDICINE | Facility: CLINIC | Age: 81
End: 2021-05-05

## 2021-05-06 ENCOUNTER — PATIENT MESSAGE (OUTPATIENT)
Dept: ADMINISTRATIVE | Facility: OTHER | Age: 81
End: 2021-05-06

## 2021-05-10 ENCOUNTER — PATIENT MESSAGE (OUTPATIENT)
Dept: ADMINISTRATIVE | Facility: OTHER | Age: 81
End: 2021-05-10

## 2021-05-10 ENCOUNTER — PATIENT MESSAGE (OUTPATIENT)
Dept: FAMILY MEDICINE | Facility: CLINIC | Age: 81
End: 2021-05-10

## 2021-05-10 DIAGNOSIS — Z20.822 EXPOSURE TO COVID-19 VIRUS: Primary | ICD-10-CM

## 2021-05-12 ENCOUNTER — TELEPHONE (OUTPATIENT)
Dept: FAMILY MEDICINE | Facility: CLINIC | Age: 81
End: 2021-05-12

## 2021-05-12 DIAGNOSIS — Z20.822 EXPOSURE TO COVID-19 VIRUS: Primary | ICD-10-CM

## 2021-05-14 ENCOUNTER — LAB VISIT (OUTPATIENT)
Dept: FAMILY MEDICINE | Facility: CLINIC | Age: 81
End: 2021-05-14
Payer: MEDICARE

## 2021-05-14 DIAGNOSIS — Z20.822 EXPOSURE TO COVID-19 VIRUS: ICD-10-CM

## 2021-05-14 PROCEDURE — U0005 INFEC AGEN DETEC AMPLI PROBE: HCPCS | Performed by: FAMILY MEDICINE

## 2021-05-14 PROCEDURE — U0003 INFECTIOUS AGENT DETECTION BY NUCLEIC ACID (DNA OR RNA); SEVERE ACUTE RESPIRATORY SYNDROME CORONAVIRUS 2 (SARS-COV-2) (CORONAVIRUS DISEASE [COVID-19]), AMPLIFIED PROBE TECHNIQUE, MAKING USE OF HIGH THROUGHPUT TECHNOLOGIES AS DESCRIBED BY CMS-2020-01-R: HCPCS | Performed by: FAMILY MEDICINE

## 2021-05-15 LAB — SARS-COV-2 RNA RESP QL NAA+PROBE: NOT DETECTED

## 2021-05-17 ENCOUNTER — TELEPHONE (OUTPATIENT)
Dept: FAMILY MEDICINE | Facility: CLINIC | Age: 81
End: 2021-05-17

## 2021-05-24 ENCOUNTER — TELEPHONE (OUTPATIENT)
Dept: FAMILY MEDICINE | Facility: CLINIC | Age: 81
End: 2021-05-24

## 2021-05-26 ENCOUNTER — OFFICE VISIT (OUTPATIENT)
Dept: PODIATRY | Facility: CLINIC | Age: 81
End: 2021-05-26
Payer: MEDICARE

## 2021-05-26 VITALS
HEIGHT: 63 IN | BODY MASS INDEX: 22.86 KG/M2 | DIASTOLIC BLOOD PRESSURE: 74 MMHG | SYSTOLIC BLOOD PRESSURE: 144 MMHG | WEIGHT: 129 LBS | HEART RATE: 83 BPM | RESPIRATION RATE: 18 BRPM

## 2021-05-26 DIAGNOSIS — E11.49 TYPE II DIABETES MELLITUS WITH NEUROLOGICAL MANIFESTATIONS: ICD-10-CM

## 2021-05-26 DIAGNOSIS — E11.9 TYPE 2 DIABETES MELLITUS WITHOUT COMPLICATION, WITHOUT LONG-TERM CURRENT USE OF INSULIN: Primary | ICD-10-CM

## 2021-05-26 DIAGNOSIS — D68.59 HYPERCOAGULOPATHY: ICD-10-CM

## 2021-05-26 PROCEDURE — 99213 PR OFFICE/OUTPT VISIT, EST, LEVL III, 20-29 MIN: ICD-10-PCS | Mod: S$PBB,,, | Performed by: PODIATRIST

## 2021-05-26 PROCEDURE — 99213 OFFICE O/P EST LOW 20 MIN: CPT | Mod: S$PBB,,, | Performed by: PODIATRIST

## 2021-05-26 PROCEDURE — 99999 PR PBB SHADOW E&M-EST. PATIENT-LVL IV: CPT | Mod: PBBFAC,,, | Performed by: PODIATRIST

## 2021-05-26 PROCEDURE — 99999 PR PBB SHADOW E&M-EST. PATIENT-LVL IV: ICD-10-PCS | Mod: PBBFAC,,, | Performed by: PODIATRIST

## 2021-05-26 PROCEDURE — 99214 OFFICE O/P EST MOD 30 MIN: CPT | Mod: PBBFAC | Performed by: PODIATRIST

## 2021-05-27 ENCOUNTER — PATIENT OUTREACH (OUTPATIENT)
Dept: ADMINISTRATIVE | Facility: OTHER | Age: 81
End: 2021-05-27

## 2021-05-28 ENCOUNTER — OFFICE VISIT (OUTPATIENT)
Dept: ORTHOPEDICS | Facility: CLINIC | Age: 81
End: 2021-05-28
Payer: MEDICARE

## 2021-05-28 ENCOUNTER — PATIENT MESSAGE (OUTPATIENT)
Dept: ORTHOPEDICS | Facility: CLINIC | Age: 81
End: 2021-05-28

## 2021-05-28 VITALS
HEIGHT: 63 IN | OXYGEN SATURATION: 98 % | HEART RATE: 86 BPM | RESPIRATION RATE: 19 BRPM | BODY MASS INDEX: 22.86 KG/M2 | WEIGHT: 129 LBS

## 2021-05-28 DIAGNOSIS — M17.0 PRIMARY OSTEOARTHRITIS OF BOTH KNEES: Primary | ICD-10-CM

## 2021-05-28 PROCEDURE — 99999 PR PBB SHADOW E&M-EST. PATIENT-LVL IV: ICD-10-PCS | Mod: PBBFAC,,, | Performed by: ORTHOPAEDIC SURGERY

## 2021-05-28 PROCEDURE — 20610 DRAIN/INJ JOINT/BURSA W/O US: CPT | Mod: 50,PBBFAC | Performed by: ORTHOPAEDIC SURGERY

## 2021-05-28 PROCEDURE — 99999 PR PBB SHADOW E&M-EST. PATIENT-LVL IV: CPT | Mod: PBBFAC,,, | Performed by: ORTHOPAEDIC SURGERY

## 2021-05-28 PROCEDURE — 99213 PR OFFICE/OUTPT VISIT, EST, LEVL III, 20-29 MIN: ICD-10-PCS | Mod: 25,S$PBB,, | Performed by: ORTHOPAEDIC SURGERY

## 2021-05-28 PROCEDURE — 20610 LARGE JOINT ASPIRATION/INJECTION: BILATERAL KNEE: ICD-10-PCS | Mod: 50,S$PBB,, | Performed by: ORTHOPAEDIC SURGERY

## 2021-05-28 PROCEDURE — 99214 OFFICE O/P EST MOD 30 MIN: CPT | Mod: PBBFAC | Performed by: ORTHOPAEDIC SURGERY

## 2021-05-28 PROCEDURE — 99213 OFFICE O/P EST LOW 20 MIN: CPT | Mod: 25,S$PBB,, | Performed by: ORTHOPAEDIC SURGERY

## 2021-05-28 RX ORDER — TRIAMCINOLONE ACETONIDE 40 MG/ML
40 INJECTION, SUSPENSION INTRA-ARTICULAR; INTRAMUSCULAR
Status: DISCONTINUED | OUTPATIENT
Start: 2021-05-28 | End: 2021-05-28 | Stop reason: HOSPADM

## 2021-05-28 RX ADMIN — TRIAMCINOLONE ACETONIDE 40 MG: 40 INJECTION, SUSPENSION INTRA-ARTICULAR; INTRAMUSCULAR at 03:05

## 2021-05-29 PROBLEM — E11.9 TYPE 2 DIABETES MELLITUS WITHOUT COMPLICATION, WITHOUT LONG-TERM CURRENT USE OF INSULIN: Status: ACTIVE | Noted: 2021-05-29

## 2021-05-30 ENCOUNTER — PATIENT MESSAGE (OUTPATIENT)
Dept: FAMILY MEDICINE | Facility: CLINIC | Age: 81
End: 2021-05-30

## 2021-05-31 ENCOUNTER — PATIENT MESSAGE (OUTPATIENT)
Dept: FAMILY MEDICINE | Facility: CLINIC | Age: 81
End: 2021-05-31

## 2021-05-31 ENCOUNTER — TELEPHONE (OUTPATIENT)
Dept: FAMILY MEDICINE | Facility: CLINIC | Age: 81
End: 2021-05-31

## 2021-05-31 DIAGNOSIS — E11.9 TYPE 2 DIABETES MELLITUS WITHOUT COMPLICATION, WITHOUT LONG-TERM CURRENT USE OF INSULIN: Primary | ICD-10-CM

## 2021-06-01 ENCOUNTER — INFUSION (OUTPATIENT)
Dept: INFUSION THERAPY | Facility: HOSPITAL | Age: 81
End: 2021-06-01
Attending: FAMILY MEDICINE
Payer: MEDICARE

## 2021-06-01 ENCOUNTER — PATIENT MESSAGE (OUTPATIENT)
Dept: ADMINISTRATIVE | Facility: OTHER | Age: 81
End: 2021-06-01

## 2021-06-01 VITALS
SYSTOLIC BLOOD PRESSURE: 133 MMHG | DIASTOLIC BLOOD PRESSURE: 63 MMHG | OXYGEN SATURATION: 98 % | HEIGHT: 61 IN | HEART RATE: 75 BPM | BODY MASS INDEX: 24.33 KG/M2 | WEIGHT: 128.88 LBS | RESPIRATION RATE: 18 BRPM | TEMPERATURE: 98 F

## 2021-06-01 DIAGNOSIS — I10 ESSENTIAL HYPERTENSION: Primary | ICD-10-CM

## 2021-06-01 DIAGNOSIS — Z85.3 HISTORY OF BILATERAL BREAST CANCER: ICD-10-CM

## 2021-06-01 DIAGNOSIS — D68.59 HYPERCOAGULOPATHY: ICD-10-CM

## 2021-06-01 LAB
ANION GAP SERPL CALC-SCNC: 11 MMOL/L (ref 8–16)
BASOPHILS # BLD AUTO: 0.02 K/UL (ref 0–0.2)
BASOPHILS NFR BLD: 0.2 % (ref 0–1.9)
BUN SERPL-MCNC: 28 MG/DL (ref 8–23)
CALCIUM SERPL-MCNC: 9.7 MG/DL (ref 8.7–10.5)
CHLORIDE SERPL-SCNC: 102 MMOL/L (ref 95–110)
CO2 SERPL-SCNC: 24 MMOL/L (ref 23–29)
CREAT SERPL-MCNC: 1 MG/DL (ref 0.5–1.4)
DIFFERENTIAL METHOD: ABNORMAL
EOSINOPHIL # BLD AUTO: 0.1 K/UL (ref 0–0.5)
EOSINOPHIL NFR BLD: 1.4 % (ref 0–8)
ERYTHROCYTE [DISTWIDTH] IN BLOOD BY AUTOMATED COUNT: 13.7 % (ref 11.5–14.5)
EST. GFR  (AFRICAN AMERICAN): >60 ML/MIN/1.73 M^2
EST. GFR  (NON AFRICAN AMERICAN): 52.2 ML/MIN/1.73 M^2
GLUCOSE SERPL-MCNC: 113 MG/DL (ref 70–110)
HCT VFR BLD AUTO: 41.5 % (ref 37–48.5)
HGB BLD-MCNC: 14.2 G/DL (ref 12–16)
IMM GRANULOCYTES # BLD AUTO: 0.05 K/UL (ref 0–0.04)
IMM GRANULOCYTES NFR BLD AUTO: 0.6 % (ref 0–0.5)
INR PPP: 1.1 (ref 0.8–1.2)
LYMPHOCYTES # BLD AUTO: 0.9 K/UL (ref 1–4.8)
LYMPHOCYTES NFR BLD: 10.9 % (ref 18–48)
MCH RBC QN AUTO: 28.6 PG (ref 27–31)
MCHC RBC AUTO-ENTMCNC: 34.2 G/DL (ref 32–36)
MCV RBC AUTO: 84 FL (ref 82–98)
MONOCYTES # BLD AUTO: 0.8 K/UL (ref 0.3–1)
MONOCYTES NFR BLD: 9.9 % (ref 4–15)
NEUTROPHILS # BLD AUTO: 6.4 K/UL (ref 1.8–7.7)
NEUTROPHILS NFR BLD: 77 % (ref 38–73)
NRBC BLD-RTO: 0 /100 WBC
PLATELET # BLD AUTO: 214 K/UL (ref 150–450)
PMV BLD AUTO: 9.8 FL (ref 9.2–12.9)
POTASSIUM SERPL-SCNC: 4.2 MMOL/L (ref 3.5–5.1)
PROTHROMBIN TIME: 11.6 SEC (ref 9–12.5)
RBC # BLD AUTO: 4.97 M/UL (ref 4–5.4)
SODIUM SERPL-SCNC: 137 MMOL/L (ref 136–145)
WBC # BLD AUTO: 8.28 K/UL (ref 3.9–12.7)

## 2021-06-01 PROCEDURE — 25000003 PHARM REV CODE 250: Performed by: FAMILY MEDICINE

## 2021-06-01 PROCEDURE — A4216 STERILE WATER/SALINE, 10 ML: HCPCS | Performed by: FAMILY MEDICINE

## 2021-06-01 PROCEDURE — 85025 COMPLETE CBC W/AUTO DIFF WBC: CPT | Performed by: FAMILY MEDICINE

## 2021-06-01 PROCEDURE — 85610 PROTHROMBIN TIME: CPT | Performed by: FAMILY MEDICINE

## 2021-06-01 PROCEDURE — 63600175 PHARM REV CODE 636 W HCPCS: Performed by: FAMILY MEDICINE

## 2021-06-01 PROCEDURE — 96523 IRRIG DRUG DELIVERY DEVICE: CPT

## 2021-06-01 PROCEDURE — 80048 BASIC METABOLIC PNL TOTAL CA: CPT | Performed by: FAMILY MEDICINE

## 2021-06-01 RX ORDER — SODIUM CHLORIDE 0.9 % (FLUSH) 0.9 %
10 SYRINGE (ML) INJECTION
Status: COMPLETED | OUTPATIENT
Start: 2021-06-01 | End: 2021-06-01

## 2021-06-01 RX ORDER — SODIUM CHLORIDE 0.9 % (FLUSH) 0.9 %
10 SYRINGE (ML) INJECTION
Status: CANCELLED | OUTPATIENT
Start: 2021-06-01

## 2021-06-01 RX ORDER — HEPARIN 100 UNIT/ML
500 SYRINGE INTRAVENOUS
Status: CANCELLED | OUTPATIENT
Start: 2021-06-01

## 2021-06-01 RX ORDER — HEPARIN 100 UNIT/ML
500 SYRINGE INTRAVENOUS
Status: COMPLETED | OUTPATIENT
Start: 2021-06-01 | End: 2021-06-01

## 2021-06-01 RX ADMIN — Medication 10 ML: at 09:06

## 2021-06-01 RX ADMIN — HEPARIN 500 UNITS: 100 SYRINGE at 09:06

## 2021-06-07 ENCOUNTER — PATIENT MESSAGE (OUTPATIENT)
Dept: FAMILY MEDICINE | Facility: CLINIC | Age: 81
End: 2021-06-07

## 2021-06-07 ENCOUNTER — TELEPHONE (OUTPATIENT)
Dept: FAMILY MEDICINE | Facility: CLINIC | Age: 81
End: 2021-06-07

## 2021-06-09 ENCOUNTER — PATIENT MESSAGE (OUTPATIENT)
Dept: FAMILY MEDICINE | Facility: CLINIC | Age: 81
End: 2021-06-09

## 2021-06-21 ENCOUNTER — PATIENT MESSAGE (OUTPATIENT)
Dept: FAMILY MEDICINE | Facility: CLINIC | Age: 81
End: 2021-06-21

## 2021-06-30 ENCOUNTER — INFUSION (OUTPATIENT)
Dept: INFUSION THERAPY | Facility: HOSPITAL | Age: 81
End: 2021-06-30
Attending: FAMILY MEDICINE
Payer: MEDICARE

## 2021-06-30 VITALS
HEART RATE: 70 BPM | OXYGEN SATURATION: 97 % | DIASTOLIC BLOOD PRESSURE: 68 MMHG | TEMPERATURE: 98 F | RESPIRATION RATE: 16 BRPM | SYSTOLIC BLOOD PRESSURE: 148 MMHG

## 2021-06-30 DIAGNOSIS — D68.59 HYPERCOAGULOPATHY: Primary | ICD-10-CM

## 2021-06-30 DIAGNOSIS — I10 ESSENTIAL HYPERTENSION: Primary | ICD-10-CM

## 2021-06-30 DIAGNOSIS — Z85.3 HISTORY OF BILATERAL BREAST CANCER: ICD-10-CM

## 2021-06-30 LAB
ERYTHROCYTE [SEDIMENTATION RATE] IN BLOOD BY WESTERGREN METHOD: 3 MM/HR (ref 0–20)
INR PPP: 1 (ref 0.8–1.2)
PROTHROMBIN TIME: 10.9 SEC (ref 9–12.5)

## 2021-06-30 PROCEDURE — 85651 RBC SED RATE NONAUTOMATED: CPT | Performed by: FAMILY MEDICINE

## 2021-06-30 PROCEDURE — A4216 STERILE WATER/SALINE, 10 ML: HCPCS | Performed by: FAMILY MEDICINE

## 2021-06-30 PROCEDURE — 63600175 PHARM REV CODE 636 W HCPCS: Performed by: FAMILY MEDICINE

## 2021-06-30 PROCEDURE — 36415 COLL VENOUS BLD VENIPUNCTURE: CPT | Performed by: FAMILY MEDICINE

## 2021-06-30 PROCEDURE — 36592 COLLECT BLOOD FROM PICC: CPT

## 2021-06-30 PROCEDURE — 85610 PROTHROMBIN TIME: CPT | Performed by: FAMILY MEDICINE

## 2021-06-30 PROCEDURE — 25000003 PHARM REV CODE 250: Performed by: FAMILY MEDICINE

## 2021-06-30 RX ORDER — HEPARIN 100 UNIT/ML
500 SYRINGE INTRAVENOUS
Status: CANCELLED | OUTPATIENT
Start: 2021-06-30

## 2021-06-30 RX ORDER — HEPARIN 100 UNIT/ML
500 SYRINGE INTRAVENOUS
Status: COMPLETED | OUTPATIENT
Start: 2021-06-30 | End: 2021-06-30

## 2021-06-30 RX ORDER — SODIUM CHLORIDE 0.9 % (FLUSH) 0.9 %
10 SYRINGE (ML) INJECTION
Status: CANCELLED | OUTPATIENT
Start: 2021-06-30

## 2021-06-30 RX ORDER — SODIUM CHLORIDE 0.9 % (FLUSH) 0.9 %
10 SYRINGE (ML) INJECTION
Status: COMPLETED | OUTPATIENT
Start: 2021-06-30 | End: 2021-06-30

## 2021-06-30 RX ADMIN — Medication 10 ML: at 08:06

## 2021-06-30 RX ADMIN — HEPARIN 500 UNITS: 100 SYRINGE at 08:06

## 2021-07-06 ENCOUNTER — CLINICAL SUPPORT (OUTPATIENT)
Dept: FAMILY MEDICINE | Facility: CLINIC | Age: 81
End: 2021-07-06
Attending: FAMILY MEDICINE
Payer: MEDICARE

## 2021-07-06 ENCOUNTER — OFFICE VISIT (OUTPATIENT)
Dept: FAMILY MEDICINE | Facility: CLINIC | Age: 81
End: 2021-07-06
Payer: MEDICARE

## 2021-07-06 ENCOUNTER — INFUSION (OUTPATIENT)
Dept: INFUSION THERAPY | Facility: HOSPITAL | Age: 81
End: 2021-07-06
Payer: MEDICARE

## 2021-07-06 VITALS
DIASTOLIC BLOOD PRESSURE: 68 MMHG | HEART RATE: 73 BPM | HEIGHT: 61 IN | SYSTOLIC BLOOD PRESSURE: 118 MMHG | BODY MASS INDEX: 24.24 KG/M2 | WEIGHT: 128.38 LBS | OXYGEN SATURATION: 97 % | RESPIRATION RATE: 12 BRPM | TEMPERATURE: 99 F

## 2021-07-06 DIAGNOSIS — R35.0 FREQUENT URINATION: ICD-10-CM

## 2021-07-06 DIAGNOSIS — R80.9 MICROALBUMINURIA: ICD-10-CM

## 2021-07-06 DIAGNOSIS — Z85.3 HISTORY OF BILATERAL BREAST CANCER: Primary | ICD-10-CM

## 2021-07-06 DIAGNOSIS — N30.00 ACUTE CYSTITIS WITHOUT HEMATURIA: ICD-10-CM

## 2021-07-06 DIAGNOSIS — E11.9 TYPE 2 DIABETES MELLITUS WITHOUT COMPLICATION, WITHOUT LONG-TERM CURRENT USE OF INSULIN: ICD-10-CM

## 2021-07-06 DIAGNOSIS — E11.9 TYPE 2 DIABETES MELLITUS WITHOUT COMPLICATION, WITHOUT LONG-TERM CURRENT USE OF INSULIN: Primary | ICD-10-CM

## 2021-07-06 LAB
ALBUMIN/CREAT UR: 60.3 UG/MG (ref 0–30)
ANION GAP SERPL CALC-SCNC: 9 MMOL/L (ref 8–16)
BACTERIA #/AREA URNS HPF: ABNORMAL /HPF
BASOPHILS # BLD AUTO: 0.03 K/UL (ref 0–0.2)
BASOPHILS NFR BLD: 0.4 % (ref 0–1.9)
BILIRUB UR QL STRIP: NEGATIVE
BUN SERPL-MCNC: 16 MG/DL (ref 8–23)
CALCIUM SERPL-MCNC: 10.5 MG/DL (ref 8.7–10.5)
CHLORIDE SERPL-SCNC: 103 MMOL/L (ref 95–110)
CLARITY UR: CLEAR
CO2 SERPL-SCNC: 24 MMOL/L (ref 23–29)
COLOR UR: YELLOW
CREAT SERPL-MCNC: 1 MG/DL (ref 0.5–1.4)
CREAT UR-MCNC: 68 MG/DL (ref 15–325)
DIFFERENTIAL METHOD: ABNORMAL
EOSINOPHIL # BLD AUTO: 0.2 K/UL (ref 0–0.5)
EOSINOPHIL NFR BLD: 2.2 % (ref 0–8)
ERYTHROCYTE [DISTWIDTH] IN BLOOD BY AUTOMATED COUNT: 13.8 % (ref 11.5–14.5)
EST. GFR  (AFRICAN AMERICAN): >60 ML/MIN/1.73 M^2
EST. GFR  (NON AFRICAN AMERICAN): 52.2 ML/MIN/1.73 M^2
ESTIMATED AVG GLUCOSE: 111 MG/DL (ref 68–131)
GLUCOSE SERPL-MCNC: 98 MG/DL (ref 70–110)
GLUCOSE UR QL STRIP: NEGATIVE
HBA1C MFR BLD: 5.5 % (ref 4–5.6)
HCT VFR BLD AUTO: 43.4 % (ref 37–48.5)
HGB BLD-MCNC: 14.8 G/DL (ref 12–16)
HGB UR QL STRIP: ABNORMAL
IMM GRANULOCYTES # BLD AUTO: 0.05 K/UL (ref 0–0.04)
IMM GRANULOCYTES NFR BLD AUTO: 0.6 % (ref 0–0.5)
KETONES UR QL STRIP: NEGATIVE
LEUKOCYTE ESTERASE UR QL STRIP: ABNORMAL
LYMPHOCYTES # BLD AUTO: 0.7 K/UL (ref 1–4.8)
LYMPHOCYTES NFR BLD: 9.1 % (ref 18–48)
MAGNESIUM SERPL-MCNC: 1.8 MG/DL (ref 1.6–2.6)
MCH RBC QN AUTO: 28.6 PG (ref 27–31)
MCHC RBC AUTO-ENTMCNC: 34.1 G/DL (ref 32–36)
MCV RBC AUTO: 84 FL (ref 82–98)
MICROALBUMIN UR DL<=1MG/L-MCNC: 41 UG/ML
MICROSCOPIC COMMENT: ABNORMAL
MONOCYTES # BLD AUTO: 0.7 K/UL (ref 0.3–1)
MONOCYTES NFR BLD: 8.1 % (ref 4–15)
NEUTROPHILS # BLD AUTO: 6.5 K/UL (ref 1.8–7.7)
NEUTROPHILS NFR BLD: 79.6 % (ref 38–73)
NITRITE UR QL STRIP: NEGATIVE
NRBC BLD-RTO: 0 /100 WBC
PH UR STRIP: 7 [PH] (ref 5–8)
PLATELET # BLD AUTO: 214 K/UL (ref 150–450)
PMV BLD AUTO: 9.3 FL (ref 9.2–12.9)
POTASSIUM SERPL-SCNC: 4.2 MMOL/L (ref 3.5–5.1)
PROT UR QL STRIP: NEGATIVE
RBC # BLD AUTO: 5.17 M/UL (ref 4–5.4)
RBC #/AREA URNS HPF: 1 /HPF (ref 0–4)
SODIUM SERPL-SCNC: 136 MMOL/L (ref 136–145)
SP GR UR STRIP: 1.01 (ref 1–1.03)
SQUAMOUS #/AREA URNS HPF: 1 /HPF
URN SPEC COLLECT METH UR: ABNORMAL
UROBILINOGEN UR STRIP-ACNC: NEGATIVE EU/DL
WBC # BLD AUTO: 8.16 K/UL (ref 3.9–12.7)
WBC #/AREA URNS HPF: >100 /HPF (ref 0–5)
WBC CLUMPS URNS QL MICRO: ABNORMAL

## 2021-07-06 PROCEDURE — 92228 IMG RTA DETC/MNTR DS PHY/QHP: CPT | Mod: TC,S$GLB,, | Performed by: FAMILY MEDICINE

## 2021-07-06 PROCEDURE — 83036 HEMOGLOBIN GLYCOSYLATED A1C: CPT | Performed by: FAMILY MEDICINE

## 2021-07-06 PROCEDURE — 36591 DRAW BLOOD OFF VENOUS DEVICE: CPT

## 2021-07-06 PROCEDURE — A4216 STERILE WATER/SALINE, 10 ML: HCPCS | Performed by: FAMILY MEDICINE

## 2021-07-06 PROCEDURE — 85025 COMPLETE CBC W/AUTO DIFF WBC: CPT | Performed by: FAMILY MEDICINE

## 2021-07-06 PROCEDURE — 83735 ASSAY OF MAGNESIUM: CPT | Performed by: FAMILY MEDICINE

## 2021-07-06 PROCEDURE — 99214 PR OFFICE/OUTPT VISIT, EST, LEVL IV, 30-39 MIN: ICD-10-PCS | Mod: S$GLB,,, | Performed by: FAMILY MEDICINE

## 2021-07-06 PROCEDURE — 25000003 PHARM REV CODE 250: Performed by: FAMILY MEDICINE

## 2021-07-06 PROCEDURE — 87077 CULTURE AEROBIC IDENTIFY: CPT | Performed by: FAMILY MEDICINE

## 2021-07-06 PROCEDURE — 82043 UR ALBUMIN QUANTITATIVE: CPT | Performed by: FAMILY MEDICINE

## 2021-07-06 PROCEDURE — 92228 DIABETIC EYE SCREENING PHOTO: ICD-10-PCS | Mod: TC,S$GLB,, | Performed by: FAMILY MEDICINE

## 2021-07-06 PROCEDURE — 99499 UNLISTED E&M SERVICE: CPT | Mod: S$PBB,,, | Performed by: OPHTHALMOLOGY

## 2021-07-06 PROCEDURE — 87086 URINE CULTURE/COLONY COUNT: CPT | Performed by: FAMILY MEDICINE

## 2021-07-06 PROCEDURE — 63600175 PHARM REV CODE 636 W HCPCS: Performed by: FAMILY MEDICINE

## 2021-07-06 PROCEDURE — 99214 OFFICE O/P EST MOD 30 MIN: CPT | Mod: S$GLB,,, | Performed by: FAMILY MEDICINE

## 2021-07-06 PROCEDURE — 82570 ASSAY OF URINE CREATININE: CPT | Performed by: FAMILY MEDICINE

## 2021-07-06 PROCEDURE — 99499 DIABETIC EYE SCREENING PHOTO: ICD-10-PCS | Mod: S$PBB,,, | Performed by: OPHTHALMOLOGY

## 2021-07-06 PROCEDURE — 81000 URINALYSIS NONAUTO W/SCOPE: CPT | Performed by: FAMILY MEDICINE

## 2021-07-06 PROCEDURE — 87186 SC STD MICRODIL/AGAR DIL: CPT | Performed by: FAMILY MEDICINE

## 2021-07-06 PROCEDURE — 87088 URINE BACTERIA CULTURE: CPT | Performed by: FAMILY MEDICINE

## 2021-07-06 PROCEDURE — 80048 BASIC METABOLIC PNL TOTAL CA: CPT | Performed by: FAMILY MEDICINE

## 2021-07-06 RX ORDER — HEPARIN 100 UNIT/ML
500 SYRINGE INTRAVENOUS
Status: CANCELLED | OUTPATIENT
Start: 2021-07-06

## 2021-07-06 RX ORDER — SODIUM CHLORIDE 0.9 % (FLUSH) 0.9 %
10 SYRINGE (ML) INJECTION
Status: CANCELLED | OUTPATIENT
Start: 2021-07-06

## 2021-07-06 RX ORDER — CEPHALEXIN 500 MG/1
500 CAPSULE ORAL EVERY 12 HOURS
Qty: 14 CAPSULE | Refills: 0 | Status: SHIPPED | OUTPATIENT
Start: 2021-07-06 | End: 2021-07-13

## 2021-07-06 RX ORDER — LOSARTAN POTASSIUM 25 MG/1
12.5 TABLET ORAL DAILY
Qty: 15 TABLET | Refills: 11 | Status: SHIPPED | OUTPATIENT
Start: 2021-07-06 | End: 2022-03-03 | Stop reason: SDUPTHER

## 2021-07-06 RX ORDER — HEPARIN 100 UNIT/ML
500 SYRINGE INTRAVENOUS
Status: COMPLETED | OUTPATIENT
Start: 2021-07-06 | End: 2021-07-06

## 2021-07-06 RX ORDER — SODIUM CHLORIDE 0.9 % (FLUSH) 0.9 %
10 SYRINGE (ML) INJECTION
Status: COMPLETED | OUTPATIENT
Start: 2021-07-06 | End: 2021-07-06

## 2021-07-06 RX ORDER — TIZANIDINE 4 MG/1
4 TABLET ORAL NIGHTLY
COMMUNITY
Start: 2021-06-29 | End: 2021-10-06

## 2021-07-06 RX ADMIN — HEPARIN 500 UNITS: 100 SYRINGE at 10:07

## 2021-07-06 RX ADMIN — Medication 10 ML: at 10:07

## 2021-07-09 LAB — BACTERIA UR CULT: ABNORMAL

## 2021-07-19 ENCOUNTER — PATIENT MESSAGE (OUTPATIENT)
Dept: FAMILY MEDICINE | Facility: CLINIC | Age: 81
End: 2021-07-19

## 2021-07-19 DIAGNOSIS — D68.59 HYPERCOAGULOPATHY: Primary | ICD-10-CM

## 2021-07-20 ENCOUNTER — TELEPHONE (OUTPATIENT)
Dept: FAMILY MEDICINE | Facility: CLINIC | Age: 81
End: 2021-07-20

## 2021-07-20 ENCOUNTER — PATIENT MESSAGE (OUTPATIENT)
Dept: FAMILY MEDICINE | Facility: CLINIC | Age: 81
End: 2021-07-20

## 2021-07-27 ENCOUNTER — INFUSION (OUTPATIENT)
Dept: INFUSION THERAPY | Facility: HOSPITAL | Age: 81
End: 2021-07-27
Attending: FAMILY MEDICINE
Payer: MEDICARE

## 2021-07-27 VITALS
SYSTOLIC BLOOD PRESSURE: 142 MMHG | TEMPERATURE: 97 F | DIASTOLIC BLOOD PRESSURE: 63 MMHG | HEART RATE: 73 BPM | OXYGEN SATURATION: 99 % | RESPIRATION RATE: 16 BRPM

## 2021-07-27 DIAGNOSIS — D68.59 HYPERCOAGULOPATHY: Primary | ICD-10-CM

## 2021-07-27 DIAGNOSIS — Z85.3 HISTORY OF BILATERAL BREAST CANCER: ICD-10-CM

## 2021-07-27 LAB
INR PPP: 1 (ref 0.8–1.2)
PROTHROMBIN TIME: 11 SEC (ref 9–12.5)

## 2021-07-27 PROCEDURE — 25000003 PHARM REV CODE 250: Performed by: FAMILY MEDICINE

## 2021-07-27 PROCEDURE — 96523 IRRIG DRUG DELIVERY DEVICE: CPT

## 2021-07-27 PROCEDURE — 63600175 PHARM REV CODE 636 W HCPCS: Performed by: FAMILY MEDICINE

## 2021-07-27 PROCEDURE — A4216 STERILE WATER/SALINE, 10 ML: HCPCS | Performed by: FAMILY MEDICINE

## 2021-07-27 PROCEDURE — 85610 PROTHROMBIN TIME: CPT | Performed by: FAMILY MEDICINE

## 2021-07-27 RX ORDER — HEPARIN 100 UNIT/ML
500 SYRINGE INTRAVENOUS
Status: COMPLETED | OUTPATIENT
Start: 2021-07-27 | End: 2021-07-27

## 2021-07-27 RX ORDER — SODIUM CHLORIDE 0.9 % (FLUSH) 0.9 %
10 SYRINGE (ML) INJECTION
Status: CANCELLED | OUTPATIENT
Start: 2021-07-27

## 2021-07-27 RX ORDER — SODIUM CHLORIDE 0.9 % (FLUSH) 0.9 %
10 SYRINGE (ML) INJECTION
Status: COMPLETED | OUTPATIENT
Start: 2021-07-27 | End: 2021-07-27

## 2021-07-27 RX ORDER — HEPARIN 100 UNIT/ML
500 SYRINGE INTRAVENOUS
Status: CANCELLED | OUTPATIENT
Start: 2021-07-27

## 2021-07-27 RX ADMIN — HEPARIN 500 UNITS: 100 SYRINGE at 08:07

## 2021-07-27 RX ADMIN — Medication 10 ML: at 08:07

## 2021-08-11 ENCOUNTER — PATIENT MESSAGE (OUTPATIENT)
Dept: FAMILY MEDICINE | Facility: CLINIC | Age: 81
End: 2021-08-11

## 2021-08-11 ENCOUNTER — TELEPHONE (OUTPATIENT)
Dept: FAMILY MEDICINE | Facility: CLINIC | Age: 81
End: 2021-08-11

## 2021-08-11 DIAGNOSIS — E11.9 TYPE 2 DIABETES MELLITUS WITHOUT COMPLICATION, WITHOUT LONG-TERM CURRENT USE OF INSULIN: Primary | ICD-10-CM

## 2021-08-11 DIAGNOSIS — D68.59 HYPERCOAGULOPATHY: ICD-10-CM

## 2021-08-16 ENCOUNTER — TELEPHONE (OUTPATIENT)
Dept: FAMILY MEDICINE | Facility: CLINIC | Age: 81
End: 2021-08-16

## 2021-08-16 PROCEDURE — G0180 PR HOME HEALTH MD CERTIFICATION: ICD-10-PCS | Mod: ,,, | Performed by: FAMILY MEDICINE

## 2021-08-16 PROCEDURE — G0180 MD CERTIFICATION HHA PATIENT: HCPCS | Mod: ,,, | Performed by: FAMILY MEDICINE

## 2021-08-20 ENCOUNTER — PATIENT MESSAGE (OUTPATIENT)
Dept: FAMILY MEDICINE | Facility: CLINIC | Age: 81
End: 2021-08-20

## 2021-08-23 ENCOUNTER — TELEPHONE (OUTPATIENT)
Dept: FAMILY MEDICINE | Facility: CLINIC | Age: 81
End: 2021-08-23

## 2021-08-26 ENCOUNTER — PATIENT MESSAGE (OUTPATIENT)
Dept: FAMILY MEDICINE | Facility: CLINIC | Age: 81
End: 2021-08-26

## 2021-08-26 DIAGNOSIS — D68.59 HYPERCOAGULOPATHY: ICD-10-CM

## 2021-08-27 ENCOUNTER — INFUSION (OUTPATIENT)
Dept: INFUSION THERAPY | Facility: HOSPITAL | Age: 81
End: 2021-08-27
Attending: FAMILY MEDICINE
Payer: MEDICARE

## 2021-08-27 ENCOUNTER — EXTERNAL HOME HEALTH (OUTPATIENT)
Dept: HOME HEALTH SERVICES | Facility: HOSPITAL | Age: 81
End: 2021-08-27
Payer: MEDICARE

## 2021-08-27 ENCOUNTER — LAB VISIT (OUTPATIENT)
Dept: LAB | Facility: HOSPITAL | Age: 81
End: 2021-08-27
Attending: NURSE PRACTITIONER
Payer: MEDICARE

## 2021-08-27 VITALS
RESPIRATION RATE: 16 BRPM | TEMPERATURE: 97 F | HEART RATE: 67 BPM | DIASTOLIC BLOOD PRESSURE: 58 MMHG | OXYGEN SATURATION: 100 % | SYSTOLIC BLOOD PRESSURE: 125 MMHG

## 2021-08-27 DIAGNOSIS — R51.9 FACIAL PAIN: Primary | ICD-10-CM

## 2021-08-27 DIAGNOSIS — Z79.01 ENCOUNTER FOR MONITORING COUMADIN THERAPY: ICD-10-CM

## 2021-08-27 DIAGNOSIS — Z85.3 HISTORY OF BILATERAL BREAST CANCER: ICD-10-CM

## 2021-08-27 DIAGNOSIS — I10 ESSENTIAL HYPERTENSION: Primary | ICD-10-CM

## 2021-08-27 DIAGNOSIS — Z51.81 ENCOUNTER FOR MONITORING COUMADIN THERAPY: ICD-10-CM

## 2021-08-27 LAB
ANION GAP SERPL CALC-SCNC: 10 MMOL/L (ref 8–16)
BASOPHILS # BLD AUTO: 0.03 K/UL (ref 0–0.2)
BASOPHILS NFR BLD: 0.4 % (ref 0–1.9)
BUN SERPL-MCNC: 16 MG/DL (ref 8–23)
CALCIUM SERPL-MCNC: 10.3 MG/DL (ref 8.7–10.5)
CHLORIDE SERPL-SCNC: 102 MMOL/L (ref 95–110)
CO2 SERPL-SCNC: 22 MMOL/L (ref 23–29)
CREAT SERPL-MCNC: 1 MG/DL (ref 0.5–1.4)
DIFFERENTIAL METHOD: ABNORMAL
EOSINOPHIL # BLD AUTO: 0.2 K/UL (ref 0–0.5)
EOSINOPHIL NFR BLD: 2.3 % (ref 0–8)
ERYTHROCYTE [DISTWIDTH] IN BLOOD BY AUTOMATED COUNT: 13.8 % (ref 11.5–14.5)
ERYTHROCYTE [SEDIMENTATION RATE] IN BLOOD BY WESTERGREN METHOD: 0 MM/HR (ref 0–20)
EST. GFR  (AFRICAN AMERICAN): >60 ML/MIN/1.73 M^2
EST. GFR  (NON AFRICAN AMERICAN): 52.2 ML/MIN/1.73 M^2
GLUCOSE SERPL-MCNC: 106 MG/DL (ref 70–110)
HCT VFR BLD AUTO: 41.1 % (ref 37–48.5)
HGB BLD-MCNC: 14 G/DL (ref 12–16)
IMM GRANULOCYTES # BLD AUTO: 0.03 K/UL (ref 0–0.04)
IMM GRANULOCYTES NFR BLD AUTO: 0.4 % (ref 0–0.5)
INR PPP: 1 (ref 0.8–1.2)
LYMPHOCYTES # BLD AUTO: 0.7 K/UL (ref 1–4.8)
LYMPHOCYTES NFR BLD: 8.6 % (ref 18–48)
MCH RBC QN AUTO: 29 PG (ref 27–31)
MCHC RBC AUTO-ENTMCNC: 34.1 G/DL (ref 32–36)
MCV RBC AUTO: 85 FL (ref 82–98)
MONOCYTES # BLD AUTO: 0.6 K/UL (ref 0.3–1)
MONOCYTES NFR BLD: 6.8 % (ref 4–15)
NEUTROPHILS # BLD AUTO: 6.9 K/UL (ref 1.8–7.7)
NEUTROPHILS NFR BLD: 81.5 % (ref 38–73)
NRBC BLD-RTO: 0 /100 WBC
PLATELET # BLD AUTO: 192 K/UL (ref 150–450)
PMV BLD AUTO: 9.4 FL (ref 9.2–12.9)
POTASSIUM SERPL-SCNC: 4.3 MMOL/L (ref 3.5–5.1)
PROTHROMBIN TIME: 10.9 SEC (ref 9–12.5)
RBC # BLD AUTO: 4.82 M/UL (ref 4–5.4)
SODIUM SERPL-SCNC: 134 MMOL/L (ref 136–145)
WBC # BLD AUTO: 8.44 K/UL (ref 3.9–12.7)

## 2021-08-27 PROCEDURE — 36415 COLL VENOUS BLD VENIPUNCTURE: CPT | Performed by: NURSE PRACTITIONER

## 2021-08-27 PROCEDURE — 80048 BASIC METABOLIC PNL TOTAL CA: CPT | Performed by: FAMILY MEDICINE

## 2021-08-27 PROCEDURE — 85651 RBC SED RATE NONAUTOMATED: CPT | Performed by: NURSE PRACTITIONER

## 2021-08-27 PROCEDURE — 85025 COMPLETE CBC W/AUTO DIFF WBC: CPT | Performed by: FAMILY MEDICINE

## 2021-08-27 PROCEDURE — 85610 PROTHROMBIN TIME: CPT | Performed by: FAMILY MEDICINE

## 2021-08-27 PROCEDURE — 36591 DRAW BLOOD OFF VENOUS DEVICE: CPT

## 2021-08-27 PROCEDURE — 63600175 PHARM REV CODE 636 W HCPCS: Performed by: FAMILY MEDICINE

## 2021-08-27 RX ORDER — HEPARIN 100 UNIT/ML
500 SYRINGE INTRAVENOUS
Status: COMPLETED | OUTPATIENT
Start: 2021-08-27 | End: 2021-08-27

## 2021-08-27 RX ORDER — SODIUM CHLORIDE 0.9 % (FLUSH) 0.9 %
10 SYRINGE (ML) INJECTION
Status: CANCELLED | OUTPATIENT
Start: 2021-08-27

## 2021-08-27 RX ORDER — SODIUM CHLORIDE 0.9 % (FLUSH) 0.9 %
10 SYRINGE (ML) INJECTION
Status: DISCONTINUED | OUTPATIENT
Start: 2021-08-27 | End: 2021-08-27 | Stop reason: HOSPADM

## 2021-08-27 RX ORDER — HEPARIN 100 UNIT/ML
500 SYRINGE INTRAVENOUS
Status: CANCELLED | OUTPATIENT
Start: 2021-08-27

## 2021-08-27 RX ADMIN — HEPARIN 500 UNITS: 100 SYRINGE at 08:08

## 2021-09-02 DIAGNOSIS — I10 ESSENTIAL HYPERTENSION: ICD-10-CM

## 2021-09-02 RX ORDER — SPIRONOLACTONE 25 MG/1
25 TABLET ORAL EVERY MORNING
Qty: 90 TABLET | Refills: 0
Start: 2021-09-02 | End: 2021-09-08 | Stop reason: SDUPTHER

## 2021-09-03 ENCOUNTER — OFFICE VISIT (OUTPATIENT)
Dept: PODIATRY | Facility: CLINIC | Age: 81
End: 2021-09-03
Payer: MEDICARE

## 2021-09-03 ENCOUNTER — TELEPHONE (OUTPATIENT)
Dept: FAMILY MEDICINE | Facility: CLINIC | Age: 81
End: 2021-09-03

## 2021-09-03 VITALS
HEIGHT: 61 IN | DIASTOLIC BLOOD PRESSURE: 75 MMHG | WEIGHT: 122 LBS | TEMPERATURE: 98 F | BODY MASS INDEX: 23.03 KG/M2 | SYSTOLIC BLOOD PRESSURE: 133 MMHG | HEART RATE: 77 BPM

## 2021-09-03 DIAGNOSIS — E11.9 TYPE 2 DIABETES MELLITUS WITHOUT COMPLICATION, WITHOUT LONG-TERM CURRENT USE OF INSULIN: ICD-10-CM

## 2021-09-03 DIAGNOSIS — E11.9 COMPREHENSIVE DIABETIC FOOT EXAMINATION, TYPE 2 DM, ENCOUNTER FOR: Primary | ICD-10-CM

## 2021-09-03 DIAGNOSIS — E11.49 TYPE II DIABETES MELLITUS WITH NEUROLOGICAL MANIFESTATIONS: ICD-10-CM

## 2021-09-03 DIAGNOSIS — L60.0 INGROWN NAIL: ICD-10-CM

## 2021-09-03 PROCEDURE — 99213 PR OFFICE/OUTPT VISIT, EST, LEVL III, 20-29 MIN: ICD-10-PCS | Mod: S$PBB,,, | Performed by: PODIATRIST

## 2021-09-03 PROCEDURE — 99999 PR PBB SHADOW E&M-EST. PATIENT-LVL IV: ICD-10-PCS | Mod: PBBFAC,,, | Performed by: PODIATRIST

## 2021-09-03 PROCEDURE — 99999 PR PBB SHADOW E&M-EST. PATIENT-LVL IV: CPT | Mod: PBBFAC,,, | Performed by: PODIATRIST

## 2021-09-03 PROCEDURE — 99214 OFFICE O/P EST MOD 30 MIN: CPT | Mod: PBBFAC | Performed by: PODIATRIST

## 2021-09-03 PROCEDURE — 99213 OFFICE O/P EST LOW 20 MIN: CPT | Mod: S$PBB,,, | Performed by: PODIATRIST

## 2021-09-03 RX ORDER — NORTRIPTYLINE HYDROCHLORIDE 25 MG/1
50 CAPSULE ORAL
COMMUNITY
Start: 2021-08-24 | End: 2021-10-06

## 2021-09-05 PROBLEM — E11.49 TYPE II DIABETES MELLITUS WITH NEUROLOGICAL MANIFESTATIONS: Status: ACTIVE | Noted: 2021-09-05

## 2021-09-05 PROBLEM — L60.0 INGROWN NAIL: Status: ACTIVE | Noted: 2021-09-05

## 2021-09-05 PROBLEM — E11.9 COMPREHENSIVE DIABETIC FOOT EXAMINATION, TYPE 2 DM, ENCOUNTER FOR: Status: ACTIVE | Noted: 2021-09-05

## 2021-09-07 ENCOUNTER — PATIENT MESSAGE (OUTPATIENT)
Dept: FAMILY MEDICINE | Facility: CLINIC | Age: 81
End: 2021-09-07

## 2021-09-07 ENCOUNTER — TELEPHONE (OUTPATIENT)
Dept: FAMILY MEDICINE | Facility: CLINIC | Age: 81
End: 2021-09-07

## 2021-09-07 DIAGNOSIS — I10 ESSENTIAL HYPERTENSION: ICD-10-CM

## 2021-09-08 ENCOUNTER — PATIENT MESSAGE (OUTPATIENT)
Dept: FAMILY MEDICINE | Facility: CLINIC | Age: 81
End: 2021-09-08

## 2021-09-08 RX ORDER — SPIRONOLACTONE 25 MG/1
25 TABLET ORAL EVERY MORNING
Qty: 90 TABLET | Refills: 0 | Status: SHIPPED | OUTPATIENT
Start: 2021-09-08 | End: 2021-11-30

## 2021-09-09 ENCOUNTER — TELEPHONE (OUTPATIENT)
Dept: FAMILY MEDICINE | Facility: CLINIC | Age: 81
End: 2021-09-09

## 2021-09-14 ENCOUNTER — TELEPHONE (OUTPATIENT)
Dept: PHARMACY | Facility: AMBULARY SURGERY CENTER | Age: 81
End: 2021-09-14

## 2021-09-17 ENCOUNTER — PATIENT MESSAGE (OUTPATIENT)
Dept: FAMILY MEDICINE | Facility: CLINIC | Age: 81
End: 2021-09-17

## 2021-09-21 ENCOUNTER — OFFICE VISIT (OUTPATIENT)
Dept: FAMILY MEDICINE | Facility: CLINIC | Age: 81
End: 2021-09-21
Payer: MEDICARE

## 2021-09-21 VITALS
OXYGEN SATURATION: 97 % | DIASTOLIC BLOOD PRESSURE: 78 MMHG | TEMPERATURE: 98 F | SYSTOLIC BLOOD PRESSURE: 130 MMHG | WEIGHT: 126.63 LBS | HEIGHT: 61 IN | BODY MASS INDEX: 23.91 KG/M2 | HEART RATE: 81 BPM | RESPIRATION RATE: 12 BRPM

## 2021-09-21 DIAGNOSIS — R63.4 WEIGHT LOSS: ICD-10-CM

## 2021-09-21 DIAGNOSIS — E11.9 TYPE 2 DIABETES MELLITUS WITHOUT COMPLICATION, WITHOUT LONG-TERM CURRENT USE OF INSULIN: ICD-10-CM

## 2021-09-21 DIAGNOSIS — D68.59 HYPERCOAGULOPATHY: Primary | ICD-10-CM

## 2021-09-21 DIAGNOSIS — R20.9 UNSPECIFIED DISTURBANCES OF SKIN SENSATION: ICD-10-CM

## 2021-09-21 PROCEDURE — 99214 OFFICE O/P EST MOD 30 MIN: CPT | Mod: S$PBB,,, | Performed by: FAMILY MEDICINE

## 2021-09-21 PROCEDURE — 99215 OFFICE O/P EST HI 40 MIN: CPT | Mod: PBBFAC | Performed by: FAMILY MEDICINE

## 2021-09-21 PROCEDURE — 99999 PR PBB SHADOW E&M-EST. PATIENT-LVL V: CPT | Mod: PBBFAC,,, | Performed by: FAMILY MEDICINE

## 2021-09-21 PROCEDURE — 99214 PR OFFICE/OUTPT VISIT, EST, LEVL IV, 30-39 MIN: ICD-10-PCS | Mod: S$PBB,,, | Performed by: FAMILY MEDICINE

## 2021-09-21 PROCEDURE — 99999 PR PBB SHADOW E&M-EST. PATIENT-LVL V: ICD-10-PCS | Mod: PBBFAC,,, | Performed by: FAMILY MEDICINE

## 2021-09-22 ENCOUNTER — LAB VISIT (OUTPATIENT)
Dept: LAB | Facility: HOSPITAL | Age: 81
End: 2021-09-22
Attending: FAMILY MEDICINE
Payer: MEDICARE

## 2021-09-22 ENCOUNTER — TELEPHONE (OUTPATIENT)
Dept: FAMILY MEDICINE | Facility: CLINIC | Age: 81
End: 2021-09-22

## 2021-09-22 ENCOUNTER — PATIENT MESSAGE (OUTPATIENT)
Dept: FAMILY MEDICINE | Facility: CLINIC | Age: 81
End: 2021-09-22

## 2021-09-22 DIAGNOSIS — E11.40 DIABETIC NEUROPATHY: Primary | ICD-10-CM

## 2021-09-22 DIAGNOSIS — E53.8 VITAMIN B12 DEFICIENCY: ICD-10-CM

## 2021-09-22 DIAGNOSIS — E78.5 HYPERLIPEMIA: ICD-10-CM

## 2021-09-22 DIAGNOSIS — D68.59 PRIMARY HYPERCOAGULABLE STATE: ICD-10-CM

## 2021-09-22 DIAGNOSIS — I10 ESSENTIAL HYPERTENSION, MALIGNANT: ICD-10-CM

## 2021-09-22 LAB
ALBUMIN SERPL BCP-MCNC: 3.9 G/DL (ref 3.5–5.2)
ALP SERPL-CCNC: 85 U/L (ref 55–135)
ALT SERPL W/O P-5'-P-CCNC: 18 U/L (ref 10–44)
ANION GAP SERPL CALC-SCNC: 10 MMOL/L (ref 8–16)
APTT BLDCRRT: 21.7 SEC (ref 21–32)
AST SERPL-CCNC: 21 U/L (ref 10–40)
BASOPHILS # BLD AUTO: 0.03 K/UL (ref 0–0.2)
BASOPHILS NFR BLD: 0.4 % (ref 0–1.9)
BILIRUB SERPL-MCNC: 0.7 MG/DL (ref 0.1–1)
BUN SERPL-MCNC: 16 MG/DL (ref 8–23)
CALCIUM SERPL-MCNC: 10 MG/DL (ref 8.7–10.5)
CHLORIDE SERPL-SCNC: 107 MMOL/L (ref 95–110)
CHOLEST SERPL-MCNC: 93 MG/DL (ref 120–199)
CHOLEST/HDLC SERPL: 2.1 {RATIO} (ref 2–5)
CO2 SERPL-SCNC: 21 MMOL/L (ref 23–29)
CREAT SERPL-MCNC: 1 MG/DL (ref 0.5–1.4)
DIFFERENTIAL METHOD: ABNORMAL
EOSINOPHIL # BLD AUTO: 0.2 K/UL (ref 0–0.5)
EOSINOPHIL NFR BLD: 2.8 % (ref 0–8)
ERYTHROCYTE [DISTWIDTH] IN BLOOD BY AUTOMATED COUNT: 13.5 % (ref 11.5–14.5)
ERYTHROCYTE [SEDIMENTATION RATE] IN BLOOD BY WESTERGREN METHOD: 4 MM/HR (ref 0–20)
EST. GFR  (AFRICAN AMERICAN): >60 ML/MIN/1.73 M^2
EST. GFR  (NON AFRICAN AMERICAN): 52.2 ML/MIN/1.73 M^2
ESTIMATED AVG GLUCOSE: 111 MG/DL (ref 68–131)
GLUCOSE SERPL-MCNC: 114 MG/DL (ref 70–110)
HBA1C MFR BLD: 5.5 % (ref 4–5.6)
HCT VFR BLD AUTO: 38.2 % (ref 37–48.5)
HDLC SERPL-MCNC: 44 MG/DL (ref 40–75)
HDLC SERPL: 47.3 % (ref 20–50)
HGB BLD-MCNC: 12.8 G/DL (ref 12–16)
IMM GRANULOCYTES # BLD AUTO: 0.02 K/UL (ref 0–0.04)
IMM GRANULOCYTES NFR BLD AUTO: 0.3 % (ref 0–0.5)
LDLC SERPL CALC-MCNC: 30.8 MG/DL (ref 63–159)
LYMPHOCYTES # BLD AUTO: 0.8 K/UL (ref 1–4.8)
LYMPHOCYTES NFR BLD: 11.2 % (ref 18–48)
MCH RBC QN AUTO: 28.7 PG (ref 27–31)
MCHC RBC AUTO-ENTMCNC: 33.5 G/DL (ref 32–36)
MCV RBC AUTO: 86 FL (ref 82–98)
MONOCYTES # BLD AUTO: 0.5 K/UL (ref 0.3–1)
MONOCYTES NFR BLD: 7.7 % (ref 4–15)
NEUTROPHILS # BLD AUTO: 5.3 K/UL (ref 1.8–7.7)
NEUTROPHILS NFR BLD: 77.6 % (ref 38–73)
NONHDLC SERPL-MCNC: 49 MG/DL
NRBC BLD-RTO: 0 /100 WBC
PLATELET # BLD AUTO: 172 K/UL (ref 150–450)
PMV BLD AUTO: 10.1 FL (ref 9.2–12.9)
POTASSIUM SERPL-SCNC: 4.2 MMOL/L (ref 3.5–5.1)
PROT SERPL-MCNC: 6.3 G/DL (ref 6–8.4)
RBC # BLD AUTO: 4.46 M/UL (ref 4–5.4)
SODIUM SERPL-SCNC: 138 MMOL/L (ref 136–145)
T4 FREE SERPL-MCNC: 1.25 NG/DL (ref 0.71–1.51)
TRIGL SERPL-MCNC: 91 MG/DL (ref 30–150)
TSH SERPL DL<=0.005 MIU/L-ACNC: 1.01 UIU/ML (ref 0.4–4)
VIT B12 SERPL-MCNC: 465 PG/ML (ref 210–950)
WBC # BLD AUTO: 6.78 K/UL (ref 3.9–12.7)

## 2021-09-22 PROCEDURE — 85730 THROMBOPLASTIN TIME PARTIAL: CPT | Performed by: FAMILY MEDICINE

## 2021-09-22 PROCEDURE — 80053 COMPREHEN METABOLIC PANEL: CPT | Performed by: FAMILY MEDICINE

## 2021-09-22 PROCEDURE — 83036 HEMOGLOBIN GLYCOSYLATED A1C: CPT | Performed by: FAMILY MEDICINE

## 2021-09-22 PROCEDURE — 80061 LIPID PANEL: CPT | Performed by: FAMILY MEDICINE

## 2021-09-22 PROCEDURE — 85651 RBC SED RATE NONAUTOMATED: CPT | Performed by: FAMILY MEDICINE

## 2021-09-22 PROCEDURE — 85025 COMPLETE CBC W/AUTO DIFF WBC: CPT | Performed by: FAMILY MEDICINE

## 2021-09-22 PROCEDURE — 36415 COLL VENOUS BLD VENIPUNCTURE: CPT | Performed by: FAMILY MEDICINE

## 2021-09-22 PROCEDURE — 84443 ASSAY THYROID STIM HORMONE: CPT | Performed by: FAMILY MEDICINE

## 2021-09-22 PROCEDURE — 82607 VITAMIN B-12: CPT | Performed by: FAMILY MEDICINE

## 2021-09-22 PROCEDURE — 84439 ASSAY OF FREE THYROXINE: CPT | Performed by: FAMILY MEDICINE

## 2021-09-27 ENCOUNTER — INFUSION (OUTPATIENT)
Dept: INFUSION THERAPY | Facility: HOSPITAL | Age: 81
End: 2021-09-27
Attending: FAMILY MEDICINE
Payer: MEDICARE

## 2021-09-27 VITALS
DIASTOLIC BLOOD PRESSURE: 69 MMHG | SYSTOLIC BLOOD PRESSURE: 147 MMHG | RESPIRATION RATE: 18 BRPM | HEART RATE: 67 BPM | OXYGEN SATURATION: 100 %

## 2021-09-27 DIAGNOSIS — R63.4 WEIGHT LOSS: ICD-10-CM

## 2021-09-27 DIAGNOSIS — R20.9 UNSPECIFIED DISTURBANCES OF SKIN SENSATION: ICD-10-CM

## 2021-09-27 DIAGNOSIS — Z85.3 HISTORY OF BILATERAL BREAST CANCER: Primary | ICD-10-CM

## 2021-09-27 DIAGNOSIS — D68.59 HYPERCOAGULOPATHY: ICD-10-CM

## 2021-09-27 DIAGNOSIS — E11.9 TYPE 2 DIABETES MELLITUS WITHOUT COMPLICATION, WITHOUT LONG-TERM CURRENT USE OF INSULIN: ICD-10-CM

## 2021-09-27 LAB
ALBUMIN SERPL BCP-MCNC: 3.9 G/DL (ref 3.5–5.2)
ALP SERPL-CCNC: 87 U/L (ref 55–135)
ALT SERPL W/O P-5'-P-CCNC: 21 U/L (ref 10–44)
ANION GAP SERPL CALC-SCNC: 11 MMOL/L (ref 8–16)
APTT BLDCRRT: 37.9 SEC (ref 21–32)
AST SERPL-CCNC: 20 U/L (ref 10–40)
BASOPHILS # BLD AUTO: 0.03 K/UL (ref 0–0.2)
BASOPHILS NFR BLD: 0.4 % (ref 0–1.9)
BILIRUB SERPL-MCNC: 0.6 MG/DL (ref 0.1–1)
BUN SERPL-MCNC: 22 MG/DL (ref 8–23)
CALCIUM SERPL-MCNC: 10.2 MG/DL (ref 8.7–10.5)
CHLORIDE SERPL-SCNC: 105 MMOL/L (ref 95–110)
CHOLEST SERPL-MCNC: 107 MG/DL (ref 120–199)
CHOLEST/HDLC SERPL: 2.4 {RATIO} (ref 2–5)
CO2 SERPL-SCNC: 21 MMOL/L (ref 23–29)
CREAT SERPL-MCNC: 1.1 MG/DL (ref 0.5–1.4)
CRP SERPL-MCNC: 1.4 MG/L (ref 0–8.2)
DIFFERENTIAL METHOD: ABNORMAL
EOSINOPHIL # BLD AUTO: 0.2 K/UL (ref 0–0.5)
EOSINOPHIL NFR BLD: 2.9 % (ref 0–8)
ERYTHROCYTE [DISTWIDTH] IN BLOOD BY AUTOMATED COUNT: 13.4 % (ref 11.5–14.5)
ERYTHROCYTE [SEDIMENTATION RATE] IN BLOOD BY WESTERGREN METHOD: 5 MM/HR (ref 0–20)
EST. GFR  (AFRICAN AMERICAN): 53.7 ML/MIN/1.73 M^2
EST. GFR  (NON AFRICAN AMERICAN): 46.5 ML/MIN/1.73 M^2
ESTIMATED AVG GLUCOSE: 111 MG/DL (ref 68–131)
GLUCOSE SERPL-MCNC: 129 MG/DL (ref 70–110)
HBA1C MFR BLD: 5.5 % (ref 4–5.6)
HCT VFR BLD AUTO: 41.6 % (ref 37–48.5)
HDLC SERPL-MCNC: 44 MG/DL (ref 40–75)
HDLC SERPL: 41.1 % (ref 20–50)
HGB BLD-MCNC: 13.7 G/DL (ref 12–16)
IMM GRANULOCYTES # BLD AUTO: 0.02 K/UL (ref 0–0.04)
IMM GRANULOCYTES NFR BLD AUTO: 0.3 % (ref 0–0.5)
INR PPP: 1 (ref 0.8–1.2)
LDLC SERPL CALC-MCNC: 39.4 MG/DL (ref 63–159)
LYMPHOCYTES # BLD AUTO: 0.7 K/UL (ref 1–4.8)
LYMPHOCYTES NFR BLD: 10.1 % (ref 18–48)
MCH RBC QN AUTO: 28.7 PG (ref 27–31)
MCHC RBC AUTO-ENTMCNC: 32.9 G/DL (ref 32–36)
MCV RBC AUTO: 87 FL (ref 82–98)
MONOCYTES # BLD AUTO: 0.5 K/UL (ref 0.3–1)
MONOCYTES NFR BLD: 6.8 % (ref 4–15)
NEUTROPHILS # BLD AUTO: 5.8 K/UL (ref 1.8–7.7)
NEUTROPHILS NFR BLD: 79.5 % (ref 38–73)
NONHDLC SERPL-MCNC: 63 MG/DL
NRBC BLD-RTO: 0 /100 WBC
PLATELET # BLD AUTO: 202 K/UL (ref 150–450)
PMV BLD AUTO: 9.6 FL (ref 9.2–12.9)
POTASSIUM SERPL-SCNC: 4.1 MMOL/L (ref 3.5–5.1)
PROT SERPL-MCNC: 6.5 G/DL (ref 6–8.4)
PROTHROMBIN TIME: 10.9 SEC (ref 9–12.5)
RBC # BLD AUTO: 4.77 M/UL (ref 4–5.4)
SODIUM SERPL-SCNC: 137 MMOL/L (ref 136–145)
T4 FREE SERPL-MCNC: 1.31 NG/DL (ref 0.71–1.51)
TRIGL SERPL-MCNC: 118 MG/DL (ref 30–150)
TSH SERPL DL<=0.005 MIU/L-ACNC: 1.01 UIU/ML (ref 0.4–4)
VIT B12 SERPL-MCNC: 496 PG/ML (ref 210–950)
WBC # BLD AUTO: 7.32 K/UL (ref 3.9–12.7)

## 2021-09-27 PROCEDURE — 85651 RBC SED RATE NONAUTOMATED: CPT | Performed by: FAMILY MEDICINE

## 2021-09-27 PROCEDURE — 86140 C-REACTIVE PROTEIN: CPT | Performed by: FAMILY MEDICINE

## 2021-09-27 PROCEDURE — 36591 DRAW BLOOD OFF VENOUS DEVICE: CPT

## 2021-09-27 PROCEDURE — 80053 COMPREHEN METABOLIC PANEL: CPT | Performed by: FAMILY MEDICINE

## 2021-09-27 PROCEDURE — 80061 LIPID PANEL: CPT | Performed by: FAMILY MEDICINE

## 2021-09-27 PROCEDURE — 63600175 PHARM REV CODE 636 W HCPCS: Performed by: FAMILY MEDICINE

## 2021-09-27 PROCEDURE — 85730 THROMBOPLASTIN TIME PARTIAL: CPT | Performed by: FAMILY MEDICINE

## 2021-09-27 PROCEDURE — 83036 HEMOGLOBIN GLYCOSYLATED A1C: CPT | Performed by: FAMILY MEDICINE

## 2021-09-27 PROCEDURE — 85025 COMPLETE CBC W/AUTO DIFF WBC: CPT | Performed by: FAMILY MEDICINE

## 2021-09-27 PROCEDURE — 82607 VITAMIN B-12: CPT | Performed by: FAMILY MEDICINE

## 2021-09-27 PROCEDURE — 85610 PROTHROMBIN TIME: CPT | Performed by: FAMILY MEDICINE

## 2021-09-27 PROCEDURE — 84443 ASSAY THYROID STIM HORMONE: CPT | Performed by: FAMILY MEDICINE

## 2021-09-27 PROCEDURE — 84439 ASSAY OF FREE THYROXINE: CPT | Performed by: FAMILY MEDICINE

## 2021-09-27 RX ORDER — HEPARIN 100 UNIT/ML
500 SYRINGE INTRAVENOUS
Status: COMPLETED | OUTPATIENT
Start: 2021-09-27 | End: 2021-09-27

## 2021-09-27 RX ORDER — SODIUM CHLORIDE 0.9 % (FLUSH) 0.9 %
10 SYRINGE (ML) INJECTION
Status: DISCONTINUED | OUTPATIENT
Start: 2021-09-27 | End: 2021-09-27 | Stop reason: HOSPADM

## 2021-09-27 RX ORDER — HEPARIN 100 UNIT/ML
500 SYRINGE INTRAVENOUS
Status: CANCELLED | OUTPATIENT
Start: 2021-09-27

## 2021-09-27 RX ORDER — SODIUM CHLORIDE 0.9 % (FLUSH) 0.9 %
10 SYRINGE (ML) INJECTION
Status: CANCELLED | OUTPATIENT
Start: 2021-09-27

## 2021-09-27 RX ADMIN — HEPARIN 500 UNITS: 100 SYRINGE at 08:09

## 2021-10-05 ENCOUNTER — DOCUMENT SCAN (OUTPATIENT)
Dept: HOME HEALTH SERVICES | Facility: HOSPITAL | Age: 81
End: 2021-10-05
Payer: MEDICARE

## 2021-10-05 RX ORDER — CLONIDINE HYDROCHLORIDE 0.1 MG/1
0.1 TABLET ORAL 2 TIMES DAILY PRN
Qty: 60 TABLET | Refills: 11 | Status: SHIPPED | OUTPATIENT
Start: 2021-10-05 | End: 2022-03-03 | Stop reason: SDUPTHER

## 2021-10-06 ENCOUNTER — OFFICE VISIT (OUTPATIENT)
Dept: FAMILY MEDICINE | Facility: CLINIC | Age: 81
End: 2021-10-06
Payer: MEDICARE

## 2021-10-06 VITALS
HEIGHT: 61 IN | OXYGEN SATURATION: 97 % | SYSTOLIC BLOOD PRESSURE: 120 MMHG | RESPIRATION RATE: 13 BRPM | HEART RATE: 75 BPM | DIASTOLIC BLOOD PRESSURE: 68 MMHG | BODY MASS INDEX: 23.75 KG/M2 | TEMPERATURE: 98 F | WEIGHT: 125.81 LBS

## 2021-10-06 DIAGNOSIS — G44.89 CHRONIC MIXED HEADACHE SYNDROME: Primary | ICD-10-CM

## 2021-10-06 PROCEDURE — 99214 OFFICE O/P EST MOD 30 MIN: CPT | Mod: S$PBB,,, | Performed by: FAMILY MEDICINE

## 2021-10-06 PROCEDURE — 99214 PR OFFICE/OUTPT VISIT, EST, LEVL IV, 30-39 MIN: ICD-10-PCS | Mod: S$PBB,,, | Performed by: FAMILY MEDICINE

## 2021-10-06 PROCEDURE — 99999 PR PBB SHADOW E&M-EST. PATIENT-LVL IV: CPT | Mod: PBBFAC,,, | Performed by: FAMILY MEDICINE

## 2021-10-06 PROCEDURE — 99999 PR PBB SHADOW E&M-EST. PATIENT-LVL IV: ICD-10-PCS | Mod: PBBFAC,,, | Performed by: FAMILY MEDICINE

## 2021-10-06 PROCEDURE — 99214 OFFICE O/P EST MOD 30 MIN: CPT | Mod: PBBFAC | Performed by: FAMILY MEDICINE

## 2021-10-06 RX ORDER — PROPRANOLOL HYDROCHLORIDE 60 MG/1
60 CAPSULE, EXTENDED RELEASE ORAL DAILY
Qty: 30 CAPSULE | Refills: 11 | Status: SHIPPED | OUTPATIENT
Start: 2021-10-06 | End: 2021-11-09

## 2021-10-11 ENCOUNTER — TELEPHONE (OUTPATIENT)
Dept: FAMILY MEDICINE | Facility: CLINIC | Age: 81
End: 2021-10-11

## 2021-10-15 PROCEDURE — G0179 PR HOME HEALTH MD RECERTIFICATION: ICD-10-PCS | Mod: ,,, | Performed by: FAMILY MEDICINE

## 2021-10-15 PROCEDURE — G0179 MD RECERTIFICATION HHA PT: HCPCS | Mod: ,,, | Performed by: FAMILY MEDICINE

## 2021-10-19 ENCOUNTER — HOSPITAL ENCOUNTER (OUTPATIENT)
Dept: RADIOLOGY | Facility: HOSPITAL | Age: 81
Discharge: HOME | End: 2021-10-19
Attending: FAMILY MEDICINE
Payer: MEDICARE

## 2021-10-19 DIAGNOSIS — R63.4 WEIGHT LOSS: ICD-10-CM

## 2021-10-19 PROCEDURE — 76536 US SOFT TISSUE HEAD NECK THYROID: ICD-10-PCS | Mod: 26,,, | Performed by: RADIOLOGY

## 2021-10-19 PROCEDURE — 76536 US EXAM OF HEAD AND NECK: CPT | Mod: 26,,, | Performed by: RADIOLOGY

## 2021-10-19 PROCEDURE — 76536 US EXAM OF HEAD AND NECK: CPT | Mod: TC

## 2021-10-20 ENCOUNTER — IMMUNIZATION (OUTPATIENT)
Dept: FAMILY MEDICINE | Facility: CLINIC | Age: 81
End: 2021-10-20
Payer: MEDICARE

## 2021-10-20 DIAGNOSIS — Z23 NEED FOR VACCINATION: Primary | ICD-10-CM

## 2021-10-20 PROCEDURE — 91301 COVID-19, MRNA, LNP-S, PF, 100 MCG/0.5 ML DOSE VACCINE: CPT | Mod: PBBFAC

## 2021-10-20 PROCEDURE — 0013A COVID-19, MRNA, LNP-S, PF, 100 MCG/0.5 ML DOSE VACCINE: CPT | Mod: PBBFAC

## 2021-10-26 ENCOUNTER — TELEPHONE (OUTPATIENT)
Dept: FAMILY MEDICINE | Facility: CLINIC | Age: 81
End: 2021-10-26
Payer: MEDICARE

## 2021-10-26 DIAGNOSIS — D68.59 HYPERCOAGULOPATHY: ICD-10-CM

## 2021-10-26 DIAGNOSIS — R30.0 DYSURIA: Primary | ICD-10-CM

## 2021-10-26 RX ORDER — SULFAMETHOXAZOLE AND TRIMETHOPRIM 800; 160 MG/1; MG/1
1 TABLET ORAL 2 TIMES DAILY
Qty: 6 TABLET | Refills: 0 | Status: SHIPPED | OUTPATIENT
Start: 2021-10-26 | End: 2021-10-29

## 2021-10-27 ENCOUNTER — PATIENT OUTREACH (OUTPATIENT)
Dept: HOME HEALTH SERVICES | Facility: HOSPITAL | Age: 81
End: 2021-10-27
Payer: MEDICARE

## 2021-10-28 ENCOUNTER — EXTERNAL HOME HEALTH (OUTPATIENT)
Dept: HOME HEALTH SERVICES | Facility: HOSPITAL | Age: 81
End: 2021-10-28
Payer: MEDICARE

## 2021-10-29 PROCEDURE — 99454 REM MNTR PHYSIOL PARAM 16-30: CPT | Mod: PBBFAC | Performed by: FAMILY MEDICINE

## 2021-11-01 ENCOUNTER — INFUSION (OUTPATIENT)
Dept: INFUSION THERAPY | Facility: HOSPITAL | Age: 81
End: 2021-11-01
Attending: FAMILY MEDICINE
Payer: MEDICARE

## 2021-11-01 VITALS
TEMPERATURE: 98 F | DIASTOLIC BLOOD PRESSURE: 59 MMHG | HEIGHT: 61 IN | RESPIRATION RATE: 18 BRPM | WEIGHT: 128.88 LBS | HEART RATE: 73 BPM | BODY MASS INDEX: 24.33 KG/M2 | SYSTOLIC BLOOD PRESSURE: 125 MMHG | OXYGEN SATURATION: 97 %

## 2021-11-01 DIAGNOSIS — D68.59 HYPERCOAGULOPATHY: ICD-10-CM

## 2021-11-01 DIAGNOSIS — Z85.3 HISTORY OF BILATERAL BREAST CANCER: Primary | ICD-10-CM

## 2021-11-01 LAB
INR PPP: 1 (ref 0.8–1.2)
INR PPP: 1 (ref 0.8–1.2)
PROTHROMBIN TIME: 10.7 SEC (ref 9–12.5)
PROTHROMBIN TIME: 10.7 SEC (ref 9–12.5)

## 2021-11-01 PROCEDURE — 85610 PROTHROMBIN TIME: CPT | Performed by: FAMILY MEDICINE

## 2021-11-01 PROCEDURE — 96523 IRRIG DRUG DELIVERY DEVICE: CPT

## 2021-11-01 PROCEDURE — 25000003 PHARM REV CODE 250: Performed by: FAMILY MEDICINE

## 2021-11-01 PROCEDURE — A4216 STERILE WATER/SALINE, 10 ML: HCPCS | Performed by: FAMILY MEDICINE

## 2021-11-01 RX ORDER — HEPARIN 100 UNIT/ML
500 SYRINGE INTRAVENOUS
Status: CANCELLED | OUTPATIENT
Start: 2021-11-01

## 2021-11-01 RX ORDER — HEPARIN 100 UNIT/ML
500 SYRINGE INTRAVENOUS
Status: DISCONTINUED | OUTPATIENT
Start: 2021-11-01 | End: 2021-11-01 | Stop reason: HOSPADM

## 2021-11-01 RX ORDER — SODIUM CHLORIDE 0.9 % (FLUSH) 0.9 %
10 SYRINGE (ML) INJECTION
Status: COMPLETED | OUTPATIENT
Start: 2021-11-01 | End: 2021-11-01

## 2021-11-01 RX ORDER — SODIUM CHLORIDE 0.9 % (FLUSH) 0.9 %
10 SYRINGE (ML) INJECTION
Status: CANCELLED | OUTPATIENT
Start: 2021-11-01

## 2021-11-01 RX ADMIN — Medication 10 ML: at 08:11

## 2021-11-05 ENCOUNTER — DOCUMENT SCAN (OUTPATIENT)
Dept: HOME HEALTH SERVICES | Facility: HOSPITAL | Age: 81
End: 2021-11-05
Payer: MEDICARE

## 2021-11-08 ENCOUNTER — TELEPHONE (OUTPATIENT)
Dept: FAMILY MEDICINE | Facility: CLINIC | Age: 81
End: 2021-11-08
Payer: MEDICARE

## 2021-11-09 ENCOUNTER — OFFICE VISIT (OUTPATIENT)
Dept: FAMILY MEDICINE | Facility: CLINIC | Age: 81
End: 2021-11-09
Payer: MEDICARE

## 2021-11-09 VITALS
OXYGEN SATURATION: 99 % | BODY MASS INDEX: 23.19 KG/M2 | RESPIRATION RATE: 14 BRPM | DIASTOLIC BLOOD PRESSURE: 66 MMHG | HEART RATE: 88 BPM | SYSTOLIC BLOOD PRESSURE: 126 MMHG | HEIGHT: 61 IN | WEIGHT: 122.81 LBS

## 2021-11-09 DIAGNOSIS — R05.9 COUGH: Primary | ICD-10-CM

## 2021-11-09 DIAGNOSIS — Z20.822 ENCOUNTER FOR LABORATORY TESTING FOR COVID-19 VIRUS: ICD-10-CM

## 2021-11-09 DIAGNOSIS — I44.2 THIRD DEGREE AV BLOCK: ICD-10-CM

## 2021-11-09 DIAGNOSIS — Z95.0 S/P PLACEMENT OF CARDIAC PACEMAKER: ICD-10-CM

## 2021-11-09 DIAGNOSIS — G63 NEUROPATHY DUE TO MEDICAL CONDITION: ICD-10-CM

## 2021-11-09 DIAGNOSIS — N18.31 CHRONIC KIDNEY DISEASE, STAGE 3A: ICD-10-CM

## 2021-11-09 PROCEDURE — 99214 OFFICE O/P EST MOD 30 MIN: CPT | Mod: S$PBB,,, | Performed by: FAMILY MEDICINE

## 2021-11-09 PROCEDURE — 99999 PR PBB SHADOW E&M-EST. PATIENT-LVL IV: ICD-10-PCS | Mod: PBBFAC,,, | Performed by: FAMILY MEDICINE

## 2021-11-09 PROCEDURE — 99999 PR PBB SHADOW E&M-EST. PATIENT-LVL IV: CPT | Mod: PBBFAC,,, | Performed by: FAMILY MEDICINE

## 2021-11-09 PROCEDURE — 99214 PR OFFICE/OUTPT VISIT, EST, LEVL IV, 30-39 MIN: ICD-10-PCS | Mod: S$PBB,,, | Performed by: FAMILY MEDICINE

## 2021-11-09 PROCEDURE — U0003 INFECTIOUS AGENT DETECTION BY NUCLEIC ACID (DNA OR RNA); SEVERE ACUTE RESPIRATORY SYNDROME CORONAVIRUS 2 (SARS-COV-2) (CORONAVIRUS DISEASE [COVID-19]), AMPLIFIED PROBE TECHNIQUE, MAKING USE OF HIGH THROUGHPUT TECHNOLOGIES AS DESCRIBED BY CMS-2020-01-R: HCPCS | Performed by: FAMILY MEDICINE

## 2021-11-09 PROCEDURE — 99214 OFFICE O/P EST MOD 30 MIN: CPT | Mod: PBBFAC | Performed by: FAMILY MEDICINE

## 2021-11-09 PROCEDURE — U0005 INFEC AGEN DETEC AMPLI PROBE: HCPCS | Performed by: FAMILY MEDICINE

## 2021-11-09 RX ORDER — CEFUROXIME AXETIL 500 MG/1
500 TABLET ORAL 2 TIMES DAILY
COMMUNITY
Start: 2021-11-06 | End: 2022-03-03

## 2021-11-10 ENCOUNTER — DOCUMENT SCAN (OUTPATIENT)
Dept: HOME HEALTH SERVICES | Facility: HOSPITAL | Age: 81
End: 2021-11-10
Payer: MEDICARE

## 2021-11-10 LAB
SARS-COV-2 RNA RESP QL NAA+PROBE: NOT DETECTED
SARS-COV-2- CYCLE NUMBER: NORMAL

## 2021-11-11 ENCOUNTER — PATIENT MESSAGE (OUTPATIENT)
Dept: FAMILY MEDICINE | Facility: CLINIC | Age: 81
End: 2021-11-11
Payer: MEDICARE

## 2021-11-29 ENCOUNTER — LAB VISIT (OUTPATIENT)
Dept: LAB | Facility: HOSPITAL | Age: 81
End: 2021-11-29
Attending: NURSE PRACTITIONER
Payer: MEDICARE

## 2021-11-29 ENCOUNTER — TELEPHONE (OUTPATIENT)
Dept: FAMILY MEDICINE | Facility: CLINIC | Age: 81
End: 2021-11-29
Payer: MEDICARE

## 2021-11-29 DIAGNOSIS — K92.1 BLOOD IN STOOL: ICD-10-CM

## 2021-11-29 DIAGNOSIS — K92.1 BLOOD IN STOOL: Primary | ICD-10-CM

## 2021-11-29 LAB
BASOPHILS # BLD AUTO: 0.04 K/UL (ref 0–0.2)
BASOPHILS NFR BLD: 0.5 % (ref 0–1.9)
DIFFERENTIAL METHOD: ABNORMAL
EOSINOPHIL # BLD AUTO: 0.1 K/UL (ref 0–0.5)
EOSINOPHIL NFR BLD: 1.6 % (ref 0–8)
ERYTHROCYTE [DISTWIDTH] IN BLOOD BY AUTOMATED COUNT: 13.8 % (ref 11.5–14.5)
HCT VFR BLD AUTO: 43.4 % (ref 37–48.5)
HGB BLD-MCNC: 14.4 G/DL (ref 12–16)
IMM GRANULOCYTES # BLD AUTO: 0.02 K/UL (ref 0–0.04)
IMM GRANULOCYTES NFR BLD AUTO: 0.2 % (ref 0–0.5)
LYMPHOCYTES # BLD AUTO: 1.3 K/UL (ref 1–4.8)
LYMPHOCYTES NFR BLD: 15.3 % (ref 18–48)
MCH RBC QN AUTO: 28.7 PG (ref 27–31)
MCHC RBC AUTO-ENTMCNC: 33.2 G/DL (ref 32–36)
MCV RBC AUTO: 87 FL (ref 82–98)
MONOCYTES # BLD AUTO: 0.7 K/UL (ref 0.3–1)
MONOCYTES NFR BLD: 8.4 % (ref 4–15)
NEUTROPHILS # BLD AUTO: 6.4 K/UL (ref 1.8–7.7)
NEUTROPHILS NFR BLD: 74 % (ref 38–73)
NRBC BLD-RTO: 0 /100 WBC
OB PNL STL: NEGATIVE
PLATELET # BLD AUTO: 222 K/UL (ref 150–450)
PMV BLD AUTO: 9.3 FL (ref 9.2–12.9)
RBC # BLD AUTO: 5.02 M/UL (ref 4–5.4)
WBC # BLD AUTO: 8.7 K/UL (ref 3.9–12.7)

## 2021-11-29 PROCEDURE — 36415 COLL VENOUS BLD VENIPUNCTURE: CPT | Performed by: NURSE PRACTITIONER

## 2021-11-29 PROCEDURE — 82272 OCCULT BLD FECES 1-3 TESTS: CPT | Performed by: NURSE PRACTITIONER

## 2021-11-29 PROCEDURE — 85025 COMPLETE CBC W/AUTO DIFF WBC: CPT | Performed by: NURSE PRACTITIONER

## 2021-11-30 ENCOUNTER — TELEPHONE (OUTPATIENT)
Dept: FAMILY MEDICINE | Facility: CLINIC | Age: 81
End: 2021-11-30
Payer: MEDICARE

## 2021-12-03 ENCOUNTER — TELEPHONE (OUTPATIENT)
Dept: FAMILY MEDICINE | Facility: CLINIC | Age: 81
End: 2021-12-03
Payer: MEDICARE

## 2021-12-06 ENCOUNTER — INFUSION (OUTPATIENT)
Dept: INFUSION THERAPY | Facility: HOSPITAL | Age: 81
End: 2021-12-06
Payer: MEDICARE

## 2021-12-06 VITALS
WEIGHT: 128.88 LBS | TEMPERATURE: 98 F | BODY MASS INDEX: 24.33 KG/M2 | OXYGEN SATURATION: 99 % | RESPIRATION RATE: 18 BRPM | SYSTOLIC BLOOD PRESSURE: 146 MMHG | HEIGHT: 61 IN | DIASTOLIC BLOOD PRESSURE: 63 MMHG | HEART RATE: 72 BPM

## 2021-12-06 DIAGNOSIS — N18.31 CHRONIC KIDNEY DISEASE, STAGE 3A: Primary | ICD-10-CM

## 2021-12-06 DIAGNOSIS — Z85.3 HISTORY OF BILATERAL BREAST CANCER: ICD-10-CM

## 2021-12-06 DIAGNOSIS — D68.59 HYPERCOAGULOPATHY: Primary | ICD-10-CM

## 2021-12-06 DIAGNOSIS — D68.59 HYPERCOAGULOPATHY: ICD-10-CM

## 2021-12-06 LAB
ALBUMIN SERPL BCP-MCNC: 3.9 G/DL (ref 3.5–5.2)
ALP SERPL-CCNC: 87 U/L (ref 55–135)
ALT SERPL W/O P-5'-P-CCNC: 20 U/L (ref 10–44)
ANION GAP SERPL CALC-SCNC: 11 MMOL/L (ref 8–16)
AST SERPL-CCNC: 24 U/L (ref 10–40)
BILIRUB SERPL-MCNC: 0.6 MG/DL (ref 0.1–1)
BUN SERPL-MCNC: 19 MG/DL (ref 8–23)
CALCIUM SERPL-MCNC: 9.7 MG/DL (ref 8.7–10.5)
CHLORIDE SERPL-SCNC: 106 MMOL/L (ref 95–110)
CO2 SERPL-SCNC: 21 MMOL/L (ref 23–29)
CREAT SERPL-MCNC: 1 MG/DL (ref 0.5–1.4)
EST. GFR  (AFRICAN AMERICAN): >60 ML/MIN/1.73 M^2
EST. GFR  (NON AFRICAN AMERICAN): 52.2 ML/MIN/1.73 M^2
GLUCOSE SERPL-MCNC: 117 MG/DL (ref 70–110)
INR PPP: 1 (ref 0.8–1.2)
POTASSIUM SERPL-SCNC: 3.9 MMOL/L (ref 3.5–5.1)
PROT SERPL-MCNC: 6.2 G/DL (ref 6–8.4)
PROTHROMBIN TIME: 10.5 SEC (ref 9–12.5)
SODIUM SERPL-SCNC: 138 MMOL/L (ref 136–145)

## 2021-12-06 PROCEDURE — 96523 IRRIG DRUG DELIVERY DEVICE: CPT

## 2021-12-06 PROCEDURE — 25000003 PHARM REV CODE 250: Performed by: FAMILY MEDICINE

## 2021-12-06 PROCEDURE — 80053 COMPREHEN METABOLIC PANEL: CPT | Performed by: FAMILY MEDICINE

## 2021-12-06 PROCEDURE — A4216 STERILE WATER/SALINE, 10 ML: HCPCS | Performed by: FAMILY MEDICINE

## 2021-12-06 PROCEDURE — 63600175 PHARM REV CODE 636 W HCPCS: Performed by: FAMILY MEDICINE

## 2021-12-06 PROCEDURE — 85610 PROTHROMBIN TIME: CPT | Performed by: FAMILY MEDICINE

## 2021-12-06 RX ORDER — HEPARIN 100 UNIT/ML
500 SYRINGE INTRAVENOUS
Status: CANCELLED | OUTPATIENT
Start: 2021-12-06

## 2021-12-06 RX ORDER — HEPARIN 100 UNIT/ML
500 SYRINGE INTRAVENOUS
Status: COMPLETED | OUTPATIENT
Start: 2021-12-06 | End: 2021-12-06

## 2021-12-06 RX ORDER — SODIUM CHLORIDE 0.9 % (FLUSH) 0.9 %
10 SYRINGE (ML) INJECTION
Status: COMPLETED | OUTPATIENT
Start: 2021-12-06 | End: 2021-12-06

## 2021-12-06 RX ORDER — SODIUM CHLORIDE 0.9 % (FLUSH) 0.9 %
10 SYRINGE (ML) INJECTION
Status: CANCELLED | OUTPATIENT
Start: 2021-12-06

## 2021-12-06 RX ADMIN — Medication 10 ML: at 08:12

## 2021-12-06 RX ADMIN — HEPARIN 500 UNITS: 100 SYRINGE at 08:12

## 2021-12-09 ENCOUNTER — OFFICE VISIT (OUTPATIENT)
Dept: FAMILY MEDICINE | Facility: CLINIC | Age: 81
End: 2021-12-09
Payer: MEDICARE

## 2021-12-09 VITALS
HEART RATE: 79 BPM | OXYGEN SATURATION: 98 % | WEIGHT: 126.38 LBS | BODY MASS INDEX: 23.86 KG/M2 | SYSTOLIC BLOOD PRESSURE: 122 MMHG | RESPIRATION RATE: 14 BRPM | DIASTOLIC BLOOD PRESSURE: 74 MMHG | HEIGHT: 61 IN | TEMPERATURE: 98 F

## 2021-12-09 DIAGNOSIS — G44.89 CHRONIC MIXED HEADACHE SYNDROME: Primary | ICD-10-CM

## 2021-12-09 PROCEDURE — 99214 PR OFFICE/OUTPT VISIT, EST, LEVL IV, 30-39 MIN: ICD-10-PCS | Mod: S$PBB,,, | Performed by: FAMILY MEDICINE

## 2021-12-09 PROCEDURE — 99999 PR PBB SHADOW E&M-EST. PATIENT-LVL V: ICD-10-PCS | Mod: PBBFAC,,, | Performed by: FAMILY MEDICINE

## 2021-12-09 PROCEDURE — 99215 OFFICE O/P EST HI 40 MIN: CPT | Mod: PBBFAC | Performed by: FAMILY MEDICINE

## 2021-12-09 PROCEDURE — 99999 PR PBB SHADOW E&M-EST. PATIENT-LVL V: CPT | Mod: PBBFAC,,, | Performed by: FAMILY MEDICINE

## 2021-12-09 PROCEDURE — 99214 OFFICE O/P EST MOD 30 MIN: CPT | Mod: S$PBB,,, | Performed by: FAMILY MEDICINE

## 2021-12-09 RX ORDER — METOPROLOL TARTRATE 25 MG/1
12.5 TABLET, FILM COATED ORAL 2 TIMES DAILY
COMMUNITY
Start: 2021-12-02 | End: 2022-03-03 | Stop reason: SDUPTHER

## 2021-12-15 ENCOUNTER — OFFICE VISIT (OUTPATIENT)
Dept: PODIATRY | Facility: CLINIC | Age: 81
End: 2021-12-15
Payer: MEDICARE

## 2021-12-15 VITALS
RESPIRATION RATE: 16 BRPM | SYSTOLIC BLOOD PRESSURE: 138 MMHG | WEIGHT: 125 LBS | BODY MASS INDEX: 22.15 KG/M2 | HEART RATE: 76 BPM | DIASTOLIC BLOOD PRESSURE: 68 MMHG | HEIGHT: 63 IN

## 2021-12-15 DIAGNOSIS — L60.0 INGROWN NAIL: Primary | ICD-10-CM

## 2021-12-15 DIAGNOSIS — E11.49 TYPE II DIABETES MELLITUS WITH NEUROLOGICAL MANIFESTATIONS: ICD-10-CM

## 2021-12-15 DIAGNOSIS — E11.9 COMPREHENSIVE DIABETIC FOOT EXAMINATION, TYPE 2 DM, ENCOUNTER FOR: ICD-10-CM

## 2021-12-15 PROCEDURE — 99214 OFFICE O/P EST MOD 30 MIN: CPT | Mod: PBBFAC | Performed by: PODIATRIST

## 2021-12-15 PROCEDURE — 99213 PR OFFICE/OUTPT VISIT, EST, LEVL III, 20-29 MIN: ICD-10-PCS | Mod: S$PBB,,, | Performed by: PODIATRIST

## 2021-12-15 PROCEDURE — 99213 OFFICE O/P EST LOW 20 MIN: CPT | Mod: S$PBB,,, | Performed by: PODIATRIST

## 2021-12-15 PROCEDURE — 99999 PR PBB SHADOW E&M-EST. PATIENT-LVL IV: CPT | Mod: PBBFAC,,, | Performed by: PODIATRIST

## 2021-12-15 PROCEDURE — 99999 PR PBB SHADOW E&M-EST. PATIENT-LVL IV: ICD-10-PCS | Mod: PBBFAC,,, | Performed by: PODIATRIST

## 2022-01-03 ENCOUNTER — INFUSION (OUTPATIENT)
Dept: INFUSION THERAPY | Facility: HOSPITAL | Age: 82
End: 2022-01-03
Payer: MEDICARE

## 2022-01-03 VITALS
BODY MASS INDEX: 24.55 KG/M2 | RESPIRATION RATE: 17 BRPM | TEMPERATURE: 98 F | WEIGHT: 130.06 LBS | HEART RATE: 78 BPM | OXYGEN SATURATION: 99 % | DIASTOLIC BLOOD PRESSURE: 64 MMHG | SYSTOLIC BLOOD PRESSURE: 139 MMHG | HEIGHT: 61 IN

## 2022-01-03 DIAGNOSIS — Z85.3 HISTORY OF BILATERAL BREAST CANCER: Primary | ICD-10-CM

## 2022-01-03 PROCEDURE — 63600175 PHARM REV CODE 636 W HCPCS: Performed by: FAMILY MEDICINE

## 2022-01-03 PROCEDURE — 25000003 PHARM REV CODE 250: Performed by: FAMILY MEDICINE

## 2022-01-03 PROCEDURE — 96523 IRRIG DRUG DELIVERY DEVICE: CPT

## 2022-01-03 PROCEDURE — A4216 STERILE WATER/SALINE, 10 ML: HCPCS | Performed by: FAMILY MEDICINE

## 2022-01-03 RX ORDER — SODIUM CHLORIDE 0.9 % (FLUSH) 0.9 %
10 SYRINGE (ML) INJECTION
Status: COMPLETED | OUTPATIENT
Start: 2022-01-03 | End: 2022-01-03

## 2022-01-03 RX ORDER — HEPARIN 100 UNIT/ML
500 SYRINGE INTRAVENOUS
Status: COMPLETED | OUTPATIENT
Start: 2022-01-03 | End: 2022-01-03

## 2022-01-03 RX ORDER — HEPARIN 100 UNIT/ML
500 SYRINGE INTRAVENOUS
Status: CANCELLED | OUTPATIENT
Start: 2022-01-03

## 2022-01-03 RX ORDER — SODIUM CHLORIDE 0.9 % (FLUSH) 0.9 %
10 SYRINGE (ML) INJECTION
Status: CANCELLED | OUTPATIENT
Start: 2022-01-03

## 2022-01-03 RX ADMIN — Medication 10 ML: at 09:01

## 2022-01-03 RX ADMIN — HEPARIN 500 UNITS: 100 SYRINGE at 09:01

## 2022-01-03 NOTE — PLAN OF CARE
"Problem: Adult Inpatient Plan of Care  Goal: Plan of Care Review  Outcome: Ongoing, Progressing  Flowsheets (Taken 1/3/2022 0912)  Plan of Care Reviewed With: patient  Goal: Patient-Specific Goal (Individualized)  Outcome: Ongoing, Progressing  Flowsheets (Taken 1/3/2022 0912)  Anxieties, Fears or Concerns: PT DENIES ANY ANXIETIES, FEARS, OR CONCERNS  Individualized Care Needs: PT POSITIONED IN COMFORT IN RECLINER CHAIR FOR MONTHLY PORT FLUSH  Patient-Specific Goals (Include Timeframe): PT PORT TO FLUSH WITH NO INTOLERANCE, NO ADVERSE EFFECTS, & TO REMAIN INFECTION FREE    /64 (BP Location: Left arm, Patient Position: Sitting)   Pulse 78   Temp 98.2 °F (36.8 °C) (Oral)   Resp 17   SpO2 99%       PT AAOX3, CALM AND PLEASANT ON ARRIVAL. LEFT UPPER CHEST WALL PAC FLUSHED PER MDO. STERILE TECHNIQUE OBSERVED, FLUSHED WITH HEPARIN SEE EMAR. 3/4" MONSALVE NEEDLE USED, BLOOD RETURN NOTED ON ACCESS. DEACCESSED PER MDO, PT LUIS WELL. SCHEDULED FOR MONTHLY PORT FLUSH. D/C TO HOME IN CARE OF SELF.      "

## 2022-01-10 ENCOUNTER — LAB VISIT (OUTPATIENT)
Dept: FAMILY MEDICINE | Facility: CLINIC | Age: 82
End: 2022-01-10
Payer: MEDICARE

## 2022-01-10 ENCOUNTER — PATIENT MESSAGE (OUTPATIENT)
Dept: FAMILY MEDICINE | Facility: CLINIC | Age: 82
End: 2022-01-10
Payer: MEDICARE

## 2022-01-10 DIAGNOSIS — R06.02 SOB (SHORTNESS OF BREATH): ICD-10-CM

## 2022-01-10 DIAGNOSIS — R05.9 COUGH: Primary | ICD-10-CM

## 2022-01-10 PROCEDURE — U0005 INFEC AGEN DETEC AMPLI PROBE: HCPCS | Performed by: FAMILY MEDICINE

## 2022-01-10 PROCEDURE — U0003 INFECTIOUS AGENT DETECTION BY NUCLEIC ACID (DNA OR RNA); SEVERE ACUTE RESPIRATORY SYNDROME CORONAVIRUS 2 (SARS-COV-2) (CORONAVIRUS DISEASE [COVID-19]), AMPLIFIED PROBE TECHNIQUE, MAKING USE OF HIGH THROUGHPUT TECHNOLOGIES AS DESCRIBED BY CMS-2020-01-R: HCPCS | Performed by: FAMILY MEDICINE

## 2022-01-10 NOTE — PROGRESS NOTES
Ursula Rodríguez presented to clinic for COVID-19 swab.   Ursula Rodríguez verified x2, name and .  Ursula Rodríguez instructed on what will be completed, asked if ever had COVID-19 swab.   Explained procedure to Ursula Rodríguez.   Specimen obtained.   No questions or concerns voiced further at this time.   Ursula Rodríguez tolerated well and left in satisfactory condition.

## 2022-01-11 LAB
SARS-COV-2 RNA RESP QL NAA+PROBE: NOT DETECTED
SARS-COV-2- CYCLE NUMBER: NORMAL

## 2022-01-31 ENCOUNTER — TELEPHONE (OUTPATIENT)
Dept: FAMILY MEDICINE | Facility: CLINIC | Age: 82
End: 2022-01-31
Payer: MEDICARE

## 2022-01-31 ENCOUNTER — PATIENT MESSAGE (OUTPATIENT)
Dept: FAMILY MEDICINE | Facility: CLINIC | Age: 82
End: 2022-01-31
Payer: MEDICARE

## 2022-01-31 RX ORDER — HEPARIN 100 UNIT/ML
500 SYRINGE INTRAVENOUS
Status: CANCELLED | OUTPATIENT
Start: 2022-01-31

## 2022-01-31 RX ORDER — SODIUM CHLORIDE 0.9 % (FLUSH) 0.9 %
10 SYRINGE (ML) INJECTION
Status: CANCELLED | OUTPATIENT
Start: 2022-01-31

## 2022-01-31 NOTE — TELEPHONE ENCOUNTER
----- Message from Anjana Lemus sent at 1/31/2022  2:48 PM CST -----  Contact: patient  Type: Needs Medical Advice  Who Called:  patient for Rosario Avila Call Back Number: [phone]  Additional Information: patient is requesting a call back regarding flushing her port and her blood work.

## 2022-02-01 ENCOUNTER — HOSPITAL ENCOUNTER (OUTPATIENT)
Dept: RADIOLOGY | Facility: HOSPITAL | Age: 82
Discharge: HOME | End: 2022-02-01
Attending: STUDENT IN AN ORGANIZED HEALTH CARE EDUCATION/TRAINING PROGRAM
Payer: MEDICARE

## 2022-02-01 DIAGNOSIS — R55 SYNCOPE, UNSPECIFIED SYNCOPE TYPE: ICD-10-CM

## 2022-02-01 PROCEDURE — 93880 EXTRACRANIAL BILAT STUDY: CPT | Mod: TC

## 2022-02-01 PROCEDURE — 93880 US CAROTID BILATERAL: ICD-10-PCS | Mod: 26,,, | Performed by: RADIOLOGY

## 2022-02-01 PROCEDURE — 93880 EXTRACRANIAL BILAT STUDY: CPT | Mod: 26,,, | Performed by: RADIOLOGY

## 2022-02-02 ENCOUNTER — PATIENT MESSAGE (OUTPATIENT)
Dept: FAMILY MEDICINE | Facility: CLINIC | Age: 82
End: 2022-02-02
Payer: MEDICARE

## 2022-02-02 ENCOUNTER — INFUSION (OUTPATIENT)
Dept: INFUSION THERAPY | Facility: HOSPITAL | Age: 82
End: 2022-02-02
Attending: FAMILY MEDICINE
Payer: MEDICARE

## 2022-02-02 VITALS
HEART RATE: 72 BPM | SYSTOLIC BLOOD PRESSURE: 132 MMHG | OXYGEN SATURATION: 99 % | RESPIRATION RATE: 20 BRPM | TEMPERATURE: 98 F | DIASTOLIC BLOOD PRESSURE: 60 MMHG

## 2022-02-02 DIAGNOSIS — Z85.3 HISTORY OF BILATERAL BREAST CANCER: ICD-10-CM

## 2022-02-02 DIAGNOSIS — D68.59 HYPERCOAGULOPATHY: Primary | ICD-10-CM

## 2022-02-02 LAB
ALBUMIN SERPL BCP-MCNC: 4 G/DL (ref 3.5–5.2)
ALP SERPL-CCNC: 91 U/L (ref 55–135)
ALT SERPL W/O P-5'-P-CCNC: 16 U/L (ref 10–44)
ANION GAP SERPL CALC-SCNC: 12 MMOL/L (ref 8–16)
AST SERPL-CCNC: 19 U/L (ref 10–40)
BASOPHILS # BLD AUTO: 0.03 K/UL (ref 0–0.2)
BASOPHILS NFR BLD: 0.5 % (ref 0–1.9)
BILIRUB SERPL-MCNC: 0.5 MG/DL (ref 0.1–1)
BUN SERPL-MCNC: 24 MG/DL (ref 8–23)
CALCIUM SERPL-MCNC: 10 MG/DL (ref 8.7–10.5)
CHLORIDE SERPL-SCNC: 105 MMOL/L (ref 95–110)
CO2 SERPL-SCNC: 23 MMOL/L (ref 23–29)
CREAT SERPL-MCNC: 1.1 MG/DL (ref 0.5–1.4)
DIFFERENTIAL METHOD: ABNORMAL
EOSINOPHIL # BLD AUTO: 0.1 K/UL (ref 0–0.5)
EOSINOPHIL NFR BLD: 2 % (ref 0–8)
ERYTHROCYTE [DISTWIDTH] IN BLOOD BY AUTOMATED COUNT: 13.1 % (ref 11.5–14.5)
EST. GFR  (AFRICAN AMERICAN): 53.3 ML/MIN/1.73 M^2
EST. GFR  (NON AFRICAN AMERICAN): 46.2 ML/MIN/1.73 M^2
GLUCOSE SERPL-MCNC: 139 MG/DL (ref 70–110)
HCT VFR BLD AUTO: 41.8 % (ref 37–48.5)
HGB BLD-MCNC: 14.1 G/DL (ref 12–16)
IMM GRANULOCYTES # BLD AUTO: 0.02 K/UL (ref 0–0.04)
IMM GRANULOCYTES NFR BLD AUTO: 0.3 % (ref 0–0.5)
INR PPP: 1.1 (ref 0.8–1.2)
LYMPHOCYTES # BLD AUTO: 0.9 K/UL (ref 1–4.8)
LYMPHOCYTES NFR BLD: 13.4 % (ref 18–48)
MCH RBC QN AUTO: 28.9 PG (ref 27–31)
MCHC RBC AUTO-ENTMCNC: 33.7 G/DL (ref 32–36)
MCV RBC AUTO: 86 FL (ref 82–98)
MONOCYTES # BLD AUTO: 0.5 K/UL (ref 0.3–1)
MONOCYTES NFR BLD: 8.3 % (ref 4–15)
NEUTROPHILS # BLD AUTO: 4.9 K/UL (ref 1.8–7.7)
NEUTROPHILS NFR BLD: 75.5 % (ref 38–73)
NRBC BLD-RTO: 0 /100 WBC
PLATELET # BLD AUTO: 207 K/UL (ref 150–450)
PMV BLD AUTO: 9.8 FL (ref 9.2–12.9)
POTASSIUM SERPL-SCNC: 4.1 MMOL/L (ref 3.5–5.1)
PROT SERPL-MCNC: 6.4 G/DL (ref 6–8.4)
PROTHROMBIN TIME: 11 SEC (ref 9–12.5)
RBC # BLD AUTO: 4.88 M/UL (ref 4–5.4)
SODIUM SERPL-SCNC: 140 MMOL/L (ref 136–145)
WBC # BLD AUTO: 6.42 K/UL (ref 3.9–12.7)

## 2022-02-02 PROCEDURE — 85610 PROTHROMBIN TIME: CPT | Performed by: FAMILY MEDICINE

## 2022-02-02 PROCEDURE — 25000003 PHARM REV CODE 250: Performed by: FAMILY MEDICINE

## 2022-02-02 PROCEDURE — 85025 COMPLETE CBC W/AUTO DIFF WBC: CPT | Performed by: FAMILY MEDICINE

## 2022-02-02 PROCEDURE — 36415 COLL VENOUS BLD VENIPUNCTURE: CPT | Performed by: FAMILY MEDICINE

## 2022-02-02 PROCEDURE — 96523 IRRIG DRUG DELIVERY DEVICE: CPT

## 2022-02-02 PROCEDURE — A4216 STERILE WATER/SALINE, 10 ML: HCPCS | Performed by: FAMILY MEDICINE

## 2022-02-02 PROCEDURE — 63600175 PHARM REV CODE 636 W HCPCS: Performed by: FAMILY MEDICINE

## 2022-02-02 PROCEDURE — 80053 COMPREHEN METABOLIC PANEL: CPT | Performed by: FAMILY MEDICINE

## 2022-02-02 RX ORDER — SODIUM CHLORIDE 0.9 % (FLUSH) 0.9 %
10 SYRINGE (ML) INJECTION
Status: COMPLETED | OUTPATIENT
Start: 2022-02-02 | End: 2022-02-02

## 2022-02-02 RX ORDER — HEPARIN 100 UNIT/ML
500 SYRINGE INTRAVENOUS
Status: COMPLETED | OUTPATIENT
Start: 2022-02-02 | End: 2022-02-02

## 2022-02-02 RX ORDER — HEPARIN 100 UNIT/ML
500 SYRINGE INTRAVENOUS
Status: CANCELLED | OUTPATIENT
Start: 2022-02-02

## 2022-02-02 RX ORDER — SODIUM CHLORIDE 0.9 % (FLUSH) 0.9 %
10 SYRINGE (ML) INJECTION
Status: CANCELLED | OUTPATIENT
Start: 2022-02-02

## 2022-02-02 RX ADMIN — Medication 10 ML: at 10:02

## 2022-02-02 RX ADMIN — HEPARIN 500 UNITS: 100 SYRINGE at 10:02

## 2022-02-02 NOTE — PLAN OF CARE
"Problem: Adult Inpatient Plan of Care  Goal: Plan of Care Review  Outcome: Ongoing, Progressing  Flowsheets (Taken 2/2/2022 1030)  Plan of Care Reviewed With: patient  /60 (BP Location: Left arm, Patient Position: Sitting)   Pulse 72   Temp 97.6 °F (36.4 °C) (Oral)   Resp 20   SpO2 99%   PT AAOX3, CALM AND PLEASANT ON ARRIVAL. LEFT UPPER CHEST WALL PAC FLUSHED PER MD. STERILE TECHNIQUE OBSERVED, FLUSHED WITH HEPARIN SEE EMAR. 3/4" MONSALVE NEEDLE USED, BLOOD RETURN NOTED ON ACCESS. CBC, CMP AND PT/INR COLLECTED AND DELIVERED TO LAB, DEACCESSED PER MDO, PT LUIS WELL. SCHEDULED FOR MONTHLY PORT FLUSH. D/C TO HOME IN CARE OF SELF.         "

## 2022-02-22 ENCOUNTER — HOSPITAL ENCOUNTER (OUTPATIENT)
Dept: RADIOLOGY | Facility: HOSPITAL | Age: 82
Discharge: HOME | End: 2022-02-22
Payer: MEDICARE

## 2022-02-22 ENCOUNTER — PATIENT MESSAGE (OUTPATIENT)
Dept: FAMILY MEDICINE | Facility: CLINIC | Age: 82
End: 2022-02-22
Payer: MEDICARE

## 2022-02-22 DIAGNOSIS — R42 DIZZINESS: ICD-10-CM

## 2022-02-22 PROCEDURE — 70450 CT HEAD WITHOUT CONTRAST: ICD-10-PCS | Mod: 26,,, | Performed by: RADIOLOGY

## 2022-02-22 PROCEDURE — 70450 CT HEAD/BRAIN W/O DYE: CPT | Mod: 26,,, | Performed by: RADIOLOGY

## 2022-02-22 PROCEDURE — 70450 CT HEAD/BRAIN W/O DYE: CPT | Mod: TC

## 2022-02-28 ENCOUNTER — PATIENT MESSAGE (OUTPATIENT)
Dept: FAMILY MEDICINE | Facility: CLINIC | Age: 82
End: 2022-02-28
Payer: MEDICARE

## 2022-02-28 DIAGNOSIS — D68.59 HYPERCOAGULOPATHY: Primary | ICD-10-CM

## 2022-03-01 ENCOUNTER — NURSE TRIAGE (OUTPATIENT)
Dept: ADMINISTRATIVE | Facility: CLINIC | Age: 82
End: 2022-03-01
Payer: MEDICARE

## 2022-03-01 ENCOUNTER — PATIENT MESSAGE (OUTPATIENT)
Dept: FAMILY MEDICINE | Facility: CLINIC | Age: 82
End: 2022-03-01
Payer: MEDICARE

## 2022-03-01 DIAGNOSIS — D68.59 HYPERCOAGULOPATHY: ICD-10-CM

## 2022-03-01 DIAGNOSIS — D68.59 HYPERCOAGULOPATHY: Primary | ICD-10-CM

## 2022-03-01 NOTE — TELEPHONE ENCOUNTER
Reason for Disposition   [1] Caller has NON-URGENT medicine question about med that PCP prescribed AND [2] triager unable to answer question    Protocols used: MEDICATION QUESTION CALL-A-PJ Vergara states mail order pharmacy through her insurance states they sent a prescription request over to have eliquis filled through them. She saves $20/month by doing this. She states she will be out in five days and the prescription takes 5 days to get to her. She will ask her local pharmacy about an emergency supply but she needs the mail order prescription sent over in the morning. Advised triage nurse would send message to provider office for her. Please contact caller with any further care advice.

## 2022-03-03 DIAGNOSIS — R80.9 MICROALBUMINURIA: ICD-10-CM

## 2022-03-03 DIAGNOSIS — D68.59 HYPERCOAGULOPATHY: ICD-10-CM

## 2022-03-03 DIAGNOSIS — E03.9 ACQUIRED HYPOTHYROIDISM: ICD-10-CM

## 2022-03-03 DIAGNOSIS — E78.2 MIXED HYPERLIPIDEMIA: ICD-10-CM

## 2022-03-03 RX ORDER — LOSARTAN POTASSIUM 25 MG/1
12.5 TABLET ORAL DAILY
Qty: 45 TABLET | Refills: 3 | Status: SHIPPED | OUTPATIENT
Start: 2022-03-03 | End: 2022-12-19

## 2022-03-03 RX ORDER — LEVOTHYROXINE SODIUM 50 UG/1
50 TABLET ORAL DAILY
Qty: 90 TABLET | Refills: 3 | Status: SHIPPED | OUTPATIENT
Start: 2022-03-03 | End: 2022-12-19

## 2022-03-03 RX ORDER — SIMVASTATIN 20 MG/1
20 TABLET, FILM COATED ORAL NIGHTLY
Qty: 90 TABLET | Refills: 3 | Status: SHIPPED | OUTPATIENT
Start: 2022-03-03 | End: 2022-03-16 | Stop reason: SDUPTHER

## 2022-03-03 RX ORDER — METOPROLOL TARTRATE 25 MG/1
12.5 TABLET, FILM COATED ORAL 2 TIMES DAILY
Qty: 90 TABLET | Refills: 3 | Status: SHIPPED | OUTPATIENT
Start: 2022-03-03 | End: 2023-02-22

## 2022-03-03 RX ORDER — CLONIDINE HYDROCHLORIDE 0.1 MG/1
0.1 TABLET ORAL 2 TIMES DAILY PRN
Qty: 60 TABLET | Refills: 11 | Status: SHIPPED | OUTPATIENT
Start: 2022-03-03 | End: 2024-01-03 | Stop reason: SDUPTHER

## 2022-03-04 ENCOUNTER — INFUSION (OUTPATIENT)
Dept: INFUSION THERAPY | Facility: HOSPITAL | Age: 82
End: 2022-03-04
Attending: FAMILY MEDICINE
Payer: MEDICARE

## 2022-03-04 VITALS
DIASTOLIC BLOOD PRESSURE: 65 MMHG | RESPIRATION RATE: 18 BRPM | SYSTOLIC BLOOD PRESSURE: 146 MMHG | TEMPERATURE: 98 F | HEART RATE: 72 BPM

## 2022-03-04 DIAGNOSIS — Z85.3 HISTORY OF BILATERAL BREAST CANCER: Primary | ICD-10-CM

## 2022-03-04 DIAGNOSIS — D68.59 HYPERCOAGULOPATHY: ICD-10-CM

## 2022-03-04 LAB
INR PPP: 1.1 (ref 0.8–1.2)
PROTHROMBIN TIME: 11 SEC (ref 9–12.5)

## 2022-03-04 PROCEDURE — 96523 IRRIG DRUG DELIVERY DEVICE: CPT

## 2022-03-04 PROCEDURE — 85610 PROTHROMBIN TIME: CPT | Performed by: FAMILY MEDICINE

## 2022-03-04 RX ORDER — SODIUM CHLORIDE 0.9 % (FLUSH) 0.9 %
10 SYRINGE (ML) INJECTION
Status: DISCONTINUED | OUTPATIENT
Start: 2022-03-04 | End: 2022-03-04 | Stop reason: HOSPADM

## 2022-03-04 RX ORDER — HEPARIN 100 UNIT/ML
500 SYRINGE INTRAVENOUS
Status: CANCELLED | OUTPATIENT
Start: 2022-03-04

## 2022-03-04 RX ORDER — SODIUM CHLORIDE 0.9 % (FLUSH) 0.9 %
10 SYRINGE (ML) INJECTION
Status: CANCELLED | OUTPATIENT
Start: 2022-03-04

## 2022-03-04 NOTE — PLAN OF CARE
Pleasant alert and oriented patient ambulated independently to clinic for monthly port flush - good blood return - saline locked - labs obtained and taken to [facility] lab - pt tolerated well - discharged home with no concerns - scheduled for 4/1/22.

## 2022-03-09 ENCOUNTER — OFFICE VISIT (OUTPATIENT)
Dept: FAMILY MEDICINE | Facility: CLINIC | Age: 82
End: 2022-03-09
Payer: MEDICARE

## 2022-03-09 VITALS
SYSTOLIC BLOOD PRESSURE: 128 MMHG | OXYGEN SATURATION: 99 % | TEMPERATURE: 98 F | DIASTOLIC BLOOD PRESSURE: 78 MMHG | RESPIRATION RATE: 14 BRPM | HEIGHT: 61 IN | BODY MASS INDEX: 23.19 KG/M2 | HEART RATE: 84 BPM | WEIGHT: 122.81 LBS

## 2022-03-09 DIAGNOSIS — G44.89 CHRONIC MIXED HEADACHE SYNDROME: Primary | ICD-10-CM

## 2022-03-09 PROCEDURE — 99213 OFFICE O/P EST LOW 20 MIN: CPT | Mod: S$PBB,,, | Performed by: FAMILY MEDICINE

## 2022-03-09 PROCEDURE — 99214 OFFICE O/P EST MOD 30 MIN: CPT | Mod: PBBFAC | Performed by: FAMILY MEDICINE

## 2022-03-09 PROCEDURE — 99213 PR OFFICE/OUTPT VISIT, EST, LEVL III, 20-29 MIN: ICD-10-PCS | Mod: S$PBB,,, | Performed by: FAMILY MEDICINE

## 2022-03-09 PROCEDURE — 99999 PR PBB SHADOW E&M-EST. PATIENT-LVL IV: CPT | Mod: PBBFAC,,, | Performed by: FAMILY MEDICINE

## 2022-03-09 PROCEDURE — 99999 PR PBB SHADOW E&M-EST. PATIENT-LVL IV: ICD-10-PCS | Mod: PBBFAC,,, | Performed by: FAMILY MEDICINE

## 2022-03-09 NOTE — PROGRESS NOTES
"[facility] - Clinic Note    Subjective      Ms. Rodríguez is a 82 y.o. female who presents to clinic for Follow-up (3mth follow up)    Continuing to have headache.    PMH Ursula has a past medical history of Anemia (1995), Breast cancer (1995), Breast cancer (2013), Factor V Leiden (1994), Hyperlipidemia (2017), Hypertension (2017), Hypothyroidism (2019), Osteoporosis, Pacemaker (10/28/2021), Recurrent urinary tract infection, and Vertigo (1994).   PSXH Ursula has a past surgical history that includes Hemorrhoid surgery (1968); Hysterectomy (1970); Tumor removal (Left, 1994); Mastectomy (Right, 1994); and Mastectomy (Left, 2013).    Ursula's family history includes Cancer in her brother, maternal grandmother, and mother; Diabetes in her brother, brother, and father; Heart disease in her father and mother; Hypertension in her brother, brother, brother, and mother; Lung disease in her father; No Known Problems in her sister; Stroke in her brother and brother.   ANUJ Vergara reports that she has never smoked. She has never used smokeless tobacco. She reports that she does not drink alcohol and does not use drugs.   JAC Vergara is allergic to iodine and iodide containing products and ditropan [oxybutynin chloride].   JAYME Vergara has a current medication list which includes the following prescription(s): apixaban, clonidine, levothyroxine, losartan, magnesium oxide, metformin, metoprolol tartrate, multivitamin, nifedipine, potassium, simvastatin, and spironolactone.     Review of Systems   Constitutional: Negative for chills, fatigue and fever.   Respiratory: Negative for cough and shortness of breath.    Cardiovascular: Negative for chest pain.   Neurological: Positive for headaches. Negative for dizziness.     Objective     /78 (BP Location: Left arm, Patient Position: Sitting, BP Method: Medium (Manual))   Pulse 84   Temp 98.2 °F (36.8 °C) (Temporal)   Resp 14   Ht 5' 1" (1.549 m)   Wt 55.7 kg (122 lb 12.8 oz)  "  SpO2 99%   BMI 23.20 kg/m²     Physical Exam  Vitals and nursing note reviewed.   Constitutional:       General: She is not in acute distress.     Appearance: Normal appearance. She is well-developed. She is not diaphoretic.   HENT:      Head: Normocephalic and atraumatic.      Right Ear: External ear normal.      Left Ear: External ear normal.   Eyes:      General:         Right eye: No discharge.         Left eye: No discharge.   Cardiovascular:      Rate and Rhythm: Normal rate and regular rhythm.      Heart sounds: Normal heart sounds.   Pulmonary:      Effort: Pulmonary effort is normal.      Breath sounds: Normal breath sounds. No wheezing or rales.   Skin:     General: Skin is warm and dry.   Neurological:      Mental Status: She is alert and oriented to person, place, and time. Mental status is at baseline.   Psychiatric:         Mood and Affect: Mood normal.         Behavior: Behavior normal.         Thought Content: Thought content normal.         Judgment: Judgment normal.        Assessment/Plan     1. Chronic mixed headache syndrome       Patient is going to see Dr. Garcia in a couple of weeks.    Future Appointments   Date Time Provider Department Center   3/16/2022  8:00 AM Sander Almaguer DPM [facility]   3/23/2022 10:30 AM STEFFI Garcia MD [facility] HEM ONC [facility]   4/1/2022  8:30 AM [facility], OP THERAPEUTICS 10 [facility] OPTHERA [facility]   6/8/2022 10:40 AM Vicente Sutton MD [facility] FAMMED [facility]         Vicente Sutton MD  Family Medicine [facility]

## 2022-03-14 ENCOUNTER — OFFICE VISIT (OUTPATIENT)
Dept: FAMILY MEDICINE | Facility: CLINIC | Age: 82
End: 2022-03-14
Payer: MEDICARE

## 2022-03-14 VITALS
RESPIRATION RATE: 14 BRPM | TEMPERATURE: 98 F | HEIGHT: 61 IN | BODY MASS INDEX: 23.07 KG/M2 | SYSTOLIC BLOOD PRESSURE: 124 MMHG | OXYGEN SATURATION: 97 % | WEIGHT: 122.19 LBS | DIASTOLIC BLOOD PRESSURE: 76 MMHG | HEART RATE: 78 BPM

## 2022-03-14 DIAGNOSIS — G63 NEUROPATHY DUE TO MEDICAL CONDITION: ICD-10-CM

## 2022-03-14 DIAGNOSIS — B02.9 HERPES ZOSTER WITHOUT COMPLICATION: Primary | ICD-10-CM

## 2022-03-14 DIAGNOSIS — E11.9 TYPE 2 DIABETES MELLITUS WITHOUT COMPLICATION, WITHOUT LONG-TERM CURRENT USE OF INSULIN: ICD-10-CM

## 2022-03-14 PROCEDURE — 99214 OFFICE O/P EST MOD 30 MIN: CPT | Mod: S$PBB,,, | Performed by: FAMILY MEDICINE

## 2022-03-14 PROCEDURE — 99999 PR PBB SHADOW E&M-EST. PATIENT-LVL IV: CPT | Mod: PBBFAC,,, | Performed by: FAMILY MEDICINE

## 2022-03-14 PROCEDURE — 99214 OFFICE O/P EST MOD 30 MIN: CPT | Mod: PBBFAC | Performed by: FAMILY MEDICINE

## 2022-03-14 PROCEDURE — 99214 PR OFFICE/OUTPT VISIT, EST, LEVL IV, 30-39 MIN: ICD-10-PCS | Mod: S$PBB,,, | Performed by: FAMILY MEDICINE

## 2022-03-14 PROCEDURE — 99999 PR PBB SHADOW E&M-EST. PATIENT-LVL IV: ICD-10-PCS | Mod: PBBFAC,,, | Performed by: FAMILY MEDICINE

## 2022-03-14 RX ORDER — VALACYCLOVIR HYDROCHLORIDE 1 G/1
1000 TABLET, FILM COATED ORAL 3 TIMES DAILY
Qty: 21 TABLET | Refills: 0 | Status: SHIPPED | OUTPATIENT
Start: 2022-03-14 | End: 2022-03-16

## 2022-03-14 NOTE — PROGRESS NOTES
[facility] - Clinic Note    Subjective      Ms. Rodríguez is a 82 y.o. female who presents to clinic for Rash (Scalp area)    Noticed rash in her scalp area over the last couple of days.  History of shingles.    [facility] Ursula has a past medical history of Anemia (), Breast cancer (), Breast cancer (), Factor V Leiden (), Hyperlipidemia (), Hypertension (), Hypothyroidism (), Osteoporosis, Pacemaker (10/28/2021), Recurrent urinary tract infection, and Vertigo ().   PSXH Ursula has a past surgical history that includes Hemorrhoid surgery (); Hysterectomy (); Tumor removal (Left, ); Mastectomy (Right, ); Mastectomy (Left, );  section; Eye surgery (); Breast surgery (, , Nose cancer); Hernia repair (); and Cardiac valve replacement ().    Ursula's family history includes Cancer in her brother, maternal grandmother, and mother; Diabetes in her brother, brother, and father; Heart disease in her father and mother; Hypertension in her brother, brother, brother, and mother; Lung disease in her father; No Known Problems in her sister; Stroke in her brother and brother.   ANUJ Vergara reports that she has never smoked. She has never used smokeless tobacco. She reports that she does not drink alcohol and does not use drugs.   JAC Vergara is allergic to iodine and iodide containing products and ditropan [oxybutynin chloride].   JAYME Vergara has a current medication list which includes the following prescription(s): clonidine, levothyroxine, losartan, magnesium oxide, metoprolol tartrate, multivitamin, potassium, apixaban, cyproheptadine, meclizine, metformin, nifedipine, simvastatin, and spironolactone.     Review of Systems   Constitutional:  Negative for chills and fever.   Respiratory:  Negative for shortness of breath.    Cardiovascular:  Negative for chest pain.   Skin:  Positive for rash.   Objective     /76 (BP Location: Left arm, Patient  "Position: Sitting, BP Method: Medium (Manual))   Pulse 78   Temp 97.6 °F (36.4 °C) (Temporal)   Resp 14   Ht 5' 1" (1.549 m)   Wt 55.4 kg (122 lb 3.2 oz)   SpO2 97%   BMI 23.09 kg/m²     Physical Exam  Vitals and nursing note reviewed.   Constitutional:       General: She is not in acute distress.     Appearance: Normal appearance. She is well-developed. She is not diaphoretic.   HENT:      Head: Normocephalic and atraumatic.      Right Ear: External ear normal.      Left Ear: External ear normal.   Eyes:      General:         Right eye: No discharge.         Left eye: No discharge.   Cardiovascular:      Rate and Rhythm: Normal rate and regular rhythm.      Heart sounds: Normal heart sounds.   Pulmonary:      Effort: Pulmonary effort is normal.      Breath sounds: Normal breath sounds. No wheezing or rales.   Skin:     General: Skin is warm and dry.      Findings: Rash (left posterior scalp) present.   Neurological:      Mental Status: She is alert and oriented to person, place, and time. Mental status is at baseline.   Psychiatric:         Mood and Affect: Mood normal.         Behavior: Behavior normal.         Thought Content: Thought content normal.         Judgment: Judgment normal.      Assessment/Plan     1. Herpes zoster without complication  DISCONTINUED: valACYclovir (VALTREX) 1000 MG tablet      2. Type 2 diabetes mellitus without complication, without long-term current use of insulin        3. Neuropathy due to medical condition          Scalp rash with dermatomal distribution.  Will treat with Valtrex.  Monitor for improvement.    Diabetes- will have home health help with medication management and education.    Future Appointments   Date Time Provider Department Center   12/23/2022  8:00 AM [facility] DEXA1 [facility] DEXA [facility]   12/23/2022  8:30 AM [facility] OP THERAPEUTICS 6 [facility] OPTHERA [facility]   1/10/2023 10:30 AM STEFFI Garcia MD [facility] HEM ONC [facility]   1/31/2023  9:40 AM Vicente " MD [facility]   2/1/2023  9:30 AM [facility]uer [facility]         Vicente Sutton MD  Family Medicine  [facility]

## 2022-03-16 ENCOUNTER — OFFICE VISIT (OUTPATIENT)
Dept: PODIATRY | Facility: CLINIC | Age: 82
End: 2022-03-16
Payer: MEDICARE

## 2022-03-16 ENCOUNTER — HOSPITAL ENCOUNTER (OUTPATIENT)
Dept: RADIOLOGY | Facility: HOSPITAL | Age: 82
Discharge: HOME | End: 2022-03-16
Attending: PODIATRIST
Payer: MEDICARE

## 2022-03-16 VITALS
HEIGHT: 61 IN | WEIGHT: 122 LBS | BODY MASS INDEX: 23.03 KG/M2 | RESPIRATION RATE: 16 BRPM | SYSTOLIC BLOOD PRESSURE: 153 MMHG | HEART RATE: 80 BPM | DIASTOLIC BLOOD PRESSURE: 72 MMHG

## 2022-03-16 DIAGNOSIS — E11.9 COMPREHENSIVE DIABETIC FOOT EXAMINATION, TYPE 2 DM, ENCOUNTER FOR: ICD-10-CM

## 2022-03-16 DIAGNOSIS — E11.9 TYPE 2 DIABETES MELLITUS WITHOUT COMPLICATION, WITHOUT LONG-TERM CURRENT USE OF INSULIN: ICD-10-CM

## 2022-03-16 DIAGNOSIS — S99.921A INJURY, FOOT, RIGHT, INITIAL ENCOUNTER: Primary | ICD-10-CM

## 2022-03-16 DIAGNOSIS — M19.079 OSTEOARTHRITIS OF ANKLE AND FOOT, UNSPECIFIED LATERALITY: ICD-10-CM

## 2022-03-16 DIAGNOSIS — M67.471 GANGLION CYST OF RIGHT FOOT: ICD-10-CM

## 2022-03-16 DIAGNOSIS — S99.921A INJURY, FOOT, RIGHT, INITIAL ENCOUNTER: ICD-10-CM

## 2022-03-16 PROCEDURE — 99215 OFFICE O/P EST HI 40 MIN: CPT | Mod: PBBFAC | Performed by: PODIATRIST

## 2022-03-16 PROCEDURE — 99999 PR PBB SHADOW E&M-EST. PATIENT-LVL V: ICD-10-PCS | Mod: PBBFAC,,, | Performed by: PODIATRIST

## 2022-03-16 PROCEDURE — 99214 OFFICE O/P EST MOD 30 MIN: CPT | Mod: S$PBB,,, | Performed by: PODIATRIST

## 2022-03-16 PROCEDURE — 99999 PR PBB SHADOW E&M-EST. PATIENT-LVL V: CPT | Mod: PBBFAC,,, | Performed by: PODIATRIST

## 2022-03-16 PROCEDURE — 73630 XR FOOT COMPLETE 3 VIEW RIGHT: ICD-10-PCS | Mod: 26,RT,, | Performed by: RADIOLOGY

## 2022-03-16 PROCEDURE — 99214 PR OFFICE/OUTPT VISIT, EST, LEVL IV, 30-39 MIN: ICD-10-PCS | Mod: S$PBB,,, | Performed by: PODIATRIST

## 2022-03-16 PROCEDURE — 73630 X-RAY EXAM OF FOOT: CPT | Mod: 26,RT,, | Performed by: RADIOLOGY

## 2022-03-16 PROCEDURE — 73630 X-RAY EXAM OF FOOT: CPT | Mod: TC,FY,RT

## 2022-03-16 RX ORDER — SIMVASTATIN 20 MG/1
TABLET, FILM COATED ORAL
COMMUNITY
End: 2023-01-12

## 2022-03-16 NOTE — PROGRESS NOTES
Subjective:      Patient ID: Ursula Rodríguez is a 82 y.o. female.    Chief Complaint: Follow-up, Foot Pain, and Foot Swelling   Patient presents today for diabetic evaluation she is currently on Eliquis and states she has a history of neuropathy that does bother her at night.      Review of Systems   Neurological: Positive for numbness.   All other systems reviewed and are negative.       Constitutional    Pleasant, well-nourished, no distress, well oriented    Eyes         GLASSES    Cardiovascular          No chest pain, no shortness of breath    Respiratory           No cough, no congestion     Musculoskeletal        No muscle aches, no arthralgias/joint pain, no back pain, no swelling in the extremities            Objective:      Physical Exam  Vitals and nursing note reviewed.   Constitutional:       Appearance: Normal appearance.   Cardiovascular:      Pulses:           Dorsalis pedis pulses are 1+ on the right side and 1+ on the left side.        Posterior tibial pulses are 1+ on the right side and 1+ on the left side.   Pulmonary:      Effort: Pulmonary effort is normal.   Musculoskeletal:         General: Swelling, tenderness and signs of injury present.      Right foot: Deformity present.      Left foot: Deformity present.        Feet:    Feet:      Right foot:      Protective Sensation: 4 sites tested. 1 site sensed.     Skin integrity: Erythema and warmth present.      Toenail Condition: Right toenails are abnormally thick and ingrown. Fungal disease present.     Left foot:      Protective Sensation: 4 sites tested. 1 site sensed.     Toenail Condition: Left toenails are abnormally thick. Fungal disease present.  Skin:     Capillary Refill: Capillary refill takes more than 3 seconds.      Findings: Erythema present.   Neurological:      Mental Status: She is alert.      Sensory: Sensory deficit present.   Psychiatric:         Mood and Affect: Mood normal.         Behavior: Behavior normal.          Thought Content: Thought content normal.         Judgment: Judgment normal.       Vascular         Arterial Pulses Right: posterior tibialis 1/4, dorsalis pedis 1/4, normal CFT   Arterial Pulses Left: posterior tibialis 1/4, dorsalis pedis 1/4, normal CFT   No lower extremity edema bilateral   Pedal skin temperature and color are normal bilateral     Integumentary   Distal 5th digit left foot including nail and under this area is black due to dried blood.  Patient had a subungual hematoma  due to prolonged walking which eventually drained, no bogginess or fluctuance today.  There is discolored hyperkeratotic tissue encompassing the distal 5th digit.  When debriding this area there is healthy pink tissue underneath, nail remains well attached for now, no further evidence of abscess or cellulitis at this time.  Skin is in good condition        Neurological   Gross sensation intact, denies burning or tingling in feet    Musculoskeletal   Muscle Strength/Testing and Tone:  Intact-excellent for age,  normal tone bilateral   Joints, Bones, and Muscles: Limited ROM midfoot   Consistent with osteoarthritis related to age.  Mild arthritic and degenerative changes        Walks well unassisted                                                Assessment:       Encounter Diagnoses   Name Primary?    Injury, foot, right, initial encounter Yes    Osteoarthritis of ankle and foot, unspecified laterality     Comprehensive diabetic foot examination, type 2 DM, encounter for     Type 2 diabetes mellitus without complication, without long-term current use of insulin          Plan:       Ursula was seen today for follow-up, foot pain and foot swelling.    Diagnoses and all orders for this visit:    Injury, foot, right, initial encounter  -     X-Ray Foot Complete Right; Future    Osteoarthritis of ankle and foot, unspecified laterality  -     MRI Brain Without Contrast    Comprehensive diabetic foot examination, type 2 DM, encounter  for    Type 2 diabetes mellitus without complication, without long-term current use of insulin    Ganglion cyst of right foot        Patient presents today for diabetic evaluation she is currently on Eliquis and states she has a history of neuropathy that does bother her at night.    A complete diabetic evaluation was performed today patient does have findings consistent with diabetic related neuropathy.  Patient had several elongated ingrowing toenails I did debride these today I have advised the patient these were starting to become ingrown because of her neuropathy she was not having any pain or discomfort this needs to be monitored carefully.  Patient did have several ingrowing toenails especially the medial lateral border bilateral hallux the reserve were able to be trimmed and removed patient did not require a nail avulsion at this time.  Patient states while she does have noticeable neuropathy it is not to the point where she needs to be medicated for it and we will continue to monitor this.  Patient was in understanding and agreement with this diabetic education provided recommended follow-up is every 4-6 months unless patient has any problems questions or concerns I encouraged the patient to contact us for follow-up with any trauma to her feet.  Comprehensive diabetic evaluation performed.  Patient relates that she had dropped the top of wooden garbage can that hit the top of her right foot she states she thought it would be getting better but it has remained swollen and somewhat sore.  Plain film x-rays evaluated today there are no fractures no dislocation the patient does have significant soft tissue swelling overlying the dorsal aspect of the right foot she does have degenerative changes in this area as previously noted bilateral I advised her it is very likely that she has a little cyst formation on the top of the right foot and she needs to make sure that she does not wear shoes that rub or irritate  the area.  Patient also had a pre ulcerative lesion on the 5th digit left this was non excisionally debrided.  Patient will be seen for follow-up in 3 month unless she has any problems questions or concerns sooner. This note was created using [employer] voice recognition software that occasionally misinterpreted phrases or words.

## 2022-03-21 ENCOUNTER — PATIENT MESSAGE (OUTPATIENT)
Dept: FAMILY MEDICINE | Facility: CLINIC | Age: 82
End: 2022-03-21
Payer: MEDICARE

## 2022-03-21 DIAGNOSIS — E11.9 TYPE 2 DIABETES MELLITUS WITHOUT COMPLICATION, WITHOUT LONG-TERM CURRENT USE OF INSULIN: ICD-10-CM

## 2022-03-21 DIAGNOSIS — I10 ESSENTIAL HYPERTENSION: ICD-10-CM

## 2022-03-21 RX ORDER — METFORMIN HYDROCHLORIDE 500 MG/1
TABLET, EXTENDED RELEASE ORAL
Qty: 180 TABLET | Refills: 3 | Status: SHIPPED | OUTPATIENT
Start: 2022-03-21 | End: 2022-05-24

## 2022-03-21 RX ORDER — NIFEDIPINE 30 MG/1
TABLET, EXTENDED RELEASE ORAL
Qty: 180 TABLET | Refills: 0 | Status: SHIPPED | OUTPATIENT
Start: 2022-03-21 | End: 2022-03-21 | Stop reason: SDUPTHER

## 2022-03-21 RX ORDER — NIFEDIPINE 30 MG/1
TABLET, EXTENDED RELEASE ORAL
Qty: 180 TABLET | Refills: 0 | Status: SHIPPED | OUTPATIENT
Start: 2022-03-21 | End: 2022-04-26

## 2022-03-21 NOTE — TELEPHONE ENCOUNTER
Dear Dr. Mckoy ,     In the absence of Vicente Sutton MD, can you please address this patients concern or request?      Pt is requesting Refill on     Procardia XL 30 mg   #180  Last refill 11/1/21    Metformin 500mg         Thank you,  Diane Haro LPN      .

## 2022-03-21 NOTE — TELEPHONE ENCOUNTER
----- Message from Alyssia Ribeiro sent at 3/21/2022  9:12 AM CDT -----  Type:  Patient Call Back    Who Called: Claire    What is the reqeust in detail: Pt states that she sent several request to have her  NIFEdipine (PROCARDIA-XL) 30 MG   metFORMIN (GLUCOPHAGE-XR) 500 MG ER 24hr tablet  filled she states that she will not have anything after today and need it be sent to   [id number] [place] - [address] [facility] [address].Please Advise     Can the clinic reply by [facility]?    Best Call Back Number: [phone]

## 2022-03-21 NOTE — TELEPHONE ENCOUNTER
----- Message from Silvia Jaeger sent at 3/21/2022  8:49 AM CDT -----  Contact: pt  Type:  RX Refill Request    Who Called:  Pt  Refill or New Rx: Refill  RX Name and Strength: NIFEdipine (PROCARDIA-XL) 30 MG (OSM) 24 hr tablet  How is the patient currently taking it? (ex. 1XDay):  TAKE 1 TABLET(30 MG) BY MOUTH TWICE DAILY  Is this a 30 day or 90 day RX: 180 tablet  Preferred Pharmacy with phone number:   [id number] - [place] [address] AT [facility] [address] [facility] [address]  [address] [place] [address]  Phone: [phone] Fax: [phone]  Best Call Back Number: [phone]  Additional Information: pt out of medicine requesting an emergency fill [facility]

## 2022-03-21 NOTE — TELEPHONE ENCOUNTER
Dear Dr. Mckoy,     In the absence of Vicente Sutton MD, can you please address this patients concern or request?      Pt states her nifedipine was not received at the pharmacy. Can you please resend!    Thank you,  Diane Haro LPN

## 2022-03-21 NOTE — TELEPHONE ENCOUNTER
----- Message from Yousuf Babcock sent at 3/21/2022  1:52 PM CDT -----  Type: Needs Medical Advice  Who Called:  Patient    Pharmacy name and phone #:    [id number] - [place], [place] - [address]  [address]  [facility] [address]  Phone: [phone] Fax: [phone]        Best Call Back Number: [phone]  Additional Information: Patient states that her refill isn't at the pharmacy:  NIFEdipine (PROCARDIA-XL) 30 MG (OSM) 24 hr tablet    Please call to advise

## 2022-03-23 ENCOUNTER — OFFICE VISIT (OUTPATIENT)
Dept: HEMATOLOGY/ONCOLOGY | Facility: CLINIC | Age: 82
End: 2022-03-23
Payer: MEDICARE

## 2022-03-23 VITALS
WEIGHT: 121 LBS | HEIGHT: 61 IN | DIASTOLIC BLOOD PRESSURE: 74 MMHG | HEART RATE: 81 BPM | SYSTOLIC BLOOD PRESSURE: 167 MMHG | RESPIRATION RATE: 18 BRPM | TEMPERATURE: 98 F | BODY MASS INDEX: 22.84 KG/M2

## 2022-03-23 DIAGNOSIS — D68.59 HYPERCOAGULOPATHY: ICD-10-CM

## 2022-03-23 DIAGNOSIS — R63.0 ANOREXIA: ICD-10-CM

## 2022-03-23 DIAGNOSIS — R63.4 WEIGHT LOSS, ABNORMAL: ICD-10-CM

## 2022-03-23 DIAGNOSIS — Z85.3 HISTORY OF BILATERAL BREAST CANCER: Primary | ICD-10-CM

## 2022-03-23 PROCEDURE — 99214 PR OFFICE/OUTPT VISIT, EST, LEVL IV, 30-39 MIN: ICD-10-PCS | Mod: S$GLB,,, | Performed by: INTERNAL MEDICINE

## 2022-03-23 PROCEDURE — 99214 OFFICE O/P EST MOD 30 MIN: CPT | Mod: S$GLB,,, | Performed by: INTERNAL MEDICINE

## 2022-03-23 NOTE — PROGRESS NOTES
[facility] In Office Hematology Oncology Subsequent Encounter Note.    3/23/22        Subjective:      Patient ID:   Ursula Rodríguez  82 y.o. female  1940        Chief Complaint:   Breast cancer follow up, last seen .    HPI:  82 y.o. female with diagnosis of R Breast cancer,2013.  R breast partial mastectomy followed by [facility].  Sentinal node +.  Stage II dx.  CTA x's 3/4, arimidex, Tamoxifen 2015.    Hx of L breast cancer , had partial mastectomy and reconstruction.    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  .  On Eliquis 2.5 mg BID prophylaxis.    Admits to decreased appetite, weight down 15#.    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  Hx HTN, DM, GERD,cholesterol, DJD, CVA, TIA., thyroid dx, bradycardia.    Appendectomy, Partial hystectomy, L knee surgery several times, hernia repair, Tumor removed L ear drum .  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  S/P pacemaker placement    Allergy iodine, IVP dye.    Dad COPD, DM.  Mom Breast cancer, DM, increased HR.  Sister DM.  Brother DM, PVD.  Sister colon cancer, heart dx.  Sister Parkinson's Dx.  Daughter colon cancer.    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   LN +.  M3    ROS:   GEN: normal without any fever, night sweats or weight loss  HEENT: See HPI  CV: normal with no CP, SOB, PND, BUSTOS or orthopnea  PULM: normal with no SOB, cough, hemoptysis, sputum or pleuritic pain  GI: normal with no abdominal pain, nausea, vomiting, constipation, diarrhea, melanotic stools, BRBPR, or hematemesis  : normal with no hematuria, dysuria  BREAST: See HPI  SKIN: normal with no rash, erythema, bruising, or swelling     Past Medical History:   Diagnosis Date    Anemia     Breast cancer     LEFT DIAGNOSED FIRST    Breast cancer     Right     Factor V Leiden     Hyperlipidemia     Hypertension 2017    Hypothyroidism 2019    Osteoporosis     UNKNOWN DATE OF DX    Pacemaker 10/28/2021    Recurrent urinary tract infection     Vertigo      Past  Surgical History:   Procedure Laterality Date    HEMORRHOID SURGERY  1968    HYSTERECTOMY  1970    MASTECTOMY Right 1994    MASTECTOMY Left 2013    BREAST CANCER    TUMOR REMOVAL Left 1994    EAR       Review of patient's allergies indicates:   Allergen Reactions    Iodine and iodide containing products     Ditropan [oxybutynin chloride] Palpitations and Other (See Comments)     Made patient weak     Social History     Socioeconomic History    Marital status:     Highest education level: Master's degree (e.g., MA, MS, [place], MEd, MSW, ANDRE)   Occupational History    Occupation: Phd x 3-   Tobacco Use    Smoking status: Never Smoker    Smokeless tobacco: Never Used   Substance and Sexual Activity    Alcohol use: No     Comment: SPECIAL OCCASIONS LIKE ZACHERY    Drug use: No    Sexual activity: Not Currently     Social Determinants of Health     Financial Resource Strain: Low Risk     Difficulty of Paying Living Expenses: Not very hard   Food Insecurity: No Food Insecurity    Worried About Running Out of Food in the Last Year: Never true    Ran Out of Food in the Last Year: Never true   Transportation Needs: No Transportation Needs    Lack of Transportation (Medical): No    Lack of Transportation (Non-Medical): No   Physical Activity: Insufficiently Active    Days of Exercise per Week: 6 days    Minutes of Exercise per Session: 20 min   Stress: Stress Concern Present    Feeling of Stress : Very much   Social Connections: Unknown    Frequency of Communication with Friends and Family: More than three times a week    Frequency of Social Gatherings with Friends and Family: Once a week    Active Member of Clubs or Organizations: Yes    Attends Club or Organization Meetings: 1 to 4 times per year    Marital Status:    Housing Stability: Low Risk     Unable to Pay for Housing in the Last Year: No    Number of Places Lived in the Last Year: 1    Unstable Housing in the Last Year:  "No         Current Outpatient Medications:     apixaban (ELIQUIS) 2.5 mg Tab, Take 1 tablet (2.5 mg total) by mouth 2 (two) times daily., Disp: 60 tablet, Rfl: 11    cloNIDine (CATAPRES) 0.1 MG tablet, Take 1 tablet (0.1 mg total) by mouth 2 (two) times daily as needed (BP > 160/90)., Disp: 60 tablet, Rfl: 11    levothyroxine (SYNTHROID) 50 MCG tablet, Take 1 tablet (50 mcg total) by mouth once daily., Disp: 90 tablet, Rfl: 3    losartan (COZAAR) 25 MG tablet, Take 0.5 tablets (12.5 mg total) by mouth once daily., Disp: 45 tablet, Rfl: 3    magnesium oxide (MAG-OX) 400 mg (241.3 mg magnesium) tablet, Take 400 mg by mouth once daily., Disp: , Rfl:     metFORMIN (GLUCOPHAGE-XR) 500 MG ER 24hr tablet, Take 1 tablet by mouth in the morning with food for 2 weeks then increase to 1 tablet by mouth twice a day with food., Disp: 180 tablet, Rfl: 3    metoprolol tartrate (LOPRESSOR) 25 MG tablet, Take 0.5 tablets (12.5 mg total) by mouth 2 (two) times daily., Disp: 90 tablet, Rfl: 3    multivitamin capsule, Take 1 capsule by mouth once daily., Disp: , Rfl:     NIFEdipine (PROCARDIA-XL) 30 MG (OSM) 24 hr tablet, TAKE 1 TABLET(30 MG) BY MOUTH TWICE DAILY, Disp: 180 tablet, Rfl: 0    potassium 99 mg Tab, Take by mouth once daily., Disp: , Rfl:     simvastatin (ZOCOR) 20 MG tablet, 1 tablet in the evening, Disp: , Rfl:     spironolactone (ALDACTONE) 25 MG tablet, TAKE 1 TABLET(25 MG) BY MOUTH EVERY MORNING, Disp: 90 tablet, Rfl: 0          Objective:   Vitals:  Blood pressure (!) 167/74, pulse 81, temperature 97.5 °F (36.4 °C), resp. rate 18, height 5' 1" (1.549 m), weight 54.9 kg (121 lb).    Physical Examination:   GEN: no apparent distress, comfortable  HEAD: atraumatic and normocephalic  EYES: no pallor, no icterus  ENT:  no pharyngeal erythema, external ears WNL; no nasal discharge  NECK: no masses, thyroid normal, trachea midline, no LAD/LN's, supple  CV: RRR with no murmur; normal pulse; normal S1 and S2; no " "pedal edema  CHEST: Normal respiratory effort; CTAB; normal breath sounds; no wheeze or crackles  ABDOM: nontender and nondistended; soft;  no rebound/guarding  MUSC/Skeletal: ROM normal; no crepitus; joints normal; no deformities   EXTREM: multiple spider veins L & R leg  SKIN: multiple ecchemoses on forearms, poor skin turgor.  : no cvat  NEURO: grossly intact; motor/sensory WNL;  no tremors  PSYCH: normal mood, affect and behavior  LYMPH: normal cervical, supraclavicular, axillary and groin LN's  BREASTS: L 'breast" post op change, without palpable mass.  R chest NT, post operative change, no palpable mass    CAT head small vessel dx.  MRI of L/S spine JUAN, DUTCH.      Assessment:   (1) 82 y.o. female with diagnosis of  Bilateral breast cancer, S/P treatment as above.  Observe for now.     (2)Weight loss 15#, decreased appetite.  Concerned for cancer recurrence.  Check PET at [facility].    (3)S/P pacemaker placement for bradycardia.    (4)Hx TIA, F5L, on Eliquis 2.5 mg prophylaxis long term.       Plan:  Check PET scan for cancer recurrence. To account for her sx.    RTC 1 month.              "

## 2022-03-24 ENCOUNTER — TELEPHONE (OUTPATIENT)
Dept: HEMATOLOGY/ONCOLOGY | Facility: CLINIC | Age: 82
End: 2022-03-24
Payer: MEDICARE

## 2022-03-24 DIAGNOSIS — R63.4 WEIGHT LOSS, ABNORMAL: Primary | ICD-10-CM

## 2022-03-24 DIAGNOSIS — R63.0 ANOREXIA: ICD-10-CM

## 2022-03-30 ENCOUNTER — PATIENT MESSAGE (OUTPATIENT)
Dept: FAMILY MEDICINE | Facility: CLINIC | Age: 82
End: 2022-03-30
Payer: MEDICARE

## 2022-03-30 DIAGNOSIS — D68.59 HYPERCOAGULOPATHY: Primary | ICD-10-CM

## 2022-03-31 ENCOUNTER — PATIENT MESSAGE (OUTPATIENT)
Dept: FAMILY MEDICINE | Facility: CLINIC | Age: 82
End: 2022-03-31
Payer: MEDICARE

## 2022-04-01 ENCOUNTER — INFUSION (OUTPATIENT)
Dept: INFUSION THERAPY | Facility: HOSPITAL | Age: 82
End: 2022-04-01
Attending: FAMILY MEDICINE
Payer: MEDICARE

## 2022-04-01 VITALS
RESPIRATION RATE: 18 BRPM | DIASTOLIC BLOOD PRESSURE: 68 MMHG | WEIGHT: 130.06 LBS | HEIGHT: 61 IN | HEART RATE: 76 BPM | TEMPERATURE: 98 F | OXYGEN SATURATION: 98 % | SYSTOLIC BLOOD PRESSURE: 131 MMHG | BODY MASS INDEX: 24.55 KG/M2

## 2022-04-01 DIAGNOSIS — Z85.3 HISTORY OF BILATERAL BREAST CANCER: ICD-10-CM

## 2022-04-01 DIAGNOSIS — Z51.81 ENCOUNTER FOR MONITORING COUMADIN THERAPY: ICD-10-CM

## 2022-04-01 DIAGNOSIS — Z79.01 ENCOUNTER FOR MONITORING COUMADIN THERAPY: ICD-10-CM

## 2022-04-01 DIAGNOSIS — D68.59 HYPERCOAGULOPATHY: Primary | ICD-10-CM

## 2022-04-01 LAB — ERYTHROCYTE [SEDIMENTATION RATE] IN BLOOD BY WESTERGREN METHOD: 4 MM/HR (ref 0–20)

## 2022-04-01 PROCEDURE — 96523 IRRIG DRUG DELIVERY DEVICE: CPT

## 2022-04-01 PROCEDURE — 85250 CLOT FACTOR IX PTC/CHRSTMAS: CPT | Performed by: FAMILY MEDICINE

## 2022-04-01 PROCEDURE — 85651 RBC SED RATE NONAUTOMATED: CPT | Performed by: FAMILY MEDICINE

## 2022-04-01 PROCEDURE — 85240 CLOT FACTOR VIII AHG 1 STAGE: CPT | Performed by: FAMILY MEDICINE

## 2022-04-01 PROCEDURE — 25000003 PHARM REV CODE 250: Performed by: FAMILY MEDICINE

## 2022-04-01 PROCEDURE — A4216 STERILE WATER/SALINE, 10 ML: HCPCS | Performed by: FAMILY MEDICINE

## 2022-04-01 RX ORDER — HEPARIN 100 UNIT/ML
500 SYRINGE INTRAVENOUS
Status: CANCELLED | OUTPATIENT
Start: 2022-04-01

## 2022-04-01 RX ORDER — HEPARIN 100 UNIT/ML
500 SYRINGE INTRAVENOUS
Status: DISCONTINUED | OUTPATIENT
Start: 2022-04-01 | End: 2022-04-01 | Stop reason: HOSPADM

## 2022-04-01 RX ORDER — SODIUM CHLORIDE 0.9 % (FLUSH) 0.9 %
10 SYRINGE (ML) INJECTION
Status: COMPLETED | OUTPATIENT
Start: 2022-04-01 | End: 2022-04-01

## 2022-04-01 RX ORDER — SODIUM CHLORIDE 0.9 % (FLUSH) 0.9 %
10 SYRINGE (ML) INJECTION
Status: CANCELLED | OUTPATIENT
Start: 2022-04-01

## 2022-04-01 RX ADMIN — Medication 10 ML: at 08:04

## 2022-04-01 NOTE — PLAN OF CARE
"Problem: Adult Inpatient Plan of Care  Goal: Plan of Care Review  Outcome: Ongoing, Progressing  Flowsheets (Taken 4/1/2022 0858)  Plan of Care Reviewed With: patient  /68 (Patient Position: Sitting)   Pulse 76   Temp 97.8 °F (36.6 °C) (Oral)   Resp 18   Ht 5' 1" (1.549 m)   Wt 59 kg (130 lb 1.1 oz)   SpO2 98%   BMI 24.58 kg/m² Pleasant alert and oriented patient ambulated independently to clinic for monthly port flush - good blood return - saline locked - labs obtained and [place] [facility] [place] pt tolerated well - discharged home with no concerns - scheduled for 4/1/22.       "

## 2022-04-04 ENCOUNTER — TELEPHONE (OUTPATIENT)
Dept: HEMATOLOGY/ONCOLOGY | Facility: CLINIC | Age: 82
End: 2022-04-04
Payer: MEDICARE

## 2022-04-04 LAB
FACT IX ACT/NOR PPP: 112 % (ref 65–145)
FACT VIII ACT/NOR PPP: 102 % (ref 60–170)
FACT VIII ACT/NOR PPP: 104 % (ref 55–200)
VON WILLEBRAND EVAL PPP-IMP: NORMAL
VWF AG ACT/NOR PPP IA: 159 % (ref 55–200)
VWF:AC ACT/NOR PPP IA: 130 % (ref 55–200)

## 2022-04-04 NOTE — TELEPHONE ENCOUNTER
----- Message from Michelle Gottlieb sent at 3/31/2022  4:14 PM CDT -----  The patient said Imaging called her about having her PET scan. She said she is allergic to iodine. Please call her back at [phone]

## 2022-04-05 ENCOUNTER — PATIENT MESSAGE (OUTPATIENT)
Dept: FAMILY MEDICINE | Facility: CLINIC | Age: 82
End: 2022-04-05
Payer: MEDICARE

## 2022-04-19 ENCOUNTER — HOSPITAL ENCOUNTER (OUTPATIENT)
Dept: RADIOLOGY | Facility: HOSPITAL | Age: 82
Discharge: HOME | End: 2022-04-19
Attending: INTERNAL MEDICINE
Payer: MEDICARE

## 2022-04-19 DIAGNOSIS — R63.0 ANOREXIA: ICD-10-CM

## 2022-04-19 DIAGNOSIS — R63.4 WEIGHT LOSS, ABNORMAL: ICD-10-CM

## 2022-04-19 DIAGNOSIS — Z85.3 HISTORY OF BILATERAL BREAST CANCER: ICD-10-CM

## 2022-04-19 PROCEDURE — A9503 TC99M MEDRONATE: HCPCS

## 2022-04-19 PROCEDURE — 78306 BONE IMAGING WHOLE BODY: CPT | Mod: TC

## 2022-04-19 PROCEDURE — 78306 BONE IMAGING WHOLE BODY: CPT | Mod: 26,,, | Performed by: RADIOLOGY

## 2022-04-19 PROCEDURE — 78306 NM BONE SCAN WHOLE BODY: ICD-10-PCS | Mod: 26,,, | Performed by: RADIOLOGY

## 2022-04-28 ENCOUNTER — HOSPITAL ENCOUNTER (OUTPATIENT)
Dept: RADIOLOGY | Facility: HOSPITAL | Age: 82
Discharge: HOME | End: 2022-04-28
Attending: INTERNAL MEDICINE
Payer: MEDICARE

## 2022-04-28 DIAGNOSIS — R63.4 WEIGHT LOSS, ABNORMAL: ICD-10-CM

## 2022-04-28 DIAGNOSIS — Z85.3 HISTORY OF BILATERAL BREAST CANCER: ICD-10-CM

## 2022-04-28 DIAGNOSIS — R63.0 ANOREXIA: ICD-10-CM

## 2022-04-28 PROCEDURE — 74176 CT CHEST ABDOMEN PELVIS WITHOUT CONTRAST(XPD): ICD-10-PCS | Mod: 26,,, | Performed by: RADIOLOGY

## 2022-04-28 PROCEDURE — 71250 CT CHEST ABDOMEN PELVIS WITHOUT CONTRAST(XPD): ICD-10-PCS | Mod: 26,,, | Performed by: RADIOLOGY

## 2022-04-28 PROCEDURE — 71250 CT THORAX DX C-: CPT | Mod: 26,,, | Performed by: RADIOLOGY

## 2022-04-28 PROCEDURE — 74176 CT ABD & PELVIS W/O CONTRAST: CPT | Mod: TC

## 2022-04-28 PROCEDURE — 74176 CT ABD & PELVIS W/O CONTRAST: CPT | Mod: 26,,, | Performed by: RADIOLOGY

## 2022-04-28 PROCEDURE — 25500020 PHARM REV CODE 255: Performed by: INTERNAL MEDICINE

## 2022-04-28 PROCEDURE — A9698 NON-RAD CONTRAST MATERIALNOC: HCPCS | Performed by: INTERNAL MEDICINE

## 2022-04-28 RX ADMIN — BARIUM SULFATE 900 ML: 20 SUSPENSION ORAL at 08:04

## 2022-05-04 ENCOUNTER — OFFICE VISIT (OUTPATIENT)
Dept: HEMATOLOGY/ONCOLOGY | Facility: CLINIC | Age: 82
End: 2022-05-04
Payer: MEDICARE

## 2022-05-04 ENCOUNTER — PATIENT MESSAGE (OUTPATIENT)
Dept: FAMILY MEDICINE | Facility: CLINIC | Age: 82
End: 2022-05-04
Payer: MEDICARE

## 2022-05-04 VITALS
WEIGHT: 119 LBS | HEIGHT: 61 IN | TEMPERATURE: 97 F | DIASTOLIC BLOOD PRESSURE: 75 MMHG | RESPIRATION RATE: 17 BRPM | BODY MASS INDEX: 22.47 KG/M2 | SYSTOLIC BLOOD PRESSURE: 153 MMHG | HEART RATE: 79 BPM

## 2022-05-04 DIAGNOSIS — R63.4 UNEXPLAINED WEIGHT LOSS: Primary | ICD-10-CM

## 2022-05-04 DIAGNOSIS — R30.0 DYSURIA: Primary | ICD-10-CM

## 2022-05-04 PROCEDURE — 99213 PR OFFICE/OUTPT VISIT, EST, LEVL III, 20-29 MIN: ICD-10-PCS | Mod: S$GLB,,, | Performed by: INTERNAL MEDICINE

## 2022-05-04 PROCEDURE — 99213 OFFICE O/P EST LOW 20 MIN: CPT | Mod: S$GLB,,, | Performed by: INTERNAL MEDICINE

## 2022-05-04 RX ORDER — CYPROHEPTADINE HYDROCHLORIDE 4 MG/1
4 TABLET ORAL 2 TIMES DAILY WITH MEALS
Qty: 180 TABLET | Refills: 1 | Status: SHIPPED | OUTPATIENT
Start: 2022-05-04 | End: 2024-01-16 | Stop reason: SDUPTHER

## 2022-05-05 NOTE — PROGRESS NOTES
[facility] In Office Hematology Oncology Subsequent Encounter Note.    22    Subjective:      Patient ID:   Ursula Rodríguez  82 y.o. female  1940        Chief Complaint:   Breast cancer follow up, last seen .    HPI:  82 y.o. female with diagnosis of R Breast cancer,2013.  R breast partial mastectomy followed by [facility].  Sentinal node +.  Stage II dx.  CTA x's 3/4, arimidex, Tamoxifen 2015.    Hx of L breast cancer , had partial mastectomy and reconstruction.    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  .  On Eliquis 2.5 mg BID prophylaxis.    Admits to decreased appetite, weight down 15#.  Weight down 2# since last visit 6 weeks ago.  Begin trial of Periactin 4 mg BID for appetite, weight gain.    Hx of hitting R foot, still sore.  Saw Dr. Almaguer, no fx per xray.    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  Hx HTN, DM, GERD,cholesterol, DJD, CVA, TIA., thyroid dx, bradycardia.    Appendectomy, Partial hystectomy, L knee surgery several times, hernia repair, Tumor removed L ear drum .  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  S/P pacemaker placement    Allergy iodine, IVP dye.    Dad COPD, DM.  Mom Breast cancer, DM, increased HR.  Sister DM.  Brother DM, PVD.  Sister colon cancer, heart dx.  Sister Parkinson's Dx.  Daughter colon cancer.    [id number]  4.2 cm , multifocal DCIS.   LN +.  M3    ROS:   GEN: normal without any fever, night sweats or weight loss  HEENT: See HPI  CV: normal with no CP, SOB, PND, BUSTOS or orthopnea  PULM: normal with no SOB, cough, hemoptysis, sputum or pleuritic pain  GI: normal with no abdominal pain, nausea, vomiting, constipation, diarrhea, melanotic stools, BRBPR, or hematemesis  : normal with no hematuria, dysuria  BREAST: See HPI  SKIN: normal with no rash, erythema, bruising, or swelling     Past Medical History:   Diagnosis Date    Anemia     Breast cancer     LEFT DIAGNOSED FIRST    Breast cancer     Right     Factor V Leiden     Hyperlipidemia      Hypertension 2017    Hypothyroidism 2019    Osteoporosis     UNKNOWN DATE OF DX    Pacemaker 10/28/2021    Recurrent urinary tract infection     Vertigo 1994     Past Surgical History:   Procedure Laterality Date    HEMORRHOID SURGERY  1968    HYSTERECTOMY  1970    MASTECTOMY Right 1994    MASTECTOMY Left 2013    BREAST CANCER    TUMOR REMOVAL Left 1994    EAR       Review of patient's allergies indicates:   Allergen Reactions    Iodine and iodide containing products     Ditropan [oxybutynin chloride] Palpitations and Other (See Comments)     Made patient weak     Social History     Socioeconomic History    Marital status:     Highest education level: Master's degree (e.g., MA, MS, [place], MEd, MSW, ANDRE)   Occupational History    Occupation: Phd x 3-   Tobacco Use    Smoking status: Never Smoker    Smokeless tobacco: Never Used   Substance and Sexual Activity    Alcohol use: No     Comment: SPECIAL OCCASIONS LIKE ZACHERY    Drug use: No    Sexual activity: Not Currently     Social Determinants of Health     Financial Resource Strain: Low Risk     Difficulty of Paying Living Expenses: Not very hard   Food Insecurity: No Food Insecurity    Worried About Running Out of Food in the Last Year: Never true    Ran Out of Food in the Last Year: Never true   Transportation Needs: No Transportation Needs    Lack of Transportation (Medical): No    Lack of Transportation (Non-Medical): No   Physical Activity: Sufficiently Active    Days of Exercise per Week: 6 days    Minutes of Exercise per Session: 40 min   Stress: Stress Concern Present    Feeling of Stress : To some extent   Social Connections: Unknown    Frequency of Communication with Friends and Family: More than three times a week    Frequency of Social Gatherings with Friends and Family: Once a week    Active Member of Clubs or Organizations: Yes    Attends Club or Organization Meetings: 1 to 4 times per year    Marital Status:  "   Housing Stability: Low Risk     Unable to Pay for Housing in the Last Year: No    Number of Places Lived in the Last Year: 1    Unstable Housing in the Last Year: No         Current Outpatient Medications:     apixaban (ELIQUIS) 2.5 mg Tab, Take 1 tablet (2.5 mg total) by mouth 2 (two) times daily., Disp: 60 tablet, Rfl: 11    cloNIDine (CATAPRES) 0.1 MG tablet, Take 1 tablet (0.1 mg total) by mouth 2 (two) times daily as needed (BP > 160/90)., Disp: 60 tablet, Rfl: 11    cyproheptadine (PERIACTIN) 4 mg tablet, Take 1 tablet (4 mg total) by mouth 2 (two) times daily with meals., Disp: 180 tablet, Rfl: 1    levothyroxine (SYNTHROID) 50 MCG tablet, Take 1 tablet (50 mcg total) by mouth once daily., Disp: 90 tablet, Rfl: 3    losartan (COZAAR) 25 MG tablet, Take 0.5 tablets (12.5 mg total) by mouth once daily., Disp: 45 tablet, Rfl: 3    magnesium oxide (MAG-OX) 400 mg (241.3 mg magnesium) tablet, Take 400 mg by mouth once daily., Disp: , Rfl:     metFORMIN (GLUCOPHAGE-XR) 500 MG ER 24hr tablet, Take 1 tablet by mouth in the morning with food for 2 weeks then increase to 1 tablet by mouth twice a day with food., Disp: 180 tablet, Rfl: 3    metoprolol tartrate (LOPRESSOR) 25 MG tablet, Take 0.5 tablets (12.5 mg total) by mouth 2 (two) times daily., Disp: 90 tablet, Rfl: 3    multivitamin capsule, Take 1 capsule by mouth once daily., Disp: , Rfl:     NIFEdipine (PROCARDIA-XL) 30 MG (OSM) 24 hr tablet, TAKE 1 TABLET(30 MG) BY MOUTH TWICE DAILY, Disp: 180 tablet, Rfl: 0    potassium 99 mg Tab, Take by mouth once daily., Disp: , Rfl:     simvastatin (ZOCOR) 20 MG tablet, 1 tablet in the evening, Disp: , Rfl:     spironolactone (ALDACTONE) 25 MG tablet, TAKE 1 TABLET(25 MG) BY MOUTH EVERY MORNING, Disp: 90 tablet, Rfl: 0          Objective:   Vitals:  Blood pressure (!) 153/75, pulse 79, temperature 97.2 °F (36.2 °C), resp. rate 17, height 5' 1" (1.549 m), weight 54 kg (119 lb).    Physical " "Examination:   GEN: no apparent distress, comfortable  HEAD: atraumatic and normocephalic  EYES: no pallor, no icterus  ENT:  no pharyngeal erythema, external ears WNL; no nasal discharge  NECK: no masses, thyroid normal, trachea midline, no LAD/LN's, supple  CV: RRR with no murmur; normal pulse; normal S1 and S2; no pedal edema  CHEST: Normal respiratory effort; CTAB; normal breath sounds; no wheeze or crackles  ABDOM: nontender and nondistended; soft;  no rebound/guarding  MUSC/Skeletal: ROM normal; no crepitus; joints normal; no deformities   EXTREM: multiple spider veins L & R leg  SKIN: multiple ecchemoses on forearms, poor skin turgor.  : no cvat  NEURO: grossly intact; motor/sensory WNL;  no tremors  PSYCH: normal mood, affect and behavior  LYMPH: normal cervical, supraclavicular, axillary and groin LN's  BREASTS: L 'breast" post op change, without palpable mass.  R chest NT, post operative change, no palpable mass    CAT head small vessel dx.  MRI of L/S spine DDD, DJD.    Current studies:  Bone Scan, no metastases seen.  But intense uptake at R foot.  Concern for occult fx or osteomyelitis, she is following up with Dr. Almaguer on this.    CT of chest abdomin and pelvis, multiple 2-3 mm nodules in lungs, no previous CT for comparison.  Significance?  Will plan to repeat CT of chest in 3 months.  No definitive metastatic dx seen.      Assessment:   (1) 82 y.o. female with diagnosis of  Bilateral breast cancer, S/P treatment as above.  CISCO.  Observe for now.     (2)Weight loss 15#, decreased appetite.  Periactin 4 mg BID.    (3)S/P pacemaker placement for bradycardia.    (4)Hx TIA, F5L, on Eliquis 2.5 mg prophylaxis long term.    (5)Abn PET at R foot.  Fx? Or osteomyelitis, re eval per Dr. Almaguer.    RTC 5 weeks.                       "

## 2022-05-06 ENCOUNTER — INFUSION (OUTPATIENT)
Dept: INFUSION THERAPY | Facility: HOSPITAL | Age: 82
End: 2022-05-06
Attending: FAMILY MEDICINE
Payer: MEDICARE

## 2022-05-06 VITALS
TEMPERATURE: 98 F | BODY MASS INDEX: 22.07 KG/M2 | OXYGEN SATURATION: 98 % | RESPIRATION RATE: 19 BRPM | HEIGHT: 61 IN | WEIGHT: 116.88 LBS | HEART RATE: 72 BPM | SYSTOLIC BLOOD PRESSURE: 141 MMHG | DIASTOLIC BLOOD PRESSURE: 66 MMHG

## 2022-05-06 DIAGNOSIS — Z85.3 HISTORY OF BILATERAL BREAST CANCER: Primary | ICD-10-CM

## 2022-05-06 PROCEDURE — 25000003 PHARM REV CODE 250: Performed by: FAMILY MEDICINE

## 2022-05-06 PROCEDURE — A4216 STERILE WATER/SALINE, 10 ML: HCPCS | Performed by: FAMILY MEDICINE

## 2022-05-06 PROCEDURE — 96523 IRRIG DRUG DELIVERY DEVICE: CPT

## 2022-05-06 RX ORDER — HEPARIN 100 UNIT/ML
500 SYRINGE INTRAVENOUS
Status: DISCONTINUED | OUTPATIENT
Start: 2022-05-06 | End: 2022-05-06 | Stop reason: HOSPADM

## 2022-05-06 RX ORDER — SODIUM CHLORIDE 0.9 % (FLUSH) 0.9 %
10 SYRINGE (ML) INJECTION
Status: COMPLETED | OUTPATIENT
Start: 2022-05-06 | End: 2022-05-06

## 2022-05-06 RX ORDER — SODIUM CHLORIDE 0.9 % (FLUSH) 0.9 %
10 SYRINGE (ML) INJECTION
Status: CANCELLED | OUTPATIENT
Start: 2022-05-06

## 2022-05-06 RX ORDER — HEPARIN 100 UNIT/ML
500 SYRINGE INTRAVENOUS
Status: CANCELLED | OUTPATIENT
Start: 2022-05-06

## 2022-05-06 RX ADMIN — Medication 10 ML: at 08:05

## 2022-05-06 NOTE — PLAN OF CARE
"Problem: Adult Inpatient Plan of Care  Goal: Plan of Care Review  Outcome: Ongoing, Progressing  Flowsheets (Taken 5/6/2022 0844)  Plan of Care Reviewed With: patient  BP (!) 141/66 (Patient Position: Sitting)   Pulse 72   Temp 97.7 °F (36.5 °C)   Resp 19   Ht 5' 1" (1.549 m)   Wt 53 kg (116 lb 13.5 oz)   SpO2 98%   BMI 22.08 kg/m² Pleasant alert and oriented patient ambulated independently to clinic for monthly port flush - good blood return - saline locked - labs obtained and taken to [facility] lab - pt tolerated well - discharged home with no concerns - scheduled for 6/3/22.       "

## 2022-05-09 ENCOUNTER — APPOINTMENT (OUTPATIENT)
Dept: LAB | Facility: HOSPITAL | Age: 82
End: 2022-05-09
Attending: FAMILY MEDICINE
Payer: MEDICARE

## 2022-05-09 DIAGNOSIS — R30.0 DYSURIA: Primary | ICD-10-CM

## 2022-05-09 LAB
BACTERIA #/AREA URNS HPF: ABNORMAL /HPF
BILIRUB UR QL STRIP: NEGATIVE
CLARITY UR: CLEAR
COLOR UR: YELLOW
GLUCOSE UR QL STRIP: NEGATIVE
HGB UR QL STRIP: NEGATIVE
KETONES UR QL STRIP: NEGATIVE
LEUKOCYTE ESTERASE UR QL STRIP: ABNORMAL
MICROSCOPIC COMMENT: ABNORMAL
NITRITE UR QL STRIP: NEGATIVE
PH UR STRIP: 7 [PH] (ref 5–8)
PROT UR QL STRIP: NEGATIVE
RBC #/AREA URNS HPF: 2 /HPF (ref 0–4)
SP GR UR STRIP: 1.02 (ref 1–1.03)
URN SPEC COLLECT METH UR: ABNORMAL
UROBILINOGEN UR STRIP-ACNC: NEGATIVE EU/DL
WBC #/AREA URNS HPF: 60 /HPF (ref 0–5)

## 2022-05-09 PROCEDURE — 87086 URINE CULTURE/COLONY COUNT: CPT | Performed by: FAMILY MEDICINE

## 2022-05-09 PROCEDURE — 81000 URINALYSIS NONAUTO W/SCOPE: CPT | Performed by: FAMILY MEDICINE

## 2022-05-10 ENCOUNTER — TELEPHONE (OUTPATIENT)
Dept: PODIATRY | Facility: CLINIC | Age: 82
End: 2022-05-10
Payer: MEDICARE

## 2022-05-10 LAB — BACTERIA UR CULT: NORMAL

## 2022-05-10 NOTE — TELEPHONE ENCOUNTER
Patient had a bone scan done at the beginning of April and was advised by ordering physician an area of concern to right foot. Patient would like provider to review results of bone scan, which is in epic. Please advise.

## 2022-05-10 NOTE — TELEPHONE ENCOUNTER
----- Message from Agatha Us sent at 5/10/2022 11:24 AM CDT -----  Type: Needs Medical Advice  Who Called:  PT  Symptoms (please be specific):  Pt is calling to see if they received anything from Dr Price. She needs to speak to nurse about her care.    Best Call Back Number: [phone]  Additional Information: Please call and advise

## 2022-05-13 DIAGNOSIS — E11.49 TYPE II DIABETES MELLITUS WITH NEUROLOGICAL MANIFESTATIONS: Primary | ICD-10-CM

## 2022-05-17 DIAGNOSIS — R30.0 DYSURIA: Primary | ICD-10-CM

## 2022-05-17 PROCEDURE — G0180 MD CERTIFICATION HHA PATIENT: HCPCS | Mod: ,,, | Performed by: FAMILY MEDICINE

## 2022-05-17 PROCEDURE — G0180 PR HOME HEALTH MD CERTIFICATION: ICD-10-PCS | Mod: ,,, | Performed by: FAMILY MEDICINE

## 2022-05-17 RX ORDER — NITROFURANTOIN 25; 75 MG/1; MG/1
100 CAPSULE ORAL 2 TIMES DAILY
Qty: 14 CAPSULE | Refills: 0 | Status: SHIPPED | OUTPATIENT
Start: 2022-05-17 | End: 2022-05-24

## 2022-06-02 ENCOUNTER — PATIENT MESSAGE (OUTPATIENT)
Dept: FAMILY MEDICINE | Facility: CLINIC | Age: 82
End: 2022-06-02
Payer: MEDICARE

## 2022-06-02 ENCOUNTER — TELEPHONE (OUTPATIENT)
Dept: FAMILY MEDICINE | Facility: CLINIC | Age: 82
End: 2022-06-02
Payer: MEDICARE

## 2022-06-02 DIAGNOSIS — D68.59 HYPERCOAGULOPATHY: ICD-10-CM

## 2022-06-02 DIAGNOSIS — R53.81 OTHER MALAISE: ICD-10-CM

## 2022-06-02 DIAGNOSIS — R30.0 DYSURIA: Primary | ICD-10-CM

## 2022-06-02 NOTE — TELEPHONE ENCOUNTER
----- Message from Rene Reed sent at 6/2/2022  3:24 PM CDT -----  Contact: pt  Who Called: PT  Regarding: The pt is requesting to have her blood work orders put in for UTI stating she need to have it checked because her ankles are swollen and she will be getting a port flush tomorrow. She want to have it all done at once. She is requesting a callback.   Would the patient rather a call back or a response via [facility]? Call back  Best Call Back Number: [phone]  Additional Information:

## 2022-06-02 NOTE — TELEPHONE ENCOUNTER
The pt is requesting to have her blood work orders put in for UTI stating she need to have it checked because her ankles are swollen and she will be getting a port flush tomorrow. She want to have it all done at once

## 2022-06-03 ENCOUNTER — LAB VISIT (OUTPATIENT)
Dept: LAB | Facility: HOSPITAL | Age: 82
End: 2022-06-03
Attending: FAMILY MEDICINE
Payer: MEDICARE

## 2022-06-03 ENCOUNTER — EXTERNAL HOME HEALTH (OUTPATIENT)
Dept: HOME HEALTH SERVICES | Facility: HOSPITAL | Age: 82
End: 2022-06-03
Payer: MEDICARE

## 2022-06-03 ENCOUNTER — INFUSION (OUTPATIENT)
Dept: INFUSION THERAPY | Facility: HOSPITAL | Age: 82
End: 2022-06-03
Attending: FAMILY MEDICINE
Payer: MEDICARE

## 2022-06-03 VITALS
TEMPERATURE: 98 F | DIASTOLIC BLOOD PRESSURE: 60 MMHG | SYSTOLIC BLOOD PRESSURE: 127 MMHG | BODY MASS INDEX: 22.07 KG/M2 | HEIGHT: 61 IN | HEART RATE: 68 BPM | OXYGEN SATURATION: 98 % | RESPIRATION RATE: 18 BRPM | WEIGHT: 116.88 LBS

## 2022-06-03 DIAGNOSIS — R53.81 OTHER MALAISE: ICD-10-CM

## 2022-06-03 DIAGNOSIS — Z85.3 HISTORY OF BILATERAL BREAST CANCER: Primary | ICD-10-CM

## 2022-06-03 DIAGNOSIS — R30.0 DYSURIA: ICD-10-CM

## 2022-06-03 DIAGNOSIS — D68.59 HYPERCOAGULOPATHY: ICD-10-CM

## 2022-06-03 LAB
ANION GAP SERPL CALC-SCNC: 10 MMOL/L (ref 8–16)
BASOPHILS # BLD AUTO: 0.02 K/UL (ref 0–0.2)
BASOPHILS NFR BLD: 0.3 % (ref 0–1.9)
BILIRUB UR QL STRIP: NEGATIVE
BUN SERPL-MCNC: 26 MG/DL (ref 8–23)
CALCIUM SERPL-MCNC: 9.6 MG/DL (ref 8.7–10.5)
CHLORIDE SERPL-SCNC: 107 MMOL/L (ref 95–110)
CLARITY UR: CLEAR
CO2 SERPL-SCNC: 23 MMOL/L (ref 23–29)
COLOR UR: YELLOW
CREAT SERPL-MCNC: 0.9 MG/DL (ref 0.5–1.4)
DIFFERENTIAL METHOD: ABNORMAL
EOSINOPHIL # BLD AUTO: 0.3 K/UL (ref 0–0.5)
EOSINOPHIL NFR BLD: 4.8 % (ref 0–8)
ERYTHROCYTE [DISTWIDTH] IN BLOOD BY AUTOMATED COUNT: 13.3 % (ref 11.5–14.5)
EST. GFR  (AFRICAN AMERICAN): >60 ML/MIN/1.73 M^2
EST. GFR  (NON AFRICAN AMERICAN): 58.9 ML/MIN/1.73 M^2
ESTIMATED AVG GLUCOSE: 117 MG/DL (ref 68–131)
GLUCOSE SERPL-MCNC: 123 MG/DL (ref 70–110)
GLUCOSE UR QL STRIP: NEGATIVE
HBA1C MFR BLD: 5.7 % (ref 4–5.6)
HCT VFR BLD AUTO: 39.6 % (ref 37–48.5)
HGB BLD-MCNC: 13.1 G/DL (ref 12–16)
HGB UR QL STRIP: NEGATIVE
IMM GRANULOCYTES # BLD AUTO: 0.02 K/UL (ref 0–0.04)
IMM GRANULOCYTES NFR BLD AUTO: 0.3 % (ref 0–0.5)
KETONES UR QL STRIP: NEGATIVE
LEUKOCYTE ESTERASE UR QL STRIP: NEGATIVE
LYMPHOCYTES # BLD AUTO: 0.7 K/UL (ref 1–4.8)
LYMPHOCYTES NFR BLD: 9.9 % (ref 18–48)
MCH RBC QN AUTO: 27.6 PG (ref 27–31)
MCHC RBC AUTO-ENTMCNC: 33.1 G/DL (ref 32–36)
MCV RBC AUTO: 83 FL (ref 82–98)
MONOCYTES # BLD AUTO: 0.5 K/UL (ref 0.3–1)
MONOCYTES NFR BLD: 7.6 % (ref 4–15)
NEUTROPHILS # BLD AUTO: 5.3 K/UL (ref 1.8–7.7)
NEUTROPHILS NFR BLD: 77.1 % (ref 38–73)
NITRITE UR QL STRIP: NEGATIVE
NRBC BLD-RTO: 0 /100 WBC
PH UR STRIP: 7 [PH] (ref 5–8)
PLATELET # BLD AUTO: 177 K/UL (ref 150–450)
PMV BLD AUTO: 9.6 FL (ref 9.2–12.9)
POTASSIUM SERPL-SCNC: 4 MMOL/L (ref 3.5–5.1)
PROT UR QL STRIP: NEGATIVE
RBC # BLD AUTO: 4.75 M/UL (ref 4–5.4)
SODIUM SERPL-SCNC: 140 MMOL/L (ref 136–145)
SP GR UR STRIP: 1.02 (ref 1–1.03)
TSH SERPL DL<=0.005 MIU/L-ACNC: 2.22 UIU/ML (ref 0.4–4)
URN SPEC COLLECT METH UR: NORMAL
UROBILINOGEN UR STRIP-ACNC: 1 EU/DL
WBC # BLD AUTO: 6.86 K/UL (ref 3.9–12.7)

## 2022-06-03 PROCEDURE — A4216 STERILE WATER/SALINE, 10 ML: HCPCS | Performed by: FAMILY MEDICINE

## 2022-06-03 PROCEDURE — 80048 BASIC METABOLIC PNL TOTAL CA: CPT | Performed by: FAMILY MEDICINE

## 2022-06-03 PROCEDURE — 36415 COLL VENOUS BLD VENIPUNCTURE: CPT | Performed by: FAMILY MEDICINE

## 2022-06-03 PROCEDURE — 25000003 PHARM REV CODE 250: Performed by: FAMILY MEDICINE

## 2022-06-03 PROCEDURE — 81003 URINALYSIS AUTO W/O SCOPE: CPT | Performed by: FAMILY MEDICINE

## 2022-06-03 PROCEDURE — 84443 ASSAY THYROID STIM HORMONE: CPT | Performed by: FAMILY MEDICINE

## 2022-06-03 PROCEDURE — 96523 IRRIG DRUG DELIVERY DEVICE: CPT

## 2022-06-03 PROCEDURE — 83036 HEMOGLOBIN GLYCOSYLATED A1C: CPT | Performed by: FAMILY MEDICINE

## 2022-06-03 PROCEDURE — 85025 COMPLETE CBC W/AUTO DIFF WBC: CPT | Performed by: FAMILY MEDICINE

## 2022-06-03 RX ORDER — HEPARIN 100 UNIT/ML
500 SYRINGE INTRAVENOUS
Status: CANCELLED | OUTPATIENT
Start: 2022-06-03

## 2022-06-03 RX ORDER — SODIUM CHLORIDE 0.9 % (FLUSH) 0.9 %
10 SYRINGE (ML) INJECTION
Status: COMPLETED | OUTPATIENT
Start: 2022-06-03 | End: 2022-06-03

## 2022-06-03 RX ORDER — SODIUM CHLORIDE 0.9 % (FLUSH) 0.9 %
10 SYRINGE (ML) INJECTION
Status: CANCELLED | OUTPATIENT
Start: 2022-06-03

## 2022-06-03 RX ADMIN — Medication 10 ML: at 08:06

## 2022-06-03 NOTE — PLAN OF CARE
"Problem: Adult Inpatient Plan of Care  Goal: Plan of Care Review  Outcome: Ongoing, Progressing  Flowsheets (Taken 6/3/2022 0850)  Plan of Care Reviewed With:   patient   daughter  /60 (BP Location: Left arm, Patient Position: Sitting)   Pulse 68   Temp 98.2 °F (36.8 °C) (Oral)   Resp 18   Ht 5' 1" (1.549 m)   Wt 53 kg (116 lb 13.5 oz)   SpO2 98%   BMI 22.08 kg/m² Pleasant alert and oriented patient ambulated independently to clinic for monthly port flush - good blood return - saline locked - labs obtained and taken to [facility] [place] lab - pt tolerated well - discharged home with no concerns - scheduled for 7/1/22.          "

## 2022-06-07 ENCOUNTER — TELEPHONE (OUTPATIENT)
Dept: FAMILY MEDICINE | Facility: CLINIC | Age: 82
End: 2022-06-07
Payer: MEDICARE

## 2022-06-07 NOTE — TELEPHONE ENCOUNTER
----- Message from Sonia Frank sent at 6/7/2022  1:28 PM CDT -----  Who Called: Patient    What is the reqeust in detail: Requesting call back to know if patient can be seen tomorrow via Audio only. Please advise.     Can the clinic reply by [facility]? No    Best Call Back Number: [phone]    Additional Information:

## 2022-06-07 NOTE — TELEPHONE ENCOUNTER
We are not currently doing on the only visits that if she can do a virtual visit that would be fine.

## 2022-06-13 ENCOUNTER — PATIENT MESSAGE (OUTPATIENT)
Dept: FAMILY MEDICINE | Facility: CLINIC | Age: 82
End: 2022-06-13
Payer: MEDICARE

## 2022-06-13 ENCOUNTER — PATIENT MESSAGE (OUTPATIENT)
Dept: ORTHOPEDICS | Facility: CLINIC | Age: 82
End: 2022-06-13
Payer: MEDICARE

## 2022-06-15 ENCOUNTER — OFFICE VISIT (OUTPATIENT)
Dept: PODIATRY | Facility: CLINIC | Age: 82
End: 2022-06-15
Payer: MEDICARE

## 2022-06-15 VITALS
BODY MASS INDEX: 23.13 KG/M2 | DIASTOLIC BLOOD PRESSURE: 64 MMHG | SYSTOLIC BLOOD PRESSURE: 138 MMHG | RESPIRATION RATE: 16 BRPM | HEIGHT: 61 IN | HEART RATE: 74 BPM | WEIGHT: 122.5 LBS

## 2022-06-15 DIAGNOSIS — M19.071 OSTEOARTHRITIS OF RIGHT ANKLE AND FOOT: ICD-10-CM

## 2022-06-15 DIAGNOSIS — E11.9 COMPREHENSIVE DIABETIC FOOT EXAMINATION, TYPE 2 DM, ENCOUNTER FOR: ICD-10-CM

## 2022-06-15 DIAGNOSIS — E11.9 TYPE 2 DIABETES MELLITUS WITHOUT COMPLICATION, WITHOUT LONG-TERM CURRENT USE OF INSULIN: Primary | ICD-10-CM

## 2022-06-15 PROBLEM — S99.921A INJURY, FOOT, RIGHT, INITIAL ENCOUNTER: Status: RESOLVED | Noted: 2022-03-16 | Resolved: 2022-06-15

## 2022-06-15 PROCEDURE — 99213 OFFICE O/P EST LOW 20 MIN: CPT | Mod: S$PBB,,, | Performed by: PODIATRIST

## 2022-06-15 PROCEDURE — 99214 OFFICE O/P EST MOD 30 MIN: CPT | Mod: PBBFAC | Performed by: PODIATRIST

## 2022-06-15 PROCEDURE — 99999 PR PBB SHADOW E&M-EST. PATIENT-LVL IV: ICD-10-PCS | Mod: PBBFAC,,, | Performed by: PODIATRIST

## 2022-06-15 PROCEDURE — 99999 PR PBB SHADOW E&M-EST. PATIENT-LVL IV: CPT | Mod: PBBFAC,,, | Performed by: PODIATRIST

## 2022-06-15 PROCEDURE — 99213 PR OFFICE/OUTPT VISIT, EST, LEVL III, 20-29 MIN: ICD-10-PCS | Mod: S$PBB,,, | Performed by: PODIATRIST

## 2022-06-15 NOTE — PROGRESS NOTES
Subjective:      Patient ID: Ursula Rodríguez is a 82 y.o. female.    Chief Complaint: Follow-up, Foot Injury, Nail Problem, and Diabetes Mellitus   Patient presents today for diabetic evaluation she is currently on Eliquis and states she has a history of neuropathy that does bother her at night.      Review of Systems   Neurological: Positive for numbness.   All other systems reviewed and are negative.       Constitutional    Pleasant, well-nourished, no distress, well oriented    Eyes         GLASSES    Cardiovascular          No chest pain, no shortness of breath    Respiratory           No cough, no congestion     Musculoskeletal        No muscle aches, no arthralgias/joint pain, no back pain, no swelling in the extremities            Objective:      Physical Exam  Vitals and nursing note reviewed.   Constitutional:       Appearance: Normal appearance.   Cardiovascular:      Pulses:           Dorsalis pedis pulses are 1+ on the right side and 1+ on the left side.        Posterior tibial pulses are 1+ on the right side and 1+ on the left side.   Pulmonary:      Effort: Pulmonary effort is normal.   Musculoskeletal:         General: Swelling, tenderness and signs of injury present.      Right foot: Deformity present.      Left foot: Deformity present.        Feet:    Feet:      Right foot:      Protective Sensation: 4 sites tested. 1 site sensed.     Skin integrity: Erythema and warmth present.      Toenail Condition: Right toenails are abnormally thick and ingrown. Fungal disease present.     Left foot:      Protective Sensation: 4 sites tested. 1 site sensed.     Toenail Condition: Left toenails are abnormally thick. Fungal disease present.  Skin:     Capillary Refill: Capillary refill takes more than 3 seconds.      Findings: Erythema present.   Neurological:      Mental Status: She is alert.      Sensory: Sensory deficit present.   Psychiatric:         Mood and Affect: Mood normal.         Behavior: Behavior  normal.         Thought Content: Thought content normal.         Judgment: Judgment normal.       Vascular         Arterial Pulses Right: posterior tibialis 1/4, dorsalis pedis 1/4, normal CFT   Arterial Pulses Left: posterior tibialis 1/4, dorsalis pedis 1/4, normal CFT   No lower extremity edema bilateral   Pedal skin temperature and color are normal bilateral     Integumentary   Distal 5th digit left foot including nail and under this area is black due to dried blood.  Patient had a subungual hematoma  due to prolonged walking which eventually drained, no bogginess or fluctuance today.  There is discolored hyperkeratotic tissue encompassing the distal 5th digit.  When debriding this area there is healthy pink tissue underneath, nail remains well attached for now, no further evidence of abscess or cellulitis at this time.  Skin is in good condition        Neurological   Gross sensation intact, denies burning or tingling in feet    Musculoskeletal   Muscle Strength/Testing and Tone:  Intact-excellent for age,  normal tone bilateral   Joints, Bones, and Muscles: Limited ROM midfoot   Consistent with osteoarthritis related to age.  Mild arthritic and degenerative changes        Walks well unassisted                                                                    Assessment:       Encounter Diagnoses   Name Primary?    Type 2 diabetes mellitus without complication, without long-term current use of insulin Yes    Comprehensive diabetic foot examination, type 2 DM, encounter for     Osteoarthritis of right ankle and foot          Plan:       Ursula was seen today for follow-up, foot injury, nail problem and diabetes mellitus.    Diagnoses and all orders for this visit:    Type 2 diabetes mellitus without complication, without long-term current use of insulin    Comprehensive diabetic foot examination, type 2 DM, encounter for    Osteoarthritis of right ankle and foot        Patient presents today for diabetic  evaluation she is currently on Eliquis and states she has a history of neuropathy that does bother her at night.    A complete diabetic evaluation was performed today patient does have findings consistent with diabetic related neuropathy.  Patient had several elongated ingrowing toenails I did debride these today I have advised the patient these were starting to become ingrown because of her neuropathy she was not having any pain or discomfort this needs to be monitored carefully.  Patient did have several ingrowing toenails especially the medial lateral border bilateral hallux the reserve were able to be trimmed and removed patient did not require a nail avulsion at this time.  Patient states while she does have noticeable neuropathy it is not to the point where she needs to be medicated for it and we will continue to monitor this.  Patient was in understanding and agreement with this diabetic education provided recommended follow-up is every 4-6 months unless patient has any problems questions or concerns I encouraged the patient to contact us for follow-up with any trauma to her feet.  Comprehensive diabetic evaluation performed.  Patient had previous injury to the dorsal aspect of the right foot much of the swelling has gone down the patient does have residual cyst with bone spurring on the top of the right foot I have advised her this is not going to completely resolve and she needs to make sure she does not wear shoes they are too tight or irritate this area.  Patient was made aware the only way to address this would be surgically and certainly that is not something we want to pursue.  Patient was in agreement with this.  Patient also had a pre ulcerative lesion on the 5th digit left this was non excisionally debrided.  Patient will be seen for follow-up in 3 month unless she has any problems questions or concerns sooner. This note was created using [employer] voice recognition software that occasionally  misinterpreted phrases or words.

## 2022-06-21 DIAGNOSIS — M17.0 PRIMARY OSTEOARTHRITIS OF BOTH KNEES: Primary | ICD-10-CM

## 2022-06-28 ENCOUNTER — DOCUMENT SCAN (OUTPATIENT)
Dept: HOME HEALTH SERVICES | Facility: HOSPITAL | Age: 82
End: 2022-06-28
Payer: MEDICARE

## 2022-06-29 ENCOUNTER — HOSPITAL ENCOUNTER (OUTPATIENT)
Dept: RADIOLOGY | Facility: HOSPITAL | Age: 82
Discharge: HOME | End: 2022-06-29
Attending: ORTHOPAEDIC SURGERY
Payer: MEDICARE

## 2022-06-29 ENCOUNTER — OFFICE VISIT (OUTPATIENT)
Dept: ORTHOPEDICS | Facility: CLINIC | Age: 82
End: 2022-06-29
Payer: MEDICARE

## 2022-06-29 VITALS — HEIGHT: 61 IN | WEIGHT: 122.56 LBS | BODY MASS INDEX: 23.14 KG/M2 | RESPIRATION RATE: 18 BRPM

## 2022-06-29 DIAGNOSIS — M25.562 CHRONIC PAIN OF BOTH KNEES: ICD-10-CM

## 2022-06-29 DIAGNOSIS — M70.52 PES ANSERINUS BURSITIS OF LEFT KNEE: ICD-10-CM

## 2022-06-29 DIAGNOSIS — M25.561 CHRONIC PAIN OF BOTH KNEES: ICD-10-CM

## 2022-06-29 DIAGNOSIS — M17.0 PRIMARY OSTEOARTHRITIS OF BOTH KNEES: ICD-10-CM

## 2022-06-29 DIAGNOSIS — M17.0 PRIMARY OSTEOARTHRITIS OF BOTH KNEES: Primary | ICD-10-CM

## 2022-06-29 DIAGNOSIS — G89.29 CHRONIC PAIN OF BOTH KNEES: ICD-10-CM

## 2022-06-29 PROCEDURE — 73562 X-RAY EXAM OF KNEE 3: CPT | Mod: TC,50

## 2022-06-29 PROCEDURE — 73562 X-RAY EXAM OF KNEE 3: CPT | Mod: 26,50,, | Performed by: RADIOLOGY

## 2022-06-29 PROCEDURE — 99214 OFFICE O/P EST MOD 30 MIN: CPT | Mod: 25,S$PBB,, | Performed by: ORTHOPAEDIC SURGERY

## 2022-06-29 PROCEDURE — 73562 XR KNEE 3 VIEW BILATERAL: ICD-10-PCS | Mod: 26,50,, | Performed by: RADIOLOGY

## 2022-06-29 PROCEDURE — 20610 LARGE JOINT ASPIRATION/INJECTION: BILATERAL KNEE: ICD-10-PCS | Mod: 50,S$PBB,, | Performed by: ORTHOPAEDIC SURGERY

## 2022-06-29 PROCEDURE — 99214 PR OFFICE/OUTPT VISIT, EST, LEVL IV, 30-39 MIN: ICD-10-PCS | Mod: 25,S$PBB,, | Performed by: ORTHOPAEDIC SURGERY

## 2022-06-29 PROCEDURE — 20610 DRAIN/INJ JOINT/BURSA W/O US: CPT | Mod: PBBFAC,LT,51 | Performed by: ORTHOPAEDIC SURGERY

## 2022-06-29 PROCEDURE — 99999 PR PBB SHADOW E&M-EST. PATIENT-LVL III: CPT | Mod: PBBFAC,,, | Performed by: ORTHOPAEDIC SURGERY

## 2022-06-29 PROCEDURE — 99213 OFFICE O/P EST LOW 20 MIN: CPT | Mod: PBBFAC,25 | Performed by: ORTHOPAEDIC SURGERY

## 2022-06-29 PROCEDURE — 20610 DRAIN/INJ JOINT/BURSA W/O US: CPT | Mod: 50,PBBFAC | Performed by: ORTHOPAEDIC SURGERY

## 2022-06-29 PROCEDURE — 99999 PR PBB SHADOW E&M-EST. PATIENT-LVL III: ICD-10-PCS | Mod: PBBFAC,,, | Performed by: ORTHOPAEDIC SURGERY

## 2022-06-29 RX ORDER — TRIAMCINOLONE ACETONIDE 40 MG/ML
20 INJECTION, SUSPENSION INTRA-ARTICULAR; INTRAMUSCULAR
Status: DISCONTINUED | OUTPATIENT
Start: 2022-06-29 | End: 2022-06-29 | Stop reason: HOSPADM

## 2022-06-29 RX ADMIN — TRIAMCINOLONE ACETONIDE 20 MG: 40 INJECTION, SUSPENSION INTRA-ARTICULAR; INTRAMUSCULAR at 01:06

## 2022-06-29 NOTE — PROCEDURES
Large Joint Aspiration/Injection: L anserine bursa    Date/Time: 6/29/2022 1:45 PM  Performed by: Valerio Chew DO  Authorized by: Valerio Chew, DO     Consent Done?:  Yes (Verbal)  Indications:  Diagnostic evaluation and pain  Site marked: the procedure site was marked    Timeout: prior to procedure the correct patient, procedure, and site was verified    Prep: patient was prepped and draped in usual sterile fashion      Details:  Needle Size:  22 G  Ultrasonic Guidance for needle placement?: No    Approach:  Anteromedial  Location:  Knee  Site:  L anserine bursa  Medications:  20 mg triamcinolone acetonide 40 mg/mL  Patient tolerance:  Patient tolerated the procedure well with no immediate complications

## 2022-06-29 NOTE — PROCEDURES
Large Joint Aspiration/Injection: bilateral knee    Date/Time: 6/29/2022 1:45 PM  Performed by: Valerio Chew DO  Authorized by: Valerio Chew, DO     Consent Done?:  Yes (Verbal)  Indications:  Arthritis, diagnostic evaluation, joint swelling and pain  Site marked: the procedure site was marked    Timeout: prior to procedure the correct patient, procedure, and site was verified    Prep: patient was prepped and draped in usual sterile fashion      Details:  Needle Size:  22 G  Ultrasonic Guidance for needle placement?: No    Approach:  Anterolateral  Location:  Knee  Laterality:  Bilateral  Site:  Bilateral knee  Medications (Right):  20 mg triamcinolone acetonide 40 mg/mL  Medications (Left):  20 mg triamcinolone acetonide 40 mg/mL  Patient tolerance:  Patient tolerated the procedure well with no immediate complications

## 2022-06-29 NOTE — PROGRESS NOTES
Patient ID: Ursula Rodríguez is a 81 y.o. female.     Chief Complaint: Pain of the Right Knee and Pain of the Left Knee        HPI: Ms. Rodríguez returned today with complaints of recurrent pain in both her knees.  She stated her left knee is the greater of the 2 with pain.  Her left knee pain began approximately 3-4 months ago and has increased in intensity over the last week to the point is giving her issues with ambulation.  Her right knee is a dull aching type of pain which is tolerable.  Walking increases her symptoms while rest improves them.  She has not taken NSAIDs, worn a brace, taken PT, nor had a recent injection.  She was given steroid injections on 05/28/2021 which gave her relief until this most recent exacerbation       ROS:  No new diagnosis/surgery/prescriptions since last office visit on 05/28/2021  Constitution: Negative for chills and fever.   HENT: Negative for congestion.    Eyes: Negative for blurred vision.   Cardiovascular: Negative for chest pain.   Respiratory: Negative for cough.    Endocrine: Negative for polydipsia.   Hematologic/Lymphatic: Bruises/bleeds easily.   Skin: Negative for itching.   Musculoskeletal: Positive for joint pain.   Gastrointestinal: Negative for constipation and diarrhea.   Genitourinary: Negative for nocturia.   Neurological: Negative for seizures.   Psychiatric/Behavioral: Negative for substance abuse.   Allergic/Immunologic: Negative for environmental allergies.       Objective:   Physical Exam:   General: AAOx3.  No acute distress  Vascular:  Pulses intact and equal bilaterally.  Capillary refill less than 3 seconds and equal bilaterally  Neurologic:  Pinprick and soft touch intact and equal bilaterally  Integment:  No ecchymosis, no errythema  Extremity: Knee:  Extension/flexion equal bilaterally 0/118 degrees. Mild varus alignment both knees.  Mild effusion both knees.  Crepitus with motion both knees.  Mildly positive patellar load/compression both  knees.  Negative patella apprehension/relocation both knees.  Mild increased excursion with valgus stressing both knees right greater than left.  Baker cyst right knee. Excursion equal bilaterally with Lachman/drawer.  Positive joint line tenderness both knees right greater than left.  Mild tenderness over the MCL both knees.  Rebekah negative both knees.  Morovis positive both knees.  Tender at the anserine insertion left knee.   Swelling at the anserine insertion left knee.  Radiography:   personally reviewed x-rays of both knees completed on 06/29/2022 which showed advanced tricompartmental arthritic changes      Assessment:       Impression:    1. Symptomatic tricompartmental arthritis, both knees.  2. Pes anserine bursitis, left knee.  3. Bilateral knee pain.        Plan:       1.  Discussed physical examination and radiographic evaluation with the patient. Ursula understands that she has exacerbation of arthritis in both of her knees along with pes anserine bursitis of her left knee.  Treatment alternatives and outcomes were discussed with the patient she understands she could consider continuing with conservative management such as observation, activity modification, NSAIDs, bracing, physical therapy, injections, or she could consider surgical intervention such as arthroscopy versus total knee arthroplasty.  She states she did not want surgery. .  2.  Offered a steroid injection of both knees, she elected to proceed.  3.  Brace wear was discussed.  4.  Take NSAIDs as tolerated allowed by PCM.  5.  Continue with home exercises previously shown discussed.  6. Offered a steroid injection to the anserine insertion of the left knee, she elected to proceed.  7.  Follow up p.r.n..

## 2022-06-30 ENCOUNTER — PATIENT MESSAGE (OUTPATIENT)
Dept: FAMILY MEDICINE | Facility: CLINIC | Age: 82
End: 2022-06-30
Payer: MEDICARE

## 2022-06-30 DIAGNOSIS — R53.81 OTHER MALAISE: Primary | ICD-10-CM

## 2022-07-01 ENCOUNTER — INFUSION (OUTPATIENT)
Dept: INFUSION THERAPY | Facility: HOSPITAL | Age: 82
End: 2022-07-01
Attending: FAMILY MEDICINE
Payer: MEDICARE

## 2022-07-01 ENCOUNTER — PATIENT MESSAGE (OUTPATIENT)
Dept: FAMILY MEDICINE | Facility: CLINIC | Age: 82
End: 2022-07-01
Payer: MEDICARE

## 2022-07-01 VITALS
SYSTOLIC BLOOD PRESSURE: 145 MMHG | OXYGEN SATURATION: 98 % | TEMPERATURE: 34 F | HEIGHT: 61 IN | RESPIRATION RATE: 18 BRPM | DIASTOLIC BLOOD PRESSURE: 63 MMHG | BODY MASS INDEX: 22.07 KG/M2 | WEIGHT: 116.88 LBS | HEART RATE: 63 BPM

## 2022-07-01 DIAGNOSIS — Z85.3 HISTORY OF BILATERAL BREAST CANCER: Primary | ICD-10-CM

## 2022-07-01 PROCEDURE — 63600175 PHARM REV CODE 636 W HCPCS: Performed by: FAMILY MEDICINE

## 2022-07-01 PROCEDURE — A4216 STERILE WATER/SALINE, 10 ML: HCPCS | Performed by: FAMILY MEDICINE

## 2022-07-01 PROCEDURE — 25000003 PHARM REV CODE 250: Performed by: FAMILY MEDICINE

## 2022-07-01 PROCEDURE — 96523 IRRIG DRUG DELIVERY DEVICE: CPT

## 2022-07-01 RX ORDER — HEPARIN 100 UNIT/ML
500 SYRINGE INTRAVENOUS
Status: CANCELLED | OUTPATIENT
Start: 2022-07-01

## 2022-07-01 RX ORDER — HEPARIN 100 UNIT/ML
500 SYRINGE INTRAVENOUS
Status: COMPLETED | OUTPATIENT
Start: 2022-07-01 | End: 2022-07-01

## 2022-07-01 RX ORDER — SODIUM CHLORIDE 0.9 % (FLUSH) 0.9 %
10 SYRINGE (ML) INJECTION
Status: CANCELLED | OUTPATIENT
Start: 2022-07-01

## 2022-07-01 RX ORDER — SODIUM CHLORIDE 0.9 % (FLUSH) 0.9 %
10 SYRINGE (ML) INJECTION
Status: COMPLETED | OUTPATIENT
Start: 2022-07-01 | End: 2022-07-01

## 2022-07-01 RX ADMIN — Medication 10 ML: at 08:07

## 2022-07-01 RX ADMIN — HEPARIN 500 UNITS: 100 SYRINGE at 08:07

## 2022-07-01 NOTE — PLAN OF CARE
"Problem: Adult Inpatient Plan of Care  Goal: Plan of Care Review  Outcome: Ongoing, Progressing  Flowsheets (Taken 7/1/2022 0829)  Plan of Care Reviewed With: patient  BP (!) 145/63 (Patient Position: Sitting)   Pulse 63   Temp (!) 33.8 °F (1 °C)   Resp 18   Ht 5' 1" (1.549 m)   Wt 53 kg (116 lb 13.5 oz)   SpO2 98%   BMI 22.08 kg/m² Pleasant alert and oriented patient ambulated independently to clinic for monthly port flush - good blood return - saline locked - labs obtained and taken to [facility] lab - pt tolerated well - discharged home with no concerns - scheduled for 7/29/22.          "

## 2022-07-06 ENCOUNTER — OFFICE VISIT (OUTPATIENT)
Dept: HEMATOLOGY/ONCOLOGY | Facility: CLINIC | Age: 82
End: 2022-07-06
Payer: MEDICARE

## 2022-07-06 VITALS
HEART RATE: 74 BPM | SYSTOLIC BLOOD PRESSURE: 115 MMHG | DIASTOLIC BLOOD PRESSURE: 72 MMHG | WEIGHT: 122 LBS | HEIGHT: 61 IN | RESPIRATION RATE: 16 BRPM | BODY MASS INDEX: 23.03 KG/M2

## 2022-07-06 DIAGNOSIS — R42 VERTIGO: Primary | ICD-10-CM

## 2022-07-06 PROCEDURE — 99214 OFFICE O/P EST MOD 30 MIN: CPT | Mod: S$GLB,,, | Performed by: INTERNAL MEDICINE

## 2022-07-06 PROCEDURE — 99214 PR OFFICE/OUTPT VISIT, EST, LEVL IV, 30-39 MIN: ICD-10-PCS | Mod: S$GLB,,, | Performed by: INTERNAL MEDICINE

## 2022-07-06 RX ORDER — MECLIZINE HYDROCHLORIDE 25 MG/1
25 TABLET ORAL 3 TIMES DAILY PRN
Qty: 90 TABLET | Refills: 6 | Status: SHIPPED | OUTPATIENT
Start: 2022-07-06 | End: 2022-10-18

## 2022-07-06 NOTE — PROGRESS NOTES
[facility] In Office Hematology Oncology Subsequent Encounter Note.    22    Subjective:      Patient ID:   Ursula Rodríguez  82 y.o. female  1940        Chief Complaint:   Breast cancer follow up, last seen .    HPI:  82 y.o. female with diagnosis of R Breast cancer,2013.  R breast partial mastectomy followed by [facility].  Sentinal node +.  Stage II dx.  CTA x's 3/4, arimidex, Tamoxifen 2015.    Hx of L breast cancer , had partial mastectomy and reconstruction.    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  .  On Eliquis 2.5 mg BID prophylaxis.    Admits to decreased appetite, weight down 15#.  Weight down 2# since last visit 6 weeks ago.  Begin trial of Periactin 4 mg BID for appetite, weight gain.    Weight 116# to 122# now.  On Eliquis 2.5 mg 1 BID.  Refill antivert for vertigo sx prn.    Hx of hitting R foot, still sore.  Saw Dr. Almaguer, no fx per xray.    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  Hx HTN, DM, GERD,cholesterol, DJD, CVA, TIA., thyroid dx, bradycardia.    Appendectomy, Partial hystectomy, L knee surgery several times, hernia repair, Tumor removed L ear drum .  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  S/P pacemaker placement    Allergy iodine, IVP dye.    Dad COPD, DM.  Mom Breast cancer, DM, increased HR.  Sister DM.  Brother DM, PVD.  Sister colon cancer, heart dx.  Sister Parkinson's Dx.  Daughter colon cancer.    [id number]  4.2 cm , multifocal DCIS.   LN +.  M3    ROS:   GEN: normal without any fever, night sweats or weight loss  HEENT: See HPI  CV: normal with no CP, SOB, PND, BUSTOS or orthopnea  PULM: normal with no SOB, cough, hemoptysis, sputum or pleuritic pain  GI: normal with no abdominal pain, nausea, vomiting, constipation, diarrhea, melanotic stools, BRBPR, or hematemesis  : normal with no hematuria, dysuria  BREAST: See HPI  SKIN: normal with no rash, erythema, bruising, or swelling     Past Medical History:   Diagnosis Date    Anemia     Breast cancer     LEFT DIAGNOSED  FIRST    Breast cancer 2013    Right     Factor V Leiden 1994    Hyperlipidemia 2017    Hypertension 2017    Hypothyroidism 2019    Osteoporosis     UNKNOWN DATE OF DX    Pacemaker 10/28/2021    Recurrent urinary tract infection     Vertigo 1994     Past Surgical History:   Procedure Laterality Date    HEMORRHOID SURGERY  1968    HYSTERECTOMY  1970    MASTECTOMY Right 1994    MASTECTOMY Left 2013    BREAST CANCER    TUMOR REMOVAL Left 1994    EAR       Review of patient's allergies indicates:   Allergen Reactions    Iodine and iodide containing products     Ditropan [oxybutynin chloride] Palpitations and Other (See Comments)     Made patient weak     Social History     Socioeconomic History    Marital status:     Highest education level: Master's degree (e.g., MA, MS, [place], MEd, MSW, ANDRE)   Occupational History    Occupation: Phd x 3-   Tobacco Use    Smoking status: Never Smoker    Smokeless tobacco: Never Used   Substance and Sexual Activity    Alcohol use: No     Comment: SPECIAL OCCASIONS LIKE ZACHERY    Drug use: No    Sexual activity: Not Currently     Social Determinants of Health     Financial Resource Strain: Low Risk     Difficulty of Paying Living Expenses: Not very hard   Food Insecurity: No Food Insecurity    Worried About Running Out of Food in the Last Year: Never true    Ran Out of Food in the Last Year: Never true   Transportation Needs: No Transportation Needs    Lack of Transportation (Medical): No    Lack of Transportation (Non-Medical): No   Physical Activity: Sufficiently Active    Days of Exercise per Week: 5 days    Minutes of Exercise per Session: 30 min   Stress: Stress Concern Present    Feeling of Stress : Very much   Social Connections: Unknown    Frequency of Communication with Friends and Family: More than three times a week    Frequency of Social Gatherings with Friends and Family: Never    Active Member of Clubs or Organizations: No     Attends Club or Organization Meetings: Never    Marital Status:    Housing Stability: Low Risk     Unable to Pay for Housing in the Last Year: No    Number of Places Lived in the Last Year: 1    Unstable Housing in the Last Year: No         Current Outpatient Medications:     apixaban (ELIQUIS) 2.5 mg Tab, Take 1 tablet (2.5 mg total) by mouth 2 (two) times daily., Disp: 60 tablet, Rfl: 11    cloNIDine (CATAPRES) 0.1 MG tablet, Take 1 tablet (0.1 mg total) by mouth 2 (two) times daily as needed (BP > 160/90)., Disp: 60 tablet, Rfl: 11    cyproheptadine (PERIACTIN) 4 mg tablet, Take 1 tablet (4 mg total) by mouth 2 (two) times daily with meals., Disp: 180 tablet, Rfl: 1    levothyroxine (SYNTHROID) 50 MCG tablet, Take 1 tablet (50 mcg total) by mouth once daily., Disp: 90 tablet, Rfl: 3    losartan (COZAAR) 25 MG tablet, Take 0.5 tablets (12.5 mg total) by mouth once daily., Disp: 45 tablet, Rfl: 3    magnesium oxide (MAG-OX) 400 mg (241.3 mg magnesium) tablet, Take 400 mg by mouth once daily., Disp: , Rfl:     metFORMIN (GLUCOPHAGE-XR) 500 MG ER 24hr tablet, TAKE 1 TABLET BY MOUTH EVERY MORNING WITH FOOD FOR 2 WEEKS THEN INCREASE TO 1 TABLET BY MOUTH TWICE DAILY WITH FOOD, Disp: 180 tablet, Rfl: 3    metoprolol tartrate (LOPRESSOR) 25 MG tablet, Take 0.5 tablets (12.5 mg total) by mouth 2 (two) times daily., Disp: 90 tablet, Rfl: 3    multivitamin capsule, Take 1 capsule by mouth once daily., Disp: , Rfl:     NIFEdipine (PROCARDIA-XL) 30 MG (OSM) 24 hr tablet, TAKE 1 TABLET(30 MG) BY MOUTH TWICE DAILY, Disp: 180 tablet, Rfl: 0    potassium 99 mg Tab, Take by mouth once daily., Disp: , Rfl:     simvastatin (ZOCOR) 20 MG tablet, 1 tablet in the evening, Disp: , Rfl:     spironolactone (ALDACTONE) 25 MG tablet, TAKE 1 TABLET(25 MG) BY MOUTH EVERY MORNING, Disp: 90 tablet, Rfl: 0    meclizine (ANTIVERT) 25 mg tablet, Take 1 tablet (25 mg total) by mouth 3 (three) times daily as needed for  "Dizziness., Disp: 90 tablet, Rfl: 6          Objective:   Vitals:  Blood pressure 115/72, pulse 74, resp. rate 16, height 5' 1" (1.549 m), weight 55.3 kg (122 lb).    Physical Examination:   GEN: no apparent distress, comfortable  HEAD: atraumatic and normocephalic  EYES: no pallor, no icterus  ENT:  no pharyngeal erythema, external ears WNL; no nasal discharge  NECK: no masses, thyroid normal, trachea midline, no LAD/LN's, supple  CV: RRR with no murmur; normal pulse; normal S1 and S2; no pedal edema  CHEST: Normal respiratory effort; CTAB; normal breath sounds; no wheeze or crackles  ABDOM: nontender and nondistended; soft;  no rebound/guarding  MUSC/Skeletal: ROM normal; no crepitus; joints normal; no deformities   EXTREM: multiple spider veins L & R leg  SKIN: multiple ecchemoses on forearms, poor skin turgor.  : no cvat  NEURO: grossly intact; motor/sensory WNL;  no tremors  PSYCH: normal mood, affect and behavior  LYMPH: normal cervical, supraclavicular, axillary and groin LN's  BREASTS: L 'breast" post op change, without palpable mass.  R chest NT, post operative change, no palpable mass    CAT head small vessel dx.  MRI of L/S spine DDD, DJD.    Current studies:  Bone Scan, no metastases seen.  But intense uptake at R foot.  Concern for occult fx or osteomyelitis, she is following up with Dr. Almaguer on this.    CT of chest abdomin and pelvis, multiple 2-3 mm nodules in lungs, no previous CT for comparison.  Significance?  Will plan to repeat CT of chest in 3 months.  No definitive metastatic dx seen.      Assessment:   (1) 82 y.o. female with diagnosis of  Bilateral breast cancer, S/P treatment as above.  CISCO.  Observe for now.     (2)Weight loss 15#, decreased appetite.  Periactin 4 mg BID.  Now weight better, up to 122#.    (3)S/P pacemaker placement for bradycardia.    (4)Hx TIA, F5L, on Eliquis 2.5 mg prophylaxis long term.    (5)Abn PET at R foot.  Fx? Or osteomyelitis, re eval per  " Tripp.    RTC 6 months.

## 2022-07-16 PROCEDURE — G0179 PR HOME HEALTH MD RECERTIFICATION: ICD-10-PCS | Mod: ,,, | Performed by: FAMILY MEDICINE

## 2022-07-16 PROCEDURE — G0179 MD RECERTIFICATION HHA PT: HCPCS | Mod: ,,, | Performed by: FAMILY MEDICINE

## 2022-07-28 ENCOUNTER — PATIENT MESSAGE (OUTPATIENT)
Dept: FAMILY MEDICINE | Facility: CLINIC | Age: 82
End: 2022-07-28
Payer: MEDICARE

## 2022-07-28 DIAGNOSIS — D68.59 HYPERCOAGULOPATHY: Primary | ICD-10-CM

## 2022-07-28 NOTE — TELEPHONE ENCOUNTER
Pt MyChart Message:        I am scheduled for my Port Flush tomorrow. Since, this is the how my blood test are drawn. Wondering if my INR / PT and   CBC could also be drawn?  Thank you.  Dr. Vergara

## 2022-07-29 ENCOUNTER — INFUSION (OUTPATIENT)
Dept: INFUSION THERAPY | Facility: HOSPITAL | Age: 82
End: 2022-07-29
Attending: FAMILY MEDICINE
Payer: MEDICARE

## 2022-07-29 VITALS
OXYGEN SATURATION: 97 % | SYSTOLIC BLOOD PRESSURE: 134 MMHG | TEMPERATURE: 98 F | HEART RATE: 66 BPM | WEIGHT: 116.88 LBS | BODY MASS INDEX: 22.07 KG/M2 | DIASTOLIC BLOOD PRESSURE: 63 MMHG | HEIGHT: 61 IN | RESPIRATION RATE: 18 BRPM

## 2022-07-29 DIAGNOSIS — Z85.3 HISTORY OF BILATERAL BREAST CANCER: Primary | ICD-10-CM

## 2022-07-29 PROCEDURE — 96523 IRRIG DRUG DELIVERY DEVICE: CPT

## 2022-07-29 PROCEDURE — 63600175 PHARM REV CODE 636 W HCPCS: Performed by: FAMILY MEDICINE

## 2022-07-29 PROCEDURE — A4216 STERILE WATER/SALINE, 10 ML: HCPCS | Performed by: FAMILY MEDICINE

## 2022-07-29 PROCEDURE — 25000003 PHARM REV CODE 250: Performed by: FAMILY MEDICINE

## 2022-07-29 RX ORDER — HEPARIN 100 UNIT/ML
500 SYRINGE INTRAVENOUS
Status: COMPLETED | OUTPATIENT
Start: 2022-07-29 | End: 2022-07-29

## 2022-07-29 RX ORDER — HEPARIN 100 UNIT/ML
500 SYRINGE INTRAVENOUS
Status: CANCELLED | OUTPATIENT
Start: 2022-07-29

## 2022-07-29 RX ORDER — SODIUM CHLORIDE 0.9 % (FLUSH) 0.9 %
10 SYRINGE (ML) INJECTION
Status: COMPLETED | OUTPATIENT
Start: 2022-07-29 | End: 2022-07-29

## 2022-07-29 RX ORDER — SODIUM CHLORIDE 0.9 % (FLUSH) 0.9 %
10 SYRINGE (ML) INJECTION
Status: CANCELLED | OUTPATIENT
Start: 2022-07-29

## 2022-07-29 RX ADMIN — Medication 10 ML: at 08:07

## 2022-07-29 RX ADMIN — HEPARIN 500 UNITS: 100 SYRINGE at 08:07

## 2022-07-29 NOTE — PLAN OF CARE
"Problem: Adult Inpatient Plan of Care  Goal: Plan of Care Review  Outcome: Ongoing, Progressing  Flowsheets (Taken 7/29/2022 0850)  Plan of Care Reviewed With: patient  /63 (BP Location: Left arm, Patient Position: Sitting)   Pulse 66   Temp 98.1 °F (36.7 °C) (Oral)   Resp 18   Ht 5' 1" (1.549 m)   Wt 53 kg (116 lb 13.5 oz)   SpO2 97%   BMI 22.08 kg/m² Pleasant alert and oriented patient ambulated independently to clinic for monthly port flush - good blood return - saline locked - labs obtained and [place] [facility] [place] pt tolerated well - discharged home with no concerns - scheduled for 8/26/22.       "

## 2022-08-11 ENCOUNTER — TELEPHONE (OUTPATIENT)
Dept: FAMILY MEDICINE | Facility: CLINIC | Age: 82
End: 2022-08-11
Payer: MEDICARE

## 2022-08-11 DIAGNOSIS — U07.1 COVID-19: Primary | ICD-10-CM

## 2022-08-11 DIAGNOSIS — U07.1 COVID: ICD-10-CM

## 2022-08-11 NOTE — TELEPHONE ENCOUNTER
----- Message from Deb Lee sent at 8/11/2022  4:16 PM CDT -----  Regarding: nurse called  Name of Who is Calling: MILTON MADRID [[id number]] Curtis(nurse)      What is the request in detail: pt tested positive for covid. She has a cough and is requesting medication that would help with treatment. Please advise .        [id number] - [place] [address] AT [facility] [address]  [address] [place] [address]  Phone: [phone] Fax: [phone]            Can the clinic reply by [facility]: No      [facility]: [phone]

## 2022-08-12 ENCOUNTER — TELEPHONE (OUTPATIENT)
Dept: FAMILY MEDICINE | Facility: CLINIC | Age: 82
End: 2022-08-12
Payer: MEDICARE

## 2022-08-12 DIAGNOSIS — U07.1 COVID-19: Primary | ICD-10-CM

## 2022-08-12 RX ORDER — AZITHROMYCIN 250 MG/1
TABLET, FILM COATED ORAL
Qty: 6 TABLET | Refills: 0 | Status: SHIPPED | OUTPATIENT
Start: 2022-08-12 | End: 2022-08-17

## 2022-08-12 RX ORDER — PROMETHAZINE HYDROCHLORIDE AND DEXTROMETHORPHAN HYDROBROMIDE 6.25; 15 MG/5ML; MG/5ML
5 SYRUP ORAL EVERY 4 HOURS PRN
Qty: 200 ML | Refills: 0 | Status: SHIPPED | OUTPATIENT
Start: 2022-08-12 | End: 2022-08-22

## 2022-08-12 NOTE — TELEPHONE ENCOUNTER
----- Message from Yousuf Babcock sent at 8/12/2022  9:41 AM CDT -----  Type: Needs Medical Advice  Who Called:  Patient  Symptoms (please be specific): Covid +     Pharmacy name and phone #:    [id number] [place] [facility] [address]  [address]  [place] [address]  Phone: [phone] Fax: [phone]        Best Call Back Number: [phone]  Additional Information: Patient states that she has been waiting for something to be sent to the pharmacy for her Covid symptoms.    Please call ASAP-- Pt is very upset  Pt is threatening to go over everyone's head if she doesn't hear anything by 10:00 am today.

## 2022-08-12 NOTE — TELEPHONE ENCOUNTER
Spoke with patient. Informed patient infusion placed. Patient declines and would like abx sent into the pharmacy.

## 2022-08-12 NOTE — TELEPHONE ENCOUNTER
Spoke with patient. Informed patient provider placed infusion orders, pt declines and wishes for abx to be sent into the pharmacy

## 2022-08-15 ENCOUNTER — PATIENT MESSAGE (OUTPATIENT)
Dept: FAMILY MEDICINE | Facility: CLINIC | Age: 82
End: 2022-08-15
Payer: MEDICARE

## 2022-08-15 DIAGNOSIS — E11.49 TYPE II DIABETES MELLITUS WITH NEUROLOGICAL MANIFESTATIONS: Primary | ICD-10-CM

## 2022-08-16 ENCOUNTER — TELEPHONE (OUTPATIENT)
Dept: FAMILY MEDICINE | Facility: CLINIC | Age: 82
End: 2022-08-16
Payer: MEDICARE

## 2022-08-16 NOTE — TELEPHONE ENCOUNTER
----- Message from Douglas Lawton sent at 8/16/2022 10:44 AM CDT -----  Contact: Sharda from [facility] at : [phone]  Type: Needs Medical Advice  Who Called:  Sharda  [place] Call Back Number: [phone]  Additional Information: Sharda from [facility] is calling for an addenum on his 3/14/22 office notes regarding diabetes for the pt. Please call back and advise.

## 2022-08-16 NOTE — TELEPHONE ENCOUNTER
Note was addended but if there is anything else that needs to be written then she needs appointment.  Also has been sometime since her last appointment so it may be good to get her scheduled   Orthopaedic Surgery Progress Note 07/25/2020    S: No acute events overnight. Epidural came out yesterday. Reports she has just stood up once since surgery. Is anxious about dressing changes.    Temp: 97.9  F (36.6  C) Temp src: Oral BP: (!) 146/64 Pulse: 89 Heart Rate: 83 Resp: 18 SpO2: 99 % O2 Device: Nasal cannula Oxygen Delivery: 2 LPM    Exam:  Gen: No acute distress, resting comfortably in bed.  Resp: Non-labored breathing  MSK:    LLE:  - Dressings with bloody drainage  - Slight ecchymosis around skin edges  - Slight gapping at medial incision edge  - No flucuant areas around incision    Drain output:   Medial 30 mL last 24 hours  Lateral 30 mL last 24 hours    Recent Labs   Lab 07/25/20  0606 07/24/20  0827 07/24/20  0617  07/21/20  1245   WBC 9.8  --   --   --   --    HGB 6.7* 7.3* 7.1*   < >  --      --   --   --  211    < > = values in this interval not displayed.       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 Adams. Doing well postoperatively.    Needs TCU placement once pain is well controlled on PO medications and medically stable.    Hgb 6.7 -- plan for Transfusion 1 unit PRBC today and recheck tomorrow AM.    Plan:  Orthopaedic Surgery Primary  Activity: Up with assist.  Weight bearing status: NWB LLE.  Antibiotics: Periop complete  Diet: Begin with clear fluids and progress diet as tolerated.  DVT prophylaxis: SCDs  Bracing/Splinting: Stump protector to be kept clean, dry, and intact between dressing changes  Elevation: Elevate LLE on pillows to keep at level of heart as much as possible.  Wound Care: dry to dry dressing changes BID (adaptic, 4x4, abd, kerlix)  Drains: discontinued 7/25/2020  Pain management: transition from IV to orals as tolerated.  X-rays: None  Physical Therapy:  ROM, ADL's.  Occupational Therapy: ADL's.  Labs: [facility]  Consults: PT, OT, Medicine, appreciate assistance in caring for this patient.  Follow-up: Clinic with Dr. Adams  in 2 weeks for wound check.  Disposition: Pending progress with therapies, pain control on orals, and medical stability, anticipate discharge to TCU once placement secured.    Patient discussed with Dr. Adams.    Silver Salinas MD  Orthopaedic Surgery PGY-4  Pager [phone]

## 2022-08-16 NOTE — TELEPHONE ENCOUNTER
AGUILAR  With [place] [facility] requesting addendum to office note 3.14.22 for diabetes as patient was referred to them for diabetes but no documention in office note 3.14.22

## 2022-08-26 ENCOUNTER — INFUSION (OUTPATIENT)
Dept: INFUSION THERAPY | Facility: HOSPITAL | Age: 82
End: 2022-08-26
Attending: FAMILY MEDICINE
Payer: MEDICARE

## 2022-08-26 PROCEDURE — 96523 IRRIG DRUG DELIVERY DEVICE: CPT

## 2022-08-26 NOTE — PLAN OF CARE
Pleasant alert and oriented patient ambulated to clinic with daughter for monthly port flush - pt tolerated well - discharged home and will RTC in one month.

## 2022-08-29 ENCOUNTER — EXTERNAL HOME HEALTH (OUTPATIENT)
Dept: HOME HEALTH SERVICES | Facility: HOSPITAL | Age: 82
End: 2022-08-29
Payer: MEDICARE

## 2022-08-31 DIAGNOSIS — Z78.0 MENOPAUSE: ICD-10-CM

## 2022-09-19 ENCOUNTER — OFFICE VISIT (OUTPATIENT)
Dept: PODIATRY | Facility: CLINIC | Age: 82
End: 2022-09-19
Payer: MEDICARE

## 2022-09-19 VITALS
HEIGHT: 61 IN | WEIGHT: 110 LBS | TEMPERATURE: 98 F | BODY MASS INDEX: 20.77 KG/M2 | SYSTOLIC BLOOD PRESSURE: 155 MMHG | DIASTOLIC BLOOD PRESSURE: 74 MMHG | HEART RATE: 90 BPM

## 2022-09-19 DIAGNOSIS — E11.9 TYPE 2 DIABETES MELLITUS WITHOUT COMPLICATION, WITHOUT LONG-TERM CURRENT USE OF INSULIN: ICD-10-CM

## 2022-09-19 DIAGNOSIS — L60.0 INGROWN NAIL: Primary | ICD-10-CM

## 2022-09-19 DIAGNOSIS — E11.9 COMPREHENSIVE DIABETIC FOOT EXAMINATION, TYPE 2 DM, ENCOUNTER FOR: ICD-10-CM

## 2022-09-19 PROCEDURE — 99999 PR PBB SHADOW E&M-EST. PATIENT-LVL III: ICD-10-PCS | Mod: PBBFAC,,, | Performed by: PODIATRIST

## 2022-09-19 PROCEDURE — 99213 OFFICE O/P EST LOW 20 MIN: CPT | Mod: PBBFAC | Performed by: PODIATRIST

## 2022-09-19 PROCEDURE — 99999 PR PBB SHADOW E&M-EST. PATIENT-LVL III: CPT | Mod: PBBFAC,,, | Performed by: PODIATRIST

## 2022-09-19 PROCEDURE — 99213 OFFICE O/P EST LOW 20 MIN: CPT | Mod: S$PBB,,, | Performed by: PODIATRIST

## 2022-09-19 PROCEDURE — 99213 PR OFFICE/OUTPT VISIT, EST, LEVL III, 20-29 MIN: ICD-10-PCS | Mod: S$PBB,,, | Performed by: PODIATRIST

## 2022-09-19 RX ORDER — SPIRONOLACTONE 25 MG/1
1 TABLET ORAL
COMMUNITY
Start: 2022-06-29 | End: 2024-01-03 | Stop reason: SDUPTHER

## 2022-09-20 NOTE — PROGRESS NOTES
Subjective:      Patient ID: Ursula Rodríguez is a 82 y.o. female.    Chief Complaint: Diabetes Mellitus and Nail Problem   Patient presents today for diabetic evaluation she is currently on Eliquis and states she has a history of neuropathy that does bother her at night.      Review of Systems   Neurological:  Positive for numbness.   All other systems reviewed and are negative.     Constitutional    Pleasant, well-nourished, no distress, well oriented    Eyes         GLASSES    Cardiovascular          No chest pain, no shortness of breath    Respiratory           No cough, no congestion     Musculoskeletal        No muscle aches, no arthralgias/joint pain, no back pain, no swelling in the extremities            Objective:      Physical Exam  Vitals and nursing note reviewed.   Constitutional:       Appearance: Normal appearance.   Cardiovascular:      Pulses:           Dorsalis pedis pulses are 1+ on the right side and 1+ on the left side.        Posterior tibial pulses are 1+ on the right side and 1+ on the left side.   Pulmonary:      Effort: Pulmonary effort is normal.   Musculoskeletal:         General: Swelling, tenderness and signs of injury present.      Right foot: Deformity present.      Left foot: Deformity present.        Feet:    Feet:      Right foot:      Protective Sensation: 4 sites tested.   1 site sensed.     Skin integrity: Erythema and warmth present.      Toenail Condition: Right toenails are abnormally thick and ingrown. Fungal disease present.     Left foot:      Protective Sensation: 4 sites tested.   1 site sensed.     Toenail Condition: Left toenails are abnormally thick. Fungal disease present.  Skin:     Capillary Refill: Capillary refill takes more than 3 seconds.      Findings: Erythema present.   Neurological:      Mental Status: She is alert.      Sensory: Sensory deficit present.   Psychiatric:         Mood and Affect: Mood normal.         Behavior: Behavior normal.          Thought Content: Thought content normal.         Judgment: Judgment normal.     Vascular         Arterial Pulses Right: posterior tibialis 1/4, dorsalis pedis 1/4, normal CFT   Arterial Pulses Left: posterior tibialis 1/4, dorsalis pedis 1/4, normal CFT   No lower extremity edema bilateral   Pedal skin temperature and color are normal bilateral     Integumentary   Distal 5th digit left foot including nail and under this area is black due to dried blood.  Patient had a subungual hematoma  due to prolonged walking which eventually drained, no bogginess or fluctuance today.  There is discolored hyperkeratotic tissue encompassing the distal 5th digit.  When debriding this area there is healthy pink tissue underneath, nail remains well attached for now, no further evidence of abscess or cellulitis at this time.  Skin is in good condition        Neurological   Gross sensation intact, denies burning or tingling in feet    Musculoskeletal   Muscle Strength/Testing and Tone:  Intact-excellent for age,  normal tone bilateral   Joints, Bones, and Muscles: Limited ROM midfoot   Consistent with osteoarthritis related to age.  Mild arthritic and degenerative changes        Walks well unassisted          Assessment:       Encounter Diagnoses   Name Primary?    Ingrown nail Yes    Type 2 diabetes mellitus without complication, without long-term current use of insulin     Comprehensive diabetic foot examination, type 2 DM, encounter for          Plan:       Ursula was seen today for diabetes mellitus and nail problem.    Diagnoses and all orders for this visit:    Ingrown nail    Type 2 diabetes mellitus without complication, without long-term current use of insulin    Comprehensive diabetic foot examination, type 2 DM, encounter for      Patient presents today for diabetic evaluation she is currently on Eliquis and states she has a history of neuropathy that does bother her at night.    A complete diabetic evaluation was performed  today patient does have findings consistent with diabetic related neuropathy.  Patient had several elongated ingrowing toenails I did debride these today I have advised the patient these were starting to become ingrown because of her neuropathy she was not having any pain or discomfort this needs to be monitored carefully.  Patient did have several ingrowing toenails especially the medial lateral border bilateral hallux the reserve were able to be trimmed and removed patient did not require a nail avulsion at this time.  Patient states while she does have noticeable neuropathy it is not to the point where she needs to be medicated for it and we will continue to monitor this.  Patient was in understanding and agreement with this diabetic education provided recommended follow-up is every 4-6 months unless patient has any problems questions or concerns I encouraged the patient to contact us for follow-up with any trauma to her feet.  Comprehensive diabetic evaluation performed.  Patient had previous injury to the dorsal aspect of the right foot much of the swelling has gone down the patient does have residual cyst with bone spurring on the top of the right foot I have advised her this is not going to completely resolve and she needs to make sure she does not wear shoes they are too tight or irritate this area.  Patient was made aware the only way to address this would be surgically and certainly that is not something we want to pursue.  Patient was in agreement with this.  Patient also had a pre ulcerative lesion on the 5th digit left this was non excisionally debrided.  Patient will be seen for follow-up in 3 month unless she has any problems questions or concerns sooner. This note was created using [employer] voice recognition software that occasionally misinterpreted phrases or words.

## 2022-09-23 ENCOUNTER — INFUSION (OUTPATIENT)
Dept: INFUSION THERAPY | Facility: HOSPITAL | Age: 82
End: 2022-09-23
Attending: FAMILY MEDICINE
Payer: MEDICARE

## 2022-09-23 VITALS — SYSTOLIC BLOOD PRESSURE: 126 MMHG | HEART RATE: 73 BPM | DIASTOLIC BLOOD PRESSURE: 60 MMHG

## 2022-09-23 DIAGNOSIS — Z85.3 HISTORY OF BILATERAL BREAST CANCER: Primary | ICD-10-CM

## 2022-09-23 PROCEDURE — 96523 IRRIG DRUG DELIVERY DEVICE: CPT

## 2022-09-23 RX ORDER — HEPARIN 100 UNIT/ML
500 SYRINGE INTRAVENOUS
Status: DISCONTINUED | OUTPATIENT
Start: 2022-09-23 | End: 2022-09-23 | Stop reason: HOSPADM

## 2022-09-23 RX ORDER — SODIUM CHLORIDE 0.9 % (FLUSH) 0.9 %
10 SYRINGE (ML) INJECTION
Status: CANCELLED | OUTPATIENT
Start: 2022-09-23

## 2022-09-23 RX ORDER — SODIUM CHLORIDE 0.9 % (FLUSH) 0.9 %
10 SYRINGE (ML) INJECTION
Status: DISCONTINUED | OUTPATIENT
Start: 2022-09-23 | End: 2022-09-23 | Stop reason: HOSPADM

## 2022-09-23 RX ORDER — HEPARIN 100 UNIT/ML
500 SYRINGE INTRAVENOUS
Status: CANCELLED | OUTPATIENT
Start: 2022-09-23

## 2022-09-23 NOTE — PLAN OF CARE
Pleasant alert and oriented patient ambulated to clinic for monthly port flush - pt tolerated well - discharged home with no concerns - pt to RTC 10/21

## 2022-10-11 ENCOUNTER — PATIENT MESSAGE (OUTPATIENT)
Dept: FAMILY MEDICINE | Facility: CLINIC | Age: 82
End: 2022-10-11
Payer: MEDICARE

## 2022-10-18 ENCOUNTER — OFFICE VISIT (OUTPATIENT)
Dept: FAMILY MEDICINE | Facility: CLINIC | Age: 82
End: 2022-10-18
Payer: MEDICARE

## 2022-10-18 VITALS
BODY MASS INDEX: 22.47 KG/M2 | HEART RATE: 83 BPM | RESPIRATION RATE: 16 BRPM | SYSTOLIC BLOOD PRESSURE: 120 MMHG | HEIGHT: 61 IN | WEIGHT: 119 LBS | DIASTOLIC BLOOD PRESSURE: 62 MMHG | TEMPERATURE: 98 F | OXYGEN SATURATION: 97 %

## 2022-10-18 DIAGNOSIS — I10 ESSENTIAL HYPERTENSION: ICD-10-CM

## 2022-10-18 DIAGNOSIS — E11.49 TYPE II DIABETES MELLITUS WITH NEUROLOGICAL MANIFESTATIONS: ICD-10-CM

## 2022-10-18 DIAGNOSIS — R30.0 DYSURIA: ICD-10-CM

## 2022-10-18 DIAGNOSIS — Z78.0 ENCOUNTER FOR OSTEOPOROSIS SCREENING IN ASYMPTOMATIC POSTMENOPAUSAL PATIENT: ICD-10-CM

## 2022-10-18 DIAGNOSIS — E55.9 VITAMIN D DEFICIENCY: ICD-10-CM

## 2022-10-18 DIAGNOSIS — Z13.820 ENCOUNTER FOR OSTEOPOROSIS SCREENING IN ASYMPTOMATIC POSTMENOPAUSAL PATIENT: ICD-10-CM

## 2022-10-18 DIAGNOSIS — R53.82 CHRONIC FATIGUE: Primary | ICD-10-CM

## 2022-10-18 DIAGNOSIS — E03.9 ACQUIRED HYPOTHYROIDISM: ICD-10-CM

## 2022-10-18 DIAGNOSIS — R26.81 UNSTEADINESS ON FEET: ICD-10-CM

## 2022-10-18 LAB
BILIRUB UR QL STRIP: NEGATIVE
CLARITY UR: CLEAR
COLOR UR: YELLOW
GLUCOSE UR QL STRIP: NEGATIVE
HGB UR QL STRIP: NEGATIVE
KETONES UR QL STRIP: ABNORMAL
LEUKOCYTE ESTERASE UR QL STRIP: NEGATIVE
NITRITE UR QL STRIP: NEGATIVE
PH UR STRIP: 6 [PH] (ref 5–8)
PROT UR QL STRIP: NEGATIVE
SP GR UR STRIP: 1.02 (ref 1–1.03)
URN SPEC COLLECT METH UR: ABNORMAL
UROBILINOGEN UR STRIP-ACNC: NEGATIVE EU/DL

## 2022-10-18 PROCEDURE — 99999 PR PBB SHADOW E&M-EST. PATIENT-LVL V: CPT | Mod: PBBFAC,,, | Performed by: NURSE PRACTITIONER

## 2022-10-18 PROCEDURE — 99214 PR OFFICE/OUTPT VISIT, EST, LEVL IV, 30-39 MIN: ICD-10-PCS | Mod: S$PBB,,, | Performed by: NURSE PRACTITIONER

## 2022-10-18 PROCEDURE — 81003 URINALYSIS AUTO W/O SCOPE: CPT | Performed by: NURSE PRACTITIONER

## 2022-10-18 PROCEDURE — 99215 OFFICE O/P EST HI 40 MIN: CPT | Mod: PBBFAC | Performed by: NURSE PRACTITIONER

## 2022-10-18 PROCEDURE — 99999 PR PBB SHADOW E&M-EST. PATIENT-LVL V: ICD-10-PCS | Mod: PBBFAC,,, | Performed by: NURSE PRACTITIONER

## 2022-10-18 PROCEDURE — 99214 OFFICE O/P EST MOD 30 MIN: CPT | Mod: S$PBB,,, | Performed by: NURSE PRACTITIONER

## 2022-10-18 NOTE — PROGRESS NOTES
"Subjective:       Patient ID: Ursula Rodríguez is a 82 y.o. female.    Chief Complaint: not feeling well (Pt states she she's is supposed to be seen every three months and she can not get an appt to see her PCP she also has a question about a medication that she thinks she is supposed to be taking )    -  The patient is a 83 y/o that presents with c/o "Just not feeling well and very tired". Does report mild dysuria. Labs from Aug discussed as essentially normal and this could be a UTI. She is due and agreeable to health screening.  -  Past Medical History:   Diagnosis Date    Anemia     Breast cancer 1995    LEFT DIAGNOSED FIRST    Breast cancer 2013    Right     Factor V Leiden     Hyperlipidemia     Hypertension     Hypothyroidism 2019    Osteoporosis     UNKNOWN DATE OF DX    Pacemaker 10/28/2021    Recurrent urinary tract infection     Vertigo        Past Surgical History:   Procedure Laterality Date    BREAST SURGERY  , 2013, Nose cancer    CARDIAC VALVE REPLACEMENT       SECTION      EYE SURGERY      HEMORRHOID SURGERY  1968    HERNIA REPAIR  1995    HYSTERECTOMY  1970    MASTECTOMY Right 1994    MASTECTOMY Left     BREAST CANCER    TUMOR REMOVAL Left 1994    EAR        Social History     Socioeconomic History    Marital status:     Highest education level: Master's degree (e.g., MA, MS, [place], MEd, MSW, ANDRE)   Occupational History    Occupation: Phd x 3-   Tobacco Use    Smoking status: Never    Smokeless tobacco: Never   Substance and Sexual Activity    Alcohol use: No     Comment: SPECIAL OCCASIONS LIKE ZACHERY    Drug use: No    Sexual activity: Not Currently     Partners: Male     Social Determinants of Health     Financial Resource Strain: Low Risk     Difficulty of Paying Living Expenses: Not very hard   Food Insecurity: No Food Insecurity    Worried About Running Out of Food in the Last Year: Never true    Ran Out of Food in the Last Year: Never true "   Transportation Needs: No Transportation Needs    Lack of Transportation (Medical): No    Lack of Transportation (Non-Medical): No   Physical Activity: Sufficiently Active    Days of Exercise per Week: 6 days    Minutes of Exercise per Session: 30 min   Stress: Stress Concern Present    Feeling of Stress : To some extent   Social Connections: Unknown    Frequency of Communication with Friends and Family: More than three times a week    Frequency of Social Gatherings with Friends and Family: Once a week    Active Member of Clubs or Organizations: No    Attends Club or Organization Meetings: 1 to 4 times per year    Marital Status:    Housing Stability: Low Risk     Unable to Pay for Housing in the Last Year: No    Number of Places Lived in the Last Year: 1    Unstable Housing in the Last Year: No       Family History   Problem Relation Age of Onset    Cancer Mother     Hypertension Mother     Heart disease Mother     No Known Problems Sister     Diabetes Brother     Hypertension Brother     Cancer Maternal Grandmother     Lung disease Father     Heart disease Father     Diabetes Father     Cancer Brother     Diabetes Brother     Hypertension Brother     Stroke Brother     Hypertension Brother     Stroke Brother        Review of patient's allergies indicates:   Allergen Reactions    Iodine and iodide containing products     Ditropan [oxybutynin chloride] Palpitations and Other (See Comments)     Made patient weak          Current Outpatient Medications:     apixaban (ELIQUIS) 2.5 mg Tab, Take 1 tablet (2.5 mg total) by mouth 2 (two) times daily., Disp: 60 tablet, Rfl: 11    cloNIDine (CATAPRES) 0.1 MG tablet, Take 1 tablet (0.1 mg total) by mouth 2 (two) times daily as needed (BP > 160/90)., Disp: 60 tablet, Rfl: 11    cyproheptadine (PERIACTIN) 4 mg tablet, Take 1 tablet (4 mg total) by mouth 2 (two) times daily with meals., Disp: 180 tablet, Rfl: 1    levothyroxine (SYNTHROID) 50 MCG tablet, Take 1 tablet  (50 mcg total) by mouth once daily., Disp: 90 tablet, Rfl: 3    losartan (COZAAR) 25 MG tablet, Take 0.5 tablets (12.5 mg total) by mouth once daily., Disp: 45 tablet, Rfl: 3    magnesium oxide (MAG-OX) 400 mg (241.3 mg magnesium) tablet, Take 400 mg by mouth once daily., Disp: , Rfl:     meclizine (ANTIVERT) 25 mg tablet, Take 1 tablet (25 mg total) by mouth 3 (three) times daily as needed for Dizziness., Disp: 90 tablet, Rfl: 6    metFORMIN (GLUCOPHAGE-XR) 500 MG ER 24hr tablet, TAKE 1 TABLET BY MOUTH EVERY MORNING WITH FOOD FOR 2 WEEKS THEN INCREASE TO 1 TABLET BY MOUTH TWICE DAILY WITH FOOD, Disp: 180 tablet, Rfl: 3    metoprolol tartrate (LOPRESSOR) 25 MG tablet, Take 0.5 tablets (12.5 mg total) by mouth 2 (two) times daily., Disp: 90 tablet, Rfl: 3    multivitamin capsule, Take 1 capsule by mouth once daily., Disp: , Rfl:     NIFEdipine (PROCARDIA-XL) 30 MG (OSM) 24 hr tablet, Take 1 tablet (30 MG ) by mouth twice daily, Disp: 180 tablet, Rfl: 0    potassium 99 mg Tab, Take by mouth once daily., Disp: , Rfl:     simvastatin (ZOCOR) 20 MG tablet, 1 tablet in the evening, Disp: , Rfl:     spironolactone (ALDACTONE) 25 MG tablet, 1 tablet., Disp: , Rfl:     HPI  Review of Systems   Constitutional:  Positive for fatigue.   HENT: Negative.     Eyes: Negative.    Respiratory: Negative.     Cardiovascular: Negative.    Gastrointestinal: Negative.    Endocrine: Negative.    Genitourinary: Negative.    Musculoskeletal: Negative.    Skin: Negative.    Allergic/Immunologic: Negative.    Neurological: Negative.    Hematological: Negative.    Psychiatric/Behavioral: Negative.       Objective:      Physical Exam  Vitals reviewed.   Constitutional:       General: She is not in acute distress.     Appearance: Normal appearance. She is well-developed and normal weight. She is not ill-appearing.   HENT:      Head: Normocephalic.   Eyes:      Conjunctiva/sclera: Conjunctivae normal.   Neck:      Thyroid: No thyromegaly.    Cardiovascular:      Rate and Rhythm: Normal rate and regular rhythm.      Heart sounds: Murmur heard.   Pulmonary:      Effort: Pulmonary effort is normal.      Breath sounds: Normal breath sounds. No wheezing or rales.   Musculoskeletal:         General: Normal range of motion.      Cervical back: Normal range of motion.      Right lower leg: No edema.      Left lower leg: No edema.   Skin:     General: Skin is warm and dry.   Neurological:      Mental Status: She is alert and oriented to person, place, and time. Mental status is at baseline.   Psychiatric:         Mood and Affect: Mood normal.         Behavior: Behavior normal.         Thought Content: Thought content normal.         Judgment: Judgment normal.       Assessment:       1. Chronic fatigue    2. Type II diabetes mellitus with neurological manifestations    3. Essential hypertension    4. Acquired hypothyroidism    5. Encounter for osteoporosis screening in asymptomatic postmenopausal patient    6. Vitamin D deficiency    7. Unsteadiness on feet    8. Dysuria        Plan:     1- labs on Friday  2- dexa scan  3- eye exam today  4- f/u with Dr. Sutton in 3 months  5- urinalysis and microalbumin today  -    Chronic fatigue  -     Vitamin D; Future; Expected date: 10/18/2022  -     Vitamin B12; Future; Expected date: 10/18/2022  -     Urinalysis, Reflex to Urine Culture Urine, Clean Catch    Type II diabetes mellitus with neurological manifestations  -     CBC Auto Differential; Future; Expected date: 10/18/2022  -     Comprehensive Metabolic Panel; Future; Expected date: 10/18/2022  -     Microalbumin/Creatinine Ratio, Urine; Future; Expected date: 10/19/2022  -     Diabetic Eye Screening Photo; Future  -     Vitamin B12; Future; Expected date: 10/18/2022    Essential hypertension  -     CBC Auto Differential; Future; Expected date: 10/18/2022  -     Comprehensive Metabolic Panel; Future; Expected date: 10/18/2022    Acquired hypothyroidism  -      CBC Auto Differential; Future; Expected date: 10/18/2022  -     Comprehensive Metabolic Panel; Future; Expected date: 10/18/2022  -     TSH; Future; Expected date: 10/18/2022  -     T4, Free; Future; Expected date: 10/18/2022    Encounter for osteoporosis screening in asymptomatic postmenopausal patient  -     DXA Bone Density Spine And Hip; Future; Expected date: 10/18/2022    Vitamin D deficiency  -     Vitamin D; Future; Expected date: 10/18/2022    Unsteadiness on feet  -     Vitamin B12; Future; Expected date: 10/18/2022  -     Urinalysis, Reflex to Urine Culture Urine, Clean Catch    Dysuria  -     Urinalysis, Reflex to Urine Culture Urine, Clean Catch      Risks, benefits, and side effects were discussed with the patient. All questions were answered to the fullest satisfaction of the patient, and pt verbalized understanding and agreement to treatment plan. Pt was to call with any new or worsening symptoms, or present to the ER.

## 2022-10-21 ENCOUNTER — TELEPHONE (OUTPATIENT)
Dept: FAMILY MEDICINE | Facility: CLINIC | Age: 82
End: 2022-10-21
Payer: MEDICARE

## 2022-10-21 ENCOUNTER — INFUSION (OUTPATIENT)
Dept: INFUSION THERAPY | Facility: HOSPITAL | Age: 82
End: 2022-10-21
Payer: MEDICARE

## 2022-10-21 VITALS
DIASTOLIC BLOOD PRESSURE: 65 MMHG | OXYGEN SATURATION: 99 % | BODY MASS INDEX: 22.28 KG/M2 | HEART RATE: 77 BPM | RESPIRATION RATE: 18 BRPM | TEMPERATURE: 98 F | WEIGHT: 118 LBS | HEIGHT: 61 IN | SYSTOLIC BLOOD PRESSURE: 133 MMHG

## 2022-10-21 DIAGNOSIS — R53.81 OTHER MALAISE: ICD-10-CM

## 2022-10-21 DIAGNOSIS — E55.9 VITAMIN D DEFICIENCY: ICD-10-CM

## 2022-10-21 DIAGNOSIS — R53.82 CHRONIC FATIGUE: ICD-10-CM

## 2022-10-21 DIAGNOSIS — R26.81 UNSTEADINESS ON FEET: ICD-10-CM

## 2022-10-21 DIAGNOSIS — E03.9 ACQUIRED HYPOTHYROIDISM: ICD-10-CM

## 2022-10-21 DIAGNOSIS — E03.9 ACQUIRED HYPOTHYROIDISM: Primary | ICD-10-CM

## 2022-10-21 DIAGNOSIS — E11.49 TYPE II DIABETES MELLITUS WITH NEUROLOGICAL MANIFESTATIONS: ICD-10-CM

## 2022-10-21 DIAGNOSIS — Z85.3 HISTORY OF BILATERAL BREAST CANCER: Primary | ICD-10-CM

## 2022-10-21 DIAGNOSIS — I10 ESSENTIAL HYPERTENSION: ICD-10-CM

## 2022-10-21 LAB
25(OH)D3+25(OH)D2 SERPL-MCNC: 39 NG/ML (ref 30–96)
ALBUMIN SERPL BCP-MCNC: 3.9 G/DL (ref 3.5–5.2)
ALP SERPL-CCNC: 86 U/L (ref 55–135)
ALT SERPL W/O P-5'-P-CCNC: 11 U/L (ref 10–44)
ANION GAP SERPL CALC-SCNC: 12 MMOL/L (ref 8–16)
AST SERPL-CCNC: 17 U/L (ref 10–40)
BASOPHILS # BLD AUTO: 0.03 K/UL (ref 0–0.2)
BASOPHILS NFR BLD: 0.5 % (ref 0–1.9)
BILIRUB SERPL-MCNC: 0.8 MG/DL (ref 0.1–1)
BUN SERPL-MCNC: 21 MG/DL (ref 8–23)
CALCIUM SERPL-MCNC: 9.7 MG/DL (ref 8.7–10.5)
CHLORIDE SERPL-SCNC: 104 MMOL/L (ref 95–110)
CO2 SERPL-SCNC: 23 MMOL/L (ref 23–29)
CREAT SERPL-MCNC: 1 MG/DL (ref 0.5–1.4)
DIFFERENTIAL METHOD: ABNORMAL
EOSINOPHIL # BLD AUTO: 0.2 K/UL (ref 0–0.5)
EOSINOPHIL NFR BLD: 3.8 % (ref 0–8)
ERYTHROCYTE [DISTWIDTH] IN BLOOD BY AUTOMATED COUNT: 13.4 % (ref 11.5–14.5)
EST. GFR  (NO RACE VARIABLE): 55.6 ML/MIN/1.73 M^2
GLUCOSE SERPL-MCNC: 150 MG/DL (ref 70–110)
HCT VFR BLD AUTO: 39.7 % (ref 37–48.5)
HGB BLD-MCNC: 13 G/DL (ref 12–16)
IMM GRANULOCYTES # BLD AUTO: 0.01 K/UL (ref 0–0.04)
IMM GRANULOCYTES NFR BLD AUTO: 0.2 % (ref 0–0.5)
LYMPHOCYTES # BLD AUTO: 0.7 K/UL (ref 1–4.8)
LYMPHOCYTES NFR BLD: 10.8 % (ref 18–48)
MCH RBC QN AUTO: 27.7 PG (ref 27–31)
MCHC RBC AUTO-ENTMCNC: 32.7 G/DL (ref 32–36)
MCV RBC AUTO: 85 FL (ref 82–98)
MONOCYTES # BLD AUTO: 0.5 K/UL (ref 0.3–1)
MONOCYTES NFR BLD: 7.8 % (ref 4–15)
NEUTROPHILS # BLD AUTO: 4.6 K/UL (ref 1.8–7.7)
NEUTROPHILS NFR BLD: 76.9 % (ref 38–73)
NRBC BLD-RTO: 0 /100 WBC
PLATELET # BLD AUTO: 177 K/UL (ref 150–450)
PMV BLD AUTO: 9.8 FL (ref 9.2–12.9)
POTASSIUM SERPL-SCNC: 3.7 MMOL/L (ref 3.5–5.1)
PROT SERPL-MCNC: 6.6 G/DL (ref 6–8.4)
RBC # BLD AUTO: 4.7 M/UL (ref 4–5.4)
SODIUM SERPL-SCNC: 139 MMOL/L (ref 136–145)
T4 FREE SERPL-MCNC: 1.34 NG/DL (ref 0.71–1.51)
TSH SERPL DL<=0.005 MIU/L-ACNC: 1.87 UIU/ML (ref 0.4–4)
VIT B12 SERPL-MCNC: 372 PG/ML (ref 210–950)
VIT B12 SERPL-MCNC: 372 PG/ML (ref 210–950)
WBC # BLD AUTO: 6 K/UL (ref 3.9–12.7)

## 2022-10-21 PROCEDURE — 84443 ASSAY THYROID STIM HORMONE: CPT | Performed by: NURSE PRACTITIONER

## 2022-10-21 PROCEDURE — 80053 COMPREHEN METABOLIC PANEL: CPT | Performed by: NURSE PRACTITIONER

## 2022-10-21 PROCEDURE — 85025 COMPLETE CBC W/AUTO DIFF WBC: CPT | Performed by: NURSE PRACTITIONER

## 2022-10-21 PROCEDURE — 82607 VITAMIN B-12: CPT | Performed by: NURSE PRACTITIONER

## 2022-10-21 PROCEDURE — 82306 VITAMIN D 25 HYDROXY: CPT | Performed by: NURSE PRACTITIONER

## 2022-10-21 PROCEDURE — 96523 IRRIG DRUG DELIVERY DEVICE: CPT

## 2022-10-21 PROCEDURE — 84439 ASSAY OF FREE THYROXINE: CPT | Performed by: NURSE PRACTITIONER

## 2022-10-21 RX ORDER — SODIUM CHLORIDE 0.9 % (FLUSH) 0.9 %
10 SYRINGE (ML) INJECTION
Status: DISCONTINUED | OUTPATIENT
Start: 2022-10-21 | End: 2022-10-21 | Stop reason: HOSPADM

## 2022-10-21 RX ORDER — HEPARIN 100 UNIT/ML
500 SYRINGE INTRAVENOUS
Status: CANCELLED | OUTPATIENT
Start: 2022-10-21

## 2022-10-21 RX ORDER — HEPARIN 100 UNIT/ML
500 SYRINGE INTRAVENOUS
Status: DISCONTINUED | OUTPATIENT
Start: 2022-10-21 | End: 2022-10-21 | Stop reason: HOSPADM

## 2022-10-21 RX ORDER — SODIUM CHLORIDE 0.9 % (FLUSH) 0.9 %
10 SYRINGE (ML) INJECTION
Status: CANCELLED | OUTPATIENT
Start: 2022-10-21

## 2022-10-21 NOTE — PLAN OF CARE
"Problem: Adult Inpatient Plan of Care  Goal: Plan of Care Review  Outcome: Ongoing, Progressing  Flowsheets (Taken 10/21/2022 0831)  Plan of Care Reviewed With: patient  /65 (BP Location: Left arm, Patient Position: Sitting)   Pulse 77   Temp 97.6 °F (36.4 °C) (Oral)   Resp 18   Ht 5' 1" (1.549 m)   Wt 53.5 kg (118 lb)   SpO2 99%   BMI 22.30 kg/m² Pleasant alert and oriented patient ambulated to clinic for monthly port flush with labs - pt tolerated well - discharged home with no concerns - pt to RTC 11/18       "

## 2022-11-15 ENCOUNTER — PATIENT MESSAGE (OUTPATIENT)
Dept: FAMILY MEDICINE | Facility: CLINIC | Age: 82
End: 2022-11-15
Payer: MEDICARE

## 2022-11-15 DIAGNOSIS — D68.59 HYPERCOAGULOPATHY: ICD-10-CM

## 2022-11-15 DIAGNOSIS — Z45.2 ENCOUNTER FOR CARE RELATED TO VASCULAR ACCESS PORT: Primary | ICD-10-CM

## 2022-11-17 ENCOUNTER — PATIENT MESSAGE (OUTPATIENT)
Dept: FAMILY MEDICINE | Facility: CLINIC | Age: 82
End: 2022-11-17
Payer: MEDICARE

## 2022-11-18 ENCOUNTER — INFUSION (OUTPATIENT)
Dept: INFUSION THERAPY | Facility: HOSPITAL | Age: 82
End: 2022-11-18
Attending: FAMILY MEDICINE
Payer: MEDICARE

## 2022-11-18 VITALS
HEART RATE: 96 BPM | HEIGHT: 61 IN | OXYGEN SATURATION: 97 % | RESPIRATION RATE: 18 BRPM | DIASTOLIC BLOOD PRESSURE: 60 MMHG | TEMPERATURE: 98 F | WEIGHT: 118 LBS | SYSTOLIC BLOOD PRESSURE: 136 MMHG | BODY MASS INDEX: 22.28 KG/M2

## 2022-11-18 DIAGNOSIS — Z85.3 HISTORY OF BILATERAL BREAST CANCER: ICD-10-CM

## 2022-11-18 DIAGNOSIS — Z45.2 ENCOUNTER FOR CARE RELATED TO VASCULAR ACCESS PORT: Primary | ICD-10-CM

## 2022-11-18 DIAGNOSIS — D68.59 HYPERCOAGULOPATHY: ICD-10-CM

## 2022-11-18 LAB
INR PPP: 1.1 (ref 0.8–1.2)
PROTHROMBIN TIME: 11.1 SEC (ref 9–12.5)

## 2022-11-18 PROCEDURE — A4216 STERILE WATER/SALINE, 10 ML: HCPCS | Performed by: FAMILY MEDICINE

## 2022-11-18 PROCEDURE — 36591 DRAW BLOOD OFF VENOUS DEVICE: CPT

## 2022-11-18 PROCEDURE — 25000003 PHARM REV CODE 250: Performed by: FAMILY MEDICINE

## 2022-11-18 PROCEDURE — 85610 PROTHROMBIN TIME: CPT | Performed by: FAMILY MEDICINE

## 2022-11-18 PROCEDURE — 96523 IRRIG DRUG DELIVERY DEVICE: CPT

## 2022-11-18 RX ORDER — SODIUM CHLORIDE 0.9 % (FLUSH) 0.9 %
10 SYRINGE (ML) INJECTION
Status: COMPLETED | OUTPATIENT
Start: 2022-11-18 | End: 2022-11-18

## 2022-11-18 RX ORDER — SODIUM CHLORIDE 0.9 % (FLUSH) 0.9 %
10 SYRINGE (ML) INJECTION
Status: CANCELLED | OUTPATIENT
Start: 2022-11-18

## 2022-11-18 RX ORDER — HEPARIN 100 UNIT/ML
500 SYRINGE INTRAVENOUS
Status: CANCELLED | OUTPATIENT
Start: 2022-11-18

## 2022-11-18 RX ORDER — HEPARIN 100 UNIT/ML
500 SYRINGE INTRAVENOUS
Status: DISCONTINUED | OUTPATIENT
Start: 2022-11-18 | End: 2022-11-18 | Stop reason: HOSPADM

## 2022-11-18 RX ADMIN — Medication 10 ML: at 08:11

## 2022-11-18 NOTE — NURSING
Implanted port accessed using sterile technique, flushed easily with adequate blood return.   Lab specimens drawn. Port flushed with NS, per protocol and de-accessed. Pt refused heparin flush. Pt tolerated well with no questions or complaints.

## 2022-11-22 PROCEDURE — G0180 PR HOME HEALTH MD CERTIFICATION: ICD-10-PCS | Mod: ,,, | Performed by: FAMILY MEDICINE

## 2022-11-22 PROCEDURE — G0180 MD CERTIFICATION HHA PATIENT: HCPCS | Mod: ,,, | Performed by: FAMILY MEDICINE

## 2022-11-28 ENCOUNTER — OFFICE VISIT (OUTPATIENT)
Dept: PODIATRY | Facility: CLINIC | Age: 82
End: 2022-11-28
Payer: MEDICARE

## 2022-11-28 VITALS
SYSTOLIC BLOOD PRESSURE: 128 MMHG | WEIGHT: 110 LBS | BODY MASS INDEX: 20.77 KG/M2 | HEART RATE: 71 BPM | HEIGHT: 61 IN | TEMPERATURE: 98 F | DIASTOLIC BLOOD PRESSURE: 62 MMHG

## 2022-11-28 DIAGNOSIS — E11.9 TYPE 2 DIABETES MELLITUS WITHOUT COMPLICATION, WITHOUT LONG-TERM CURRENT USE OF INSULIN: ICD-10-CM

## 2022-11-28 DIAGNOSIS — L60.0 INGROWN NAIL: Primary | ICD-10-CM

## 2022-11-28 DIAGNOSIS — E11.9 COMPREHENSIVE DIABETIC FOOT EXAMINATION, TYPE 2 DM, ENCOUNTER FOR: ICD-10-CM

## 2022-11-28 DIAGNOSIS — M19.071 OSTEOARTHRITIS OF RIGHT ANKLE AND FOOT: ICD-10-CM

## 2022-11-28 PROCEDURE — 99213 OFFICE O/P EST LOW 20 MIN: CPT | Mod: S$PBB,,, | Performed by: PODIATRIST

## 2022-11-28 PROCEDURE — 99999 PR PBB SHADOW E&M-EST. PATIENT-LVL IV: CPT | Mod: PBBFAC,,, | Performed by: PODIATRIST

## 2022-11-28 PROCEDURE — 99999 PR PBB SHADOW E&M-EST. PATIENT-LVL IV: ICD-10-PCS | Mod: PBBFAC,,, | Performed by: PODIATRIST

## 2022-11-28 PROCEDURE — 99213 PR OFFICE/OUTPT VISIT, EST, LEVL III, 20-29 MIN: ICD-10-PCS | Mod: S$PBB,,, | Performed by: PODIATRIST

## 2022-11-28 PROCEDURE — 99214 OFFICE O/P EST MOD 30 MIN: CPT | Mod: PBBFAC | Performed by: PODIATRIST

## 2022-11-28 NOTE — PROGRESS NOTES
Subjective:      Patient ID: Ursula Rodríguez is a 82 y.o. female.    Chief Complaint: Follow-up, Nail Problem, and Diabetes Mellitus   Patient presents today for diabetic evaluation she is currently on Eliquis and states she has a history of neuropathy that does bother her at night.      Review of Systems   Neurological:  Positive for numbness.   All other systems reviewed and are negative.     Constitutional    Pleasant, well-nourished, no distress, well oriented    Eyes         GLASSES    Cardiovascular          No chest pain, no shortness of breath    Respiratory           No cough, no congestion     Musculoskeletal        No muscle aches, no arthralgias/joint pain, no back pain, no swelling in the extremities            Objective:      Physical Exam  Vitals and nursing note reviewed.   Constitutional:       Appearance: Normal appearance.   Cardiovascular:      Pulses:           Dorsalis pedis pulses are 1+ on the right side and 1+ on the left side.        Posterior tibial pulses are 1+ on the right side and 1+ on the left side.   Pulmonary:      Effort: Pulmonary effort is normal.   Musculoskeletal:         General: Swelling, tenderness and signs of injury present.      Right foot: Deformity present.      Left foot: Deformity present.        Feet:    Feet:      Right foot:      Protective Sensation: 4 sites tested.   1 site sensed.     Skin integrity: Erythema and warmth present.      Toenail Condition: Right toenails are abnormally thick and ingrown. Fungal disease present.     Left foot:      Protective Sensation: 4 sites tested.   1 site sensed.     Toenail Condition: Left toenails are abnormally thick. Fungal disease present.  Skin:     Capillary Refill: Capillary refill takes more than 3 seconds.      Findings: Erythema present.   Neurological:      Mental Status: She is alert.      Sensory: Sensory deficit present.   Psychiatric:         Mood and Affect: Mood normal.         Behavior: Behavior normal.          Thought Content: Thought content normal.         Judgment: Judgment normal.     Vascular         Arterial Pulses Right: posterior tibialis 1/4, dorsalis pedis 1/4, normal CFT   Arterial Pulses Left: posterior tibialis 1/4, dorsalis pedis 1/4, normal CFT   No lower extremity edema bilateral   Pedal skin temperature and color are normal bilateral     Integumentary   Distal 5th digit left foot including nail and under this area is black due to dried blood.  Patient had a subungual hematoma  due to prolonged walking which eventually drained, no bogginess or fluctuance today.  There is discolored hyperkeratotic tissue encompassing the distal 5th digit.  When debriding this area there is healthy pink tissue underneath, nail remains well attached for now, no further evidence of abscess or cellulitis at this time.  Skin is in good condition        Neurological   Gross sensation intact, denies burning or tingling in feet    Musculoskeletal   Muscle Strength/Testing and Tone:  Intact-excellent for age,  normal tone bilateral   Joints, Bones, and Muscles: Limited ROM midfoot   Consistent with osteoarthritis related to age.  Mild arthritic and degenerative changes        Walks well unassisted          Assessment:       Encounter Diagnoses   Name Primary?    Ingrown nail Yes    Type 2 diabetes mellitus without complication, without long-term current use of insulin     Comprehensive diabetic foot examination, type 2 DM, encounter for     Osteoarthritis of right ankle and foot          Plan:       Ursula was seen today for follow-up, nail problem and diabetes mellitus.    Diagnoses and all orders for this visit:    Ingrown nail    Type 2 diabetes mellitus without complication, without long-term current use of insulin    Comprehensive diabetic foot examination, type 2 DM, encounter for    Osteoarthritis of right ankle and foot      Patient presents today for diabetic evaluation she is currently on Eliquis and states she has a  history of neuropathy that does bother her at night.    A complete diabetic evaluation was performed today patient does have findings consistent with diabetic related neuropathy.  Patient had several elongated ingrowing toenails I did debride these today I have advised the patient these were starting to become ingrown because of her neuropathy she was not having any pain or discomfort this needs to be monitored carefully.  Patient did have several ingrowing toenails especially the medial lateral border bilateral hallux the reserve were able to be trimmed and removed patient did not require a nail avulsion at this time.  Patient states while she does have noticeable neuropathy it is not to the point where she needs to be medicated for it and we will continue to monitor this.  Patient was in understanding and agreement with this diabetic education provided recommended follow-up is every 4-6 months unless patient has any problems questions or concerns I encouraged the patient to contact us for follow-up with any trauma to her feet.  Comprehensive diabetic evaluation performed.  Patient had previous injury to the dorsal aspect of the right foot much of the swelling has gone down the patient does have residual cyst with bone spurring on the top of the right foot I have advised her this is not going to completely resolve and she needs to make sure she does not wear shoes they are too tight or irritate this area.  Patient was made aware the only way to address this would be surgically and certainly that is not something we want to pursue.  Patient was in agreement with this.  Patient also had a pre ulcerative lesion on the 5th digit left this was non excisionally debrided.  Patient will be seen for follow-up in 3 month unless she has any problems questions or concerns sooner. This note was created using [employer] voice recognition software that occasionally misinterpreted phrases or words.

## 2022-12-07 ENCOUNTER — EXTERNAL HOME HEALTH (OUTPATIENT)
Dept: HOME HEALTH SERVICES | Facility: HOSPITAL | Age: 82
End: 2022-12-07
Payer: MEDICARE

## 2022-12-28 ENCOUNTER — PATIENT MESSAGE (OUTPATIENT)
Dept: FAMILY MEDICINE | Facility: CLINIC | Age: 82
End: 2022-12-28
Payer: MEDICARE

## 2022-12-28 DIAGNOSIS — D68.59 HYPERCOAGULOPATHY: Primary | ICD-10-CM

## 2022-12-29 ENCOUNTER — HOSPITAL ENCOUNTER (OUTPATIENT)
Dept: RADIOLOGY | Facility: HOSPITAL | Age: 82
Discharge: HOME | End: 2022-12-29
Attending: NURSE PRACTITIONER
Payer: MEDICARE

## 2022-12-29 ENCOUNTER — INFUSION (OUTPATIENT)
Dept: INFUSION THERAPY | Facility: HOSPITAL | Age: 82
End: 2022-12-29
Attending: FAMILY MEDICINE
Payer: MEDICARE

## 2022-12-29 VITALS
BODY MASS INDEX: 20.77 KG/M2 | WEIGHT: 110 LBS | SYSTOLIC BLOOD PRESSURE: 143 MMHG | HEART RATE: 77 BPM | RESPIRATION RATE: 18 BRPM | DIASTOLIC BLOOD PRESSURE: 64 MMHG | TEMPERATURE: 98 F | HEIGHT: 61 IN | OXYGEN SATURATION: 99 %

## 2022-12-29 DIAGNOSIS — Z78.0 ENCOUNTER FOR OSTEOPOROSIS SCREENING IN ASYMPTOMATIC POSTMENOPAUSAL PATIENT: ICD-10-CM

## 2022-12-29 DIAGNOSIS — D68.59 HYPERCOAGULOPATHY: Primary | ICD-10-CM

## 2022-12-29 DIAGNOSIS — Z13.820 ENCOUNTER FOR OSTEOPOROSIS SCREENING IN ASYMPTOMATIC POSTMENOPAUSAL PATIENT: ICD-10-CM

## 2022-12-29 DIAGNOSIS — Z85.3 HISTORY OF BILATERAL BREAST CANCER: ICD-10-CM

## 2022-12-29 LAB
INR PPP: 1.1 (ref 0.8–1.2)
PROTHROMBIN TIME: 11.4 SEC (ref 9–12.5)

## 2022-12-29 PROCEDURE — 63600175 PHARM REV CODE 636 W HCPCS: Performed by: FAMILY MEDICINE

## 2022-12-29 PROCEDURE — 85610 PROTHROMBIN TIME: CPT | Performed by: FAMILY MEDICINE

## 2022-12-29 PROCEDURE — 85250 CLOT FACTOR IX PTC/CHRSTMAS: CPT | Performed by: FAMILY MEDICINE

## 2022-12-29 PROCEDURE — 85335 FACTOR INHIBITOR TEST: CPT | Performed by: FAMILY MEDICINE

## 2022-12-29 PROCEDURE — A4216 STERILE WATER/SALINE, 10 ML: HCPCS | Performed by: FAMILY MEDICINE

## 2022-12-29 PROCEDURE — 77080 DEXA BONE DENSITY SPINE HIP: ICD-10-PCS | Mod: 26,,, | Performed by: RADIOLOGY

## 2022-12-29 PROCEDURE — 77080 DXA BONE DENSITY AXIAL: CPT | Mod: TC

## 2022-12-29 PROCEDURE — 96523 IRRIG DRUG DELIVERY DEVICE: CPT

## 2022-12-29 PROCEDURE — 77080 DXA BONE DENSITY AXIAL: CPT | Mod: 26,,, | Performed by: RADIOLOGY

## 2022-12-29 PROCEDURE — 25000003 PHARM REV CODE 250: Performed by: FAMILY MEDICINE

## 2022-12-29 RX ORDER — SODIUM CHLORIDE 0.9 % (FLUSH) 0.9 %
10 SYRINGE (ML) INJECTION
Status: CANCELLED | OUTPATIENT
Start: 2022-12-29

## 2022-12-29 RX ORDER — HEPARIN 100 UNIT/ML
500 SYRINGE INTRAVENOUS
Status: COMPLETED | OUTPATIENT
Start: 2022-12-29 | End: 2022-12-29

## 2022-12-29 RX ORDER — SODIUM CHLORIDE 0.9 % (FLUSH) 0.9 %
10 SYRINGE (ML) INJECTION
Status: COMPLETED | OUTPATIENT
Start: 2022-12-29 | End: 2022-12-29

## 2022-12-29 RX ORDER — HEPARIN 100 UNIT/ML
500 SYRINGE INTRAVENOUS
Status: CANCELLED | OUTPATIENT
Start: 2022-12-29

## 2022-12-29 RX ADMIN — Medication 10 ML: at 08:12

## 2022-12-29 RX ADMIN — Medication 500 UNITS: at 08:12

## 2022-12-29 NOTE — PLAN OF CARE
"Problem: Adult Inpatient Plan of Care  Goal: Plan of Care Review  12/29/2022 0822 by Yisel Roberson, RN  Flowsheets (Taken 12/29/2022 0822)  Plan of Care Reviewed With: patient  BP (!) 143/64 (BP Location: Left arm, Patient Position: Sitting)   Pulse 77   Temp 98 °F (36.7 °C) (Oral)   Resp 18   Ht 5' 1" (1.549 m)   Wt 49.9 kg (110 lb)   SpO2 99%   BMI 20.78 kg/m² Pleasant alert and oriented patient ambulated to clinic for monthly port flush with labs - pt tolerated well - discharged home with no concerns - pt to RTC 1/26/23.    "

## 2022-12-30 LAB — FACT IX ACT/NOR PPP: 93 % (ref 65–145)

## 2023-01-03 ENCOUNTER — TELEPHONE (OUTPATIENT)
Dept: FAMILY MEDICINE | Facility: CLINIC | Age: 83
End: 2023-01-03
Payer: MEDICARE

## 2023-01-03 ENCOUNTER — HOSPITAL ENCOUNTER (EMERGENCY)
Facility: HOSPITAL | Age: 83
Discharge: HOME | End: 2023-01-03
Attending: FAMILY MEDICINE
Payer: MEDICARE

## 2023-01-03 VITALS
SYSTOLIC BLOOD PRESSURE: 142 MMHG | HEIGHT: 63 IN | RESPIRATION RATE: 20 BRPM | WEIGHT: 105 LBS | OXYGEN SATURATION: 100 % | DIASTOLIC BLOOD PRESSURE: 63 MMHG | HEART RATE: 67 BPM | BODY MASS INDEX: 18.61 KG/M2 | TEMPERATURE: 99 F

## 2023-01-03 DIAGNOSIS — R53.1 WEAK: ICD-10-CM

## 2023-01-03 DIAGNOSIS — B34.9 ACUTE VIRAL SYNDROME: Primary | ICD-10-CM

## 2023-01-03 LAB
ALBUMIN SERPL BCP-MCNC: 4 G/DL (ref 3.5–5.2)
ALP SERPL-CCNC: 90 U/L (ref 55–135)
ALT SERPL W/O P-5'-P-CCNC: 17 U/L (ref 10–44)
ANION GAP SERPL CALC-SCNC: 11 MMOL/L (ref 8–16)
AST SERPL-CCNC: 22 U/L (ref 10–40)
BASOPHILS # BLD AUTO: 0.03 K/UL (ref 0–0.2)
BASOPHILS NFR BLD: 0.5 % (ref 0–1.9)
BILIRUB SERPL-MCNC: 0.6 MG/DL (ref 0.1–1)
BNP SERPL-MCNC: 66 PG/ML (ref 0–99)
BUN SERPL-MCNC: 17 MG/DL (ref 8–23)
CALCIUM SERPL-MCNC: 10.3 MG/DL (ref 8.7–10.5)
CHLORIDE SERPL-SCNC: 107 MMOL/L (ref 95–110)
CO2 SERPL-SCNC: 24 MMOL/L (ref 23–29)
CREAT SERPL-MCNC: 0.9 MG/DL (ref 0.5–1.4)
D DIMER PPP IA.FEU-MCNC: 0.64 MG/L FEU
DIFFERENTIAL METHOD: ABNORMAL
EOSINOPHIL # BLD AUTO: 0.2 K/UL (ref 0–0.5)
EOSINOPHIL NFR BLD: 3.7 % (ref 0–8)
ERYTHROCYTE [DISTWIDTH] IN BLOOD BY AUTOMATED COUNT: 13.3 % (ref 11.5–14.5)
EST. GFR  (NO RACE VARIABLE): >60 ML/MIN/1.73 M^2
GLUCOSE SERPL-MCNC: 129 MG/DL (ref 70–110)
HCT VFR BLD AUTO: 42.3 % (ref 37–48.5)
HCV AB SERPL QL IA: NORMAL
HGB BLD-MCNC: 14.1 G/DL (ref 12–16)
HIV 1+2 AB+HIV1 P24 AG SERPL QL IA: NORMAL
IMM GRANULOCYTES # BLD AUTO: 0.02 K/UL (ref 0–0.04)
IMM GRANULOCYTES NFR BLD AUTO: 0.3 % (ref 0–0.5)
LYMPHOCYTES # BLD AUTO: 0.6 K/UL (ref 1–4.8)
LYMPHOCYTES NFR BLD: 10.3 % (ref 18–48)
MCH RBC QN AUTO: 27.6 PG (ref 27–31)
MCHC RBC AUTO-ENTMCNC: 33.3 G/DL (ref 32–36)
MCV RBC AUTO: 83 FL (ref 82–98)
MONOCYTES # BLD AUTO: 0.5 K/UL (ref 0.3–1)
MONOCYTES NFR BLD: 7.9 % (ref 4–15)
NEUTROPHILS # BLD AUTO: 4.6 K/UL (ref 1.8–7.7)
NEUTROPHILS NFR BLD: 77.3 % (ref 38–73)
NRBC BLD-RTO: 0 /100 WBC
PLATELET # BLD AUTO: 171 K/UL (ref 150–450)
PMV BLD AUTO: 9.4 FL (ref 9.2–12.9)
POTASSIUM SERPL-SCNC: 3.8 MMOL/L (ref 3.5–5.1)
PROT SERPL-MCNC: 6.7 G/DL (ref 6–8.4)
RBC # BLD AUTO: 5.11 M/UL (ref 4–5.4)
SODIUM SERPL-SCNC: 142 MMOL/L (ref 136–145)
TROPONIN I SERPL DL<=0.01 NG/ML-MCNC: 0.01 NG/ML (ref 0–0.03)
WBC # BLD AUTO: 5.95 K/UL (ref 3.9–12.7)

## 2023-01-03 PROCEDURE — 71045 X-RAY EXAM CHEST 1 VIEW: CPT | Mod: TC

## 2023-01-03 PROCEDURE — 99285 EMERGENCY DEPT VISIT HI MDM: CPT | Mod: 25

## 2023-01-03 PROCEDURE — 71045 XR CHEST AP PORTABLE: ICD-10-PCS | Mod: 26,,, | Performed by: RADIOLOGY

## 2023-01-03 PROCEDURE — 96374 THER/PROPH/DIAG INJ IV PUSH: CPT

## 2023-01-03 PROCEDURE — 80053 COMPREHEN METABOLIC PANEL: CPT | Performed by: FAMILY MEDICINE

## 2023-01-03 PROCEDURE — 85025 COMPLETE CBC W/AUTO DIFF WBC: CPT | Performed by: FAMILY MEDICINE

## 2023-01-03 PROCEDURE — 63600175 PHARM REV CODE 636 W HCPCS

## 2023-01-03 PROCEDURE — 85379 FIBRIN DEGRADATION QUANT: CPT | Performed by: FAMILY MEDICINE

## 2023-01-03 PROCEDURE — 87389 HIV-1 AG W/HIV-1&-2 AB AG IA: CPT | Performed by: FAMILY MEDICINE

## 2023-01-03 PROCEDURE — 63600175 PHARM REV CODE 636 W HCPCS: Performed by: FAMILY MEDICINE

## 2023-01-03 PROCEDURE — 86803 HEPATITIS C AB TEST: CPT | Performed by: FAMILY MEDICINE

## 2023-01-03 PROCEDURE — 70450 CT HEAD/BRAIN W/O DYE: CPT | Mod: TC

## 2023-01-03 PROCEDURE — 71045 X-RAY EXAM CHEST 1 VIEW: CPT | Mod: 26,,, | Performed by: RADIOLOGY

## 2023-01-03 PROCEDURE — 93005 ELECTROCARDIOGRAM TRACING: CPT

## 2023-01-03 PROCEDURE — 70450 CT HEAD WITHOUT CONTRAST: ICD-10-PCS | Mod: 26,,, | Performed by: RADIOLOGY

## 2023-01-03 PROCEDURE — 93010 EKG 12-LEAD: ICD-10-PCS | Mod: ,,, | Performed by: INTERNAL MEDICINE

## 2023-01-03 PROCEDURE — 83880 ASSAY OF NATRIURETIC PEPTIDE: CPT | Performed by: FAMILY MEDICINE

## 2023-01-03 PROCEDURE — 84484 ASSAY OF TROPONIN QUANT: CPT | Performed by: FAMILY MEDICINE

## 2023-01-03 PROCEDURE — 93010 ELECTROCARDIOGRAM REPORT: CPT | Mod: ,,, | Performed by: INTERNAL MEDICINE

## 2023-01-03 PROCEDURE — 70450 CT HEAD/BRAIN W/O DYE: CPT | Mod: 26,,, | Performed by: RADIOLOGY

## 2023-01-03 RX ORDER — HEPARIN SODIUM,PORCINE/PF 10 UNIT/ML
10 SYRINGE (ML) INTRAVENOUS
Status: DISCONTINUED | OUTPATIENT
Start: 2023-01-03 | End: 2023-01-03 | Stop reason: HOSPADM

## 2023-01-03 RX ORDER — HEPARIN SODIUM,PORCINE/PF 10 UNIT/ML
SYRINGE (ML) INTRAVENOUS
Status: COMPLETED
Start: 2023-01-03 | End: 2023-01-03

## 2023-01-03 RX ADMIN — HEPARIN, PORCINE (PF) 10 UNIT/ML INTRAVENOUS SYRINGE 10 UNITS: at 12:01

## 2023-01-03 RX ADMIN — HEPARIN, PORCINE (PF) 10 UNIT/ML INTRAVENOUS SYRINGE: at 12:01

## 2023-01-03 NOTE — TELEPHONE ENCOUNTER
Spoke with patient. Patient feels her PT/INR is low. Pt reports headache, numbness in face, heart palpitations and states when checking Blood sugar she is unable to obtain enough blood. Advised pt to report to the ER as no appointment is available. Pt voiced understanding.

## 2023-01-03 NOTE — ED NOTES
In to see pt at this time. Pt aaox4. Pt has complaints of headache and vibrations in her abdomen along with numbness in her feet and face that started Saturday. Pt states pain of 2/10 in her head and states her headaches have been the worst of it. Pt has no other needs or complaints at this time.

## 2023-01-03 NOTE — TELEPHONE ENCOUNTER
----- Message from Xiomara Phillips sent at 1/3/2023  8:36 AM CST -----  Contact: pt at  [phone]  Type:  Same Day Appointment Request    Caller is requesting a same day appointment.  Caller declined first available appointment listed below.      Name of Caller:  pt  When is the first available appointment?  3/2/23  Symptoms:  Heart racing/bleeding issue  Best Call Back Number:  [phone]  Additional Information:   Pt states that she NEEDS to be seen today asap. Please call back to advise.

## 2023-01-03 NOTE — ED PROVIDER NOTES
Encounter Date: 1/3/2023       History     Chief Complaint   Patient presents with    Chest Pain    Headache     Midsternal (pt feels like her heart is racing)     83-year-old female presents to the ED with complaints of fatigue mild headache heaviness in the chest decreased appetite and some generalized weakness the symptoms developed over the last 48 hours has no sore throat no cough nose chills no dysuria and no known exposure to COVID-19 or influenza though both are high incidence in the community currently    Review of patient's allergies indicates:   Allergen Reactions    Iodine and iodide containing products     Ditropan [oxybutynin chloride] Palpitations and Other (See Comments)     Made patient weak     Past Medical History:   Diagnosis Date    Anemia     Breast cancer 1995    LEFT DIAGNOSED FIRST    Breast cancer 2013    Right     Diabetes mellitus     Factor V Leiden 1994    Hyperlipidemia 2017    Hypertension 2017    Hypothyroidism 2019    Osteoporosis     UNKNOWN DATE OF DX    Pacemaker 10/28/2021    Recurrent urinary tract infection     Vertigo 1994     Past Surgical History:   Procedure Laterality Date    BREAST SURGERY  , 2013, Nose cancer    CARDIAC VALVE REPLACEMENT       SECTION      EYE SURGERY  1918    HEMORRHOID SURGERY  1968    HERNIA REPAIR  1995    HYSTERECTOMY  1970    MASTECTOMY Right 1994    MASTECTOMY Left 2013    BREAST CANCER    TUMOR REMOVAL Left 1994    EAR     Family History   Problem Relation Age of Onset    Cancer Mother     Hypertension Mother     Heart disease Mother     No Known Problems Sister     Diabetes Brother     Hypertension Brother     Cancer Maternal Grandmother     Lung disease Father     Heart disease Father     Diabetes Father     Cancer Brother     Diabetes Brother     Hypertension Brother     Stroke Brother     Hypertension Brother     Stroke Brother      Social History     Tobacco Use    Smoking status: Never    Smokeless tobacco: Never    Substance Use Topics    Alcohol use: No     Comment: SPECIAL OCCASIONS LIKE ZACHERY    Drug use: No     Review of Systems   Constitutional:  Positive for fatigue. Negative for fever.   HENT:  Negative for sore throat.    Respiratory:  Negative for shortness of breath.    Cardiovascular:  Positive for chest pain.   Gastrointestinal:  Negative for nausea.   Genitourinary:  Negative for dysuria.   Musculoskeletal:  Negative for back pain.   Skin:  Negative for rash.   Neurological:  Positive for dizziness and headaches. Negative for weakness.   Hematological:  Does not bruise/bleed easily.     Physical Exam     Initial Vitals   BP Pulse Resp Temp SpO2   01/03/23 0955 01/03/23 0955 01/03/23 0955 01/03/23 0958 01/03/23 0955   (!) 147/69 90 20 98.8 °F (37.1 °C) 100 %      MAP       --                Physical Exam    Nursing note and vitals reviewed.  Constitutional: She appears well-developed and well-nourished. She is not diaphoretic. No distress.   HENT:   Head: Normocephalic and atraumatic.   Right Ear: External ear normal.   Left Ear: External ear normal.   Eyes: Pupils are equal, round, and reactive to light. Right eye exhibits no discharge. Left eye exhibits no discharge.   Neck: No tracheal deviation present. No JVD present.   Cardiovascular:      Exam reveals no friction rub.       No murmur heard.  Pulmonary/Chest: No stridor. No respiratory distress. She has no wheezes. She has no rales.   Abdominal: Bowel sounds are normal. She exhibits no distension.   Musculoskeletal:         General: Normal range of motion.     Neurological: She is alert.   Skin: Skin is warm.   Psychiatric: She has a normal mood and affect.       ED Course   Procedures  Labs Reviewed   CBC W/ AUTO DIFFERENTIAL - Abnormal; Notable for the following components:       Result Value    Lymph # 0.6 (*)     Gran % 77.3 (*)     Lymph % 10.3 (*)     All other components within normal limits    Narrative:     Release to patient->Immediate    COMPREHENSIVE METABOLIC PANEL - Abnormal; Notable for the following components:    Glucose 129 (*)     All other components within normal limits    Narrative:     Release to patient->Immediate   D DIMER, QUANTITATIVE - Abnormal; Notable for the following components:    D-Dimer 0.64 (*)     All other components within normal limits    Narrative:     Release to patient->Immediate   TROPONIN I    Narrative:     Release to patient->Immediate   B-TYPE NATRIURETIC PEPTIDE    Narrative:     Release to patient->Immediate   HIV 1 / 2 ANTIBODY   HEPATITIS C ANTIBODY     EKG Readings: (Independently Interpreted)   Initial Reading: No STEMI. Previous EKG: Compared with most recent EKG Previous EKG Date: July 2019. Rhythm: Normal Sinus Rhythm. Heart Rate: 97. Ectopy: No Ectopy. Conduction: LBBB. ST Segments: Normal ST Segments. T Waves: Normal. Other Impression: Unchanged from prior EKG     Imaging Results              CT Head Without Contrast (Final result)  Result time 01/03/23 11:34:31      Final result by Kailash Acosta MD (01/03/23 11:34:31)                   Impression:      Cortical atrophy with extensive periventricular deep white matter change consistent with advanced chronic small vessel ischemic disease.      Electronically signed by: Kailash Acosta  Date:    01/03/2023  Time:    11:34               Narrative:    EXAMINATION:  CT HEAD WITHOUT CONTRAST    CLINICAL HISTORY:  Headache, sudden, severe;    TECHNIQUE:  Low dose axial images were obtained through the head.  Coronal and sagittal reformations were also performed. Contrast was not administered.    COMPARISON:  CT 02/22/2022.    FINDINGS:  There is no acute hemorrhage or infarction.  There is cortical atrophy.  There are extensive periventricular deep white matter changes consistent with advanced chronic small vessel ischemic disease.  Chronic benign basal ganglia calcification.    No extra-axial fluid collections.  Ventricles are normal in size, shape and  configuration.  The basal cisterns are patent.    The imaged paranasal sinuses and ethmoid air cells are well aerated.    The mastoid air cells and middle ears are normally pneumatized.                                       X-Ray Chest AP Portable (Final result)  Result time 01/03/23 10:21:58      Final result by Kailash Acosta MD (01/03/23 10:21:58)                   Impression:      1. No acute chest disease  2. Left IJ Port-A-Cath.      Electronically signed by: Kailash Acosta  Date:    01/03/2023  Time:    10:21               Narrative:    EXAMINATION:  XR CHEST AP PORTABLE    CLINICAL HISTORY:  sob;    TECHNIQUE:  Portable view of the chest was performed.    COMPARISON:  07/04/2019.    FINDINGS:  Mild chronic interstitial change.  No focal consolidation.  Heart size normal.  Trachea midline.  Calcified aortic plaque.    Bony thorax intact with none mild demineralization.  Left IJ Port-A-Cath remains in satisfactory position.  Numerous surgical clips project over the diaphragm and left lower lobe.                                       Medications   heparin, porcine (PF) injection 10 Units (10 Units Intravenous Given 1/3/23 1241)                              Clinical Impression:   Final diagnoses:  [R53.1] Weak  [B34.9] Acute viral syndrome (Primary)        ED Disposition Condition    Discharge Stable          ED Prescriptions    None       Follow-up Information    None          Primo Riddle MD  01/03/23 1083

## 2023-01-03 NOTE — ED TRIAGE NOTES
Pt presents to the er with c/o headache, chest pain, and reports of her heart racing. Pt states she is having midsternal chest pain

## 2023-01-10 ENCOUNTER — OFFICE VISIT (OUTPATIENT)
Dept: HEMATOLOGY/ONCOLOGY | Facility: CLINIC | Age: 83
End: 2023-01-10
Payer: MEDICARE

## 2023-01-10 VITALS
HEIGHT: 63 IN | SYSTOLIC BLOOD PRESSURE: 178 MMHG | BODY MASS INDEX: 20.27 KG/M2 | RESPIRATION RATE: 16 BRPM | WEIGHT: 114.38 LBS | HEART RATE: 70 BPM | DIASTOLIC BLOOD PRESSURE: 69 MMHG | TEMPERATURE: 98 F

## 2023-01-10 DIAGNOSIS — Z85.3 HISTORY OF BILATERAL BREAST CANCER: Primary | ICD-10-CM

## 2023-01-10 PROCEDURE — 99214 PR OFFICE/OUTPT VISIT, EST, LEVL IV, 30-39 MIN: ICD-10-PCS | Mod: S$GLB,,, | Performed by: INTERNAL MEDICINE

## 2023-01-10 PROCEDURE — 99214 OFFICE O/P EST MOD 30 MIN: CPT | Mod: S$GLB,,, | Performed by: INTERNAL MEDICINE

## 2023-01-10 NOTE — PROGRESS NOTES
[facility] In Office Hematology Oncology Subsequent Encounter Note.    1/10/23    Subjective:      Patient ID:   Ursula Rodríguez  83 y.o. female  1940  MD Herman      Chief Complaint:   Breast cancer follow up    HPI:  83 y.o. female with diagnosis of R Breast cancer,2013.  R breast partial mastectomy followed by [facility].  Sentinal node +.  Stage II dx.  CTA x's 3/4, arimidex, Tamoxifen 2015.    Hx of L breast cancer , had partial mastectomy and reconstruction.    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  .  On Eliquis 2.5 mg BID prophylaxis.    Previously she had decreased appetite, weight down 15#.  Began trial of Periactin 4 mg BID for appetite, weight gain.  She stopped her periactin, says appetite is improved, her daughter agrees with her.    On Eliquis 2.5 mg 1 BID.  Refill antivert for vertigo sx prn.   Bone Density, osteoporosis.     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  Hx HTN, DM, GERD,cholesterol, DJD, CVA, TIA., thyroid dx, bradycardia.    Appendectomy, Partial hystectomy, L knee surgery several times, hernia repair, Tumor removed L ear drum .  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  S/P pacemaker placement    Allergy iodine, IVP dye.    Dad COPD, DM.  Mom Breast cancer, DM, increased HR.  Sister DM.  Brother DM, PVD.  Sister colon cancer, heart dx.  Sister Parkinson's Dx.  Daughter colon cancer.    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   LN +.  M3    ROS:   GEN: normal without any fever, night sweats or weight loss  HEENT: See HPI  CV: normal with no CP, SOB, PND, BUSTOS or orthopnea  PULM: normal with no SOB, cough, hemoptysis, sputum or pleuritic pain  GI: normal with no abdominal pain, nausea, vomiting, constipation, diarrhea, melanotic stools, BRBPR, or hematemesis  : normal with no hematuria, dysuria  BREAST: See HPI  SKIN: normal with no rash, erythema, bruising, or swelling     Past Medical History:   Diagnosis Date    Anemia     Breast cancer     LEFT DIAGNOSED FIRST    Breast cancer  2013    Right     Diabetes mellitus     Factor V Leiden 1994    Hyperlipidemia 2017    Hypertension 2017    Hypothyroidism 2019    Osteoporosis     UNKNOWN DATE OF DX    Pacemaker 10/28/2021    Recurrent urinary tract infection     Vertigo      Past Surgical History:   Procedure Laterality Date    BREAST SURGERY  , , Nose cancer    CARDIAC VALVE REPLACEMENT       SECTION      EYE SURGERY  1918    HEMORRHOID SURGERY  1968    HERNIA REPAIR  1995    HYSTERECTOMY  1970    MASTECTOMY Right 1994    MASTECTOMY Left 2013    BREAST CANCER    TUMOR REMOVAL Left 1994    EAR       Review of patient's allergies indicates:   Allergen Reactions    Iodine and iodide containing products     Ditropan [oxybutynin chloride] Palpitations and Other (See Comments)     Made patient weak     Social History     Socioeconomic History    Marital status:     Highest education level: Master's degree (e.g., MA, MS, [place], MEd, MSW, ANDRE)   Occupational History    Occupation: Phd x 3-   Tobacco Use    Smoking status: Never    Smokeless tobacco: Never   Substance and Sexual Activity    Alcohol use: No     Comment: SPECIAL OCCASIONS LIKE ZACHERY    Drug use: No    Sexual activity: Not Currently     Partners: Male     Social Determinants of Health     Financial Resource Strain: Low Risk     Difficulty of Paying Living Expenses: Not hard at all   Food Insecurity: No Food Insecurity    Worried About Running Out of Food in the Last Year: Never true    Ran Out of Food in the Last Year: Never true   Transportation Needs: No Transportation Needs    Lack of Transportation (Medical): No    Lack of Transportation (Non-Medical): No   Physical Activity: Insufficiently Active    Days of Exercise per Week: 5 days    Minutes of Exercise per Session: 20 min   Stress: Stress Concern Present    Feeling of Stress : To some extent   Social Connections: Unknown    Frequency of Communication with Friends and Family: More than three times a  week    Frequency of Social Gatherings with Friends and Family: More than three times a week    Active Member of Clubs or Organizations: Yes    Attends Club or Organization Meetings: 1 to 4 times per year    Marital Status:    Housing Stability: Low Risk     Unable to Pay for Housing in the Last Year: No    Number of Places Lived in the Last Year: 1    Unstable Housing in the Last Year: No         Current Outpatient Medications:     apixaban (ELIQUIS) 2.5 mg Tab, Take 1 tablet (2.5 mg total) by mouth 2 (two) times daily., Disp: 60 tablet, Rfl: 11    cloNIDine (CATAPRES) 0.1 MG tablet, Take 1 tablet (0.1 mg total) by mouth 2 (two) times daily as needed (BP > 160/90)., Disp: 60 tablet, Rfl: 11    levothyroxine (SYNTHROID) 50 MCG tablet, TAKE 1 TABLET BY MOUTH ONCE DAILY, Disp: 90 tablet, Rfl: 3    losartan (COZAAR) 25 MG tablet, TAKE ONE-HALF TABLET BY  MOUTH ONCE DAILY, Disp: 45 tablet, Rfl: 3    magnesium oxide (MAG-OX) 400 mg (241.3 mg magnesium) tablet, Take 400 mg by mouth once daily., Disp: , Rfl:     metFORMIN (GLUCOPHAGE-XR) 500 MG ER 24hr tablet, TAKE 1 TABLET BY MOUTH EVERY MORNING WITH FOOD FOR 2 WEEKS THEN INCREASE TO 1 TABLET BY MOUTH TWICE DAILY WITH FOOD, Disp: 180 tablet, Rfl: 3    metoprolol tartrate (LOPRESSOR) 25 MG tablet, Take 0.5 tablets (12.5 mg total) by mouth 2 (two) times daily., Disp: 90 tablet, Rfl: 3    multivitamin capsule, Take 1 capsule by mouth once daily., Disp: , Rfl:     NIFEdipine (PROCARDIA-XL) 30 MG (OSM) 24 hr tablet, Take 1 tablet (30 MG ) by mouth twice daily, Disp: 180 tablet, Rfl: 0    potassium 99 mg Tab, Take by mouth once daily., Disp: , Rfl:     simvastatin (ZOCOR) 20 MG tablet, 1 tablet in the evening, Disp: , Rfl:     spironolactone (ALDACTONE) 25 MG tablet, 1 tablet., Disp: , Rfl:     cyproheptadine (PERIACTIN) 4 mg tablet, Take 1 tablet (4 mg total) by mouth 2 (two) times daily with meals., Disp: 180 tablet, Rfl: 1    meclizine (ANTIVERT) 25 mg tablet, Take 1  "tablet (25 mg total) by mouth 3 (three) times daily as needed for Dizziness., Disp: 90 tablet, Rfl: 6          Objective:   Vitals:  Blood pressure (!) 178/69, pulse 70, temperature 98 °F (36.7 °C), resp. rate 16, height 5' 3" (1.6 m), weight 51.9 kg (114 lb 6.4 oz).    Physical Examination:   GEN: no apparent distress, comfortable  HEAD: atraumatic and normocephalic  EYES: no pallor, no icterus  ENT:  no pharyngeal erythema, external ears WNL; no nasal discharge  NECK: no masses, thyroid normal, trachea midline, no LAD/LN's, supple  CV: RRR with no murmur; normal pulse; normal S1 and S2; no pedal edema  CHEST: Normal respiratory effort; CTAB; normal breath sounds; no wheeze or crackles  ABDOM: nontender and nondistended; soft;  no rebound/guarding  MUSC/Skeletal: ROM normal; no crepitus; joints normal; no deformities   EXTREM: multiple spider veins L & R leg  SKIN: multiple ecchemoses on forearms, poor skin turgor.  : no cvat  NEURO: grossly intact; motor/sensory WNL;  no tremors  PSYCH: normal mood, affect and behavior  LYMPH: normal cervical, supraclavicular, axillary and groin LN's  BREASTS: L 'breast" post op change, without palpable mass.  R chest NT, post operative change, no palpable mass    CAT head small vessel dx.  MRI of L/S spine DDD, DJD.    Current studies:  Bone Scan, no metastases seen.  But intense uptake at R foot.  Concern for occult fx or osteomyelitis, she is following up with Dr. Almaguer on this.    CT of chest abdomin and pelvis, multiple 2-3 mm nodules in lungs, no previous CT for comparison.  Significance?  Will plan to repeat CT of chest in 3 months.  No definitive metastatic dx seen.    B 12 372.  Assessment:   (1) 83 y.o. female with diagnosis of  Bilateral breast cancer, S/P treatment as above.  CISCO.  Observe for now.     (2)Weight loss 15#, decreased appetite.  Periactin 4 mg BID.  Now weight better, up to 122#.  She stopped periactin.    (3)S/P pacemaker placement for " bradycardia.    (4)Hx TIA, F5L, on Eliquis 2.5 mg prophylaxis long term.    RTC 6 months.                         Answers submitted by the patient for this visit:  Review of Systems Questionnaire (Submitted on 1/9/2023)  appetite change : No  unexpected weight change: Yes  mouth sores: No  visual disturbance: No  cough: Yes  shortness of breath: No  chest pain: No  abdominal pain: No  diarrhea: No  frequency: Yes  back pain: Yes  rash: No  headaches: Yes  adenopathy: Yes  nervous/ anxious: Yes

## 2023-01-11 ENCOUNTER — TELEPHONE (OUTPATIENT)
Dept: FAMILY MEDICINE | Facility: CLINIC | Age: 83
End: 2023-01-11
Payer: MEDICARE

## 2023-01-11 DIAGNOSIS — M81.0 OSTEOPOROSIS, UNSPECIFIED OSTEOPOROSIS TYPE, UNSPECIFIED PATHOLOGICAL FRACTURE PRESENCE: Primary | ICD-10-CM

## 2023-01-11 RX ORDER — ALENDRONATE SODIUM 70 MG/1
70 TABLET ORAL
Qty: 4 TABLET | Refills: 11 | Status: SHIPPED | OUTPATIENT
Start: 2023-01-11 | End: 2024-01-03 | Stop reason: SDUPTHER

## 2023-01-12 ENCOUNTER — PATIENT MESSAGE (OUTPATIENT)
Dept: FAMILY MEDICINE | Facility: CLINIC | Age: 83
End: 2023-01-12
Payer: MEDICARE

## 2023-01-12 NOTE — TELEPHONE ENCOUNTER
----- Message from Yesenia Richter NP sent at 1/8/2023  3:14 PM CST -----  Please see Dexa results and message to pt. ThanksMirtha

## 2023-01-26 ENCOUNTER — TELEPHONE (OUTPATIENT)
Dept: PODIATRY | Facility: CLINIC | Age: 83
End: 2023-01-26
Payer: MEDICARE

## 2023-01-26 ENCOUNTER — INFUSION (OUTPATIENT)
Dept: INFUSION THERAPY | Facility: HOSPITAL | Age: 83
End: 2023-01-26
Attending: FAMILY MEDICINE
Payer: MEDICARE

## 2023-01-26 VITALS
SYSTOLIC BLOOD PRESSURE: 162 MMHG | HEART RATE: 86 BPM | DIASTOLIC BLOOD PRESSURE: 71 MMHG | RESPIRATION RATE: 19 BRPM | TEMPERATURE: 98 F

## 2023-01-26 DIAGNOSIS — Z85.3 HISTORY OF BILATERAL BREAST CANCER: Primary | ICD-10-CM

## 2023-01-26 PROCEDURE — 96523 IRRIG DRUG DELIVERY DEVICE: CPT

## 2023-01-26 RX ORDER — HEPARIN 100 UNIT/ML
500 SYRINGE INTRAVENOUS
Status: DISCONTINUED | OUTPATIENT
Start: 2023-01-26 | End: 2023-01-26 | Stop reason: HOSPADM

## 2023-01-26 RX ORDER — SODIUM CHLORIDE 0.9 % (FLUSH) 0.9 %
10 SYRINGE (ML) INJECTION
Status: DISCONTINUED | OUTPATIENT
Start: 2023-01-26 | End: 2023-01-26 | Stop reason: HOSPADM

## 2023-01-26 RX ORDER — HEPARIN 100 UNIT/ML
500 SYRINGE INTRAVENOUS
Status: CANCELLED | OUTPATIENT
Start: 2023-01-26

## 2023-01-26 RX ORDER — SODIUM CHLORIDE 0.9 % (FLUSH) 0.9 %
10 SYRINGE (ML) INJECTION
Status: CANCELLED | OUTPATIENT
Start: 2023-01-26

## 2023-01-26 NOTE — PLAN OF CARE
Pleasant alert and oriented patient ambulated independently to clinic for port flush - pt tolerated well - discharged home with no concerns - pt to RTC in one month.

## 2023-01-26 NOTE — TELEPHONE ENCOUNTER
Patient states she couldn't update her medications in portal but wanted us to know everything is the same and she will be at her appt next week.

## 2023-01-31 ENCOUNTER — OFFICE VISIT (OUTPATIENT)
Dept: FAMILY MEDICINE | Facility: CLINIC | Age: 83
End: 2023-01-31
Payer: MEDICARE

## 2023-01-31 VITALS
WEIGHT: 120 LBS | OXYGEN SATURATION: 98 % | HEIGHT: 63 IN | BODY MASS INDEX: 21.26 KG/M2 | SYSTOLIC BLOOD PRESSURE: 122 MMHG | HEART RATE: 73 BPM | DIASTOLIC BLOOD PRESSURE: 72 MMHG | TEMPERATURE: 98 F | RESPIRATION RATE: 16 BRPM

## 2023-01-31 DIAGNOSIS — N18.31 CHRONIC KIDNEY DISEASE, STAGE 3A: ICD-10-CM

## 2023-01-31 DIAGNOSIS — I70.0 AORTIC ATHEROSCLEROSIS: ICD-10-CM

## 2023-01-31 DIAGNOSIS — I44.2 THIRD DEGREE AV BLOCK: ICD-10-CM

## 2023-01-31 DIAGNOSIS — D68.59 HYPERCOAGULOPATHY: ICD-10-CM

## 2023-01-31 DIAGNOSIS — G63 NEUROPATHY DUE TO MEDICAL CONDITION: ICD-10-CM

## 2023-01-31 DIAGNOSIS — E11.49 TYPE II DIABETES MELLITUS WITH NEUROLOGICAL MANIFESTATIONS: ICD-10-CM

## 2023-01-31 DIAGNOSIS — F41.9 ANXIETY: Primary | ICD-10-CM

## 2023-01-31 PROCEDURE — 99999 PR PBB SHADOW E&M-EST. PATIENT-LVL IV: CPT | Mod: PBBFAC,,, | Performed by: FAMILY MEDICINE

## 2023-01-31 PROCEDURE — 99214 OFFICE O/P EST MOD 30 MIN: CPT | Mod: PBBFAC | Performed by: FAMILY MEDICINE

## 2023-01-31 PROCEDURE — 99214 PR OFFICE/OUTPT VISIT, EST, LEVL IV, 30-39 MIN: ICD-10-PCS | Mod: S$PBB,,, | Performed by: FAMILY MEDICINE

## 2023-01-31 PROCEDURE — 99999 PR PBB SHADOW E&M-EST. PATIENT-LVL IV: ICD-10-PCS | Mod: PBBFAC,,, | Performed by: FAMILY MEDICINE

## 2023-01-31 PROCEDURE — 99214 OFFICE O/P EST MOD 30 MIN: CPT | Mod: S$PBB,,, | Performed by: FAMILY MEDICINE

## 2023-01-31 RX ORDER — LANOLIN ALCOHOL/MO/W.PET/CERES
100 CREAM (GRAM) TOPICAL DAILY
COMMUNITY

## 2023-01-31 RX ORDER — MECLIZINE HYDROCHLORIDE 50 MG/1
50 TABLET ORAL 3 TIMES DAILY PRN
COMMUNITY
End: 2023-05-17 | Stop reason: SDUPTHER

## 2023-01-31 RX ORDER — SERTRALINE HYDROCHLORIDE 25 MG/1
25 TABLET, FILM COATED ORAL DAILY
Qty: 30 TABLET | Refills: 11 | Status: SHIPPED | OUTPATIENT
Start: 2023-01-31 | End: 2024-01-03 | Stop reason: SDUPTHER

## 2023-01-31 NOTE — PROGRESS NOTES
[facility] Clinic Note    Subjective      Ms. Rodríguez is a 83 y.o. female who presents to clinic for Anxiety    Patient has been having more anxiety.    [facility] Ursula has a past medical history of Anemia (), Breast cancer (), Breast cancer (), Diabetes mellitus, Factor V Leiden (), Hyperlipidemia (), Hypertension (), Hypothyroidism (), Osteoporosis, Pacemaker (10/28/2021), Recurrent urinary tract infection, and Vertigo ().   [facility] Ursula has a past surgical history that includes Hemorrhoid surgery (); Hysterectomy (); Tumor removal (Left, ); Mastectomy (Right, ); Mastectomy (Left, );  section; Eye surgery (); Breast surgery (, , Nose cancer); Hernia repair (); and Cardiac valve replacement ().    Ursula's family history includes Cancer in her brother, maternal grandmother, and mother; Diabetes in her brother, brother, and father; Heart disease in her father and mother; Hypertension in her brother, brother, brother, and mother; Lung disease in her father; No Known Problems in her sister; Stroke in her brother and brother.   ANUJ Vergara reports that she has never smoked. She has never used smokeless tobacco. She reports that she does not drink alcohol and does not use drugs.   JAC Vergara is allergic to iodine and iodide containing products and ditropan [oxybutynin chloride].   JAYME Vergara has a current medication list which includes the following prescription(s): alendronate, apixaban, clonidine, cyanocobalamin, ergocalciferol (vitamin d2), levothyroxine, losartan, magnesium oxide, meclizine, metformin, metoprolol tartrate, multivitamin, nifedipine, potassium, simvastatin, spironolactone, cyproheptadine, and sertraline.     Review of Systems   Constitutional:  Negative for chills and fever.   Respiratory:  Negative for shortness of breath.    Cardiovascular:  Negative for chest pain.   Skin:  Negative for rash.   Psychiatric/Behavioral:  The  "patient is nervous/anxious.    Objective     /72 (BP Location: Left arm, Patient Position: Sitting, BP Method: Medium (Manual))   Pulse 73   Temp 98.4 °F (36.9 °C) (Temporal)   Resp 16   Ht 5' 3" (1.6 m)   Wt 54.4 kg (120 lb)   SpO2 98%   BMI 21.26 kg/m²     Physical Exam  Vitals and nursing note reviewed.   Constitutional:       General: She is not in acute distress.     Appearance: Normal appearance. She is well-developed. She is not diaphoretic.   HENT:      Head: Normocephalic and atraumatic.      Right Ear: External ear normal.      Left Ear: External ear normal.   Eyes:      General:         Right eye: No discharge.         Left eye: No discharge.   Cardiovascular:      Rate and Rhythm: Normal rate and regular rhythm.      Heart sounds: Normal heart sounds.   Pulmonary:      Effort: Pulmonary effort is normal.      Breath sounds: Normal breath sounds. No wheezing or rales.   Skin:     General: Skin is warm and dry.   Neurological:      Mental Status: She is alert and oriented to person, place, and time. Mental status is at baseline.   Psychiatric:         Mood and Affect: Mood normal.         Behavior: Behavior normal.         Thought Content: Thought content normal.         Judgment: Judgment normal.      Assessment/Plan     1. Anxiety  sertraline (ZOLOFT) 25 MG tablet      2. Aortic atherosclerosis        3. Type II diabetes mellitus with neurological manifestations        4. Hypercoagulopathy        5. Third degree AV block        6. Chronic kidney disease, stage 3a        7. Neuropathy due to medical condition          Will start low-dose Zoloft to see if that helps with anxiety.  Other chronic conditions are controlled.  Continue current medications as prescribed.  Follow-up in 3 months.  Diabetes- will have home health help with medication management and education.    Future Appointments   Date Time Provider Department Center   2/1/2023  9:30 AM Sander Almaguer DPM [facility]   2/23/2023  8:30 AM [facility] OP THERAPEUTICS 6 [facility]   5/31/2023 10:20 AM Vicente Sutton MD [facility]   7/11/2023 10:45 AM STEFFI Garcia MD [facility] HEM ONC [facility] Adriano Sutton MD  Family Medicine [facility]

## 2023-02-01 ENCOUNTER — OFFICE VISIT (OUTPATIENT)
Dept: PODIATRY | Facility: CLINIC | Age: 83
End: 2023-02-01
Payer: MEDICARE

## 2023-02-01 VITALS
HEIGHT: 63 IN | BODY MASS INDEX: 21.26 KG/M2 | WEIGHT: 120 LBS | RESPIRATION RATE: 20 BRPM | DIASTOLIC BLOOD PRESSURE: 73 MMHG | HEART RATE: 68 BPM | SYSTOLIC BLOOD PRESSURE: 138 MMHG

## 2023-02-01 DIAGNOSIS — E11.9 TYPE 2 DIABETES MELLITUS WITHOUT COMPLICATION, WITHOUT LONG-TERM CURRENT USE OF INSULIN: Primary | ICD-10-CM

## 2023-02-01 DIAGNOSIS — E11.9 COMPREHENSIVE DIABETIC FOOT EXAMINATION, TYPE 2 DM, ENCOUNTER FOR: ICD-10-CM

## 2023-02-01 DIAGNOSIS — L60.0 INGROWN NAIL: ICD-10-CM

## 2023-02-01 PROCEDURE — 99999 PR PBB SHADOW E&M-EST. PATIENT-LVL V: ICD-10-PCS | Mod: PBBFAC,,, | Performed by: PODIATRIST

## 2023-02-01 PROCEDURE — 99213 PR OFFICE/OUTPT VISIT, EST, LEVL III, 20-29 MIN: ICD-10-PCS | Mod: S$PBB,,, | Performed by: PODIATRIST

## 2023-02-01 PROCEDURE — 99999 PR PBB SHADOW E&M-EST. PATIENT-LVL V: CPT | Mod: PBBFAC,,, | Performed by: PODIATRIST

## 2023-02-01 PROCEDURE — 99213 OFFICE O/P EST LOW 20 MIN: CPT | Mod: S$PBB,,, | Performed by: PODIATRIST

## 2023-02-01 PROCEDURE — 99215 OFFICE O/P EST HI 40 MIN: CPT | Mod: PBBFAC | Performed by: PODIATRIST

## 2023-02-04 NOTE — PROGRESS NOTES
Subjective:      Patient ID: Ursula Rodríguez is a 83 y.o. female.    Chief Complaint: Diabetes Mellitus, Diabetic Foot Exam, and Nail Care     Patient presents today for diabetic evaluation she is currently on Eliquis and states she has a history of neuropathy that does bother her at night.      Review of Systems   Neurological:  Positive for numbness.   All other systems reviewed and are negative.     Constitutional    Pleasant, well-nourished, no distress, well oriented    Eyes         GLASSES    Cardiovascular          No chest pain, no shortness of breath    Respiratory           No cough, no congestion     Musculoskeletal        No muscle aches, no arthralgias/joint pain, no back pain, no swelling in the extremities            Objective:      Physical Exam  Vitals and nursing note reviewed.   Constitutional:       Appearance: Normal appearance.   Cardiovascular:      Pulses:           Dorsalis pedis pulses are 1+ on the right side and 1+ on the left side.        Posterior tibial pulses are 1+ on the right side and 1+ on the left side.   Pulmonary:      Effort: Pulmonary effort is normal.   Musculoskeletal:         General: Swelling, tenderness and signs of injury present.      Right foot: Deformity present.      Left foot: Deformity present.        Feet:    Feet:      Right foot:      Protective Sensation: 4 sites tested.   1 site sensed.     Skin integrity: Erythema and warmth present.      Toenail Condition: Right toenails are abnormally thick and ingrown. Fungal disease present.     Left foot:      Protective Sensation: 4 sites tested.   1 site sensed.     Toenail Condition: Left toenails are abnormally thick. Fungal disease present.  Skin:     Capillary Refill: Capillary refill takes more than 3 seconds.      Findings: Erythema present.   Neurological:      Mental Status: She is alert.      Sensory: Sensory deficit present.   Psychiatric:         Mood and Affect: Mood normal.         Behavior: Behavior  normal.         Thought Content: Thought content normal.         Judgment: Judgment normal.     Vascular         Arterial Pulses Right: posterior tibialis 1/4, dorsalis pedis 1/4, normal CFT   Arterial Pulses Left: posterior tibialis 1/4, dorsalis pedis 1/4, normal CFT   No lower extremity edema bilateral   Pedal skin temperature and color are normal bilateral     Integumentary   Distal 5th digit left foot including nail and under this area is black due to dried blood.  Patient had a subungual hematoma  due to prolonged walking which eventually drained, no bogginess or fluctuance today.  There is discolored hyperkeratotic tissue encompassing the distal 5th digit.  When debriding this area there is healthy pink tissue underneath, nail remains well attached for now, no further evidence of abscess or cellulitis at this time.  Skin is in good condition        Neurological   Gross sensation intact, denies burning or tingling in feet    Musculoskeletal   Muscle Strength/Testing and Tone:  Intact-excellent for age,  normal tone bilateral   Joints, Bones, and Muscles: Limited ROM midfoot   Consistent with osteoarthritis related to age.  Mild arthritic and degenerative changes        Walks well unassisted                                        Assessment:       Encounter Diagnoses   Name Primary?    Type 2 diabetes mellitus without complication, without long-term current use of insulin Yes    Comprehensive diabetic foot examination, type 2 DM, encounter for     Ingrown nail          Plan:       Ursula was seen today for diabetes mellitus, diabetic foot exam and nail care.    Diagnoses and all orders for this visit:    Type 2 diabetes mellitus without complication, without long-term current use of insulin    Comprehensive diabetic foot examination, type 2 DM, encounter for    Ingrown nail      Patient presents today for diabetic evaluation she is currently on Eliquis and states she has a history of neuropathy that does bother  her at night.    A complete diabetic evaluation was performed today patient does have findings consistent with diabetic related neuropathy.  Patient had several elongated ingrowing toenails I did debride these today I have advised the patient these were starting to become ingrown because of her neuropathy she was not having any pain or discomfort this needs to be monitored carefully.  Patient did have several ingrowing toenails especially the medial lateral border bilateral hallux the reserve were able to be trimmed and removed patient did not require a nail avulsion at this time.  Patient states while she does have noticeable neuropathy it is not to the point where she needs to be medicated for it and we will continue to monitor this.  Patient was in understanding and agreement with this diabetic education provided recommended follow-up is every 4-6 months unless patient has any problems questions or concerns I encouraged the patient to contact us for follow-up with any trauma to her feet.  Comprehensive diabetic evaluation performed.  Patient had previous injury to the dorsal aspect of the right foot much of the swelling has gone down the patient does have residual cyst with bone spurring on the top of the right foot I have advised her this is not going to completely resolve and she needs to make sure she does not wear shoes they are too tight or irritate this area.  Patient was made aware the only way to address this would be surgically and certainly that is not something we want to pursue.  Patient was in agreement with this.  Patient also had a pre ulcerative lesion on the 5th digit left this was non excisionally debrided.  Patient will be seen for follow-up in 3 month unless she has any problems questions or concerns sooner. This note was created using [employer] voice recognition software that occasionally misinterpreted phrases or words.

## 2023-02-22 ENCOUNTER — PATIENT MESSAGE (OUTPATIENT)
Dept: FAMILY MEDICINE | Facility: CLINIC | Age: 83
End: 2023-02-22
Payer: MEDICARE

## 2023-02-23 ENCOUNTER — INFUSION (OUTPATIENT)
Dept: INFUSION THERAPY | Facility: HOSPITAL | Age: 83
End: 2023-02-23
Attending: FAMILY MEDICINE
Payer: MEDICARE

## 2023-02-23 VITALS
SYSTOLIC BLOOD PRESSURE: 141 MMHG | OXYGEN SATURATION: 100 % | RESPIRATION RATE: 16 BRPM | DIASTOLIC BLOOD PRESSURE: 65 MMHG | HEART RATE: 75 BPM | BODY MASS INDEX: 20.24 KG/M2 | TEMPERATURE: 98 F | HEIGHT: 62 IN | WEIGHT: 110 LBS

## 2023-02-23 DIAGNOSIS — D68.59 HYPERCOAGULOPATHY: Primary | ICD-10-CM

## 2023-02-23 DIAGNOSIS — Z85.3 HISTORY OF BILATERAL BREAST CANCER: ICD-10-CM

## 2023-02-23 LAB
INR PPP: 1.1 (ref 0.8–1.2)
PROTHROMBIN TIME: 11.1 SEC (ref 9–12.5)

## 2023-02-23 PROCEDURE — 63600175 PHARM REV CODE 636 W HCPCS: Performed by: FAMILY MEDICINE

## 2023-02-23 PROCEDURE — 25000003 PHARM REV CODE 250: Performed by: FAMILY MEDICINE

## 2023-02-23 PROCEDURE — A4216 STERILE WATER/SALINE, 10 ML: HCPCS | Performed by: FAMILY MEDICINE

## 2023-02-23 PROCEDURE — 96523 IRRIG DRUG DELIVERY DEVICE: CPT

## 2023-02-23 PROCEDURE — 85610 PROTHROMBIN TIME: CPT | Performed by: FAMILY MEDICINE

## 2023-02-23 RX ORDER — HEPARIN 100 UNIT/ML
500 SYRINGE INTRAVENOUS
Status: CANCELLED | OUTPATIENT
Start: 2023-02-23

## 2023-02-23 RX ORDER — HEPARIN 100 UNIT/ML
500 SYRINGE INTRAVENOUS
Status: COMPLETED | OUTPATIENT
Start: 2023-02-23 | End: 2023-02-23

## 2023-02-23 RX ORDER — SODIUM CHLORIDE 0.9 % (FLUSH) 0.9 %
10 SYRINGE (ML) INJECTION
Status: CANCELLED | OUTPATIENT
Start: 2023-02-23

## 2023-02-23 RX ORDER — SODIUM CHLORIDE 0.9 % (FLUSH) 0.9 %
10 SYRINGE (ML) INJECTION
Status: COMPLETED | OUTPATIENT
Start: 2023-02-23 | End: 2023-02-23

## 2023-02-23 RX ADMIN — Medication 10 ML: at 08:02

## 2023-02-23 RX ADMIN — HEPARIN 500 UNITS: 100 SYRINGE at 08:02

## 2023-03-23 ENCOUNTER — INFUSION (OUTPATIENT)
Dept: INFUSION THERAPY | Facility: HOSPITAL | Age: 83
End: 2023-03-23
Attending: FAMILY MEDICINE
Payer: MEDICARE

## 2023-03-23 VITALS — SYSTOLIC BLOOD PRESSURE: 138 MMHG | DIASTOLIC BLOOD PRESSURE: 63 MMHG | HEART RATE: 76 BPM | TEMPERATURE: 98 F

## 2023-03-23 DIAGNOSIS — Z85.3 HISTORY OF BILATERAL BREAST CANCER: Primary | ICD-10-CM

## 2023-03-23 PROCEDURE — 96523 IRRIG DRUG DELIVERY DEVICE: CPT

## 2023-03-23 RX ORDER — HEPARIN 100 UNIT/ML
500 SYRINGE INTRAVENOUS
Status: DISCONTINUED | OUTPATIENT
Start: 2023-03-23 | End: 2023-03-23 | Stop reason: HOSPADM

## 2023-03-23 RX ORDER — SODIUM CHLORIDE 0.9 % (FLUSH) 0.9 %
10 SYRINGE (ML) INJECTION
Status: DISCONTINUED | OUTPATIENT
Start: 2023-03-23 | End: 2023-03-23 | Stop reason: HOSPADM

## 2023-03-23 RX ORDER — HEPARIN 100 UNIT/ML
500 SYRINGE INTRAVENOUS
Status: CANCELLED | OUTPATIENT
Start: 2023-03-23

## 2023-03-23 RX ORDER — SODIUM CHLORIDE 0.9 % (FLUSH) 0.9 %
10 SYRINGE (ML) INJECTION
Status: CANCELLED | OUTPATIENT
Start: 2023-03-23

## 2023-03-23 NOTE — PLAN OF CARE
Pleasant alert and oriented patient ambulated to clinic for port flush - pt tolerated well - discharged home with no concerns - pt to RTC in one month

## 2023-04-19 ENCOUNTER — TELEPHONE (OUTPATIENT)
Dept: PODIATRY | Facility: CLINIC | Age: 83
End: 2023-04-19
Payer: MEDICARE

## 2023-04-19 NOTE — TELEPHONE ENCOUNTER
Patient stated that this message was supposed to go to Dr. Sutton's staff, not to this clinic. Patient wanted to know when she could get an order for her labs to be done. Advised patient that there are already orders in and she can schedule her labs. Patient verbalized understanding.    ----- Message from Ly Andre sent at 4/19/2023 10:56 AM CDT -----  Who Called: Patient     Symptoms (please be specific):  bladder problem and have feet burning      How long has patient had these symptoms: 3-4 weeks    Pharmacy name and phone #:       Best Call Back Number: [phone]      Additional Information: pt also have appt to gte port flushed on 4/20 and pt want to get blood work for 5/1 appt. Pls advise.

## 2023-04-20 ENCOUNTER — INFUSION (OUTPATIENT)
Dept: INFUSION THERAPY | Facility: HOSPITAL | Age: 83
End: 2023-04-20
Attending: FAMILY MEDICINE
Payer: MEDICARE

## 2023-04-20 ENCOUNTER — TELEPHONE (OUTPATIENT)
Dept: FAMILY MEDICINE | Facility: CLINIC | Age: 83
End: 2023-04-20
Payer: MEDICARE

## 2023-04-20 VITALS
TEMPERATURE: 98 F | SYSTOLIC BLOOD PRESSURE: 144 MMHG | DIASTOLIC BLOOD PRESSURE: 66 MMHG | RESPIRATION RATE: 18 BRPM | HEART RATE: 77 BPM | OXYGEN SATURATION: 99 %

## 2023-04-20 DIAGNOSIS — R53.83 FATIGUE, UNSPECIFIED TYPE: Primary | ICD-10-CM

## 2023-04-20 DIAGNOSIS — Z85.3 HISTORY OF BILATERAL BREAST CANCER: Primary | ICD-10-CM

## 2023-04-20 PROCEDURE — A4216 STERILE WATER/SALINE, 10 ML: HCPCS | Performed by: FAMILY MEDICINE

## 2023-04-20 PROCEDURE — 96523 IRRIG DRUG DELIVERY DEVICE: CPT

## 2023-04-20 PROCEDURE — 25000003 PHARM REV CODE 250: Performed by: FAMILY MEDICINE

## 2023-04-20 RX ORDER — SODIUM CHLORIDE 0.9 % (FLUSH) 0.9 %
10 SYRINGE (ML) INJECTION
Status: CANCELLED | OUTPATIENT
Start: 2023-04-20

## 2023-04-20 RX ORDER — SODIUM CHLORIDE 0.9 % (FLUSH) 0.9 %
10 SYRINGE (ML) INJECTION
Status: COMPLETED | OUTPATIENT
Start: 2023-04-20 | End: 2023-04-20

## 2023-04-20 RX ORDER — HEPARIN 100 UNIT/ML
500 SYRINGE INTRAVENOUS
Status: CANCELLED | OUTPATIENT
Start: 2023-04-20

## 2023-04-20 RX ADMIN — Medication 10 ML: at 08:04

## 2023-04-20 NOTE — TELEPHONE ENCOUNTER
Pt called yesterday, yet message sent to wrong office.   Spoke with pt. Pt reports having feet issues(edema), feels she has decreased kidney function. C/o headaches. Pt requesting blood work.   Pt was informed by in correct office blood work orders were in chart, yet no blood work in chart. Please advise.

## 2023-04-20 NOTE — TELEPHONE ENCOUNTER
----- Message from Kamala Casarez sent at 4/20/2023  9:56 AM CDT -----  Who Called: Pt    What is the request in detail: Requesting call back to discuss going in for port flush and no orders were in for her. Please advise    Can the clinic reply by [facility]? No    Best Call Back Number: [phone]      Additional Information:

## 2023-04-20 NOTE — TELEPHONE ENCOUNTER
I spoke with patient. Patient did not have labs placed in patient chart as she was informed yesterday. Please if you are unsure if orders are in, please send to PCP office.     Pt does not wish to schedule as blood can only be taking from her port, yet already had port flush. Pt will awaiting next port flush to have blood work completed.   Encouraged pt to contact office if she has further questions or concerns.

## 2023-04-20 NOTE — NURSING
Implanted port accessed using sterile technique, flushed easily with adequate blood return. Port flushed with NS per protocol and de-accessed. Pt tolerated well with no questions or complaints.

## 2023-05-01 ENCOUNTER — OFFICE VISIT (OUTPATIENT)
Dept: PODIATRY | Facility: CLINIC | Age: 83
End: 2023-05-01
Payer: MEDICARE

## 2023-05-01 VITALS
HEART RATE: 77 BPM | HEIGHT: 62 IN | WEIGHT: 110 LBS | BODY MASS INDEX: 20.24 KG/M2 | SYSTOLIC BLOOD PRESSURE: 129 MMHG | DIASTOLIC BLOOD PRESSURE: 79 MMHG

## 2023-05-01 DIAGNOSIS — E11.49 TYPE II DIABETES MELLITUS WITH NEUROLOGICAL MANIFESTATIONS: ICD-10-CM

## 2023-05-01 DIAGNOSIS — L60.0 INGROWN NAIL: Primary | ICD-10-CM

## 2023-05-01 DIAGNOSIS — E11.9 TYPE 2 DIABETES MELLITUS WITHOUT COMPLICATION, WITHOUT LONG-TERM CURRENT USE OF INSULIN: ICD-10-CM

## 2023-05-01 DIAGNOSIS — E11.9 COMPREHENSIVE DIABETIC FOOT EXAMINATION, TYPE 2 DM, ENCOUNTER FOR: ICD-10-CM

## 2023-05-01 PROCEDURE — 99999 PR PBB SHADOW E&M-EST. PATIENT-LVL IV: ICD-10-PCS | Mod: PBBFAC,,, | Performed by: PODIATRIST

## 2023-05-01 PROCEDURE — 99213 PR OFFICE/OUTPT VISIT, EST, LEVL III, 20-29 MIN: ICD-10-PCS | Mod: S$PBB,,, | Performed by: PODIATRIST

## 2023-05-01 PROCEDURE — 99213 OFFICE O/P EST LOW 20 MIN: CPT | Mod: S$PBB,,, | Performed by: PODIATRIST

## 2023-05-01 PROCEDURE — 99214 OFFICE O/P EST MOD 30 MIN: CPT | Mod: PBBFAC | Performed by: PODIATRIST

## 2023-05-01 PROCEDURE — 99999 PR PBB SHADOW E&M-EST. PATIENT-LVL IV: CPT | Mod: PBBFAC,,, | Performed by: PODIATRIST

## 2023-05-02 NOTE — PROGRESS NOTES
Subjective:      Patient ID: Ursula Rodríguez is a 83 y.o. female.    Chief Complaint: No chief complaint on file.     Patient presents today for diabetic evaluation she is currently on Eliquis and states she has a history of neuropathy that does bother her at night.      Review of Systems   Neurological:  Positive for numbness.   All other systems reviewed and are negative.     Constitutional    Pleasant, well-nourished, no distress, well oriented    Eyes         GLASSES    Cardiovascular          No chest pain, no shortness of breath    Respiratory           No cough, no congestion     Musculoskeletal        No muscle aches, no arthralgias/joint pain, no back pain, no swelling in the extremities            Objective:      Physical Exam  Vitals and nursing note reviewed.   Constitutional:       Appearance: Normal appearance.   Cardiovascular:      Pulses:           Dorsalis pedis pulses are 1+ on the right side and 1+ on the left side.        Posterior tibial pulses are 1+ on the right side and 1+ on the left side.   Pulmonary:      Effort: Pulmonary effort is normal.   Musculoskeletal:         General: Swelling, tenderness and signs of injury present.      Right foot: Deformity present.      Left foot: Deformity present.        Feet:    Feet:      Right foot:      Protective Sensation: 4 sites tested.   1 site sensed.     Skin integrity: Erythema and warmth present.      Toenail Condition: Right toenails are abnormally thick and ingrown. Fungal disease present.     Left foot:      Protective Sensation: 4 sites tested.   1 site sensed.     Toenail Condition: Left toenails are abnormally thick. Fungal disease present.  Skin:     Capillary Refill: Capillary refill takes more than 3 seconds.      Findings: Erythema present.   Neurological:      Mental Status: She is alert.      Sensory: Sensory deficit present.   Psychiatric:         Mood and Affect: Mood normal.         Behavior: Behavior normal.         Thought  Content: Thought content normal.         Judgment: Judgment normal.     Vascular         Arterial Pulses Right: posterior tibialis 1/4, dorsalis pedis 1/4, normal CFT   Arterial Pulses Left: posterior tibialis 1/4, dorsalis pedis 1/4, normal CFT   No lower extremity edema bilateral   Pedal skin temperature and color are normal bilateral     Integumentary   Distal 5th digit left foot including nail and under this area is black due to dried blood.  Patient had a subungual hematoma  due to prolonged walking which eventually drained, no bogginess or fluctuance today.  There is discolored hyperkeratotic tissue encompassing the distal 5th digit.  When debriding this area there is healthy pink tissue underneath, nail remains well attached for now, no further evidence of abscess or cellulitis at this time.  Skin is in good condition        Neurological   Gross sensation intact, denies burning or tingling in feet    Musculoskeletal   Muscle Strength/Testing and Tone:  Intact-excellent for age,  normal tone bilateral   Joints, Bones, and Muscles: Limited ROM midfoot   Consistent with osteoarthritis related to age.  Mild arthritic and degenerative changes        Walks well unassisted            Assessment:       Encounter Diagnoses   Name Primary?    Ingrown nail Yes    Type 2 diabetes mellitus without complication, without long-term current use of insulin     Comprehensive diabetic foot examination, type 2 DM, encounter for     Type II diabetes mellitus with neurological manifestations          Plan:       Diagnoses and all orders for this visit:    Ingrown nail    Type 2 diabetes mellitus without complication, without long-term current use of insulin    Comprehensive diabetic foot examination, type 2 DM, encounter for    Type II diabetes mellitus with neurological manifestations        Patient presents today for diabetic evaluation she is currently on Eliquis and states she has a history of neuropathy that does bother her at  night.    A complete diabetic evaluation was performed today patient does have findings consistent with diabetic related neuropathy.  Patient had several elongated ingrowing toenails I did debride these today I have advised the patient these were starting to become ingrown because of her neuropathy she was not having any pain or discomfort this needs to be monitored carefully.  Patient did have several ingrowing toenails especially the medial lateral border bilateral hallux the reserve were able to be trimmed and removed patient did not require a nail avulsion at this time.  Patient states while she does have noticeable neuropathy it is not to the point where she needs to be medicated for it and we will continue to monitor this.  Patient was in understanding and agreement with this diabetic education provided recommended follow-up is every 4-6 months unless patient has any problems questions or concerns I encouraged the patient to contact us for follow-up with any trauma to her feet.  Comprehensive at the end of the office visit I had discussed the patient's diabetic neuropathy there were you with her she states it is definitely gotten worse it is bothering her all the time but especially bad at night it is affecting her sleep.  I did discuss the application of Qutenza topical medication applied in the office to address the neuropathy this needs to be applied every 3 months.  Patient states that she would like to try this medication I will set her up for a 2 week follow-up will get medication ordered and we can apply this in the office.  Patient understands this typically needs to be reapplied every 3 months and can take several applications before she sees positive results and decreased neuropathy.  Diabetic evaluation performed.   Patient also had a pre ulcerative lesion on the 5th digit left this was non excisionally debrided.  Patient will be seen for follow-up in 3 month unless she has any problems questions or  concerns sooner. This note was created using [employer] voice recognition software that occasionally misinterpreted phrases or words.

## 2023-05-17 ENCOUNTER — OFFICE VISIT (OUTPATIENT)
Dept: PODIATRY | Facility: CLINIC | Age: 83
End: 2023-05-17
Payer: MEDICARE

## 2023-05-17 VITALS
DIASTOLIC BLOOD PRESSURE: 72 MMHG | HEART RATE: 72 BPM | SYSTOLIC BLOOD PRESSURE: 137 MMHG | WEIGHT: 107 LBS | HEIGHT: 62 IN | BODY MASS INDEX: 19.69 KG/M2

## 2023-05-17 DIAGNOSIS — M19.071 OSTEOARTHRITIS OF RIGHT ANKLE AND FOOT: ICD-10-CM

## 2023-05-17 DIAGNOSIS — E11.49 TYPE II DIABETES MELLITUS WITH NEUROLOGICAL MANIFESTATIONS: Primary | ICD-10-CM

## 2023-05-17 DIAGNOSIS — E11.9 COMPREHENSIVE DIABETIC FOOT EXAMINATION, TYPE 2 DM, ENCOUNTER FOR: ICD-10-CM

## 2023-05-17 PROCEDURE — 99999 PR PBB SHADOW E&M-EST. PATIENT-LVL V: CPT | Mod: PBBFAC,,, | Performed by: PODIATRIST

## 2023-05-17 PROCEDURE — 99213 OFFICE O/P EST LOW 20 MIN: CPT | Mod: 25,S$PBB,, | Performed by: PODIATRIST

## 2023-05-17 PROCEDURE — 99999 PR PBB SHADOW E&M-EST. PATIENT-LVL V: ICD-10-PCS | Mod: PBBFAC,,, | Performed by: PODIATRIST

## 2023-05-17 PROCEDURE — 64999 UNLISTED PX NERVOUS SYSTEM: CPT | Mod: PBBFAC | Performed by: PODIATRIST

## 2023-05-17 PROCEDURE — 99215 OFFICE O/P EST HI 40 MIN: CPT | Mod: PBBFAC | Performed by: PODIATRIST

## 2023-05-17 PROCEDURE — 99213 PR OFFICE/OUTPT VISIT, EST, LEVL III, 20-29 MIN: ICD-10-PCS | Mod: 25,S$PBB,, | Performed by: PODIATRIST

## 2023-05-17 RX ORDER — MECLIZINE HYDROCHLORIDE 25 MG/1
TABLET ORAL
COMMUNITY
Start: 2023-04-29 | End: 2023-09-13 | Stop reason: SDUPTHER

## 2023-05-19 ENCOUNTER — INFUSION (OUTPATIENT)
Dept: INFUSION THERAPY | Facility: HOSPITAL | Age: 83
End: 2023-05-19
Attending: FAMILY MEDICINE
Payer: MEDICARE

## 2023-05-19 VITALS
RESPIRATION RATE: 16 BRPM | TEMPERATURE: 98 F | DIASTOLIC BLOOD PRESSURE: 68 MMHG | OXYGEN SATURATION: 98 % | SYSTOLIC BLOOD PRESSURE: 155 MMHG | HEART RATE: 84 BPM

## 2023-05-19 DIAGNOSIS — Z85.3 HISTORY OF BILATERAL BREAST CANCER: Primary | ICD-10-CM

## 2023-05-19 PROCEDURE — 96523 IRRIG DRUG DELIVERY DEVICE: CPT

## 2023-05-19 RX ORDER — SODIUM CHLORIDE 0.9 % (FLUSH) 0.9 %
10 SYRINGE (ML) INJECTION
Status: CANCELLED | OUTPATIENT
Start: 2023-05-19

## 2023-05-19 RX ORDER — HEPARIN 100 UNIT/ML
500 SYRINGE INTRAVENOUS
Status: CANCELLED | OUTPATIENT
Start: 2023-05-19

## 2023-05-19 RX ORDER — SODIUM CHLORIDE 0.9 % (FLUSH) 0.9 %
10 SYRINGE (ML) INJECTION
Status: DISCONTINUED | OUTPATIENT
Start: 2023-05-19 | End: 2023-05-19 | Stop reason: HOSPADM

## 2023-05-19 RX ORDER — HEPARIN 100 UNIT/ML
500 SYRINGE INTRAVENOUS
Status: DISCONTINUED | OUTPATIENT
Start: 2023-05-19 | End: 2023-05-19 | Stop reason: HOSPADM

## 2023-05-19 NOTE — PLAN OF CARE
Pleasant alert and oriented patient ambulated to clinic for port flush  - pt tolerated well - discharged hojme with no concerns - pt to RTC in one month.

## 2023-05-20 NOTE — PROGRESS NOTES
Subjective:      Patient ID: Ursula Rodríguez is a 83 y.o. female.    Chief Complaint: Nail Care and Foot Swelling     Patient presents today for diabetic evaluation she is currently on Eliquis and states she has a history of neuropathy that does bother her at night.      Review of Systems   Neurological:  Positive for numbness.   All other systems reviewed and are negative.     Constitutional    Pleasant, well-nourished, no distress, well oriented    Eyes         GLASSES    Cardiovascular          No chest pain, no shortness of breath    Respiratory           No cough, no congestion     Musculoskeletal        No muscle aches, no arthralgias/joint pain, no back pain, no swelling in the extremities            Objective:      Physical Exam  Vitals and nursing note reviewed.   Constitutional:       Appearance: Normal appearance.   Cardiovascular:      Pulses:           Dorsalis pedis pulses are 1+ on the right side and 1+ on the left side.        Posterior tibial pulses are 1+ on the right side and 1+ on the left side.   Pulmonary:      Effort: Pulmonary effort is normal.   Musculoskeletal:         General: Swelling, tenderness and signs of injury present.      Right foot: Deformity present.      Left foot: Deformity present.        Feet:    Feet:      Right foot:      Protective Sensation: 4 sites tested.   1 site sensed.     Skin integrity: Erythema and warmth present.      Toenail Condition: Right toenails are abnormally thick and ingrown. Fungal disease present.     Left foot:      Protective Sensation: 4 sites tested.   1 site sensed.     Toenail Condition: Left toenails are abnormally thick. Fungal disease present.  Skin:     Capillary Refill: Capillary refill takes more than 3 seconds.      Findings: Erythema present.   Neurological:      Mental Status: She is alert.      Sensory: Sensory deficit present.   Psychiatric:         Mood and Affect: Mood normal.         Behavior: Behavior normal.         Thought  Content: Thought content normal.         Judgment: Judgment normal.     Vascular         Arterial Pulses Right: posterior tibialis 1/4, dorsalis pedis 1/4, normal CFT   Arterial Pulses Left: posterior tibialis 1/4, dorsalis pedis 1/4, normal CFT   No lower extremity edema bilateral   Pedal skin temperature and color are normal bilateral     Integumentary   Distal 5th digit left foot including nail and under this area is black due to dried blood.  Patient had a subungual hematoma  due to prolonged walking which eventually drained, no bogginess or fluctuance today.  There is discolored hyperkeratotic tissue encompassing the distal 5th digit.  When debriding this area there is healthy pink tissue underneath, nail remains well attached for now, no further evidence of abscess or cellulitis at this time.  Skin is in good condition        Neurological   Gross sensation intact, denies burning or tingling in feet    Musculoskeletal   Muscle Strength/Testing and Tone:  Intact-excellent for age,  normal tone bilateral   Joints, Bones, and Muscles: Limited ROM midfoot   Consistent with osteoarthritis related to age.  Mild arthritic and degenerative changes        Walks well unassisted            Assessment:       Encounter Diagnoses   Name Primary?    Type II diabetes mellitus with neurological manifestations Yes    Comprehensive diabetic foot examination, type 2 DM, encounter for     Osteoarthritis of right ankle and foot          Plan:       Ursula was seen today for nail care and foot swelling.    Diagnoses and all orders for this visit:    Type II diabetes mellitus with neurological manifestations    Comprehensive diabetic foot examination, type 2 DM, encounter for    Osteoarthritis of right ankle and foot        Patient presents today for diabetic evaluation she is currently on Eliquis and states she has a history of neuropathy that does bother her at night.    A complete diabetic evaluation was performed today patient does  have findings consistent with diabetic related neuropathy.  Patient is presenting today for application of Qutenza to help control her diabetic related neuropathy which bothers her constantly and has gotten progressively worse.  Patient's feet were evaluated there is no open wounds or sores noted patient tolerated the appy application without issue and will be followed up with her normally scheduled diabetic evaluation appointment she does understand that some patients require a 2nd application in 3 months before seeing significant results of the medication.  This note was created using [employer] voice recognition software that occasionally misinterpreted phrases or words.    Qutenza sheets applied to the dorsal and plantar aspect of both feet 4 sheets totaling, 1120 sq cm, wrapped and left intact for 30 minutes with no side effects.    Removed and cooling gel applied.  Patient was performed per manufacture guidelines.

## 2023-05-22 ENCOUNTER — TELEPHONE (OUTPATIENT)
Dept: FAMILY MEDICINE | Facility: CLINIC | Age: 83
End: 2023-05-22
Payer: MEDICARE

## 2023-05-22 NOTE — TELEPHONE ENCOUNTER
Spoke with patient. Informed to contact [facility] [phone]. Pt voiced understanding

## 2023-05-22 NOTE — TELEPHONE ENCOUNTER
Can we have patient follow-up with the infusion center to have a port flush to check on the port?   Patient called and informed of labs as per below.

## 2023-05-22 NOTE — TELEPHONE ENCOUNTER
----- Message from Dyana Preston sent at 5/22/2023  4:15 PM CDT -----  Contact: PT  Type:  Needs Medical Advice    Who Called: PT   Symptoms (please be specific): PT STATES THAT HER PORT MAY BE CLOGGED. PT NEEDS TO KNOW WHAT TO DO.    How long has patient had these symptoms:  LAST Friday MORNING  Pharmacy name and phone #:    [id number] [place] [address] AT [facility] [address]  [address] [place] [id number]  Phone: [phone] Fax: [phone]    Would the patient rather a call back or a response via [facility]? CALL   Best Call Back Number: [phone] (home)     Additional Information: THANK YOU

## 2023-05-31 ENCOUNTER — OFFICE VISIT (OUTPATIENT)
Dept: FAMILY MEDICINE | Facility: CLINIC | Age: 83
End: 2023-05-31
Payer: MEDICARE

## 2023-05-31 ENCOUNTER — INFUSION (OUTPATIENT)
Dept: INFUSION THERAPY | Facility: HOSPITAL | Age: 83
End: 2023-05-31
Attending: FAMILY MEDICINE
Payer: MEDICARE

## 2023-05-31 ENCOUNTER — TELEPHONE (OUTPATIENT)
Dept: FAMILY MEDICINE | Facility: CLINIC | Age: 83
End: 2023-05-31
Payer: MEDICARE

## 2023-05-31 ENCOUNTER — PATIENT MESSAGE (OUTPATIENT)
Dept: ADMINISTRATIVE | Facility: HOSPITAL | Age: 83
End: 2023-05-31
Payer: MEDICARE

## 2023-05-31 VITALS
TEMPERATURE: 98 F | RESPIRATION RATE: 16 BRPM | HEART RATE: 78 BPM | OXYGEN SATURATION: 99 % | SYSTOLIC BLOOD PRESSURE: 173 MMHG | DIASTOLIC BLOOD PRESSURE: 71 MMHG

## 2023-05-31 VITALS
SYSTOLIC BLOOD PRESSURE: 130 MMHG | RESPIRATION RATE: 16 BRPM | HEIGHT: 62 IN | BODY MASS INDEX: 22.63 KG/M2 | WEIGHT: 123 LBS | HEART RATE: 84 BPM | OXYGEN SATURATION: 98 % | TEMPERATURE: 98 F | DIASTOLIC BLOOD PRESSURE: 72 MMHG

## 2023-05-31 DIAGNOSIS — R53.83 FATIGUE, UNSPECIFIED TYPE: ICD-10-CM

## 2023-05-31 DIAGNOSIS — Z85.3 HISTORY OF BILATERAL BREAST CANCER: Primary | ICD-10-CM

## 2023-05-31 DIAGNOSIS — Z95.828 PORT-A-CATH IN PLACE: ICD-10-CM

## 2023-05-31 DIAGNOSIS — E11.49 TYPE II DIABETES MELLITUS WITH NEUROLOGICAL MANIFESTATIONS: Primary | ICD-10-CM

## 2023-05-31 DIAGNOSIS — E11.49 TYPE II DIABETES MELLITUS WITH NEUROLOGICAL MANIFESTATIONS: ICD-10-CM

## 2023-05-31 PROCEDURE — 99999 PR PBB SHADOW E&M-EST. PATIENT-LVL IV: CPT | Mod: PBBFAC,,, | Performed by: FAMILY MEDICINE

## 2023-05-31 PROCEDURE — 99214 OFFICE O/P EST MOD 30 MIN: CPT | Mod: PBBFAC | Performed by: FAMILY MEDICINE

## 2023-05-31 PROCEDURE — 99214 PR OFFICE/OUTPT VISIT, EST, LEVL IV, 30-39 MIN: ICD-10-PCS | Mod: S$PBB,,, | Performed by: FAMILY MEDICINE

## 2023-05-31 PROCEDURE — 25000003 PHARM REV CODE 250: Performed by: FAMILY MEDICINE

## 2023-05-31 PROCEDURE — A4216 STERILE WATER/SALINE, 10 ML: HCPCS | Performed by: FAMILY MEDICINE

## 2023-05-31 PROCEDURE — 96523 IRRIG DRUG DELIVERY DEVICE: CPT

## 2023-05-31 PROCEDURE — 99214 OFFICE O/P EST MOD 30 MIN: CPT | Mod: S$PBB,,, | Performed by: FAMILY MEDICINE

## 2023-05-31 PROCEDURE — 99999 PR PBB SHADOW E&M-EST. PATIENT-LVL IV: ICD-10-PCS | Mod: PBBFAC,,, | Performed by: FAMILY MEDICINE

## 2023-05-31 RX ORDER — SODIUM CHLORIDE 0.9 % (FLUSH) 0.9 %
10 SYRINGE (ML) INJECTION
Status: COMPLETED | OUTPATIENT
Start: 2023-05-31 | End: 2023-05-31

## 2023-05-31 RX ORDER — HEPARIN 100 UNIT/ML
500 SYRINGE INTRAVENOUS
Status: DISCONTINUED | OUTPATIENT
Start: 2023-05-31 | End: 2023-05-31 | Stop reason: HOSPADM

## 2023-05-31 RX ORDER — SODIUM CHLORIDE 0.9 % (FLUSH) 0.9 %
10 SYRINGE (ML) INJECTION
Status: CANCELLED | OUTPATIENT
Start: 2023-05-31

## 2023-05-31 RX ORDER — HEPARIN 100 UNIT/ML
500 SYRINGE INTRAVENOUS
Status: CANCELLED | OUTPATIENT
Start: 2023-05-31

## 2023-05-31 RX ADMIN — Medication 10 ML: at 11:05

## 2023-05-31 NOTE — PLAN OF CARE
Problem: Adult Inpatient Plan of Care  Goal: Plan of Care Review  Outcome: Ongoing, Progressing  Flowsheets (Taken 5/31/2023 1156)  Plan of Care Reviewed With:   patient   daughter  BP (!) 173/71 (BP Location: Left arm, Patient Position: Sitting)   Pulse 78   Temp 97.5 °F (36.4 °C) (Oral)   Resp 16   SpO2 99% Pleasant alert and oriented patient ambulated to clinic for port flush  - pt tolerated well - discharged hojme with no concerns - pt to RTC 6/23/23 - No blood obtained, provider aware, and port study will be ordered by provider.

## 2023-05-31 NOTE — TELEPHONE ENCOUNTER
----- Message from Yesenia Morgan sent at 5/31/2023  3:34 PM CDT -----  Regarding: RETURN CALL  Pt needs someone to give her a call. She saw Dr. Sutton this morning. She is having issues w/ her port and receiving all kinds of emails.

## 2023-05-31 NOTE — TELEPHONE ENCOUNTER
"Spoke with pt. Pt states, " I received an email in regards to my port, put the number down for the port.I have been hours and I tired of messing with it" please advise I cannot see in patient chart who sent message to her in with number for the port  "

## 2023-05-31 NOTE — PROGRESS NOTES
[facility] Note    Subjective      Ms. Rodríguez is a 83 y.o. female who presents to clinic for Follow-up (3mth follow up/refills)    Port does not seem to be working.    [facility] Ursula has a past medical history of Anemia (), Breast cancer (), Breast cancer (), Diabetes mellitus, Factor V Leiden (), Hyperlipidemia (), Hypertension (), Hypothyroidism (), Osteoporosis, Pacemaker (10/28/2021), Recurrent urinary tract infection, and Vertigo ().   PSXH Ursula has a past surgical history that includes Hemorrhoid surgery (); Hysterectomy (); Tumor removal (Left, ); Mastectomy (Right, ); Mastectomy (Left, );  section; Eye surgery (); Breast surgery (, , Nose cancer); Hernia repair (); and Cardiac valve replacement ().    Ursula's family history includes Cancer in her brother, maternal grandmother, and mother; Diabetes in her brother, brother, and father; Heart disease in her father and mother; Hypertension in her brother, brother, brother, and mother; Lung disease in her father; No Known Problems in her sister; Stroke in her brother and brother.   ANUJ Vergara reports that she has never smoked. She has never used smokeless tobacco. She reports that she does not drink alcohol and does not use drugs.   JAC Vergara is allergic to iodine and iodide containing products and ditropan [oxybutynin chloride].   JAYME Vergara has a current medication list which includes the following prescription(s): alendronate, cyanocobalamin, eliquis, ergocalciferol (vitamin d2), levothyroxine, losartan, magnesium oxide, meclizine, metformin, metoprolol tartrate, multivitamin, nifedipine, potassium, sertraline, simvastatin, spironolactone, clonidine, and cyproheptadine, and the following Facility-Administered Medications: heparin, porcine (pf).     Review of Systems   Constitutional:  Negative for chills and fever.   Respiratory:  Negative for shortness of breath.   "  Cardiovascular:  Negative for chest pain.   Skin:  Negative for rash.   Psychiatric/Behavioral:  The patient is nervous/anxious.    Objective     /72 (BP Location: Left arm, Patient Position: Sitting, BP Method: Medium (Manual))   Pulse 84   Temp 98.3 °F (36.8 °C) (Temporal)   Resp 16   Ht 5' 2" (1.575 m)   Wt 55.8 kg (123 lb)   SpO2 98%   BMI 22.50 kg/m²     Physical Exam  Vitals and nursing note reviewed.   Constitutional:       General: She is not in acute distress.     Appearance: Normal appearance. She is well-developed. She is not diaphoretic.   HENT:      Head: Normocephalic and atraumatic.      Right Ear: External ear normal.      Left Ear: External ear normal.   Eyes:      General:         Right eye: No discharge.         Left eye: No discharge.   Cardiovascular:      Rate and Rhythm: Normal rate and regular rhythm.      Heart sounds: Normal heart sounds.   Pulmonary:      Effort: Pulmonary effort is normal.      Breath sounds: Normal breath sounds. No wheezing or rales.   Skin:     General: Skin is warm and dry.   Neurological:      Mental Status: She is alert and oriented to person, place, and time. Mental status is at baseline.   Psychiatric:         Mood and Affect: Mood normal.         Behavior: Behavior normal.         Thought Content: Thought content normal.         Judgment: Judgment normal.      Assessment/Plan     1. Type II diabetes mellitus with neurological manifestations  Hemoglobin A1C      2. Port-A-Cath in place  Port-A-Cath work up        Continue current medications as prescribed.  Check labs.  Tried to get for flush but is not trying blood.  Outpatient therapeutic recommended Port-A-Cath workup.  Will order.    Future Appointments   Date Time Provider Department Center   6/12/2023  8:45 AM Sander Almaguer DPM [facility]   6/23/2023  8:30 AM [facility] OP THERAPEUTICS 9 [facility]   7/11/2023 11:00 AM STEFFI Garcia MD [facility] HEM ONC [facility] "   7/21/2023  8:30 AM [facility] OP THERAPEUTICS 9 [facility]   8/16/2023  8:30 AM [facility]uer [facility]   8/18/2023  8:30 AM [facility] OP THERAPEUTICS 5 [facility]   9/13/2023 10:20 AM Vicente Sutton [place] [facility]   9/15/2023  8:30 AM [facility] OP THERAPEUTICS 9 [facility]   10/13/2023  8:30 AM [facility]   11/10/2023  8:30 AM [facility] OP [facility]UTICS [facility]   12/8/2023  8:30 AM [facility] OP [facility]APEUTICS 5 [facility]         Vicente Sutton MD  Family Medicine [facility]

## 2023-06-05 NOTE — TELEPHONE ENCOUNTER
Since her port didn't seem to be working I had ordered a test to see why. Please have this scheduled

## 2023-06-13 ENCOUNTER — TELEPHONE (OUTPATIENT)
Dept: FAMILY MEDICINE | Facility: CLINIC | Age: 83
End: 2023-06-13
Payer: MEDICARE

## 2023-06-13 NOTE — TELEPHONE ENCOUNTER
"Spoke with pt. Pt states, I have not heard from anyone to get my port study scheduled." Informed pt I have sent Yoselyn with scheduling another message. Pt voiced understanding.   "

## 2023-06-13 NOTE — TELEPHONE ENCOUNTER
----- Message from Jim Schulz sent at 6/13/2023 12:48 PM CDT -----  Contact: self  Type: Needs Medical Advice  Who Called:  pt    Best Call Back Number: [phone]    Additional Information: Pt called in regards to having a port for labs  Thank you

## 2023-06-23 ENCOUNTER — INFUSION (OUTPATIENT)
Dept: INFUSION THERAPY | Facility: HOSPITAL | Age: 83
End: 2023-06-23
Attending: FAMILY MEDICINE
Payer: MEDICARE

## 2023-06-23 VITALS
HEIGHT: 62 IN | OXYGEN SATURATION: 98 % | RESPIRATION RATE: 18 BRPM | HEART RATE: 79 BPM | DIASTOLIC BLOOD PRESSURE: 62 MMHG | SYSTOLIC BLOOD PRESSURE: 135 MMHG | TEMPERATURE: 98 F | WEIGHT: 123 LBS | BODY MASS INDEX: 22.63 KG/M2

## 2023-06-23 DIAGNOSIS — Z95.828 PORT-A-CATH IN PLACE: Primary | ICD-10-CM

## 2023-06-23 DIAGNOSIS — Z85.3 HISTORY OF BILATERAL BREAST CANCER: Primary | ICD-10-CM

## 2023-06-23 LAB
ALBUMIN SERPL BCP-MCNC: 4 G/DL (ref 3.5–5.2)
ALP SERPL-CCNC: 85 U/L (ref 55–135)
ALT SERPL W/O P-5'-P-CCNC: 12 U/L (ref 10–44)
ANION GAP SERPL CALC-SCNC: 10 MMOL/L (ref 8–16)
AST SERPL-CCNC: 19 U/L (ref 10–40)
BASOPHILS # BLD AUTO: 0.06 K/UL (ref 0–0.2)
BASOPHILS NFR BLD: 0.7 % (ref 0–1.9)
BILIRUB SERPL-MCNC: 0.6 MG/DL (ref 0.1–1)
BUN SERPL-MCNC: 17 MG/DL (ref 8–23)
CALCIUM SERPL-MCNC: 10.1 MG/DL (ref 8.7–10.5)
CHLORIDE SERPL-SCNC: 108 MMOL/L (ref 95–110)
CO2 SERPL-SCNC: 22 MMOL/L (ref 23–29)
CREAT SERPL-MCNC: 1.2 MG/DL (ref 0.5–1.4)
DIFFERENTIAL METHOD: ABNORMAL
EOSINOPHIL # BLD AUTO: 0.7 K/UL (ref 0–0.5)
EOSINOPHIL NFR BLD: 8.2 % (ref 0–8)
ERYTHROCYTE [DISTWIDTH] IN BLOOD BY AUTOMATED COUNT: 13.8 % (ref 11.5–14.5)
EST. GFR  (NO RACE VARIABLE): 44.4 ML/MIN/1.73 M^2
ESTIMATED AVG GLUCOSE: 108 MG/DL (ref 68–131)
GLUCOSE SERPL-MCNC: 124 MG/DL (ref 70–110)
HBA1C MFR BLD: 5.4 % (ref 4–5.6)
HCT VFR BLD AUTO: 44.5 % (ref 37–48.5)
HGB BLD-MCNC: 14.5 G/DL (ref 12–16)
IMM GRANULOCYTES # BLD AUTO: 0.03 K/UL (ref 0–0.04)
IMM GRANULOCYTES NFR BLD AUTO: 0.4 % (ref 0–0.5)
LYMPHOCYTES # BLD AUTO: 1.2 K/UL (ref 1–4.8)
LYMPHOCYTES NFR BLD: 14.3 % (ref 18–48)
MCH RBC QN AUTO: 27.7 PG (ref 27–31)
MCHC RBC AUTO-ENTMCNC: 32.6 G/DL (ref 32–36)
MCV RBC AUTO: 85 FL (ref 82–98)
MONOCYTES # BLD AUTO: 0.6 K/UL (ref 0.3–1)
MONOCYTES NFR BLD: 6.8 % (ref 4–15)
NEUTROPHILS # BLD AUTO: 5.7 K/UL (ref 1.8–7.7)
NEUTROPHILS NFR BLD: 69.6 % (ref 38–73)
NRBC BLD-RTO: 0 /100 WBC
PLATELET # BLD AUTO: 201 K/UL (ref 150–450)
PMV BLD AUTO: 9.6 FL (ref 9.2–12.9)
POTASSIUM SERPL-SCNC: 4 MMOL/L (ref 3.5–5.1)
PROT SERPL-MCNC: 6.7 G/DL (ref 6–8.4)
RBC # BLD AUTO: 5.23 M/UL (ref 4–5.4)
SODIUM SERPL-SCNC: 140 MMOL/L (ref 136–145)
TSH SERPL DL<=0.005 MIU/L-ACNC: 1.9 UIU/ML (ref 0.4–4)
WBC # BLD AUTO: 8.19 K/UL (ref 3.9–12.7)

## 2023-06-23 PROCEDURE — A4216 STERILE WATER/SALINE, 10 ML: HCPCS | Performed by: FAMILY MEDICINE

## 2023-06-23 PROCEDURE — 85025 COMPLETE CBC W/AUTO DIFF WBC: CPT | Performed by: FAMILY MEDICINE

## 2023-06-23 PROCEDURE — 83036 HEMOGLOBIN GLYCOSYLATED A1C: CPT | Performed by: FAMILY MEDICINE

## 2023-06-23 PROCEDURE — 80053 COMPREHEN METABOLIC PANEL: CPT | Performed by: FAMILY MEDICINE

## 2023-06-23 PROCEDURE — 25000003 PHARM REV CODE 250: Performed by: FAMILY MEDICINE

## 2023-06-23 PROCEDURE — 84443 ASSAY THYROID STIM HORMONE: CPT | Performed by: FAMILY MEDICINE

## 2023-06-23 PROCEDURE — 96523 IRRIG DRUG DELIVERY DEVICE: CPT

## 2023-06-23 RX ORDER — SODIUM CHLORIDE 0.9 % (FLUSH) 0.9 %
10 SYRINGE (ML) INJECTION
Status: CANCELLED | OUTPATIENT
Start: 2023-06-23

## 2023-06-23 RX ORDER — SODIUM CHLORIDE 0.9 % (FLUSH) 0.9 %
10 SYRINGE (ML) INJECTION
Status: COMPLETED | OUTPATIENT
Start: 2023-06-23 | End: 2023-06-23

## 2023-06-23 RX ORDER — HEPARIN 100 UNIT/ML
500 SYRINGE INTRAVENOUS
Status: CANCELLED | OUTPATIENT
Start: 2023-06-23

## 2023-06-23 RX ADMIN — Medication 10 ML: at 08:06

## 2023-06-23 NOTE — NURSING
Implanted port accessed using sterile technique, flushed easily with adequate blood return.   Port flushed with Normal Saline and Heparin per protocol and de-accessed. Pt tolerated well with no questions or complaints.  amr

## 2023-06-28 ENCOUNTER — OFFICE VISIT (OUTPATIENT)
Dept: PODIATRY | Facility: CLINIC | Age: 83
End: 2023-06-28
Payer: MEDICARE

## 2023-06-28 VITALS
HEART RATE: 71 BPM | HEIGHT: 62 IN | BODY MASS INDEX: 23.37 KG/M2 | SYSTOLIC BLOOD PRESSURE: 145 MMHG | WEIGHT: 127 LBS | DIASTOLIC BLOOD PRESSURE: 72 MMHG

## 2023-06-28 DIAGNOSIS — L60.0 INGROWN NAIL: ICD-10-CM

## 2023-06-28 DIAGNOSIS — E11.9 TYPE 2 DIABETES MELLITUS WITHOUT COMPLICATION, WITHOUT LONG-TERM CURRENT USE OF INSULIN: Primary | ICD-10-CM

## 2023-06-28 DIAGNOSIS — E11.9 COMPREHENSIVE DIABETIC FOOT EXAMINATION, TYPE 2 DM, ENCOUNTER FOR: ICD-10-CM

## 2023-06-28 PROCEDURE — 99214 OFFICE O/P EST MOD 30 MIN: CPT | Mod: PBBFAC | Performed by: PODIATRIST

## 2023-06-28 PROCEDURE — 99213 OFFICE O/P EST LOW 20 MIN: CPT | Mod: S$PBB,,, | Performed by: PODIATRIST

## 2023-06-28 PROCEDURE — 99213 PR OFFICE/OUTPT VISIT, EST, LEVL III, 20-29 MIN: ICD-10-PCS | Mod: S$PBB,,, | Performed by: PODIATRIST

## 2023-06-28 PROCEDURE — 99999 PR PBB SHADOW E&M-EST. PATIENT-LVL IV: CPT | Mod: PBBFAC,,, | Performed by: PODIATRIST

## 2023-06-28 PROCEDURE — 99999 PR PBB SHADOW E&M-EST. PATIENT-LVL IV: ICD-10-PCS | Mod: PBBFAC,,, | Performed by: PODIATRIST

## 2023-07-02 NOTE — PROGRESS NOTES
Subjective:      Patient ID: Ursula Rodríguez is a 83 y.o. female.    Chief Complaint: Foot Swelling     Patient presents today for diabetic evaluation she is currently on Eliquis and states she has a history of neuropathy that does bother her at night.      Review of Systems   Neurological:  Positive for numbness.   All other systems reviewed and are negative.     Constitutional    Pleasant, well-nourished, no distress, well oriented    Eyes         GLASSES    Cardiovascular          No chest pain, no shortness of breath    Respiratory           No cough, no congestion     Musculoskeletal        No muscle aches, no arthralgias/joint pain, no back pain, no swelling in the extremities            Objective:      Physical Exam  Vitals and nursing note reviewed.   Constitutional:       Appearance: Normal appearance.   Cardiovascular:      Pulses:           Dorsalis pedis pulses are 1+ on the right side and 1+ on the left side.        Posterior tibial pulses are 1+ on the right side and 1+ on the left side.   Pulmonary:      Effort: Pulmonary effort is normal.   Musculoskeletal:         General: Swelling, tenderness and signs of injury present.      Right foot: Deformity present.      Left foot: Deformity present.   Feet:      Right foot:      Protective Sensation: 4 sites tested.   1 site sensed.     Toenail Condition: Right toenails are abnormally thick and ingrown. Fungal disease present.     Left foot:      Protective Sensation: 4 sites tested.   1 site sensed.     Toenail Condition: Left toenails are abnormally thick. Fungal disease present.  Skin:     Capillary Refill: Capillary refill takes more than 3 seconds.      Findings: Erythema present.   Neurological:      Mental Status: She is alert.      Sensory: Sensory deficit present.   Psychiatric:         Mood and Affect: Mood normal.         Behavior: Behavior normal.         Thought Content: Thought content normal.         Judgment: Judgment normal.     Vascular          Arterial Pulses Right: posterior tibialis 1/4, dorsalis pedis 1/4, normal CFT   Arterial Pulses Left: posterior tibialis 1/4, dorsalis pedis 1/4, normal CFT   No lower extremity edema bilateral   Pedal skin temperature and color are normal bilateral     Musculoskeletal   Muscle Strength/Testing and Tone:  Intact-excellent for age,  normal tone bilateral   Joints, Bones, and Muscles: Limited ROM midfoot   Consistent with osteoarthritis related to age.  Mild arthritic and degenerative changes        Walks well unassisted            Assessment:       Encounter Diagnoses   Name Primary?    Type 2 diabetes mellitus without complication, without long-term current use of insulin Yes    Comprehensive diabetic foot examination, type 2 DM, encounter for     Ingrown nail          Plan:       Ursula was seen today for foot swelling.    Diagnoses and all orders for this visit:    Type 2 diabetes mellitus without complication, without long-term current use of insulin    Comprehensive diabetic foot examination, type 2 DM, encounter for    Ingrown nail        Patient presents today for diabetic evaluation she is currently on Eliquis and states she has a history of neuropathy that does bother her at night.    A complete diabetic evaluation was performed today patient does have findings consistent with diabetic related neuropathy.  Patient had several elongated ingrowing toenails I did debride these today I have advised the patient these were starting to become ingrown because of her neuropathy she was not having any pain or discomfort this needs to be monitored carefully.  Patient did have several ingrowing toenails especially the medial lateral border bilateral hallux the reserve were able to be trimmed and removed patient did not require a nail avulsion at this time.  Patient states while she does have noticeable neuropathy it is not to the point where she needs to be medicated for it and we will continue to monitor this.   Patient was in understanding and agreement with this diabetic education provided.  Patient had several ingrowing toenails today these were debrided and removed this puts her at risk for complication.  Patient is scheduled to have her Qutenza reapplied in a proximally 2 months she states she got a little bit of relief for the 1st several days then it seemed to wear off and her neuropathy is still causing her significant discomfort she does have a lot of swelling in both feet today certainly this can contribute to the discomfort she is having in her feet.  Patient is due to have her Qutenza reapplied in the middle of August or after.  This note was created using [employer] voice recognition software that occasionally misinterpreted phrases or words.

## 2023-07-05 DIAGNOSIS — Z95.828 PORT-A-CATH IN PLACE: Primary | ICD-10-CM

## 2023-07-05 RX ORDER — PREDNISONE 50 MG/1
TABLET ORAL
Qty: 3 TABLET | Refills: 0 | Status: SHIPPED | OUTPATIENT
Start: 2023-07-05 | End: 2024-01-03

## 2023-07-18 ENCOUNTER — OFFICE VISIT (OUTPATIENT)
Dept: SURGERY | Facility: CLINIC | Age: 83
End: 2023-07-18
Payer: MEDICARE

## 2023-07-18 ENCOUNTER — HOSPITAL ENCOUNTER (OUTPATIENT)
Dept: RADIOLOGY | Facility: HOSPITAL | Age: 83
Discharge: HOME | End: 2023-07-18
Attending: FAMILY MEDICINE
Payer: MEDICARE

## 2023-07-18 VITALS
WEIGHT: 124 LBS | BODY MASS INDEX: 22.82 KG/M2 | HEIGHT: 62 IN | DIASTOLIC BLOOD PRESSURE: 68 MMHG | HEART RATE: 79 BPM | SYSTOLIC BLOOD PRESSURE: 138 MMHG | OXYGEN SATURATION: 96 %

## 2023-07-18 DIAGNOSIS — Z98.890 HISTORY OF INFUSAPORT CENTRAL VENOUS CATHETER INSERTION: Primary | ICD-10-CM

## 2023-07-18 DIAGNOSIS — Z86.73 HISTORY OF TIA (TRANSIENT ISCHEMIC ATTACK): ICD-10-CM

## 2023-07-18 DIAGNOSIS — I87.2 PERIPHERAL VENOUS INSUFFICIENCY: ICD-10-CM

## 2023-07-18 DIAGNOSIS — Z95.828 PORT-A-CATH IN PLACE: ICD-10-CM

## 2023-07-18 PROCEDURE — 99215 OFFICE O/P EST HI 40 MIN: CPT | Mod: 25,PBBFAC | Performed by: SURGERY

## 2023-07-18 PROCEDURE — 99999 PR PBB SHADOW E&M-EST. PATIENT-LVL V: ICD-10-PCS | Mod: PBBFAC,,, | Performed by: SURGERY

## 2023-07-18 PROCEDURE — 96372 THER/PROPH/DIAG INJ SC/IM: CPT

## 2023-07-18 PROCEDURE — 99204 OFFICE O/P NEW MOD 45 MIN: CPT | Mod: S$PBB,,, | Performed by: SURGERY

## 2023-07-18 PROCEDURE — 99999 PR PBB SHADOW E&M-EST. PATIENT-LVL V: CPT | Mod: PBBFAC,,, | Performed by: SURGERY

## 2023-07-18 PROCEDURE — 25500020 PHARM REV CODE 255: Performed by: RADIOLOGY

## 2023-07-18 PROCEDURE — 99204 PR OFFICE/OUTPT VISIT, NEW, LEVL IV, 45-59 MIN: ICD-10-PCS | Mod: S$PBB,,, | Performed by: SURGERY

## 2023-07-18 RX ORDER — SODIUM CHLORIDE 9 MG/ML
INJECTION, SOLUTION INTRAVENOUS CONTINUOUS
Status: CANCELLED | OUTPATIENT
Start: 2023-07-18

## 2023-07-18 RX ORDER — ENOXAPARIN SODIUM 100 MG/ML
60 INJECTION SUBCUTANEOUS 2 TIMES DAILY
Qty: 12 ML | Refills: 0 | Status: SHIPPED | OUTPATIENT
Start: 2023-07-18 | End: 2023-07-28

## 2023-07-18 RX ADMIN — IOHEXOL 100 ML: 300 INJECTION, SOLUTION INTRAVENOUS at 11:07

## 2023-07-18 NOTE — LETTER
This communication is flagged as high priority.             WRITTEN Surgical Clearance    PLEASE PROVIDE ALL INFORMATION      Date: 7/18/2023    Dear Dr. Romero,    Please provide us with WRITTEN Surgical Clearance on Ms. Ursula Rodríguez,  1940, [id number].       Please send any test results such as : EKG, Holter Monitor, Echocardiogram and or Stress tests.      Patient will be scheduled for a Port Removal/Port Placeement under General/MAC anesthesia scheduled for July 26, 2023.    Patient is currently taking Eliquis (TAKE 1 TABLET BY MOUTH TWICE  DAILY) .      We are requesting clearance for the patient to hold eliquis medication starting on  July 23, 2023 and restarting it July 28, 2023    Dr. Conrad has prescribed Lovenox Bridge starting July 23, 2023 and stopping July 28, 2023    PLEASE FAX THIS CLEARANCE WITH TEST RESULTS [phone] .    Thank you for your help in this matter.    Sincerely,  Arcadio Cano MA/Carl Conrad MD  Phone- [phone]  Fax- [phone]

## 2023-07-18 NOTE — H&P
[facility] H&P Note    Subjective:       Patient ID: Ursula Rodríguez is a 83 y.o. female.    Chief Complaint:  Doc I need a new chemo port/Ucbzhp-P-Fvxl.  HPI:  Ursula Rodríguez is a 83 y.o. female history of factor 5 Leiden previous TIA on long-term use of anticoagulation therapy with Eliquis presents today as a new patient referral for evaluation of nonfunctioning Akycsv-Z-Bnbx/chemo port.  Patient had an Kflmxo-L-Qjht placed left chest wall 10 years ago  by Dr. Acevedo.  History of bilateral mastectomy and axillary lymph nodes removed for breast cancer.  She is not able to get injections infusions or aspirations in her arms.  Mild history of lymphedema 1 of the arms which has been treated since.  Patient presents today desired to proceed with Linnmm-X-Hsij removal and replacement.  She presents today now as a new patient referral.    Past Medical History:   Diagnosis Date    Anemia     Breast cancer 1995    LEFT DIAGNOSED FIRST    Breast cancer     Right     Diabetes mellitus     Factor V Leiden     Hyperlipidemia 2017    Hypertension 2017    Hypothyroidism 2019    Osteoporosis     UNKNOWN DATE OF DX    Pacemaker 10/28/2021    Recurrent urinary tract infection     Vertigo      Past Surgical History:   Procedure Laterality Date    BREAST SURGERY  , 2013, Nose cancer    CARDIAC VALVE REPLACEMENT       SECTION      EYE SURGERY      HEMORRHOID SURGERY  1968    HERNIA REPAIR      HYSTERECTOMY  1970    INSERTION OF PACEMAKER      MASTECTOMY Right 1994    MASTECTOMY Left     BREAST CANCER    PORTACATH PLACEMENT  2013    TUMOR REMOVAL Left     EAR     Family History   Problem Relation Age of Onset    Cancer Mother     Hypertension Mother     Heart disease Mother     No Known Problems Sister     Diabetes Brother     Hypertension Brother     Cancer Maternal Grandmother     Lung disease Father     Heart disease Father     Diabetes Father     Cancer Brother      Diabetes Brother     Hypertension Brother     Stroke Brother     Hypertension Brother     Stroke Brother      Social History     Socioeconomic History    Marital status:     Highest education level: Master's degree (e.g., MA, MS, [place], MEd, MSW, ANDRE)   Occupational History    Occupation: Phd x 3-   Tobacco Use    Smoking status: Never    Smokeless tobacco: Never   Substance and Sexual Activity    Alcohol use: No     Comment: SPECIAL OCCASIONS LIKE ZACHERY    Drug use: No    Sexual activity: Not Currently     Partners: Male     Social Determinants of Health     Financial Resource Strain: Low Risk     Difficulty of Paying Living Expenses: Not hard at all   Food Insecurity: No Food Insecurity    Worried About Running Out of Food in the Last Year: Never true    Ran Out of Food in the Last Year: Never true   Transportation Needs: No Transportation Needs    Lack of Transportation (Medical): No    Lack of Transportation (Non-Medical): No   Physical Activity: Insufficiently Active    Days of Exercise per Week: 5 days    Minutes of Exercise per Session: 20 min   Stress: Stress Concern Present    Feeling of Stress : To some extent   Social Connections: Unknown    Frequency of Communication with Friends and Family: More than three times a week    Frequency of Social Gatherings with Friends and Family: Once a week    Active Member of Clubs or Organizations: Yes    Attends Club or Organization Meetings: 1 to 4 times per year    Marital Status:    Housing Stability: Low Risk     Unable to Pay for Housing in the Last Year: No    Number of Places Lived in the Last Year: 1    Unstable Housing in the Last Year: No       Current Outpatient Medications   Medication Sig Dispense Refill    alendronate (FOSAMAX) 70 MG tablet Take 1 tablet (70 mg total) by mouth every 7 days. 4 tablet 11    cyanocobalamin (VITAMIN B-12) 1000 MCG tablet Take 100 mcg by mouth once daily.      ELIQUIS 2.5 mg Tab TAKE 1 TABLET BY MOUTH TWICE   DAILY 180 tablet 3    ERGOCALCIFEROL, VITAMIN D2, ORAL Take by mouth.      levothyroxine (SYNTHROID) 50 MCG tablet TAKE 1 TABLET BY MOUTH ONCE DAILY 90 tablet 3    losartan (COZAAR) 25 MG tablet TAKE ONE-HALF TABLET BY  MOUTH ONCE DAILY 45 tablet 3    magnesium oxide (MAG-OX) 400 mg (241.3 mg magnesium) tablet Take 400 mg by mouth once daily.      meclizine (ANTIVERT) 25 mg tablet Take by mouth.      metFORMIN (GLUCOPHAGE-XR) 500 MG ER 24hr tablet TAKE 1 TABLET BY MOUTH EVERY MORNING WITH FOOD FOR 2 WEEKS THEN INCREASE TO 1 TABLET BY MOUTH TWICE DAILY WITH FOOD 180 tablet 3    metoprolol tartrate (LOPRESSOR) 25 MG tablet TAKE ONE-HALF TABLET BY  MOUTH TWICE DAILY 90 tablet 3    multivitamin capsule Take 1 capsule by mouth once daily.      NIFEdipine (PROCARDIA-XL) 30 MG (OSM) 24 hr tablet Take 1 tablet (30 MG ) by mouth twice daily 180 tablet 0    potassium 99 mg Tab Take by mouth once daily.      predniSONE (DELTASONE) 50 MG Tab 50 mg at 13, 7, and 1 hour prior to contrast administration. 3 tablet 0    sertraline (ZOLOFT) 25 MG tablet Take 1 tablet (25 mg total) by mouth once daily. 30 tablet 11    simvastatin (ZOCOR) 20 MG tablet TAKE 1 TABLET BY MOUTH IN  THE EVENING 90 tablet 3    spironolactone (ALDACTONE) 25 MG tablet 1 tablet.      cloNIDine (CATAPRES) 0.1 MG tablet Take 1 tablet (0.1 mg total) by mouth 2 (two) times daily as needed (BP > 160/90). 60 tablet 11    cyproheptadine (PERIACTIN) 4 mg tablet Take 1 tablet (4 mg total) by mouth 2 (two) times daily with meals. 180 tablet 1    enoxaparin (LOVENOX) 60 mg/0.6 mL Syrg Inject 0.6 mLs (60 mg total) into the skin 2 (two) times daily. for 10 days 12 mL 0     No current facility-administered medications for this visit.     Review of patient's allergies indicates:   Allergen Reactions    Iodine and iodide containing products     Ditropan [oxybutynin chloride] Palpitations and Other (See Comments)     Made patient weak       Review of Systems   Constitutional:   "Negative for activity change, appetite change, chills and fever.   HENT:  Negative for congestion, dental problem and ear discharge.    Eyes:  Negative for discharge and itching.   Respiratory:  Negative for apnea, choking and chest tightness.    Cardiovascular:  Negative for chest pain and leg swelling.   Gastrointestinal:  Negative for abdominal distention, abdominal pain, anal bleeding, constipation, diarrhea and nausea.   Endocrine: Negative for cold intolerance and heat intolerance.   Genitourinary:  Negative for difficulty urinating and dyspareunia.   Musculoskeletal:  Negative for arthralgias and back pain.   Skin:  Negative for color change and pallor.   Neurological:  Negative for dizziness and facial asymmetry.   Hematological:  Negative for adenopathy. Does not bruise/bleed easily.   Psychiatric/Behavioral:  Negative for agitation and behavioral problems.      Objective:      Vitals:    07/18/23 1408   BP: 138/68   Pulse: 79   SpO2: 96%   Weight: 56.2 kg (124 lb)   Height: 5' 2" (1.575 m)     Physical Exam  Constitutional:       General: She is not in acute distress.     Appearance: She is well-developed.   HENT:      Head: Normocephalic and atraumatic.   Eyes:      Pupils: Pupils are equal, round, and reactive to light.   Neck:      Thyroid: No thyromegaly.   Cardiovascular:      Rate and Rhythm: Normal rate and regular rhythm.   Pulmonary:      Effort: Pulmonary effort is normal.      Breath sounds: Normal breath sounds.   Abdominal:      General: Bowel sounds are normal. There is no distension.      Palpations: Abdomen is soft.      Tenderness: There is no abdominal tenderness.   Musculoskeletal:         General: No deformity. Normal range of motion.      Cervical back: Normal range of motion and neck supple.   Skin:     General: Skin is warm.      Capillary Refill: Capillary refill takes less than 2 seconds.      Findings: No erythema.   Neurological:      Mental Status: She is alert and oriented to " person, place, and time.      Cranial Nerves: No cranial nerve deficit.   Psychiatric:         Behavior: Behavior normal.        Assessment:       1. History of infusaport central venous catheter insertion    2. Peripheral venous insufficiency    3. History of TIA (transient ischemic attack)        Plan:   History of infusaport central venous catheter insertion  -     Full code; Standing  -     Place in Outpatient; Standing  -     Case Request Operating Room: [id number]  -     Vital signs; Standing  -     Insert peripheral IV; Standing  -     Verify beta-blocker dose taken within 24 hours if patient is prescribed beta-blocker; Standing  -     Height and weight; Standing  -     Intake and output; Standing  -     Verify discontinuation of antithrombotics; Standing  -     POCT glucose; Standing  -     Verify blood consent; Standing  -     Verify consent; Standing  -     Diet NPO; Standing  -     Place sequential compression device; Standing  -     Place GANESH hose; Standing    Peripheral venous insufficiency  -     Full code; Standing  -     Place in Outpatient; Standing  -     Case Request Operating Room: [id number]-CATH  -     Vital signs; Standing  -     Insert peripheral IV; Standing  -     Verify beta-blocker dose taken within 24 hours if patient is prescribed beta-blocker; Standing  -     Height and weight; Standing  -     Intake and output; Standing  -     Verify discontinuation of antithrombotics; Standing  -     POCT glucose; Standing  -     Verify blood consent; Standing  -     Verify consent; Standing  -     Diet NPO; Standing  -     Place sequential compression device; Standing  -     Place GANESH hose; Standing    History of TIA (transient ischemic attack)  -     enoxaparin (LOVENOX) 60 mg/0.6 mL Syrg; Inject 0.6 mLs (60 mg total) into the skin 2 (two) times daily. for 10 days  Dispense: 12 mL; Refill: 0    Other orders  -     0.9%  NaCl infusion  -     ceFAZolin (ANCEF) 2 g in dextrose 5 %  (D5W) 50 mL IVPB        Medical Decision Making/Counseling:  Cbqxvf-W-Cvga dependent for lab draws.  Recent Qhngiz-G-Xris study shows evidence of fibrin sheath at the catheter tip which is distal allowing lab draws.  Patient desires for Unifsv-J-Pmvq placement.  Risk benefits have been discussed patient family voiced understanding risk benefits wished to proceed near future.  Will plan for Mcsduj-S-Koab placement and removal of contralateral Atldgz-W-Nqst.    Risk of Nzhtjw-W-Ogdy placement have been discussed to include valvular heart dysfunction, arrhythmias which could require Ftynzw-B-Ytsf removal or pulling back of catheter which would be a separate surgery.  Additionally utilization of large blood vessels in the chest or neck to have the risk of bleeding has been discussed.  Further the risk of infection to include that of Txcaul-Q-Nfmg or catheter infection associated with other etiologies long-term to include UTI has bacteremia etcetera as the cause of possible sepsis necessitating removal of chemo port.  Routinely antibiotics do not treat Jwpzkg-K-Uybr infections well.  Further the risk of pneumothorax associated with placement of Faepoc-Q-Ytnv to happen in 1 case in every 200 cases done across the [place] sometimes necessitating the use of chest tube insertion and admission to the hospital to allow for lung to heal.  After informed discussion, patient voices understanding risk benefits and desires to proceed the near future.  Total clinic time today spent with patient, reviewing chart 45 minutes of which greater than half that time was utilized for face-to-face counseling.    Patient instructed that best way to communicate with my office staff is for patient to get on the [facility] patient portal to expedite communication and communication issues that may occur.  Patient was given instructions on how to get on the portal.  I encouraged patient to obtain portal access as well.  Ultimately it is up  to the patient to obtain access.  Patient voiced understanding.

## 2023-07-19 ENCOUNTER — HOSPITAL ENCOUNTER (OUTPATIENT)
Dept: PREADMISSION TESTING | Facility: HOSPITAL | Age: 83
Discharge: HOME | End: 2023-07-19
Attending: INTERNAL MEDICINE
Payer: MEDICARE

## 2023-07-19 ENCOUNTER — ANESTHESIA EVENT (OUTPATIENT)
Dept: SURGERY | Facility: HOSPITAL | Age: 83
End: 2023-07-19
Payer: MEDICARE

## 2023-07-19 PROCEDURE — 99900103 DSU ONLY-NO CHARGE-INITIAL HR (STAT)

## 2023-07-19 NOTE — ANESTHESIA PREPROCEDURE EVALUATION
07/19/2023  Ursula Rodríguez is a 83 y.o., female.      Pre-op Assessment    I have reviewed the Patient Summary Reports.     I have reviewed the Nursing Notes.    I have reviewed the Medications.     Review of Systems  Social:  Non-Smoker    Hematology/Oncology:  Hematology Normal        EENT/Dental:EENT/Dental Normal   Cardiovascular:   Pacemaker Hypertension Dysrhythmias    Pulmonary:  Pulmonary Normal    Renal/:   Chronic Renal Disease, CKD    Musculoskeletal:   Arthritis     Neurological:   Neuromuscular Disease,   Peripheral Neuropathy    Endocrine:   Diabetes Hypothyroidism        Physical Exam    Airway:  Mallampati: III / II  Mouth Opening: Small, but > 3cm  TM Distance: Normal  Tongue: Normal  Neck ROM: Normal ROM        Anesthesia Plan  Type of Anesthesia, risks & benefits discussed:    Anesthesia Type: Gen ETT, Gen Supraglottic Airway  Intra-op Monitoring Plan: Standard ASA Monitors  Post Op Pain Control Plan: multimodal analgesia  Induction:  IV  Airway Plan: Direct and Video  Informed Consent: Informed consent signed with the Patient and all parties understand the risks and agree with anesthesia plan.  All questions answered.   ASA Score: 3  Day of Surgery Review of History & Physical: H&P Update referred to the surgeon/provider.    Ready For Surgery From Anesthesia Perspective.     .

## 2023-07-19 NOTE — DISCHARGE INSTRUCTIONS
NOTHING TO EAT OR DRINK AFTER MIDNIGHT Tuesday night.     Shower with [employer] [facility] the evening and morning prior to surgery.

## 2023-07-26 ENCOUNTER — ANESTHESIA (OUTPATIENT)
Dept: SURGERY | Facility: HOSPITAL | Age: 83
End: 2023-07-26
Payer: MEDICARE

## 2023-07-26 ENCOUNTER — HOSPITAL ENCOUNTER (OUTPATIENT)
Facility: HOSPITAL | Age: 83
Discharge: HOME | End: 2023-07-26
Attending: SURGERY | Admitting: SURGERY
Payer: MEDICARE

## 2023-07-26 VITALS
WEIGHT: 124 LBS | SYSTOLIC BLOOD PRESSURE: 129 MMHG | OXYGEN SATURATION: 97 % | TEMPERATURE: 98 F | DIASTOLIC BLOOD PRESSURE: 77 MMHG | HEART RATE: 85 BPM | RESPIRATION RATE: 20 BRPM | HEIGHT: 62 IN | BODY MASS INDEX: 22.82 KG/M2

## 2023-07-26 DIAGNOSIS — Z98.890 HISTORY OF INFUSAPORT CENTRAL VENOUS CATHETER INSERTION: ICD-10-CM

## 2023-07-26 DIAGNOSIS — I87.2 PERIPHERAL VENOUS INSUFFICIENCY: ICD-10-CM

## 2023-07-26 LAB — POCT GLUCOSE: 110 MG/DL (ref 70–110)

## 2023-07-26 PROCEDURE — 36590 PR REMOVAL TUNNELED CV CATH W SUBQ PORT OR PUMP: ICD-10-PCS | Mod: 51,LT,, | Performed by: SURGERY

## 2023-07-26 PROCEDURE — 88300 SURGICAL PATH GROSS: CPT | Performed by: PATHOLOGY

## 2023-07-26 PROCEDURE — 36590 REMOVAL TUNNELED CV CATH: CPT | Mod: 51,LT,, | Performed by: SURGERY

## 2023-07-26 PROCEDURE — D9220A PRA ANESTHESIA: Mod: ANES,,, | Performed by: ANESTHESIOLOGY

## 2023-07-26 PROCEDURE — 37000008 HC ANESTHESIA 1ST 15 MINUTES: Performed by: SURGERY

## 2023-07-26 PROCEDURE — 63600175 PHARM REV CODE 636 W HCPCS: Performed by: SURGERY

## 2023-07-26 PROCEDURE — 88300 PR  SURG PATH,GROSS,LEVEL I: ICD-10-PCS | Mod: 26,,, | Performed by: PATHOLOGY

## 2023-07-26 PROCEDURE — 77001 FLUOROGUIDE FOR VEIN DEVICE: CPT | Mod: 26,,, | Performed by: SURGERY

## 2023-07-26 PROCEDURE — 36000706: Performed by: SURGERY

## 2023-07-26 PROCEDURE — 77001 CHG FLUOROGUIDE CNTRL VEN ACCESS,PLACE,REPLACE,REMOVE: ICD-10-PCS | Mod: 26,,, | Performed by: SURGERY

## 2023-07-26 PROCEDURE — D9220A PRA ANESTHESIA: Mod: CRNA,,, | Performed by: NURSE ANESTHETIST, CERTIFIED REGISTERED

## 2023-07-26 PROCEDURE — 63600175 PHARM REV CODE 636 W HCPCS: Performed by: NURSE ANESTHETIST, CERTIFIED REGISTERED

## 2023-07-26 PROCEDURE — 37000009 HC ANESTHESIA EA ADD 15 MINS: Performed by: SURGERY

## 2023-07-26 PROCEDURE — 71000015 HC POSTOP RECOV 1ST HR: Performed by: SURGERY

## 2023-07-26 PROCEDURE — 36561 INSERT TUNNELED CV CATH: CPT | Mod: RT,,, | Performed by: SURGERY

## 2023-07-26 PROCEDURE — 71000033 HC RECOVERY, INTIAL HOUR: Performed by: SURGERY

## 2023-07-26 PROCEDURE — C1788 PORT, INDWELLING, IMP: HCPCS | Performed by: SURGERY

## 2023-07-26 PROCEDURE — D9220A PRA ANESTHESIA: ICD-10-PCS | Mod: CRNA,,, | Performed by: NURSE ANESTHETIST, CERTIFIED REGISTERED

## 2023-07-26 PROCEDURE — 25000003 PHARM REV CODE 250: Performed by: SURGERY

## 2023-07-26 PROCEDURE — 88300 SURGICAL PATH GROSS: CPT | Mod: 26,,, | Performed by: PATHOLOGY

## 2023-07-26 PROCEDURE — 36000707: Performed by: SURGERY

## 2023-07-26 PROCEDURE — 36561 PR INSERT TUNNELED CV CATH WITH PORT: ICD-10-PCS | Mod: RT,,, | Performed by: SURGERY

## 2023-07-26 PROCEDURE — 25000003 PHARM REV CODE 250: Performed by: NURSE ANESTHETIST, CERTIFIED REGISTERED

## 2023-07-26 PROCEDURE — D9220A PRA ANESTHESIA: ICD-10-PCS | Mod: ANES,,, | Performed by: ANESTHESIOLOGY

## 2023-07-26 DEVICE — PORT VORTEX VX DETACH 8F 7.2F: Type: IMPLANTABLE DEVICE | Site: CHEST | Status: FUNCTIONAL

## 2023-07-26 RX ORDER — LIDOCAINE HYDROCHLORIDE 20 MG/ML
INJECTION, SOLUTION EPIDURAL; INFILTRATION; INTRACAUDAL; PERINEURAL
Status: DISCONTINUED | OUTPATIENT
Start: 2023-07-26 | End: 2023-07-26

## 2023-07-26 RX ORDER — PROPOFOL 10 MG/ML
VIAL (ML) INTRAVENOUS
Status: DISCONTINUED | OUTPATIENT
Start: 2023-07-26 | End: 2023-07-26

## 2023-07-26 RX ORDER — SODIUM CHLORIDE, SODIUM LACTATE, POTASSIUM CHLORIDE, CALCIUM CHLORIDE 600; 310; 30; 20 MG/100ML; MG/100ML; MG/100ML; MG/100ML
INJECTION, SOLUTION INTRAVENOUS CONTINUOUS
Status: DISCONTINUED | OUTPATIENT
Start: 2023-07-26 | End: 2023-07-26 | Stop reason: HOSPADM

## 2023-07-26 RX ORDER — BUPIVACAINE HYDROCHLORIDE AND EPINEPHRINE 2.5; 5 MG/ML; UG/ML
INJECTION, SOLUTION EPIDURAL; INFILTRATION; INTRACAUDAL; PERINEURAL
Status: DISCONTINUED | OUTPATIENT
Start: 2023-07-26 | End: 2023-07-26 | Stop reason: HOSPADM

## 2023-07-26 RX ORDER — SODIUM CHLORIDE, SODIUM LACTATE, POTASSIUM CHLORIDE, CALCIUM CHLORIDE 600; 310; 30; 20 MG/100ML; MG/100ML; MG/100ML; MG/100ML
125 INJECTION, SOLUTION INTRAVENOUS CONTINUOUS
Status: DISCONTINUED | OUTPATIENT
Start: 2023-07-26 | End: 2023-07-26 | Stop reason: HOSPADM

## 2023-07-26 RX ORDER — MORPHINE SULFATE 2 MG/ML
2 INJECTION, SOLUTION INTRAMUSCULAR; INTRAVENOUS EVERY 5 MIN PRN
Status: DISCONTINUED | OUTPATIENT
Start: 2023-07-26 | End: 2023-07-26 | Stop reason: HOSPADM

## 2023-07-26 RX ORDER — MORPHINE SULFATE 10 MG/ML
INJECTION INTRAMUSCULAR; INTRAVENOUS; SUBCUTANEOUS
Status: DISCONTINUED | OUTPATIENT
Start: 2023-07-26 | End: 2023-07-26

## 2023-07-26 RX ORDER — HEPARIN SODIUM 5000 [USP'U]/ML
INJECTION, SOLUTION INTRAVENOUS; SUBCUTANEOUS
Status: DISCONTINUED | OUTPATIENT
Start: 2023-07-26 | End: 2023-07-26 | Stop reason: HOSPADM

## 2023-07-26 RX ORDER — LIDOCAINE HCL/EPINEPHRINE/PF 2%-1:200K
VIAL (ML) INJECTION
Status: DISCONTINUED | OUTPATIENT
Start: 2023-07-26 | End: 2023-07-26 | Stop reason: HOSPADM

## 2023-07-26 RX ORDER — ONDANSETRON 4 MG/1
4 TABLET, FILM COATED ORAL EVERY 6 HOURS PRN
Qty: 30 TABLET | Refills: 0 | Status: SHIPPED | OUTPATIENT
Start: 2023-07-26 | End: 2023-08-05

## 2023-07-26 RX ORDER — SODIUM CHLORIDE 9 MG/ML
INJECTION, SOLUTION INTRAVENOUS CONTINUOUS
Status: DISCONTINUED | OUTPATIENT
Start: 2023-07-26 | End: 2023-07-26 | Stop reason: HOSPADM

## 2023-07-26 RX ORDER — ONDANSETRON 2 MG/ML
4 INJECTION INTRAMUSCULAR; INTRAVENOUS DAILY PRN
Status: DISCONTINUED | OUTPATIENT
Start: 2023-07-26 | End: 2023-07-26 | Stop reason: HOSPADM

## 2023-07-26 RX ORDER — OXYCODONE AND ACETAMINOPHEN 5; 325 MG/1; MG/1
1 TABLET ORAL
Status: DISCONTINUED | OUTPATIENT
Start: 2023-07-26 | End: 2023-07-26 | Stop reason: HOSPADM

## 2023-07-26 RX ORDER — OXYCODONE AND ACETAMINOPHEN 5; 325 MG/1; MG/1
1 TABLET ORAL EVERY 8 HOURS PRN
Qty: 10 TABLET | Refills: 0 | Status: SHIPPED | OUTPATIENT
Start: 2023-07-26 | End: 2023-08-05

## 2023-07-26 RX ORDER — ONDANSETRON 2 MG/ML
INJECTION INTRAMUSCULAR; INTRAVENOUS
Status: DISCONTINUED | OUTPATIENT
Start: 2023-07-26 | End: 2023-07-26

## 2023-07-26 RX ORDER — CEFAZOLIN SODIUM 2 G/50ML
2 SOLUTION INTRAVENOUS
Status: COMPLETED | OUTPATIENT
Start: 2023-07-26 | End: 2023-07-26

## 2023-07-26 RX ORDER — DEXAMETHASONE SODIUM PHOSPHATE 4 MG/ML
INJECTION, SOLUTION INTRA-ARTICULAR; INTRALESIONAL; INTRAMUSCULAR; INTRAVENOUS; SOFT TISSUE
Status: DISCONTINUED | OUTPATIENT
Start: 2023-07-26 | End: 2023-07-26

## 2023-07-26 RX ADMIN — PROPOFOL 50 MG: 10 INJECTION, EMULSION INTRAVENOUS at 08:07

## 2023-07-26 RX ADMIN — LIDOCAINE HYDROCHLORIDE 40 MG: 20 INJECTION, SOLUTION EPIDURAL; INFILTRATION; INTRACAUDAL; PERINEURAL at 08:07

## 2023-07-26 RX ADMIN — SODIUM CHLORIDE, POTASSIUM CHLORIDE, SODIUM LACTATE AND CALCIUM CHLORIDE: 600; 310; 30; 20 INJECTION, SOLUTION INTRAVENOUS at 08:07

## 2023-07-26 RX ADMIN — MORPHINE SULFATE 0.5 MG: 10 INJECTION INTRAVENOUS at 08:07

## 2023-07-26 RX ADMIN — ONDANSETRON 4 MG: 2 INJECTION INTRAMUSCULAR; INTRAVENOUS at 08:07

## 2023-07-26 RX ADMIN — CEFAZOLIN SODIUM 2 G: 2 SOLUTION INTRAVENOUS at 08:07

## 2023-07-26 RX ADMIN — DEXAMETHASONE SODIUM PHOSPHATE 4 MG: 4 INJECTION, SOLUTION INTRAMUSCULAR; INTRAVENOUS at 08:07

## 2023-07-26 NOTE — H&P
[facility] H&P Note    Subjective:       Patient ID: Ursula Rodríguez is a 83 y.o. female.    Chief Complaint:  Doc I need a new chemo port/Xvysjm-G-Mjee.  HPI:  Ursula Rodríguez is a 83 y.o. female history of factor 5 Leiden previous TIA on long-term use of anticoagulation therapy with Eliquis presents today as a new patient referral for evaluation of nonfunctioning Umdwen-O-Hieu/chemo port.  Patient had an Lpzdrg-D-Cmwf placed left chest wall 10 years ago  by Dr. Acevedo.  History of bilateral mastectomy and axillary lymph nodes removed for breast cancer.  She is not able to get injections infusions or aspirations in her arms.  Mild history of lymphedema 1 of the arms which has been treated since.  Patient presents today desired to proceed with Zjlubk-S-Xwen removal and replacement.  She presents today now as a new patient referral.    Past Medical History:   Diagnosis Date    Anemia     Breast cancer 1995    LEFT DIAGNOSED FIRST    Breast cancer     Right     Diabetes mellitus     Factor V Leiden     Hyperlipidemia 2017    Hypertension 2017    Hypothyroidism 2019    Osteoporosis     UNKNOWN DATE OF DX    Pacemaker 10/28/2021    Recurrent urinary tract infection     Vertigo      Past Surgical History:   Procedure Laterality Date    BREAST SURGERY  , 2013, Nose cancer    CARDIAC VALVE REPLACEMENT       SECTION      EYE SURGERY      HEMORRHOID SURGERY  1968    HERNIA REPAIR      HYSTERECTOMY  1970    INSERTION OF PACEMAKER      MASTECTOMY Right 1994    MASTECTOMY Left     BREAST CANCER    PORTACATH PLACEMENT  2013    TUMOR REMOVAL Left     EAR     Family History   Problem Relation Age of Onset    Cancer Mother     Hypertension Mother     Heart disease Mother     No Known Problems Sister     Diabetes Brother     Hypertension Brother     Cancer Maternal Grandmother     Lung disease Father     Heart disease Father     Diabetes Father     Cancer Brother      Diabetes Brother     Hypertension Brother     Stroke Brother     Hypertension Brother     Stroke Brother      Social History     Socioeconomic History    Marital status:     Highest education level: Master's degree (e.g., MA, MS, [place], MEd, MSW, ANDRE)   Occupational History    Occupation: Phd x 3-   Tobacco Use    Smoking status: Never    Smokeless tobacco: Never   Substance and Sexual Activity    Alcohol use: No     Comment: SPECIAL OCCASIONS LIKE ZACHERY    Drug use: No    Sexual activity: Not Currently     Partners: Male     Social Determinants of Health     Financial Resource Strain: Low Risk     Difficulty of Paying Living Expenses: Not hard at all   Food Insecurity: No Food Insecurity    Worried About Running Out of Food in the Last Year: Never true    Ran Out of Food in the Last Year: Never true   Transportation Needs: No Transportation Needs    Lack of Transportation (Medical): No    Lack of Transportation (Non-Medical): No   Physical Activity: Insufficiently Active    Days of Exercise per Week: 5 days    Minutes of Exercise per Session: 20 min   Stress: Stress Concern Present    Feeling of Stress : To some extent   Social Connections: Unknown    Frequency of Communication with Friends and Family: More than three times a week    Frequency of Social Gatherings with Friends and Family: Once a week    Active Member of Clubs or Organizations: Yes    Attends Club or Organization Meetings: 1 to 4 times per year    Marital Status:    Housing Stability: Low Risk     Unable to Pay for Housing in the Last Year: No    Number of Places Lived in the Last Year: 1    Unstable Housing in the Last Year: No       Current Facility-Administered Medications   Medication Dose Route Frequency Provider Last Rate Last Admin    0.9%  NaCl infusion   Intravenous Continuous Carl Conrad MD        cefazolin (ANCEF) 2 gram in dextrose 5% 50 mL IVPB (premix)  2 g Intravenous On Call Procedure Carl CHARLES  MD [place]         Review of patient's allergies indicates:   Allergen Reactions    Iodine and iodide containing products     Ditropan [oxybutynin chloride] Palpitations and Other (See Comments)     Made patient weak       Review of Systems   Constitutional:  Negative for activity change, appetite change, chills and fever.   HENT:  Negative for congestion, dental problem and ear discharge.    Eyes:  Negative for discharge and itching.   Respiratory:  Negative for apnea, choking and chest tightness.    Cardiovascular:  Negative for chest pain and leg swelling.   Gastrointestinal:  Negative for abdominal distention, abdominal pain, anal bleeding, constipation, diarrhea and nausea.   Endocrine: Negative for cold intolerance and heat intolerance.   Genitourinary:  Negative for difficulty urinating and dyspareunia.   Musculoskeletal:  Negative for arthralgias and back pain.   Skin:  Negative for color change and pallor.   Neurological:  Negative for dizziness and facial asymmetry.   Hematological:  Negative for adenopathy. Does not bruise/bleed easily.   Psychiatric/Behavioral:  Negative for agitation and behavioral problems.      Objective:      There were no vitals filed for this visit.    Physical Exam  Constitutional:       General: She is not in acute distress.     Appearance: She is well-developed.   HENT:      Head: Normocephalic and atraumatic.   Eyes:      Pupils: Pupils are equal, round, and reactive to light.   Neck:      Thyroid: No thyromegaly.   Cardiovascular:      Rate and Rhythm: Normal rate and regular rhythm.   Pulmonary:      Effort: Pulmonary effort is normal.      Breath sounds: Normal breath sounds.   Abdominal:      General: Bowel sounds are normal. There is no distension.      Palpations: Abdomen is soft.      Tenderness: There is no abdominal tenderness.   Musculoskeletal:         General: No deformity. Normal range of motion.      Cervical back: Normal range of motion and neck supple.    Skin:     General: Skin is warm.      Capillary Refill: Capillary refill takes less than 2 seconds.      Findings: No erythema.   Neurological:      Mental Status: She is alert and oriented to person, place, and time.      Cranial Nerves: No cranial nerve deficit.   Psychiatric:         Behavior: Behavior normal.        Assessment:       1. Peripheral venous insufficiency    2. History of infusaport central venous catheter insertion        Plan:   Peripheral venous insufficiency  -     Case Request Operating Room: PAJLPLESS-GGTE-R-CATH  -     Place in Outpatient; Standing  -     Vital signs; Standing  -     Verify beta-blocker dose taken within 24 hours if patient is prescribed beta-blocker; Standing  -     Height and weight; Standing  -     Intake and output; Standing  -     Verify discontinuation of antithrombotics; Standing  -     POCT glucose; Standing  -     Verify blood consent; Standing  -     Verify consent; Standing  -     Diet NPO; Standing  -     Place sequential compression device; Standing  -     Place GANESH hose; Standing    History of infusaport central venous catheter insertion  -     Case Request Operating Room: FCATJWIUD-GSSO-U-CATH  -     Place in Outpatient; Standing  -     Vital signs; Standing  -     Verify beta-blocker dose taken within 24 hours if patient is prescribed beta-blocker; Standing  -     Height and weight; Standing  -     Intake and output; Standing  -     Verify discontinuation of antithrombotics; Standing  -     POCT glucose; Standing  -     Verify blood consent; Standing  -     Verify consent; Standing  -     Diet NPO; Standing  -     Place sequential compression device; Standing  -     Place GANESH hose; Standing    Other orders  -     0.9%  NaCl infusion  -     cefazolin (ANCEF) 2 gram in dextrose 5% 50 mL IVPB (premix)        Medical Decision Making/Counseling:  Xdnliv-E-Pbsv dependent for lab draws.  Recent Mnggja-E-Etmm study shows evidence of fibrin sheath at the catheter tip  which is distal allowing lab draws.  Patient desires for Rwqttz-H-Feyr placement.  Risk benefits have been discussed patient family voiced understanding risk benefits wished to proceed near future.  Will plan for Daeqre-Q-Geuq placement and removal of contralateral Tgjafm-X-Khkh.    Risk of Rgjqvt-G-Qkuu placement have been discussed to include valvular heart dysfunction, arrhythmias which could require Kolgiv-W-Qres removal or pulling back of catheter which would be a separate surgery.  Additionally utilization of large blood vessels in the chest or neck to have the risk of bleeding has been discussed.  Further the risk of infection to include that of Wbzeui-Q-Haze or catheter infection associated with other etiologies long-term to include UTI has bacteremia etcetera as the cause of possible sepsis necessitating removal of chemo port.  Routinely antibiotics do not treat Pkglvd-J-Zzud infections well.  Further the risk of pneumothorax associated with placement of Odijhb-W-Baef to happen in 1 case in every 200 cases done across the [place] sometimes necessitating the use of chest tube insertion and admission to the hospital to allow for lung to heal.  After informed discussion, patient voices understanding risk benefits and desires to proceed the near future.  Total clinic time today spent with patient, reviewing chart 45 minutes of which greater than half that time was utilized for face-to-face counseling.    Patient instructed that best way to communicate with my office staff is for patient to get on the [facility] epic patient portal to expedite communication and communication issues that may occur.  Patient was given instructions on how to get on the portal.  I encouraged patient to obtain portal access as well.  Ultimately it is up to the patient to obtain access.  Patient voiced understanding.

## 2023-07-26 NOTE — PLAN OF CARE
Cardiac clearance and EKG on chart. Pt reports she stopped blood thinners (Eliquis) as  Ordered on Sunday July 23, 2023 and started Lovenox BID. Last lovenox was yesterday July 25,2023. Pt

## 2023-07-26 NOTE — ANESTHESIA POSTPROCEDURE EVALUATION
Anesthesia Post Evaluation    Patient: Ursula Rodríguez    Procedure(s) Performed: Procedure(s) (LRB):  [id number]-E-CATH (N/A)  REMOVAL, CATHETER, CENTRAL VENOUS, TUNNELED, WITH PORT (Left)    Final Anesthesia Type: general      Patient location during evaluation: PACU  Patient participation: Yes- Able to Participate  Level of consciousness: awake and awake and alert  Post-procedure vital signs: reviewed and stable  Pain management: adequate  Airway patency: patent    PONV status at discharge: No PONV  Anesthetic complications: no      Cardiovascular status: blood pressure returned to baseline  Respiratory status: unassisted and spontaneous ventilation  Hydration status: euvolemic  Follow-up not needed.          Vitals Value Taken Time   /77 07/26/23 0947   Temp 36.6 °C (97.9 °F) 07/26/23 0914   Pulse 85 07/26/23 0949   Resp 20 07/26/23 0949   SpO2 97 % 07/26/23 0949         Event Time   Out of Recovery 09:41:14         Pain/Chaim Score: Chaim Score: 10 (7/26/2023  9:38 AM)  Modified Chaim Score: 20 (7/26/2023  9:43 AM)

## 2023-07-26 NOTE — ANESTHESIA PROCEDURE NOTES
Intubation    Date/Time: 7/26/2023 8:22 AM  Performed by: Henny Ramachandran CRNA  Authorized by: Jayjay Del Angel MD     Intubation:     Induction:  Intravenous    Intubated:  Postinduction    Mask Ventilation:  Easy mask    Attempts:  1    Attempted By:  CRNA    Method of Intubation:  Direct    Difficult Airway Encountered?: No      Complications:  None    Airway Device:  Supraglottic airway/LMA    Airway Device Size:  3.0    Style/Cuff Inflation:  Cuffed (inflated to minimal occlusive pressure)    Secured at:  The lips    Placement Verified By:  Capnometry    Complicating Factors:  None    Findings Post-Intubation:  BS equal bilateral and atraumatic/condition of teeth unchanged

## 2023-07-26 NOTE — OP NOTE
[facility] Surgery  Operative Note     SUMMARY     Surgery Date: 7/26/2023     Pre-op Diagnosis:  History of infusaport central venous catheter insertion [Z98.890]  Peripheral venous insufficiency [I87.2]    Post-op Diagnosis:  Post-Op Diagnosis Codes:     * History of infusaport central venous catheter insertion [Z98.890]     * Peripheral venous insufficiency [I87.2]    Procedure(s) (LRB):  HCAEIUHAY-JQSA-S-CATH (N/A)  REMOVAL, CATHETER, CENTRAL VENOUS, TUNNELED, WITH PORT (Left)    Surgeon(s) and Role:     * Carl Conrad MD - Primary    Assistant: None    Antibiotics: Ancef 2 gram IV    Estimated Blood Loss: 5cc    Anesthesia:  General    Description of the findings of the procedure:  Successful insertion of right internal jugular venous Acagzv-T-Btpk.  Successful removal of left internal jugular venous Viqmhi-L-Ewso.    Specimens:  Left internal jugular venous Rlhujw-U-Ruqd with catheter.    Complications:  None apparent in the operative room.    Implants:  Vortex low-profile 6 Nepali right internal jugular venous Olmbzn-L-Crqm with catheter.         Indications for Procedure:     Ms Rodríguez is a 84yo F presented clinic as referral for evaluation of Gjumqw-X-Eifq malfunction.  Patient with left chest Xcgkmf-V-Qwpo.  Desires for placement of right chest Lfcppk-P-Azqt.  Malfunction of left chest Sxycod-I-Hhfi.  Desires for removal of left chest Rbjwla-A-Duua.  Risk benefits alternatives were all discussed in clinic.  They voiced understanding of the risk benefits and wished to proceed today by signed informed consent.  All questions were answered to the patient family's satisfaction.    Procedure:       Patient was brought back in the operative room placed on the table in supine position.  Monitored anesthesia care was given per the anesthesia team.  A time-out was conducted with all the operative room in agreement correct patient correct procedure correct allergies correct antibiotics.  Patient was  given 2 g IV Ancef prior to surgical incision.  Patient's bilateral chest was prepped and draped in standard sterile surgical fashion.    Ultrasound device was used identify the patient's right internal jugular vein.  2 cc 0.25% Marcaine with epinephrine was injected in the patient's planned right neck puncture site.  18 gauge needle with syringe provided by the Qrouoi-M-Grha kit was inserted in the patient's right internal jugular vein under ultrasound guidance.  Dark venous blood return was present.  Wire was placed over needle and wire was confirmed to be in the patient's right internal jugular vein using the ultrasound.  Fluoroscopy was then pulled into the operative field and x-ray confirmed wire to be in the patient's superior vena cava going down the patient's inferior vena cava on the right side of the spine, in good position.  Wire was then it fixed to the surgical drape with a hemostat.    10 cc 0.25% Marcaine with epinephrine is injected the patient's planned right chest port site.  Skin incision was made with a 15 blade in the patient's right chest superior lateral.  Skin incision is carried down to subcutaneous tissues with Bovie electric cautery.  Inferiorly a pocket was created for subcutaneous port with Bovie electric cautery.  Superiorly the catheter provided by the Fsevbk-B-Uvxg kit was tunneled over the clavicle and medial to the right neck wire site.  At this point fluoroscopy was pulled back into operative field, and dilator and sheath provided by the Owvqlb-T-Zujs kit was advanced over the wire in the patient's right neck dilator and wire were completely removed from the sheath.  Catheter was then inserted over the sheath under fluoroscopic guidance.  Sheath was removed in its entirety.  Catheter was pulled back to approximately 15 cm, and at the junction of the SVC and right atrium.    Catheter was pulled back to the subcutaneous port site.  The catheter was trimmed and subcutaneous port was  placed on catheter.  Blue locking device was placed over catheter and port junction.  Port was accessed with a [place] needle and heparinized saline.  Aspiration of port revealed dark venous blood return, and easily flushed there afterwards.    Subcutaneous port was placed in pocket previously created.  Three 0 Vicryl sutures were used in a simple subdermal fashion close the sub dermis at all sites.  Four O Monocryl suture was used in a simple interrupted fashion to close the right neck incision sites.  For O Monocryl suture was used in a running epidermal fashion to close the port site.  Dermabond was placed over all surgical sites as a dressing.  Final fluoroscopy image revealed subcutaneous port subcutaneous catheter in place without kinks, catheter tip within the SVC atrial junction, in good position.    Attention was then turned towards the left chest Clqhwz-O-Kbro site.  2 cc 0.25% Marcaine with epinephrine is injected the patient's planned procedural site.  Skin incision was made in a transverse fashion.  Skin incision is carried down to Ynekdv-V-Cyuh with Bovie electric cautery.  Qoltxt-I-Ukrr was removed with port and catheter completely intact.  Handed off as specimen.  Catheter tract was oversewn with a 3-0 Vicryl suture in a figure-of-eight fashion.  Skin was closed with 4-0 Monocryl suture in a running subcuticular fashion.  Dermabond placed over top as dressing.    Patient was reversed from his anesthetic agent transfer back to the postoperative care unit stable condition.  All counts were correct at the end of the procedure including lap pads instruments as well as needles.  Plan will be postoperative chest x-ray to ensure no postoperative complications, and discharge home today with adequate pain and nausea medication.  Patient will follow up in surgery Clinic in 2 weeks.  Mwkkli-F-Ftah is ready for utilization.

## 2023-07-26 NOTE — TRANSFER OF CARE
"Anesthesia Transfer of Care Note    Patient: Ursula Rodríguez    Procedure(s) Performed: Procedure(s) (LRB):  WFUEAQRKW-BAQP-R-CATH (N/A)  REMOVAL, CATHETER, CENTRAL VENOUS, TUNNELED, WITH PORT (Left)    Patient location: PACU    Anesthesia Type: general    Transport from OR: Transported from OR on room air with adequate spontaneous ventilation    Post pain: adequate analgesia    Post assessment: no apparent anesthetic complications and tolerated procedure well    Post vital signs: stable    Level of consciousness: awake, alert and oriented    Nausea/Vomiting: no nausea/vomiting    Complications: none    Transfer of care protocol was followed      Last vitals:   Visit Vitals  BP (!) 119/59   Pulse 106   Temp 36.6 °C (97.9 °F) (Tympanic)   Resp 12   Ht 5' 2" (1.575 m)   Wt 56.2 kg (124 lb)   SpO2 99%   Breastfeeding No   BMI 22.68 kg/m²     "

## 2023-07-26 NOTE — DISCHARGE SUMMARY
Discharge Note        SUMMARY     Admit Date: 7/26/2023    Attending Physician: Carl Conrad MD     Discharge Physician: Carl Conrad MD    Discharge Date: 7/26/2023 9:06 AM      Hospital Course: Patient tolerated procedure well.     Disposition: Home or Self Care    Patient Instructions:   Current Discharge Medication List        START taking these medications    Details   ondansetron (ZOFRAN) 4 MG tablet Take 1 tablet (4 mg total) by mouth every 6 (six) hours as needed for Nausea.  Qty: 30 tablet, Refills: 0      oxyCODONE-acetaminophen (PERCOCET) 5-325 mg per tablet Take 1 tablet by mouth every 8 (eight) hours as needed for Pain.  Qty: 10 tablet, Refills: 0    Comments: n/a            CONTINUE these medications which have NOT CHANGED    Details   alendronate (FOSAMAX) 70 MG tablet Take 1 tablet (70 mg total) by mouth every 7 days.  Qty: 4 tablet, Refills: 11    Associated Diagnoses: Osteoporosis, unspecified osteoporosis type, unspecified pathological fracture presence      cyanocobalamin (VITAMIN B-12) 1000 MCG tablet Take 100 mcg by mouth once daily.      enoxaparin (LOVENOX) 60 mg/0.6 mL Syrg Inject 0.6 mLs (60 mg total) into the skin 2 (two) times daily. for 10 days  Qty: 12 mL, Refills: 0    Associated Diagnoses: History of TIA (transient ischemic attack)      ERGOCALCIFEROL, VITAMIN D2, ORAL Take by mouth.      levothyroxine (SYNTHROID) 50 MCG tablet TAKE 1 TABLET BY MOUTH ONCE DAILY  Qty: 90 tablet, Refills: 3    Comments: Requesting 1 year supply  Associated Diagnoses: Acquired hypothyroidism      losartan (COZAAR) 25 MG tablet TAKE ONE-HALF TABLET BY  MOUTH ONCE DAILY  Qty: 45 tablet, Refills: 3    Comments: Requesting 1 year supply  Associated Diagnoses: Microalbuminuria      magnesium oxide (MAG-OX) 400 mg (241.3 mg magnesium) tablet Take 400 mg by mouth once daily.      meclizine (ANTIVERT) 25 mg tablet Take by mouth.      metFORMIN (GLUCOPHAGE-XR) 500 MG ER 24hr tablet TAKE 1  TABLET BY MOUTH EVERY MORNING WITH FOOD FOR 2 WEEKS THEN INCREASE TO 1 TABLET BY MOUTH TWICE DAILY WITH FOOD  Qty: 180 tablet, Refills: 3    Associated Diagnoses: Type 2 diabetes mellitus without complication, without long-term current use of insulin      metoprolol tartrate (LOPRESSOR) 25 MG tablet TAKE ONE-HALF TABLET BY  MOUTH TWICE DAILY  Qty: 90 tablet, Refills: 3    Comments: Requesting 1 year supply      multivitamin capsule Take 1 capsule by mouth once daily.      NIFEdipine (PROCARDIA-XL) 30 MG (OSM) 24 hr tablet Take 1 tablet (30 MG ) by mouth twice daily  Qty: 180 tablet, Refills: 0    Comments: .  Associated Diagnoses: Essential hypertension      potassium 99 mg Tab Take by mouth once daily.      predniSONE (DELTASONE) 50 MG Tab 50 mg at 13, 7, and 1 hour prior to contrast administration.  Qty: 3 tablet, Refills: 0    Associated Diagnoses: Port-A-Cath in place      sertraline (ZOLOFT) 25 MG tablet Take 1 tablet (25 mg total) by mouth once daily.  Qty: 30 tablet, Refills: 11    Associated Diagnoses: Anxiety      simvastatin (ZOCOR) 20 MG tablet TAKE 1 TABLET BY MOUTH IN  THE EVENING  Qty: 90 tablet, Refills: 3    Comments: Requesting 1 year supply      spironolactone (ALDACTONE) 25 MG tablet 1 tablet.      cloNIDine (CATAPRES) 0.1 MG tablet Take 1 tablet (0.1 mg total) by mouth 2 (two) times daily as needed (BP > 160/90).  Qty: 60 tablet, Refills: 11    Comments: .      cyproheptadine (PERIACTIN) 4 mg tablet Take 1 tablet (4 mg total) by mouth 2 (two) times daily with meals.  Qty: 180 tablet, Refills: 1    Associated Diagnoses: Unexplained weight loss      ELIQUIS 2.5 mg Tab TAKE 1 TABLET BY MOUTH TWICE  DAILY  Qty: 180 tablet, Refills: 3    Comments: Requesting 1 year supply  Associated Diagnoses: Hypercoagulopathy             Discharge Procedure Orders (must include Diet, Follow-up, Activity):   Discharge Procedure Orders (must include Diet, Follow-up, Activity)   Diet general     Lifting restrictions    Order Comments: No heavy lifting, pushing, pulling, bending, strenuous exercise greater than 10 lb for the next 6 weeks.     Other restrictions (specify):   Order Comments: No swimming or submersion of wounds in lakes, ponds, streams, oceans, bathtubs, etc until seen in clinic for postoperative evaluation.     No dressing needed     Call MD for:  temperature >100.4     Call MD for:  persistent nausea and vomiting     Call MD for:  severe uncontrolled pain     Call MD for:  difficulty breathing, headache or visual disturbances     Call MD for:  redness, tenderness, or signs of infection (pain, swelling, redness, odor or green/yellow discharge around incision site)     Call MD for:  persistent dizziness or light-headedness        Follow Up:  Follow up as scheduled.  Resume routine diet.  Activity as tolerated.    No driving day of procedure.

## 2023-07-28 LAB
FINAL PATHOLOGIC DIAGNOSIS: NORMAL
GROSS: NORMAL
Lab: NORMAL

## 2023-08-01 ENCOUNTER — OFFICE VISIT (OUTPATIENT)
Dept: SURGERY | Facility: CLINIC | Age: 83
End: 2023-08-01
Payer: MEDICARE

## 2023-08-01 VITALS
HEART RATE: 77 BPM | OXYGEN SATURATION: 99 % | HEIGHT: 62 IN | SYSTOLIC BLOOD PRESSURE: 124 MMHG | DIASTOLIC BLOOD PRESSURE: 72 MMHG | BODY MASS INDEX: 23 KG/M2 | WEIGHT: 125 LBS

## 2023-08-01 DIAGNOSIS — Z09 POSTOP CHECK: Primary | ICD-10-CM

## 2023-08-01 PROCEDURE — 99214 OFFICE O/P EST MOD 30 MIN: CPT | Mod: PBBFAC | Performed by: SURGERY

## 2023-08-01 PROCEDURE — 99999 PR PBB SHADOW E&M-EST. PATIENT-LVL IV: CPT | Mod: PBBFAC,,, | Performed by: SURGERY

## 2023-08-01 PROCEDURE — 99024 POSTOP FOLLOW-UP VISIT: CPT | Mod: POP,,, | Performed by: SURGERY

## 2023-08-01 PROCEDURE — 99999 PR PBB SHADOW E&M-EST. PATIENT-LVL IV: ICD-10-PCS | Mod: PBBFAC,,, | Performed by: SURGERY

## 2023-08-01 PROCEDURE — 99024 PR POST-OP FOLLOW-UP VISIT: ICD-10-PCS | Mod: POP,,, | Performed by: SURGERY

## 2023-08-01 NOTE — PROGRESS NOTES
Doing well after port removal and replacement.  No issues.  Surgical sites look great.  No signs or symptoms of infection.  Return to normal activities.  Follow-up surgery clinic as needed.  Port is ready to utilize.

## 2023-08-16 ENCOUNTER — OFFICE VISIT (OUTPATIENT)
Dept: PODIATRY | Facility: CLINIC | Age: 83
End: 2023-08-16
Payer: MEDICARE

## 2023-08-16 VITALS
HEART RATE: 63 BPM | DIASTOLIC BLOOD PRESSURE: 71 MMHG | SYSTOLIC BLOOD PRESSURE: 136 MMHG | BODY MASS INDEX: 23 KG/M2 | HEIGHT: 62 IN | WEIGHT: 125 LBS

## 2023-08-16 DIAGNOSIS — E11.9 COMPREHENSIVE DIABETIC FOOT EXAMINATION, TYPE 2 DM, ENCOUNTER FOR: ICD-10-CM

## 2023-08-16 DIAGNOSIS — M19.071 OSTEOARTHRITIS OF RIGHT ANKLE AND FOOT: ICD-10-CM

## 2023-08-16 DIAGNOSIS — L60.0 INGROWN NAIL: Primary | ICD-10-CM

## 2023-08-16 DIAGNOSIS — E11.9 TYPE 2 DIABETES MELLITUS WITHOUT COMPLICATION, WITHOUT LONG-TERM CURRENT USE OF INSULIN: ICD-10-CM

## 2023-08-16 PROCEDURE — 99999 PR PBB SHADOW E&M-EST. PATIENT-LVL IV: ICD-10-PCS | Mod: PBBFAC,,, | Performed by: PODIATRIST

## 2023-08-16 PROCEDURE — 64999 UNLISTED PX NERVOUS SYSTEM: CPT | Mod: PBBFAC | Performed by: PODIATRIST

## 2023-08-16 PROCEDURE — 99213 PR OFFICE/OUTPT VISIT, EST, LEVL III, 20-29 MIN: ICD-10-PCS | Mod: 25,S$PBB,, | Performed by: PODIATRIST

## 2023-08-16 PROCEDURE — 99214 OFFICE O/P EST MOD 30 MIN: CPT | Mod: PBBFAC,25 | Performed by: PODIATRIST

## 2023-08-16 PROCEDURE — 64999 UNLISTED PX NERVOUS SYSTEM: CPT | Mod: S$PBB,,, | Performed by: PODIATRIST

## 2023-08-16 PROCEDURE — 64999 PR QUTENZA APPLICATION: ICD-10-PCS | Mod: S$PBB,,, | Performed by: PODIATRIST

## 2023-08-16 PROCEDURE — 99213 OFFICE O/P EST LOW 20 MIN: CPT | Mod: 25,S$PBB,, | Performed by: PODIATRIST

## 2023-08-16 PROCEDURE — 99999 PR PBB SHADOW E&M-EST. PATIENT-LVL IV: CPT | Mod: PBBFAC,,, | Performed by: PODIATRIST

## 2023-08-19 NOTE — PROGRESS NOTES
Subjective:      Patient ID: Ursula Rodríguez is a 83 y.o. female.    Chief Complaint: Follow-up     Patient presents today for diabetic evaluation she is currently on Eliquis and states she has a history of neuropathy that does bother her at night.      Review of Systems   Neurological:  Positive for numbness.   All other systems reviewed and are negative.       Constitutional    Pleasant, well-nourished, no distress, well oriented    Eyes         GLASSES    Cardiovascular          No chest pain, no shortness of breath    Respiratory           No cough, no congestion     Musculoskeletal        No muscle aches, no arthralgias/joint pain, no back pain, no swelling in the extremities            Objective:      Physical Exam  Vitals and nursing note reviewed.   Constitutional:       Appearance: Normal appearance.   Cardiovascular:      Pulses:           Dorsalis pedis pulses are 1+ on the right side and 1+ on the left side.        Posterior tibial pulses are 1+ on the right side and 1+ on the left side.   Pulmonary:      Effort: Pulmonary effort is normal.   Musculoskeletal:         General: Swelling, tenderness and signs of injury present.      Right foot: Deformity present.      Left foot: Deformity present.        Feet:    Feet:      Right foot:      Protective Sensation: 4 sites tested.   1 site sensed.     Skin integrity: Erythema and warmth present.      Toenail Condition: Right toenails are abnormally thick and ingrown. Fungal disease present.     Left foot:      Protective Sensation: 4 sites tested.   1 site sensed.     Toenail Condition: Left toenails are abnormally thick. Fungal disease present.  Skin:     Capillary Refill: Capillary refill takes more than 3 seconds.      Findings: Erythema present.   Neurological:      Mental Status: She is alert.      Sensory: Sensory deficit present.   Psychiatric:         Mood and Affect: Mood normal.         Behavior: Behavior normal.         Thought Content: Thought  content normal.         Judgment: Judgment normal.       Vascular         Arterial Pulses Right: posterior tibialis 1/4, dorsalis pedis 1/4, normal CFT   Arterial Pulses Left: posterior tibialis 1/4, dorsalis pedis 1/4, normal CFT   No lower extremity edema bilateral   Pedal skin temperature and color are normal bilateral     Integumentary   Distal 5th digit left foot including nail and under this area is black due to dried blood.  Patient had a subungual hematoma  due to prolonged walking which eventually drained, no bogginess or fluctuance today.  There is discolored hyperkeratotic tissue encompassing the distal 5th digit.  When debriding this area there is healthy pink tissue underneath, nail remains well attached for now, no further evidence of abscess or cellulitis at this time.  Skin is in good condition        Neurological   Gross sensation intact, denies burning or tingling in feet    Musculoskeletal   Muscle Strength/Testing and Tone:  Intact-excellent for age,  normal tone bilateral   Joints, Bones, and Muscles: Limited ROM midfoot   Consistent with osteoarthritis related to age.  Mild arthritic and degenerative changes        Walks well unassisted            Assessment:       Encounter Diagnoses   Name Primary?    Ingrown nail Yes    Type 2 diabetes mellitus without complication, without long-term current use of insulin     Comprehensive diabetic foot examination, type 2 DM, encounter for     Osteoarthritis of right ankle and foot          Plan:       Ursula was seen today for follow-up.    Diagnoses and all orders for this visit:    Ingrown nail    Type 2 diabetes mellitus without complication, without long-term current use of insulin    Comprehensive diabetic foot examination, type 2 DM, encounter for    Osteoarthritis of right ankle and foot        Patient presents today for diabetic evaluation she is currently on Eliquis and states she has a history of neuropathy that does bother her at night.    A  complete diabetic evaluation was performed today patient does have findings consistent with diabetic related neuropathy.    Patient had several elongated ingrowing toenails I did debride these today I have advised the patient these were starting to become ingrown because of her neuropathy she was not having any pain or discomfort this needs to be monitored carefully.  Patient did have several ingrowing toenails especially the medial lateral border bilateral hallux the reserve were able to be trimmed and removed patient did not require a nail avulsion at this time.Patient is presenting today for application of Qutenza to help control her diabetic related neuropathy which bothers her constantly and has gotten progressively worse.  Patient's feet were evaluated there is no open wounds or sores noted patient tolerated the appy application without issue.  Patient management performed today in addition to application of Qutenza as the patient is being seen for follow-up diabetic evaluation she also was seen for chronically ingrown toenails that put her at risk for complication.   This note was created using M*Modal voice recognition software that occasionally misinterpreted phrases or words.    Qutenza sheets applied to the dorsal and plantar aspect of both feet 4 sheets totaling, 1120 sq cm, wrapped and left intact for 30 minutes with no side effects.    Removed and cooling gel applied.  Patient was performed per manufacture guidelines.

## 2023-09-12 ENCOUNTER — OFFICE VISIT (OUTPATIENT)
Dept: HEMATOLOGY/ONCOLOGY | Facility: CLINIC | Age: 83
End: 2023-09-12
Payer: MEDICARE

## 2023-09-12 VITALS
RESPIRATION RATE: 16 BRPM | SYSTOLIC BLOOD PRESSURE: 139 MMHG | HEART RATE: 79 BPM | TEMPERATURE: 98 F | HEIGHT: 62 IN | DIASTOLIC BLOOD PRESSURE: 76 MMHG | BODY MASS INDEX: 23.04 KG/M2 | WEIGHT: 125.19 LBS

## 2023-09-12 DIAGNOSIS — Z85.3 HISTORY OF BILATERAL BREAST CANCER: Primary | ICD-10-CM

## 2023-09-12 PROCEDURE — 99214 OFFICE O/P EST MOD 30 MIN: CPT | Mod: S$GLB,,, | Performed by: INTERNAL MEDICINE

## 2023-09-12 PROCEDURE — 99214 PR OFFICE/OUTPT VISIT, EST, LEVL IV, 30-39 MIN: ICD-10-PCS | Mod: S$GLB,,, | Performed by: INTERNAL MEDICINE

## 2023-09-12 NOTE — PROGRESS NOTES
[facility] In Office Hematology Oncology Subsequent Encounter Note.    23    Subjective:      Patient ID:   Ursula Rodríguez  83 y.o. female  1940  MD Herman      Chief Complaint:   Breast cancer follow up    HPI:  83 y.o. female with diagnosis of R Breast cancer,2013.  R breast partial mastectomy followed by [facility].  Sentinal node +.  Stage II dx.  CTA x's 3/4, arimidex, Tamoxifen 2015.    Hx of L breast cancer , had partial mastectomy and reconstruction.    Was on Periactin, weight increased from 122 to 125#, off periactin now.    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  .  On Eliquis 2.5 mg BID prophylaxis.    Previously she had decreased appetite, weight down 15#.  Began trial of Periactin 4 mg BID for appetite, weight gain.  Off periactin now 125#.  She stopped her periactin, says appetite is improved, her daughter agrees with her.    On Eliquis 2.5 mg 1 BID.  Refill antivert for vertigo sx prn.   Bone Density, osteoporosis.     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  Hx HTN, DM, GERD,cholesterol, DJD, CVA, TIA., thyroid dx, bradycardia.    Appendectomy, Partial hystectomy, L knee surgery several times, hernia repair, Tumor removed L ear drum .  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  S/P pacemaker placement    Allergy iodine, IVP dye.    Dad COPD, DM.  Mom Breast cancer, DM, increased HR.  Sister DM.  Brother DM, PVD.  Sister colon cancer, heart dx.  Sister Parkinson's Dx.  Daughter colon cancer.   of 64 years, recently passed away.  [facility].    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   LN +.  M3    New port placed for lab draws.    ROS:   GEN: normal without any fever, night sweats or weight loss  HEENT: See HPI  CV: normal with no CP, SOB, PND, BUSTOS or orthopnea  PULM: normal with no SOB, cough, hemoptysis, sputum or pleuritic pain  GI: normal with no abdominal pain, nausea, vomiting, constipation, diarrhea, melanotic stools, BRBPR, or hematemesis  : normal with no hematuria,  dysuria  BREAST: See HPI  SKIN: normal with no rash, erythema, bruising, or swelling     Past Medical History:   Diagnosis Date    Anemia 1995    Breast cancer 1995    LEFT DIAGNOSED FIRST    Breast cancer 2013    Right     Diabetes mellitus     Factor V Leiden 1994    Hyperlipidemia 2017    Hypertension 2017    Hypothyroidism 2019    Osteoporosis     UNKNOWN DATE OF DX    Pacemaker 10/28/2021    Recurrent urinary tract infection     Vertigo      Past Surgical History:   Procedure Laterality Date    BREAST SURGERY  , 2013, Nose cancer    CARDIAC VALVE REPLACEMENT       SECTION      EYE SURGERY      HEMORRHOID SURGERY  1968    HERNIA REPAIR      HYSTERECTOMY      INSERTION OF PACEMAKER      INSERTION OF TUNNELED CENTRAL VENOUS CATHETER (CVC) WITH SUBCUTANEOUS PORT N/A 2023    Procedure: [id number];  Surgeon: Carl Conrad MD;  Location: [facility] OR;  Service: General;  Laterality: N/A;    MASTECTOMY Right     MASTECTOMY Left     BREAST CANCER    MEDIPORT REMOVAL Left 2023    Procedure: REMOVAL, CATHETER, CENTRAL VENOUS, TUNNELED, WITH PORT;  Surgeon: Carl Conrad MD;  Location: [facility] OR;  Service: General;  Laterality: Left;    PORTACATH PLACEMENT      TUMOR REMOVAL Left     EAR       Review of patient's allergies indicates:   Allergen Reactions    Iodine and iodide containing products     Ditropan [oxybutynin chloride] Palpitations and Other (See Comments)     Made patient weak     Social History     Socioeconomic History    Marital status:     Highest education level: Master's degree (e.g., MA, MS, [place], MEd, MSW, ANDRE)   Occupational History    Occupation: Phd x 3-   Tobacco Use    Smoking status: Never    Smokeless tobacco: Never   Substance and Sexual Activity    Alcohol use: No     Comment: SPECIAL OCCASIONS LIKE ZACHERY    Drug use: No    Sexual activity: Not Currently     Partners: Male     Social Determinants of Health      Financial Resource Strain: Low Risk  (9/6/2023)    Overall Financial Resource Strain (CARDIA)     Difficulty of Paying Living Expenses: Not hard at all   Food Insecurity: No Food Insecurity (9/6/2023)    Hunger Vital Sign     Worried About Running Out of Food in the Last Year: Never true     Ran Out of Food in the Last Year: Never true   Transportation Needs: No Transportation Needs (9/6/2023)    PRAPARE - Transportation     Lack of Transportation (Medical): No     Lack of Transportation (Non-Medical): No   Physical Activity: Insufficiently Active (9/6/2023)    Exercise Vital Sign     Days of Exercise per Week: 3 days     Minutes of Exercise per Session: 20 min   Stress: Stress Concern Present (9/6/2023)    [employer] - Occupational Stress Questionnaire     Feeling of Stress : Rather much   Social Connections: Unknown (9/6/2023)    Social Connection and Isolation Panel [NHANES]     Frequency of Communication with Friends and Family: More than three times a week     Frequency of Social Gatherings with Friends and Family: Twice a week     Active Member of Clubs or Organizations: No     Attends Club or Organization Meetings: Never     Marital Status:    Housing Stability: Low Risk  (9/6/2023)    Housing Stability Vital Sign     Unable to Pay for Housing in the Last Year: No     Number of Places Lived in the Last Year: 1     Unstable Housing in the Last Year: No         Current Outpatient Medications:     alendronate (FOSAMAX) 70 MG tablet, Take 1 tablet (70 mg total) by mouth every 7 days., Disp: 4 tablet, Rfl: 11    cyanocobalamin (VITAMIN B-12) 1000 MCG tablet, Take 100 mcg by mouth once daily., Disp: , Rfl:     ELIQUIS 2.5 mg Tab, TAKE 1 TABLET BY MOUTH TWICE  DAILY, Disp: 180 tablet, Rfl: 3    ERGOCALCIFEROL, VITAMIN D2, ORAL, Take by mouth., Disp: , Rfl:     levothyroxine (SYNTHROID) 50 MCG tablet, TAKE 1 TABLET BY MOUTH ONCE DAILY, Disp: 90 tablet, Rfl: 3    losartan  "(COZAAR) 25 MG tablet, TAKE ONE-HALF TABLET BY  MOUTH ONCE DAILY, Disp: 45 tablet, Rfl: 3    magnesium oxide (MAG-OX) 400 mg (241.3 mg magnesium) tablet, Take 400 mg by mouth once daily., Disp: , Rfl:     meclizine (ANTIVERT) 25 mg tablet, Take by mouth., Disp: , Rfl:     metFORMIN (GLUCOPHAGE-XR) 500 MG ER 24hr tablet, TAKE 1 TABLET BY MOUTH EVERY MORNING WITH FOOD FOR 2 WEEKS THEN INCREASE TO 1 TABLET BY MOUTH TWICE DAILY WITH FOOD, Disp: 180 tablet, Rfl: 3    metoprolol tartrate (LOPRESSOR) 25 MG tablet, TAKE ONE-HALF TABLET BY  MOUTH TWICE DAILY, Disp: 90 tablet, Rfl: 3    multivitamin capsule, Take 1 capsule by mouth once daily., Disp: , Rfl:     NIFEdipine (PROCARDIA-XL) 30 MG (OSM) 24 hr tablet, Take 1 tablet (30 MG ) by mouth twice daily, Disp: 180 tablet, Rfl: 0    potassium 99 mg Tab, Take by mouth once daily., Disp: , Rfl:     predniSONE (DELTASONE) 50 MG Tab, 50 mg at 13, 7, and 1 hour prior to contrast administration., Disp: 3 tablet, Rfl: 0    sertraline (ZOLOFT) 25 MG tablet, Take 1 tablet (25 mg total) by mouth once daily., Disp: 30 tablet, Rfl: 11    simvastatin (ZOCOR) 20 MG tablet, TAKE 1 TABLET BY MOUTH IN  THE EVENING, Disp: 90 tablet, Rfl: 3    spironolactone (ALDACTONE) 25 MG tablet, 1 tablet., Disp: , Rfl:     cloNIDine (CATAPRES) 0.1 MG tablet, Take 1 tablet (0.1 mg total) by mouth 2 (two) times daily as needed (BP > 160/90)., Disp: 60 tablet, Rfl: 11    cyproheptadine (PERIACTIN) 4 mg tablet, Take 1 tablet (4 mg total) by mouth 2 (two) times daily with meals., Disp: 180 tablet, Rfl: 1    Current Facility-Administered Medications:     capsaicin-skin cleanser patch 4 patch, 4 patch, Topical (Top), 1 time in Clinic/HOD, Sander Almaguer DPM          Objective:   Vitals:  Blood pressure 139/76, pulse 79, temperature 97.5 °F (36.4 °C), resp. rate 16, height 5' 2" (1.575 m), weight 56.8 kg (125 lb 3.2 oz).    Physical Examination:   GEN: no apparent distress, comfortable  HEAD: atraumatic and " "normocephalic  EYES: no pallor, no icterus  ENT:  no pharyngeal erythema, external ears WNL; no nasal discharge  NECK: no masses, thyroid normal, trachea midline, no LAD/LN's, supple  CV: RRR with no murmur; normal pulse; normal S1 and S2; no pedal edema  CHEST: Normal respiratory effort; CTAB; normal breath sounds; no wheeze or crackles  ABDOM: nontender and nondistended; soft;  no rebound/guarding  MUSC/Skeletal: ROM normal; no crepitus; joints normal; no deformities   EXTREM: multiple spider veins L & R leg  SKIN: multiple ecchemoses on forearms, poor skin turgor.  : no cvat  NEURO: grossly intact; motor/sensory WNL;  no tremors  PSYCH: normal mood, affect and behavior  LYMPH: normal cervical, supraclavicular, axillary and groin LN's  BREASTS: L 'breast" post op change, without palpable mass.  R chest NT, post operative change, no palpable mass    CAT head small vessel dx.  MRI of L/S spine DDD, DJD.    Current studies:  Bone Scan, no metastases seen.  But intense uptake at R foot.  Concern for occult fx or osteomyelitis, she is following up with Dr. Almaguer on this.    CT of chest abdomin and pelvis, multiple 2-3 mm nodules in lungs, no previous CT for comparison.  Significance?  Will plan to repeat CT of chest in 3 months.  No definitive metastatic dx seen.    B 12 372.  Assessment:   (1) 83 y.o. female with diagnosis of  Bilateral breast cancer, S/P treatment as above.  CISCO.  Observe for now.     (2)Weight loss 15#, decreased appetite.  Periactin 4 mg BID.  Now weight better, up to 125#.  She stopped periactin.    (3)S/P pacemaker placement for bradycardia.    (4)Hx TIA, F5L, on Eliquis 2.5 mg prophylaxis long term.    RTC 6 months.                         Answers submitted by the patient for this visit:  Review of Systems Questionnaire (Submitted on 1/9/2023)  appetite change : No  unexpected weight change: Yes  mouth sores: No  visual disturbance: No  cough: Yes  shortness of breath: No  chest pain: " No  abdominal pain: No  diarrhea: No  frequency: Yes  back pain: Yes  rash: No  headaches: Yes  adenopathy: Yes  nervous/ anxious: Yes

## 2023-09-13 ENCOUNTER — OFFICE VISIT (OUTPATIENT)
Dept: FAMILY MEDICINE | Facility: CLINIC | Age: 83
End: 2023-09-13
Payer: MEDICARE

## 2023-09-13 VITALS
SYSTOLIC BLOOD PRESSURE: 124 MMHG | HEIGHT: 62 IN | OXYGEN SATURATION: 97 % | HEART RATE: 76 BPM | WEIGHT: 125.38 LBS | TEMPERATURE: 98 F | DIASTOLIC BLOOD PRESSURE: 64 MMHG | BODY MASS INDEX: 23.07 KG/M2 | RESPIRATION RATE: 14 BRPM

## 2023-09-13 DIAGNOSIS — D68.59 HYPERCOAGULOPATHY: ICD-10-CM

## 2023-09-13 DIAGNOSIS — Z23 NEED FOR INFLUENZA VACCINATION: ICD-10-CM

## 2023-09-13 DIAGNOSIS — E11.49 TYPE II DIABETES MELLITUS WITH NEUROLOGICAL MANIFESTATIONS: Primary | ICD-10-CM

## 2023-09-13 PROCEDURE — 99215 OFFICE O/P EST HI 40 MIN: CPT | Mod: PBBFAC,25 | Performed by: FAMILY MEDICINE

## 2023-09-13 PROCEDURE — 99999PBSHW FLU VACCINE - QUADRIVALENT - ADJUVANTED: ICD-10-PCS | Mod: PBBFAC,,,

## 2023-09-13 PROCEDURE — 99999 PR PBB SHADOW E&M-EST. PATIENT-LVL V: ICD-10-PCS | Mod: PBBFAC,,, | Performed by: FAMILY MEDICINE

## 2023-09-13 PROCEDURE — 99999PBSHW FLU VACCINE - QUADRIVALENT - ADJUVANTED: Mod: PBBFAC,,,

## 2023-09-13 PROCEDURE — 99999 PR PBB SHADOW E&M-EST. PATIENT-LVL V: CPT | Mod: PBBFAC,,, | Performed by: FAMILY MEDICINE

## 2023-09-13 PROCEDURE — 99213 OFFICE O/P EST LOW 20 MIN: CPT | Mod: S$PBB,,, | Performed by: FAMILY MEDICINE

## 2023-09-13 PROCEDURE — G0008 ADMIN INFLUENZA VIRUS VAC: HCPCS | Mod: PBBFAC

## 2023-09-13 PROCEDURE — 99213 PR OFFICE/OUTPT VISIT, EST, LEVL III, 20-29 MIN: ICD-10-PCS | Mod: S$PBB,,, | Performed by: FAMILY MEDICINE

## 2023-09-13 RX ORDER — MECLIZINE HYDROCHLORIDE 25 MG/1
25 TABLET ORAL 3 TIMES DAILY PRN
Qty: 90 TABLET | Refills: 3 | Status: SHIPPED | OUTPATIENT
Start: 2023-09-13

## 2023-09-13 NOTE — PROGRESS NOTES
[facility] - Clinic Note    Subjective      Ms. Rodríguez is a 83 y.o. female who presents to clinic for Follow-up (3mth follow up)    Needs some refills of medications.  Port exchange went well.    [facility] Ursula has a past medical history of Anemia (), Breast cancer (), Breast cancer (), Diabetes mellitus, Factor V Leiden (), Hyperlipidemia (), Hypertension (), Hypothyroidism (), Osteoporosis, Pacemaker (10/28/2021), Recurrent urinary tract infection, and Vertigo ().   [facility] Ursula has a past surgical history that includes Hemorrhoid surgery (); Hysterectomy (); Tumor removal (Left, ); Mastectomy (Right, ); Mastectomy (Left, );  section; Eye surgery (); Breast surgery (, , Nose cancer); Hernia repair (); Cardiac valve replacement (); Portacath placement (); Insertion of pacemaker; Insertion of tunneled central venous catheter (CVC) with subcutaneous port (N/A, 2023); and Mediport removal (Left, 2023).    Ursula's family history includes Cancer in her brother, maternal grandmother, and mother; Diabetes in her brother, brother, and father; Heart disease in her father and mother; Hypertension in her brother, brother, brother, and mother; Lung disease in her father; No Known Problems in her sister; Stroke in her brother and brother.   ANUJ Vergara reports that she has never smoked. She has never used smokeless tobacco. She reports that she does not drink alcohol and does not use drugs.   JAC Vergara is allergic to iodine and iodide containing products and ditropan [oxybutynin chloride].   JAYME Vergara has a current medication list which includes the following prescription(s): alendronate, cyanocobalamin, ergocalciferol (vitamin d2), levothyroxine, losartan, magnesium oxide, metformin, metoprolol tartrate, multivitamin, nifedipine, potassium, sertraline, simvastatin, spironolactone, apixaban, clonidine, cyproheptadine, meclizine, and  "prednisone, and the following Facility-Administered Medications: capsaicin-skin cleanser.     Review of Systems   Constitutional:  Negative for chills and fever.   Respiratory:  Negative for shortness of breath.    Cardiovascular:  Negative for chest pain.   Skin:  Negative for rash.   Psychiatric/Behavioral:  The patient is nervous/anxious.      Objective     /64 (BP Location: Left arm, Patient Position: Sitting, BP Method: Medium (Manual))   Pulse 76   Temp 97.6 °F (36.4 °C) (Temporal)   Resp 14   Ht 5' 2" (1.575 m)   Wt 56.9 kg (125 lb 6.4 oz)   SpO2 97%   BMI 22.94 kg/m²     Physical Exam  Vitals and nursing note reviewed.   Constitutional:       General: She is not in acute distress.     Appearance: Normal appearance. She is well-developed. She is not diaphoretic.   HENT:      Head: Normocephalic and atraumatic.      Right Ear: External ear normal.      Left Ear: External ear normal.   Eyes:      General:         Right eye: No discharge.         Left eye: No discharge.   Cardiovascular:      Rate and Rhythm: Normal rate and regular rhythm.      Heart sounds: Normal heart sounds.   Pulmonary:      Effort: Pulmonary effort is normal.      Breath sounds: Normal breath sounds. No wheezing or rales.   Skin:     General: Skin is warm and dry.   Neurological:      Mental Status: She is alert and oriented to person, place, and time. Mental status is at baseline.   Psychiatric:         Mood and Affect: Mood normal.         Behavior: Behavior normal.         Thought Content: Thought content normal.         Judgment: Judgment normal.        Assessment/Plan     1. Type II diabetes mellitus with neurological manifestations  CBC Auto Differential    Comprehensive Metabolic Panel      2. Hypercoagulopathy  apixaban (ELIQUIS) 2.5 mg Tab      3. Need for influenza vaccination  Influenza (FLUAD) - Quadrivalent (Adjuvanted) *Preferred* (65+) (PF)        Due for labs as above.  Refilled medication as above.  Flu " vaccine today.    Future Appointments   Date Time Provider Department Center   9/15/2023  8:30 AM [facility]   10/13/2023  8:30 AM [facility]   11/10/2023  8:30 AM [facility]   11/15/2023  8:45 [facility]   12/8/2023  8:30 AM [facility]   5/13/2024 10:00 AM STEFFI Garcia MD [facility] HEM ONC [facility] Adriano Sutton MD  Family Medicine  [facility]

## 2023-09-21 ENCOUNTER — INFUSION (OUTPATIENT)
Dept: INFUSION THERAPY | Facility: HOSPITAL | Age: 83
End: 2023-09-21
Attending: FAMILY MEDICINE
Payer: MEDICARE

## 2023-09-21 VITALS
TEMPERATURE: 98 F | OXYGEN SATURATION: 98 % | BODY MASS INDEX: 22.63 KG/M2 | WEIGHT: 123 LBS | DIASTOLIC BLOOD PRESSURE: 63 MMHG | SYSTOLIC BLOOD PRESSURE: 139 MMHG | HEIGHT: 62 IN | HEART RATE: 84 BPM | RESPIRATION RATE: 18 BRPM

## 2023-09-21 DIAGNOSIS — Z85.3 HISTORY OF BILATERAL BREAST CANCER: Primary | ICD-10-CM

## 2023-09-21 DIAGNOSIS — E11.49 TYPE II DIABETES MELLITUS WITH NEUROLOGICAL MANIFESTATIONS: ICD-10-CM

## 2023-09-21 LAB
ALBUMIN SERPL BCP-MCNC: 4.1 G/DL (ref 3.5–5.2)
ALP SERPL-CCNC: 70 U/L (ref 55–135)
ALT SERPL W/O P-5'-P-CCNC: 12 U/L (ref 10–44)
ANION GAP SERPL CALC-SCNC: 9 MMOL/L (ref 8–16)
AST SERPL-CCNC: 16 U/L (ref 10–40)
BASOPHILS # BLD AUTO: 0.05 K/UL (ref 0–0.2)
BASOPHILS NFR BLD: 0.5 % (ref 0–1.9)
BILIRUB SERPL-MCNC: 0.8 MG/DL (ref 0.1–1)
BUN SERPL-MCNC: 24 MG/DL (ref 8–23)
CALCIUM SERPL-MCNC: 9.8 MG/DL (ref 8.7–10.5)
CHLORIDE SERPL-SCNC: 106 MMOL/L (ref 95–110)
CO2 SERPL-SCNC: 23 MMOL/L (ref 23–29)
CREAT SERPL-MCNC: 1.1 MG/DL (ref 0.5–1.4)
DIFFERENTIAL METHOD: ABNORMAL
EOSINOPHIL # BLD AUTO: 0.6 K/UL (ref 0–0.5)
EOSINOPHIL NFR BLD: 6.2 % (ref 0–8)
ERYTHROCYTE [DISTWIDTH] IN BLOOD BY AUTOMATED COUNT: 13.8 % (ref 11.5–14.5)
EST. GFR  (NO RACE VARIABLE): 49.2 ML/MIN/1.73 M^2
GLUCOSE SERPL-MCNC: 152 MG/DL (ref 70–110)
HCT VFR BLD AUTO: 41 % (ref 37–48.5)
HGB BLD-MCNC: 13.8 G/DL (ref 12–16)
IMM GRANULOCYTES # BLD AUTO: 0.1 K/UL (ref 0–0.04)
IMM GRANULOCYTES NFR BLD AUTO: 1 % (ref 0–0.5)
LYMPHOCYTES # BLD AUTO: 1 K/UL (ref 1–4.8)
LYMPHOCYTES NFR BLD: 9.8 % (ref 18–48)
MCH RBC QN AUTO: 28 PG (ref 27–31)
MCHC RBC AUTO-ENTMCNC: 33.7 G/DL (ref 32–36)
MCV RBC AUTO: 83 FL (ref 82–98)
MONOCYTES # BLD AUTO: 0.8 K/UL (ref 0.3–1)
MONOCYTES NFR BLD: 8.4 % (ref 4–15)
NEUTROPHILS # BLD AUTO: 7.2 K/UL (ref 1.8–7.7)
NEUTROPHILS NFR BLD: 74.1 % (ref 38–73)
NRBC BLD-RTO: 0 /100 WBC
PLATELET # BLD AUTO: 206 K/UL (ref 150–450)
PMV BLD AUTO: 9.1 FL (ref 9.2–12.9)
POTASSIUM SERPL-SCNC: 4 MMOL/L (ref 3.5–5.1)
PROT SERPL-MCNC: 6.2 G/DL (ref 6–8.4)
RBC # BLD AUTO: 4.92 M/UL (ref 4–5.4)
SODIUM SERPL-SCNC: 138 MMOL/L (ref 136–145)
WBC # BLD AUTO: 9.74 K/UL (ref 3.9–12.7)

## 2023-09-21 PROCEDURE — 96523 IRRIG DRUG DELIVERY DEVICE: CPT

## 2023-09-21 PROCEDURE — A4216 STERILE WATER/SALINE, 10 ML: HCPCS | Performed by: FAMILY MEDICINE

## 2023-09-21 PROCEDURE — 80053 COMPREHEN METABOLIC PANEL: CPT | Performed by: FAMILY MEDICINE

## 2023-09-21 PROCEDURE — 63600175 PHARM REV CODE 636 W HCPCS: Performed by: FAMILY MEDICINE

## 2023-09-21 PROCEDURE — 85025 COMPLETE CBC W/AUTO DIFF WBC: CPT | Performed by: FAMILY MEDICINE

## 2023-09-21 PROCEDURE — 25000003 PHARM REV CODE 250: Performed by: FAMILY MEDICINE

## 2023-09-21 RX ORDER — HEPARIN 100 UNIT/ML
500 SYRINGE INTRAVENOUS
Status: COMPLETED | OUTPATIENT
Start: 2023-09-21 | End: 2023-09-21

## 2023-09-21 RX ORDER — HEPARIN 100 UNIT/ML
500 SYRINGE INTRAVENOUS
Status: CANCELLED | OUTPATIENT
Start: 2023-09-21

## 2023-09-21 RX ORDER — SODIUM CHLORIDE 0.9 % (FLUSH) 0.9 %
10 SYRINGE (ML) INJECTION
Status: CANCELLED | OUTPATIENT
Start: 2023-09-21

## 2023-09-21 RX ORDER — SODIUM CHLORIDE 0.9 % (FLUSH) 0.9 %
10 SYRINGE (ML) INJECTION
Status: COMPLETED | OUTPATIENT
Start: 2023-09-21 | End: 2023-09-21

## 2023-09-21 RX ADMIN — Medication 10 ML: at 08:09

## 2023-09-21 RX ADMIN — HEPARIN 500 UNITS: 100 SYRINGE at 08:09

## 2023-09-21 NOTE — PLAN OF CARE
"Problem: Adult Inpatient Plan of Care  Goal: Plan of Care Review  Outcome: Ongoing, Progressing  Flowsheets (Taken 9/21/2023 0848)  Plan of Care Reviewed With: patient /63 (BP Location: Left arm, Patient Position: Sitting)   Pulse 84   Temp 98 °F (36.7 °C) (Oral)   Resp 18   Ht 5' 2" (1.575 m)   Wt 55.8 kg (123 lb)   SpO2 98%   BMI 22.50 kg/m² Pleasant alert and oriented patient ambulated to clinic for port flush  - pt tolerated well - discharged home with no concerns - pt to RTC 1 month- Labs collected     "

## 2023-09-25 ENCOUNTER — TELEPHONE (OUTPATIENT)
Dept: FAMILY MEDICINE | Facility: CLINIC | Age: 83
End: 2023-09-25
Payer: MEDICARE

## 2023-09-25 NOTE — TELEPHONE ENCOUNTER
----- Message from Lorie Roldan, Patient Care Assistant sent at 9/25/2023  1:23 PM CDT -----  Regarding: advice  Contact: pt  Type: Needs Medical Advice    Who Called:  pt     Best Call Back Number: [phone] (home)     Additional Information: pt states she would like a callback regarding a renewal for a disable parking sticker. Please call pt to advise. Thanks!

## 2023-09-25 NOTE — TELEPHONE ENCOUNTER
Pt requesting updated Handicap tag. Pt daughter will bring form up tomorrow. Verbalized understanding.

## 2023-09-28 ENCOUNTER — TELEPHONE (OUTPATIENT)
Dept: FAMILY MEDICINE | Facility: CLINIC | Age: 83
End: 2023-09-28
Payer: MEDICARE

## 2023-09-28 NOTE — TELEPHONE ENCOUNTER
----- Message from Lorie Roldan, Patient Care Assistant sent at 9/28/2023  1:31 PM CDT -----  Regarding: returning call  Contact: Lucita ramírez's daughter  Type:  Patient Returning Call    Who Called:  Lucita ramírez's daughter    Who Left Message for Patient:  not sure     Does the patient know what this is regarding?:  yes     Best Call Back Number:  [phone]     Additional Information:  please call Lucita ramírez's daughter to advise. Thanks!

## 2023-10-06 DIAGNOSIS — E03.9 ACQUIRED HYPOTHYROIDISM: ICD-10-CM

## 2023-10-06 NOTE — TELEPHONE ENCOUNTER
----- Message from Douglas Lawton sent at 10/6/2023  9:13 AM CDT -----  Contact: pt at levothyroxine (SYNTHROID) 50 MCG tablet  Type:  RX Refill Request    Who Called:  pt  Refill or New Rx:  refill  RX Name and Strength:  levothyroxine (SYNTHROID) 50 MCG tablet  How is the patient currently taking it? (ex. 1XDay):  1XDay  Is this a 30 day or 90 day RX:  90  Preferred Pharmacy with phone number:    [id number] - [place] [address] AT [facility] [address]  [address] [place] [address]  Phone: [phone] Fax: [phone]  Local or Mail Order:  Local  Ordering Provider:  Alek Avila Call Back Number:  [phone]  Additional Information:  pt is calling the office to save her levothyroxine (SYNTHROID) 50 MCG tablet refilled today she only has 4 pills left.

## 2023-10-07 RX ORDER — LEVOTHYROXINE SODIUM 50 UG/1
50 TABLET ORAL DAILY
Qty: 90 TABLET | Refills: 3 | Status: SHIPPED | OUTPATIENT
Start: 2023-10-07 | End: 2024-01-03 | Stop reason: SDUPTHER

## 2023-11-13 ENCOUNTER — PATIENT MESSAGE (OUTPATIENT)
Dept: FAMILY MEDICINE | Facility: CLINIC | Age: 83
End: 2023-11-13
Payer: MEDICARE

## 2023-11-15 ENCOUNTER — INFUSION (OUTPATIENT)
Dept: INFUSION THERAPY | Facility: HOSPITAL | Age: 83
End: 2023-11-15
Attending: FAMILY MEDICINE
Payer: MEDICARE

## 2023-11-15 ENCOUNTER — OFFICE VISIT (OUTPATIENT)
Dept: PODIATRY | Facility: CLINIC | Age: 83
End: 2023-11-15
Payer: MEDICARE

## 2023-11-15 VITALS
WEIGHT: 123 LBS | SYSTOLIC BLOOD PRESSURE: 153 MMHG | DIASTOLIC BLOOD PRESSURE: 70 MMHG | HEART RATE: 71 BPM | HEART RATE: 69 BPM | BODY MASS INDEX: 22.63 KG/M2 | TEMPERATURE: 97 F | DIASTOLIC BLOOD PRESSURE: 77 MMHG | HEIGHT: 62 IN | SYSTOLIC BLOOD PRESSURE: 148 MMHG | RESPIRATION RATE: 16 BRPM | OXYGEN SATURATION: 99 %

## 2023-11-15 DIAGNOSIS — Z85.3 HISTORY OF BILATERAL BREAST CANCER: Primary | ICD-10-CM

## 2023-11-15 DIAGNOSIS — E11.9 COMPREHENSIVE DIABETIC FOOT EXAMINATION, TYPE 2 DM, ENCOUNTER FOR: ICD-10-CM

## 2023-11-15 DIAGNOSIS — E11.9 TYPE 2 DIABETES MELLITUS WITHOUT COMPLICATION, WITHOUT LONG-TERM CURRENT USE OF INSULIN: Primary | ICD-10-CM

## 2023-11-15 DIAGNOSIS — L60.0 INGROWN NAIL: ICD-10-CM

## 2023-11-15 PROCEDURE — 99999 PR PBB SHADOW E&M-EST. PATIENT-LVL V: ICD-10-PCS | Mod: PBBFAC,,, | Performed by: PODIATRIST

## 2023-11-15 PROCEDURE — A4216 STERILE WATER/SALINE, 10 ML: HCPCS | Performed by: NURSE PRACTITIONER

## 2023-11-15 PROCEDURE — 99213 PR OFFICE/OUTPT VISIT, EST, LEVL III, 20-29 MIN: ICD-10-PCS | Mod: S$PBB,,, | Performed by: PODIATRIST

## 2023-11-15 PROCEDURE — 99999 PR PBB SHADOW E&M-EST. PATIENT-LVL V: CPT | Mod: PBBFAC,,, | Performed by: PODIATRIST

## 2023-11-15 PROCEDURE — 63600175 PHARM REV CODE 636 W HCPCS: Performed by: NURSE PRACTITIONER

## 2023-11-15 PROCEDURE — 96523 IRRIG DRUG DELIVERY DEVICE: CPT | Mod: 59

## 2023-11-15 PROCEDURE — 25000003 PHARM REV CODE 250: Performed by: NURSE PRACTITIONER

## 2023-11-15 PROCEDURE — 99215 OFFICE O/P EST HI 40 MIN: CPT | Mod: PBBFAC | Performed by: PODIATRIST

## 2023-11-15 PROCEDURE — 99213 OFFICE O/P EST LOW 20 MIN: CPT | Mod: S$PBB,,, | Performed by: PODIATRIST

## 2023-11-15 RX ORDER — HEPARIN 100 UNIT/ML
500 SYRINGE INTRAVENOUS
Status: DISCONTINUED | OUTPATIENT
Start: 2023-11-15 | End: 2023-11-15 | Stop reason: HOSPADM

## 2023-11-15 RX ORDER — SODIUM CHLORIDE 0.9 % (FLUSH) 0.9 %
10 SYRINGE (ML) INJECTION
Status: DISCONTINUED | OUTPATIENT
Start: 2023-11-15 | End: 2023-11-15 | Stop reason: HOSPADM

## 2023-11-15 RX ORDER — SODIUM CHLORIDE 0.9 % (FLUSH) 0.9 %
10 SYRINGE (ML) INJECTION
Status: CANCELLED | OUTPATIENT
Start: 2023-11-15

## 2023-11-15 RX ORDER — HEPARIN 100 UNIT/ML
500 SYRINGE INTRAVENOUS
Status: CANCELLED | OUTPATIENT
Start: 2023-11-15

## 2023-11-15 RX ADMIN — Medication 10 ML: at 08:11

## 2023-11-15 RX ADMIN — HEPARIN 500 UNITS: 100 SYRINGE at 08:11

## 2023-11-15 NOTE — NURSING
Implanted port accessed using sterile technique, flushed easily with adequate blood return.   Port flushed with Normal Saline and Heparin per protocol and de-accessed. Pt tolerated well with no questions or complaints.  amr   
Statement Selected

## 2023-11-15 NOTE — PLAN OF CARE
Problem: Adult Inpatient Plan of Care  Goal: Optimal Comfort and Wellbeing  Outcome: Ongoing, Progressing  Intervention: Provide Person-Centered Care  Flowsheets (Taken 11/15/2023 0904)  Trust Relationship/Rapport:   care explained   choices provided   emotional support provided   empathic listening provided   questions answered   questions encouraged   reassurance provided   thoughts/feelings acknowledged

## 2023-11-18 NOTE — PROGRESS NOTES
Subjective:      Patient ID: Ursula Rodríguez is a 83 y.o. female.    Chief Complaint: Diabetic Foot Exam and Nail Care     Patient presents today for diabetic evaluation she is currently on Eliquis and states she has a history of neuropathy that does bother her at night.      Review of Systems   Neurological:  Positive for numbness.   All other systems reviewed and are negative.       Constitutional    Pleasant, well-nourished, no distress, well oriented    Eyes         GLASSES    Cardiovascular          No chest pain, no shortness of breath    Respiratory           No cough, no congestion     Musculoskeletal        No muscle aches, no arthralgias/joint pain, no back pain, no swelling in the extremities            Objective:      Physical Exam  Vitals and nursing note reviewed.   Constitutional:       Appearance: Normal appearance.   Cardiovascular:      Pulses:           Dorsalis pedis pulses are 1+ on the right side and 1+ on the left side.        Posterior tibial pulses are 1+ on the right side and 1+ on the left side.   Pulmonary:      Effort: Pulmonary effort is normal.   Musculoskeletal:         General: Swelling, tenderness and signs of injury present.      Right foot: Deformity present.      Left foot: Deformity present.   Feet:      Right foot:      Protective Sensation: 4 sites tested.   1 site sensed.     Skin integrity: Erythema and warmth present.      Toenail Condition: Right toenails are abnormally thick and ingrown. Fungal disease present.     Left foot:      Protective Sensation: 4 sites tested.   1 site sensed.     Toenail Condition: Left toenails are abnormally thick. Fungal disease present.  Skin:     Capillary Refill: Capillary refill takes more than 3 seconds.      Findings: Erythema present.   Neurological:      Mental Status: She is alert.      Sensory: Sensory deficit present.   Psychiatric:         Mood and Affect: Mood normal.         Behavior: Behavior normal.         Thought Content:  Thought content normal.         Judgment: Judgment normal.       Vascular         Arterial Pulses Right: posterior tibialis 1/4, dorsalis pedis 1/4, normal CFT   Arterial Pulses Left: posterior tibialis 1/4, dorsalis pedis 1/4, normal CFT   No lower extremity edema bilateral   Pedal skin temperature and color are normal bilateral     Integumentary   Distal 5th digit left foot including nail and under this area is black due to dried blood.  Patient had a subungual hematoma  due to prolonged walking which eventually drained, no bogginess or fluctuance today.  There is discolored hyperkeratotic tissue encompassing the distal 5th digit.  When debriding this area there is healthy pink tissue underneath, nail remains well attached for now, no further evidence of abscess or cellulitis at this time.  Skin is in good condition        Neurological   Gross sensation intact, denies burning or tingling in feet    Musculoskeletal   Muscle Strength/Testing and Tone:  Intact-excellent for age,  normal tone bilateral   Joints, Bones, and Muscles: Limited ROM midfoot   Consistent with osteoarthritis related to age.  Mild arthritic and degenerative changes        Walks well unassisted            Assessment:       Encounter Diagnoses   Name Primary?    Type 2 diabetes mellitus without complication, without long-term current use of insulin Yes    Comprehensive diabetic foot examination, type 2 DM, encounter for     Ingrown nail          Plan:       Ursula was seen today for diabetic foot exam and nail care.    Diagnoses and all orders for this visit:    Type 2 diabetes mellitus without complication, without long-term current use of insulin    Comprehensive diabetic foot examination, type 2 DM, encounter for    Ingrown nail        Patient presents today for diabetic evaluation she is currently on Eliquis and states she has a history of neuropathy that does bother her at night.    A complete diabetic evaluation was performed today patient  does have findings consistent with diabetic related neuropathy.    Patient had several elongated ingrowing toenails I did debride these today I have advised the patient these were starting to become ingrown because of her neuropathy she was not having any pain or discomfort this needs to be monitored carefully.  Patient did have several ingrowing toenails especially the medial lateral border bilateral hallux the reserve were able to be trimmed and removed patient did not require a nail avulsion at this time.  Patient states that the Qutenza was actually helping more than she realized she states now that she is had it for over 3 months and it is worn off her neuropathy has bothered her more.  Patient needs to be beyond 3 months before re-application she did indicate today her primary concern right now are her knees she states her knees are causing her severe pain discomfort and making it very difficult for her to get around she is scheduled for a series of Synvisc injections to help with her knee discomfort and mobility issues.  This note was created using MSurvela voice recognition software that occasionally misinterpreted phrases or words.

## 2023-12-12 ENCOUNTER — INFUSION (OUTPATIENT)
Dept: INFUSION THERAPY | Facility: HOSPITAL | Age: 83
End: 2023-12-12
Attending: FAMILY MEDICINE
Payer: MEDICARE

## 2023-12-12 VITALS
RESPIRATION RATE: 16 BRPM | BODY MASS INDEX: 22.63 KG/M2 | SYSTOLIC BLOOD PRESSURE: 147 MMHG | TEMPERATURE: 99 F | HEART RATE: 82 BPM | DIASTOLIC BLOOD PRESSURE: 67 MMHG | OXYGEN SATURATION: 99 % | WEIGHT: 123 LBS | HEIGHT: 62 IN

## 2023-12-12 DIAGNOSIS — Z85.3 HISTORY OF BILATERAL BREAST CANCER: Primary | ICD-10-CM

## 2023-12-12 PROCEDURE — A4216 STERILE WATER/SALINE, 10 ML: HCPCS | Performed by: NURSE PRACTITIONER

## 2023-12-12 PROCEDURE — 25000003 PHARM REV CODE 250: Performed by: NURSE PRACTITIONER

## 2023-12-12 PROCEDURE — 63600175 PHARM REV CODE 636 W HCPCS: Performed by: NURSE PRACTITIONER

## 2023-12-12 PROCEDURE — 96523 IRRIG DRUG DELIVERY DEVICE: CPT

## 2023-12-12 RX ORDER — SODIUM CHLORIDE 0.9 % (FLUSH) 0.9 %
10 SYRINGE (ML) INJECTION
Status: CANCELLED | OUTPATIENT
Start: 2023-12-12

## 2023-12-12 RX ORDER — HEPARIN 100 UNIT/ML
500 SYRINGE INTRAVENOUS
Status: DISCONTINUED | OUTPATIENT
Start: 2023-12-12 | End: 2023-12-12 | Stop reason: HOSPADM

## 2023-12-12 RX ORDER — SODIUM CHLORIDE 0.9 % (FLUSH) 0.9 %
10 SYRINGE (ML) INJECTION
Status: DISCONTINUED | OUTPATIENT
Start: 2023-12-12 | End: 2023-12-12 | Stop reason: HOSPADM

## 2023-12-12 RX ORDER — HEPARIN 100 UNIT/ML
500 SYRINGE INTRAVENOUS
Status: CANCELLED | OUTPATIENT
Start: 2023-12-12

## 2023-12-12 RX ADMIN — Medication 10 ML: at 08:12

## 2023-12-12 RX ADMIN — HEPARIN 500 UNITS: 100 SYRINGE at 08:12

## 2023-12-12 NOTE — PLAN OF CARE
Problem: Adult Inpatient Plan of Care  Goal: Optimal Comfort and Wellbeing  Outcome: Ongoing, Progressing  Intervention: Provide Person-Centered Care  Flowsheets (Taken 12/12/2023 0822)  Trust Relationship/Rapport:   care explained   choices provided   emotional support provided   empathic listening provided   questions answered   questions encouraged   reassurance provided   thoughts/feelings acknowledged

## 2023-12-18 ENCOUNTER — TELEPHONE (OUTPATIENT)
Dept: FAMILY MEDICINE | Facility: CLINIC | Age: 83
End: 2023-12-18
Payer: MEDICARE

## 2023-12-18 DIAGNOSIS — I10 ESSENTIAL HYPERTENSION: ICD-10-CM

## 2023-12-18 RX ORDER — NIFEDIPINE 30 MG/1
TABLET, EXTENDED RELEASE ORAL
Qty: 60 TABLET | Refills: 0 | Status: SHIPPED | OUTPATIENT
Start: 2023-12-18 | End: 2024-01-03 | Stop reason: SDUPTHER

## 2023-12-18 NOTE — TELEPHONE ENCOUNTER
----- Message from Lorie Roldan, Patient Care Assistant sent at 12/18/2023  8:37 AM CST -----  Regarding: refill  Contact: Joann jaquez  Type:  RX Refill Request    Who Called:  Joann jaquez  Refill or New Rx:  refill   RX Name and Strength:  NIFEdipine (PROCARDIA-XL) 30 MG (OSM) 24 hr tablet    How is the patient currently taking it? 2xday   Is this a 30 day or 90 day RX:  90    Preferred Pharmacy with phone number:    [id number] - [place], [place] - [address] [place] [address] AT [address]  [address] [place] [address]  Phone: [phone] Fax: [phone]    Local or Mail Order:  local   Ordering Provider:  Herman   Best Call Back Number:  [phone] (home)     Additional Information:  please call to advise. Thanks!

## 2023-12-18 NOTE — TELEPHONE ENCOUNTER
----- Message from Dyana Preston sent at 12/18/2023  9:15 AM CST -----  Contact: anjel Sainz  Type:  Needs Medical Advice    Who Called: ANJEL SAINZ   Symptoms (please be specific):  RX QUESTIONS    How long has patient had these symptoms:  N/A  Pharmacy name and phone #:     [facility] #[id number] - [place], [place] - [address] [place] [address] [address]  [address]  [facility] [address]  Phone: [phone] Fax: [phone]  Would the patient rather a call back or a response via [facility]? CALL   Best Call Back Number:  [phone] ALISTAIR, DAUGHTER CELL #   Additional Information: PH LINES ARE NOT CONNECTING SO SHE'S ASKING THAT YOU CALL HER CELL   THANK YOU

## 2023-12-18 NOTE — TELEPHONE ENCOUNTER
Lucita reports pt unable to refill any medication. Informed daughter new RX for requested rx was sent into pharmacy. Appt scheduled for pt to [facility] care. Daughter voiced understanding.

## 2023-12-18 NOTE — TELEPHONE ENCOUNTER
----- Message from Sammi Trinidad sent at 12/18/2023  8:41 AM CST -----  Contact: Lucita White  Type:  Needs Medical Advice    Who Called:   Lucita White    Pharmacy name and phone #:        [facility] #[id number] - [place], [place] - [address]  [address]  [place] [address]  Phone: [phone] Fax: [phone]    Would the patient rather a call back or a response via [facility]?   Call back  Best Call Back Number:   [phone]    Additional Information:   States she is out of all of her prescriptions and states the pharmacy has put a hold on them - please call - thank you

## 2023-12-18 NOTE — TELEPHONE ENCOUNTER
Dear Dr. Red     In the absence of Vicente Sutton MD, can you please address this patients concern or request?      Pt out of RX  Thank you,  Diane Haro LPN

## 2024-01-02 NOTE — PROGRESS NOTES
Subjective:       Patient ID: Ursula Rodríguez is a 84 y.o. female.    Chief Complaint: Establish Care (Est care and to go over medications )    Ms. Rodríguez is an 83-year-old female with a chronic problem with her knees, hypercoagulopathy, hypothyroidism, type 2 diabetes, chronic kidney disease stage 3, aortic atherosclerosis, osteoporosis and bilateral breast cancer.  She is here to establish care. She also has a pacemaker due bradycardia. We have reviewed her medications and removed the ones she is not taking.  She has a new port (left upper chest wall), and the old port was removed from the right upper chest wall. She was concerned about her spironolactone and losartan interactions because her pharmacy keeps telling her that it is dangerous. I explained that it may increase her serum potassiium levels but her providers are keeping an eye ont at through her regular blood draws.     Her last labs were done 2023 and have been reviewed.  They include the followin. CBC within acceptable limits   2. CMP remarkable for BUN 24, GFR 49 and a glucose of 152  3. Last hemoglobin A1c 5.4 % on 2023  4. TSH 1.902 on 2023   Review of Systems   Constitutional:  Negative for activity change, appetite change, chills, diaphoresis and fever.   HENT:  Negative for congestion, ear pain, postnasal drip, rhinorrhea and sore throat.    Eyes:  Negative for pain, discharge, redness and itching.   Respiratory:  Negative for cough and shortness of breath.    Cardiovascular:  Negative for chest pain, palpitations and leg swelling.   Gastrointestinal:  Negative for abdominal distention, abdominal pain, constipation, diarrhea and nausea.   Genitourinary:  Negative for difficulty urinating, dysuria, frequency and urgency.   Skin:  Negative for color change, rash and wound.   All other systems reviewed and are negative.      Patient Active Problem List   Diagnosis    History of bilateral breast cancer    Decreased range  "of motion (ROM) of knee    Bilateral knee pain    Primary localized osteoarthrosis of right lower leg    Primary localized osteoarthrosis of left lower leg    Pes anserinus bursitis of both knees    Primary osteoarthritis of right knee    Primary osteoarthritis of left knee    Essential hypertension    Hypercoagulopathy    Acquired hypothyroidism    Type 2 diabetes mellitus without complication, without long-term current use of insulin    Comprehensive diabetic foot examination, type 2 DM, encounter for    Ingrown nail    Type II diabetes mellitus with neurological manifestations    Neuropathy due to medical condition    Chronic kidney disease, stage 3a    Third degree AV block    S/P placement of cardiac pacemaker    Osteoarthritis of ankle and foot    COVID    Osteoporosis    Aortic atherosclerosis       Objective:      Physical Exam    Lab Results   Component Value Date    WBC 9.74 09/21/2023    HGB 13.8 09/21/2023    HCT 41.0 09/21/2023     09/21/2023    CHOL 100 (L) 08/26/2022    TRIG 236 (H) 08/26/2022    HDL 37 (L) 08/26/2022    ALT 12 09/21/2023    AST 16 09/21/2023     09/21/2023    K 4.0 09/21/2023     09/21/2023    CREATININE 1.1 09/21/2023    BUN 24 (H) 09/21/2023    CO2 23 09/21/2023    TSH 1.902 06/23/2023    INR 1.1 02/23/2023    HGBA1C 5.4 06/23/2023     The ASCVD Risk score (Dharmesh BOYER, et al., 2019) failed to calculate for the following reasons:    The 2019 ASCVD risk score is only valid for ages 40 to 79  Visit Vitals  /86 (BP Location: Left arm, Patient Position: Sitting, BP Method: Large (Manual))   Pulse 73   Resp 18   Ht 5' 2" (1.575 m)   Wt 57.5 kg (126 lb 12.8 oz)   SpO2 99%   BMI 23.19 kg/m²      Assessment:       1. Type II diabetes mellitus with neurological manifestations    2. Anxiety    3. Essential hypertension    4. Type 2 diabetes mellitus without complication, without long-term current use of insulin    5. Acquired hypothyroidism    6. Hypercoagulopathy  "   7. Osteoporosis, unspecified osteoporosis type, unspecified pathological fracture presence    8. Hypercholesterolemia        Plan:       1. Type II diabetes mellitus with neurological manifestations  -     CBC Auto Differential; Future; Expected date: 01/03/2024  -     Comprehensive Metabolic Panel; Future; Expected date: 01/03/2024  -     Hemoglobin A1C; Future; Expected date: 01/03/2024    2. Anxiety  -     sertraline (ZOLOFT) 25 MG tablet; Take 1 tablet (25 mg total) by mouth once daily.  Dispense: 90 tablet; Refill: 3    3. Essential hypertension  -     spironolactone (ALDACTONE) 25 MG tablet; Take 1 tablet (25 mg total) by mouth once daily.  Dispense: 90 tablet; Refill: 3  -     NIFEdipine (PROCARDIA-XL) 30 MG (OSM) 24 hr tablet; Take 1 tablet (30 MG ) by mouth twice daily  Dispense: 180 tablet; Refill: 3  -     cloNIDine (CATAPRES) 0.1 MG tablet; Take 1 tablet (0.1 mg total) by mouth 2 (two) times daily as needed (BP > 160/90).  Dispense: 180 tablet; Refill: 3    4. Type 2 diabetes mellitus without complication, without long-term current use of insulin  -     metFORMIN (GLUCOPHAGE-XR) 500 MG ER 24hr tablet; TAKE 1 TABLET BY MOUTH EVERY MORNING WITH FOOD FOR 2 WEEKS THEN INCREASE TO 1 TABLET BY MOUTH TWICE DAILY WITH FOOD  Dispense: 180 tablet; Refill: 3    5. Acquired hypothyroidism  -     levothyroxine (SYNTHROID) 50 MCG tablet; Take 1 tablet (50 mcg total) by mouth once daily.  Dispense: 90 tablet; Refill: 3    6. Hypercoagulopathy  -     apixaban (ELIQUIS) 2.5 mg Tab; Take 1 tablet (2.5 mg total) by mouth 2 (two) times daily.  Dispense: 180 tablet; Refill: 3    7. Osteoporosis, unspecified osteoporosis type, unspecified pathological fracture presence  -     alendronate (FOSAMAX) 70 MG tablet; Take 1 tablet (70 mg total) by mouth every 7 days.  Dispense: 13 tablet; Refill: 3    8. Hypercholesterolemia  -     simvastatin (ZOCOR) 20 MG tablet; Take 1 tablet (20 mg total) by mouth every evening.  Dispense: 90  tablet; Refill: 3    Other orders  -     Discontinue: cloNIDine (CATAPRES) 0.1 MG tablet; Take 1 tablet (0.1 mg total) by mouth 2 (two) times daily as needed (BP > 160/90).  Dispense: 60 tablet; Refill: 11  -     metoprolol tartrate (LOPRESSOR) 25 MG tablet; Take 0.5 tablets (12.5 mg total) by mouth 2 (two) times daily.  Dispense: 90 tablet; Refill: 3       Follow up in about 3 months (around 4/3/2024), or if symptoms worsen or fail to improve.      Future Appointments       Date Provider Specialty Appt Notes    1/3/2024  Lab lab    1/11/2024  Infusion Therapy port flush q 4 weeks    2/21/2024 Sander Almaguer DPM Podiatry 3 month f/u

## 2024-01-03 ENCOUNTER — OFFICE VISIT (OUTPATIENT)
Dept: FAMILY MEDICINE | Facility: CLINIC | Age: 84
End: 2024-01-03
Payer: MEDICARE

## 2024-01-03 VITALS
WEIGHT: 126.81 LBS | SYSTOLIC BLOOD PRESSURE: 134 MMHG | OXYGEN SATURATION: 99 % | RESPIRATION RATE: 18 BRPM | BODY MASS INDEX: 23.34 KG/M2 | HEART RATE: 73 BPM | HEIGHT: 62 IN | DIASTOLIC BLOOD PRESSURE: 86 MMHG

## 2024-01-03 DIAGNOSIS — M81.0 OSTEOPOROSIS, UNSPECIFIED OSTEOPOROSIS TYPE, UNSPECIFIED PATHOLOGICAL FRACTURE PRESENCE: ICD-10-CM

## 2024-01-03 DIAGNOSIS — I10 ESSENTIAL HYPERTENSION: ICD-10-CM

## 2024-01-03 DIAGNOSIS — F41.9 ANXIETY: ICD-10-CM

## 2024-01-03 DIAGNOSIS — E78.00 HYPERCHOLESTEROLEMIA: ICD-10-CM

## 2024-01-03 DIAGNOSIS — E11.49 TYPE II DIABETES MELLITUS WITH NEUROLOGICAL MANIFESTATIONS: Primary | ICD-10-CM

## 2024-01-03 DIAGNOSIS — D68.59 HYPERCOAGULOPATHY: ICD-10-CM

## 2024-01-03 DIAGNOSIS — E11.9 TYPE 2 DIABETES MELLITUS WITHOUT COMPLICATION, WITHOUT LONG-TERM CURRENT USE OF INSULIN: ICD-10-CM

## 2024-01-03 DIAGNOSIS — E03.9 ACQUIRED HYPOTHYROIDISM: ICD-10-CM

## 2024-01-03 PROCEDURE — 99213 OFFICE O/P EST LOW 20 MIN: CPT | Mod: PBBFAC | Performed by: FAMILY MEDICINE

## 2024-01-03 PROCEDURE — 99999 PR PBB SHADOW E&M-EST. PATIENT-LVL III: CPT | Mod: PBBFAC,,, | Performed by: FAMILY MEDICINE

## 2024-01-03 PROCEDURE — 99214 OFFICE O/P EST MOD 30 MIN: CPT | Mod: S$PBB,,, | Performed by: FAMILY MEDICINE

## 2024-01-03 RX ORDER — SPIRONOLACTONE 25 MG/1
25 TABLET ORAL DAILY
Qty: 90 TABLET | Refills: 3 | Status: SHIPPED | OUTPATIENT
Start: 2024-01-03

## 2024-01-03 RX ORDER — SERTRALINE HYDROCHLORIDE 25 MG/1
25 TABLET, FILM COATED ORAL DAILY
Qty: 90 TABLET | Refills: 3 | Status: SHIPPED | OUTPATIENT
Start: 2024-01-03 | End: 2025-01-02

## 2024-01-03 RX ORDER — METOPROLOL TARTRATE 25 MG/1
12.5 TABLET, FILM COATED ORAL 2 TIMES DAILY
Qty: 90 TABLET | Refills: 3 | Status: SHIPPED | OUTPATIENT
Start: 2024-01-03

## 2024-01-03 RX ORDER — LEVOTHYROXINE SODIUM 50 UG/1
50 TABLET ORAL DAILY
Qty: 90 TABLET | Refills: 3 | Status: SHIPPED | OUTPATIENT
Start: 2024-01-03

## 2024-01-03 RX ORDER — CLONIDINE HYDROCHLORIDE 0.1 MG/1
0.1 TABLET ORAL 2 TIMES DAILY PRN
Qty: 60 TABLET | Refills: 11 | Status: SHIPPED | OUTPATIENT
Start: 2024-01-03 | End: 2024-01-03 | Stop reason: SDUPTHER

## 2024-01-03 RX ORDER — CLONIDINE HYDROCHLORIDE 0.1 MG/1
0.1 TABLET ORAL 2 TIMES DAILY PRN
Qty: 180 TABLET | Refills: 3 | Status: SHIPPED | OUTPATIENT
Start: 2024-01-03 | End: 2025-01-02

## 2024-01-03 RX ORDER — SIMVASTATIN 20 MG/1
20 TABLET, FILM COATED ORAL NIGHTLY
Qty: 90 TABLET | Refills: 3 | Status: SHIPPED | OUTPATIENT
Start: 2024-01-03

## 2024-01-03 RX ORDER — NIFEDIPINE 30 MG/1
TABLET, EXTENDED RELEASE ORAL
Qty: 180 TABLET | Refills: 3 | Status: SHIPPED | OUTPATIENT
Start: 2024-01-03

## 2024-01-03 RX ORDER — ALENDRONATE SODIUM 70 MG/1
70 TABLET ORAL
Qty: 13 TABLET | Refills: 3 | Status: SHIPPED | OUTPATIENT
Start: 2024-01-03 | End: 2025-01-02

## 2024-01-03 RX ORDER — METFORMIN HYDROCHLORIDE 500 MG/1
TABLET, EXTENDED RELEASE ORAL
Qty: 180 TABLET | Refills: 3 | Status: SHIPPED | OUTPATIENT
Start: 2024-01-03

## 2024-01-11 ENCOUNTER — INFUSION (OUTPATIENT)
Dept: INFUSION THERAPY | Facility: HOSPITAL | Age: 84
End: 2024-01-11
Attending: FAMILY MEDICINE
Payer: MEDICARE

## 2024-01-11 ENCOUNTER — TELEPHONE (OUTPATIENT)
Dept: FAMILY MEDICINE | Facility: CLINIC | Age: 84
End: 2024-01-11
Payer: MEDICARE

## 2024-01-11 VITALS
WEIGHT: 120 LBS | BODY MASS INDEX: 22.08 KG/M2 | HEART RATE: 77 BPM | TEMPERATURE: 98 F | HEIGHT: 62 IN | RESPIRATION RATE: 18 BRPM | SYSTOLIC BLOOD PRESSURE: 147 MMHG | OXYGEN SATURATION: 98 % | DIASTOLIC BLOOD PRESSURE: 67 MMHG

## 2024-01-11 DIAGNOSIS — D68.59 HYPERCOAGULOPATHY: Primary | ICD-10-CM

## 2024-01-11 DIAGNOSIS — Z85.3 HISTORY OF BILATERAL BREAST CANCER: Primary | ICD-10-CM

## 2024-01-11 PROCEDURE — 63600175 PHARM REV CODE 636 W HCPCS: Performed by: NURSE PRACTITIONER

## 2024-01-11 PROCEDURE — 25000003 PHARM REV CODE 250: Performed by: NURSE PRACTITIONER

## 2024-01-11 PROCEDURE — 96523 IRRIG DRUG DELIVERY DEVICE: CPT

## 2024-01-11 PROCEDURE — A4216 STERILE WATER/SALINE, 10 ML: HCPCS | Performed by: NURSE PRACTITIONER

## 2024-01-11 RX ORDER — HEPARIN 100 UNIT/ML
500 SYRINGE INTRAVENOUS
Status: DISCONTINUED | OUTPATIENT
Start: 2024-01-11 | End: 2024-01-11 | Stop reason: HOSPADM

## 2024-01-11 RX ORDER — SODIUM CHLORIDE 0.9 % (FLUSH) 0.9 %
10 SYRINGE (ML) INJECTION
OUTPATIENT
Start: 2024-01-11

## 2024-01-11 RX ORDER — HEPARIN 100 UNIT/ML
500 SYRINGE INTRAVENOUS
OUTPATIENT
Start: 2024-01-11

## 2024-01-11 RX ORDER — SODIUM CHLORIDE 0.9 % (FLUSH) 0.9 %
10 SYRINGE (ML) INJECTION
Status: CANCELLED | OUTPATIENT
Start: 2024-01-11

## 2024-01-11 RX ORDER — HEPARIN 100 UNIT/ML
500 SYRINGE INTRAVENOUS
Status: CANCELLED | OUTPATIENT
Start: 2024-01-11

## 2024-01-11 RX ORDER — SODIUM CHLORIDE 0.9 % (FLUSH) 0.9 %
10 SYRINGE (ML) INJECTION
Status: DISCONTINUED | OUTPATIENT
Start: 2024-01-11 | End: 2024-01-11 | Stop reason: HOSPADM

## 2024-01-11 RX ADMIN — HEPARIN 500 UNITS: 100 SYRINGE at 08:01

## 2024-01-11 RX ADMIN — Medication 10 ML: at 08:01

## 2024-01-11 NOTE — PLAN OF CARE
Problem: Adult Inpatient Plan of Care  Goal: Optimal Comfort and Wellbeing  Outcome: Ongoing, Progressing  Intervention: Provide Person-Centered Care  Flowsheets (Taken 1/11/2024 3758)  Trust Relationship/Rapport:   care explained   choices provided   emotional support provided   empathic listening provided   questions answered   questions encouraged   reassurance provided   thoughts/feelings acknowledged

## 2024-01-11 NOTE — TELEPHONE ENCOUNTER
----- Message from Silvia Jaeger sent at 1/11/2024  9:08 AM CST -----  Contact: PT  Type: Needs Medical Advice    Who Called: PT   Best Call Back Number: [phone]  Additional  Information: PT requesting to get lab order for INR, in system.  Lab has been drawn just wanting on order to process.  Please Advise- Thank you

## 2024-01-15 ENCOUNTER — PATIENT MESSAGE (OUTPATIENT)
Dept: FAMILY MEDICINE | Facility: CLINIC | Age: 84
End: 2024-01-15
Payer: MEDICARE

## 2024-01-15 DIAGNOSIS — R63.4 UNEXPLAINED WEIGHT LOSS: ICD-10-CM

## 2024-01-15 DIAGNOSIS — R80.9 MICROALBUMINURIA: ICD-10-CM

## 2024-01-16 RX ORDER — CYPROHEPTADINE HYDROCHLORIDE 4 MG/1
4 TABLET ORAL 2 TIMES DAILY WITH MEALS
Qty: 180 TABLET | Refills: 3 | Status: SHIPPED | OUTPATIENT
Start: 2024-01-16

## 2024-01-16 RX ORDER — LOSARTAN POTASSIUM 25 MG/1
12.5 TABLET ORAL DAILY
Qty: 45 TABLET | Refills: 3 | Status: SHIPPED | OUTPATIENT
Start: 2024-01-16

## 2024-02-15 ENCOUNTER — INFUSION (OUTPATIENT)
Dept: INFUSION THERAPY | Facility: HOSPITAL | Age: 84
End: 2024-02-15
Attending: FAMILY MEDICINE
Payer: MEDICARE

## 2024-02-15 VITALS
HEIGHT: 62 IN | BODY MASS INDEX: 22.08 KG/M2 | TEMPERATURE: 98 F | OXYGEN SATURATION: 97 % | RESPIRATION RATE: 16 BRPM | DIASTOLIC BLOOD PRESSURE: 78 MMHG | SYSTOLIC BLOOD PRESSURE: 148 MMHG | WEIGHT: 120 LBS | HEART RATE: 90 BPM

## 2024-02-15 DIAGNOSIS — Z85.3 HISTORY OF BILATERAL BREAST CANCER: Primary | ICD-10-CM

## 2024-02-15 PROCEDURE — 96523 IRRIG DRUG DELIVERY DEVICE: CPT

## 2024-02-15 PROCEDURE — 25000003 PHARM REV CODE 250: Performed by: NURSE PRACTITIONER

## 2024-02-15 PROCEDURE — 63600175 PHARM REV CODE 636 W HCPCS: Performed by: NURSE PRACTITIONER

## 2024-02-15 PROCEDURE — A4216 STERILE WATER/SALINE, 10 ML: HCPCS | Performed by: NURSE PRACTITIONER

## 2024-02-15 RX ORDER — SODIUM CHLORIDE 0.9 % (FLUSH) 0.9 %
10 SYRINGE (ML) INJECTION
Status: DISCONTINUED | OUTPATIENT
Start: 2024-02-15 | End: 2024-02-15 | Stop reason: HOSPADM

## 2024-02-15 RX ORDER — SODIUM CHLORIDE 0.9 % (FLUSH) 0.9 %
10 SYRINGE (ML) INJECTION
Status: CANCELLED | OUTPATIENT
Start: 2024-02-15

## 2024-02-15 RX ORDER — HEPARIN 100 UNIT/ML
500 SYRINGE INTRAVENOUS
Status: CANCELLED | OUTPATIENT
Start: 2024-02-15

## 2024-02-15 RX ORDER — HEPARIN 100 UNIT/ML
500 SYRINGE INTRAVENOUS
Status: DISCONTINUED | OUTPATIENT
Start: 2024-02-15 | End: 2024-02-15 | Stop reason: HOSPADM

## 2024-02-15 RX ADMIN — HEPARIN 500 UNITS: 100 SYRINGE at 10:02

## 2024-02-15 RX ADMIN — Medication 10 ML: at 10:02

## 2024-02-21 ENCOUNTER — OFFICE VISIT (OUTPATIENT)
Dept: PODIATRY | Facility: CLINIC | Age: 84
End: 2024-02-21
Payer: MEDICARE

## 2024-02-21 VITALS
HEIGHT: 62 IN | SYSTOLIC BLOOD PRESSURE: 147 MMHG | WEIGHT: 120 LBS | DIASTOLIC BLOOD PRESSURE: 67 MMHG | HEART RATE: 74 BPM | BODY MASS INDEX: 22.08 KG/M2

## 2024-02-21 DIAGNOSIS — L60.0 INGROWN NAIL: ICD-10-CM

## 2024-02-21 DIAGNOSIS — E11.9 TYPE 2 DIABETES MELLITUS WITHOUT COMPLICATION, WITHOUT LONG-TERM CURRENT USE OF INSULIN: Primary | ICD-10-CM

## 2024-02-21 DIAGNOSIS — E11.9 COMPREHENSIVE DIABETIC FOOT EXAMINATION, TYPE 2 DM, ENCOUNTER FOR: ICD-10-CM

## 2024-02-21 DIAGNOSIS — E11.49 TYPE II DIABETES MELLITUS WITH NEUROLOGICAL MANIFESTATIONS: ICD-10-CM

## 2024-02-21 PROCEDURE — 64999 UNLISTED PX NERVOUS SYSTEM: CPT | Mod: S$PBB,,, | Performed by: PODIATRIST

## 2024-02-21 PROCEDURE — 99999 PR PBB SHADOW E&M-EST. PATIENT-LVL V: CPT | Mod: PBBFAC,,, | Performed by: PODIATRIST

## 2024-02-21 PROCEDURE — 99215 OFFICE O/P EST HI 40 MIN: CPT | Mod: PBBFAC,25 | Performed by: PODIATRIST

## 2024-02-21 PROCEDURE — 99213 OFFICE O/P EST LOW 20 MIN: CPT | Mod: 25,S$PBB,, | Performed by: PODIATRIST

## 2024-02-21 PROCEDURE — 64999 UNLISTED PX NERVOUS SYSTEM: CPT | Mod: PBBFAC | Performed by: PODIATRIST

## 2024-02-24 NOTE — PROGRESS NOTES
Subjective:      Patient ID: Ursula Rodríguez is a 84 y.o. female.    Chief Complaint: Follow-up and Diabetic Foot Exam     Patient presents today for diabetic evaluation she is currently on Eliquis and states she has a history of neuropathy that does bother her at night.      Review of Systems   Neurological:  Positive for numbness.   All other systems reviewed and are negative.       Constitutional    Pleasant, well-nourished, no distress, well oriented    Eyes         GLASSES    Cardiovascular          No chest pain, no shortness of breath    Respiratory           No cough, no congestion     Musculoskeletal        No muscle aches, no arthralgias/joint pain, no back pain, no swelling in the extremities            Objective:      Physical Exam  Vitals and nursing note reviewed.   Constitutional:       Appearance: Normal appearance.   Cardiovascular:      Pulses:           Dorsalis pedis pulses are 1+ on the right side and 1+ on the left side.        Posterior tibial pulses are 1+ on the right side and 1+ on the left side.   Pulmonary:      Effort: Pulmonary effort is normal.   Musculoskeletal:         General: Swelling, tenderness and signs of injury present.      Right foot: Deformity present.      Left foot: Deformity present.        Feet:    Feet:      Right foot:      Protective Sensation: 4 sites tested.   1 site sensed.     Skin integrity: Erythema and warmth present.      Toenail Condition: Right toenails are abnormally thick and ingrown. Fungal disease present.     Left foot:      Protective Sensation: 4 sites tested.   1 site sensed.     Toenail Condition: Left toenails are abnormally thick. Fungal disease present.  Skin:     Capillary Refill: Capillary refill takes more than 3 seconds.      Findings: Erythema present.   Neurological:      Mental Status: She is alert.      Sensory: Sensory deficit present.   Psychiatric:         Mood and Affect: Mood normal.         Behavior: Behavior normal.          Thought Content: Thought content normal.         Judgment: Judgment normal.       Vascular         Arterial Pulses Right: posterior tibialis 1/4, dorsalis pedis 1/4, normal CFT   Arterial Pulses Left: posterior tibialis 1/4, dorsalis pedis 1/4, normal CFT   No lower extremity edema bilateral   Pedal skin temperature and color are normal bilateral     Integumentary   Distal 5th digit left foot including nail and under this area is black due to dried blood.  Patient had a subungual hematoma  due to prolonged walking which eventually drained, no bogginess or fluctuance today.  There is discolored hyperkeratotic tissue encompassing the distal 5th digit.  When debriding this area there is healthy pink tissue underneath, nail remains well attached for now, no further evidence of abscess or cellulitis at this time.  Skin is in good condition        Neurological   Gross sensation intact, denies burning or tingling in feet    Musculoskeletal   Muscle Strength/Testing and Tone:  Intact-excellent for age,  normal tone bilateral   Joints, Bones, and Muscles: Limited ROM midfoot   Consistent with osteoarthritis related to age.  Mild arthritic and degenerative changes        Walks well unassisted            Assessment:       Encounter Diagnoses   Name Primary?    Type 2 diabetes mellitus without complication, without long-term current use of insulin Yes    Comprehensive diabetic foot examination, type 2 DM, encounter for     Type II diabetes mellitus with neurological manifestations     Ingrown nail          Plan:       Ursula was seen today for follow-up and diabetic foot exam.    Diagnoses and all orders for this visit:    Type 2 diabetes mellitus without complication, without long-term current use of insulin    Comprehensive diabetic foot examination, type 2 DM, encounter for    Type II diabetes mellitus with neurological manifestations    Ingrown nail        Patient presents today for diabetic evaluation she is currently on  Eliquis and states she has a history of neuropathy that does bother her at night.  Patient had indicated today she did not realize how much the Qutenza was helping with her diabetic related neuropathy until the previous application had worn off she is requesting that this be reapplied today because it does help her significantly.  Patient also relates that she recently had a nerve stimulator implanted by Dr. Stubbs for her left knee.  A complete diabetic evaluation was performed today patient does have findings consistent with diabetic related neuropathy.    Patient had several elongated ingrowing toenails I did debride these today I have advised the patient these were starting to become ingrown because of her neuropathy she was not having any pain or discomfort this needs to be monitored carefully.  Patient did have several ingrowing toenails especially the medial lateral border bilateral hallux the reserve were able to be trimmed and removed patient did not require a nail avulsion at this time.Patient is presenting today for application of Qutenza to help control her diabetic related neuropathy which bothers her constantly and has gotten progressively worse.  Patient's feet were evaluated there is no open wounds or sores noted patient tolerated the appy application without issue.  Patient management performed today in addition to application of Qutenza as the patient is being seen for follow-up diabetic evaluation she also was seen for chronically ingrown toenails that put her at risk for complication.   This note was created using [employer] voice recognition software that occasionally misinterpreted phrases or words.    Qutenza sheets applied to the dorsal and plantar aspect of both feet 4 sheets totaling, 1120 sq cm, wrapped and left intact for 30 minutes with no side effects.    Removed and cooling gel applied.  Patient was performed per manufacture guidelines.

## 2024-03-14 ENCOUNTER — INFUSION (OUTPATIENT)
Dept: INFUSION THERAPY | Facility: HOSPITAL | Age: 84
End: 2024-03-14
Attending: FAMILY MEDICINE
Payer: MEDICARE

## 2024-03-14 VITALS
DIASTOLIC BLOOD PRESSURE: 63 MMHG | WEIGHT: 120 LBS | BODY MASS INDEX: 22.08 KG/M2 | RESPIRATION RATE: 16 BRPM | HEART RATE: 69 BPM | HEIGHT: 62 IN | SYSTOLIC BLOOD PRESSURE: 146 MMHG | TEMPERATURE: 97 F

## 2024-03-14 DIAGNOSIS — Z85.3 HISTORY OF BILATERAL BREAST CANCER: Primary | ICD-10-CM

## 2024-03-14 PROCEDURE — 63600175 PHARM REV CODE 636 W HCPCS: Performed by: NURSE PRACTITIONER

## 2024-03-14 PROCEDURE — A4216 STERILE WATER/SALINE, 10 ML: HCPCS | Performed by: NURSE PRACTITIONER

## 2024-03-14 PROCEDURE — 25000003 PHARM REV CODE 250: Performed by: NURSE PRACTITIONER

## 2024-03-14 PROCEDURE — 96523 IRRIG DRUG DELIVERY DEVICE: CPT

## 2024-03-14 RX ORDER — SODIUM CHLORIDE 0.9 % (FLUSH) 0.9 %
10 SYRINGE (ML) INJECTION
Status: DISCONTINUED | OUTPATIENT
Start: 2024-03-14 | End: 2024-03-14 | Stop reason: HOSPADM

## 2024-03-14 RX ORDER — HEPARIN 100 UNIT/ML
500 SYRINGE INTRAVENOUS
Status: DISCONTINUED | OUTPATIENT
Start: 2024-03-14 | End: 2024-03-14 | Stop reason: HOSPADM

## 2024-03-14 RX ORDER — HEPARIN 100 UNIT/ML
500 SYRINGE INTRAVENOUS
Status: CANCELLED | OUTPATIENT
Start: 2024-03-14

## 2024-03-14 RX ORDER — SODIUM CHLORIDE 0.9 % (FLUSH) 0.9 %
10 SYRINGE (ML) INJECTION
Status: CANCELLED | OUTPATIENT
Start: 2024-03-14

## 2024-03-14 RX ADMIN — Medication 10 ML: at 10:03

## 2024-03-14 RX ADMIN — HEPARIN 500 UNITS: 100 SYRINGE at 10:03

## 2024-04-04 RX ORDER — METOPROLOL TARTRATE 25 MG/1
12.5 TABLET, FILM COATED ORAL 2 TIMES DAILY
Qty: 90 TABLET | Refills: 1 | Status: SHIPPED | OUTPATIENT
Start: 2024-04-04

## 2024-04-04 NOTE — TELEPHONE ENCOUNTER
Care Due:                  Date            Visit Type   Department     Provider  --------------------------------------------------------------------------------                                             [facility]  Last Visit: 01-      ROSA ELENA Gutierrez                               -                              PRIMARY      [facility]  Next Visit: 05-      CARE ([facility])   FAMILY Michael Gutierrez                                                            Last  Test          Frequency    Reason                     Performed    Due Date  --------------------------------------------------------------------------------    Lipid Panel.  12 months..  simvastatin..............  08- 08-    Mg Level....  12 months..  alendronate..............  Not Found    Overdue    Phosphate...  12 months..  alendronate..............  Not Found    Overdue    TSH.........  12 months..  levothyroxine............  06- 06-    Vitamin D...  12 months..  alendronate..............  10-   10-    [facility] Embedded Care Due Messages. Reference number: [id number].   4/04/2024 1:36:44 PM CDT

## 2024-04-05 RX ORDER — MECLIZINE HYDROCHLORIDE 25 MG/1
25 TABLET ORAL 3 TIMES DAILY PRN
Qty: 90 TABLET | Refills: 1 | Status: SHIPPED | OUTPATIENT
Start: 2024-04-05 | End: 2024-05-27

## 2024-04-05 NOTE — TELEPHONE ENCOUNTER
No care due was identified.  [facility] Embedded Care Due Messages. Reference number: [id number].   4/05/2024 9:03:00 AM CDT

## 2024-04-05 NOTE — TELEPHONE ENCOUNTER
----- Message from Amber Clement sent at 4/5/2024  7:34 AM CDT -----  Contact: Self  Type:  RX Refill Request    Who Called:  Self - she is out  Refill or New Rx:   refill  RX Name and Strength:   meclizine (ANTIVERT) 25 mg tablet    How is the patient currently taking it? (ex. 1XDay):  Take 1 tablet (25 mg total) by mouth 3 (three) times daily as needed for Dizziness. - Oral   Is this a 30 day or 90 day RX:  90 tablet 3  Preferred Pharmacy with phone number:    [facility] #[id number] - [place], [place] - [address] [place] [address] [address]  [address]  [place] [address]  Phone: [phone] Fax: [phone]  Local or Mail Order:  Local  Ordering Provider:  Speedy Avila Call Back Number:  [phone]  Additional Information:  Please call the patient back at the phone number listed above to advise. Thank you!

## 2024-04-05 NOTE — TELEPHONE ENCOUNTER
Refill Routing Note   Medication(s) are not appropriate for processing by [facility] for the following reason(s):        Outside of protocol    [facility] action(s):  Route             Appointments  past 12m or future 3m with PCP    Date Provider   Last Visit   1/3/2024 Chacha Gutierrez MD   Next Visit   5/3/2024 Chacha Gutierrez MD   ED visits in past 90 days: 0        Note composed:9:26 AM 04/05/2024

## 2024-04-18 ENCOUNTER — INFUSION (OUTPATIENT)
Dept: INFUSION THERAPY | Facility: HOSPITAL | Age: 84
End: 2024-04-18
Attending: FAMILY MEDICINE
Payer: MEDICARE

## 2024-04-18 VITALS
RESPIRATION RATE: 16 BRPM | OXYGEN SATURATION: 98 % | SYSTOLIC BLOOD PRESSURE: 154 MMHG | DIASTOLIC BLOOD PRESSURE: 69 MMHG | WEIGHT: 119.94 LBS | HEART RATE: 72 BPM | HEIGHT: 62 IN | TEMPERATURE: 98 F | BODY MASS INDEX: 22.07 KG/M2

## 2024-04-18 DIAGNOSIS — Z85.3 HISTORY OF BILATERAL BREAST CANCER: Primary | ICD-10-CM

## 2024-04-18 PROCEDURE — 96523 IRRIG DRUG DELIVERY DEVICE: CPT

## 2024-04-18 PROCEDURE — A4216 STERILE WATER/SALINE, 10 ML: HCPCS | Performed by: NURSE PRACTITIONER

## 2024-04-18 PROCEDURE — 63600175 PHARM REV CODE 636 W HCPCS: Performed by: NURSE PRACTITIONER

## 2024-04-18 PROCEDURE — 25000003 PHARM REV CODE 250: Performed by: NURSE PRACTITIONER

## 2024-04-18 RX ORDER — HEPARIN 100 UNIT/ML
500 SYRINGE INTRAVENOUS
Status: CANCELLED | OUTPATIENT
Start: 2024-04-18

## 2024-04-18 RX ORDER — HEPARIN 100 UNIT/ML
500 SYRINGE INTRAVENOUS
Status: DISCONTINUED | OUTPATIENT
Start: 2024-04-18 | End: 2024-04-18 | Stop reason: HOSPADM

## 2024-04-18 RX ORDER — SODIUM CHLORIDE 0.9 % (FLUSH) 0.9 %
10 SYRINGE (ML) INJECTION
Status: CANCELLED | OUTPATIENT
Start: 2024-04-18

## 2024-04-18 RX ORDER — SODIUM CHLORIDE 0.9 % (FLUSH) 0.9 %
10 SYRINGE (ML) INJECTION
Status: DISCONTINUED | OUTPATIENT
Start: 2024-04-18 | End: 2024-04-18 | Stop reason: HOSPADM

## 2024-04-18 RX ADMIN — Medication 10 ML: at 10:04

## 2024-04-18 RX ADMIN — HEPARIN 500 UNITS: 100 SYRINGE at 10:04

## 2024-04-18 NOTE — NURSING
Implanted port accessed with 20G 0.75 inch lyles needle using sterile technique. Flushed without difficulty. Blood return observed. Port flushed with Normal Saline and Heparin per protocol and de-accessed. Band-aid applied over puncture site. Pt tolerated well.

## 2024-05-03 ENCOUNTER — OFFICE VISIT (OUTPATIENT)
Dept: FAMILY MEDICINE | Facility: CLINIC | Age: 84
End: 2024-05-03
Payer: MEDICARE

## 2024-05-03 VITALS
BODY MASS INDEX: 23.37 KG/M2 | HEART RATE: 53 BPM | SYSTOLIC BLOOD PRESSURE: 138 MMHG | DIASTOLIC BLOOD PRESSURE: 62 MMHG | OXYGEN SATURATION: 98 % | RESPIRATION RATE: 11 BRPM | HEIGHT: 62 IN | WEIGHT: 127 LBS

## 2024-05-03 DIAGNOSIS — Z00.00 ROUTINE MEDICAL EXAM: Primary | ICD-10-CM

## 2024-05-03 DIAGNOSIS — Z79.01 ANTICOAGULATED: ICD-10-CM

## 2024-05-03 DIAGNOSIS — I70.0 AORTIC ATHEROSCLEROSIS: ICD-10-CM

## 2024-05-03 DIAGNOSIS — E78.00 HYPERCHOLESTEROLEMIA: ICD-10-CM

## 2024-05-03 DIAGNOSIS — R41.3 MEMORY CHANGES: ICD-10-CM

## 2024-05-03 DIAGNOSIS — E64.0 SEQUELAE OF PROTEIN-CALORIE MALNUTRITION: ICD-10-CM

## 2024-05-03 DIAGNOSIS — N18.31 STAGE 3A CHRONIC KIDNEY DISEASE: ICD-10-CM

## 2024-05-03 DIAGNOSIS — R80.9 MICROALBUMINURIA: ICD-10-CM

## 2024-05-03 DIAGNOSIS — I10 ESSENTIAL HYPERTENSION: ICD-10-CM

## 2024-05-03 DIAGNOSIS — E11.49 TYPE II DIABETES MELLITUS WITH NEUROLOGICAL MANIFESTATIONS: ICD-10-CM

## 2024-05-03 PROCEDURE — 99214 OFFICE O/P EST MOD 30 MIN: CPT | Mod: S$PBB,,, | Performed by: FAMILY MEDICINE

## 2024-05-03 PROCEDURE — 99215 OFFICE O/P EST HI 40 MIN: CPT | Mod: PBBFAC | Performed by: FAMILY MEDICINE

## 2024-05-03 PROCEDURE — 99999 PR PBB SHADOW E&M-EST. PATIENT-LVL V: CPT | Mod: PBBFAC,,, | Performed by: FAMILY MEDICINE

## 2024-05-03 RX ORDER — HEPARIN 100 UNIT/ML
500 SYRINGE INTRAVENOUS
Status: CANCELLED | OUTPATIENT
Start: 2024-05-03

## 2024-05-03 RX ORDER — SODIUM CHLORIDE 0.9 % (FLUSH) 0.9 %
10 SYRINGE (ML) INJECTION
Status: CANCELLED | OUTPATIENT
Start: 2024-05-03

## 2024-05-03 NOTE — PROGRESS NOTES
Subjective:       Patient ID: Ursula Rodríguez is a 84 y.o. female.    Chief Complaint: Follow-up (3 month) and Ear Injury (Discomfort, had tumor removed)      Past Medical History:   Diagnosis Date    Anemia     Breast cancer 1995    LEFT DIAGNOSED FIRST    Breast cancer     Right     Diabetes mellitus     Factor V Leiden     Hyperlipidemia 2017    Hypertension 2017    Hypothyroidism 2019    Osteoporosis     UNKNOWN DATE OF DX    Pacemaker 10/28/2021    Recurrent urinary tract infection     Status post insertion of nerve stimulator 2024    Vertigo        Past Surgical History:   Procedure Laterality Date    BREAST SURGERY  , 2013, Nose cancer    CARDIAC VALVE REPLACEMENT       SECTION      EYE SURGERY      HEMORRHOID SURGERY      HERNIA REPAIR      HYSTERECTOMY      INSERTION OF PACEMAKER      INSERTION OF TUNNELED CENTRAL VENOUS CATHETER (CVC) WITH SUBCUTANEOUS PORT N/A 2023    Procedure: [id number];  Surgeon: Carl Conrad MD;  Location: [facility] OR;  Service: General;  Laterality: N/A;    MASTECTOMY Right     MASTECTOMY Left 2013    BREAST CANCER    MEDIPORT REMOVAL Left 2023    Procedure: REMOVAL, CATHETER, CENTRAL VENOUS, TUNNELED, WITH PORT;  Surgeon: Carl Conrad MD;  Location: [facility] OR;  Service: General;  Laterality: Left;    PORTACATH PLACEMENT  2013    TUMOR REMOVAL Left     EAR        Social History     Socioeconomic History    Marital status:     Highest education level: Master's degree (e.g., MA, MS, [place], MEd, MSW, ANDRE)   Occupational History    Occupation: Phd x 3-   Tobacco Use    Smoking status: Never    Smokeless tobacco: Never   Substance and Sexual Activity    Alcohol use: No     Comment: SPECIAL OCCASIONS LIKE ZACHERY    Drug use: No    Sexual activity: Not Currently     Partners: Male     Social Determinants of Health     Financial Resource Strain: Medium Risk (2023)    Overall  Financial Resource Strain (CARDIA)     Difficulty of Paying Living Expenses: Somewhat hard   Food Insecurity: No Food Insecurity (12/28/2023)    Hunger Vital Sign     Worried About Running Out of Food in the Last Year: Never true     Ran Out of Food in the Last Year: Never true   Transportation Needs: No Transportation Needs (12/28/2023)    PRAPARE - Transportation     Lack of Transportation (Medical): No     Lack of Transportation (Non-Medical): No   Physical Activity: Insufficiently Active (12/28/2023)    Exercise Vital Sign     Days of Exercise per Week: 7 days     Minutes of Exercise per Session: 20 min   Stress: No Stress Concern Present (12/28/2023)    [employer] - Occupational Stress Questionnaire     Feeling of Stress : Only a little   Housing Stability: Low Risk  (12/28/2023)    Housing Stability Vital Sign     Unable to Pay for Housing in the Last Year: No     Number of Places Lived in the Last Year: 1     Unstable Housing in the Last Year: No       Family History   Problem Relation Name Age of Onset    Cancer Mother Lena     Hypertension Mother Lena     Heart disease Mother Lena     No Known Problems Sister Ashley     Diabetes Brother Pratik     Hypertension Brother Pratik     Cancer Maternal Grandmother Mammie     Lung disease Father August     Heart disease Father August     Diabetes Father August     Cancer Brother Jameel     Diabetes Brother Jameel     Hypertension Brother Krunal     Stroke Brother Krunal     Hypertension Brother Say     Stroke Brother Say        Review of patient's allergies indicates:   Allergen Reactions    Iodine Other (See Comments)    Iodine and iodide containing products     Ditropan [oxybutynin chloride] Palpitations and Other (See Comments)     Made patient weak          Current Outpatient Medications:     alendronate (FOSAMAX) 70 MG tablet, Take 1 tablet (70 mg total) by mouth every 7 days., Disp: 13 tablet, Rfl: 3    apixaban (ELIQUIS)  2.5 mg Tab, Take 1 tablet (2.5 mg total) by mouth 2 (two) times daily., Disp: 180 tablet, Rfl: 3    cloNIDine (CATAPRES) 0.1 MG tablet, Take 1 tablet (0.1 mg total) by mouth 2 (two) times daily as needed (BP > 160/90)., Disp: 180 tablet, Rfl: 3    cyanocobalamin (VITAMIN B-12) 1000 MCG tablet, Take 100 mcg by mouth once daily., Disp: , Rfl:     cyproheptadine (PERIACTIN) 4 mg tablet, Take 1 tablet (4 mg total) by mouth 2 (two) times daily with meals., Disp: 180 tablet, Rfl: 3    ERGOCALCIFEROL, VITAMIN D2, ORAL, Take by mouth., Disp: , Rfl:     levothyroxine (SYNTHROID) 50 MCG tablet, Take 1 tablet (50 mcg total) by mouth once daily., Disp: 90 tablet, Rfl: 3    losartan (COZAAR) 25 MG tablet, Take 0.5 tablets (12.5 mg total) by mouth once daily., Disp: 45 tablet, Rfl: 3    magnesium oxide (MAG-OX) 400 mg (241.3 mg magnesium) tablet, Take 400 mg by mouth once daily., Disp: , Rfl:     meclizine (ANTIVERT) 25 mg tablet, Take 1 tablet (25 mg total) by mouth 3 (three) times daily as needed for Dizziness., Disp: 90 tablet, Rfl: 1    metFORMIN (GLUCOPHAGE-XR) 500 MG ER 24hr tablet, TAKE 1 TABLET BY MOUTH EVERY MORNING WITH FOOD FOR 2 WEEKS THEN INCREASE TO 1 TABLET BY MOUTH TWICE DAILY WITH FOOD, Disp: 180 tablet, Rfl: 3    metoprolol tartrate (LOPRESSOR) 25 MG tablet, Take 0.5 tablets (12.5 mg total) by mouth 2 (two) times daily., Disp: 90 tablet, Rfl: 1    multivitamin capsule, Take 1 capsule by mouth once daily., Disp: , Rfl:     NIFEdipine (PROCARDIA-XL) 30 MG (OSM) 24 hr tablet, Take 1 tablet (30 MG ) by mouth twice daily, Disp: 180 tablet, Rfl: 3    sertraline (ZOLOFT) 25 MG tablet, Take 1 tablet (25 mg total) by mouth once daily., Disp: 90 tablet, Rfl: 3    simvastatin (ZOCOR) 20 MG tablet, Take 1 tablet (20 mg total) by mouth every evening., Disp: 90 tablet, Rfl: 3    spironolactone (ALDACTONE) 25 MG tablet, Take 1 tablet (25 mg total) by mouth once daily., Disp: 90 tablet, Rfl: 3  No current facility-administered  medications for this visit.    Ms. 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  She also has osteoporosis and periodic vertigo.  She does have regular foot exams with Dr. Almaguer, podiatry  Cardiology, Dr Dyer,  Dr Stubbs , pain management s/p nerve stimulator to knee  Ophthalmology, Dr Ferrera, [place], [place]. And Dr Sun, [facility], [place]  She does have a port that is flushes monthly at [facility]    Daughter states that she is increasingly forgetful and is seeing things that aren't there (seeing people in the yard at night or people coming in her room at night and standing over her). She also says other people come into her house at night for various reasons.     Follow-up  Pertinent negatives include no abdominal pain, chest pain, chills, congestion, coughing, diaphoresis, fever, nausea, rash or sore throat.     Review of Systems   Constitutional:  Negative for activity change, appetite change, chills, diaphoresis and fever.   HENT:  Negative for congestion, ear pain, postnasal drip, rhinorrhea and sore throat.    Eyes:  Negative for pain, discharge, redness and itching.   Respiratory:  Negative for cough and shortness of breath.    Cardiovascular:  Negative for chest pain, palpitations and leg swelling.   Gastrointestinal:  Negative for abdominal distention, abdominal pain, constipation, diarrhea and nausea.   Genitourinary:  Negative for difficulty urinating, dysuria, frequency and urgency.   Musculoskeletal:         Foot swelling, sees Dr Almaguer for lymphedema   Skin:  Negative for color change, rash and wound.   Psychiatric/Behavioral:  Positive for confusion. The patient is nervous/anxious.    All other systems reviewed and are negative.      Objective:      Physical Exam  Constitutional:       Appearance: Normal appearance.   HENT:      Head:  Normocephalic.      Mouth/Throat:      Mouth: Mucous membranes are moist.   Eyes:      Extraocular Movements: Extraocular movements intact.      Pupils: Pupils are equal, round, and reactive to light.   Cardiovascular:      Rate and Rhythm: Regular rhythm. Bradycardia present.      Comments: paced  Pulmonary:      Effort: Pulmonary effort is normal.      Breath sounds: Normal breath sounds.   Musculoskeletal:         General: Normal range of motion.      Comments: Nerve stimulator to bilateral knees (Dr Amin)   Skin:     General: Skin is warm and dry.   Neurological:      General: No focal deficit present.      Mental Status: She is alert and oriented to person, place, and time. Mental status is at baseline.   Psychiatric:         Mood and Affect: Mood normal.         Behavior: Behavior normal.         Assessment:       1. Routine medical exam    2. Microalbuminuria    3. Type II diabetes mellitus with neurological manifestations    4. Essential hypertension    5. Hypercholesterolemia    6. Aortic atherosclerosis    7. Stage 3a chronic kidney disease    8. Sequelae of protein-calorie malnutrition    9. Anticoagulated    10. Memory changes        Plan:         Routine medical exam  -     Vitamin D; Future; Expected date: 05/03/2024  -     Vitamin B12; Future; Expected date: 05/03/2024  -     Lipid Panel; Future; Expected date: 05/03/2024  -     CBC Auto Differential; Future; Expected date: 05/03/2024  -     Comprehensive Metabolic Panel; Future; Expected date: 05/03/2024    Microalbuminuria  -     Microalbumin/Creatinine Ratio, Urine; Future; Expected date: 05/03/2024    Type II diabetes mellitus with neurological manifestations  -     Microalbumin/Creatinine Ratio, Urine; Future; Expected date: 05/03/2024  -     Hemoglobin A1C; Future; Expected date: 05/03/2024  -     TSH; Future; Expected date: 05/03/2024    Essential hypertension  -     Microalbumin/Creatinine Ratio, Urine; Future; Expected date: 05/03/2024  -      CBC Auto Differential; Future; Expected date: 05/03/2024  -     Comprehensive Metabolic Panel; Future; Expected date: 05/03/2024    Hypercholesterolemia  -     Lipid Panel; Future; Expected date: 05/03/2024    Aortic atherosclerosis  -     Lipid Panel; Future; Expected date: 05/03/2024    Stage 3a chronic kidney disease  -     Vitamin D; Future; Expected date: 05/03/2024    Sequelae of protein-calorie malnutrition  -     Vitamin B12; Future; Expected date: 05/03/2024    Anticoagulated  -     Protime-INR; Future; Expected date: 06/03/2024  -     APTT; Future; Expected date: 05/03/2024    Memory changes  -     Ambulatory referral/consult to Neurology; Future; Expected date: 05/10/2024        Risks, benefits, and side effects were discussed with the patient. All questions were answered to the fullest satisfaction of the patient, and pt verbalized understanding and agreement to treatment plan. Pt was to call with any new or worsening symptoms, or present to the ER.        Chacha Gutierrez MD

## 2024-05-16 ENCOUNTER — INFUSION (OUTPATIENT)
Dept: INFUSION THERAPY | Facility: HOSPITAL | Age: 84
End: 2024-05-16
Attending: FAMILY MEDICINE
Payer: MEDICARE

## 2024-05-16 ENCOUNTER — HOSPITAL ENCOUNTER (OUTPATIENT)
Dept: RADIOLOGY | Facility: HOSPITAL | Age: 84
Discharge: HOME | End: 2024-05-16
Attending: FAMILY MEDICINE
Payer: MEDICARE

## 2024-05-16 ENCOUNTER — TELEPHONE (OUTPATIENT)
Dept: FAMILY MEDICINE | Facility: CLINIC | Age: 84
End: 2024-05-16
Payer: MEDICARE

## 2024-05-16 VITALS
WEIGHT: 125.69 LBS | HEART RATE: 81 BPM | HEIGHT: 62 IN | RESPIRATION RATE: 16 BRPM | OXYGEN SATURATION: 97 % | SYSTOLIC BLOOD PRESSURE: 142 MMHG | BODY MASS INDEX: 23.13 KG/M2 | TEMPERATURE: 97 F | DIASTOLIC BLOOD PRESSURE: 65 MMHG

## 2024-05-16 DIAGNOSIS — R41.3 MEMORY CHANGES: ICD-10-CM

## 2024-05-16 DIAGNOSIS — Z85.3 HISTORY OF BILATERAL BREAST CANCER: Primary | ICD-10-CM

## 2024-05-16 PROCEDURE — 63600175 PHARM REV CODE 636 W HCPCS: Performed by: NURSE PRACTITIONER

## 2024-05-16 PROCEDURE — 70450 CT HEAD/BRAIN W/O DYE: CPT | Mod: TC

## 2024-05-16 PROCEDURE — 70450 CT HEAD/BRAIN W/O DYE: CPT | Mod: 26,,, | Performed by: RADIOLOGY

## 2024-05-16 PROCEDURE — 96523 IRRIG DRUG DELIVERY DEVICE: CPT

## 2024-05-16 RX ORDER — SODIUM CHLORIDE 0.9 % (FLUSH) 0.9 %
10 SYRINGE (ML) INJECTION
OUTPATIENT
Start: 2024-05-16

## 2024-05-16 RX ORDER — SODIUM CHLORIDE 0.9 % (FLUSH) 0.9 %
10 SYRINGE (ML) INJECTION
Status: DISCONTINUED | OUTPATIENT
Start: 2024-05-16 | End: 2024-05-16 | Stop reason: HOSPADM

## 2024-05-16 RX ORDER — HEPARIN 100 UNIT/ML
500 SYRINGE INTRAVENOUS
Status: CANCELLED | OUTPATIENT
Start: 2024-05-16

## 2024-05-16 RX ORDER — HEPARIN 100 UNIT/ML
500 SYRINGE INTRAVENOUS
OUTPATIENT
Start: 2024-05-16

## 2024-05-16 RX ORDER — SODIUM CHLORIDE 0.9 % (FLUSH) 0.9 %
10 SYRINGE (ML) INJECTION
Status: CANCELLED | OUTPATIENT
Start: 2024-05-16

## 2024-05-16 RX ORDER — HEPARIN 100 UNIT/ML
500 SYRINGE INTRAVENOUS
Status: DISCONTINUED | OUTPATIENT
Start: 2024-05-16 | End: 2024-05-16 | Stop reason: HOSPADM

## 2024-05-16 RX ADMIN — HEPARIN 500 UNITS: 100 SYRINGE at 08:05

## 2024-05-16 NOTE — PLAN OF CARE
Use a family-focused approach to care.  Develop trust and rapport by proactively providing information, encouraging questions, addressing concerns and offering reassurance.  Acknowledge emotional response to hospitalization.  Recognize and utilize personal coping strategies.  Honor spiritual and cultural preferences.

## 2024-05-16 NOTE — NURSING
Port accessed by AALIYAH Steven. 20G 1inch lyles needle used, blood return noted. Labs drawn, port flushed with NS and then heparin flush. Port then deaccessed by AALIYAH Steven.

## 2024-05-16 NOTE — TELEPHONE ENCOUNTER
----- Message from Chacha Gutierrez MD sent at 5/16/2024 10:03 AM CDT -----  There are no acute changes seen on CT head.  There are chronic changes that are likely secondary to age

## 2024-05-16 NOTE — TELEPHONE ENCOUNTER
----- Message from Chacha Gutierrez MD sent at 5/16/2024 10:05 AM CDT -----  Lipid panel is acceptable    CMP shows an elevated BUN and elevated glucose, and decreasing GFR..  Please adhere to a diabetic diet with low carbohydrates and low sugars.  Increase your fluid intake to help maintain adequate kidney function.  We do lose kidney function with age so it is natural for the GFR to decrease as we age    PTT consistent with previous and acceptable PT INR within a normal range    CBC within acceptable range

## 2024-05-16 NOTE — TELEPHONE ENCOUNTER
Attempted to  call patient to  give CT results. No answer and not able to leave message due to mailbox being full

## 2024-05-17 ENCOUNTER — PATIENT MESSAGE (OUTPATIENT)
Dept: FAMILY MEDICINE | Facility: CLINIC | Age: 84
End: 2024-05-17
Payer: MEDICARE

## 2024-05-17 ENCOUNTER — TELEPHONE (OUTPATIENT)
Dept: FAMILY MEDICINE | Facility: CLINIC | Age: 84
End: 2024-05-17
Payer: MEDICARE

## 2024-05-17 NOTE — TELEPHONE ENCOUNTER
----- Message from Karithomas Dimas sent at 5/17/2024 10:37 AM CDT -----  Regarding: rx for a cold  Contact: pt  Type:  Needs Medical Advice    Who Called: pt    Symptoms (please be specific): cough     How long has patient had these symptoms:  after getting tests yesterday    Pharmacy name and phone #:    [id number] - [place], [place] [address] [address]  [address] [place] [address]  Phone: [phone] Fax: [phone]    Would the patient rather a call back or a response via [facility]? Call back    Best Call Back Number: [phone]    Additional Information: pt has a little cough and no fever.  Pt feels like she might be getting a sore throat and is requesting a rx for her cold.    Please advise.  Thank you.

## 2024-05-17 NOTE — TELEPHONE ENCOUNTER
----- Message from Dyana Preston sent at 5/17/2024  8:31 AM CDT -----  Contact: PT  Type:  Needs Medical Advice    Who Called: PT   Symptoms (please be specific): COUGH AND POST NASAL DRIP    How long has patient had these symptoms:  YESTERDAY  Pharmacy name and phone #:    [facility] #[id number] - [place] [address] [address]  [address] [address]  Phone: [phone] Fax: [phone]  Would the patient rather a call back or a response via [facility]? CALL   Best Call Back Number: [phone] (home)   Additional Information: THANK YOU

## 2024-05-17 NOTE — TELEPHONE ENCOUNTER
Attempted to contact Ursula Rodríguez to discuss Scheduling an appointment.    Unable to leave message on phone - no voicemail or mailbox full on [phone] (home).    Diane Haro LPN

## 2024-05-23 ENCOUNTER — OFFICE VISIT (OUTPATIENT)
Dept: FAMILY MEDICINE | Facility: CLINIC | Age: 84
End: 2024-05-23
Payer: MEDICARE

## 2024-05-23 ENCOUNTER — HOSPITAL ENCOUNTER (OUTPATIENT)
Dept: RADIOLOGY | Facility: HOSPITAL | Age: 84
Discharge: HOME | End: 2024-05-23
Attending: STUDENT IN AN ORGANIZED HEALTH CARE EDUCATION/TRAINING PROGRAM
Payer: MEDICARE

## 2024-05-23 VITALS
TEMPERATURE: 98 F | HEART RATE: 79 BPM | DIASTOLIC BLOOD PRESSURE: 70 MMHG | BODY MASS INDEX: 23.07 KG/M2 | WEIGHT: 125.38 LBS | SYSTOLIC BLOOD PRESSURE: 126 MMHG | HEIGHT: 62 IN | OXYGEN SATURATION: 98 %

## 2024-05-23 DIAGNOSIS — A49.9 BACTERIAL INFECTION: Primary | ICD-10-CM

## 2024-05-23 DIAGNOSIS — A49.9 BACTERIAL INFECTION: ICD-10-CM

## 2024-05-23 LAB
CTP QC/QA: YES
CTP QC/QA: YES
POC MOLECULAR INFLUENZA A AGN: NEGATIVE
POC MOLECULAR INFLUENZA B AGN: NEGATIVE
SARS-COV-2 RDRP RESP QL NAA+PROBE: NEGATIVE

## 2024-05-23 PROCEDURE — 99214 OFFICE O/P EST MOD 30 MIN: CPT | Mod: PBBFAC,25 | Performed by: STUDENT IN AN ORGANIZED HEALTH CARE EDUCATION/TRAINING PROGRAM

## 2024-05-23 PROCEDURE — 99999PBSHW POCT INFLUENZA A/B MOLECULAR: Mod: PBBFAC,,,

## 2024-05-23 PROCEDURE — 99999 PR PBB SHADOW E&M-EST. PATIENT-LVL IV: CPT | Mod: PBBFAC,,, | Performed by: STUDENT IN AN ORGANIZED HEALTH CARE EDUCATION/TRAINING PROGRAM

## 2024-05-23 PROCEDURE — 71046 X-RAY EXAM CHEST 2 VIEWS: CPT | Mod: TC

## 2024-05-23 PROCEDURE — 99214 OFFICE O/P EST MOD 30 MIN: CPT | Mod: S$PBB,,, | Performed by: STUDENT IN AN ORGANIZED HEALTH CARE EDUCATION/TRAINING PROGRAM

## 2024-05-23 PROCEDURE — 87635 SARS-COV-2 COVID-19 AMP PRB: CPT | Mod: PBBFAC | Performed by: STUDENT IN AN ORGANIZED HEALTH CARE EDUCATION/TRAINING PROGRAM

## 2024-05-23 PROCEDURE — 99999PBSHW: Mod: PBBFAC,,,

## 2024-05-23 PROCEDURE — 87502 INFLUENZA DNA AMP PROBE: CPT | Mod: PBBFAC | Performed by: STUDENT IN AN ORGANIZED HEALTH CARE EDUCATION/TRAINING PROGRAM

## 2024-05-23 PROCEDURE — 71046 X-RAY EXAM CHEST 2 VIEWS: CPT | Mod: 26,,, | Performed by: RADIOLOGY

## 2024-05-23 RX ORDER — AZITHROMYCIN 250 MG/1
TABLET, FILM COATED ORAL
Qty: 6 TABLET | Refills: 0 | Status: SHIPPED | OUTPATIENT
Start: 2024-05-23 | End: 2024-05-28

## 2024-05-23 RX ORDER — GUAIFENESIN AND DEXTROMETHORPHAN HYDROBROMIDE 10; 100 MG/5ML; MG/5ML
5 SYRUP ORAL EVERY 6 HOURS PRN
COMMUNITY
Start: 2024-05-23 | End: 2024-06-02

## 2024-05-23 RX ORDER — FLUTICASONE PROPIONATE 50 MCG
1 SPRAY, SUSPENSION (ML) NASAL DAILY
Qty: 18.2 ML | Refills: 0 | Status: SHIPPED | OUTPATIENT
Start: 2024-05-23

## 2024-05-23 RX ORDER — BENZONATATE 200 MG/1
200 CAPSULE ORAL 3 TIMES DAILY PRN
Qty: 20 CAPSULE | Refills: 0 | Status: SHIPPED | OUTPATIENT
Start: 2024-05-23 | End: 2024-06-07

## 2024-05-23 RX ORDER — AZELASTINE 1 MG/ML
1 SPRAY, METERED NASAL 2 TIMES DAILY
Qty: 18.2 ML | Refills: 0 | Status: SHIPPED | OUTPATIENT
Start: 2024-05-23 | End: 2025-05-23

## 2024-05-23 NOTE — PROGRESS NOTES
[facility] Note    Subjective:    The HPI and pertinent ROS is included in the Diagnostic Impression Remarks section at the end of the note. Please see below for further details. Chief complaint is at end of note.     Ursula is a pleasant intelligent patient who is here for evaluation.     Modified Medications    No medications on file       Data reviewed 274}  Previous medical records reviewed and summarized in plan section at end of note.      If you are due for any health screening(s) below please notify me so we can arrange them to be ordered and scheduled. Most healthy patients at your age complete them, but you are free to accept or refuse. If you can't do it, I'll definitely understand. If you can, I'd certainly appreciate it!     Tests to Keep You Healthy    Eye Exam: DUE      The following portions of the patient's history were reviewed and updated as appropriate: allergies, current medications, past family history, past medical history, past social history, past surgical history and problem list.    She  has a past medical history of Anemia (), Breast cancer (), Breast cancer (), Diabetes mellitus, Factor V Leiden (), Hyperlipidemia (), Hypertension (), Hypothyroidism (), Osteoporosis, Pacemaker (10/28/2021), Recurrent urinary tract infection, Status post insertion of nerve stimulator (2024), and Vertigo ().  She  has a past surgical history that includes Hemorrhoid surgery (); Hysterectomy (); Tumor removal (Left, ); Mastectomy (Right, ); Mastectomy (Left, );  section; Eye surgery (); Breast surgery (, , Nose cancer); Hernia repair (); Cardiac valve replacement (); Portacath placement (); Insertion of pacemaker; Insertion of tunneled central venous catheter (CVC) with subcutaneous port (N/A, 2023); and Mediport removal (Left, 2023).    She  reports that she has never smoked. She has never  "used smokeless tobacco. She reports that she does not drink alcohol and does not use drugs.  She family history includes Cancer in her brother, maternal grandmother, and mother; Diabetes in her brother, brother, and father; Heart disease in her father and mother; Hypertension in her brother, brother, brother, and mother; Lung disease in her father; No Known Problems in her sister; Stroke in her brother and brother.    Review of patient's allergies indicates:   Allergen Reactions    Iodine Other (See Comments)    Iodine and iodide containing products     Ditropan [oxybutynin chloride] Palpitations and Other (See Comments)     Made patient weak       Tobacco Use: Low Risk  (5/23/2024)    Patient History     Smoking Tobacco Use: Never     Smokeless Tobacco Use: Never     Passive Exposure: Not on file     Physical Examination  General appearance: alert, cooperative, no distress  Neck: no thyromegaly, no neck stiffness  Lungs: clear to auscultation, no wheezes, rales or rhonchi, symmetric air entry  Heart: normal rate, regular rhythm, normal S1, S2, no murmurs, rubs, clicks or gallops  Abdomen: soft, nontender, nondistended, no rigidity, rebound, or guarding.   Back: no point tenderness over spine  Extremities: peripheral pulses normal, no unilateral leg swelling or calf tenderness   Neurological:alert, oriented, normal speech, no new focal findings or movement disorder noted from baseline    BP Readings from Last 3 Encounters:   05/23/24 126/70   05/16/24 (!) 142/65   05/03/24 138/62     Wt Readings from Last 3 Encounters:   05/23/24 56.9 kg (125 lb 6.4 oz)   05/16/24 57 kg (125 lb 10.6 oz)   05/03/24 57.6 kg (127 lb)     /70 (BP Location: Left arm, Patient Position: Sitting, BP Method: Medium (Manual))   Pulse 79   Temp 98.4 °F (36.9 °C) (Oral)   Ht 5' 2" (1.575 m)   Wt 56.9 kg (125 lb 6.4 oz)   SpO2 98%   BMI 22.94 kg/m²    274}  Laboratory: I have reviewed old labs below:    274}    Lab Results " "  Component Value Date    WBC 6.77 05/16/2024    HGB 14.1 05/16/2024    HCT 42.7 05/16/2024    MCV 83 05/16/2024     05/16/2024     05/16/2024    K 3.8 05/16/2024     05/16/2024    CALCIUM 10.5 05/16/2024    CO2 23 05/16/2024     (H) 05/16/2024    BUN 27 (H) 05/16/2024    CREATININE 1.1 05/16/2024    EGFRNORACEVR 48.9 (A) 05/16/2024    ANIONGAP 11 05/16/2024    PROT 6.7 05/16/2024    ALBUMIN 3.9 05/16/2024    BILITOT 0.6 05/16/2024    ALKPHOS 91 05/16/2024    ALT 15 05/16/2024    AST 20 05/16/2024    INR 1.1 05/16/2024    CHOL 105 (L) 05/16/2024    TRIG 66 05/16/2024    HDL 44 05/16/2024    LDLCALC 47.8 (L) 05/16/2024    TSH 2.336 05/16/2024    HGBA1C 5.6 05/16/2024      Lab reviewed by me: Particular labs of significance that I will monitor, workup, or treat to improve are mentioned below in diagnostic impression remarks.    Imaging/EKG: I have reviewed the pertinent results and my findings are noted in remarks.  274}    CC:   Chief Complaint   Patient presents with    Fever     Headaches, body ache, coughing, congestion- 1 week        274}    Assessment/Plan  Ursula Rodríguez is a 84 y.o. female who presents to clinic with:  1. Bacterial infection       274}  Diagnostic Impression Remarks + HPI     Documentation entered by me for this encounter may have been done in part using speech-recognition technology. Although I have made an effort to ensure accuracy, "sound like" errors may exist and should be interpreted in context.      Patient presented today w/ 7 day history of nasal and chest congestion, headaches, myalgias, 1 episode of fever. Will treat. No shortness of breath.      Additional workup planned: see labs ordered below.    See below for labs and meds ordered with associated diagnosis      1. Bacterial infection  - X-Ray Chest PA And Lateral; Future  - azithromycin (Z-GARY) 250 MG tablet; Take 2 tablets by mouth on day 1; Take 1 tablet by mouth on days 2-5  Dispense: 6 tablet; " Refill: 0  - dextromethorphan-guaiFENesin  mg/5 ml (ROBITUSSIN-DM)  mg/5 mL liquid; Take 5 mLs by mouth every 6 (six) hours as needed (cough).  - benzonatate (TESSALON) 200 MG capsule; Take 1 capsule (200 mg total) by mouth 3 (three) times daily as needed for Cough.  Dispense: 20 capsule; Refill: 0  - fluticasone propionate (FLONASE) 50 mcg/actuation nasal spray; 1 spray (50 mcg total) by Each Nostril route once daily.  Dispense: 18.2 mL; Refill: 0  - azelastine (ASTELIN) 137 mcg (0.1 %) nasal spray; 1 spray (137 mcg total) by Nasal route 2 (two) times daily.  Dispense: 18.2 mL; Refill: 0  - POCT COVID-19 Rapid Screening  - POCT Influenza A/B Molecular      Sandrine Red MD   274}    If you are due for any health screening(s) below please notify me so we can arrange them to be ordered and scheduled. Most healthy patients at your age complete them, but you are free to accept or refuse.     If you can't do it, I'll definitely understand. If you can, I'd certainly appreciate it!   Tests to Keep You Healthy    Eye Exam: DUE

## 2024-05-27 RX ORDER — MECLIZINE HYDROCHLORIDE 25 MG/1
25 TABLET ORAL
Qty: 90 TABLET | Refills: 1 | Status: SHIPPED | OUTPATIENT
Start: 2024-05-27

## 2024-05-27 NOTE — TELEPHONE ENCOUNTER
No care due was identified.  [facility] Embedded Care Due Messages. Reference number: [id number].   5/27/2024 3:29:35 PM CDT

## 2024-06-13 ENCOUNTER — INFUSION (OUTPATIENT)
Dept: INFUSION THERAPY | Facility: HOSPITAL | Age: 84
End: 2024-06-13
Attending: FAMILY MEDICINE
Payer: MEDICARE

## 2024-06-13 VITALS
HEART RATE: 79 BPM | DIASTOLIC BLOOD PRESSURE: 72 MMHG | TEMPERATURE: 98 F | BODY MASS INDEX: 23.08 KG/M2 | OXYGEN SATURATION: 97 % | WEIGHT: 125.44 LBS | SYSTOLIC BLOOD PRESSURE: 147 MMHG | HEIGHT: 62 IN | RESPIRATION RATE: 18 BRPM

## 2024-06-13 DIAGNOSIS — Z85.3 HISTORY OF BILATERAL BREAST CANCER: Primary | ICD-10-CM

## 2024-06-13 PROCEDURE — 25000003 PHARM REV CODE 250: Performed by: NURSE PRACTITIONER

## 2024-06-13 PROCEDURE — 96523 IRRIG DRUG DELIVERY DEVICE: CPT

## 2024-06-13 PROCEDURE — 63600175 PHARM REV CODE 636 W HCPCS: Performed by: NURSE PRACTITIONER

## 2024-06-13 PROCEDURE — A4216 STERILE WATER/SALINE, 10 ML: HCPCS | Performed by: NURSE PRACTITIONER

## 2024-06-13 RX ORDER — SODIUM CHLORIDE 0.9 % (FLUSH) 0.9 %
10 SYRINGE (ML) INJECTION
Status: DISCONTINUED | OUTPATIENT
Start: 2024-06-13 | End: 2024-06-13 | Stop reason: HOSPADM

## 2024-06-13 RX ORDER — SODIUM CHLORIDE 0.9 % (FLUSH) 0.9 %
10 SYRINGE (ML) INJECTION
OUTPATIENT
Start: 2024-06-13

## 2024-06-13 RX ORDER — HEPARIN 100 UNIT/ML
500 SYRINGE INTRAVENOUS
Status: DISCONTINUED | OUTPATIENT
Start: 2024-06-13 | End: 2024-06-13 | Stop reason: HOSPADM

## 2024-06-13 RX ORDER — HEPARIN 100 UNIT/ML
500 SYRINGE INTRAVENOUS
OUTPATIENT
Start: 2024-06-13

## 2024-06-13 RX ADMIN — Medication 10 ML: at 08:06

## 2024-06-13 RX ADMIN — HEPARIN 500 UNITS: 100 SYRINGE at 08:06

## 2024-06-13 NOTE — PLAN OF CARE
Problem: Adult Inpatient Plan of Care  Goal: Optimal Comfort and Wellbeing  Outcome: Progressing  Intervention: Monitor Pain and Promote Comfort  Flowsheets (Taken 6/13/2024 4327)  Pain Management Interventions:   quiet environment facilitated   relaxation techniques promoted   pillow support provided   care clustered

## 2024-06-13 NOTE — NURSING
Right chest wall implanted port accessed with 20G 1 inch lyles needle using sterile technique. Flushed without difficulty. Blood return observed. Port flushed with Normal Saline and Heparin per protocol and de-accessed. Band-aid applied over puncture site. Pt tolerated well.

## 2024-06-13 NOTE — PLAN OF CARE
Problem: Adult Inpatient Plan of Care  Goal: Optimal Comfort and Wellbeing  Intervention: Provide Person-Centered Care  Flowsheets (Taken 6/13/2024 5948)  Trust Relationship/Rapport:   care explained   reassurance provided   choices provided   thoughts/feelings acknowledged   emotional support provided   empathic listening provided   questions answered   questions encouraged

## 2024-06-21 ENCOUNTER — OFFICE VISIT (OUTPATIENT)
Dept: FAMILY MEDICINE | Facility: CLINIC | Age: 84
End: 2024-06-21
Payer: MEDICARE

## 2024-06-21 ENCOUNTER — HOSPITAL ENCOUNTER (OUTPATIENT)
Dept: RADIOLOGY | Facility: HOSPITAL | Age: 84
Discharge: HOME | End: 2024-06-21
Attending: FAMILY MEDICINE
Payer: MEDICARE

## 2024-06-21 VITALS
HEART RATE: 86 BPM | OXYGEN SATURATION: 98 % | SYSTOLIC BLOOD PRESSURE: 124 MMHG | BODY MASS INDEX: 22.52 KG/M2 | RESPIRATION RATE: 14 BRPM | HEIGHT: 62 IN | DIASTOLIC BLOOD PRESSURE: 66 MMHG | WEIGHT: 122.38 LBS

## 2024-06-21 DIAGNOSIS — C50.919 MALIGNANT NEOPLASM OF FEMALE BREAST, UNSPECIFIED ESTROGEN RECEPTOR STATUS, UNSPECIFIED LATERALITY, UNSPECIFIED SITE OF BREAST: ICD-10-CM

## 2024-06-21 DIAGNOSIS — R05.2 SUBACUTE COUGH: Primary | ICD-10-CM

## 2024-06-21 DIAGNOSIS — G63 NEUROPATHY DUE TO MEDICAL CONDITION: ICD-10-CM

## 2024-06-21 DIAGNOSIS — R05.2 SUBACUTE COUGH: ICD-10-CM

## 2024-06-21 DIAGNOSIS — A49.9 BACTERIAL INFECTION: ICD-10-CM

## 2024-06-21 DIAGNOSIS — I44.2 THIRD DEGREE AV BLOCK: ICD-10-CM

## 2024-06-21 PROCEDURE — 71046 X-RAY EXAM CHEST 2 VIEWS: CPT | Mod: TC

## 2024-06-21 PROCEDURE — 99214 OFFICE O/P EST MOD 30 MIN: CPT | Mod: PBBFAC,25 | Performed by: FAMILY MEDICINE

## 2024-06-21 PROCEDURE — 99999 PR PBB SHADOW E&M-EST. PATIENT-LVL IV: CPT | Mod: PBBFAC,,, | Performed by: FAMILY MEDICINE

## 2024-06-21 RX ORDER — BENZONATATE 100 MG/1
100 CAPSULE ORAL 3 TIMES DAILY PRN
Qty: 30 CAPSULE | Refills: 1 | Status: SHIPPED | OUTPATIENT
Start: 2024-06-21 | End: 2024-07-01

## 2024-06-21 RX ORDER — DOXYCYCLINE HYCLATE 100 MG
100 TABLET ORAL 2 TIMES DAILY
Qty: 20 TABLET | Refills: 0 | Status: SHIPPED | OUTPATIENT
Start: 2024-06-21

## 2024-06-21 RX ORDER — PREDNISONE 20 MG/1
20 TABLET ORAL DAILY
Qty: 5 TABLET | Refills: 0 | Status: SHIPPED | OUTPATIENT
Start: 2024-06-21

## 2024-06-21 RX ORDER — FLUTICASONE PROPIONATE 50 MCG
1 SPRAY, SUSPENSION (ML) NASAL DAILY
Qty: 16 G | Refills: 5 | Status: SHIPPED | OUTPATIENT
Start: 2024-06-21

## 2024-06-21 RX ORDER — AZELASTINE 1 MG/ML
1 SPRAY, METERED NASAL 2 TIMES DAILY
Qty: 18.2 ML | Refills: 5 | Status: SHIPPED | OUTPATIENT
Start: 2024-06-21 | End: 2025-06-21

## 2024-06-21 RX ORDER — BENZONATATE 200 MG/1
CAPSULE ORAL
COMMUNITY
End: 2024-06-21

## 2024-06-21 NOTE — PROGRESS NOTES
Subjective:       Patient ID: Ursula Rodríguez is a 84 y.o. female.    Chief Complaint: Cough (X weeks)      Past Medical History:   Diagnosis Date    Anemia 1995    Breast cancer 1995    LEFT DIAGNOSED FIRST    Breast cancer 2013    Right     Diabetes mellitus     Factor V Leiden     Hyperlipidemia 2017    Hypertension 2017    Hypothyroidism 2019    Osteoporosis     UNKNOWN DATE OF DX    Pacemaker 10/28/2021    Recurrent urinary tract infection     Status post insertion of nerve stimulator 2024    Vertigo        Past Surgical History:   Procedure Laterality Date    BREAST SURGERY  , , Nose cancer    CARDIAC VALVE REPLACEMENT       SECTION      EYE SURGERY      HEMORRHOID SURGERY      HERNIA REPAIR      HYSTERECTOMY      INSERTION OF PACEMAKER      INSERTION OF TUNNELED CENTRAL VENOUS CATHETER (CVC) WITH SUBCUTANEOUS PORT N/A 2023    Procedure: [id number];  Surgeon: Carl Conrad MD;  Location: [facility] OR;  Service: General;  Laterality: N/A;    MASTECTOMY Right     MASTECTOMY Left     BREAST CANCER    MEDIPORT REMOVAL Left 2023    Procedure: REMOVAL, CATHETER, CENTRAL VENOUS, TUNNELED, WITH PORT;  Surgeon: Carl Conrad MD;  Location: [facility] OR;  Service: General;  Laterality: Left;    PORTACATH PLACEMENT  2013    TUMOR REMOVAL Left 1994    EAR        Social History     Socioeconomic History    Marital status:     Highest education level: Master's degree (e.g., MA, MS, [place], MEd, MSW, ANDRE)   Occupational History    Occupation: Phd x 3-   Tobacco Use    Smoking status: Never    Smokeless tobacco: Never   Substance and Sexual Activity    Alcohol use: No     Comment: SPECIAL OCCASIONS LIKE ZACHERY    Drug use: No    Sexual activity: Not Currently     Partners: Male     Social Determinants of Health     Financial Resource Strain: Medium Risk (2023)    Overall Financial Resource Strain (CARDIA)     Difficulty of  Paying Living Expenses: Somewhat hard   Food Insecurity: No Food Insecurity (12/28/2023)    Hunger Vital Sign     Worried About Running Out of Food in the Last Year: Never true     Ran Out of Food in the Last Year: Never true   Transportation Needs: No Transportation Needs (12/28/2023)    PRAPARE - Transportation     Lack of Transportation (Medical): No     Lack of Transportation (Non-Medical): No   Physical Activity: Insufficiently Active (12/28/2023)    Exercise Vital Sign     Days of Exercise per Week: 7 days     Minutes of Exercise per Session: 20 min   Stress: No Stress Concern Present (12/28/2023)    [employer] [place] [employer] - Occupational Stress Questionnaire     Feeling of Stress : Only a little   Housing Stability: Low Risk  (12/28/2023)    Housing Stability Vital Sign     Unable to Pay for Housing in the Last Year: No     Number of Places Lived in the Last Year: 1     Unstable Housing in the Last Year: No       Family History   Problem Relation Name Age of Onset    Cancer Mother Foster     Hypertension Mother Lena     Heart disease Mother Lena     No Known Problems Sister Ashley     Diabetes Brother Pratik     Hypertension Brother Pratik     Cancer Maternal Grandmother Mammie     Lung disease Father August     Heart disease Father August     Diabetes Father August     Cancer Brother Jameel     Diabetes Brother Jameel     Hypertension Brother Krunal     Stroke Brother Krunal     Hypertension Brother Say     Stroke Brother Say        Review of patient's allergies indicates:   Allergen Reactions    Iodine Other (See Comments)    Iodine and iodide containing products     Ditropan [oxybutynin chloride] Palpitations and Other (See Comments)     Made patient weak          Current Outpatient Medications:     alendronate (FOSAMAX) 70 MG tablet, Take 1 tablet (70 mg total) by mouth every 7 days., Disp: 13 tablet, Rfl: 3    apixaban (ELIQUIS) 2.5 mg Tab, Take 1 tablet (2.5 mg total) by mouth 2  (two) times daily., Disp: 180 tablet, Rfl: 3    cloNIDine (CATAPRES) 0.1 MG tablet, Take 1 tablet (0.1 mg total) by mouth 2 (two) times daily as needed (BP > 160/90)., Disp: 180 tablet, Rfl: 3    cyanocobalamin (VITAMIN B-12) 1000 MCG tablet, Take 100 mcg by mouth once daily., Disp: , Rfl:     cyproheptadine (PERIACTIN) 4 mg tablet, Take 1 tablet (4 mg total) by mouth 2 (two) times daily with meals., Disp: 180 tablet, Rfl: 3    levothyroxine (SYNTHROID) 50 MCG tablet, Take 1 tablet (50 mcg total) by mouth once daily., Disp: 90 tablet, Rfl: 3    losartan (COZAAR) 25 MG tablet, Take 0.5 tablets (12.5 mg total) by mouth once daily., Disp: 45 tablet, Rfl: 3    magnesium oxide (MAG-OX) 400 mg (241.3 mg magnesium) tablet, Take 400 mg by mouth once daily., Disp: , Rfl:     meclizine (ANTIVERT) 25 mg tablet, TAKE 1 TABLET(25 MG) BY MOUTH THREE TIMES DAILY AS NEEDED FOR DIZZINESS, Disp: 90 tablet, Rfl: 1    metFORMIN (GLUCOPHAGE-XR) 500 MG ER 24hr tablet, TAKE 1 TABLET BY MOUTH EVERY MORNING WITH FOOD FOR 2 WEEKS THEN INCREASE TO 1 TABLET BY MOUTH TWICE DAILY WITH FOOD, Disp: 180 tablet, Rfl: 3    metoprolol tartrate (LOPRESSOR) 25 MG tablet, Take 0.5 tablets (12.5 mg total) by mouth 2 (two) times daily., Disp: 90 tablet, Rfl: 1    multivitamin capsule, Take 1 capsule by mouth once daily., Disp: , Rfl:     NIFEdipine (PROCARDIA-XL) 30 MG (OSM) 24 hr tablet, Take 1 tablet (30 MG ) by mouth twice daily, Disp: 180 tablet, Rfl: 3    sertraline (ZOLOFT) 25 MG tablet, Take 1 tablet (25 mg total) by mouth once daily., Disp: 90 tablet, Rfl: 3    simvastatin (ZOCOR) 20 MG tablet, Take 1 tablet (20 mg total) by mouth every evening., Disp: 90 tablet, Rfl: 3    spironolactone (ALDACTONE) 25 MG tablet, Take 1 tablet (25 mg total) by mouth once daily., Disp: 90 tablet, Rfl: 3    azelastine (ASTELIN) 137 mcg (0.1 %) nasal spray, 1 spray (137 mcg total) by Nasal route 2 (two) times daily., Disp: 18.2 mL, Rfl: 5    benzonatate (TESSALON  PERLES) 100 MG capsule, Take 1 capsule (100 mg total) by mouth 3 (three) times daily as needed for Cough., Disp: 30 capsule, Rfl: 1    doxycycline (VIBRA-TABS) 100 MG tablet, Take 1 tablet (100 mg total) by mouth 2 (two) times daily., Disp: 20 tablet, Rfl: 0    ERGOCALCIFEROL, VITAMIN D2, ORAL, Take by mouth. (Patient not taking: Reported on 6/21/2024), Disp: , Rfl:     fluticasone propionate (FLONASE) 50 mcg/actuation nasal spray, 1 spray (50 mcg total) by Each Nostril route once daily., Disp: 16 g, Rfl: 5    predniSONE (DELTASONE) 20 MG tablet, Take 1 tablet (20 mg total) by mouth once daily., Disp: 5 tablet, Rfl: 0    Ms Rodríguez is here today with cough, congestion and wheezing. She was seen 1 month ago for the same and was placed on azithromycin. She improved on the meds but never really got over the symptoms. She has now been sick for two  weeks again. Her cough is mostly non-productive.     Cough  Associated symptoms include postnasal drip. Pertinent negatives include no chest pain, chills, fever, rash, rhinorrhea, sore throat or shortness of breath.     Review of Systems   Constitutional:  Negative for activity change, appetite change, chills, diaphoresis and fever.   HENT:  Positive for congestion and postnasal drip. Negative for rhinorrhea, sinus pressure and sore throat.    Respiratory:  Positive for cough. Negative for shortness of breath.    Cardiovascular:  Negative for chest pain, palpitations and leg swelling.   Gastrointestinal:  Negative for abdominal distention and abdominal pain.   Skin:  Negative for color change, rash and wound.       Objective:      Physical Exam  Vitals and nursing note reviewed.   Constitutional:       Appearance: Normal appearance. She is normal weight.   HENT:      Head: Normocephalic.      Nose: Nose normal.   Eyes:      Extraocular Movements: Extraocular movements intact.      Conjunctiva/sclera: Conjunctivae normal.      Pupils: Pupils are equal, round, and reactive to  light.   Cardiovascular:      Rate and Rhythm: Normal rate and regular rhythm.      Heart sounds: Normal heart sounds. No murmur heard.  Pulmonary:      Effort: Pulmonary effort is normal. No respiratory distress.      Breath sounds: Normal breath sounds.   Musculoskeletal:         General: Normal range of motion.      Cervical back: Normal range of motion and neck supple.   Skin:     General: Skin is warm and dry.   Neurological:      General: No focal deficit present.      Mental Status: She is alert and oriented to person, place, and time.   Psychiatric:         Mood and Affect: Mood normal.         Behavior: Behavior normal.         Assessment:       1. Subacute cough    2. Malignant neoplasm of female breast, unspecified estrogen receptor status, unspecified laterality, unspecified site of breast    3. Third degree AV block    4. Bacterial infection        Plan:         Subacute cough  -     X-Ray Chest PA And Lateral; Future; Expected date: 06/21/2024  -     benzonatate (TESSALON PERLES) 100 MG capsule; Take 1 capsule (100 mg total) by mouth 3 (three) times daily as needed for Cough.  Dispense: 30 capsule; Refill: 1  -     doxycycline (VIBRA-TABS) 100 MG tablet; Take 1 tablet (100 mg total) by mouth 2 (two) times daily.  Dispense: 20 tablet; Refill: 0  -     predniSONE (DELTASONE) 20 MG tablet; Take 1 tablet (20 mg total) by mouth once daily.  Dispense: 5 tablet; Refill: 0    Malignant neoplasm of female breast, unspecified estrogen receptor status, unspecified laterality, unspecified site of breast  Comments:  has been cancer free for over 10 years    Third degree AV block  Comments:  stable and followed by Dr West, cardiology,  appt in 2 weeks  [facility]    Bacterial infection  -     azelastine (ASTELIN) 137 mcg (0.1 %) nasal spray; 1 spray (137 mcg total) by Nasal route 2 (two) times daily.  Dispense: 18.2 mL; Refill: 5  -     fluticasone propionate (FLONASE) 50 mcg/actuation nasal spray; 1 spray  (50 mcg total) by Each Nostril route once daily.  Dispense: 16 g; Refill: 5        Risks, benefits, and side effects were discussed with the patient. All questions were answered to the fullest satisfaction of the patient, and pt verbalized understanding and agreement to treatment plan. Pt was to call with any new or worsening symptoms, or present to the ER.        Chacha Gutierrez MD

## 2024-07-10 ENCOUNTER — OFFICE VISIT (OUTPATIENT)
Facility: CLINIC | Age: 84
End: 2024-07-10
Payer: MEDICARE

## 2024-07-10 VITALS
WEIGHT: 122.81 LBS | DIASTOLIC BLOOD PRESSURE: 57 MMHG | SYSTOLIC BLOOD PRESSURE: 148 MMHG | TEMPERATURE: 97 F | HEART RATE: 86 BPM | HEIGHT: 63 IN | BODY MASS INDEX: 21.76 KG/M2 | RESPIRATION RATE: 17 BRPM

## 2024-07-10 DIAGNOSIS — Z85.3 HISTORY OF BILATERAL BREAST CANCER: Primary | ICD-10-CM

## 2024-07-10 PROCEDURE — 99999 PR PBB SHADOW E&M-EST. PATIENT-LVL V: CPT | Mod: PBBFAC,,, | Performed by: INTERNAL MEDICINE

## 2024-07-10 PROCEDURE — 99215 OFFICE O/P EST HI 40 MIN: CPT | Mod: PBBFAC,PN | Performed by: INTERNAL MEDICINE

## 2024-07-10 NOTE — LETTER
July 10, 2024        Chacha Gutierrez MD  [address] [place] [address]             [facility]  [address]  [place] [address]  Phone: [phone]  Fax: [phone]   Patient: Ursula Rodríguez   MR Number: [id number]   YOB: 1940   Date of Visit: 7/10/2024       Dear Dr. Gutierrez:    Thank you for referring Ursula Rodríguez to me for evaluation. Below are the relevant portions of my assessment and plan of care.            If you have questions, please do not hesitate to call me. I look forward to following Ursula along with you.    Sincerely,      STEFFI Garcia MD           CC    No Recipients

## 2024-07-11 ENCOUNTER — INFUSION (OUTPATIENT)
Dept: INFUSION THERAPY | Facility: HOSPITAL | Age: 84
End: 2024-07-11
Attending: FAMILY MEDICINE
Payer: MEDICARE

## 2024-07-11 VITALS
OXYGEN SATURATION: 99 % | DIASTOLIC BLOOD PRESSURE: 69 MMHG | RESPIRATION RATE: 16 BRPM | BODY MASS INDEX: 22.15 KG/M2 | WEIGHT: 125 LBS | HEART RATE: 72 BPM | SYSTOLIC BLOOD PRESSURE: 152 MMHG | TEMPERATURE: 98 F | HEIGHT: 63 IN

## 2024-07-11 DIAGNOSIS — Z85.3 HISTORY OF BILATERAL BREAST CANCER: Primary | ICD-10-CM

## 2024-07-11 PROCEDURE — 96523 IRRIG DRUG DELIVERY DEVICE: CPT

## 2024-07-11 RX ORDER — SODIUM CHLORIDE 0.9 % (FLUSH) 0.9 %
10 SYRINGE (ML) INJECTION
Status: DISCONTINUED | OUTPATIENT
Start: 2024-07-11 | End: 2024-07-11 | Stop reason: HOSPADM

## 2024-07-11 RX ORDER — HEPARIN 100 UNIT/ML
500 SYRINGE INTRAVENOUS
Status: DISCONTINUED | OUTPATIENT
Start: 2024-07-11 | End: 2024-07-11 | Stop reason: HOSPADM

## 2024-07-11 RX ORDER — SODIUM CHLORIDE 0.9 % (FLUSH) 0.9 %
10 SYRINGE (ML) INJECTION
OUTPATIENT
Start: 2024-07-11

## 2024-07-11 RX ORDER — HEPARIN 100 UNIT/ML
500 SYRINGE INTRAVENOUS
OUTPATIENT
Start: 2024-07-11

## 2024-07-11 NOTE — PROGRESS NOTES
[facility] [address] Hematology Oncology In Office Subsequent Encounter Note    7/10/24    Subjective:      Patient ID:   Ursula Rodríguez  84 y.o. female  1940  Chacha Gutierrez MD      Chief Complaint:   Breast cancer follow up    HPI:  84 y.o. female with diagnosis of R Breast cancer,2013.  R breast partial mastectomy followed by [facility].  Sentinal node +.  Stage II dx.  CTA x's 3/4, arimidex, Tamoxifen 2015.    Hx of L breast cancer , had partial mastectomy and reconstruction.    Was on Periactin, weight increased from 122 to 125#, off periactin now.  122# now.    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  .  On Eliquis 2.5 mg BID prophylaxis.    Previously she had decreased appetite, weight down 15#.  Began trial of Periactin 4 mg BID for appetite, weight gain.  Off periactin now 125#.  She stopped her periactin, says appetite is improved, her daughter agrees with her.    On Eliquis 2.5 mg 1 BID.  Refill antivert for vertigo sx prn.   Bone Density, osteoporosis.     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  Hx HTN, DM, GERD,cholesterol, DJD, CVA, TIA., thyroid dx, bradycardia.    Appendectomy, Partial hystectomy, L knee surgery several times, hernia repair, Tumor removed L ear drum .  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  S/P pacemaker placement    Allergy iodine, IVP dye.    Dad COPD, DM.  Mom Breast cancer, DM, increased HR.  Sister DM.  Brother DM, PVD.  Sister colon cancer, heart dx.  Sister Parkinson's Dx.  Daughter colon cancer.   of 64 years, recently passed away.  [facility].  86 y/o.    [id number]  4.2 cm , multifocal DCIS.   LN +.  M3    New port placed for lab draws.    ROS:   GEN: normal without any fever, night sweats or weight loss  HEENT: See HPI  CV: normal with no CP, SOB, PND, BUSTOS or orthopnea  PULM: normal with no SOB, cough, hemoptysis, sputum or pleuritic pain  GI: normal with no abdominal pain, nausea, vomiting, constipation, diarrhea, melanotic stools, BRBPR, or hematemesis  : normal  with no hematuria, dysuria  BREAST: See HPI  SKIN: normal with no rash, erythema, bruising, or swelling     Past Medical History:   Diagnosis Date    Anemia 1995    Breast cancer 1995    LEFT DIAGNOSED FIRST    Breast cancer 2013    Right     Diabetes mellitus     Factor V Leiden 1994    Hyperlipidemia 2017    Hypertension 2017    Hypothyroidism 2019    Osteoporosis     UNKNOWN DATE OF DX    Pacemaker 10/28/2021    Recurrent urinary tract infection     Status post insertion of nerve stimulator 2024    Vertigo      Past Surgical History:   Procedure Laterality Date    BREAST SURGERY  , , Nose cancer    CARDIAC VALVE REPLACEMENT       SECTION      EYE SURGERY      HEMORRHOID SURGERY      HERNIA REPAIR      HYSTERECTOMY  1970    INSERTION OF PACEMAKER      INSERTION OF TUNNELED CENTRAL VENOUS CATHETER (CVC) WITH SUBCUTANEOUS PORT N/A 2023    Procedure: [id number];  Surgeon: Carl Conrad MD;  Location: [facility] OR;  Service: General;  Laterality: N/A;    MASTECTOMY Right     MASTECTOMY Left     BREAST CANCER    MEDIPORT REMOVAL Left 2023    Procedure: REMOVAL, CATHETER, CENTRAL VENOUS, TUNNELED, WITH PORT;  Surgeon: Carl Conrad MD;  Location: [facility] OR;  Service: General;  Laterality: Left;    PORTACATH PLACEMENT      TUMOR REMOVAL Left     EAR       Review of patient's allergies indicates:   Allergen Reactions    Iodine and iodide containing products     Ditropan [oxybutynin chloride] Palpitations and Other (See Comments)     Made patient weak     Social History     Socioeconomic History    Marital status:     Highest education level: Master's degree (e.g., MA, MS, [place], MEd, MSW, ANDRE)   Occupational History    Occupation: Phd x 3-   Tobacco Use    Smoking status: Never    Smokeless tobacco: Never   Substance and Sexual Activity    Alcohol use: No     Comment: SPECIAL OCCASIONS LIKE ZACHERY    Drug use: No    Sexual  activity: Not Currently     Partners: Male     Social Determinants of Health     Financial Resource Strain: Medium Risk (12/28/2023)    Overall Financial Resource Strain (CARDIA)     Difficulty of Paying Living Expenses: Somewhat hard   Food Insecurity: No Food Insecurity (12/28/2023)    Hunger Vital Sign     Worried About Running Out of Food in the Last Year: Never true     Ran Out of Food in the Last Year: Never true   Transportation Needs: No Transportation Needs (12/28/2023)    PRAPARE - Transportation     Lack of Transportation (Medical): No     Lack of Transportation (Non-Medical): No   Physical Activity: Insufficiently Active (12/28/2023)    Exercise Vital Sign     Days of Exercise per Week: 7 days     Minutes of Exercise per Session: 20 min   Stress: No Stress Concern Present (12/28/2023)    [employer] - Occupational Stress Questionnaire     Feeling of Stress : Only a little   Housing Stability: Low Risk  (12/28/2023)    Housing Stability Vital Sign     Unable to Pay for Housing in the Last Year: No     Number of Places Lived in the Last Year: 1     Unstable Housing in the Last Year: No         Current Outpatient Medications:     alendronate (FOSAMAX) 70 MG tablet, Take 1 tablet (70 mg total) by mouth every 7 days., Disp: 13 tablet, Rfl: 3    apixaban (ELIQUIS) 2.5 mg Tab, Take 1 tablet (2.5 mg total) by mouth 2 (two) times daily., Disp: 180 tablet, Rfl: 3    azelastine (ASTELIN) 137 mcg (0.1 %) nasal spray, 1 spray (137 mcg total) by Nasal route 2 (two) times daily., Disp: 18.2 mL, Rfl: 5    cloNIDine (CATAPRES) 0.1 MG tablet, Take 1 tablet (0.1 mg total) by mouth 2 (two) times daily as needed (BP > 160/90)., Disp: 180 tablet, Rfl: 3    cyanocobalamin (VITAMIN B-12) 1000 MCG tablet, Take 100 mcg by mouth once daily., Disp: , Rfl:     fluticasone propionate (FLONASE) 50 mcg/actuation nasal spray, 1 spray (50 mcg total) by Each Nostril route once daily., Disp: 16 g, Rfl: 5     levothyroxine (SYNTHROID) 50 MCG tablet, Take 1 tablet (50 mcg total) by mouth once daily., Disp: 90 tablet, Rfl: 3    losartan (COZAAR) 25 MG tablet, Take 0.5 tablets (12.5 mg total) by mouth once daily., Disp: 45 tablet, Rfl: 3    magnesium oxide (MAG-OX) 400 mg (241.3 mg magnesium) tablet, Take 400 mg by mouth once daily., Disp: , Rfl:     meclizine (ANTIVERT) 25 mg tablet, TAKE 1 TABLET(25 MG) BY MOUTH THREE TIMES DAILY AS NEEDED FOR DIZZINESS, Disp: 90 tablet, Rfl: 1    metFORMIN (GLUCOPHAGE-XR) 500 MG ER 24hr tablet, TAKE 1 TABLET BY MOUTH EVERY MORNING WITH FOOD FOR 2 WEEKS THEN INCREASE TO 1 TABLET BY MOUTH TWICE DAILY WITH FOOD, Disp: 180 tablet, Rfl: 3    metoprolol tartrate (LOPRESSOR) 25 MG tablet, Take 0.5 tablets (12.5 mg total) by mouth 2 (two) times daily., Disp: 90 tablet, Rfl: 1    multivitamin capsule, Take 1 capsule by mouth once daily., Disp: , Rfl:     NIFEdipine (PROCARDIA-XL) 30 MG (OSM) 24 hr tablet, Take 1 tablet (30 MG ) by mouth twice daily, Disp: 180 tablet, Rfl: 3    predniSONE (DELTASONE) 20 MG tablet, Take 1 tablet (20 mg total) by mouth once daily., Disp: 5 tablet, Rfl: 0    sertraline (ZOLOFT) 25 MG tablet, Take 1 tablet (25 mg total) by mouth once daily., Disp: 90 tablet, Rfl: 3    simvastatin (ZOCOR) 20 MG tablet, Take 1 tablet (20 mg total) by mouth every evening., Disp: 90 tablet, Rfl: 3    spironolactone (ALDACTONE) 25 MG tablet, Take 1 tablet (25 mg total) by mouth once daily., Disp: 90 tablet, Rfl: 3    cyproheptadine (PERIACTIN) 4 mg tablet, Take 1 tablet (4 mg total) by mouth 2 (two) times daily with meals. (Patient not taking: Reported on 7/10/2024), Disp: 180 tablet, Rfl: 3    doxycycline (VIBRA-TABS) 100 MG tablet, Take 1 tablet (100 mg total) by mouth 2 (two) times daily. (Patient not taking: Reported on 7/10/2024), Disp: 20 tablet, Rfl: 0    ERGOCALCIFEROL, VITAMIN D2, ORAL, Take by mouth. (Patient not taking: Reported on 6/21/2024), Disp: , Rfl:           Objective:  "  Vitals:  Blood pressure (!) 148/57, pulse 86, temperature 97.2 °F (36.2 °C), resp. rate 17, height 5' 3" (1.6 m), weight 55.7 kg (122 lb 12.8 oz).    Physical Examination:   GEN: no apparent distress, comfortable  HEAD: atraumatic and normocephalic  EYES: no pallor, no icterus  ENT:  no pharyngeal erythema, external ears WNL; no nasal discharge  NECK: no masses, thyroid normal, trachea midline, no LAD/LN's, supple  CV: RRR with no murmur; normal pulse; normal S1 and S2; no pedal edema  CHEST: Normal respiratory effort; CTAB; normal breath sounds; no wheeze or crackles  ABDOM: nontender and nondistended; soft;  no rebound/guarding  MUSC/Skeletal: ROM normal; no crepitus; joints normal; no deformities   EXTREM: multiple spider veins L & R leg  SKIN: multiple ecchemoses on forearms, poor skin turgor.  : no cvat  NEURO: grossly intact; motor/sensory WNL;  no tremors  PSYCH: normal mood, affect and behavior  LYMPH: normal cervical, supraclavicular, axillary and groin LN's  BREASTS: L 'breast" post op change, without palpable mass.  R chest NT, post operative change, no palpable mass    CAT head small vessel dx.  MRI of L/S spine DDD, DJD.    Current studies:  Bone Scan, no metastases seen.  But intense uptake at R foot.  Concern for occult fx or osteomyelitis, she is following up with Dr. Almaguer on this.    CT of chest abdomin and pelvis, multiple 2-3 mm nodules in lungs, no previous CT for comparison.  Significance?  Will plan to repeat CT of chest in 3 months.  No definitive metastatic dx seen.    B 12 372.  Assessment:   (1) 84 y.o. female with diagnosis of  Bilateral breast cancer, S/P treatment as above.  CISCO.  Observe for now.     (2)Weight loss 15#, decreased appetite.  Periactin 4 mg BID.  Now weight better, up to 122#.  She stopped periactin.    (3)S/P pacemaker placement for bradycardia.    (4)Hx TIA, F5L, on Eliquis 2.5 mg prophylaxis long term.    RTC 6 months.  Can cancel.  RTC 1 year.             "

## 2024-07-30 DIAGNOSIS — Z00.00 ENCOUNTER FOR MEDICARE ANNUAL WELLNESS EXAM: ICD-10-CM

## 2024-08-08 DIAGNOSIS — I10 ESSENTIAL HYPERTENSION: ICD-10-CM

## 2024-08-08 RX ORDER — SPIRONOLACTONE 25 MG/1
25 TABLET ORAL DAILY
Qty: 90 TABLET | Refills: 3 | Status: SHIPPED | OUTPATIENT
Start: 2024-08-08

## 2024-08-08 RX ORDER — MECLIZINE HYDROCHLORIDE 25 MG/1
25 TABLET ORAL 2 TIMES DAILY PRN
Qty: 90 TABLET | Refills: 1 | Status: SHIPPED | OUTPATIENT
Start: 2024-08-08

## 2024-08-13 ENCOUNTER — OFFICE VISIT (OUTPATIENT)
Dept: FAMILY MEDICINE | Facility: CLINIC | Age: 84
End: 2024-08-13
Payer: MEDICARE

## 2024-08-13 VITALS
BODY MASS INDEX: 22.6 KG/M2 | RESPIRATION RATE: 14 BRPM | HEIGHT: 62 IN | SYSTOLIC BLOOD PRESSURE: 126 MMHG | HEART RATE: 73 BPM | OXYGEN SATURATION: 98 % | WEIGHT: 122.81 LBS | DIASTOLIC BLOOD PRESSURE: 62 MMHG

## 2024-08-13 DIAGNOSIS — I10 ESSENTIAL HYPERTENSION: ICD-10-CM

## 2024-08-13 DIAGNOSIS — M25.661 DECREASED RANGE OF MOTION OF BOTH KNEES: Primary | ICD-10-CM

## 2024-08-13 DIAGNOSIS — D68.59 HYPERCOAGULOPATHY: ICD-10-CM

## 2024-08-13 DIAGNOSIS — M25.662 DECREASED RANGE OF MOTION OF BOTH KNEES: Primary | ICD-10-CM

## 2024-08-13 PROCEDURE — 99214 OFFICE O/P EST MOD 30 MIN: CPT | Mod: S$PBB,,, | Performed by: FAMILY MEDICINE

## 2024-08-13 PROCEDURE — 99214 OFFICE O/P EST MOD 30 MIN: CPT | Mod: PBBFAC | Performed by: FAMILY MEDICINE

## 2024-08-13 PROCEDURE — 99999 PR PBB SHADOW E&M-EST. PATIENT-LVL IV: CPT | Mod: PBBFAC,,, | Performed by: FAMILY MEDICINE

## 2024-08-13 PROCEDURE — G2211 COMPLEX E/M VISIT ADD ON: HCPCS | Mod: S$PBB,,, | Performed by: FAMILY MEDICINE

## 2024-08-13 NOTE — PROGRESS NOTES
Subjective:       Patient ID: Ursula Rodríguez is a 84 y.o. female.    Chief Complaint: Follow-up (3 month)      Past Medical History:   Diagnosis Date    Anemia 1995    Breast cancer 1995    LEFT DIAGNOSED FIRST    Breast cancer 2013    Right     Diabetes mellitus     Factor V Leiden     Hyperlipidemia 2017    Hypertension 2017    Hypothyroidism 2019    Osteoporosis     UNKNOWN DATE OF DX    Pacemaker 10/28/2021    Recurrent urinary tract infection     Status post insertion of nerve stimulator 2024    Vertigo        Past Surgical History:   Procedure Laterality Date    BREAST SURGERY  , , Nose cancer    CARDIAC VALVE REPLACEMENT       SECTION      EYE SURGERY      HEMORRHOID SURGERY  1968    HERNIA REPAIR      HYSTERECTOMY      INSERTION OF PACEMAKER      INSERTION OF TUNNELED CENTRAL VENOUS CATHETER (CVC) WITH SUBCUTANEOUS PORT N/A 2023    Procedure: [id number];  Surgeon: Carl Conrad MD;  Location: [facility] OR;  Service: General;  Laterality: N/A;    MASTECTOMY Right     MASTECTOMY Left     BREAST CANCER    MEDIPORT REMOVAL Left 2023    Procedure: REMOVAL, CATHETER, CENTRAL VENOUS, TUNNELED, WITH PORT;  Surgeon: Carl Conrad MD;  Location: [facility] OR;  Service: General;  Laterality: Left;    PORTACATH PLACEMENT  2013    TUMOR REMOVAL Left     EAR        Social History     Socioeconomic History    Marital status:     Highest education level: Master's degree (e.g., MA, MS, [place], MEd, MSW, ANDRE)   Occupational History    Occupation: Phd x 3-   Tobacco Use    Smoking status: Never    Smokeless tobacco: Never   Substance and Sexual Activity    Alcohol use: No     Comment: SPECIAL OCCASIONS LIKE ZACHERY    Drug use: No    Sexual activity: Not Currently     Partners: Male     Social Determinants of Health     Financial Resource Strain: Medium Risk (2023)    Overall Financial Resource Strain (CARDIA)     Difficulty of  Paying Living Expenses: Somewhat hard   Food Insecurity: No Food Insecurity (12/28/2023)    Hunger Vital Sign     Worried About Running Out of Food in the Last Year: Never true     Ran Out of Food in the Last Year: Never true   Transportation Needs: No Transportation Needs (12/28/2023)    PRAPARE - Transportation     Lack of Transportation (Medical): No     Lack of Transportation (Non-Medical): No   Physical Activity: Insufficiently Active (12/28/2023)    Exercise Vital Sign     Days of Exercise per Week: 7 days     Minutes of Exercise per Session: 20 min   Stress: No Stress Concern Present (12/28/2023)    [employer] [place] [employer] - Occupational Stress Questionnaire     Feeling of Stress : Only a little   Housing Stability: Low Risk  (12/28/2023)    Housing Stability Vital Sign     Unable to Pay for Housing in the Last Year: No     Number of Places Lived in the Last Year: 1     Unstable Housing in the Last Year: No       Family History   Problem Relation Name Age of Onset    Cancer Mother [place]     Hypertension Mother Lena     Heart disease Mother Lena     No Known Problems Sister Ashley     Diabetes Brother Pratik     Hypertension Brother Pratik     Cancer Maternal Grandmother Mammie     Lung disease Father August     Heart disease Father August     Diabetes Father August     Cancer Brother Jameel     Diabetes Brother Jameel     Hypertension Brother Krunal     Stroke Brother Krunal     Hypertension Brother Say     Stroke Brother Say        Review of patient's allergies indicates:   Allergen Reactions    Iodine Other (See Comments)    Iodine and iodide containing products     Ditropan [oxybutynin chloride] Palpitations and Other (See Comments)     Made patient weak          Current Outpatient Medications:     alendronate (FOSAMAX) 70 MG tablet, Take 1 tablet (70 mg total) by mouth every 7 days., Disp: 13 tablet, Rfl: 3    apixaban (ELIQUIS) 2.5 mg Tab, Take 1 tablet (2.5 mg total) by mouth 2  (two) times daily., Disp: 180 tablet, Rfl: 3    azelastine (ASTELIN) 137 mcg (0.1 %) nasal spray, 1 spray (137 mcg total) by Nasal route 2 (two) times daily., Disp: 18.2 mL, Rfl: 5    cloNIDine (CATAPRES) 0.1 MG tablet, Take 1 tablet (0.1 mg total) by mouth 2 (two) times daily as needed (BP > 160/90)., Disp: 180 tablet, Rfl: 3    cyanocobalamin (VITAMIN B-12) 1000 MCG tablet, Take 100 mcg by mouth once daily., Disp: , Rfl:     fluticasone propionate (FLONASE) 50 mcg/actuation nasal spray, 1 spray (50 mcg total) by Each Nostril route once daily., Disp: 16 g, Rfl: 5    levothyroxine (SYNTHROID) 50 MCG tablet, Take 1 tablet (50 mcg total) by mouth once daily., Disp: 90 tablet, Rfl: 3    losartan (COZAAR) 25 MG tablet, Take 0.5 tablets (12.5 mg total) by mouth once daily., Disp: 45 tablet, Rfl: 3    magnesium oxide (MAG-OX) 400 mg (241.3 mg magnesium) tablet, Take 400 mg by mouth once daily., Disp: , Rfl:     meclizine (ANTIVERT) 25 mg tablet, Take 1 tablet (25 mg total) by mouth 2 (two) times daily as needed for Dizziness or Nausea., Disp: 90 tablet, Rfl: 1    metFORMIN (GLUCOPHAGE-XR) 500 MG ER 24hr tablet, TAKE 1 TABLET BY MOUTH EVERY MORNING WITH FOOD FOR 2 WEEKS THEN INCREASE TO 1 TABLET BY MOUTH TWICE DAILY WITH FOOD, Disp: 180 tablet, Rfl: 3    metoprolol tartrate (LOPRESSOR) 25 MG tablet, Take 0.5 tablets (12.5 mg total) by mouth 2 (two) times daily., Disp: 90 tablet, Rfl: 1    multivitamin capsule, Take 1 capsule by mouth once daily., Disp: , Rfl:     NIFEdipine (PROCARDIA-XL) 30 MG (OSM) 24 hr tablet, Take 1 tablet (30 MG ) by mouth twice daily, Disp: 180 tablet, Rfl: 3    sertraline (ZOLOFT) 25 MG tablet, Take 1 tablet (25 mg total) by mouth once daily., Disp: 90 tablet, Rfl: 3    simvastatin (ZOCOR) 20 MG tablet, Take 1 tablet (20 mg total) by mouth every evening., Disp: 90 tablet, Rfl: 3    spironolactone (ALDACTONE) 25 MG tablet, Take 1 tablet (25 mg total) by mouth once daily., Disp: 90 tablet, Rfl: 3     predniSONE (DELTASONE) 20 MG tablet, Take 1 tablet (20 mg total) by mouth once daily. (Patient not taking: Reported on 8/13/2024), Disp: 5 tablet, Rfl: 0    Ms Rodríguez is here today for a routine medical exam. She has an appointment with Dr Arana, Neurologist, In [place] , today at 12:30 for a cognitive exam. She had a CT done in 05/2024 and the results are as follows:    etails      Reading Physician Reading Date Result Priority  Kailash Acosta MD  [phone] 5/16/2024 Routine    Narrative & Impression  EXAMINATION:  CT HEAD WITHOUT CONTRAST     CLINICAL HISTORY:  Mental status change, unknown cause; Other amnesia     TECHNIQUE:  Low dose axial images were obtained through the head.  Coronal and sagittal reformations were also performed. Contrast was not administered.     COMPARISON:  CT 01/03/2023.     FINDINGS:  There is no acute hemorrhage or infarction.  There is cortical atrophy.  There are periventricular deep white matter changes consistent with chronic small vessel ischemic disease.  Small remote lacunar infarcts of the basal ganglia.  Benign basal ganglia calcification.     No extra-axial fluid collections.  Ventricles are normal in size, shape and configuration.  The basal cisterns are patent.  The imaged paranasal sinuses and ethmoid air cells are well aerated.  The mastoid air cells and middle ears are normally pneumatized.     Impression:  Cortical atrophy with periventricular deep white matter change consistent with chronic small vessel ischemic disease.  Small lacunar infarcts of the basal ganglia.    Electronically signed by:Kailash Acosta  Date:                                            05/16/2024  Time:                                           09:40  Exam Ended: 05/16/24 09:35 CDT Last Resulted: 05/16/24 09:40 CDT    She also has chronic bilateral knee pain and is followed by Dr Stubbs and has 2 stimulators placed in each knee.    She has ringing in the ears and is Followed by Dr Floyd,  ENT, ([facility]). 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    She was on vit D but her last level was 96, so it was discontinued                 Review of Systems   HENT:  Positive for postnasal drip.         Tinnitis in the Left ear   Musculoskeletal:  Positive for arthralgias and myalgias.        Knee pain       Objective:      Physical Exam  Constitutional:       Appearance: Normal appearance.   HENT:      Nose: Nose normal.      Mouth/Throat:      Mouth: Mucous membranes are moist.   Eyes:      Extraocular Movements: Extraocular movements intact.      Pupils: Pupils are equal, round, and reactive to light.   Cardiovascular:      Rate and Rhythm: Normal rate and regular rhythm.   Pulmonary:      Effort: Pulmonary effort is normal.      Breath sounds: Normal breath sounds.   Musculoskeletal:      Comments: Ambulates with rollorator   Skin:     General: Skin is warm.   Neurological:      General: No focal deficit present.      Mental Status: She is alert and oriented to person, place, and time.   Psychiatric:         Mood and Affect: Mood normal.         Behavior: Behavior normal.         Assessment:       1. Decreased range of motion of both knees    2. Essential hypertension    3. Hypercoagulopathy        Plan:         Decreased range of motion of both knees  Comments:  Followed by Dr Stubbs, has stimulators in both knees    Essential hypertension  Comments:  well controlled    Hypercoagulopathy  Comments:  On eliquis, will give CAD [facility] drug store number to check for price        Risks, benefits, and side effects were discussed with the patient. All questions were answered to the fullest satisfaction of the patient, and pt verbalized understanding and agreement to treatment plan. Pt was to call with any new or worsening symptoms, or present to the ER.        Chacha Gutierrez MD

## 2024-08-22 ENCOUNTER — INFUSION (OUTPATIENT)
Dept: INFUSION THERAPY | Facility: HOSPITAL | Age: 84
End: 2024-08-22
Attending: FAMILY MEDICINE
Payer: MEDICARE

## 2024-08-22 VITALS
HEART RATE: 85 BPM | DIASTOLIC BLOOD PRESSURE: 72 MMHG | TEMPERATURE: 98 F | BODY MASS INDEX: 22.45 KG/M2 | SYSTOLIC BLOOD PRESSURE: 161 MMHG | WEIGHT: 122 LBS | HEIGHT: 62 IN | OXYGEN SATURATION: 97 %

## 2024-08-22 DIAGNOSIS — Z85.3 HISTORY OF BILATERAL BREAST CANCER: Primary | ICD-10-CM

## 2024-08-22 PROCEDURE — A4216 STERILE WATER/SALINE, 10 ML: HCPCS | Performed by: NURSE PRACTITIONER

## 2024-08-22 PROCEDURE — 96523 IRRIG DRUG DELIVERY DEVICE: CPT

## 2024-08-22 PROCEDURE — 63600175 PHARM REV CODE 636 W HCPCS: Performed by: NURSE PRACTITIONER

## 2024-08-22 PROCEDURE — 25000003 PHARM REV CODE 250: Performed by: NURSE PRACTITIONER

## 2024-08-22 RX ORDER — HEPARIN 100 UNIT/ML
500 SYRINGE INTRAVENOUS
Status: DISCONTINUED | OUTPATIENT
Start: 2024-08-22 | End: 2024-08-22 | Stop reason: HOSPADM

## 2024-08-22 RX ORDER — HEPARIN 100 UNIT/ML
500 SYRINGE INTRAVENOUS
OUTPATIENT
Start: 2024-08-22

## 2024-08-22 RX ORDER — SODIUM CHLORIDE 0.9 % (FLUSH) 0.9 %
10 SYRINGE (ML) INJECTION
OUTPATIENT
Start: 2024-08-22

## 2024-08-22 RX ORDER — SODIUM CHLORIDE 0.9 % (FLUSH) 0.9 %
10 SYRINGE (ML) INJECTION
Status: DISCONTINUED | OUTPATIENT
Start: 2024-08-22 | End: 2024-08-22 | Stop reason: HOSPADM

## 2024-08-22 RX ADMIN — HEPARIN 500 UNITS: 100 SYRINGE at 08:08

## 2024-08-22 RX ADMIN — Medication 10 ML: at 08:08

## 2024-08-22 NOTE — PLAN OF CARE
Problem: Adult Inpatient Plan of Care  Goal: Optimal Comfort and Wellbeing  Outcome: Progressing  Intervention: Monitor Pain and Promote Comfort  Flowsheets (Taken 8/22/2024 0824)  Pain Management Interventions:   quiet environment facilitated   relaxation techniques promoted  Intervention: Provide Person-Centered Care  Flowsheets (Taken 8/22/2024 0824)  Trust Relationship/Rapport:   reassurance provided   care explained   thoughts/feelings acknowledged   choices provided   emotional support provided   empathic listening provided   questions answered   questions encouraged

## 2024-09-17 ENCOUNTER — HOSPITAL ENCOUNTER (OUTPATIENT)
Facility: HOSPITAL | Age: 84
Discharge: HOME | End: 2024-09-17
Attending: ANESTHESIOLOGY | Admitting: ANESTHESIOLOGY
Payer: MEDICARE

## 2024-09-17 VITALS
BODY MASS INDEX: 22.31 KG/M2 | OXYGEN SATURATION: 98 % | DIASTOLIC BLOOD PRESSURE: 66 MMHG | WEIGHT: 121.25 LBS | HEART RATE: 86 BPM | TEMPERATURE: 98 F | RESPIRATION RATE: 13 BRPM | SYSTOLIC BLOOD PRESSURE: 141 MMHG | HEIGHT: 62 IN

## 2024-09-17 DIAGNOSIS — G89.29 CHRONIC PAIN OF LEFT KNEE: ICD-10-CM

## 2024-09-17 DIAGNOSIS — M25.562 CHRONIC PAIN OF LEFT KNEE: ICD-10-CM

## 2024-09-17 DIAGNOSIS — M17.12 PRIMARY OSTEOARTHRITIS OF LEFT KNEE: Primary | ICD-10-CM

## 2024-09-17 LAB — POCT GLUCOSE: 112 MG/DL (ref 70–110)

## 2024-09-17 PROCEDURE — 99152 MOD SED SAME PHYS/QHP 5/>YRS: CPT | Performed by: ANESTHESIOLOGY

## 2024-09-17 PROCEDURE — 64454 NJX AA&/STRD GNCLR NRV BRNCH: CPT | Mod: LT | Performed by: ANESTHESIOLOGY

## 2024-09-17 PROCEDURE — 25000003 PHARM REV CODE 250: Performed by: ANESTHESIOLOGY

## 2024-09-17 PROCEDURE — 63600175 PHARM REV CODE 636 W HCPCS: Performed by: ANESTHESIOLOGY

## 2024-09-17 RX ORDER — MIDAZOLAM HYDROCHLORIDE 5 MG/ML
INJECTION INTRAMUSCULAR; INTRAVENOUS
Status: DISCONTINUED | OUTPATIENT
Start: 2024-09-17 | End: 2024-09-17 | Stop reason: HOSPADM

## 2024-09-17 RX ORDER — BUPIVACAINE HYDROCHLORIDE 5 MG/ML
INJECTION, SOLUTION EPIDURAL; INTRACAUDAL
Status: DISCONTINUED | OUTPATIENT
Start: 2024-09-17 | End: 2024-09-17 | Stop reason: HOSPADM

## 2024-09-17 RX ORDER — LIDOCAINE HYDROCHLORIDE 10 MG/ML
INJECTION, SOLUTION INFILTRATION; PERINEURAL
Status: DISCONTINUED | OUTPATIENT
Start: 2024-09-17 | End: 2024-09-17 | Stop reason: HOSPADM

## 2024-09-17 RX ORDER — SODIUM CHLORIDE, SODIUM LACTATE, POTASSIUM CHLORIDE, CALCIUM CHLORIDE 600; 310; 30; 20 MG/100ML; MG/100ML; MG/100ML; MG/100ML
INJECTION, SOLUTION INTRAVENOUS CONTINUOUS
Status: DISCONTINUED | OUTPATIENT
Start: 2024-09-17 | End: 2024-09-17 | Stop reason: HOSPADM

## 2024-09-17 RX ADMIN — SODIUM CHLORIDE, POTASSIUM CHLORIDE, SODIUM LACTATE AND CALCIUM CHLORIDE: 600; 310; 30; 20 INJECTION, SOLUTION INTRAVENOUS at 10:09

## 2024-09-17 NOTE — H&P
"FOLLOW UP NOTE:     CHIEF COMPLAINT: knee pain    INTERVAL HISTORY OF PRESENT ILLNESS: Ursula Rodríguez is a 84 y.o. female who presents for GENICULAR NERVE BLOCK LEFT KNEE X3 PERIPHERAL . The patient denies of any significant changes in her health since her last appointment. The patient also denies of any changes in the character of her pain since her last appointment.     ROS:  Review of Systems   Constitutional:  Negative for chills and fever.   HENT:  Negative for sore throat.    Eyes:  Negative for visual disturbance.   Respiratory:  Negative for shortness of breath.    Cardiovascular:  Negative for chest pain.   Gastrointestinal:  Negative for nausea and vomiting.   Genitourinary:  Negative for difficulty urinating.   Musculoskeletal:  Positive for arthralgias.   Skin:  Negative for rash.   Allergic/Immunologic: Negative for immunocompromised state.   Neurological:  Negative for syncope.   Hematological:  Does not bruise/bleed easily.   Psychiatric/Behavioral:  Negative for suicidal ideas.         MEDICAL, SURGICAL, FAMILY, SOCIAL HX: reviewed    MEDICATIONS/ALLERGIES: reviewed    PHYSICAL EXAM:    VITALS: Vitals reviewed.   Vitals:    09/17/24 1004   BP: (!) 150/66   Pulse: 96   Resp: 18   Temp: 97.2 °F (36.2 °C)   TempSrc: Temporal   SpO2: 95%   Weight: 55 kg (121 lb 4.1 oz)   Height: 5' 2" (1.575 m)       Physical Exam  Vitals and nursing note reviewed.   Constitutional:       Appearance: Normal appearance. She is not toxic-appearing or diaphoretic.   HENT:      Head: Normocephalic and atraumatic.   Eyes:      General:         Right eye: No discharge.         Left eye: No discharge.      Extraocular Movements: Extraocular movements intact.      Conjunctiva/sclera: Conjunctivae normal.   Cardiovascular:      Rate and Rhythm: Normal rate.   Pulmonary:      Effort: Pulmonary effort is normal. No respiratory distress.      Breath sounds: Normal breath sounds.   Abdominal:      Palpations: Abdomen is soft. "   Skin:     General: Skin is warm and dry.      Findings: No rash.   Neurological:      Mental Status: She is alert and oriented to person, place, and time.   Psychiatric:         Mood and Affect: Mood and affect normal.         Cognition and Memory: Memory normal.         Judgment: Judgment normal.          EXTREMITIES:    Gen: No cyanosis, edema, varicosities, or tenderness to palpation BLE   Skin: Warm, pink, dry, no rashes, no lesions BLE   Strength: 5/5 motor strength BLE   ROM: hips, knees and ankles without pain or instability.     NEUROLOGICAL:    Gen: No clonus or spasticity.   Gait: Normal without antalgic lean   DTR's: 2+ in bilateral patellar, and ankle   BABINSKI: Absent bilaterally  Sensory: Intact to light touch and proprioception BLE    ASSESSMENT: Ursula Rodríguez is a 84 y.o. female who presents for GENICULAR NERVE BLOCK LEFT KNEE X3 PERIPHERAL .     PLAN:  Proceed with GENICULAR NERVE BLOCK LEFT KNEE X3 PERIPHERAL  as previously discussed.    This patient has been cleared for surgery in an ambulatory surgical facility    ASA 3,  Mallampatti Score 3  No history of anesthetic complications  Plan for RN IV sedation    Serene Stubbs MD  Pain Management

## 2024-09-17 NOTE — DISCHARGE INSTRUCTIONS
[facility] [phone]  [facility] [phone]    You may remove bandage in 24 hours. Do not soak in any water for 48 hours.

## 2024-09-17 NOTE — DISCHARGE SUMMARY
[facility] Surgery  Discharge Note  Short Stay    Procedure(s) (LRB):  GENICULAR NERVE BLOCK LEFT KNEE X3 PERIPHERAL (Left)      OUTCOME: Patient tolerated treatment/procedure well without complication and is now ready for discharge.    DISPOSITION: Home or Self Care    FINAL DIAGNOSIS: Chronic left knee pain    FOLLOWUP: In clinic    DISCHARGE INSTRUCTIONS:    Discharge Procedure Orders   Diet general     Call MD for:  temperature >100.4     Call MD for:  persistent nausea and vomiting     Call MD for:  severe uncontrolled pain     Call MD for:  difficulty breathing, headache or visual disturbances     Call MD for:  redness, tenderness, or signs of infection (pain, swelling, redness, odor or green/yellow discharge around incision site)     Call MD for:  hives     Call MD for:  persistent dizziness or light-headedness     Call MD for:  extreme fatigue        TIME SPENT ON DISCHARGE: 30 minutes

## 2024-09-17 NOTE — OP NOTE
PROCEDURE DATE: 9/17/2024    PROCEDURE: Superior lateral, Superior medial and Inferior medial genicular nerve blocks, left-sided    Diagnosis: Chronic left knee pain     Post Op Diagnosis: Same     PHYSICIAN: Serene Stubbs M.D.     LOCAL ANESTHESIA: Lidocaine 1%, 3 ml total.     MEDICATION INJECTED: 0.5% bupivacaine 1ml at each nerve     SEDATION MEDICATIONS: RN IV sedation    COMPLICATIONS: None     ESTIMATED BLOOD LOSS: None     TECHNIQUE: A time-out was taken to identify patient and procedure side prior to starting procedure.   With the patient laying supine and the knees semi-flexed, the left knee was prepped and draped in the usual sterile fashion using ChloraPrep and a fenestrated drape. Knee joint line was determined under fluoroscopic guidance. The targets included the superior lateral (SL), superior medial (SM) and inferior medial (IM) genicular nerves which past periosteal areas connecting the shaft of the femur to bilateral epicondyles and the shaft of tibia to the medial epicondyle. The local anesthetic was given using a 25-gauge 1.5 inch needle. 3.5 in 25 g spinal needed was introduced into each target area. After negative aspiration for blood, the medication was then injected. The patient tolerated the procedure well.     If found to have greater than a 50% recovery and so will be scheduled for a radiofrequency ablation of the corresponding nerves.     Patient was given post procedure and discharge instructions to follow at home. The patient was discharged in a stable condition

## 2024-09-19 ENCOUNTER — PATIENT MESSAGE (OUTPATIENT)
Dept: FAMILY MEDICINE | Facility: CLINIC | Age: 84
End: 2024-09-19
Payer: MEDICARE

## 2024-09-20 ENCOUNTER — TELEPHONE (OUTPATIENT)
Dept: FAMILY MEDICINE | Facility: CLINIC | Age: 84
End: 2024-09-20
Payer: MEDICARE

## 2024-09-20 NOTE — TELEPHONE ENCOUNTER
----- Message from Alpa Oro sent at 9/20/2024 10:52 AM CDT -----  Contact: self  Type:  Needs Medical Advice    Who Called: Sharda STEPHEN Nurse  Symptoms (please be specific): caller is requesting a referral for physical therapy with home health and medication management, sts daughter wanted  her to have it.  Would the patient rather a call back or a response via [facility]? call  Best Call Back Number:   Additional Information: please advise and thank you.

## 2024-09-26 ENCOUNTER — INFUSION (OUTPATIENT)
Dept: INFUSION THERAPY | Facility: HOSPITAL | Age: 84
End: 2024-09-26
Attending: FAMILY MEDICINE
Payer: MEDICARE

## 2024-09-26 VITALS
OXYGEN SATURATION: 99 % | SYSTOLIC BLOOD PRESSURE: 118 MMHG | DIASTOLIC BLOOD PRESSURE: 74 MMHG | TEMPERATURE: 97 F | HEIGHT: 62 IN | BODY MASS INDEX: 22.31 KG/M2 | WEIGHT: 121.25 LBS | RESPIRATION RATE: 16 BRPM | HEART RATE: 79 BPM

## 2024-09-26 DIAGNOSIS — Z85.3 HISTORY OF BILATERAL BREAST CANCER: Primary | ICD-10-CM

## 2024-09-26 PROCEDURE — 96523 IRRIG DRUG DELIVERY DEVICE: CPT

## 2024-09-26 PROCEDURE — 63600175 PHARM REV CODE 636 W HCPCS: Performed by: FAMILY MEDICINE

## 2024-09-26 RX ORDER — HEPARIN 100 UNIT/ML
500 SYRINGE INTRAVENOUS
Status: COMPLETED | OUTPATIENT
Start: 2024-09-26 | End: 2024-09-26

## 2024-09-26 RX ORDER — HEPARIN 100 UNIT/ML
500 SYRINGE INTRAVENOUS
OUTPATIENT
Start: 2024-09-26

## 2024-09-26 RX ORDER — SODIUM CHLORIDE 0.9 % (FLUSH) 0.9 %
10 SYRINGE (ML) INJECTION
Status: ACTIVE | OUTPATIENT
Start: 2024-09-26

## 2024-09-26 RX ORDER — SODIUM CHLORIDE 0.9 % (FLUSH) 0.9 %
10 SYRINGE (ML) INJECTION
OUTPATIENT
Start: 2024-09-26

## 2024-09-26 RX ADMIN — HEPARIN 500 UNITS: 100 SYRINGE at 08:09

## 2024-09-30 ENCOUNTER — OFFICE VISIT (OUTPATIENT)
Dept: PODIATRY | Facility: CLINIC | Age: 84
End: 2024-09-30
Payer: MEDICARE

## 2024-09-30 VITALS
WEIGHT: 123.31 LBS | BODY MASS INDEX: 22.69 KG/M2 | RESPIRATION RATE: 16 BRPM | SYSTOLIC BLOOD PRESSURE: 147 MMHG | HEART RATE: 73 BPM | DIASTOLIC BLOOD PRESSURE: 75 MMHG | HEIGHT: 62 IN

## 2024-09-30 DIAGNOSIS — L60.0 INGROWN NAIL: Primary | ICD-10-CM

## 2024-09-30 DIAGNOSIS — E11.49 TYPE II DIABETES MELLITUS WITH NEUROLOGICAL MANIFESTATIONS: ICD-10-CM

## 2024-09-30 DIAGNOSIS — M19.071 OSTEOARTHRITIS OF RIGHT ANKLE AND FOOT: ICD-10-CM

## 2024-09-30 DIAGNOSIS — E11.9 COMPREHENSIVE DIABETIC FOOT EXAMINATION, TYPE 2 DM, ENCOUNTER FOR: ICD-10-CM

## 2024-09-30 PROCEDURE — 99999 PR PBB SHADOW E&M-EST. PATIENT-LVL IV: CPT | Mod: PBBFAC,,, | Performed by: PODIATRIST

## 2024-09-30 PROCEDURE — 99213 OFFICE O/P EST LOW 20 MIN: CPT | Mod: S$PBB,,, | Performed by: PODIATRIST

## 2024-09-30 PROCEDURE — 99214 OFFICE O/P EST MOD 30 MIN: CPT | Mod: PBBFAC | Performed by: PODIATRIST

## 2024-09-30 RX ORDER — DOXYCYCLINE HYCLATE 100 MG
TABLET ORAL
COMMUNITY
End: 2024-09-30

## 2024-09-30 RX ORDER — BENZONATATE 100 MG/1
CAPSULE ORAL
COMMUNITY
End: 2024-09-30

## 2024-09-30 NOTE — PROGRESS NOTES
Subjective:      Patient ID: Ursula Rodríguez is a 84 y.o. female.    Chief Complaint: Follow-up, Nail Problem, Diabetes Mellitus, and Foot Swelling     Patient presents today for diabetic evaluation she is currently on Eliquis and states she has a history of neuropathy that does bother her at night.      Review of Systems   Neurological:  Positive for numbness.   All other systems reviewed and are negative.       Constitutional    Pleasant, well-nourished, no distress, well oriented    Eyes         GLASSES    Cardiovascular          No chest pain, no shortness of breath    Respiratory           No cough, no congestion     Musculoskeletal        No muscle aches, no arthralgias/joint pain, no back pain, no swelling in the extremities            Objective:      Physical Exam  Vitals and nursing note reviewed.   Constitutional:       Appearance: Normal appearance.   Cardiovascular:      Pulses:           Dorsalis pedis pulses are 1+ on the right side and 1+ on the left side.        Posterior tibial pulses are 1+ on the right side and 1+ on the left side.   Pulmonary:      Effort: Pulmonary effort is normal.   Musculoskeletal:         General: Swelling, tenderness and signs of injury present.      Right foot: Deformity present.      Left foot: Deformity present.   Feet:      Right foot:      Protective Sensation: 4 sites tested.   1 site sensed.     Skin integrity: Erythema and warmth present.      Toenail Condition: Right toenails are abnormally thick and ingrown. Fungal disease present.     Left foot:      Protective Sensation: 4 sites tested.   1 site sensed.     Toenail Condition: Left toenails are abnormally thick. Fungal disease present.  Skin:     Capillary Refill: Capillary refill takes more than 3 seconds.      Findings: Erythema present.   Neurological:      Mental Status: She is alert.      Sensory: Sensory deficit present.   Psychiatric:         Mood and Affect: Mood normal.         Behavior: Behavior normal.          Thought Content: Thought content normal.         Judgment: Judgment normal.                                 Assessment:       Encounter Diagnoses   Name Primary?    Ingrown nail Yes    Type II diabetes mellitus with neurological manifestations     Comprehensive diabetic foot examination, type 2 DM, encounter for     Osteoarthritis of right ankle and foot          Plan:       Ursula was seen today for follow-up, nail problem, diabetes mellitus and foot swelling.    Diagnoses and all orders for this visit:    Ingrown nail    Type II diabetes mellitus with neurological manifestations    Comprehensive diabetic foot examination, type 2 DM, encounter for    Osteoarthritis of right ankle and foot        Patient presents today for diabetic evaluation she is currently on Eliquis and states she has a history of neuropathy that does bother her at night.    A complete diabetic evaluation was performed today patient does have findings consistent with diabetic related neuropathy.    Patient had several elongated ingrowing toenails I did debride these today I have advised the patient these were starting to become ingrown because of her neuropathy she was not having any pain or discomfort this needs to be monitored carefully.  Patient did have several ingrowing toenails especially the medial lateral border bilateral hallux the reserve were able to be trimmed and removed patient did not require a nail avulsion at this time.  Patient states that the Qutenza was actually helping more than she realized she states now that she is had it for over 3 months and it is worn off her neuropathy has bothered her more.  Patient states her primary concern right now are her knees she states her knees are causing her severe pain discomfort and making it very difficult for her to get around.  Patient is scheduled for an ablation procedure on both knees.  Comprehensive diabetic evaluation performed.  This note was created using M*Modal voice  recognition software that occasionally misinterpreted phrases or words.

## 2024-10-08 ENCOUNTER — HOSPITAL ENCOUNTER (OUTPATIENT)
Facility: HOSPITAL | Age: 84
Discharge: HOME | End: 2024-10-08
Attending: ANESTHESIOLOGY | Admitting: ANESTHESIOLOGY
Payer: MEDICARE

## 2024-10-08 VITALS
HEART RATE: 61 BPM | OXYGEN SATURATION: 97 % | SYSTOLIC BLOOD PRESSURE: 149 MMHG | DIASTOLIC BLOOD PRESSURE: 68 MMHG | RESPIRATION RATE: 11 BRPM | TEMPERATURE: 98 F | BODY MASS INDEX: 22.68 KG/M2 | HEIGHT: 62 IN | WEIGHT: 123.25 LBS

## 2024-10-08 DIAGNOSIS — M25.562 CHRONIC PAIN OF LEFT KNEE: ICD-10-CM

## 2024-10-08 DIAGNOSIS — G89.29 CHRONIC PAIN OF LEFT KNEE: ICD-10-CM

## 2024-10-08 DIAGNOSIS — M17.12 PRIMARY OSTEOARTHRITIS OF LEFT KNEE: Primary | ICD-10-CM

## 2024-10-08 PROCEDURE — 99152 MOD SED SAME PHYS/QHP 5/>YRS: CPT | Performed by: ANESTHESIOLOGY

## 2024-10-08 PROCEDURE — 99153 MOD SED SAME PHYS/QHP EA: CPT | Performed by: ANESTHESIOLOGY

## 2024-10-08 PROCEDURE — 63600175 PHARM REV CODE 636 W HCPCS: Performed by: ANESTHESIOLOGY

## 2024-10-08 PROCEDURE — 64624 DSTRJ NULYT AGT GNCLR NRV: CPT | Mod: LT | Performed by: ANESTHESIOLOGY

## 2024-10-08 RX ORDER — LIDOCAINE HYDROCHLORIDE 10 MG/ML
INJECTION, SOLUTION INFILTRATION; PERINEURAL
Status: DISCONTINUED | OUTPATIENT
Start: 2024-10-08 | End: 2024-10-08 | Stop reason: HOSPADM

## 2024-10-08 RX ORDER — FENTANYL CITRATE 50 UG/ML
INJECTION, SOLUTION INTRAMUSCULAR; INTRAVENOUS
Status: DISCONTINUED | OUTPATIENT
Start: 2024-10-08 | End: 2024-10-08 | Stop reason: HOSPADM

## 2024-10-08 RX ORDER — DEXAMETHASONE SODIUM PHOSPHATE 4 MG/ML
INJECTION, SOLUTION INTRA-ARTICULAR; INTRALESIONAL; INTRAMUSCULAR; INTRAVENOUS; SOFT TISSUE
Status: DISCONTINUED | OUTPATIENT
Start: 2024-10-08 | End: 2024-10-08 | Stop reason: HOSPADM

## 2024-10-08 RX ORDER — MIDAZOLAM HYDROCHLORIDE 1 MG/ML
INJECTION INTRAMUSCULAR; INTRAVENOUS
Status: DISCONTINUED | OUTPATIENT
Start: 2024-10-08 | End: 2024-10-08 | Stop reason: HOSPADM

## 2024-10-08 RX ORDER — BUPIVACAINE HYDROCHLORIDE 5 MG/ML
INJECTION, SOLUTION EPIDURAL; INTRACAUDAL
Status: DISCONTINUED | OUTPATIENT
Start: 2024-10-08 | End: 2024-10-08 | Stop reason: HOSPADM

## 2024-10-08 RX ORDER — SODIUM CHLORIDE, SODIUM LACTATE, POTASSIUM CHLORIDE, CALCIUM CHLORIDE 600; 310; 30; 20 MG/100ML; MG/100ML; MG/100ML; MG/100ML
INJECTION, SOLUTION INTRAVENOUS CONTINUOUS
Status: DISCONTINUED | OUTPATIENT
Start: 2024-10-08 | End: 2024-10-08 | Stop reason: HOSPADM

## 2024-10-08 NOTE — DISCHARGE INSTRUCTIONS
Dr. Stubbs [phone] extension [phone]  [facility] [phone]  [facility] [phone]

## 2024-10-08 NOTE — OP NOTE
DATE: 10/8/2024    PROCEDURE: Radiofrequency Ablations of Superior lateral, Superior medial and Inferior medial genicular nerves, left-sided     Diagnosis: Chronic left knee pain     Post Op Diagnosis: Same     PHYSICIAN: Serene Stubbs M.D.     LOCAL ANESTHESIA: Lidocaine 1%, 6 ml total.     MEDICATION INJECTED: Mixture of 2 ml of 0.5% bupivacaine and 10 mg of dexamethasone    SEDATION MEDICATIONS: RN IV sedation    COMPLICATIONS: None     ESTIMATED BLOOD LOSS: None     TECHNIQUE: A time-out was taken to identify patient and procedure side prior to starting procedure.   With the patient laying supine and the knees semi-flexed, the left knee was prepped and draped in the usual sterile fashion using ChloraPrep and a fenestrated drape. Knee joint line was determined under fluoroscopic guidance. The targets included the superior lateral (SL), superior medial (SM) and inferior medial (IM) genicular nerves which past periosteal areas connecting the shaft of the femur to bilateral epicondyles and the shaft of tibia to the medial epicondyle. The local anesthetic was given using a 25-gauge 1.5 inch needle. 50 mm RF needle was introduced into each target area. Motor stimulation up to 2 Volts at each level confirmed no motor nerve involvement. Impedance was less than 800 ohms at each level.   1ml of 2% lidocaine was instilled prior to lesioning. Ablation was performed per level utilizing radiofrequency generator approximately 60°C for 150 seconds. The above noted medication was then injected slowly. The patient tolerated the procedure well    Patient was given post procedure and discharge instructions to follow at home. The patient was discharged in a stable condition

## 2024-10-08 NOTE — DISCHARGE SUMMARY
[facility] Surgery  Discharge Note  Short Stay    Procedure(s) (LRB):  GENICULAR NERVE BLOCK LEFT KNEE PERIPHERAL (Left)      OUTCOME: Patient tolerated treatment/procedure well without complication and is now ready for discharge.    DISPOSITION: Home or Self Care    FINAL DIAGNOSIS: Chronic left knee pain    FOLLOWUP: In clinic    DISCHARGE INSTRUCTIONS:    Discharge Procedure Orders   Diet general     Call MD for:  temperature >100.4     Call MD for:  persistent nausea and vomiting     Call MD for:  severe uncontrolled pain     Call MD for:  difficulty breathing, headache or visual disturbances     Call MD for:  redness, tenderness, or signs of infection (pain, swelling, redness, odor or green/yellow discharge around incision site)     Call MD for:  hives     Call MD for:  persistent dizziness or light-headedness     Call MD for:  extreme fatigue        TIME SPENT ON DISCHARGE: 30 minutes

## 2024-10-08 NOTE — H&P
"FOLLOW UP NOTE:     CHIEF COMPLAINT: knee pain    INTERVAL HISTORY OF PRESENT ILLNESS: Ursula Rodríguez is a 84 y.o. female who presents for GENICULAR NERVE RFA LEFT KNEE PERIPHERAL . The patient denies of any significant changes in her health since her last appointment. The patient also denies of any changes in the character of her pain since her last appointment.     ROS:  Review of Systems   Constitutional:  Negative for chills and fever.   HENT:  Negative for sore throat.    Eyes:  Negative for visual disturbance.   Respiratory:  Negative for shortness of breath.    Cardiovascular:  Negative for chest pain.   Gastrointestinal:  Negative for nausea and vomiting.   Genitourinary:  Negative for difficulty urinating.   Musculoskeletal:  Positive for arthralgias.   Skin:  Negative for rash.   Allergic/Immunologic: Negative for immunocompromised state.   Neurological:  Negative for syncope.   Hematological:  Does not bruise/bleed easily.   Psychiatric/Behavioral:  Negative for suicidal ideas.         MEDICAL, SURGICAL, FAMILY, SOCIAL HX: reviewed    MEDICATIONS/ALLERGIES: reviewed    PHYSICAL EXAM:    VITALS: Vitals reviewed.   Vitals:    10/08/24 0844   BP: (!) 159/68   Pulse: 64   Resp: 16   Temp: 97 °F (36.1 °C)   TempSrc: Temporal   SpO2: 98%   Weight: 55.9 kg (123 lb 3.8 oz)   Height: 5' 2" (1.575 m)       Physical Exam  Vitals and nursing note reviewed.   Constitutional:       Appearance: Normal appearance. She is not toxic-appearing or diaphoretic.   HENT:      Head: Normocephalic and atraumatic.   Eyes:      General:         Right eye: No discharge.         Left eye: No discharge.      Extraocular Movements: Extraocular movements intact.      Conjunctiva/sclera: Conjunctivae normal.   Cardiovascular:      Rate and Rhythm: Normal rate.   Pulmonary:      Effort: Pulmonary effort is normal. No respiratory distress.      Breath sounds: Normal breath sounds.   Abdominal:      Palpations: Abdomen is soft.   Skin:     " General: Skin is warm and dry.      Findings: No rash.   Neurological:      Mental Status: She is alert and oriented to person, place, and time.   Psychiatric:         Mood and Affect: Mood and affect normal.         Cognition and Memory: Memory normal.         Judgment: Judgment normal.          EXTREMITIES:    Gen: No cyanosis, edema, varicosities, or tenderness to palpation BLE   Skin: Warm, pink, dry, no rashes, no lesions BLE   Strength: 5/5 motor strength BLE   ROM: hips, knees and ankles without pain or instability.     NEUROLOGICAL:    Gen: No clonus or spasticity.   Gait: Normal without antalgic lean   DTR's: 2+ in bilateral patellar, and ankle   BABINSKI: Absent bilaterally  Sensory: Intact to light touch and proprioception BLE    ASSESSMENT: Ursula Rodríguez is a 84 y.o. female who presents for GENICULAR NERVE RFA LEFT KNEE PERIPHERAL.     PLAN:  Proceed with GENICULAR NERVE RFA LEFT KNEE PERIPHERAL as previously discussed.    This patient has been cleared for surgery in an ambulatory surgical facility    ASA 3,  Mallampatti Score 3  No history of anesthetic complications  Plan for RN IV sedation    Serene Stubbs MD  Pain Management

## 2024-10-15 ENCOUNTER — EXTERNAL HOME HEALTH (OUTPATIENT)
Dept: HOME HEALTH SERVICES | Facility: HOSPITAL | Age: 84
End: 2024-10-15
Payer: MEDICARE

## 2024-10-24 ENCOUNTER — INFUSION (OUTPATIENT)
Dept: INFUSION THERAPY | Facility: HOSPITAL | Age: 84
End: 2024-10-24
Attending: FAMILY MEDICINE
Payer: MEDICARE

## 2024-10-24 VITALS
HEIGHT: 62 IN | DIASTOLIC BLOOD PRESSURE: 76 MMHG | HEART RATE: 68 BPM | OXYGEN SATURATION: 97 % | TEMPERATURE: 98 F | WEIGHT: 123.25 LBS | SYSTOLIC BLOOD PRESSURE: 171 MMHG | RESPIRATION RATE: 18 BRPM | BODY MASS INDEX: 22.68 KG/M2

## 2024-10-24 DIAGNOSIS — Z85.3 HISTORY OF BILATERAL BREAST CANCER: Primary | ICD-10-CM

## 2024-10-24 PROCEDURE — 63600175 PHARM REV CODE 636 W HCPCS: Performed by: FAMILY MEDICINE

## 2024-10-24 PROCEDURE — A4216 STERILE WATER/SALINE, 10 ML: HCPCS | Performed by: FAMILY MEDICINE

## 2024-10-24 PROCEDURE — 96523 IRRIG DRUG DELIVERY DEVICE: CPT

## 2024-10-24 PROCEDURE — 25000003 PHARM REV CODE 250: Performed by: FAMILY MEDICINE

## 2024-10-24 RX ORDER — HEPARIN 100 UNIT/ML
500 SYRINGE INTRAVENOUS
OUTPATIENT
Start: 2024-10-24

## 2024-10-24 RX ORDER — SODIUM CHLORIDE 0.9 % (FLUSH) 0.9 %
10 SYRINGE (ML) INJECTION
OUTPATIENT
Start: 2024-10-24

## 2024-10-24 RX ORDER — SODIUM CHLORIDE 0.9 % (FLUSH) 0.9 %
10 SYRINGE (ML) INJECTION
Status: COMPLETED | OUTPATIENT
Start: 2024-10-24 | End: 2024-10-24

## 2024-10-24 RX ORDER — HEPARIN 100 UNIT/ML
500 SYRINGE INTRAVENOUS
Status: COMPLETED | OUTPATIENT
Start: 2024-10-24 | End: 2024-10-24

## 2024-10-24 RX ADMIN — HEPARIN 500 UNITS: 100 SYRINGE at 08:10

## 2024-10-24 RX ADMIN — Medication 10 ML: at 08:10

## 2024-10-28 LAB
LEFT EYE DM RETINOPATHY: NEGATIVE
RIGHT EYE DM RETINOPATHY: NEGATIVE

## 2024-11-05 ENCOUNTER — PATIENT OUTREACH (OUTPATIENT)
Dept: ADMINISTRATIVE | Facility: HOSPITAL | Age: 84
End: 2024-11-05
Payer: MEDICARE

## 2024-11-05 NOTE — PROGRESS NOTES
Population Health Chart Review & Patient Outreach Details      Additional [facility] Health Notes:               Updates Requested / Reviewed:      Updated Care Coordination Note, Care Everywhere, and Immunizations Reconciliation Completed or Queried: [facility] Topics Overdue:      [facility] Score: 1     Uncontrolled BP    Influenza Vaccine  Tetanus Vaccine  Shingles/Zoster Vaccine  RSV Vaccine                  Health Maintenance Topic(s) Outreach Outcomes & Actions Taken:    Eye Exam - Outreach Outcomes & Actions Taken  : Diabetic Eye External Records Uploaded, Care Team & History Updated if Applicable

## 2024-11-06 ENCOUNTER — DOCUMENT SCAN (OUTPATIENT)
Dept: HOME HEALTH SERVICES | Facility: HOSPITAL | Age: 84
End: 2024-11-06
Payer: MEDICARE

## 2024-11-07 ENCOUNTER — HOSPITAL ENCOUNTER (EMERGENCY)
Facility: HOSPITAL | Age: 84
Discharge: HOME | End: 2024-11-07
Attending: STUDENT IN AN ORGANIZED HEALTH CARE EDUCATION/TRAINING PROGRAM
Payer: MEDICARE

## 2024-11-07 DIAGNOSIS — R91.1 INCIDENTAL PULMONARY NODULE, GREATER THAN OR EQUAL TO 8MM: ICD-10-CM

## 2024-11-07 DIAGNOSIS — L98.9 SKIN LESION OF NECK: ICD-10-CM

## 2024-11-07 DIAGNOSIS — R91.8 PULMONARY NODULES: Primary | ICD-10-CM

## 2024-11-07 PROCEDURE — 99284 EMERGENCY DEPT VISIT MOD MDM: CPT | Mod: 25

## 2024-11-07 PROCEDURE — 94760 N-INVAS EAR/PLS OXIMETRY 1: CPT

## 2024-11-07 PROCEDURE — 70490 CT SOFT TISSUE NECK W/O DYE: CPT | Mod: TC

## 2024-11-07 NOTE — ED NOTES
Patient noted swelling to right lateral neck yesterday.  Swelling increased today.  Area is firm to touch and appears as a lateral oval.  Area reported as itchy.  No redness or heat noted.  Image placed in chart.     Pain:  Rated 0/10.     Psychosocial:  Patient is calm and cooperative.  Patients insight and judgement are appropriate to situation.  Appears clean, well maintained, with clothing appropriate to environment.  No evidence of delusions, hallucinations, or psychosis.     Neuro:  Eyes open spontaneously.  Awake, alert, oriented x 4.  Speech clear and appropriate.  Tolerating saliva secretions well.  Able to follow commands, demonstrating ability to actively and appropriately communicate within context of current conversation.  Symmetrical facial muscles.  Moving all extremities well with no noted weakness.  Adequate muscle tone present.    Movement is purposeful.         Airway:  Bilateral chest rise and fall.  RR regular and non-labored.         Circulatory:  Skin warm, dry, and pink.  Apical and radial pulses strong and regular.  Capillary refill/skin blanching less than 3 seconds to distal of 4 extremities.  Paced rhythm on CM.  Infusaport present to right chest, not accessed at this time.     Extremities:  No redness, heat, swelling, deformity, or pain.     Skin:  Intact with no bruising/discolorations noted.

## 2024-11-08 VITALS
HEIGHT: 63 IN | RESPIRATION RATE: 21 BRPM | SYSTOLIC BLOOD PRESSURE: 155 MMHG | BODY MASS INDEX: 21.26 KG/M2 | OXYGEN SATURATION: 99 % | WEIGHT: 120 LBS | TEMPERATURE: 98 F | DIASTOLIC BLOOD PRESSURE: 71 MMHG | HEART RATE: 67 BPM

## 2024-11-08 NOTE — RESPIRATORY THERAPY
11/07/24 1924   Patient Assessment/Suction   Level of Consciousness (AVPU) alert   Respiratory Effort Normal;Unlabored   Expansion/Accessory Muscles/Retractions no retractions;no use of accessory muscles   Rhythm/Pattern, Respiratory depth regular;pattern regular;unlabored   Cough Frequency no cough   PRE-TX-O2   Device (Oxygen Therapy) room air   SpO2 98 %   Pulse Oximetry Type Continuous   $ Pulse Oximetry - Single Charge Pulse Oximetry - Single   Pulse 64   Resp (!) 22

## 2024-11-08 NOTE — ED PROVIDER NOTES
Encounter Date: 11/7/2024       History     Chief Complaint   Patient presents with    swelling to L lateral neck      Presents with quarter-sized swelling to R lateral neck onset 2 days. Chest wall port to R chest wall (accessed one month ago). Pt reports associated itching. Denies difficulty swallowing or pain      84-year-old female with significant history of breast cancer status post radiation, and double mastectomy, factor 5 Leiden, diabetes, hypertension, hyperlipidemia, hypothyroidism, vertigo, pacemaker placement, right chest wall port placement for phlebotomy.  Presents to ED with chief complaints of nontender skin colored, swelling to right lateral neck for which she discovered 2 days ago. She reports yesterday it was pea sized, size increased today to about a quarter-size. She says that it itches and she was sits outside and then oak treat daily, but she did not realize or noticed any insect bite She denies associated fever, chills, discomfort, pain, shortness of breath, chest pain.    The history is provided by the patient. No  was used.     Review of patient's allergies indicates:   Allergen Reactions    Iodine Other (See Comments)    Iodine and iodide containing products     Ditropan [oxybutynin chloride] Palpitations and Other (See Comments)     Made patient weak     Past Medical History:   Diagnosis Date    Anemia 1995    Breast cancer 1995    LEFT DIAGNOSED FIRST    Breast cancer 2013    Right     Diabetes mellitus     Factor V Leiden 1994    Hyperlipidemia 2017    Hypertension 2017    Hypothyroidism 2019    Osteoporosis     UNKNOWN DATE OF DX    Pacemaker 10/28/2021    Recurrent urinary tract infection     Status post insertion of nerve stimulator 02/14/2024    Vertigo 1994     Past Surgical History:   Procedure Laterality Date    BLOCK, NERVE, GENICULAR Left 9/17/2024    Procedure: GENICULAR NERVE BLOCK LEFT KNEE X3 PERIPHERAL;  Surgeon: Serene Stubbs MD;  Location: [facility]  OR;  Service: Pain Management;  Laterality: Left;    BREAST SURGERY  , 2013, Nose cancer    CARDIAC VALVE REPLACEMENT       SECTION      EYE SURGERY      HEMORRHOID SURGERY      HERNIA REPAIR      HYSTERECTOMY      INSERTION OF PACEMAKER      INSERTION OF TUNNELED CENTRAL VENOUS CATHETER (CVC) WITH SUBCUTANEOUS PORT N/A 2023    Procedure: [id number];  Surgeon: Carl Conrad MD;  Location: [facility] OR;  Service: General;  Laterality: N/A;    MASTECTOMY Right     MASTECTOMY Left     BREAST CANCER    MEDIPORT REMOVAL Left 2023    Procedure: REMOVAL, CATHETER, CENTRAL VENOUS, TUNNELED, WITH PORT;  Surgeon: Carl Conrad MD;  Location: [facility] OR;  Service: General;  Laterality: Left;    PORTACATH PLACEMENT      RADIOFREQUENCY ABLATION, NERVE, GENICULAR, KNEE Left 10/8/2024    Procedure: GENICULAR NERVE BLOCK LEFT KNEE PERIPHERAL;  Surgeon: Serene Stubbs MD;  Location: [facility] OR;  Service: Pain Management;  Laterality: Left;    TUMOR REMOVAL Left     EAR     Family History   Problem Relation Name Age of Onset    Cancer Mother Lena     Hypertension Mother [place]     Heart disease Mother Lena     No Known Problems Sister Ashley     Diabetes Brother Pratik     Hypertension Brother Pratik     Cancer Maternal Grandmother Mammie     Lung disease Father August     Heart disease Father August     Diabetes Father August     Cancer Brother Jameel     Diabetes Brother Jameel     Hypertension Brother Krunal     Stroke Brother Krunal     Hypertension Brother Say     Stroke Brother Say      Social History     Tobacco Use    Smoking status: Never    Smokeless tobacco: Never   Substance Use Topics    Alcohol use: No     Comment: SPECIAL OCCASIONS LIKE ZACHERY    Drug use: No     Review of Systems   Constitutional: Negative.    HENT: Negative.     Eyes: Negative.    Respiratory: Negative.     Cardiovascular: Negative.    Gastrointestinal: Negative.     Endocrine: Negative.    Genitourinary: Negative.    Musculoskeletal: Negative.    Skin:         Lesion to right lateral neck   Neurological: Negative.    Hematological: Negative.    Psychiatric/Behavioral: Negative.     All other systems reviewed and are negative.      Physical Exam     Initial Vitals [11/07/24 1613]   BP Pulse Resp Temp SpO2   (!) 178/79 72 16 97.8 °F (36.6 °C) 100 %      MAP       --         Physical Exam    Nursing note and vitals reviewed.  Constitutional: She appears well-developed.   HENT:   Head: Normocephalic.   Eyes: Pupils are equal, round, and reactive to light.   Neck:   Normal range of motion.  Cardiovascular:  Normal rate.           Pulmonary/Chest: Breath sounds normal. No respiratory distress.   Abdominal: Abdomen is soft. Bowel sounds are normal.   Musculoskeletal:         General: Normal range of motion.      Cervical back: Normal range of motion.     Neurological: She is alert and oriented to person, place, and time. GCS score is 15. GCS eye subscore is 4. GCS verbal subscore is 5. GCS motor subscore is 6.   Skin: Skin is warm. Capillary refill takes less than 2 seconds.   Quarter-sized non-tender skin colored, superficial swelling to right lateral neck, without surrounding erythema, or fluctuance.         ED Course   Procedures  Labs Reviewed - No data to display       Imaging Results              CT Soft Tissue Neck WO Contrast (Final result)  Result time 11/07/24 22:30:19      Final result by Leigh Ann Wilkins MD (11/07/24 22:30:19)                   Impression:      Spiculated pulmonary nodule in the left upper lobe measuring 24 x 16 mm representing neoplasm until proven otherwise.  Recommend PET-CT or biopsy.      Electronically signed by: Leigh Ann Wilkins  Date:    11/07/2024  Time:    22:30               Narrative:    EXAMINATION:  CT SOFT TISSUE NECK WITHOUT CONTRAST    CLINICAL HISTORY:  Neck mass, nonpulsatile;    TECHNIQUE:  CT soft tissue neck without  contrast.  Coronal and sagittal reformatted images were obtained.    COMPARISON:  04/28/2022    FINDINGS:  Lack of contrast limits evaluation of the soft tissues of the neck, lymph nodes, and vasculature..    Spiculated pulmonary nodule in the left upper lobe measuring 24 x 16 mm representing neoplasm until proven otherwise.  Otherwise, multiple subcentimeter nodules scattered throughout the visualized lungs.    Orbits, paranasal sinuses, and skull base: Right lens is absent, likely postsurgical.  Paranasal sinuses are essentially clear.    Nasopharynx: No acute findings.    Suprahyoid neck: No acute findings.    Infrahyoid neck: No acute findings.    Thyroid: No detectable nodule.    Lymph nodes Normal. No lymphadenopathy.                                       Medications - No data to display  Medical Decision Making  84-year-old female with significant history of breast cancer status post radiation, and double mastectomy, factor 5 Leiden, diabetes, hypertension, hyperlipidemia, hypothyroidism, vertigo, pacemaker placement, right chest wall port placement for phlebotomy.  Presents to ED with chief complaints of nontender skin colored, swelling to right lateral neck for which she discovered 2 days ago. She reports yesterday it was pea sized, size increased today to about a quarter-size. She denies associated fever, chills, discomfort, pain, shortness of breath, chest pain.  --------------------------------------------  Ddx insect bite, lymph node swelling, cancer, others  CT soft tissue neck was nondiagnostic incidental finding however of spiculated pulmonary nodule in the left upper lobe measuring 24 x 16 mm representing neoplasm until proven otherwise. Otherwise, multiple subcentimeter nodules scattered throughout the visualized lungs. Recommend PET-CT or biopsy.  Above findings discussed with the patient.  She does have good oncology follow up, she will need further workup outpatient. As far as her neck lesion, I  suspect possible insect bite advised Benadryl p.r.n.  Patient was seen and reevaluated. Patient's symptoms seem to be stable. I discussed the patient's diagnosis, treatment plan, and plan for discharge with the patient. Patient was instructed to follow up with PCP, oncology and was given strict return precautions to the ED. The patient voiced understanding and agreed with the plan        Amount and/or Complexity of Data Reviewed  Radiology: ordered.                                      Clinical Impression:  Final diagnoses:  [R91.8] Pulmonary nodules (Primary)  [R91.1] Incidental pulmonary nodule, greater than or equal to 8mm  [L98.9] Skin lesion of neck          ED Disposition Condition    Discharge Stable          ED Prescriptions    None       Follow-up Information       Follow up With Specialties Details Why Contact Info    Chacha Gutierrez MD Family Medicine  As needed [address] [facility] [address] [phone]               Supa Hernandez MD  11/07/24 5084

## 2024-11-08 NOTE — DISCHARGE INSTRUCTIONS
Follow up with your oncologist tomorrow    May return to the ED at any time if any new or worsening symptoms

## 2024-11-12 ENCOUNTER — OFFICE VISIT (OUTPATIENT)
Facility: CLINIC | Age: 84
End: 2024-11-12
Payer: MEDICARE

## 2024-11-12 VITALS
HEART RATE: 71 BPM | TEMPERATURE: 97 F | WEIGHT: 124.38 LBS | RESPIRATION RATE: 17 BRPM | SYSTOLIC BLOOD PRESSURE: 159 MMHG | HEIGHT: 62 IN | BODY MASS INDEX: 22.89 KG/M2 | DIASTOLIC BLOOD PRESSURE: 73 MMHG

## 2024-11-12 DIAGNOSIS — R91.8 MASS OF UPPER LOBE OF LEFT LUNG: Primary | ICD-10-CM

## 2024-11-12 PROCEDURE — 99215 OFFICE O/P EST HI 40 MIN: CPT | Mod: PBBFAC,PN | Performed by: INTERNAL MEDICINE

## 2024-11-12 PROCEDURE — 99999 PR PBB SHADOW E&M-EST. PATIENT-LVL V: CPT | Mod: PBBFAC,,, | Performed by: INTERNAL MEDICINE

## 2024-11-12 PROCEDURE — G2211 COMPLEX E/M VISIT ADD ON: HCPCS | Mod: S$PBB,,, | Performed by: INTERNAL MEDICINE

## 2024-11-12 PROCEDURE — 99214 OFFICE O/P EST MOD 30 MIN: CPT | Mod: S$PBB,,, | Performed by: INTERNAL MEDICINE

## 2024-11-18 ENCOUNTER — HOSPITAL ENCOUNTER (OUTPATIENT)
Dept: RADIOLOGY | Facility: HOSPITAL | Age: 84
Discharge: HOME | End: 2024-11-18
Attending: INTERNAL MEDICINE
Payer: MEDICARE

## 2024-11-18 DIAGNOSIS — R91.8 MASS OF UPPER LOBE OF LEFT LUNG: ICD-10-CM

## 2024-11-18 LAB — GLUCOSE SERPL-MCNC: 77 MG/DL (ref 70–110)

## 2024-11-18 PROCEDURE — A9552 F18 FDG: HCPCS | Mod: PN | Performed by: INTERNAL MEDICINE

## 2024-11-18 PROCEDURE — 78815 PET IMAGE W/CT SKULL-THIGH: CPT | Mod: TC,PI,PN

## 2024-11-18 PROCEDURE — 78815 PET IMAGE W/CT SKULL-THIGH: CPT | Mod: 26,PS,, | Performed by: STUDENT IN AN ORGANIZED HEALTH CARE EDUCATION/TRAINING PROGRAM

## 2024-11-18 RX ORDER — FLUDEOXYGLUCOSE F18 500 MCI/ML
12 INJECTION INTRAVENOUS
Status: COMPLETED | OUTPATIENT
Start: 2024-11-18 | End: 2024-11-18

## 2024-11-18 RX ADMIN — FLUDEOXYGLUCOSE F-18 11.7 MILLICURIE: 500 INJECTION INTRAVENOUS at 02:11

## 2024-11-18 NOTE — PROGRESS NOTES
PET Imaging Questionnaire    Are you a Diabetic? Recent Blood Sugar level? Yes    Are you anemic? Bone Marrow Stimulation Meds? No    Have you had a CT Scan, if so when & where was your last one? Yes -     Have you had a PET Scan, if so when & where was your last one? Yes -     Chemotherapy or currently on Chemotherapy? Yes    Radiation therapy? Yes    Surgical History:   Past Surgical History:   Procedure Laterality Date    BLOCK, NERVE, GENICULAR Left 2024    Procedure: GENICULAR NERVE BLOCK LEFT KNEE X3 PERIPHERAL;  Surgeon: Serene Stubbs MD;  Location: [facility] OR;  Service: Pain Management;  Laterality: Left;    BREAST SURGERY  , , Nose cancer    CARDIAC VALVE REPLACEMENT       SECTION      EYE SURGERY      HEMORRHOID SURGERY      HERNIA REPAIR      HYSTERECTOMY      INSERTION OF PACEMAKER      INSERTION OF TUNNELED CENTRAL VENOUS CATHETER (CVC) WITH SUBCUTANEOUS PORT N/A 2023    Procedure: [id number];  Surgeon: Carl Conrad MD;  Location: [facility] OR;  Service: General;  Laterality: N/A;    MASTECTOMY Right     MASTECTOMY Left     BREAST CANCER    MEDIPORT REMOVAL Left 2023    Procedure: REMOVAL, CATHETER, CENTRAL VENOUS, TUNNELED, WITH PORT;  Surgeon: Carl Conrad MD;  Location: [facility] OR;  Service: General;  Laterality: Left;    PORTACATH PLACEMENT      RADIOFREQUENCY ABLATION, NERVE, GENICULAR, KNEE Left 10/8/2024    Procedure: GENICULAR NERVE BLOCK LEFT KNEE PERIPHERAL;  Surgeon: Serene Stubbs MD;  Location: [facility] OR;  Service: Pain Management;  Laterality: Left;    TUMOR REMOVAL Left     EAR        Have you been fasting for at least 6 hours? Yes    Is there any chance you may be pregnant or breastfeeding? No    Assay: 12.3 MCi@:1440   Injection Site:lt hand     Residual: .592 mCi@: 1442   Technologist: Saira Treviño Injected:11.7mCi

## 2024-11-19 ENCOUNTER — OFFICE VISIT (OUTPATIENT)
Facility: CLINIC | Age: 84
End: 2024-11-19
Payer: MEDICARE

## 2024-11-19 VITALS
DIASTOLIC BLOOD PRESSURE: 73 MMHG | RESPIRATION RATE: 18 BRPM | WEIGHT: 124 LBS | HEIGHT: 62 IN | BODY MASS INDEX: 22.82 KG/M2 | HEART RATE: 80 BPM | SYSTOLIC BLOOD PRESSURE: 149 MMHG | TEMPERATURE: 97 F

## 2024-11-19 DIAGNOSIS — Z95.0 S/P PLACEMENT OF CARDIAC PACEMAKER: ICD-10-CM

## 2024-11-19 DIAGNOSIS — R91.8 MASS OF UPPER LOBE OF LEFT LUNG: ICD-10-CM

## 2024-11-19 DIAGNOSIS — C34.12 MALIGNANT NEOPLASM OF UPPER LOBE OF LEFT LUNG: Primary | ICD-10-CM

## 2024-11-19 DIAGNOSIS — I70.0 AORTIC ATHEROSCLEROSIS: ICD-10-CM

## 2024-11-19 DIAGNOSIS — Z85.3 HISTORY OF BILATERAL BREAST CANCER: ICD-10-CM

## 2024-11-19 PROCEDURE — 99999 PR PBB SHADOW E&M-EST. PATIENT-LVL V: CPT | Mod: PBBFAC,,, | Performed by: INTERNAL MEDICINE

## 2024-11-19 PROCEDURE — G2211 COMPLEX E/M VISIT ADD ON: HCPCS | Mod: S$PBB,,, | Performed by: INTERNAL MEDICINE

## 2024-11-19 PROCEDURE — 99215 OFFICE O/P EST HI 40 MIN: CPT | Mod: PBBFAC,PN | Performed by: INTERNAL MEDICINE

## 2024-11-19 PROCEDURE — 99215 OFFICE O/P EST HI 40 MIN: CPT | Mod: S$PBB,,, | Performed by: INTERNAL MEDICINE

## 2024-11-19 NOTE — LETTER
November 22, 2024        Chacha Gutierrez MD  [address] [facility] [place] [address]             [facility]  [address]  [facility] [address]  Phone: [phone]  Fax: [phone]   Patient: Ursula Rodríguez   MR Number: [id number]   YOB: 1940   Date of Visit: 11/19/2024       Dear Dr. Gutierrez:    Thank you for referring Ursula Rodríguez to me for evaluation. Below are the relevant portions of my assessment and plan of care.            If you have questions, please do not hesitate to call me. I look forward to following Ursula along with you.    Sincerely,      STEFFI Garcia MD           CC  No Recipients

## 2024-11-19 NOTE — PROGRESS NOTES
Answers submitted by the patient for this visit:  Review of Systems Questionnaire (Submitted on 11/15/2024)  appetite change : No  unexpected weight change: No  mouth sores: No  visual disturbance: No  cough: Yes  shortness of breath: No  chest pain: No  abdominal pain: No  diarrhea: No  frequency: Yes  back pain: No  rash: No  headaches: No  adenopathy: No  nervous/ anxious: Yes

## 2024-11-21 ENCOUNTER — INFUSION (OUTPATIENT)
Dept: INFUSION THERAPY | Facility: HOSPITAL | Age: 84
End: 2024-11-21
Attending: FAMILY MEDICINE
Payer: MEDICARE

## 2024-11-21 VITALS
HEART RATE: 87 BPM | BODY MASS INDEX: 22.71 KG/M2 | HEIGHT: 62 IN | OXYGEN SATURATION: 98 % | SYSTOLIC BLOOD PRESSURE: 153 MMHG | RESPIRATION RATE: 16 BRPM | DIASTOLIC BLOOD PRESSURE: 70 MMHG | TEMPERATURE: 98 F | WEIGHT: 123.44 LBS

## 2024-11-21 DIAGNOSIS — Z85.3 HISTORY OF BILATERAL BREAST CANCER: Primary | ICD-10-CM

## 2024-11-21 PROCEDURE — 25000003 PHARM REV CODE 250: Performed by: FAMILY MEDICINE

## 2024-11-21 PROCEDURE — A4216 STERILE WATER/SALINE, 10 ML: HCPCS | Performed by: FAMILY MEDICINE

## 2024-11-21 PROCEDURE — 63600175 PHARM REV CODE 636 W HCPCS: Performed by: FAMILY MEDICINE

## 2024-11-21 RX ORDER — SODIUM CHLORIDE 0.9 % (FLUSH) 0.9 %
10 SYRINGE (ML) INJECTION
OUTPATIENT
Start: 2024-11-21

## 2024-11-21 RX ORDER — HEPARIN 100 UNIT/ML
500 SYRINGE INTRAVENOUS
Status: COMPLETED | OUTPATIENT
Start: 2024-11-21 | End: 2024-11-21

## 2024-11-21 RX ORDER — SODIUM CHLORIDE 0.9 % (FLUSH) 0.9 %
10 SYRINGE (ML) INJECTION
Status: COMPLETED | OUTPATIENT
Start: 2024-11-21 | End: 2024-11-21

## 2024-11-21 RX ORDER — HEPARIN 100 UNIT/ML
500 SYRINGE INTRAVENOUS
OUTPATIENT
Start: 2024-11-21

## 2024-11-21 RX ADMIN — HEPARIN 500 UNITS: 100 SYRINGE at 08:11

## 2024-11-21 RX ADMIN — Medication 10 ML: at 08:11

## 2024-11-22 NOTE — PROGRESS NOTES
[facility] [address] Hematology Oncology In Office Subsequent Encounter Note    24    Subjective:      Patient ID:   Ursula Rodríguez  84 y.o. female  1940  Chacha Gutierrez MD      Chief Complaint:   Breast cancer follow up    HPI:  84 y.o. female with diagnosis of R Breast cancer,2013.  R breast partial mastectomy followed by [facility].  Sentinal node +.  Stage II dx.  CTA x's 3/4, arimidex, Tamoxifen 2015.    Hx of L breast cancer , had partial mastectomy and reconstruction.    Was on Periactin, weight increased from 122 to 125#, off periactin now.  122# now.    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  .  On Eliquis 2.5 mg BID prophylaxis.    Previously she had decreased appetite, weight down 15#.  Began trial of Periactin 4 mg BID for appetite, weight gain.  Off periactin now 125#.  She stopped her periactin, says appetite is improved, her daughter agrees with her.    On Eliquis 2.5 mg 1 BID.  Refill antivert for vertigo sx prn.   Bone Density, osteoporosis.     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  Hx HTN, DM, GERD,cholesterol, DJD, CVA, TIA., thyroid dx, bradycardia.    Appendectomy, Partial hystectomy, L knee surgery several times, hernia repair, Tumor removed L ear drum .  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  S/P pacemaker placement    Allergy iodine, IVP dye.    Dad COPD, DM.  Mom Breast cancer, DM, increased HR.  Sister DM.  Brother DM, PVD.  Sister colon cancer, heart dx.  Sister Parkinson's Dx.  Daughter colon cancer.   of 64 years, recently passed away.  [facility].  86 y/o.    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   LN +.  M3    New port placed for lab draws.    She has a knee stimulator for pain control in the knees, status post ablation procedure at the nerves of the left knee.  Dr. Stubbs.  Weight is 124 lb, height is 5 ft 2 in, she has a pacemaker in place, history of TI age, age dementia and takes Eliquis 2.5 mg b.i.d..  She does not smoke but she does have exposure to secondhand smoke  with her .    She has been found to have a 2.4 cm x 1.6 cm mass in the left upper lung concerning for primary lung malignancy.  PET scan is being done.  Cataract surgery on the left was planned for 2024.  Her daughter is Lucita [phone].    ROS:   GEN: normal without any fever, night sweats or weight loss  HEENT: See HPI  CV: normal with no CP, SOB, PND, BUSTOS or orthopnea  PULM: normal with no SOB, cough, hemoptysis, sputum or pleuritic pain  GI: normal with no abdominal pain, nausea, vomiting, constipation, diarrhea, melanotic stools, BRBPR, or hematemesis  : normal with no hematuria, dysuria  BREAST: See HPI  SKIN: normal with no rash, erythema, bruising, or swelling     Past Medical History:   Diagnosis Date    Anemia     Breast cancer     LEFT DIAGNOSED FIRST    Breast cancer 2013    Right     Diabetes mellitus     Factor V Leiden 1994    Hyperlipidemia 2017    Hypertension 2017    Hypothyroidism 2019    Osteoporosis     UNKNOWN DATE OF DX    Pacemaker 10/28/2021    Recurrent urinary tract infection     Status post insertion of nerve stimulator 2024    Vertigo      Past Surgical History:   Procedure Laterality Date    BLOCK, NERVE, GENICULAR Left 2024    Procedure: GENICULAR NERVE BLOCK LEFT KNEE X3 PERIPHERAL;  Surgeon: Serene Stubbs MD;  Location: [facility] OR;  Service: Pain Management;  Laterality: Left;    BREAST SURGERY  , , Nose cancer    CARDIAC VALVE REPLACEMENT       SECTION      EYE SURGERY      HEMORRHOID SURGERY  1968    HERNIA REPAIR      HYSTERECTOMY  1970    INSERTION OF PACEMAKER      INSERTION OF TUNNELED CENTRAL VENOUS CATHETER (CVC) WITH SUBCUTANEOUS PORT N/A 2023    Procedure: [id number];  Surgeon: Carl Conrad MD;  Location: [facility] OR;  Service: General;  Laterality: N/A;    MASTECTOMY Right     MASTECTOMY Left     BREAST CANCER    MEDIPORT REMOVAL Left 2023    Procedure: REMOVAL,  CATHETER, CENTRAL VENOUS, TUNNELED, WITH PORT;  Surgeon: Carl Conrad MD;  Location: [facility] OR;  Service: General;  Laterality: Left;    PORTACATH PLACEMENT  2013    RADIOFREQUENCY ABLATION, NERVE, GENICULAR, KNEE Left 10/8/2024    Procedure: GENICULAR NERVE BLOCK LEFT KNEE PERIPHERAL;  Surgeon: Serene Stubbs MD;  Location: [facility] OR;  Service: Pain Management;  Laterality: Left;    TUMOR REMOVAL Left 1994    EAR       Review of patient's allergies indicates:   Allergen Reactions    Iodine and iodide containing products     Ditropan [oxybutynin chloride] Palpitations and Other (See Comments)     Made patient weak     Social History     Socioeconomic History    Marital status:     Highest education level: Master's degree (e.g., MA, MS, [place], MEd, MSW, ANDRE)   Occupational History    Occupation: Phd x 3-   Tobacco Use    Smoking status: Never    Smokeless tobacco: Never   Substance and Sexual Activity    Alcohol use: No     Comment: SPECIAL OCCASIONS LIKE ZACHERY    Drug use: No    Sexual activity: Not Currently     Partners: Male     Social Drivers of Health     Financial Resource Strain: Medium Risk (12/28/2023)    Overall Financial Resource Strain (CARDIA)     Difficulty of Paying Living Expenses: Somewhat hard   Food Insecurity: No Food Insecurity (12/28/2023)    Hunger Vital Sign     Worried About Running Out of Food in the Last Year: Never true     Ran Out of Food in the Last Year: Never true   Transportation Needs: No Transportation Needs (12/28/2023)    PRAPARE - Transportation     Lack of Transportation (Medical): No     Lack of Transportation (Non-Medical): No   Physical Activity: Insufficiently Active (12/28/2023)    Exercise Vital Sign     Days of Exercise per Week: 7 days     Minutes of Exercise per Session: 20 min   Stress: No Stress Concern Present (12/28/2023)    [employer] [place] [employer] - Occupational Stress Questionnaire     Feeling of Stress : Only a little    Housing Stability: Low Risk  (12/28/2023)    Housing Stability Vital Sign     Unable to Pay for Housing in the Last Year: No     Number of Places Lived in the Last Year: 1     Unstable Housing in the Last Year: No         Current Outpatient Medications:     alendronate (FOSAMAX) 70 MG tablet, Take 1 tablet (70 mg total) by mouth every 7 days., Disp: 13 tablet, Rfl: 3    apixaban (ELIQUIS) 2.5 mg Tab, Take 1 tablet (2.5 mg total) by mouth 2 (two) times daily., Disp: 180 tablet, Rfl: 3    azelastine (ASTELIN) 137 mcg (0.1 %) nasal spray, 1 spray (137 mcg total) by Nasal route 2 (two) times daily., Disp: 18.2 mL, Rfl: 5    cloNIDine (CATAPRES) 0.1 MG tablet, Take 1 tablet (0.1 mg total) by mouth 2 (two) times daily as needed (BP > 160/90)., Disp: 180 tablet, Rfl: 3    cyanocobalamin (VITAMIN B-12) 1000 MCG tablet, Take 100 mcg by mouth once daily., Disp: , Rfl:     fluticasone propionate (FLONASE) 50 mcg/actuation nasal spray, 1 spray (50 mcg total) by Each Nostril route once daily., Disp: 16 g, Rfl: 5    levothyroxine (SYNTHROID) 50 MCG tablet, Take 1 tablet (50 mcg total) by mouth once daily., Disp: 90 tablet, Rfl: 3    losartan (COZAAR) 25 MG tablet, Take 0.5 tablets (12.5 mg total) by mouth once daily., Disp: 45 tablet, Rfl: 3    magnesium oxide (MAG-OX) 400 mg (241.3 mg magnesium) tablet, Take 400 mg by mouth once daily., Disp: , Rfl:     meclizine (ANTIVERT) 25 mg tablet, Take 1 tablet (25 mg total) by mouth 2 (two) times daily as needed for Dizziness or Nausea., Disp: 90 tablet, Rfl: 1    metFORMIN (GLUCOPHAGE-XR) 500 MG ER 24hr tablet, TAKE 1 TABLET BY MOUTH EVERY MORNING WITH FOOD FOR 2 WEEKS THEN INCREASE TO 1 TABLET BY MOUTH TWICE DAILY WITH FOOD, Disp: 180 tablet, Rfl: 3    metoprolol tartrate (LOPRESSOR) 25 MG tablet, Take 0.5 tablets (12.5 mg total) by mouth 2 (two) times daily., Disp: 90 tablet, Rfl: 1    multivitamin capsule, Take 1 capsule by mouth once daily., Disp: , Rfl:     NIFEdipine  "(PROCARDIA-XL) 30 MG (OSM) 24 hr tablet, Take 1 tablet (30 MG ) by mouth twice daily, Disp: 180 tablet, Rfl: 3    sertraline (ZOLOFT) 25 MG tablet, Take 1 tablet (25 mg total) by mouth once daily., Disp: 90 tablet, Rfl: 3    simvastatin (ZOCOR) 20 MG tablet, TAKE 1 TABLET(20 MG) BY MOUTH EVERY EVENING, Disp: 90 tablet, Rfl: 1    spironolactone (ALDACTONE) 25 MG tablet, Take 1 tablet (25 mg total) by mouth once daily., Disp: 90 tablet, Rfl: 3  No current facility-administered medications for this visit.    Facility-Administered Medications Ordered in Other Visits:     sodium chloride 0.9% flush 10 mL, 10 mL, Intravenous, 1 time in Clinic/HOD, Chacha Gutierrez MD          Objective:   Vitals:  Blood pressure (!) 159/73, pulse 71, temperature 97.2 °F (36.2 °C), temperature source Temporal, resp. rate 17, height 5' 2" (1.575 m), weight 56.4 kg (124 lb 6.4 oz).    Physical Examination:   GEN: no apparent distress, comfortable  HEAD: atraumatic and normocephalic  EYES: no pallor, no icterus  ENT:  no pharyngeal erythema, external ears WNL; no nasal discharge  NECK: no masses, thyroid normal, trachea midline, no LAD/LN's, supple  CV: RRR with no murmur; normal pulse; normal S1 and S2; no pedal edema  CHEST: Normal respiratory effort; CTAB; normal breath sounds; no wheeze or crackles  ABDOM: nontender and nondistended; soft;  no rebound/guarding  MUSC/Skeletal: ROM normal; no crepitus; joints normal; no deformities   EXTREM: multiple spider veins L & R leg  SKIN: multiple ecchemoses on forearms, poor skin turgor.  : no cvat  NEURO: grossly intact; motor/sensory WNL;  no tremors  PSYCH: normal mood, affect and behavior  LYMPH: normal cervical, supraclavicular, axillary and groin LN's  BREASTS: L 'breast" post op change, without palpable mass.  R chest NT, post operative change, no palpable mass    CAT head small vessel dx.  MRI of L/S spine DDD, DJD.    Current studies:  Bone Scan, no metastases seen.  But intense uptake " at R foot.  Concern for occult fx or osteomyelitis, she is following up with Dr. Almaguer on this.    CT of chest abdomin and pelvis, multiple 2-3 mm nodules in lungs, no previous CT for comparison.  Significance?  Will plan to repeat CT of chest in 3 months.  No definitive metastatic dx seen.    B 12 372.  Assessment:   (1) 84 y.o. female with diagnosis of  Bilateral breast cancer, S/P treatment as above.  CISCO.  Observe for now.     (2)Weight loss 15#, decreased appetite.  Periactin 4 mg BID.  Now weight better, up to 124#.  She stopped periactin.    (3)S/P pacemaker placement for bradycardia.    (4)Hx TIA, F5L, on Eliquis 2.5 mg prophylaxis long term.    (5) 2.6 cm left upper lobe lung mass concerning for lung malignancy.  PET scan ordered.  Return to clinic in 3 weeks

## 2024-11-23 NOTE — PROGRESS NOTES
[facility] [address] Hematology Oncology In Office Subsequent Encounter Note    24    Subjective:      Patient ID:                                      Daughters phone # for scheduling is [phone]  Ursula Rodríguez  84 y.o. female  1940  Chacha Gutierrez MD      Chief Complaint:   Breast cancer follow up    HPI:  84 y.o. female with diagnosis of R Breast cancer,2013.  R breast partial mastectomy followed by MRM.  Sentinal node +.  Stage II dx.  CTA x's 3/4, arimidex, Tamoxifen 2015.    Hx of L breast cancer , had partial mastectomy and reconstruction.    Was on Periactin, weight increased from 122 to 125#, off periactin now.  122# now.    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  .  On Eliquis 2.5 mg BID prophylaxis.    Previously she had decreased appetite, weight down 15#.  Began trial of Periactin 4 mg BID for appetite, weight gain.  Off periactin now 125#.  She stopped her periactin, says appetite is improved, her daughter agrees with her.    On Eliquis 2.5 mg 1 BID.  Refill antivert for vertigo sx prn.   Bone Density, osteoporosis.     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  Hx HTN, DM, GERD,cholesterol, DJD, CVA, TIA., thyroid dx, bradycardia.    Appendectomy, Partial hystectomy, L knee surgery several times, hernia repair, Tumor removed L ear drum .  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  S/P pacemaker placement    Allergy iodine, IVP dye.    Dad COPD, DM.  Mom Breast cancer, DM, increased HR.  Sister DM.  Brother DM, PVD.  Sister colon cancer, heart dx.  Sister Parkinson's Dx.  Daughter colon cancer.   of 64 years, recently passed away.  [facility].  86 y/o.    [id number]  4.2 cm , multifocal DCIS.   LN +.  M3    New port placed for lab draws.    She has a knee stimulator for pain control in the knees, status post ablation procedure at the nerves of the left knee.  Dr. Stubbs.  Weight is 124 lb, height is 5 ft 2 in, she has a pacemaker in place, history of TI age, age dementia and takes Eliquis  2.5 mg b.i.d..  She does not smoke but she does have exposure to secondhand smoke with her .    She has been found to have a 2.4 cm x 1.6 cm mass in the left upper lung concerning for primary lung malignancy.  PET scan is being done.  Cataract surgery on the left was planned for 2024.  Her daughter is Lucita [phone].    PET SCAN showed 2.4 cm mass with SUV 14 with hilar and mediastinal LN.  No distant metastatic Dx.  Old fx at pelvis.     Refer to Dr. Snowden for bronchoscopy and EBUS for tissue Dx.    Expect Rad Rx and weekly concurrent chemo, I.O.?    ROS:   GEN: normal without any fever, night sweats or weight loss  HEENT: See HPI  CV: normal with no CP, SOB, PND, BUSTOS or orthopnea  PULM: normal with no SOB, cough, hemoptysis, sputum or pleuritic pain  GI: normal with no abdominal pain, nausea, vomiting, constipation, diarrhea, melanotic stools, BRBPR, or hematemesis  : normal with no hematuria, dysuria  BREAST: See HPI  SKIN: normal with no rash, erythema, bruising, or swelling     Past Medical History:   Diagnosis Date    Anemia     Breast cancer 1995    LEFT DIAGNOSED FIRST    Breast cancer 2013    Right     Diabetes mellitus     Factor V Leiden     Hyperlipidemia 2017    Hypertension 2017    Hypothyroidism 2019    Osteoporosis     UNKNOWN DATE OF DX    Pacemaker 10/28/2021    Recurrent urinary tract infection     Status post insertion of nerve stimulator 2024    Vertigo      Past Surgical History:   Procedure Laterality Date    BLOCK, NERVE, GENICULAR Left 2024    Procedure: GENICULAR NERVE BLOCK LEFT KNEE X3 PERIPHERAL;  Surgeon: Serene Stubbs MD;  Location: [facility] OR;  Service: Pain Management;  Laterality: Left;    BREAST SURGERY  , 2013, Nose cancer    CARDIAC VALVE REPLACEMENT       SECTION      EYE SURGERY      HEMORRHOID SURGERY      HERNIA REPAIR      HYSTERECTOMY      INSERTION OF PACEMAKER      INSERTION OF TUNNELED  CENTRAL VENOUS CATHETER (CVC) WITH SUBCUTANEOUS PORT N/A 7/26/2023    Procedure: [id number];  Surgeon: Carl Conrad MD;  Location: [facility] OR;  Service: General;  Laterality: N/A;    MASTECTOMY Right 1994    MASTECTOMY Left 2013    BREAST CANCER    MEDIPORT REMOVAL Left 7/26/2023    Procedure: REMOVAL, CATHETER, CENTRAL VENOUS, TUNNELED, WITH PORT;  Surgeon: Carl Conrad MD;  Location: [facility] OR;  Service: General;  Laterality: Left;    PORTACATH PLACEMENT  2013    RADIOFREQUENCY ABLATION, NERVE, GENICULAR, KNEE Left 10/8/2024    Procedure: GENICULAR NERVE BLOCK LEFT KNEE PERIPHERAL;  Surgeon: Serene Stubbs MD;  Location: [facility] OR;  Service: Pain Management;  Laterality: Left;    TUMOR REMOVAL Left 1994    EAR       Review of patient's allergies indicates:   Allergen Reactions    Iodine and iodide containing products     Ditropan [oxybutynin chloride] Palpitations and Other (See Comments)     Made patient weak     Social History     Socioeconomic History    Marital status:     Highest education level: Master's degree (e.g., MA, MS, [place], MEd, MSW, ANDRE)   Occupational History    Occupation: Phd x 3-   Tobacco Use    Smoking status: Never    Smokeless tobacco: Never   Substance and Sexual Activity    Alcohol use: No     Comment: SPECIAL OCCASIONS LIKE ZACHERY    Drug use: No    Sexual activity: Not Currently     Partners: Male     Social Drivers of Health     Financial Resource Strain: Medium Risk (12/28/2023)    Overall Financial Resource Strain (CARDIA)     Difficulty of Paying Living Expenses: Somewhat hard   Food Insecurity: No Food Insecurity (12/28/2023)    Hunger Vital Sign     Worried About Running Out of Food in the Last Year: Never true     Ran Out of Food in the Last Year: Never true   Transportation Needs: No Transportation Needs (12/28/2023)    PRAPARE - Transportation     Lack of Transportation (Medical): No     Lack of Transportation (Non-Medical): No   Physical  Activity: Insufficiently Active (12/28/2023)    Exercise Vital Sign     Days of Exercise per Week: 7 days     Minutes of Exercise per Session: 20 min   Stress: No Stress Concern Present (12/28/2023)    [employer] [place] [employer] - Occupational Stress Questionnaire     Feeling of Stress : Only a little   Housing Stability: Low Risk  (12/28/2023)    Housing Stability Vital Sign     Unable to Pay for Housing in the Last Year: No     Number of Places Lived in the Last Year: 1     Unstable Housing in the Last Year: No         Current Outpatient Medications:     alendronate (FOSAMAX) 70 MG tablet, Take 1 tablet (70 mg total) by mouth every 7 days., Disp: 13 tablet, Rfl: 3    apixaban (ELIQUIS) 2.5 mg Tab, Take 1 tablet (2.5 mg total) by mouth 2 (two) times daily., Disp: 180 tablet, Rfl: 3    azelastine (ASTELIN) 137 mcg (0.1 %) nasal spray, 1 spray (137 mcg total) by Nasal route 2 (two) times daily., Disp: 18.2 mL, Rfl: 5    cloNIDine (CATAPRES) 0.1 MG tablet, Take 1 tablet (0.1 mg total) by mouth 2 (two) times daily as needed (BP > 160/90)., Disp: 180 tablet, Rfl: 3    cyanocobalamin (VITAMIN B-12) 1000 MCG tablet, Take 100 mcg by mouth once daily., Disp: , Rfl:     fluticasone propionate (FLONASE) 50 mcg/actuation nasal spray, 1 spray (50 mcg total) by Each Nostril route once daily., Disp: 16 g, Rfl: 5    levothyroxine (SYNTHROID) 50 MCG tablet, Take 1 tablet (50 mcg total) by mouth once daily., Disp: 90 tablet, Rfl: 3    losartan (COZAAR) 25 MG tablet, Take 0.5 tablets (12.5 mg total) by mouth once daily., Disp: 45 tablet, Rfl: 3    magnesium oxide (MAG-OX) 400 mg (241.3 mg magnesium) tablet, Take 400 mg by mouth once daily., Disp: , Rfl:     meclizine (ANTIVERT) 25 mg tablet, Take 1 tablet (25 mg total) by mouth 2 (two) times daily as needed for Dizziness or Nausea., Disp: 90 tablet, Rfl: 1    metFORMIN (GLUCOPHAGE-XR) 500 MG ER 24hr tablet, TAKE 1 TABLET BY MOUTH EVERY MORNING WITH FOOD FOR 2 WEEKS THEN  "INCREASE TO 1 TABLET BY MOUTH TWICE DAILY WITH FOOD, Disp: 180 tablet, Rfl: 3    metoprolol tartrate (LOPRESSOR) 25 MG tablet, Take 0.5 tablets (12.5 mg total) by mouth 2 (two) times daily., Disp: 90 tablet, Rfl: 1    multivitamin capsule, Take 1 capsule by mouth once daily., Disp: , Rfl:     NIFEdipine (PROCARDIA-XL) 30 MG (OSM) 24 hr tablet, Take 1 tablet (30 MG ) by mouth twice daily, Disp: 180 tablet, Rfl: 3    sertraline (ZOLOFT) 25 MG tablet, Take 1 tablet (25 mg total) by mouth once daily., Disp: 90 tablet, Rfl: 3    simvastatin (ZOCOR) 20 MG tablet, TAKE 1 TABLET(20 MG) BY MOUTH EVERY EVENING, Disp: 90 tablet, Rfl: 1    spironolactone (ALDACTONE) 25 MG tablet, Take 1 tablet (25 mg total) by mouth once daily., Disp: 90 tablet, Rfl: 3  No current facility-administered medications for this visit.    Facility-Administered Medications Ordered in Other Visits:     sodium chloride 0.9% flush 10 mL, 10 mL, Intravenous, 1 time in Clinic/HOD, Chacha Gutierrez MD          Objective:   Vitals:  Blood pressure (!) 149/73, pulse 80, temperature 97 °F (36.1 °C), temperature source Temporal, resp. rate 18, height 5' 2" (1.575 m), weight 56.2 kg (124 lb).    Physical Examination:   GEN: no apparent distress, comfortable  HEAD: atraumatic and normocephalic  EYES: no pallor, no icterus  ENT:  no pharyngeal erythema, external ears WNL; no nasal discharge  NECK: no masses, thyroid normal, trachea midline, no LAD/LN's, supple  CV: RRR with no murmur; normal pulse; normal S1 and S2; no pedal edema  CHEST: Normal respiratory effort; CTAB; normal breath sounds; no wheeze or crackles  ABDOM: nontender and nondistended; soft;  no rebound/guarding  MUSC/Skeletal: ROM normal; no crepitus; joints normal; no deformities   EXTREM: multiple spider veins L & R leg  SKIN: multiple ecchemoses on forearms, poor skin turgor.  : no cvat  NEURO: grossly intact; motor/sensory WNL;  no tremors  PSYCH: normal mood, affect and behavior  LYMPH: " "normal cervical, supraclavicular, axillary and groin LN's  BREASTS: L 'breast" post op change, without palpable mass.  R chest NT, post operative change, no palpable mass    CAT head small vessel dx.  MRI of L/S spine DDD, DJD.    Current studies:  Bone Scan, no metastases seen.  But intense uptake at R foot.  Concern for occult fx or osteomyelitis, she is following up with Dr. Almaguer on this.    CT of chest abdomin and pelvis, multiple 2-3 mm nodules in lungs, no previous CT for comparison.  Significance?  Will plan to repeat CT of chest in 3 months.  No definitive metastatic dx seen.    B 12 372.  Assessment:   (1) 84 y.o. female with diagnosis of  Bilateral breast cancer, S/P treatment as above.  CISCO.  Observe for now.     (2)Weight loss 15#, decreased appetite.  Periactin 4 mg BID.  Now weight better, up to 124#.  She stopped periactin.    (3)S/P pacemaker placement for bradycardia.    (4)Hx TIA, F5L, on Eliquis 2.5 mg prophylaxis long term.    (5) 2.6 cm left upper lobe lung mass concerning for lung malignancy.    (6) PET scan showed 2.4 cm hypermetabolic mass with an SUV of 14.  Hilar and mediastinal lymphadenopathy was seen.  No distant metastatic disease was seen.  Old fracture was noted at the pelvis.    (7) refer to Dr. Valenzuela for EBUS procedure for biopsy of the mass or lymph node areas for tissue diagnosis and to confirm local extent of disease.    (8) if the diagnosis turns out to be non-small-cell lung cancer, then will plan radiation therapy and concurrent low-dose weekly chemotherapy, followed by immuno Oncology treatment maintenance with Keytruda?    Return to clinic see myself in 4 weeks.                 "

## 2024-12-06 ENCOUNTER — DOCUMENT SCAN (OUTPATIENT)
Dept: HOME HEALTH SERVICES | Facility: HOSPITAL | Age: 84
End: 2024-12-06
Payer: MEDICARE

## 2024-12-08 ENCOUNTER — TELEPHONE (OUTPATIENT)
Facility: CLINIC | Age: 84
End: 2024-12-08
Payer: MEDICARE

## 2024-12-10 ENCOUNTER — OFFICE VISIT (OUTPATIENT)
Dept: PULMONOLOGY | Facility: CLINIC | Age: 84
End: 2024-12-10
Payer: MEDICARE

## 2024-12-10 VITALS
HEART RATE: 71 BPM | OXYGEN SATURATION: 97 % | HEIGHT: 62 IN | BODY MASS INDEX: 23.13 KG/M2 | SYSTOLIC BLOOD PRESSURE: 150 MMHG | WEIGHT: 125.69 LBS | DIASTOLIC BLOOD PRESSURE: 66 MMHG

## 2024-12-10 DIAGNOSIS — C34.12 MALIGNANT NEOPLASM OF UPPER LOBE OF LEFT LUNG: ICD-10-CM

## 2024-12-10 PROCEDURE — 99214 OFFICE O/P EST MOD 30 MIN: CPT | Mod: PBBFAC,PO | Performed by: STUDENT IN AN ORGANIZED HEALTH CARE EDUCATION/TRAINING PROGRAM

## 2024-12-10 PROCEDURE — 99999 PR PBB SHADOW E&M-EST. PATIENT-LVL IV: CPT | Mod: PBBFAC,,, | Performed by: STUDENT IN AN ORGANIZED HEALTH CARE EDUCATION/TRAINING PROGRAM

## 2024-12-10 PROCEDURE — 99205 OFFICE O/P NEW HI 60 MIN: CPT | Mod: S$PBB,,, | Performed by: STUDENT IN AN ORGANIZED HEALTH CARE EDUCATION/TRAINING PROGRAM

## 2024-12-10 RX ORDER — BACITRACIN ZINC AND POLYMYXIN B SULFATE 500; 10000 [USP'U]/G; [USP'U]/G
OINTMENT OPHTHALMIC NIGHTLY
COMMUNITY

## 2024-12-10 RX ORDER — DONEPEZIL HYDROCHLORIDE 5 MG/1
10 TABLET, FILM COATED ORAL
COMMUNITY
Start: 2024-10-24 | End: 2025-04-22

## 2024-12-10 NOTE — PROGRESS NOTES
12/10/2024    Ursula Rodríguez  New Patient History and Physical    Chief Complaint   Patient presents with    Pulmonary Nodules       HPI:Ms. Rodríguez is an 84 year old woman who presents with abnormal CT.     Prior dx of R breast cancer with partial mastectomy and MRM, sential node +, stage 2 dx.   L sided breat cancer in , partial mastectomy and reconstruction.   On eliquis, due hx of Factor 5 leiden- hx of TIAs.     She is a never smoker.   Her  did smoke but quit abou 30 yaers ago.   She is losing weight- but daughter says she gained some weight.   No hemoptysis. No night sweats. No chills.     She went to the ED 24- she had some swelling in her neck and was found to have a mass.       The chief complaint problem is New to me    PFSH:  Past Medical History:   Diagnosis Date    Anemia     Breast cancer     LEFT DIAGNOSED FIRST    Breast cancer     Right     Diabetes mellitus     Factor V Leiden     Hyperlipidemia 2017    Hypertension 2017    Hypothyroidism 2019    Osteoporosis     UNKNOWN DATE OF DX    Pacemaker 10/28/2021    Recurrent urinary tract infection     Status post insertion of nerve stimulator 2024    Vertigo          Past Surgical History:   Procedure Laterality Date    BLOCK, NERVE, GENICULAR Left 2024    Procedure: GENICULAR NERVE BLOCK LEFT KNEE X3 PERIPHERAL;  Surgeon: Serene Stubbs MD;  Location: [facility] OR;  Service: Pain Management;  Laterality: Left;    BREAST SURGERY  , , Nose cancer    CARDIAC VALVE REPLACEMENT       SECTION      EYE SURGERY      HEMORRHOID SURGERY  1968    HERNIA REPAIR      HYSTERECTOMY  1970    INSERTION OF PACEMAKER      INSERTION OF TUNNELED CENTRAL VENOUS CATHETER (CVC) WITH SUBCUTANEOUS PORT N/A 2023    Procedure: [id number];  Surgeon: Carl Conrad MD;  Location: [facility] OR;  Service: General;  Laterality: N/A;    MASTECTOMY Right     MASTECTOMY Left      BREAST CANCER    MEDIPORT REMOVAL Left 7/26/2023    Procedure: REMOVAL, CATHETER, CENTRAL VENOUS, TUNNELED, WITH PORT;  Surgeon: Carl Conrad MD;  Location: [facility] OR;  Service: General;  Laterality: Left;    PORTACATH PLACEMENT  2013    RADIOFREQUENCY ABLATION, NERVE, GENICULAR, KNEE Left 10/8/2024    Procedure: GENICULAR NERVE BLOCK LEFT KNEE PERIPHERAL;  Surgeon: Serene Stubbs MD;  Location: [facility] OR;  Service: Pain Management;  Laterality: Left;    TUMOR REMOVAL Left 1994    EAR     Social History     Tobacco Use    Smoking status: Never    Smokeless tobacco: Never   Substance Use Topics    Alcohol use: No     Comment: SPECIAL OCCASIONS LIKE ZACHERY    Drug use: No     Family History   Problem Relation Name Age of Onset    Cancer Mother Potter     Hypertension Mother Potter     Heart disease Mother Lena     No Known Problems Sister Ashley     Diabetes Brother Pratik     Hypertension Brother Pratik     Cancer Maternal Grandmother Mammie     Lung disease Father August     Heart disease Father August     Diabetes Father August     Cancer Brother Jameel     Diabetes Brother Jameel     Hypertension Brother Krunal     Stroke Brother Krunal     Hypertension Brother Say     Stroke Brother Say      Review of patient's allergies indicates:   Allergen Reactions    Iodine Other (See Comments)    Iodine and iodide containing products     Ditropan [oxybutynin chloride] Palpitations and Other (See Comments)     Made patient weak       Performance Status:The patient's activity level is functions out of house.      Review of Systems:   Review of Systems   Constitutional:  Negative for chills, fever, malaise/fatigue and weight loss.   HENT:  Negative for congestion, sinus pain and sore throat.    Eyes:  Negative for blurred vision and pain.   Respiratory:  Negative for cough, shortness of breath and wheezing.    Cardiovascular:  Negative for chest pain, palpitations, orthopnea, claudication and leg swelling.    Gastrointestinal:  Negative for abdominal pain, constipation, diarrhea, heartburn, melena, nausea and vomiting.   Genitourinary:  Negative for dysuria, frequency, hematuria and urgency.   Skin:  Negative for itching and rash.   Neurological:  Negative for dizziness, seizures, loss of consciousness and headaches.   Endo/Heme/Allergies:  Negative for environmental allergies and polydipsia. Does not bruise/bleed easily.   Psychiatric/Behavioral:  Negative for depression. The patient is not nervous/anxious.         Exam:  Physical Exam  Vitals reviewed.   Constitutional:       General: She is not in acute distress.     Appearance: She is well-developed. She is not diaphoretic.   HENT:      Head: Normocephalic and atraumatic.      Mouth/Throat:      Pharynx: No oropharyngeal exudate or posterior oropharyngeal erythema.   Eyes:      General: No scleral icterus.     Pupils: Pupils are equal, round, and reactive to light.   Neck:      Vascular: No JVD.   Cardiovascular:      Rate and Rhythm: Normal rate and regular rhythm.      Heart sounds: Normal heart sounds. No murmur heard.  Pulmonary:      Effort: Pulmonary effort is normal. No respiratory distress.      Breath sounds: Normal breath sounds. No wheezing.   Abdominal:      General: Bowel sounds are normal. There is no distension.      Palpations: Abdomen is soft.      Tenderness: There is no abdominal tenderness.   Musculoskeletal:         General: No swelling.      Cervical back: Normal range of motion and neck supple. No rigidity.   Skin:     General: Skin is warm and dry.      Capillary Refill: Capillary refill takes less than 2 seconds.      Coloration: Skin is not pale.      Findings: No rash.   Neurological:      General: No focal deficit present.      Mental Status: She is alert and oriented to person, place, and time.      Cranial Nerves: No cranial nerve deficit.      Motor: No weakness or abnormal muscle tone.          Radiographs (ct chest and cxr)  reviewed: view by direct vision and interpretation as below     CT chest reviewed and with PET CT from 11/18/24 and 11/7/24- new NOAH lung mass which is FDG active, and multiple active hilar and medistinal nodes.     Labs reviewed     Lab Results   Component Value Date    WBC 6.77 05/16/2024    HGB 14.1 05/16/2024    HCT 42.7 05/16/2024    MCV 83 05/16/2024     05/16/2024       CMP  Sodium   Date Value Ref Range Status   05/16/2024 141 136 - 145 mmol/L Final     Potassium   Date Value Ref Range Status   05/16/2024 3.8 3.5 - 5.1 mmol/L Final     Chloride   Date Value Ref Range Status   05/16/2024 107 95 - 110 mmol/L Final     CO2   Date Value Ref Range Status   05/16/2024 23 23 - 29 mmol/L Final     Glucose   Date Value Ref Range Status   05/16/2024 192 (H) 70 - 110 mg/dL Final     BUN   Date Value Ref Range Status   05/16/2024 27 (H) 8 - 23 mg/dL Final     Creatinine   Date Value Ref Range Status   05/16/2024 1.1 0.5 - 1.4 mg/dL Final     Calcium   Date Value Ref Range Status   05/16/2024 10.5 8.7 - 10.5 mg/dL Final     Total Protein   Date Value Ref Range Status   05/16/2024 6.7 6.0 - 8.4 g/dL Final     Albumin   Date Value Ref Range Status   05/16/2024 3.9 3.5 - 5.2 g/dL Final     Total Bilirubin   Date Value Ref Range Status   05/16/2024 0.6 0.1 - 1.0 mg/dL Final     Comment:     For infants and newborns, interpretation of results should be based  on gestational age, weight and in agreement with clinical  observations.    Premature Infant recommended reference ranges:  Up to 24 hours.............<8.0 mg/dL  Up to 48 hours............<12.0 mg/dL  3-5 days..................<15.0 mg/dL  6-29 days.................<15.0 mg/dL       Alkaline Phosphatase   Date Value Ref Range Status   05/16/2024 91 55 - 135 U/L Final     AST   Date Value Ref Range Status   05/16/2024 20 10 - 40 U/L Final     ALT   Date Value Ref Range Status   05/16/2024 15 10 - 44 U/L Final     Anion Gap   Date Value Ref Range Status    05/16/2024 11 8 - 16 mmol/L Final     eGFR   Date Value Ref Range Status   05/16/2024 48.9 (A) >60 mL/min/1.73 m^2 Final         PFT was not done      Plan:  Clinical impression is apparently straight forward and impression with management as below.    Ms Rodríguez is an 84 year old woman with prior hx of breast cancer who presents with new FDG avid lung mass in the NOAH and associated mediastinal and hilar lymphadenopathy.     Will plan for EBUS guided biopsy. I discussed risks of approaching the AP window nodes - but I think this is likely going to be avoided as there are other sites available for biopsy.     Plan for EBUS on 12/16/24  She is on eliquis  She is on metformin  Last dose of eliquis is Friday the 13th.     Problem List Items Addressed This Visit    None  Visit Diagnoses       Malignant neoplasm of upper lobe of left lung                No follow-ups on file.    Discussed with patient above for education the following:      There are no Patient Instructions on file for this visit.

## 2024-12-10 NOTE — H&P (VIEW-ONLY)
12/10/2024    Ursula Rodríguez  New Patient History and Physical    Chief Complaint   Patient presents with    Pulmonary Nodules       HPI:Ms. Rodríguez is an 84 year old woman who presents with abnormal CT.     Prior dx of R breast cancer with partial mastectomy and MRM, sential node +, stage 2 dx.   L sided breat cancer in , partial mastectomy and reconstruction.   On eliquis, due hx of Factor 5 leiden- hx of TIAs.     She is a never smoker.   Her  did smoke but quit abou 30 yaers ago.   She is losing weight- but daughter says she gained some weight.   No hemoptysis. No night sweats. No chills.     She went to the ED 24- she had some swelling in her neck and was found to have a mass.       The chief complaint problem is New to me    PFSH:  Past Medical History:   Diagnosis Date    Anemia     Breast cancer     LEFT DIAGNOSED FIRST    Breast cancer     Right     Diabetes mellitus     Factor V Leiden     Hyperlipidemia 2017    Hypertension 2017    Hypothyroidism 2019    Osteoporosis     UNKNOWN DATE OF DX    Pacemaker 10/28/2021    Recurrent urinary tract infection     Status post insertion of nerve stimulator 2024    Vertigo          Past Surgical History:   Procedure Laterality Date    BLOCK, NERVE, GENICULAR Left 2024    Procedure: GENICULAR NERVE BLOCK LEFT KNEE X3 PERIPHERAL;  Surgeon: Serene Stubbs MD;  Location: [facility] OR;  Service: Pain Management;  Laterality: Left;    BREAST SURGERY  , , Nose cancer    CARDIAC VALVE REPLACEMENT       SECTION      EYE SURGERY      HEMORRHOID SURGERY  1968    HERNIA REPAIR      HYSTERECTOMY  1970    INSERTION OF PACEMAKER      INSERTION OF TUNNELED CENTRAL VENOUS CATHETER (CVC) WITH SUBCUTANEOUS PORT N/A 2023    Procedure: HYFOBIEHC-ZMLI-I-CATH;  Surgeon: Carl Conrad MD;  Location: [facility] OR;  Service: General;  Laterality: N/A;    MASTECTOMY Right     MASTECTOMY Left      BREAST CANCER    MEDIPORT REMOVAL Left 7/26/2023    Procedure: REMOVAL, CATHETER, CENTRAL VENOUS, TUNNELED, WITH PORT;  Surgeon: Carl Conrad MD;  Location: [facility] OR;  Service: General;  Laterality: Left;    PORTACATH PLACEMENT  2013    RADIOFREQUENCY ABLATION, NERVE, GENICULAR, KNEE Left 10/8/2024    Procedure: GENICULAR NERVE BLOCK LEFT KNEE PERIPHERAL;  Surgeon: Serene Stubbs MD;  Location: [facility] OR;  Service: Pain Management;  Laterality: Left;    TUMOR REMOVAL Left 1994    EAR     Social History     Tobacco Use    Smoking status: Never    Smokeless tobacco: Never   Substance Use Topics    Alcohol use: No     Comment: SPECIAL OCCASIONS LIKE ZACHERY    Drug use: No     Family History   Problem Relation Name Age of Onset    Cancer Mother [place]     Hypertension Mother [place]     Heart disease Mother Lena     No Known Problems Sister Ashley     Diabetes Brother Pratik     Hypertension Brother Pratik     Cancer Maternal Grandmother Mammie     Lung disease Father August     Heart disease Father August     Diabetes Father August     Cancer Brother Jameel     Diabetes Brother Jameel     Hypertension Brother Krunal     Stroke Brother Krunal     Hypertension Brother Say     Stroke Brother Say      Review of patient's allergies indicates:   Allergen Reactions    Iodine Other (See Comments)    Iodine and iodide containing products     Ditropan [oxybutynin chloride] Palpitations and Other (See Comments)     Made patient weak       Performance Status:The patient's activity level is functions out of house.      Review of Systems:   Review of Systems   Constitutional:  Negative for chills, fever, malaise/fatigue and weight loss.   HENT:  Negative for congestion, sinus pain and sore throat.    Eyes:  Negative for blurred vision and pain.   Respiratory:  Negative for cough, shortness of breath and wheezing.    Cardiovascular:  Negative for chest pain, palpitations, orthopnea, claudication and leg swelling.    Gastrointestinal:  Negative for abdominal pain, constipation, diarrhea, heartburn, melena, nausea and vomiting.   Genitourinary:  Negative for dysuria, frequency, hematuria and urgency.   Skin:  Negative for itching and rash.   Neurological:  Negative for dizziness, seizures, loss of consciousness and headaches.   Endo/Heme/Allergies:  Negative for environmental allergies and polydipsia. Does not bruise/bleed easily.   Psychiatric/Behavioral:  Negative for depression. The patient is not nervous/anxious.         Exam:  Physical Exam  Vitals reviewed.   Constitutional:       General: She is not in acute distress.     Appearance: She is well-developed. She is not diaphoretic.   HENT:      Head: Normocephalic and atraumatic.      Mouth/Throat:      Pharynx: No oropharyngeal exudate or posterior oropharyngeal erythema.   Eyes:      General: No scleral icterus.     Pupils: Pupils are equal, round, and reactive to light.   Neck:      Vascular: No JVD.   Cardiovascular:      Rate and Rhythm: Normal rate and regular rhythm.      Heart sounds: Normal heart sounds. No murmur heard.  Pulmonary:      Effort: Pulmonary effort is normal. No respiratory distress.      Breath sounds: Normal breath sounds. No wheezing.   Abdominal:      General: Bowel sounds are normal. There is no distension.      Palpations: Abdomen is soft.      Tenderness: There is no abdominal tenderness.   Musculoskeletal:         General: No swelling.      Cervical back: Normal range of motion and neck supple. No rigidity.   Skin:     General: Skin is warm and dry.      Capillary Refill: Capillary refill takes less than 2 seconds.      Coloration: Skin is not pale.      Findings: No rash.   Neurological:      General: No focal deficit present.      Mental Status: She is alert and oriented to person, place, and time.      Cranial Nerves: No cranial nerve deficit.      Motor: No weakness or abnormal muscle tone.          Radiographs (ct chest and cxr)  reviewed: view by direct vision and interpretation as below     CT chest reviewed and with PET CT from 11/18/24 and 11/7/24- new NOAH lung mass which is FDG active, and multiple active hilar and medistinal nodes.     Labs reviewed     Lab Results   Component Value Date    WBC 6.77 05/16/2024    HGB 14.1 05/16/2024    HCT 42.7 05/16/2024    MCV 83 05/16/2024     05/16/2024       CMP  Sodium   Date Value Ref Range Status   05/16/2024 141 136 - 145 mmol/L Final     Potassium   Date Value Ref Range Status   05/16/2024 3.8 3.5 - 5.1 mmol/L Final     Chloride   Date Value Ref Range Status   05/16/2024 107 95 - 110 mmol/L Final     CO2   Date Value Ref Range Status   05/16/2024 23 23 - 29 mmol/L Final     Glucose   Date Value Ref Range Status   05/16/2024 192 (H) 70 - 110 mg/dL Final     BUN   Date Value Ref Range Status   05/16/2024 27 (H) 8 - 23 mg/dL Final     Creatinine   Date Value Ref Range Status   05/16/2024 1.1 0.5 - 1.4 mg/dL Final     Calcium   Date Value Ref Range Status   05/16/2024 10.5 8.7 - 10.5 mg/dL Final     Total Protein   Date Value Ref Range Status   05/16/2024 6.7 6.0 - 8.4 g/dL Final     Albumin   Date Value Ref Range Status   05/16/2024 3.9 3.5 - 5.2 g/dL Final     Total Bilirubin   Date Value Ref Range Status   05/16/2024 0.6 0.1 - 1.0 mg/dL Final     Comment:     For infants and newborns, interpretation of results should be based  on gestational age, weight and in agreement with clinical  observations.    Premature Infant recommended reference ranges:  Up to 24 hours.............<8.0 mg/dL  Up to 48 hours............<12.0 mg/dL  3-5 days..................<15.0 mg/dL  6-29 days.................<15.0 mg/dL       Alkaline Phosphatase   Date Value Ref Range Status   05/16/2024 91 55 - 135 U/L Final     AST   Date Value Ref Range Status   05/16/2024 20 10 - 40 U/L Final     ALT   Date Value Ref Range Status   05/16/2024 15 10 - 44 U/L Final     Anion Gap   Date Value Ref Range Status    05/16/2024 11 8 - 16 mmol/L Final     eGFR   Date Value Ref Range Status   05/16/2024 48.9 (A) >60 mL/min/1.73 m^2 Final         PFT was not done      Plan:  Clinical impression is apparently straight forward and impression with management as below.    Ms Rodríguez is an 84 year old woman with prior hx of breast cancer who presents with new FDG avid lung mass in the NOAH and associated mediastinal and hilar lymphadenopathy.     Will plan for EBUS guided biopsy. I discussed risks of approaching the AP window nodes - but I think this is likely going to be avoided as there are other sites available for biopsy.     Plan for EBUS on 12/16/24  She is on eliquis  She is on metformin  Last dose of eliquis is Friday the 13th.     Problem List Items Addressed This Visit    None  Visit Diagnoses       Malignant neoplasm of upper lobe of left lung                No follow-ups on file.    Discussed with patient above for education the following:      There are no Patient Instructions on file for this visit.

## 2024-12-13 ENCOUNTER — ANESTHESIA EVENT (OUTPATIENT)
Dept: SURGERY | Facility: HOSPITAL | Age: 84
End: 2024-12-13
Payer: MEDICARE

## 2024-12-14 NOTE — ANESTHESIA PREPROCEDURE EVALUATION
12/13/2024  Ursula Rodríguez is a 84 y.o., female.      Results for orders placed or performed during the hospital encounter of 01/03/23   EKG 12-lead    Collection Time: 01/03/23  9:51 AM    Narrative    Test Reason : R53.1,    Vent. Rate : 097 BPM     Atrial Rate : 097 BPM     P-R Int : 146 ms          QRS Dur : 120 ms      QT Int : 356 ms       P-R-T Axes : 068 -55 097 degrees     QTc Int : 452 ms    Normal sinus rhythm  Biatrial enlargement  Left axis deviation  Anteroseptal infarct ,age undetermined  Incomplete left bundle branch block  ST and T wave abnormality, consider lateral ischemia  Abnormal ECG  When compared with ECG of 29-JUL-2019 19:41,  Anteroseptal infarct is now suggested  Confirmed by Jameel Sales MD ([id number]) on 1/4/2023 8:54:07 AM    Referred By: [email or web address]   SELF           Confirmed By:Jameel Sales MD             Lab Results   Component Value Date    WBC 6.77 05/16/2024    HGB 14.1 05/16/2024    HCT 42.7 05/16/2024    MCV 83 05/16/2024     05/16/2024     BMP  Lab Results   Component Value Date     05/16/2024    K 3.8 05/16/2024     05/16/2024    CO2 23 05/16/2024    BUN 27 (H) 05/16/2024    CREATININE 1.1 05/16/2024    CALCIUM 10.5 05/16/2024    ANIONGAP 11 05/16/2024     (H) 05/16/2024     (H) 01/11/2024     (H) 09/21/2023       No results found for this or any previous visit.         Pre-op Assessment    I have reviewed the Patient Summary Reports.     I have reviewed the Nursing Notes. I have reviewed the NPO Status.   I have reviewed the Medications.     Review of Systems  Anesthesia Hx:  No problems with previous Anesthesia             Denies Family Hx of Anesthesia complications.    Denies Personal Hx of Anesthesia complications.                    Social:  No Alcohol Use, Non-Smoker       Hematology/Oncology:                   Hematology Comments:  Eliquis therapy  Factor V Leiden      --  Cancer in past history:       Breast              EENT/Dental:   Neck mass          Cardiovascular:    Pacemaker Hypertension, poorly controlled    Dysrhythmias atrial fibrillation      hyperlipidemia   ECG has been reviewed. History of third-degree AV block                           Renal/:  Chronic Renal Disease, CKD        Kidney Function/Disease, Chronic Kidney Disease (CKD) , CKD Stage III (GFR 30-59)            Hepatic/GI:  Hepatic/GI Normal                    Musculoskeletal:  Arthritis   Status post insertion of nerve stimulator   Joint Disease:  Arthritis, Osteoarthritis   Bone Disorders:    Osteoporosis        Neurological:      Osteoarthritis  Peripheral Neuropathy                          Endocrine:  Diabetes, poorly controlled, type 2 Hypothyroidism              Physical Exam  General: Well nourished, Cooperative, Alert and Oriented    Airway:  Mallampati: III   Mouth Opening: Normal  TM Distance: > 6 cm  Tongue: Normal  Neck ROM: Extension Decreased    Chest/Lungs:  Clear to auscultation, Normal Respiratory Rate    Heart:  Rate: Normal  Rhythm: Regular Rhythm        Anesthesia Plan  Type of Anesthesia, risks & benefits discussed:    Anesthesia Type: Gen ETT  Intra-op Monitoring Plan: Standard ASA Monitors  Post Op Pain Control Plan: multimodal analgesia and IV/PO Opioids PRN  Induction:  IV  Airway Plan: Video, Post-Induction  Informed Consent: Informed consent signed with the Patient and all parties understand the risks and agree with anesthesia plan.  All questions answered.   ASA Score: 3  Anesthesia Plan Notes:   GETA  IV tylenol  Zofran Pepcid    Ready For Surgery From Anesthesia Perspective.     .

## 2024-12-16 ENCOUNTER — ANESTHESIA (OUTPATIENT)
Dept: SURGERY | Facility: HOSPITAL | Age: 84
End: 2024-12-16
Payer: MEDICARE

## 2024-12-16 ENCOUNTER — HOSPITAL ENCOUNTER (OUTPATIENT)
Facility: HOSPITAL | Age: 84
Discharge: HOME | End: 2024-12-16
Attending: STUDENT IN AN ORGANIZED HEALTH CARE EDUCATION/TRAINING PROGRAM | Admitting: STUDENT IN AN ORGANIZED HEALTH CARE EDUCATION/TRAINING PROGRAM
Payer: MEDICARE

## 2024-12-16 VITALS
OXYGEN SATURATION: 97 % | DIASTOLIC BLOOD PRESSURE: 76 MMHG | TEMPERATURE: 99 F | SYSTOLIC BLOOD PRESSURE: 171 MMHG | RESPIRATION RATE: 16 BRPM | HEART RATE: 64 BPM

## 2024-12-16 DIAGNOSIS — R91.8 LUNG MASS: Primary | ICD-10-CM

## 2024-12-16 DIAGNOSIS — C34.12 MALIGNANT NEOPLASM OF UPPER LOBE OF LEFT LUNG: ICD-10-CM

## 2024-12-16 LAB
ANION GAP SERPL CALC-SCNC: 8 MMOL/L (ref 8–16)
BUN SERPL-MCNC: 24 MG/DL (ref 8–23)
CALCIUM SERPL-MCNC: 10.9 MG/DL (ref 8.7–10.5)
CHLORIDE SERPL-SCNC: 104 MMOL/L (ref 95–110)
CO2 SERPL-SCNC: 25 MMOL/L (ref 23–29)
CREAT SERPL-MCNC: 1 MG/DL (ref 0.5–1.4)
ERYTHROCYTE [DISTWIDTH] IN BLOOD BY AUTOMATED COUNT: 14.3 % (ref 11.5–14.5)
EST. GFR  (NO RACE VARIABLE): 54.9 ML/MIN/1.73 M^2
GLUCOSE SERPL-MCNC: 113 MG/DL (ref 70–110)
HCT VFR BLD AUTO: 46.9 % (ref 37–48.5)
HGB BLD-MCNC: 15.6 G/DL (ref 12–16)
MCH RBC QN AUTO: 27.8 PG (ref 27–31)
MCHC RBC AUTO-ENTMCNC: 33.3 G/DL (ref 32–36)
MCV RBC AUTO: 84 FL (ref 82–98)
PLATELET # BLD AUTO: 152 K/UL (ref 150–450)
PMV BLD AUTO: 10.6 FL (ref 9.2–12.9)
POCT GLUCOSE: 111 MG/DL (ref 70–110)
POTASSIUM SERPL-SCNC: 4.3 MMOL/L (ref 3.5–5.1)
RBC # BLD AUTO: 5.62 M/UL (ref 4–5.4)
SODIUM SERPL-SCNC: 137 MMOL/L (ref 136–145)
WBC # BLD AUTO: 8.18 K/UL (ref 3.9–12.7)

## 2024-12-16 PROCEDURE — 31623 DX BRONCHOSCOPE/BRUSH: CPT | Mod: 51,,, | Performed by: STUDENT IN AN ORGANIZED HEALTH CARE EDUCATION/TRAINING PROGRAM

## 2024-12-16 PROCEDURE — 31652 BRONCH EBUS SAMPLNG 1/2 NODE: CPT | Mod: ,,, | Performed by: STUDENT IN AN ORGANIZED HEALTH CARE EDUCATION/TRAINING PROGRAM

## 2024-12-16 PROCEDURE — 31652 BRONCH EBUS SAMPLNG 1/2 NODE: CPT | Performed by: STUDENT IN AN ORGANIZED HEALTH CARE EDUCATION/TRAINING PROGRAM

## 2024-12-16 PROCEDURE — 27202053 HC NEEDLE BIOPSY EUS: Performed by: STUDENT IN AN ORGANIZED HEALTH CARE EDUCATION/TRAINING PROGRAM

## 2024-12-16 PROCEDURE — 99900035 HC TECH TIME PER 15 MIN (STAT)

## 2024-12-16 PROCEDURE — 37000008 HC ANESTHESIA 1ST 15 MINUTES: Performed by: STUDENT IN AN ORGANIZED HEALTH CARE EDUCATION/TRAINING PROGRAM

## 2024-12-16 PROCEDURE — 63600175 PHARM REV CODE 636 W HCPCS: Performed by: NURSE ANESTHETIST, CERTIFIED REGISTERED

## 2024-12-16 PROCEDURE — 25000003 PHARM REV CODE 250: Performed by: NURSE ANESTHETIST, CERTIFIED REGISTERED

## 2024-12-16 PROCEDURE — 27000673 HC TUBING BLOOD Y: Performed by: ANESTHESIOLOGY

## 2024-12-16 PROCEDURE — 27200946 HC BRUSH, CYTOLOGY: Performed by: STUDENT IN AN ORGANIZED HEALTH CARE EDUCATION/TRAINING PROGRAM

## 2024-12-16 PROCEDURE — 27201107 HC STYLET, STANDARD: Performed by: ANESTHESIOLOGY

## 2024-12-16 PROCEDURE — 27201114 HC TRAP (ANY): Performed by: STUDENT IN AN ORGANIZED HEALTH CARE EDUCATION/TRAINING PROGRAM

## 2024-12-16 PROCEDURE — 37000009 HC ANESTHESIA EA ADD 15 MINS: Performed by: STUDENT IN AN ORGANIZED HEALTH CARE EDUCATION/TRAINING PROGRAM

## 2024-12-16 PROCEDURE — 27000679 HC ADAPTOR, BRONCHOSCOPE: Performed by: STUDENT IN AN ORGANIZED HEALTH CARE EDUCATION/TRAINING PROGRAM

## 2024-12-16 PROCEDURE — 94799 UNLISTED PULMONARY SVC/PX: CPT

## 2024-12-16 PROCEDURE — 31624 DX BRONCHOSCOPE/LAVAGE: CPT | Performed by: STUDENT IN AN ORGANIZED HEALTH CARE EDUCATION/TRAINING PROGRAM

## 2024-12-16 PROCEDURE — 88112 CYTOPATH CELL ENHANCE TECH: CPT | Mod: TC,59 | Performed by: PATHOLOGY

## 2024-12-16 PROCEDURE — 80048 BASIC METABOLIC PNL TOTAL CA: CPT | Performed by: STUDENT IN AN ORGANIZED HEALTH CARE EDUCATION/TRAINING PROGRAM

## 2024-12-16 PROCEDURE — 31624 DX BRONCHOSCOPE/LAVAGE: CPT | Mod: 51,,, | Performed by: STUDENT IN AN ORGANIZED HEALTH CARE EDUCATION/TRAINING PROGRAM

## 2024-12-16 PROCEDURE — 31623 DX BRONCHOSCOPE/BRUSH: CPT | Performed by: STUDENT IN AN ORGANIZED HEALTH CARE EDUCATION/TRAINING PROGRAM

## 2024-12-16 PROCEDURE — 63600175 PHARM REV CODE 636 W HCPCS: Performed by: STUDENT IN AN ORGANIZED HEALTH CARE EDUCATION/TRAINING PROGRAM

## 2024-12-16 PROCEDURE — 27202107 HC XP QUATRO SENSOR: Performed by: ANESTHESIOLOGY

## 2024-12-16 PROCEDURE — 85027 COMPLETE CBC AUTOMATED: CPT | Performed by: STUDENT IN AN ORGANIZED HEALTH CARE EDUCATION/TRAINING PROGRAM

## 2024-12-16 RX ORDER — DIPHENHYDRAMINE HYDROCHLORIDE 50 MG/ML
12.5 INJECTION INTRAMUSCULAR; INTRAVENOUS ONCE AS NEEDED
Status: DISCONTINUED | OUTPATIENT
Start: 2024-12-16 | End: 2024-12-16 | Stop reason: HOSPADM

## 2024-12-16 RX ORDER — ROCURONIUM BROMIDE 10 MG/ML
INJECTION, SOLUTION INTRAVENOUS
Status: DISCONTINUED | OUTPATIENT
Start: 2024-12-16 | End: 2024-12-16

## 2024-12-16 RX ORDER — FENTANYL CITRATE 50 UG/ML
INJECTION, SOLUTION INTRAMUSCULAR; INTRAVENOUS
Status: DISCONTINUED | OUTPATIENT
Start: 2024-12-16 | End: 2024-12-16

## 2024-12-16 RX ORDER — SUCCINYLCHOLINE CHLORIDE 20 MG/ML
INJECTION INTRAMUSCULAR; INTRAVENOUS
Status: DISCONTINUED | OUTPATIENT
Start: 2024-12-16 | End: 2024-12-16

## 2024-12-16 RX ORDER — HYDRALAZINE HYDROCHLORIDE 20 MG/ML
INJECTION INTRAMUSCULAR; INTRAVENOUS
Status: DISCONTINUED | OUTPATIENT
Start: 2024-12-16 | End: 2024-12-16

## 2024-12-16 RX ORDER — ACETAMINOPHEN 10 MG/ML
INJECTION, SOLUTION INTRAVENOUS
Status: DISCONTINUED | OUTPATIENT
Start: 2024-12-16 | End: 2024-12-16

## 2024-12-16 RX ORDER — LIDOCAINE HYDROCHLORIDE 10 MG/ML
INJECTION, SOLUTION INFILTRATION; PERINEURAL
Status: COMPLETED | OUTPATIENT
Start: 2024-12-16 | End: 2024-12-16

## 2024-12-16 RX ORDER — GLUCAGON 1 MG
1 KIT INJECTION
Status: DISCONTINUED | OUTPATIENT
Start: 2024-12-16 | End: 2024-12-16 | Stop reason: HOSPADM

## 2024-12-16 RX ORDER — LIDOCAINE HYDROCHLORIDE 20 MG/ML
INJECTION, SOLUTION EPIDURAL; INFILTRATION; INTRACAUDAL; PERINEURAL
Status: DISCONTINUED | OUTPATIENT
Start: 2024-12-16 | End: 2024-12-16

## 2024-12-16 RX ORDER — OXYCODONE HYDROCHLORIDE 5 MG/1
5 TABLET ORAL
Status: DISCONTINUED | OUTPATIENT
Start: 2024-12-16 | End: 2024-12-16 | Stop reason: HOSPADM

## 2024-12-16 RX ORDER — FAMOTIDINE 10 MG/ML
INJECTION INTRAVENOUS
Status: DISCONTINUED | OUTPATIENT
Start: 2024-12-16 | End: 2024-12-16

## 2024-12-16 RX ORDER — ONDANSETRON HYDROCHLORIDE 2 MG/ML
4 INJECTION, SOLUTION INTRAVENOUS DAILY PRN
Status: DISCONTINUED | OUTPATIENT
Start: 2024-12-16 | End: 2024-12-16 | Stop reason: HOSPADM

## 2024-12-16 RX ORDER — PROPOFOL 10 MG/ML
VIAL (ML) INTRAVENOUS CONTINUOUS PRN
Status: DISCONTINUED | OUTPATIENT
Start: 2024-12-16 | End: 2024-12-16

## 2024-12-16 RX ORDER — ONDANSETRON HYDROCHLORIDE 2 MG/ML
INJECTION, SOLUTION INTRAVENOUS
Status: DISCONTINUED | OUTPATIENT
Start: 2024-12-16 | End: 2024-12-16

## 2024-12-16 RX ORDER — PROPOFOL 10 MG/ML
VIAL (ML) INTRAVENOUS
Status: DISCONTINUED | OUTPATIENT
Start: 2024-12-16 | End: 2024-12-16

## 2024-12-16 RX ORDER — FENTANYL CITRATE 50 UG/ML
25 INJECTION, SOLUTION INTRAMUSCULAR; INTRAVENOUS EVERY 5 MIN PRN
Status: DISCONTINUED | OUTPATIENT
Start: 2024-12-16 | End: 2024-12-16 | Stop reason: HOSPADM

## 2024-12-16 RX ADMIN — ONDANSETRON 4 MG: 2 INJECTION INTRAMUSCULAR; INTRAVENOUS at 08:12

## 2024-12-16 RX ADMIN — PROPOFOL 130 MCG/KG/MIN: 10 INJECTION, EMULSION INTRAVENOUS at 08:12

## 2024-12-16 RX ADMIN — SUGAMMADEX 200 MG: 100 INJECTION, SOLUTION INTRAVENOUS at 10:12

## 2024-12-16 RX ADMIN — SODIUM CHLORIDE, SODIUM LACTATE, POTASSIUM CHLORIDE, AND CALCIUM CHLORIDE: .6; .31; .03; .02 INJECTION, SOLUTION INTRAVENOUS at 08:12

## 2024-12-16 RX ADMIN — HYDRALAZINE HYDROCHLORIDE 5 MG: 20 INJECTION, SOLUTION INTRAMUSCULAR; INTRAVENOUS at 09:12

## 2024-12-16 RX ADMIN — ROCURONIUM BROMIDE 45 MG: 10 INJECTION, SOLUTION INTRAVENOUS at 08:12

## 2024-12-16 RX ADMIN — FENTANYL CITRATE 25 MCG: 50 INJECTION INTRAMUSCULAR; INTRAVENOUS at 08:12

## 2024-12-16 RX ADMIN — PROPOFOL 100 MG: 10 INJECTION, EMULSION INTRAVENOUS at 08:12

## 2024-12-16 RX ADMIN — FENTANYL CITRATE 50 MCG: 50 INJECTION INTRAMUSCULAR; INTRAVENOUS at 09:12

## 2024-12-16 RX ADMIN — FENTANYL CITRATE 25 MCG: 50 INJECTION INTRAMUSCULAR; INTRAVENOUS at 09:12

## 2024-12-16 RX ADMIN — LIDOCAINE HYDROCHLORIDE 50 MG: 20 INJECTION, SOLUTION INTRAVENOUS at 08:12

## 2024-12-16 RX ADMIN — FAMOTIDINE 20 MG: 10 INJECTION, SOLUTION INTRAVENOUS at 08:12

## 2024-12-16 RX ADMIN — Medication 100 MG: at 08:12

## 2024-12-16 RX ADMIN — ACETAMINOPHEN 1000 MG: 10 INJECTION, SOLUTION INTRAVENOUS at 09:12

## 2024-12-16 RX ADMIN — ROCURONIUM BROMIDE 5 MG: 10 INJECTION, SOLUTION INTRAVENOUS at 08:12

## 2024-12-16 NOTE — TRANSFER OF CARE
Anesthesia Transfer of Care Note    Patient: Ursula Rodríguez    Procedure(s) Performed: Procedure(s) (LRB):  ENDOBRONCHIAL ULTRASOUND (EBUS) (N/A)    Patient location: PACU    Anesthesia Type: general    Transport from OR: Transported from OR on room air with adequate spontaneous ventilation    Post pain: adequate analgesia    Post assessment: no apparent anesthetic complications and tolerated procedure well    Post vital signs: stable    Level of consciousness: awake, alert and oriented    Nausea/Vomiting: no nausea/vomiting    Complications: none    Transfer of care protocol was followed      Last vitals: Visit Vitals  /63   Pulse 66   Temp 37.3 °C (99.1 °F) (Oral)   Resp 16   SpO2 98%

## 2024-12-16 NOTE — ANESTHESIA PROCEDURE NOTES
Intubation    Date/Time: 12/16/2024 8:48 AM    Performed by: Kailash Palacios CRNA  Authorized by: Levon Cummings MD    Intubation:     Induction:  Intravenous    Intubated:  Postinduction    Mask Ventilation:  Easy mask    Attempts:  1    Attempted By:  CRNA    Method of Intubation:  Video laryngoscopy    Blade:  Chavarria 3    Laryngeal View Grade: Grade I - full view of cords      Difficult Airway Encountered?: No      Complications:  None    Airway Device:  Oral endotracheal tube    Airway Device Size:  8.5    Style/Cuff Inflation:  Cuffed    Inflation Amount (mL):  3    Tube secured:  20    Secured at:  The lips    Placement Verified By:  Capnometry    Complicating Factors:  None    Findings Post-Intubation:  BS equal bilateral and atraumatic/condition of teeth unchanged

## 2024-12-16 NOTE — PROCEDURES
EBUS/BRONCHOSCOPY PROCEDURE    Indication: Lung mass     Consent: The benefits, risks, and alternatives of the procedure were discussed, and informed consent was obtained from the patient.    Intra-procedural medications: See nurse note. The patient underwent the procedure with general anesthesia; see anesthesiology records for further details.    Time Out: A time out was performed prior initiation of the procedure.    Procedure: The bronchoscope was passed with ease through the ET tube. The patient tolerated the procedure well. The views were adequate.    Findings:  Video Bronchoscopy  *Overall:     First the EBUS scope was inserted the airway to evaluate for station 4R.  4R was extremely small, only about 2-3 mm in size and poorly delineated borders.  I elected not to biopsy.    Then the EBUS scope was advanced to station 7, which was about 10 mm in size, 4 passes were made with a 25 gauge needle.    The EBUS scope was advanced to station 11L and 10 L. 11 L was again less than 5 mm in size, and I could not visualize 10 L.    The EBUS scope was advanced to the right hilum, I was not able to visualize station 10R or 11R.    The EBUS scope was removed and the  scope was inserted the airway to evaluate the left upper lobe.    There was a abnormally narrowed subsegment corresponding to the apical subsegment of the upper division of the left upper lobe. The airway appeared to have reduced caliber in airway size, by about 50-80%.  With great difficulty a brush biopsy was performed in this specific subsegments for 2 total biopsies.  Is then followed by a BAL in the upper division of the left upper lobe, 100 cc of sterile normal saline was instilled, 60 cc return which was clear and mixed with cellular debris and mucus.    All airways were inspected down to subsegmental levels including the right and left side.  Other than what was mentioned above all airways were patent without any evidence of endobronchial lesions  mucosal abnormalities secretions or erythema.    Unplanned Events:  *There were no unplanned events. The patient's vital signs remained normal throughout the procedure.  *ETT was removed in the procedure room.  *Post-procedure chest x-ray is required.  *Patient was sent in stable condition to recovery area.    Estimated Blood Loss: Minimal.    Recommendations/Plans:  Follow up in oncology clinic as scheduled.    Raleigh Valenzuela MD  Pulmonary and Critical Care Medicine  [facility]  12/16/2024  10:08 AM

## 2024-12-16 NOTE — ANESTHESIA POSTPROCEDURE EVALUATION
Anesthesia Post Evaluation    Patient: Ursula Rodríguez    Procedure(s) Performed: Procedure(s) (LRB):  ENDOBRONCHIAL ULTRASOUND (EBUS) (N/A)    Final Anesthesia Type: general      Patient location during evaluation: PACU  Patient participation: Yes- Able to Participate  Level of consciousness: awake and alert and oriented  Post-procedure vital signs: reviewed and stable  Pain management: adequate  Airway patency: patent    PONV status at discharge: No PONV  Anesthetic complications: no      Cardiovascular status: blood pressure returned to baseline and hemodynamically stable  Respiratory status: unassisted, spontaneous ventilation and room air  Hydration status: euvolemic  Follow-up not needed.              Vitals Value Taken Time   /72 12/16/24 1100   Temp 36.6 12/16/24 1104   Pulse 65 12/16/24 1102   Resp 18 12/16/24 1102   SpO2 94 % 12/16/24 1102   Vitals shown include unfiled device data.      No case tracking events are documented in the log.      Pain/Chaim Score: Chaim Score: 10 (12/16/2024 11:00 AM)

## 2024-12-16 NOTE — DISCHARGE SUMMARY
[facility]  Discharge Note  Short Stay    Procedure(s) (LRB):  ENDOBRONCHIAL ULTRASOUND (EBUS) (N/A)      OUTCOME: Patient tolerated treatment/procedure well without complication and is now ready for discharge.    DISPOSITION: Home or Self Care    FINAL DIAGNOSIS:  Lung mass    FOLLOWUP:  oncology clinic     DISCHARGE INSTRUCTIONS:  No discharge procedures on file.      Clinical Reference Documents Added to Patient Instructions         Document    ENDOBRONCHIAL ULTRASOUND (ENGLISH)            TIME SPENT ON DISCHARGE: 5 minutes

## 2024-12-16 NOTE — INTERVAL H&P NOTE
The patient has been examined and the H&P has been reviewed:    I concur with the findings and no changes have occurred since H&P was written.    Procedure risks, benefits and alternative options discussed and understood by patient/family.          Active Hospital Problems    Diagnosis  POA    *Lung mass [R91.8]  Yes      Resolved Hospital Problems   No resolved problems to display.

## 2024-12-18 ENCOUNTER — OFFICE VISIT (OUTPATIENT)
Facility: CLINIC | Age: 84
End: 2024-12-18
Payer: MEDICARE

## 2024-12-18 VITALS
WEIGHT: 124 LBS | BODY MASS INDEX: 22.82 KG/M2 | RESPIRATION RATE: 16 BRPM | HEART RATE: 73 BPM | DIASTOLIC BLOOD PRESSURE: 75 MMHG | TEMPERATURE: 97 F | HEIGHT: 62 IN | SYSTOLIC BLOOD PRESSURE: 144 MMHG

## 2024-12-18 DIAGNOSIS — R91.8 LUNG MASS: Primary | ICD-10-CM

## 2024-12-18 DIAGNOSIS — M17.11 PRIMARY LOCALIZED OSTEOARTHROSIS OF RIGHT LOWER LEG: ICD-10-CM

## 2024-12-18 DIAGNOSIS — I44.2 THIRD DEGREE AV BLOCK: ICD-10-CM

## 2024-12-18 DIAGNOSIS — Z85.3 HISTORY OF BILATERAL BREAST CANCER: ICD-10-CM

## 2024-12-18 DIAGNOSIS — R91.1 SOLITARY PULMONARY NODULE: ICD-10-CM

## 2024-12-18 PROCEDURE — 99214 OFFICE O/P EST MOD 30 MIN: CPT | Mod: PBBFAC,PN | Performed by: INTERNAL MEDICINE

## 2024-12-18 PROCEDURE — 99999 PR PBB SHADOW E&M-EST. PATIENT-LVL IV: CPT | Mod: PBBFAC,,, | Performed by: INTERNAL MEDICINE

## 2024-12-18 PROCEDURE — 99215 OFFICE O/P EST HI 40 MIN: CPT | Mod: S$PBB,,, | Performed by: INTERNAL MEDICINE

## 2024-12-18 PROCEDURE — G2211 COMPLEX E/M VISIT ADD ON: HCPCS | Mod: S$PBB,,, | Performed by: INTERNAL MEDICINE

## 2024-12-19 ENCOUNTER — INFUSION (OUTPATIENT)
Dept: INFUSION THERAPY | Facility: HOSPITAL | Age: 84
End: 2024-12-19
Attending: FAMILY MEDICINE
Payer: MEDICARE

## 2024-12-19 VITALS
WEIGHT: 123.44 LBS | TEMPERATURE: 97 F | HEART RATE: 84 BPM | BODY MASS INDEX: 22.71 KG/M2 | DIASTOLIC BLOOD PRESSURE: 61 MMHG | HEIGHT: 62 IN | SYSTOLIC BLOOD PRESSURE: 133 MMHG | RESPIRATION RATE: 16 BRPM

## 2024-12-19 DIAGNOSIS — Z85.3 HISTORY OF BILATERAL BREAST CANCER: Primary | ICD-10-CM

## 2024-12-19 PROCEDURE — A4216 STERILE WATER/SALINE, 10 ML: HCPCS | Performed by: FAMILY MEDICINE

## 2024-12-19 PROCEDURE — 63600175 PHARM REV CODE 636 W HCPCS: Performed by: FAMILY MEDICINE

## 2024-12-19 PROCEDURE — 25000003 PHARM REV CODE 250: Performed by: FAMILY MEDICINE

## 2024-12-19 RX ORDER — SODIUM CHLORIDE 0.9 % (FLUSH) 0.9 %
10 SYRINGE (ML) INJECTION
OUTPATIENT
Start: 2024-12-19

## 2024-12-19 RX ORDER — HEPARIN 100 UNIT/ML
500 SYRINGE INTRAVENOUS
OUTPATIENT
Start: 2024-12-19

## 2024-12-19 RX ORDER — SODIUM CHLORIDE 0.9 % (FLUSH) 0.9 %
10 SYRINGE (ML) INJECTION
Status: COMPLETED | OUTPATIENT
Start: 2024-12-19 | End: 2024-12-19

## 2024-12-19 RX ORDER — HEPARIN 100 UNIT/ML
500 SYRINGE INTRAVENOUS
Status: COMPLETED | OUTPATIENT
Start: 2024-12-19 | End: 2024-12-19

## 2024-12-19 RX ADMIN — HEPARIN 500 UNITS: 100 SYRINGE at 07:12

## 2024-12-19 RX ADMIN — Medication 10 ML: at 07:12

## 2024-12-21 NOTE — PROGRESS NOTES
[facility] [address] Hematology Oncology In Office Subsequent Encounter Note    24    Subjective:      Patient ID:                                      Daughters phone # for scheduling is [phone]  Ursula Rodríguez  84 y.o. female  1940  Chacha Gutierrez MD      Chief Complaint:   Breast cancer follow up    HPI:  84 y.o. female with diagnosis of R Breast cancer,2013.  R breast partial mastectomy followed by [facility].  Sentinal node +.  Stage II dx.  CTA x's 3/4, arimidex, Tamoxifen 2015.    Hx of L breast cancer , had partial mastectomy and reconstruction.    Was on Periactin, weight increased from 122 to 125#, off periactin now.  122# now.    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  .  On Eliquis 2.5 mg BID prophylaxis.    Previously she had decreased appetite, weight down 15#.  Began trial of Periactin 4 mg BID for appetite, weight gain.  Off periactin now 125#.  She stopped her periactin, says appetite is improved, her daughter agrees with her.    On Eliquis 2.5 mg 1 BID.  Refill antivert for vertigo sx prn.   Bone Density, osteoporosis.     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  Hx HTN, DM, GERD,cholesterol, DJD, CVA, TIA., thyroid dx, bradycardia.    Appendectomy, Partial hystectomy, L knee surgery several times, hernia repair, Tumor removed L ear drum .  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  S/P pacemaker placement    Allergy iodine, IVP dye.    Dad COPD, DM.  Mom Breast cancer, DM, increased HR.  Sister DM.  Brother DM, PVD.  Sister colon cancer, heart dx.  Sister Parkinson's Dx.  Daughter colon cancer.   of 64 years, recently passed away.  [facility].  86 y/o.    [id number]  4.2 cm , multifocal DCIS.   LN +.  M3    New port placed for lab draws.    She has a knee stimulator for pain control in the knees, status post ablation procedure at the nerves of the left knee.  Dr. Stubbs.  Weight is 124 lb, height is 5 ft 2 in, she has a pacemaker in place, history of TI age, age dementia and takes Eliquis  2.5 mg b.i.d..  She does not smoke but she does have exposure to secondhand smoke with her .    She has been found to have a 2.4 cm x 1.6 cm mass in the left upper lung concerning for primary lung malignancy.  PET scan is being done.  Cataract surgery on the left was planned for November 5, 2024.  Her daughter is Lucita [phone].    PET SCAN showed 2.4 cm mass with SUV 14 with hilar and mediastinal LN.  No distant metastatic Dx.  Old fx at pelvis.   Bronchoscopy with EBUS procedure was done, the pathology report was negative for cancer.  Recheck CT scan of the chest at [facility] on January 15th and see me on the 16th, 2025.  Plan to refer her to radiation therapy to see if she would be a candidate for radiation with for clinical lung malignancy without a tissue diagnosis?      ROS:   GEN: normal without any fever, night sweats or weight loss  HEENT: See HPI  CV: normal with no CP, SOB, PND, BUSTOS or orthopnea  PULM: normal with no SOB, cough, hemoptysis, sputum or pleuritic pain  GI: normal with no abdominal pain, nausea, vomiting, constipation, diarrhea, melanotic stools, BRBPR, or hematemesis  : normal with no hematuria, dysuria  BREAST: See HPI  SKIN: normal with no rash, erythema, bruising, or swelling     Past Medical History:   Diagnosis Date    Anemia 1995    Breast cancer 1995    LEFT DIAGNOSED FIRST    Breast cancer 2013    Right     Diabetes mellitus     Factor V Leiden 1994    Hyperlipidemia 2017    Hypertension 2017    Hypothyroidism 2019    Osteoporosis     UNKNOWN DATE OF DX    Pacemaker 10/28/2021    Recurrent urinary tract infection     Status post insertion of nerve stimulator 02/14/2024    Vertigo 1994     Past Surgical History:   Procedure Laterality Date    BLOCK, NERVE, GENICULAR Left 9/17/2024    Procedure: GENICULAR NERVE BLOCK LEFT KNEE X3 PERIPHERAL;  Surgeon: Serene Stubbs MD;  Location: [facility] OR;  Service: Pain Management;  Laterality: Left;    BREAST  SURGERY  , 2013, Nose cancer    CARDIAC VALVE REPLACEMENT       SECTION      ENDOBRONCHIAL ULTRASOUND N/A 2024    Procedure: ENDOBRONCHIAL ULTRASOUND (EBUS);  Surgeon: Raleigh Valenzuela MD;  Location: [facility] ENDO;  Service: Pulmonary;  Laterality: N/A;    EYE SURGERY      HEMORRHOID SURGERY      HERNIA REPAIR      HYSTERECTOMY      INSERTION OF PACEMAKER      INSERTION OF TUNNELED CENTRAL VENOUS CATHETER (CVC) WITH SUBCUTANEOUS PORT N/A 2023    Procedure: [id number];  Surgeon: Carl Conrad MD;  Location: [facility] OR;  Service: General;  Laterality: N/A;    MASTECTOMY Right     MASTECTOMY Left     BREAST CANCER    MEDIPORT REMOVAL Left 2023    Procedure: REMOVAL, CATHETER, CENTRAL VENOUS, TUNNELED, WITH PORT;  Surgeon: Carl Conrad MD;  Location: [facility] OR;  Service: General;  Laterality: Left;    PORTACATH PLACEMENT      RADIOFREQUENCY ABLATION, NERVE, GENICULAR, KNEE Left 10/8/2024    Procedure: GENICULAR NERVE BLOCK LEFT KNEE PERIPHERAL;  Surgeon: Serene Stubbs MD;  Location: [facility] OR;  Service: Pain Management;  Laterality: Left;    TUMOR REMOVAL Left     EAR       Review of patient's allergies indicates:   Allergen Reactions    Iodine and iodide containing products     Ditropan [oxybutynin chloride] Palpitations and Other (See Comments)     Made patient weak     Social History     Socioeconomic History    Marital status:     Highest education level: Master's degree (e.g., MA, MS, [place], MEd, MSW, ANDRE)   Occupational History    Occupation: Phd x 3-   Tobacco Use    Smoking status: Never    Smokeless tobacco: Never   Substance and Sexual Activity    Alcohol use: No     Comment: SPECIAL OCCASIONS LIKE ZACHERY    Drug use: No    Sexual activity: Not Currently     Partners: Male     Social Drivers of Health     Financial Resource Strain: Medium Risk (2023)    Overall Financial Resource Strain (CARDIA)      Difficulty of Paying Living Expenses: Somewhat hard   Food Insecurity: No Food Insecurity (12/28/2023)    Hunger Vital Sign     Worried About Running Out of Food in the Last Year: Never true     Ran Out of Food in the Last Year: Never true   Transportation Needs: No Transportation Needs (12/28/2023)    PRAPARE - Transportation     Lack of Transportation (Medical): No     Lack of Transportation (Non-Medical): No   Physical Activity: Insufficiently Active (12/28/2023)    Exercise Vital Sign     Days of Exercise per Week: 7 days     Minutes of Exercise per Session: 20 min   Stress: No Stress Concern Present (12/28/2023)    [employer] - Occupational Stress Questionnaire     Feeling of Stress : Only a little   Housing Stability: Low Risk  (12/28/2023)    Housing Stability Vital Sign     Unable to Pay for Housing in the Last Year: No     Number of Places Lived in the Last Year: 1     Unstable Housing in the Last Year: No         Current Outpatient Medications:     alendronate (FOSAMAX) 70 MG tablet, Take 1 tablet (70 mg total) by mouth every 7 days., Disp: 13 tablet, Rfl: 3    apixaban (ELIQUIS) 2.5 mg Tab, Take 1 tablet (2.5 mg total) by mouth 2 (two) times daily., Disp: 180 tablet, Rfl: 3    azelastine (ASTELIN) 137 mcg (0.1 %) nasal spray, 1 spray (137 mcg total) by Nasal route 2 (two) times daily., Disp: 18.2 mL, Rfl: 5    bacitracin-polymyxin b (POLYSPORIN) ophthalmic ointment, Place into both eyes every evening., Disp: , Rfl:     cloNIDine (CATAPRES) 0.1 MG tablet, Take 1 tablet (0.1 mg total) by mouth 2 (two) times daily as needed (BP > 160/90)., Disp: 180 tablet, Rfl: 3    cyanocobalamin (VITAMIN B-12) 1000 MCG tablet, Take 100 mcg by mouth once daily., Disp: , Rfl:     donepeziL (ARICEPT) 5 MG tablet, 10 mg., Disp: , Rfl:     fluticasone propionate (FLONASE) 50 mcg/actuation nasal spray, 1 spray (50 mcg total) by Each Nostril route once daily., Disp: 16 g, Rfl: 5    levothyroxine  "(SYNTHROID) 50 MCG tablet, Take 1 tablet (50 mcg total) by mouth once daily., Disp: 90 tablet, Rfl: 3    losartan (COZAAR) 25 MG tablet, Take 0.5 tablets (12.5 mg total) by mouth once daily., Disp: 45 tablet, Rfl: 3    magnesium oxide (MAG-OX) 400 mg (241.3 mg magnesium) tablet, Take 400 mg by mouth once daily., Disp: , Rfl:     meclizine (ANTIVERT) 25 mg tablet, Take 1 tablet (25 mg total) by mouth 2 (two) times daily as needed for Dizziness or Nausea., Disp: 90 tablet, Rfl: 1    metFORMIN (GLUCOPHAGE-XR) 500 MG ER 24hr tablet, TAKE 1 TABLET BY MOUTH EVERY MORNING WITH FOOD FOR 2 WEEKS THEN INCREASE TO 1 TABLET BY MOUTH TWICE DAILY WITH FOOD, Disp: 180 tablet, Rfl: 3    metoprolol tartrate (LOPRESSOR) 25 MG tablet, Take 0.5 tablets (12.5 mg total) by mouth 2 (two) times daily., Disp: 90 tablet, Rfl: 1    multivitamin capsule, Take 1 capsule by mouth once daily., Disp: , Rfl:     NIFEdipine (PROCARDIA-XL) 30 MG (OSM) 24 hr tablet, Take 1 tablet (30 MG ) by mouth twice daily, Disp: 180 tablet, Rfl: 3    sertraline (ZOLOFT) 25 MG tablet, Take 1 tablet (25 mg total) by mouth once daily., Disp: 90 tablet, Rfl: 3    simvastatin (ZOCOR) 20 MG tablet, TAKE 1 TABLET(20 MG) BY MOUTH EVERY EVENING, Disp: 90 tablet, Rfl: 1    spironolactone (ALDACTONE) 25 MG tablet, Take 1 tablet (25 mg total) by mouth once daily., Disp: 90 tablet, Rfl: 3  No current facility-administered medications for this visit.    Facility-Administered Medications Ordered in Other Visits:     sodium chloride 0.9% flush 10 mL, 10 mL, Intravenous, 1 time in Clinic/HOD, Chacha Gutierrez MD          Objective:   Vitals:  Blood pressure (!) 144/75, pulse 73, temperature 97 °F (36.1 °C), temperature source Temporal, resp. rate 16, height 5' 2" (1.575 m), weight 56.2 kg (124 lb).    Physical Examination:   GEN: no apparent distress, comfortable  HEAD: atraumatic and normocephalic  EYES: no pallor, no icterus  ENT:  no pharyngeal erythema, external ears WNL; no " "nasal discharge  NECK: no masses, thyroid normal, trachea midline, no LAD/LN's, supple  CV: RRR with no murmur; normal pulse; normal S1 and S2; no pedal edema  CHEST: Normal respiratory effort; CTAB; normal breath sounds; no wheeze or crackles  ABDOM: nontender and nondistended; soft;  no rebound/guarding  MUSC/Skeletal: ROM normal; no crepitus; joints normal; no deformities   EXTREM: multiple spider veins L & R leg  SKIN: multiple ecchemoses on forearms, poor skin turgor.  : no cvat  NEURO: grossly intact; motor/sensory WNL;  no tremors  PSYCH: normal mood, affect and behavior  LYMPH: normal cervical, supraclavicular, axillary and groin LN's  BREASTS: L 'breast" post op change, without palpable mass.  R chest NT, post operative change, no palpable mass    CAT head small vessel dx.  MRI of L/S spine DDD, DJD.    Current studies:  Bone Scan, no metastases seen.  But intense uptake at R foot.  Concern for occult fx or osteomyelitis, she is following up with Dr. Almaguer on this.    CT of chest abdomin and pelvis, multiple 2-3 mm nodules in lungs, no previous CT for comparison.  Significance?  Will plan to repeat CT of chest in 3 months.  No definitive metastatic dx seen.    B 12 372.  Assessment:   (1) 84 y.o. female with diagnosis of  Bilateral breast cancer, S/P treatment as above.  CISCO.  Observe for now.     (2)Weight loss 15#, decreased appetite.  Periactin 4 mg BID.  Now weight better, up to 124#.  She stopped periactin.    (3)S/P pacemaker placement for bradycardia.    (4)Hx TIA, F5L, on Eliquis 2.5 mg prophylaxis long term.    (5) 2.6 cm left upper lobe lung mass concerning for lung malignancy.    (6) PET scan showed 2.4 cm hypermetabolic mass with an SUV of 14.  Hilar and mediastinal lymphadenopathy was seen.  No distant metastatic disease was seen.  Old fracture was noted at the pelvis.    (7) Dr. Valenzuela for EBUS procedure for biopsy of the mass or lymph node areas for tissue diagnosis  Returned negative " per the pathology report.    (8)check CT of chest 1/15/25, RTC 1/16/25.  Plan to refer her to the radiation department to see if she would be a candidate for radiation to the mass and lymphadenopathy areas without a tissue diagnosis?

## 2024-12-29 DIAGNOSIS — F41.9 ANXIETY: ICD-10-CM

## 2024-12-29 NOTE — TELEPHONE ENCOUNTER
Care Due:                  Date            Visit Type   Department     Provider  --------------------------------------------------------------------------------                                EP -                              PRIMARY      [facility] 2ND FLOOR  Last Visit: 08-      CARE ([facility])   FAMILY Michael Gutierrez                              EP -                              PRIMARY      [facility] 2ND FLOOR  Next Visit: 01-      CARE ([facility])   FAMILY Michael Gutierrez                                                            Last  Test          Frequency    Reason                     Performed    Due Date  --------------------------------------------------------------------------------    HBA1C.......  6 months...  metFORMIN................  05- 11-    Mg Level....  12 months..  alendronate..............  Not Found    Overdue    Phosphate...  12 months..  alendronate..............  Not Found    Overdue    [employer] Embedded Care Due Messages. Reference number: [id number].   12/29/2024 8:03:22 AM CST

## 2024-12-30 RX ORDER — SERTRALINE HYDROCHLORIDE 25 MG/1
25 TABLET, FILM COATED ORAL
Qty: 90 TABLET | Refills: 3 | Status: SHIPPED | OUTPATIENT
Start: 2024-12-30

## 2024-12-31 ENCOUNTER — EXTERNAL HOME HEALTH (OUTPATIENT)
Dept: HOME HEALTH SERVICES | Facility: HOSPITAL | Age: 84
End: 2024-12-31
Payer: MEDICARE

## 2025-01-06 DIAGNOSIS — D68.59 HYPERCOAGULOPATHY: ICD-10-CM

## 2025-01-06 RX ORDER — APIXABAN 2.5 MG/1
2.5 TABLET, FILM COATED ORAL 2 TIMES DAILY
Qty: 180 TABLET | Refills: 3 | Status: SHIPPED | OUTPATIENT
Start: 2025-01-06

## 2025-01-07 NOTE — TELEPHONE ENCOUNTER
Refill Routing Note   Medication(s) are not appropriate for processing by [facility] for the following reason(s):        Outside of protocol    [facility] action(s):  Route             Appointments  past 12m or future 3m with PCP    Date Provider   Last Visit   8/13/2024 Chacha Gutierrez MD   Next Visit   1/15/2025 Chacha Gutierrez MD   ED visits in past 90 days: 1        Note composed:7:13 PM 01/06/2025

## 2025-01-15 ENCOUNTER — OFFICE VISIT (OUTPATIENT)
Dept: FAMILY MEDICINE | Facility: CLINIC | Age: 85
End: 2025-01-15
Payer: MEDICARE

## 2025-01-15 ENCOUNTER — HOSPITAL ENCOUNTER (OUTPATIENT)
Dept: RADIOLOGY | Facility: HOSPITAL | Age: 85
Discharge: HOME | End: 2025-01-15
Attending: INTERNAL MEDICINE
Payer: MEDICARE

## 2025-01-15 VITALS
SYSTOLIC BLOOD PRESSURE: 118 MMHG | BODY MASS INDEX: 23.15 KG/M2 | DIASTOLIC BLOOD PRESSURE: 62 MMHG | HEART RATE: 70 BPM | WEIGHT: 125.81 LBS | HEIGHT: 62 IN | OXYGEN SATURATION: 98 % | RESPIRATION RATE: 14 BRPM

## 2025-01-15 DIAGNOSIS — M81.0 OSTEOPOROSIS, UNSPECIFIED OSTEOPOROSIS TYPE, UNSPECIFIED PATHOLOGICAL FRACTURE PRESENCE: ICD-10-CM

## 2025-01-15 DIAGNOSIS — G63 NEUROPATHY DUE TO MEDICAL CONDITION: ICD-10-CM

## 2025-01-15 DIAGNOSIS — R91.1 SOLITARY PULMONARY NODULE: ICD-10-CM

## 2025-01-15 DIAGNOSIS — E11.9 TYPE 2 DIABETES MELLITUS WITHOUT COMPLICATION, WITHOUT LONG-TERM CURRENT USE OF INSULIN: ICD-10-CM

## 2025-01-15 DIAGNOSIS — N18.31 CHRONIC KIDNEY DISEASE, STAGE 3A: ICD-10-CM

## 2025-01-15 DIAGNOSIS — E55.9 VITAMIN D DEFICIENCY: ICD-10-CM

## 2025-01-15 DIAGNOSIS — R41.3 MEMORY CHANGE: ICD-10-CM

## 2025-01-15 DIAGNOSIS — I70.0 AORTIC ATHEROSCLEROSIS: ICD-10-CM

## 2025-01-15 DIAGNOSIS — E11.49 TYPE II DIABETES MELLITUS WITH NEUROLOGICAL MANIFESTATIONS: Primary | ICD-10-CM

## 2025-01-15 DIAGNOSIS — C34.12 MALIGNANT NEOPLASM OF UPPER LOBE OF LEFT LUNG: ICD-10-CM

## 2025-01-15 DIAGNOSIS — E78.00 HYPERCHOLESTEROLEMIA: ICD-10-CM

## 2025-01-15 DIAGNOSIS — I10 ESSENTIAL HYPERTENSION: ICD-10-CM

## 2025-01-15 DIAGNOSIS — R80.9 MICROALBUMINURIA: ICD-10-CM

## 2025-01-15 DIAGNOSIS — N18.31 STAGE 3A CHRONIC KIDNEY DISEASE: ICD-10-CM

## 2025-01-15 DIAGNOSIS — F01.A0 MILD VASCULAR DEMENTIA WITHOUT BEHAVIORAL DISTURBANCE, PSYCHOTIC DISTURBANCE, MOOD DISTURBANCE, OR ANXIETY: ICD-10-CM

## 2025-01-15 DIAGNOSIS — I44.2 THIRD DEGREE AV BLOCK: ICD-10-CM

## 2025-01-15 DIAGNOSIS — R42 VERTIGO: ICD-10-CM

## 2025-01-15 DIAGNOSIS — D68.59 HYPERCOAGULOPATHY: ICD-10-CM

## 2025-01-15 DIAGNOSIS — A49.9 BACTERIAL INFECTION: ICD-10-CM

## 2025-01-15 DIAGNOSIS — E64.0 SEQUELAE OF PROTEIN-CALORIE MALNUTRITION: ICD-10-CM

## 2025-01-15 DIAGNOSIS — E03.9 ACQUIRED HYPOTHYROIDISM: ICD-10-CM

## 2025-01-15 PROCEDURE — 71250 CT THORAX DX C-: CPT | Mod: TC

## 2025-01-15 PROCEDURE — 99215 OFFICE O/P EST HI 40 MIN: CPT | Mod: S$PBB,,, | Performed by: FAMILY MEDICINE

## 2025-01-15 PROCEDURE — 71250 CT THORAX DX C-: CPT | Mod: 26,,, | Performed by: RADIOLOGY

## 2025-01-15 PROCEDURE — G2211 COMPLEX E/M VISIT ADD ON: HCPCS | Mod: S$PBB,,, | Performed by: FAMILY MEDICINE

## 2025-01-15 PROCEDURE — 99999 PR PBB SHADOW E&M-EST. PATIENT-LVL IV: CPT | Mod: PBBFAC,,, | Performed by: FAMILY MEDICINE

## 2025-01-15 PROCEDURE — 99214 OFFICE O/P EST MOD 30 MIN: CPT | Mod: PBBFAC,25 | Performed by: FAMILY MEDICINE

## 2025-01-15 RX ORDER — NIFEDIPINE 30 MG/1
TABLET, EXTENDED RELEASE ORAL
Qty: 180 TABLET | Refills: 3 | Status: SHIPPED | OUTPATIENT
Start: 2025-01-15

## 2025-01-15 RX ORDER — MECLIZINE HYDROCHLORIDE 25 MG/1
25 TABLET ORAL 2 TIMES DAILY PRN
Qty: 90 TABLET | Refills: 1 | Status: SHIPPED | OUTPATIENT
Start: 2025-01-15

## 2025-01-15 RX ORDER — SPIRONOLACTONE 25 MG/1
25 TABLET ORAL DAILY
Qty: 90 TABLET | Refills: 3 | Status: SHIPPED | OUTPATIENT
Start: 2025-01-15

## 2025-01-15 RX ORDER — SIMVASTATIN 20 MG/1
20 TABLET, FILM COATED ORAL NIGHTLY
Qty: 90 TABLET | Refills: 3 | Status: SHIPPED | OUTPATIENT
Start: 2025-01-15

## 2025-01-15 RX ORDER — DONEPEZIL HYDROCHLORIDE 5 MG/1
10 TABLET, FILM COATED ORAL DAILY
Qty: 180 TABLET | Refills: 3 | Status: SHIPPED | OUTPATIENT
Start: 2025-01-15

## 2025-01-15 RX ORDER — LEVOTHYROXINE SODIUM 50 UG/1
50 TABLET ORAL DAILY
Qty: 90 TABLET | Refills: 3 | Status: SHIPPED | OUTPATIENT
Start: 2025-01-15

## 2025-01-15 RX ORDER — LOSARTAN POTASSIUM 25 MG/1
12.5 TABLET ORAL DAILY
Qty: 45 TABLET | Refills: 3 | Status: SHIPPED | OUTPATIENT
Start: 2025-01-15

## 2025-01-15 RX ORDER — METOPROLOL TARTRATE 25 MG/1
12.5 TABLET, FILM COATED ORAL 2 TIMES DAILY
Qty: 90 TABLET | Refills: 3 | Status: SHIPPED | OUTPATIENT
Start: 2025-01-15

## 2025-01-15 RX ORDER — FLUTICASONE PROPIONATE 50 MCG
1 SPRAY, SUSPENSION (ML) NASAL DAILY
Qty: 48 G | Refills: 3 | Status: SHIPPED | OUTPATIENT
Start: 2025-01-15

## 2025-01-15 RX ORDER — CLONIDINE HYDROCHLORIDE 0.1 MG/1
0.1 TABLET ORAL 2 TIMES DAILY PRN
Qty: 180 TABLET | Refills: 3 | Status: SHIPPED | OUTPATIENT
Start: 2025-01-15 | End: 2026-01-15

## 2025-01-15 RX ORDER — AZELASTINE 1 MG/ML
1 SPRAY, METERED NASAL 2 TIMES DAILY
Qty: 18.2 ML | Refills: 5 | Status: SHIPPED | OUTPATIENT
Start: 2025-01-15 | End: 2026-01-15

## 2025-01-15 RX ORDER — ALENDRONATE SODIUM 70 MG/1
70 TABLET ORAL
Qty: 13 TABLET | Refills: 3 | Status: SHIPPED | OUTPATIENT
Start: 2025-01-15 | End: 2026-01-15

## 2025-01-15 RX ORDER — METFORMIN HYDROCHLORIDE 500 MG/1
TABLET, EXTENDED RELEASE ORAL
Qty: 180 TABLET | Refills: 3 | Status: SHIPPED | OUTPATIENT
Start: 2025-01-15

## 2025-01-15 NOTE — PROGRESS NOTES
Patient ID: Ursula Rodríguez is a 85 y.o. female.    Chief Complaint: Follow-up (6 month follow up/medication refill)    History of Present Illness    CHIEF COMPLAINT:  Ursula presents today for a six-month follow-up with a new diagnosis of lung cancer.    LUNG CANCER HISTORY:  She was found to have a lung mass after presenting to the ER with a superficial neck mass in late October/early November. Bronchoscopy revealed multiple masses, including a large central mass in the left lung and two additional masses in the right lung that were not visualized during the procedure. Two masses were also noted in the esophagus. Bronchial washings were non-diagnostic. PET scan from November 18th showed hypermetabolic left upper lobe opacity concerning for malignancy with speculated appearance. She denies experiencing any respiratory symptoms.    SOCIAL HISTORY:  She has never smoked but reports potential secondhand smoke exposure from her father who was a smoker.    CURRENT MEDICATIONS:  She takes Synthroid, Losartan (half tablet daily), Meclizine (as needed), Metformin, Metoprolol (half tablet twice daily), Nifedipine (one tablet twice daily), Aldactone, and Zoloft.    LABS:  Thyroid and CBC were normal with no anemia. Vitamin D was elevated at 94, approaching toxic levels. B12 was low-normal at 489. Blood sugar was elevated at 192. GFR was low, fluctuating between 49-55 mL/min. Microalbumin and creatinine ratio was elevated at 69. Cholesterol levels were reported as normal.    HYDRATION:  She reports inadequate water intake during the day, consuming only 2-3 bottles of water, sometimes taking multiple days to finish a single bottle. She drinks water frequently at night, leading to multiple episodes of nocturia. She acknowledges drinking water only when she remembers to do so.      ROS:  ROS as indicated in HPI.         Physical Exam    General: No acute distress. Well-developed. Well-nourished.  Eyes: EOMI. Sclerae  anicteric.  HENT: Normocephalic. Atraumatic. Nares patent. Moist oral mucosa.  Cardiovascular: Regular rate. Regular rhythm. No murmurs. No rubs. No gallops. Normal S1, S2.  Respiratory: Normal respiratory effort. Clear to auscultation bilaterally. No rales. No rhonchi. No wheezing.  Musculoskeletal: No  obvious deformity.  Extremities: No lower extremity edema.  Neurological: Alert & oriented x3. No slurred speech. Normal gait.  Psychiatric: Normal mood. Normal affect. Good insight. Good judgment.  Skin: Warm. Dry. No rash.         Assessment & Plan    IMPRESSION:  - Lung mass detected on CT, confirmed by PET scan as hypermetabolic left upper lobe opacity concerning for malignancy  - Patient unable to undergo chest biopsy due to age and comorbidities  - Monitoring with repeat CT in 2 months to assess growth and determine need for radiation therapy  - Reviewed labs from May: thyroid normal, A1C normal, vitamin D elevated (94), B12 low normal (489), cholesterol good, GFR fluctuating but stable (49-55)  - Kidney function declining with age, microalbumin/creatinine ratio elevated (69)    MALIGNANT NEOPLASM OF UPPER LOBE OF LEFT LUNG:  - Diagnosed with lung cancer in the left upper lobe in October/November.  - CT revealed a mass in the chest, with rapid growth noted as chest XR 4-5 months prior showed no abnormalities.  - Bronchoscopy before Thanksgiving revealed two masses in the esophagus, a large mass in the lung, and two additional masses in the right lung (unlocatable during procedure).  - Deep washings were inconclusive.  - PET scan on November 18th showed hypermetabolic left upper lobe opacity concerning for malignancy, with a speculated shape indicative of lung cancer.  - Lung mass measured 8 centimeters.  - Lung exam noted good lung sounds with no wheezing or crackling.    TYPE II DIABETES MELLITUS WITH NEUROLOGICAL MANIFESTATIONS:  - Last full lab panel from May 16th showed normal A1C, with blood sugar of 192  on the day of the test.  - Metformin prescribed for diabetes management, with prescription refilled for 1 year.    THIRD DEGREE AV BLOCK:  - Continued Metoprolol prescription for heart rate control at current dosage of 12 mg daily, taken as half a tablet in the morning and half in the evening.    STAGE 3A CHRONIC KIDNEY DISEASE:  - Kidney function monitored with fluctuating GFR: 49 in September 2023, 55 in January last year, now back to 49.  - Elevated microalbumin and creatinine ratio at 69 (above normal range of 30 or below).  - Kidney function assessed as stable but not optimal, with GFR consistently below the threshold of 60.    THYROID DISORDER:  - Thyroid function checked in May, found to be normal.  - Synthroid prescription renewed for 1 year.    VITAMIN D DEFICIENCY:  - Ordered Vitamin D level test.    HYPERTENSION:  - Prescribed medications: Losartan (45 tablets, half tablet daily, 3 refills) and Nifedipine (180 tablets, 1 tablet twice daily, 90-day supply).  - Aldactone prescription renewed in August for a year's supply, to be adjusted to align with other medications.    HYPERLIPIDEMIA:  - Cholesterol levels checked in May, found to be excellent.  - Noted Simvastatin prescription needs refill.    VERTIGO:  - Prescribed Meclizine to be taken as needed.  - Planned to refill Meclizine prescription.    DEPRESSION:  - Zoloft prescription continued, refilled by Dr. Marsh in December for 1 year.    LUNG CANCER:  - Previous chest XRs and PET scan results reviewed.  - Confirmed presence of port for treatment administration.  - Scheduled CT for today to assess tumor growth and determine need for radiation therapy.    HYDRATION:  - Ursula to increase daily water intake, especially during daytime hours.    VITAMIN B12 DEFICIENCY:  - Recommend considering increased B12 intake through diet or supplements.  - Discussed potential benefits of this approach.    LABS:  - Basic metabolic panel ordered.    FOLLOW UP:  - Follow up  in 6 months to reassess treatment plan.  - Ursula to contact office if needed before next scheduled appointment.         Plan:          Type II diabetes mellitus with neurological manifestations  -     Comprehensive metabolic panel; Future; Expected date: 01/15/2025  -     CBC auto differential; Future; Expected date: 01/15/2025    Osteoporosis, unspecified osteoporosis type, unspecified pathological fracture presence  -     alendronate (FOSAMAX) 70 MG tablet; Take 1 tablet (70 mg total) by mouth every 7 days.  Dispense: 13 tablet; Refill: 3    Bacterial infection  -     azelastine (ASTELIN) 137 mcg (0.1 %) nasal spray; 1 spray (137 mcg total) by Nasal route 2 (two) times daily.  Dispense: 18.2 mL; Refill: 5  -     fluticasone propionate (FLONASE) 50 mcg/actuation nasal spray; 1 spray (50 mcg total) by Each Nostril route once daily.  Dispense: 48 g; Refill: 3    Essential hypertension  -     cloNIDine (CATAPRES) 0.1 MG tablet; Take 1 tablet (0.1 mg total) by mouth 2 (two) times daily as needed (BP > 160/90).  Dispense: 180 tablet; Refill: 3  -     metoprolol tartrate (LOPRESSOR) 25 MG tablet; Take 0.5 tablets (12.5 mg total) by mouth 2 (two) times daily.  Dispense: 90 tablet; Refill: 3  -     NIFEdipine (PROCARDIA-XL) 30 MG (OSM) 24 hr tablet; TAKE 1 TABLET(30 MG) BY MOUTH TWICE DAILY  Dispense: 180 tablet; Refill: 3  -     spironolactone (ALDACTONE) 25 MG tablet; Take 1 tablet (25 mg total) by mouth once daily.  Dispense: 90 tablet; Refill: 3    Microalbuminuria  -     Microalbumin/Creatinine Ratio, Urine; Future; Expected date: 01/15/2025  -     losartan (COZAAR) 25 MG tablet; Take 0.5 tablets (12.5 mg total) by mouth once daily.  Dispense: 45 tablet; Refill: 3    Stage 3a chronic kidney disease    Memory change  -     donepeziL (ARICEPT) 5 MG tablet; Take 2 tablets (10 mg total) by mouth once daily.  Dispense: 180 tablet; Refill: 3    Vitamin D deficiency  -     Vitamin D; Future; Expected date:  07/15/2025    Acquired hypothyroidism  -     levothyroxine (SYNTHROID) 50 MCG tablet; Take 1 tablet (50 mcg total) by mouth once daily.  Dispense: 90 tablet; Refill: 3    Type 2 diabetes mellitus without complication, without long-term current use of insulin  -     metFORMIN (GLUCOPHAGE-XR) 500 MG ER 24hr tablet; TAKE 1 TABLET BY MOUTH EVERY MORNING WITH FOOD FOR 2 WEEKS THEN INCREASE TO 1 TABLET BY MOUTH TWICE DAILY WITH FOOD  Dispense: 180 tablet; Refill: 3    Hypercholesterolemia  -     simvastatin (ZOCOR) 20 MG tablet; Take 1 tablet (20 mg total) by mouth every evening.  Dispense: 90 tablet; Refill: 3    Vertigo  -     meclizine (ANTIVERT) 25 mg tablet; Take 1 tablet (25 mg total) by mouth 2 (two) times daily as needed for Dizziness or Nausea.  Dispense: 90 tablet; Refill: 1    Chronic kidney disease, stage 3a  Comments:  stable    Mild vascular dementia without behavioral disturbance, psychotic disturbance, mood disturbance, or anxiety  Comments:  stable    Sequelae of protein-calorie malnutrition  Comments:  stable    Malignant neoplasm of upper lobe of left lung  Comments:  new diagnosis    Third degree AV block  Comments:  stable    Hypercoagulopathy  Comments:  stable    Aortic atherosclerosis  Comments:  stable    Neuropathy due to medical condition  Comments:  stable    In excessive of 40 minutes total time spent for evaluation and management services. Time included elements of the following: time spent preparing to see patient, obtaining and reviewing separately obtained history, exam, evaluation, counseling and education of patient/family member or care giver, documenting in the HMR, independently interpreting results and communication of results, coordination of care ordering medications, tests, or procedures, referral and communication to other health care professionals.      Follow up in about 6 months (around 7/15/2025), or if symptoms worsen or fail to improve.    This note was generated with the  assistance of ambient listening technology. Verbal consent was obtained by the patient and accompanying visitor(s) for the recording of patient appointment to facilitate this note. I attest to having reviewed and edited the generated note for accuracy, though some syntax or spelling errors may persist. Please contact the author of this note for any clarification.

## 2025-01-17 DIAGNOSIS — Z85.3 HISTORY OF BILATERAL BREAST CANCER: ICD-10-CM

## 2025-01-17 DIAGNOSIS — R91.8 LUNG MASS: Primary | ICD-10-CM

## 2025-01-21 ENCOUNTER — TELEPHONE (OUTPATIENT)
Facility: CLINIC | Age: 85
End: 2025-01-21
Payer: MEDICARE

## 2025-01-24 PROCEDURE — G0179 MD RECERTIFICATION HHA PT: HCPCS | Mod: ,,, | Performed by: FAMILY MEDICINE

## 2025-01-24 NOTE — TELEPHONE ENCOUNTER
Spoke to daughter, Lucita, verbalized Dr. Padgett's message. Canceled appt. For 1/25/25 @0900.      
Spoke with Patient's daughter. She explained that they wanted to discuss her Mom's CT scan and will wait till Thursday instead of a virtual or telephone appointment.  Rescheduled for Saturday 1/25/25 at 0900.   
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

## 2025-01-28 NOTE — PROGRESS NOTES
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 Rodríguez is a 85F never-smoker w/ pacemaker and h/o bilateral breast CA, for which she underwent L-TM in 1995 and then R-TM w/ adj chemo, PMRT, and adj endo tx in 2014.    She remained w/ [facility] for subsequent 10yrs, however underwent CT neck imaging for R SCV swelling this year around indwelling catheter (deemed benign/superficial inflammation) that revealed a NOAH nodule.    Subsequent imaging, including PET, showed avidity of the 2.4cm NOAH nodule as well as AP window, precarinal, and R-hilar LAD.    EBUS was attempted, but only the pre/sub-carinal LAD was reachable.  And path from that jimmy sampling as well as bronchial washings show no evidence of malignancy.    She has been referred now to [facility] for eval.discussion re: tx options.    The pt denies any recent f/c/n/v/d/cp/sob, night sweats, wt loss, coughing above baseline, weakness, falls, or pain.    The patient is very active and in great condition, considering her age.  Uses a walker for bilateral knee arthritis.    REVIEW OF SYSTEMS:  A complete ROS was performed and the patient denies any acute changes/concerns other than per HPI.    Past Medical History:   Diagnosis Date    Anemia 1995    Cancer     breast    Diabetes mellitus     Factor V Leiden 1994    Hyperlipidemia 2017    Hypertension 2017    Hypothyroidism 2019    Lung cancer     left    Osteoporosis     UNKNOWN DATE OF DX    Pacemaker 10/28/2021    Recurrent urinary tract infection     Status post insertion of nerve stimulator 02/14/2024    Vertigo 1994     Past Surgical History:   Procedure Laterality Date    BLOCK, NERVE, GENICULAR Left 9/17/2024    Procedure: GENICULAR NERVE BLOCK LEFT KNEE X3 PERIPHERAL;  Surgeon: Serene Stubbs MD;  Location: [facility]  OR;  Service: Pain Management;  Laterality: Left;    BREAST SURGERY  1994, 2013, Nose cancer    CARDIAC VALVE REPLACEMENT       SECTION      ENDOBRONCHIAL ULTRASOUND N/A 2024    Procedure: ENDOBRONCHIAL ULTRASOUND (EBUS);  Surgeon: Raleigh Valenzuela MD;  Location: [facility] ENDO;  Service: Pulmonary;  Laterality: N/A;    EYE SURGERY      HEMORRHOID SURGERY      HERNIA REPAIR      HYSTERECTOMY      INSERTION OF PACEMAKER      INSERTION OF TUNNELED CENTRAL VENOUS CATHETER (CVC) WITH SUBCUTANEOUS PORT N/A 2023    Procedure: [id number]-W-CATH;  Surgeon: Carl Conrad MD;  Location: [facility] OR;  Service: General;  Laterality: N/A;    MASTECTOMY Right     MASTECTOMY Left     BREAST CANCER    MEDIPORT REMOVAL Left 2023    Procedure: REMOVAL, CATHETER, CENTRAL VENOUS, TUNNELED, WITH PORT;  Surgeon: Carl Conrad MD;  Location: [facility] OR;  Service: General;  Laterality: Left;    PORTACATH PLACEMENT      RADIOFREQUENCY ABLATION, NERVE, GENICULAR, KNEE Left 10/8/2024    Procedure: GENICULAR NERVE BLOCK LEFT KNEE PERIPHERAL;  Surgeon: Serene Stubbs MD;  Location: [facility] OR;  Service: Pain Management;  Laterality: Left;    TUMOR REMOVAL Left     EAR     Social History     Socioeconomic History    Marital status:     Highest education level: Master's degree (e.g., MA, MS, [place], MEd, MSW, ANDRE)   Occupational History    Occupation: Phd x 3-   Tobacco Use    Smoking status: Never    Smokeless tobacco: Never   Substance and Sexual Activity    Alcohol use: No     Comment: SPECIAL OCCASIONS LIKE ZACHERY    Drug use: No    Sexual activity: Not Currently     Partners: Male     Social Drivers of Health     Financial Resource Strain: Low Risk  (2025)    Overall Financial Resource Strain (CARDIA)     Difficulty of Paying Living Expenses: Not hard at all   Food Insecurity: No Food Insecurity (2025)    Hunger Vital Sign     Worried About Running Out  of Food in the Last Year: Never true     Ran Out of Food in the Last Year: Never true   Transportation Needs: No Transportation Needs (12/28/2023)    PRAPARE - Transportation     Lack of Transportation (Medical): No     Lack of Transportation (Non-Medical): No   Physical Activity: Unknown (1/24/2025)    Exercise Vital Sign     Days of Exercise per Week: Patient declined     Minutes of Exercise per Session: 0 min   Stress: No Stress Concern Present (1/24/2025)    [place] [place] [employer] - Occupational Stress Questionnaire     Feeling of Stress : Only a little   Housing Stability: Low Risk  (12/28/2023)    Housing Stability Vital Sign     Unable to Pay for Housing in the Last Year: No     Number of Places Lived in the Last Year: 1     Unstable Housing in the Last Year: No     Family History   Problem Relation Name Age of Onset    Cancer Mother [place]     Hypertension Mother [place]     Heart disease Mother Lena     No Known Problems Sister Ashley     Diabetes Brother Pratik     Hypertension Brother Pratik     Cancer Maternal Grandmother Mammie     Lung disease Father August     Heart disease Father August     Diabetes Father August     Cancer Brother Jameel     Diabetes Brother Jameel     Hypertension Brother Krunal     Stroke Brother Krunal     Hypertension Brother Say     Stroke Brother Say        PRIOR HISTORY OF CHEMOTHERAPY OR RADIOTHERAPY: Please see HPI for patients prior oncologic history.    Medication List with Changes/Refills   Current Medications    ALENDRONATE (FOSAMAX) 70 MG TABLET    Take 1 tablet (70 mg total) by mouth every 7 days.    AZELASTINE (ASTELIN) 137 MCG (0.1 %) NASAL SPRAY    1 spray (137 mcg total) by Nasal route 2 (two) times daily.    CLONIDINE (CATAPRES) 0.1 MG TABLET    Take 1 tablet (0.1 mg total) by mouth 2 (two) times daily as needed (BP > 160/90).    CYANOCOBALAMIN (VITAMIN B-12) 1000 MCG TABLET    Take 100 mcg by mouth once daily.    DONEPEZIL (ARICEPT) 5 MG  TABLET    Take 2 tablets (10 mg total) by mouth once daily.    ELIQUIS 2.5 MG TAB    TAKE 1 TABLET(2.5 MG) BY MOUTH TWICE DAILY    FLUTICASONE PROPIONATE (FLONASE) 50 MCG/ACTUATION NASAL SPRAY    1 spray (50 mcg total) by Each Nostril route once daily.    LEVOTHYROXINE (SYNTHROID) 50 MCG TABLET    Take 1 tablet (50 mcg total) by mouth once daily.    LOSARTAN (COZAAR) 25 MG TABLET    Take 0.5 tablets (12.5 mg total) by mouth once daily.    MAGNESIUM OXIDE (MAG-OX) 400 MG (241.3 MG MAGNESIUM) TABLET    Take 400 mg by mouth once daily.    MECLIZINE (ANTIVERT) 25 MG TABLET    Take 1 tablet (25 mg total) by mouth 2 (two) times daily as needed for Dizziness or Nausea.    METFORMIN (GLUCOPHAGE-XR) 500 MG ER 24HR TABLET    TAKE 1 TABLET BY MOUTH EVERY MORNING WITH FOOD FOR 2 WEEKS THEN INCREASE TO 1 TABLET BY MOUTH TWICE DAILY WITH FOOD    METOPROLOL TARTRATE (LOPRESSOR) 25 MG TABLET    Take 0.5 tablets (12.5 mg total) by mouth 2 (two) times daily.    MULTIVITAMIN CAPSULE    Take 1 capsule by mouth once daily.    NIFEDIPINE (PROCARDIA-XL) 30 MG (OSM) 24 HR TABLET    TAKE 1 TABLET(30 MG) BY MOUTH TWICE DAILY    SERTRALINE (ZOLOFT) 25 MG TABLET    TAKE 1 TABLET(25 MG) BY MOUTH EVERY DAY    SIMVASTATIN (ZOCOR) 20 MG TABLET    Take 1 tablet (20 mg total) by mouth every evening.    SPIRONOLACTONE (ALDACTONE) 25 MG TABLET    Take 1 tablet (25 mg total) by mouth once daily.     Review of patient's allergies indicates:   Allergen Reactions    Iodine Other (See Comments)    Iodine and iodide containing products     Ditropan [oxybutynin chloride] Palpitations and Other (See Comments)     Made patient weak       QUALITY OF LIFE: 90%- Able to Carry on Normal Activity: Minor Symptoms of Disease    There were no vitals filed for this visit.  There is no height or weight on file to calculate BMI.    PHYSICAL EXAM:  GENERAL: alert; in no apparent distress.   HEAD: normocephalic, atraumatic.  EYES: pupils are equal, round, reactive to light  and accommodation. Sclera anicteric. Conjunctiva not injected.   NOSE/THROAT: no nasal erythema or rhinorrhea. Oropharynx pink, without erythema, ulcerations or thrush.   NECK: no cervical motion rigidity; supple with no masses.  CHEST: clear to auscultation bilaterally; no wheezes, crackles or rubs. Patient is speaking comfortably on room air with normal work of breathing without using accessory muscles of respiration.  CARDIOVASCULAR: regular rate and rhythm; no murmurs, rubs or gallops.  ABDOMEN: soft, nontender, nondistended. Bowel sounds present.   MUSCULOSKELETAL: no tenderness to palpation along the spine or scapulae. Normal range of motion.  NEUROLOGIC: cranial nerves II-XII intact bilaterally. Strength 5/5 in bilateral upper and lower extremities. No sensory deficits appreciated. Reflexes globally intact. No cerebellar signs. Normal gait.  LYMPHATIC: no cervical, supraclavicular or axillary adenopathy appreciated bilaterally.   EXTREMITIES: no clubbing, cyanosis, edema.  SKIN: no erythema, rashes, ulcerations noted.     REVIEW OF IMAGING/PATHOLOGY/LABS: Please see HPI. All images reviewed personally by dictating physician.       ASSESSMENT: Ursula Rodríguez is a 85 y.o. female with suspected locally advanced NSCLC of NOAH (vs metastasis or infectious/reactive etiology)    PLAN:  After complete review of the patient's chart/hx and eval/discussion w/ the patient today, I explained that further w/u is warranted before tx recommendations can be made, as her chest findings could represent lung CA, metastatic breast CA recurrence, or other benign/infectious/reactive etiologies.    And additionally, being a never-smoker, testing tissue for mutations such as EGFR could provide options like Tagrisso.    Her prior breast CA RT in 2014 provided significant lung doses (ie R-lung V20=35%, B-lung V20=20%). And this would be cumulative w/ any future lung RT (while not impossible if needed) in risks of causing pulmonary  fibrosis and other toxicities.    Thus I have ordered IR CT-guided percutaneous lung biopsy, as well as PFT's now.    And will also see if Dr. Garcia would like ctDNA testing and if confirmation of NSCLC with that could be used for molecular testing and/or to justify use of concurrent CRT.    If NSCLC is confirmed or empiric tx for such is ultimately recommended, she would be stage IIIA and require concurrent CRT for best disease control.    Plan to present at tumor board as well.    DISPOSITION: RTC AFTER FURTHER WORKUP    I have personally seen and evaluated this patient. Greater than 50% of this time was spent discussing coordination of care and/or counseling.    PHYSICIAN: Anand Rodríguez III, MD    Thank you for the opportunity to meet and consult with Ursula Rodríguez.   Please feel free to contact me to discuss the above recommendation further.

## 2025-01-29 ENCOUNTER — OFFICE VISIT (OUTPATIENT)
Dept: RADIATION ONCOLOGY | Facility: CLINIC | Age: 85
End: 2025-01-29
Payer: MEDICARE

## 2025-01-29 ENCOUNTER — SOCIAL WORK (OUTPATIENT)
Dept: HEMATOLOGY/ONCOLOGY | Facility: CLINIC | Age: 85
End: 2025-01-29
Payer: MEDICARE

## 2025-01-29 VITALS
WEIGHT: 124.88 LBS | DIASTOLIC BLOOD PRESSURE: 71 MMHG | BODY MASS INDEX: 22.84 KG/M2 | SYSTOLIC BLOOD PRESSURE: 157 MMHG | HEART RATE: 76 BPM | RESPIRATION RATE: 16 BRPM | OXYGEN SATURATION: 98 %

## 2025-01-29 DIAGNOSIS — C34.12 MALIGNANT NEOPLASM OF UPPER LOBE OF LEFT LUNG: Primary | ICD-10-CM

## 2025-01-29 DIAGNOSIS — R91.8 LUNG MASS: ICD-10-CM

## 2025-01-29 DIAGNOSIS — Z85.3 HISTORY OF BILATERAL BREAST CANCER: ICD-10-CM

## 2025-01-29 PROCEDURE — G2211 COMPLEX E/M VISIT ADD ON: HCPCS | Mod: S$GLB,,, | Performed by: RADIOLOGY

## 2025-01-29 PROCEDURE — 99205 OFFICE O/P NEW HI 60 MIN: CPT | Mod: S$GLB,,, | Performed by: RADIOLOGY

## 2025-01-30 ENCOUNTER — INFUSION (OUTPATIENT)
Dept: INFUSION THERAPY | Facility: HOSPITAL | Age: 85
End: 2025-01-30
Attending: FAMILY MEDICINE
Payer: MEDICARE

## 2025-01-30 VITALS
HEART RATE: 73 BPM | OXYGEN SATURATION: 99 % | DIASTOLIC BLOOD PRESSURE: 63 MMHG | SYSTOLIC BLOOD PRESSURE: 141 MMHG | RESPIRATION RATE: 16 BRPM | WEIGHT: 125 LBS | TEMPERATURE: 98 F | HEIGHT: 62 IN | BODY MASS INDEX: 23 KG/M2

## 2025-01-30 DIAGNOSIS — Z85.3 HISTORY OF BILATERAL BREAST CANCER: Primary | ICD-10-CM

## 2025-01-30 DIAGNOSIS — Z00.00 ENCOUNTER FOR MEDICARE ANNUAL WELLNESS EXAM: ICD-10-CM

## 2025-01-30 PROCEDURE — 63600175 PHARM REV CODE 636 W HCPCS: Performed by: FAMILY MEDICINE

## 2025-01-30 PROCEDURE — 96523 IRRIG DRUG DELIVERY DEVICE: CPT

## 2025-01-30 RX ORDER — HEPARIN 100 UNIT/ML
500 SYRINGE INTRAVENOUS
OUTPATIENT
Start: 2025-01-30

## 2025-01-30 RX ORDER — SODIUM CHLORIDE 0.9 % (FLUSH) 0.9 %
10 SYRINGE (ML) INJECTION
Status: DISCONTINUED | OUTPATIENT
Start: 2025-01-30 | End: 2025-01-30 | Stop reason: HOSPADM

## 2025-01-30 RX ORDER — HEPARIN 100 UNIT/ML
500 SYRINGE INTRAVENOUS
Status: COMPLETED | OUTPATIENT
Start: 2025-01-30 | End: 2025-01-30

## 2025-01-30 RX ORDER — SODIUM CHLORIDE 0.9 % (FLUSH) 0.9 %
10 SYRINGE (ML) INJECTION
OUTPATIENT
Start: 2025-01-30

## 2025-01-30 RX ADMIN — HEPARIN 500 UNITS: 100 SYRINGE at 10:01

## 2025-02-03 ENCOUNTER — TELEPHONE (OUTPATIENT)
Dept: RADIOLOGY | Facility: HOSPITAL | Age: 85
End: 2025-02-03

## 2025-02-03 DIAGNOSIS — C34.12 MALIGNANT NEOPLASM OF UPPER LOBE OF LEFT LUNG: Primary | ICD-10-CM

## 2025-02-03 NOTE — NURSING
Pt scheduled for ct guided lung bx.  Given arrival date and time of 2/18 at 7am.    Pt to hold eliquis 48 hours before procedure.  Pt to have nothing to eat or drink after midnight day of procedure.  Pt must have  home.    Pt's daughter, Lucita, was given above instructions and verbalized understanding.

## 2025-02-04 ENCOUNTER — OFFICE VISIT (OUTPATIENT)
Dept: PULMONOLOGY | Facility: CLINIC | Age: 85
End: 2025-02-04
Payer: MEDICARE

## 2025-02-04 VITALS
DIASTOLIC BLOOD PRESSURE: 75 MMHG | WEIGHT: 123.44 LBS | SYSTOLIC BLOOD PRESSURE: 149 MMHG | HEART RATE: 72 BPM | OXYGEN SATURATION: 99 % | HEIGHT: 62 IN | BODY MASS INDEX: 22.71 KG/M2

## 2025-02-04 DIAGNOSIS — R91.8 LUNG MASS: Primary | ICD-10-CM

## 2025-02-04 PROCEDURE — 99214 OFFICE O/P EST MOD 30 MIN: CPT | Mod: PBBFAC,PO | Performed by: STUDENT IN AN ORGANIZED HEALTH CARE EDUCATION/TRAINING PROGRAM

## 2025-02-04 PROCEDURE — 99215 OFFICE O/P EST HI 40 MIN: CPT | Mod: S$PBB,,, | Performed by: STUDENT IN AN ORGANIZED HEALTH CARE EDUCATION/TRAINING PROGRAM

## 2025-02-04 PROCEDURE — 99999 PR PBB SHADOW E&M-EST. PATIENT-LVL IV: CPT | Mod: PBBFAC,,, | Performed by: STUDENT IN AN ORGANIZED HEALTH CARE EDUCATION/TRAINING PROGRAM

## 2025-02-04 RX ORDER — BACITRACIN ZINC AND POLYMYXIN B SULFATE 500; 10000 [USP'U]/G; [USP'U]/G
OINTMENT OPHTHALMIC
COMMUNITY

## 2025-02-04 NOTE — PROGRESS NOTES
2025    Ursula Rodríguez  New Patient History and Physical    Chief Complaint   Patient presents with    Shortness of Breath       HPI:Ms. Rodríguez is an 84 year old woman who presents with abnormal CT.     Prior dx of R breast cancer with partial mastectomy and MRM, sential node +, stage 2 dx.   L sided breat cancer in , partial mastectomy and reconstruction.   On eliquis, due hx of Factor 5 leiden- hx of TIAs.     She is a never smoker.   Her  did smoke but quit abou 30 yaers ago.   She is losing weight- but daughter says she gained some weight.   No hemoptysis. No night sweats. No chills.     She went to the ED 24- she had some swelling in her neck and was found to have a mass.       The chief complaint problem is New to me    PFSH:  Past Medical History:   Diagnosis Date    Anemia     Cancer     breast    Diabetes mellitus     Factor V Leiden     Hyperlipidemia 2017    Hypertension 2017    Hypothyroidism 2019    Lung cancer     left    Osteoporosis     UNKNOWN DATE OF DX    Pacemaker 10/28/2021    Recurrent urinary tract infection     Status post insertion of nerve stimulator 2024    Vertigo          Past Surgical History:   Procedure Laterality Date    BLOCK, NERVE, GENICULAR Left 2024    Procedure: GENICULAR NERVE BLOCK LEFT KNEE X3 PERIPHERAL;  Surgeon: Serene Stubbs MD;  Location: [facility] OR;  Service: Pain Management;  Laterality: Left;    BREAST SURGERY  , , Nose cancer    CARDIAC VALVE REPLACEMENT       SECTION      ENDOBRONCHIAL ULTRASOUND N/A 2024    Procedure: ENDOBRONCHIAL ULTRASOUND (EBUS);  Surgeon: Raleigh Valenzuela MD;  Location[facility];  Service: Pulmonary;  Laterality: N/A;    EYE SURGERY      HEMORRHOID SURGERY      HERNIA REPAIR      HYSTERECTOMY      INSERTION OF PACEMAKER      INSERTION OF TUNNELED CENTRAL VENOUS CATHETER (CVC) WITH SUBCUTANEOUS PORT N/A 2023    Procedure: [id number]-B-CATH;   Surgeon: Carl Conrad MD;  Location: [facility] OR;  Service: General;  Laterality: N/A;    MASTECTOMY Right 1994    MASTECTOMY Left 2013    BREAST CANCER    MEDIPORT REMOVAL Left 7/26/2023    Procedure: REMOVAL, CATHETER, CENTRAL VENOUS, TUNNELED, WITH PORT;  Surgeon: Carl Conrad MD;  Location: [facility] OR;  Service: General;  Laterality: Left;    PORTACATH PLACEMENT  2013    RADIOFREQUENCY ABLATION, NERVE, GENICULAR, KNEE Left 10/8/2024    Procedure: GENICULAR NERVE BLOCK LEFT KNEE PERIPHERAL;  Surgeon: Serene Stubbs MD;  Location: [facility] OR;  Service: Pain Management;  Laterality: Left;    TUMOR REMOVAL Left 1994    EAR     Social History     Tobacco Use    Smoking status: Never    Smokeless tobacco: Never   Substance Use Topics    Alcohol use: No     Comment: SPECIAL OCCASIONS LIKE ZACHERY    Drug use: No     Family History   Problem Relation Name Age of Onset    Cancer Mother [place]     Hypertension Mother [place]     Heart disease Mother [place]     Lung disease Father August     Heart disease Father August     Diabetes Father August     Cancer Father August     No Known Problems Sister Ashley     Diabetes Brother Pratik     Hypertension Brother [place]     Cancer Brother Jameel     Diabetes Brother Jameel     Hypertension Brother Krunal     Stroke Brother Krunal     Hypertension Brother Say     Stroke Brother Say     Cancer Maternal Grandmother Mammie      Review of patient's allergies indicates:   Allergen Reactions    Iodine Other (See Comments)    Iodine and iodide containing products     Ditropan [oxybutynin chloride] Palpitations and Other (See Comments)     Made patient weak       Performance Status:The patient's activity level is functions out of house.      Review of Systems:   Review of Systems   Constitutional:  Negative for chills, fever, malaise/fatigue and weight loss.   HENT:  Negative for congestion, sinus pain and sore throat.    Eyes:  Negative for blurred vision and pain.    Respiratory:  Negative for cough, shortness of breath and wheezing.    Cardiovascular:  Negative for chest pain, palpitations, orthopnea, claudication and leg swelling.   Gastrointestinal:  Negative for abdominal pain, constipation, diarrhea, heartburn, melena, nausea and vomiting.   Genitourinary:  Negative for dysuria, frequency, hematuria and urgency.   Skin:  Negative for itching and rash.   Neurological:  Negative for dizziness, seizures, loss of consciousness and headaches.   Endo/Heme/Allergies:  Negative for environmental allergies and polydipsia. Does not bruise/bleed easily.   Psychiatric/Behavioral:  Negative for depression. The patient is not nervous/anxious.         Exam:  Physical Exam  Vitals reviewed.   Constitutional:       General: She is not in acute distress.     Appearance: She is well-developed. She is not diaphoretic.   HENT:      Head: Normocephalic and atraumatic.      Mouth/Throat:      Pharynx: No oropharyngeal exudate or posterior oropharyngeal erythema.   Eyes:      General: No scleral icterus.     Pupils: Pupils are equal, round, and reactive to light.   Neck:      Vascular: No JVD.   Cardiovascular:      Rate and Rhythm: Normal rate and regular rhythm.      Heart sounds: Normal heart sounds. No murmur heard.  Pulmonary:      Effort: Pulmonary effort is normal. No respiratory distress.      Breath sounds: Normal breath sounds. No wheezing.   Abdominal:      General: Bowel sounds are normal. There is no distension.      Palpations: Abdomen is soft.      Tenderness: There is no abdominal tenderness.   Musculoskeletal:         General: No swelling.      Cervical back: Normal range of motion and neck supple. No rigidity.   Skin:     General: Skin is warm and dry.      Capillary Refill: Capillary refill takes less than 2 seconds.      Coloration: Skin is not pale.      Findings: No rash.   Neurological:      General: No focal deficit present.      Mental Status: She is alert and oriented  to person, place, and time.      Cranial Nerves: No cranial nerve deficit.      Motor: No weakness or abnormal muscle tone.          Radiographs (ct chest and cxr) reviewed: view by direct vision and interpretation as below     CT chest reviewed and with PET CT from 11/18/24 and 11/7/24- new NOAH lung mass which is FDG active, and multiple active hilar and medistinal nodes.     Labs reviewed     Lab Results   Component Value Date    WBC 8.60 01/30/2025    HGB 13.5 01/30/2025    HCT 41.1 01/30/2025    MCV 84 01/30/2025     01/30/2025       CMP  Sodium   Date Value Ref Range Status   01/30/2025 139 136 - 145 mmol/L Final     Potassium   Date Value Ref Range Status   01/30/2025 3.7 3.5 - 5.1 mmol/L Final     Chloride   Date Value Ref Range Status   01/30/2025 108 95 - 110 mmol/L Final     CO2   Date Value Ref Range Status   01/30/2025 20 (L) 23 - 29 mmol/L Final     Glucose   Date Value Ref Range Status   01/30/2025 248 (H) 70 - 110 mg/dL Final     BUN   Date Value Ref Range Status   01/30/2025 24 (H) 8 - 23 mg/dL Final     Creatinine   Date Value Ref Range Status   01/30/2025 1.0 0.5 - 1.4 mg/dL Final     Calcium   Date Value Ref Range Status   01/30/2025 9.9 8.7 - 10.5 mg/dL Final     Total Protein   Date Value Ref Range Status   01/30/2025 6.4 6.0 - 8.4 g/dL Final     Albumin   Date Value Ref Range Status   01/30/2025 3.9 3.5 - 5.2 g/dL Final     Total Bilirubin   Date Value Ref Range Status   01/30/2025 0.6 0.1 - 1.0 mg/dL Final     Comment:     For infants and newborns, interpretation of results should be based  on gestational age, weight and in agreement with clinical  observations.    Premature Infant recommended reference ranges:  Up to 24 hours.............<8.0 mg/dL  Up to 48 hours............<12.0 mg/dL  3-5 days..................<15.0 mg/dL  6-29 days.................<15.0 mg/dL       Alkaline Phosphatase   Date Value Ref Range Status   01/30/2025 74 40 - 150 U/L Final     AST   Date Value Ref Range  Status   01/30/2025 17 10 - 40 U/L Final     ALT   Date Value Ref Range Status   01/30/2025 13 10 - 44 U/L Final     Anion Gap   Date Value Ref Range Status   01/30/2025 11 8 - 16 mmol/L Final     eGFR   Date Value Ref Range Status   01/30/2025 54.5 (A) >60 mL/min/1.73 m^2 Final         PFT was not done      Plan:  Clinical impression is apparently straight forward and impression with management as below.    Ms Rodríguez is an 84 year old woman with prior hx of breast cancer who presents with new FDG avid lung mass in the NOAH and associated mediastinal and hilar lymphadenopathy.     EBUS was attempted, not able to reach the AP window safely. Station 7 was negative.     Agree with transthoracic biopsy- I think that navigational bronchoscopy will also likely be difficult.      Extensive conversation with patient and daughter about risks/benefits of diagnostic procedures- considering she is 85 and looks really good for her age- she does want to pursue biopsy at this time.     Problem List Items Addressed This Visit    None        No follow-ups on file.    Discussed with patient above for education the following:      There are no Patient Instructions on file for this visit.

## 2025-02-11 ENCOUNTER — HOSPITAL ENCOUNTER (OUTPATIENT)
Dept: PULMONOLOGY | Facility: HOSPITAL | Age: 85
Discharge: HOME | End: 2025-02-11
Attending: RADIOLOGY
Payer: MEDICARE

## 2025-02-11 DIAGNOSIS — C34.12 MALIGNANT NEOPLASM OF UPPER LOBE OF LEFT LUNG: ICD-10-CM

## 2025-02-11 LAB
DLCO SINGLE BREATH LLN: 11.82
DLCO SINGLE BREATH PRE REF: 57.5 %
DLCO SINGLE BREATH REF: 17.55
DLCOC SBVA LLN: 2.32
DLCOC SBVA REF: 3.81
DLCOC SINGLE BREATH LLN: 11.82
DLCOC SINGLE BREATH REF: 17.55
DLCOVA LLN: 2.32
DLCOVA PRE REF: 80.3 %
DLCOVA PRE: 3.06 ML/(MIN*MMHG*L) (ref 2.32–5.31)
DLCOVA REF: 3.81
ERVN2 LLN: -16449.63
ERVN2 PRE REF: 147.1 %
ERVN2 PRE: 0.55 L (ref -16449.63–16450.37)
ERVN2 REF: 0.37
FEF 25 75 LLN: 0.53
FEF 25 75 PRE REF: 87.8 %
FEF 25 75 REF: 1.93
FEV1 FVC LLN: 61
FEV1 FVC PRE REF: 101 %
FEV1 FVC REF: 77
FEV1 LLN: 1.12
FEV1 PRE REF: 104.1 %
FEV1 REF: 1.65
FRCN2 LLN: 1.79
FRCN2 PRE REF: 72.5 %
FRCN2 REF: 2.61
FVC LLN: 1.49
FVC PRE REF: 101.4 %
FVC REF: 2.19
IVC PRE: 2.11 L (ref 1.49–2.94)
IVC SINGLE BREATH LLN: 1.49
IVC SINGLE BREATH PRE REF: 96.3 %
IVC SINGLE BREATH REF: 2.19
PEF LLN: 2.16
PEF PRE REF: 65.1 %
PEF REF: 3.77
PRE DLCO: 10.1 ML/(MIN*MMHG) (ref 11.82–23.29)
PRE FEF 25 75: 1.69 L/S (ref 0.53–3.33)
PRE FET 100: 6.26 SEC
PRE FEV1 FVC: 77.3 % (ref 60.9–90.27)
PRE FEV1: 1.72 L (ref 1.12–2.15)
PRE FRC N2: 1.9 L (ref 1.79–3.44)
PRE FVC: 2.22 L (ref 1.49–2.94)
PRE PEF: 2.45 L/S (ref 2.16–5.38)
RVN2 LLN: 1.67
RVN2 PRE REF: 60.2 %
RVN2 PRE: 1.35 L (ref 1.67–2.82)
RVN2 REF: 2.24
RVN2TLCN2 LLN: 38.95
RVN2TLCN2 PRE REF: 77.8 %
RVN2TLCN2 PRE: 37.76 % (ref 38.95–58.13)
RVN2TLCN2 REF: 48.54
TLCN2 LLN: 3.62
TLCN2 PRE REF: 77.6 %
TLCN2 PRE: 3.57 L (ref 3.62–5.59)
TLCN2 REF: 4.6
VA PRE: 3.3 L (ref 4.45–4.45)
VA SINGLE BREATH LLN: 4.45
VA SINGLE BREATH PRE REF: 74.1 %
VA SINGLE BREATH REF: 4.45
VCMAXN2 LLN: 1.49
VCMAXN2 PRE REF: 101.4 %
VCMAXN2 PRE: 2.22 L (ref 1.49–2.94)
VCMAXN2 REF: 2.19

## 2025-02-11 PROCEDURE — 94727 GAS DIL/WSHOT DETER LNG VOL: CPT | Performed by: CLINIC/CENTER

## 2025-02-11 PROCEDURE — 94729 DIFFUSING CAPACITY: CPT | Performed by: CLINIC/CENTER

## 2025-02-11 PROCEDURE — 94010 BREATHING CAPACITY TEST: CPT | Performed by: CLINIC/CENTER

## 2025-02-18 ENCOUNTER — TELEPHONE (OUTPATIENT)
Dept: RADIOLOGY | Facility: HOSPITAL | Age: 85
End: 2025-02-18

## 2025-02-18 NOTE — NURSING
Lung biopsied rescheduled for 2/27 @ 9am with arrival @ 7am. Will hold Eliquis after 2/24 evening dose.

## 2025-02-27 ENCOUNTER — HOSPITAL ENCOUNTER (OUTPATIENT)
Dept: RADIOLOGY | Facility: HOSPITAL | Age: 85
Discharge: HOME | End: 2025-02-27
Attending: RADIOLOGY
Payer: MEDICARE

## 2025-02-27 VITALS
RESPIRATION RATE: 16 BRPM | BODY MASS INDEX: 21.87 KG/M2 | WEIGHT: 123.44 LBS | TEMPERATURE: 98 F | HEART RATE: 67 BPM | SYSTOLIC BLOOD PRESSURE: 114 MMHG | HEIGHT: 63 IN | DIASTOLIC BLOOD PRESSURE: 45 MMHG | OXYGEN SATURATION: 96 %

## 2025-02-27 DIAGNOSIS — C34.12 MALIGNANT NEOPLASM OF UPPER LOBE OF LEFT LUNG: ICD-10-CM

## 2025-02-27 LAB
APTT PPP: 24.9 SEC (ref 21–32)
BASOPHILS # BLD AUTO: 0.04 K/UL (ref 0–0.2)
BASOPHILS NFR BLD: 0.5 % (ref 0–1.9)
DIFFERENTIAL METHOD BLD: ABNORMAL
EOSINOPHIL # BLD AUTO: 0.2 K/UL (ref 0–0.5)
EOSINOPHIL NFR BLD: 2.7 % (ref 0–8)
ERYTHROCYTE [DISTWIDTH] IN BLOOD BY AUTOMATED COUNT: 14.4 % (ref 11.5–14.5)
HCT VFR BLD AUTO: 44.5 % (ref 37–48.5)
HGB BLD-MCNC: 14.8 G/DL (ref 12–16)
IMM GRANULOCYTES # BLD AUTO: 0.02 K/UL (ref 0–0.04)
IMM GRANULOCYTES NFR BLD AUTO: 0.3 % (ref 0–0.5)
INR PPP: 1 (ref 0.8–1.2)
LYMPHOCYTES # BLD AUTO: 1 K/UL (ref 1–4.8)
LYMPHOCYTES NFR BLD: 12.6 % (ref 18–48)
MCH RBC QN AUTO: 28.1 PG (ref 27–31)
MCHC RBC AUTO-ENTMCNC: 33.3 G/DL (ref 32–36)
MCV RBC AUTO: 84 FL (ref 82–98)
MONOCYTES # BLD AUTO: 0.6 K/UL (ref 0.3–1)
MONOCYTES NFR BLD: 7.6 % (ref 4–15)
NEUTROPHILS # BLD AUTO: 5.8 K/UL (ref 1.8–7.7)
NEUTROPHILS NFR BLD: 76.3 % (ref 38–73)
NRBC BLD-RTO: 0 /100 WBC
PLATELET # BLD AUTO: 193 K/UL (ref 150–450)
PMV BLD AUTO: 9.4 FL (ref 9.2–12.9)
PROTHROMBIN TIME: 11.1 SEC (ref 9–12.5)
RBC # BLD AUTO: 5.27 M/UL (ref 4–5.4)
WBC # BLD AUTO: 7.54 K/UL (ref 3.9–12.7)

## 2025-02-27 PROCEDURE — 85610 PROTHROMBIN TIME: CPT | Performed by: RADIOLOGY

## 2025-02-27 PROCEDURE — 71045 X-RAY EXAM CHEST 1 VIEW: CPT | Mod: 26,,, | Performed by: RADIOLOGY

## 2025-02-27 PROCEDURE — 85730 THROMBOPLASTIN TIME PARTIAL: CPT | Performed by: RADIOLOGY

## 2025-02-27 PROCEDURE — 71045 X-RAY EXAM CHEST 1 VIEW: CPT | Mod: TC

## 2025-02-27 PROCEDURE — 25000003 PHARM REV CODE 250: Performed by: RADIOLOGY

## 2025-02-27 PROCEDURE — 63600175 PHARM REV CODE 636 W HCPCS: Performed by: RADIOLOGY

## 2025-02-27 PROCEDURE — 85025 COMPLETE CBC W/AUTO DIFF WBC: CPT | Performed by: RADIOLOGY

## 2025-02-27 RX ORDER — MIDAZOLAM HYDROCHLORIDE 1 MG/ML
INJECTION, SOLUTION INTRAMUSCULAR; INTRAVENOUS
Status: COMPLETED | OUTPATIENT
Start: 2025-02-27 | End: 2025-02-27

## 2025-02-27 RX ORDER — FENTANYL CITRATE 50 UG/ML
INJECTION, SOLUTION INTRAMUSCULAR; INTRAVENOUS
Status: COMPLETED | OUTPATIENT
Start: 2025-02-27 | End: 2025-02-27

## 2025-02-27 RX ORDER — SODIUM CHLORIDE 9 MG/ML
INJECTION, SOLUTION INTRAVENOUS
Status: COMPLETED | OUTPATIENT
Start: 2025-02-27 | End: 2025-02-27

## 2025-02-27 RX ADMIN — SODIUM CHLORIDE 250 ML/HR: 9 INJECTION, SOLUTION INTRAVENOUS at 09:02

## 2025-02-27 RX ADMIN — FENTANYL CITRATE 25 MCG: 50 INJECTION INTRAMUSCULAR; INTRAVENOUS at 09:02

## 2025-02-27 RX ADMIN — MIDAZOLAM 2 MG: 1 INJECTION INTRAMUSCULAR; INTRAVENOUS at 09:02

## 2025-02-27 NOTE — DISCHARGE INSTRUCTIONS
Medical Imaging Discharge Instructions    Discharge Instructions After:  Lung Biopsy    Diet:  Resume diet as prescribed by your physician and Avoid the use of muscle relaxants, sedative, hypnotics or mood altering medications for 24 hours unless approved by your physician    Medications:  Resume medications as prescribed by your physician, Avoid the use of muscle relaxants, sedatives, hypnotics or mood altering medications for 24 hours unless approved by your physician., and restart eliquis in 24 hours    Activity:  Rest today, Avoid strenuous activity for 2 days, Do not drive an automobile or operate hazardous machinery for 24 hours, and Make no major decisions or sign any legal documents for 24 hours    Care of Dressing and Incision:  Do not remove dressing until tomorrow    Report to M.D. any of the Following:  Any severe pain, new onset pain or worsening symptoms, Excessive bleeding, bruising or swelling, Temperature of 101 degrees or above, Any sudden shortness of breath or bloody sputum greater than 2 tablespoons, and IV site may become tender. If so, place a warm, wet compress on IV site four times a day. This should relieve symptoms in a few days.    Follow up with your physician regarding the results of this exam. Please feel free to call the radiology department at [phone] for any problems or questions. Ask to speak with the radiology nurse.    Portia Oseguera RN  Date of Service: 02/27/2025  10:06 AM

## 2025-03-01 DIAGNOSIS — E03.9 ACQUIRED HYPOTHYROIDISM: ICD-10-CM

## 2025-03-01 RX ORDER — LEVOTHYROXINE SODIUM 50 UG/1
50 TABLET ORAL
Qty: 90 TABLET | Refills: 0 | Status: SHIPPED | OUTPATIENT
Start: 2025-03-01

## 2025-03-01 NOTE — TELEPHONE ENCOUNTER
Care Due:                  Date            Visit Type   Department     Provider  --------------------------------------------------------------------------------                                EP -                              PRIMARY      [facility]  Last Visit: 01-      CARE ([facility])   FAMILY Michael Gutierrez                              EP -                              PRIMARY      [facility]  Next Visit: 04-      CARE ([facility])   FAMILY Michael Gutierrez                                                            Last  Test          Frequency    Reason                     Performed    Due Date  --------------------------------------------------------------------------------    HBA1C.......  6 months...  metFORMIN................  05- 11-    Lipid Panel.  12 months..  simvastatin..............  05- 05-    Mg Level....  12 months..  alendronate..............  Not Found    Overdue    Phosphate...  12 months..  alendronate..............  Not Found    Overdue    TSH.........  12 months..  levothyroxine............  05- 05-    [facility] Embedded Care Due Messages. Reference number: [id number].   3/01/2025 4:03:28 AM CST

## 2025-03-01 NOTE — TELEPHONE ENCOUNTER
Provider Staff:  Action required for this patient    Requires labs      Please see care gap opportunities below in Care Due Message.    Thanks!  [facility]     Appointments      Date Provider   Last Visit   1/15/2025 Chacha Gutierrez MD   Next Visit   4/30/2025 Chacha Gutierrez MD     Refill Decision Note   Ursula Rodríguez  is requesting a refill authorization.  Brief Assessment and Rationale for Refill:  Approve     Medication Therapy Plan:         Comments:     Note composed:8:11 AM 03/01/2025

## 2025-03-03 DIAGNOSIS — M81.0 OSTEOPOROSIS, UNSPECIFIED OSTEOPOROSIS TYPE, UNSPECIFIED PATHOLOGICAL FRACTURE PRESENCE: ICD-10-CM

## 2025-03-03 RX ORDER — ALENDRONATE SODIUM 70 MG/1
TABLET ORAL
Qty: 12 TABLET | Refills: 1 | Status: SHIPPED | OUTPATIENT
Start: 2025-03-03

## 2025-03-03 NOTE — TELEPHONE ENCOUNTER
No care due was identified.  [facility] Embedded Care Due Messages. Reference number: [id number].   3/03/2025 4:03:56 AM CST

## 2025-03-06 ENCOUNTER — INFUSION (OUTPATIENT)
Dept: INFUSION THERAPY | Facility: HOSPITAL | Age: 85
End: 2025-03-06
Attending: FAMILY MEDICINE
Payer: MEDICARE

## 2025-03-06 VITALS
OXYGEN SATURATION: 98 % | SYSTOLIC BLOOD PRESSURE: 142 MMHG | TEMPERATURE: 98 F | WEIGHT: 123.44 LBS | HEIGHT: 63 IN | DIASTOLIC BLOOD PRESSURE: 65 MMHG | HEART RATE: 95 BPM | RESPIRATION RATE: 16 BRPM | BODY MASS INDEX: 21.87 KG/M2

## 2025-03-06 DIAGNOSIS — R80.9 MICROALBUMINURIA: ICD-10-CM

## 2025-03-06 DIAGNOSIS — Z85.3 HISTORY OF BILATERAL BREAST CANCER: Primary | ICD-10-CM

## 2025-03-06 PROCEDURE — A4216 STERILE WATER/SALINE, 10 ML: HCPCS | Performed by: FAMILY MEDICINE

## 2025-03-06 PROCEDURE — 25000003 PHARM REV CODE 250: Performed by: FAMILY MEDICINE

## 2025-03-06 PROCEDURE — 63600175 PHARM REV CODE 636 W HCPCS: Performed by: FAMILY MEDICINE

## 2025-03-06 RX ORDER — HEPARIN 100 UNIT/ML
500 SYRINGE INTRAVENOUS
OUTPATIENT
Start: 2025-03-06

## 2025-03-06 RX ORDER — SODIUM CHLORIDE 0.9 % (FLUSH) 0.9 %
10 SYRINGE (ML) INJECTION
OUTPATIENT
Start: 2025-03-06

## 2025-03-06 RX ORDER — LOSARTAN POTASSIUM 25 MG/1
12.5 TABLET ORAL
Qty: 45 TABLET | Refills: 3 | Status: SHIPPED | OUTPATIENT
Start: 2025-03-06

## 2025-03-06 RX ORDER — SODIUM CHLORIDE 0.9 % (FLUSH) 0.9 %
10 SYRINGE (ML) INJECTION
Status: COMPLETED | OUTPATIENT
Start: 2025-03-06 | End: 2025-03-06

## 2025-03-06 RX ORDER — HEPARIN 100 UNIT/ML
500 SYRINGE INTRAVENOUS
Status: COMPLETED | OUTPATIENT
Start: 2025-03-06 | End: 2025-03-06

## 2025-03-06 RX ADMIN — HEPARIN 500 UNITS: 100 SYRINGE at 07:03

## 2025-03-06 RX ADMIN — Medication 10 ML: at 07:03

## 2025-03-06 NOTE — TELEPHONE ENCOUNTER
Refill Routing Note   Medication(s) are not appropriate for processing by [facility] for the following reason(s):      Required vitals abnormal    [facility] action(s):  Defer Care Due:  None identified            Appointments  past 12m or future 3m with PCP    Date Provider   Last Visit   1/15/2025 Chacha Gutierrez MD   Next Visit   4/30/2025 Chacha Gutierrze MD   ED visits in past 90 days: 0        Note composed:7:35 AM 03/06/2025

## 2025-03-06 NOTE — TELEPHONE ENCOUNTER
No care due was identified.  [facility] Embedded Care Due Messages. Reference number: [id number].   3/06/2025 4:00:37 AM CST

## 2025-03-07 ENCOUNTER — EXTERNAL HOME HEALTH (OUTPATIENT)
Dept: HOME HEALTH SERVICES | Facility: HOSPITAL | Age: 85
End: 2025-03-07
Payer: MEDICARE

## 2025-03-13 ENCOUNTER — OFFICE VISIT (OUTPATIENT)
Facility: CLINIC | Age: 85
End: 2025-03-13
Payer: MEDICARE

## 2025-03-13 VITALS
HEART RATE: 73 BPM | RESPIRATION RATE: 16 BRPM | OXYGEN SATURATION: 100 % | BODY MASS INDEX: 21.79 KG/M2 | HEIGHT: 63 IN | DIASTOLIC BLOOD PRESSURE: 73 MMHG | TEMPERATURE: 97 F | WEIGHT: 123 LBS | SYSTOLIC BLOOD PRESSURE: 135 MMHG

## 2025-03-13 DIAGNOSIS — Z85.3 HISTORY OF BILATERAL BREAST CANCER: Primary | ICD-10-CM

## 2025-03-13 DIAGNOSIS — C78.02 MALIGNANT NEOPLASM METASTATIC TO LEFT LUNG: ICD-10-CM

## 2025-03-13 PROCEDURE — 99999 PR PBB SHADOW E&M-EST. PATIENT-LVL V: CPT | Mod: PBBFAC,,, | Performed by: INTERNAL MEDICINE

## 2025-03-13 PROCEDURE — 99215 OFFICE O/P EST HI 40 MIN: CPT | Mod: PBBFAC,PN | Performed by: INTERNAL MEDICINE

## 2025-03-13 RX ORDER — ANASTROZOLE 1 MG/1
1 TABLET ORAL DAILY
Qty: 30 TABLET | Refills: 12 | Status: SHIPPED | OUTPATIENT
Start: 2025-03-13 | End: 2026-03-13

## 2025-03-13 NOTE — LETTER
March 15, 2025        Chacha Gutierrez MD  [address] [place] [address]             [facility]  [address]  [place] [address]  Phone: [phone]  Fax: [phone]   Patient: Usrula Rodríguez   MR Number: [id number]   YOB: 1940   Date of Visit: 3/13/2025       Dear Dr. Gutierrez:    Thank you for referring Ursula Rodríguez to me for evaluation. Below are the relevant portions of my assessment and plan of care.            If you have questions, please do not hesitate to call me. I look forward to following Ursula along with you.    Sincerely,      STEFFI Garcia MD           CC  No Recipients

## 2025-03-13 NOTE — PROGRESS NOTES
[facility] [address] Hematology Oncology In Office Subsequent Encounter Note    3/13/25    Subjective:      Patient ID:                                      Daughters phone # for scheduling is [phone]  Ursula Rodríguez  85 y.o. female  1940  Chacha Gutierrez MD      Chief Complaint:   Breast cancer follow up    HPI:  85 y.o. female with diagnosis of R Breast cancer,2013.  R breast partial mastectomy followed by [facility].  Sentinal node +.  Stage II dx.  CTA x's 3/4, arimidex, Tamoxifen 2015.    Hx of L breast cancer , had partial mastectomy and reconstruction.    Was on Periactin, weight increased from 122 to 125#, off periactin now.  122# now.    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  .  On Eliquis 2.5 mg BID prophylaxis.    Previously she had decreased appetite, weight down 15#.  Began trial of Periactin 4 mg BID for appetite, weight gain.  Off periactin now 125#.  She stopped her periactin, says appetite is improved, her daughter agrees with her.    On Eliquis 2.5 mg 1 BID.  Refill antivert for vertigo sx prn.   Bone Density, osteoporosis.     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  Hx HTN, DM, GERD,cholesterol, DJD, CVA, TIA., thyroid dx, bradycardia.    Appendectomy, Partial hystectomy, L knee surgery several times, hernia repair, Tumor removed L ear drum .  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  S/P pacemaker placement    Allergy iodine, IVP dye.    Dad COPD, DM.  Mom Breast cancer, DM, increased HR.  Sister DM.  Brother DM, PVD.  Sister colon cancer, heart dx.  Sister Parkinson's Dx.  Daughter colon cancer.   of 64 years, recently passed away.  [facility].  86 y/o.    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   LN +.  M3    New port placed for lab draws.    She has a knee stimulator for pain control in the knees, status post ablation procedure at the nerves of the left knee.  Dr. Stubbs.  Weight is 124 lb, height is 5 ft 2 in, she has a pacemaker in place, history of TI age, age dementia and takes Eliquis  2.5 mg b.i.d..  She does not smoke but she does have exposure to secondhand smoke with her .    She has been found to have a 2.4 cm x 1.6 cm mass in the left upper lung concerning for primary lung malignancy.  PET scan is being done.  Cataract surgery on the left was planned for November 5, 2024.  Her daughter is Lucita [phone].    PET SCAN showed 2.4 cm mass with SUV 14 with hilar and mediastinal LN.  No distant metastatic Dx.  Old fx at pelvis.   Bronchoscopy with EBUS procedure was done, the pathology report was negative for cancer.  Recheck CT scan of the chest at [facility] on January 15th and see me on the 16th, 2025.  Plan to refer her to radiation therapy to see if she would be a candidate for radiation with for clinical lung malignancy without a tissue diagnosis?    Bx of lung mass + for metastatic breast cancer.  Per Dr. Rodríguez, Rad Rx not needed unless she is symptomatic.  Begin Arimidex 1 mg 1 po daily.  Check CT in 3-4 months.  Check CTDNA monitoring for breast cancer.  RTC 6 week.      ROS:   GEN: normal without any fever, night sweats or weight loss  HEENT: See HPI  CV: normal with no CP, SOB, PND, BUSTOS or orthopnea  PULM: normal with no SOB, cough, hemoptysis, sputum or pleuritic pain  GI: normal with no abdominal pain, nausea, vomiting, constipation, diarrhea, melanotic stools, BRBPR, or hematemesis  : normal with no hematuria, dysuria  BREAST: See HPI  SKIN: normal with no rash, erythema, bruising, or swelling     Past Medical History:   Diagnosis Date    Anemia 1995    Cancer     breast    Diabetes mellitus     Factor V Leiden 1994    Hyperlipidemia 2017    Hypertension 2017    Hypothyroidism 2019    Lung cancer     left    Osteoporosis     UNKNOWN DATE OF DX    Pacemaker 10/28/2021    Recurrent urinary tract infection     Status post insertion of nerve stimulator 02/14/2024    Vertigo 1994     Past Surgical History:   Procedure Laterality Date    BLOCK, NERVE,  GENICULAR Left 2024    Procedure: GENICULAR NERVE BLOCK LEFT KNEE X3 PERIPHERAL;  Surgeon: Serene Stubbs MD;  Location: [facility] OR;  Service: Pain Management;  Laterality: Left;    BREAST SURGERY  , , Nose cancer    CARDIAC VALVE REPLACEMENT       SECTION      ENDOBRONCHIAL ULTRASOUND N/A 2024    Procedure: ENDOBRONCHIAL ULTRASOUND (EBUS);  Surgeon: Raleigh Valenzuela MD;  Location: [facility] ENDO;  Service: Pulmonary;  Laterality: N/A;    EYE SURGERY      HEMORRHOID SURGERY      HERNIA REPAIR      HYSTERECTOMY      INSERTION OF PACEMAKER      INSERTION OF TUNNELED CENTRAL VENOUS CATHETER (CVC) WITH SUBCUTANEOUS PORT N/A 2023    Procedure: [id number];  Surgeon: Carl Conrad MD;  Location: [facility] OR;  Service: General;  Laterality: N/A;    MASTECTOMY Right     MASTECTOMY Left     BREAST CANCER    MEDIPORT REMOVAL Left 2023    Procedure: REMOVAL, CATHETER, CENTRAL VENOUS, TUNNELED, WITH PORT;  Surgeon: Carl Conrad MD;  Location: [facility] OR;  Service: General;  Laterality: Left;    PORTACATH PLACEMENT      RADIOFREQUENCY ABLATION, NERVE, GENICULAR, KNEE Left 10/8/2024    Procedure: GENICULAR NERVE BLOCK LEFT KNEE PERIPHERAL;  Surgeon: Serene Stubbs MD;  Location: [facility];  Service: Pain Management;  Laterality: Left;    TUMOR REMOVAL Left     EAR       Review of patient's allergies indicates:   Allergen Reactions    Iodine and iodide containing products     Ditropan [oxybutynin chloride] Palpitations and Other (See Comments)     Made patient weak     Social History     Socioeconomic History    Marital status:     Highest education level: Master's degree (e.g., MA, MS, [place], MEd, MSW, ANDRE)   Occupational History    Occupation: Phd x 3-   Tobacco Use    Smoking status: Never    Smokeless tobacco: Never   Substance and Sexual Activity    Alcohol use: No     Comment: SPECIAL OCCASIONS LIKE ZACHERY    Drug use: No     Sexual activity: Not Currently     Partners: Male     Social Drivers of Health     Financial Resource Strain: Low Risk  (1/24/2025)    Overall Financial Resource Strain (CARDIA)     Difficulty of Paying Living Expenses: Not hard at all   Food Insecurity: No Food Insecurity (1/24/2025)    Hunger Vital Sign     Worried About Running Out of Food in the Last Year: Never true     Ran Out of Food in the Last Year: Never true   Transportation Needs: No Transportation Needs (12/28/2023)    PRAPARE - Transportation     Lack of Transportation (Medical): No     Lack of Transportation (Non-Medical): No   Physical Activity: Unknown (1/24/2025)    Exercise Vital Sign     Days of Exercise per Week: Patient declined     Minutes of Exercise per Session: 0 min   Stress: No Stress Concern Present (1/24/2025)    [employer] - Occupational Stress Questionnaire     Feeling of Stress : Only a little   Housing Stability: Low Risk  (12/28/2023)    Housing Stability Vital Sign     Unable to Pay for Housing in the Last Year: No     Number of Places Lived in the Last Year: 1     Unstable Housing in the Last Year: No         Current Outpatient Medications:     alendronate (FOSAMAX) 70 MG tablet, TAKE 1 TABLET BY MOUTH EVERY WEEK AS DIRECTED, Disp: 12 tablet, Rfl: 1    azelastine (ASTELIN) 137 mcg (0.1 %) nasal spray, 1 spray (137 mcg total) by Nasal route 2 (two) times daily., Disp: 18.2 mL, Rfl: 5    bacitracin-polymyxin b (POLYSPORIN) ophthalmic ointment, APPLY TO BOTH EYELASHES EVERY NIGHT AT BEDTIME, Disp: , Rfl:     cloNIDine (CATAPRES) 0.1 MG tablet, Take 1 tablet (0.1 mg total) by mouth 2 (two) times daily as needed (BP > 160/90)., Disp: 180 tablet, Rfl: 3    cyanocobalamin (VITAMIN B-12) 1000 MCG tablet, Take 100 mcg by mouth once daily., Disp: , Rfl:     donepeziL (ARICEPT) 5 MG tablet, Take 2 tablets (10 mg total) by mouth once daily., Disp: 180 tablet, Rfl: 3    ELIQUIS 2.5 mg Tab, TAKE 1 TABLET(2.5 MG)  BY MOUTH TWICE DAILY, Disp: 180 tablet, Rfl: 3    fluticasone propionate (FLONASE) 50 mcg/actuation nasal spray, 1 spray (50 mcg total) by Each Nostril route once daily., Disp: 48 g, Rfl: 3    levothyroxine (SYNTHROID) 50 MCG tablet, TAKE 1 TABLET(50 MCG) BY MOUTH EVERY DAY, Disp: 90 tablet, Rfl: 0    losartan (COZAAR) 25 MG tablet, TAKE 1/2 TABLET(12.5 MG) BY MOUTH EVERY DAY, Disp: 45 tablet, Rfl: 3    magnesium oxide (MAG-OX) 400 mg (241.3 mg magnesium) tablet, Take 400 mg by mouth once daily., Disp: , Rfl:     meclizine (ANTIVERT) 25 mg tablet, Take 1 tablet (25 mg total) by mouth 2 (two) times daily as needed for Dizziness or Nausea., Disp: 90 tablet, Rfl: 1    metFORMIN (GLUCOPHAGE-XR) 500 MG ER 24hr tablet, TAKE 1 TABLET BY MOUTH EVERY MORNING WITH FOOD FOR 2 WEEKS THEN INCREASE TO 1 TABLET BY MOUTH TWICE DAILY WITH FOOD, Disp: 180 tablet, Rfl: 3    metoprolol tartrate (LOPRESSOR) 25 MG tablet, Take 0.5 tablets (12.5 mg total) by mouth 2 (two) times daily., Disp: 90 tablet, Rfl: 3    multivitamin capsule, Take 1 capsule by mouth once daily., Disp: , Rfl:     NIFEdipine (PROCARDIA-XL) 30 MG (OSM) 24 hr tablet, TAKE 1 TABLET(30 MG) BY MOUTH TWICE DAILY, Disp: 180 tablet, Rfl: 3    sertraline (ZOLOFT) 25 MG tablet, TAKE 1 TABLET(25 MG) BY MOUTH EVERY DAY, Disp: 90 tablet, Rfl: 3    simvastatin (ZOCOR) 20 MG tablet, Take 1 tablet (20 mg total) by mouth every evening., Disp: 90 tablet, Rfl: 3    spironolactone (ALDACTONE) 25 MG tablet, Take 1 tablet (25 mg total) by mouth once daily., Disp: 90 tablet, Rfl: 3    anastrozole (ARIMIDEX) 1 mg Tab, Take 1 tablet (1 mg total) by mouth once daily., Disp: 30 tablet, Rfl: 12  No current facility-administered medications for this visit.    Facility-Administered Medications Ordered in Other Visits:     sodium chloride 0.9% flush 10 mL, 10 mL, Intravenous, 1 time in Clinic/HOD, Chacha Gutierrez MD          Objective:   Vitals:  Blood pressure 135/73, pulse 73, temperature  "97.2 °F (36.2 °C), temperature source Temporal, resp. rate 16, height 5' 3" (1.6 m), weight 55.8 kg (123 lb), SpO2 100%.    Physical Examination:   GEN: no apparent distress, comfortable  HEAD: atraumatic and normocephalic  EYES: no pallor, no icterus  ENT:  no pharyngeal erythema, external ears WNL; no nasal discharge  NECK: no masses, thyroid normal, trachea midline, no LAD/LN's, supple  CV: RRR with no murmur; normal pulse; normal S1 and S2; no pedal edema  CHEST: Normal respiratory effort; CTAB; normal breath sounds; no wheeze or crackles  ABDOM: nontender and nondistended; soft;  no rebound/guarding  MUSC/Skeletal: ROM normal; no crepitus; joints normal; no deformities   EXTREM: multiple spider veins L & R leg  SKIN: multiple ecchemoses on forearms, poor skin turgor.  : no cvat  NEURO: grossly intact; motor/sensory WNL;  no tremors  PSYCH: normal mood, affect and behavior  LYMPH: normal cervical, supraclavicular, axillary and groin LN's  BREASTS: L 'breast" post op change, without palpable mass.  R chest NT, post operative change, no palpable mass    CAT head small vessel dx.  MRI of L/S spine DDD, DJD.    Current studies:  Bone Scan, no metastases seen.  But intense uptake at R foot.  Concern for occult fx or osteomyelitis, she is following up with Dr. Almaguer on this.    CT of chest abdomin and pelvis, multiple 2-3 mm nodules in lungs, no previous CT for comparison.  Significance?  Will plan to repeat CT of chest in 3 months.  No definitive metastatic dx seen.    B 12 372.  Assessment:   (1) 85 y.o. female with diagnosis of  Bilateral breast cancer, S/P treatment as above.  CISCO.  Observe for now.     (2)Weight loss 15#, decreased appetite.  Periactin 4 mg BID.  Now weight better, up to 124#.  She stopped periactin.    (3)S/P pacemaker placement for bradycardia.    (4)Hx TIA, F5L, on Eliquis 2.5 mg prophylaxis long term.    (5) 2.6 cm left upper lobe lung mass concerning for lung malignancy.    (6) PET scan " showed 2.4 cm hypermetabolic mass with an SUV of 14.  Hilar and mediastinal lymphadenopathy was seen.  No distant metastatic disease was seen.  Old fracture was noted at the pelvis.    (7) Dr. Valenzuela for EBUS procedure for biopsy of the mass or lymph node areas for tissue diagnosis  Returned negative per the pathology report.    (8)Repeat Bx positive for ERP+ metastatic breast cancer.  Begin Arimidex 1 mg 1 po daily.  Per Dr. Rodríguez, Rad Rx to mass not indicated unless pt is symptomatic.  RTC 6 weeks.  Check CT of chest 3-4 months.    Eddy Padgett MD  [facility]  3/13/25

## 2025-03-25 ENCOUNTER — TELEPHONE (OUTPATIENT)
Dept: HEMATOLOGY/ONCOLOGY | Facility: CLINIC | Age: 85
End: 2025-03-25
Payer: MEDICARE

## 2025-03-25 ENCOUNTER — TUMOR BOARD CONFERENCE (OUTPATIENT)
Facility: CLINIC | Age: 85
End: 2025-03-25
Payer: MEDICARE

## 2025-03-25 NOTE — PROGRESS NOTES
03/25/25 1215   [facility]   Presenting Hospital / Clinic [facility]   Virtual Tumor Board Conference In person   Presenter JANIE Garcia MD, MELVIN Rodríguez MD   Date Presented to Tumor Board 03/25/25   Specialties Present Medical Oncology;Hematology;Radiation Oncology;Surgical Oncology;Pathology;Navigation;Radiology   Cancer Type   Cancer Type Unknown primary   Recommended Plan (General)   Recommended Plan Note Pt has been started on AI.  If responding or stable continue arimidex.  Order scan in 2 months to check for response.

## 2025-03-25 NOTE — TELEPHONE ENCOUNTER
Per STARR Caraballo, Dr Rei Mcmanus requesting pathology slides from patient's breast cancer from 2013 to compare. Reviewed chart and found previous pathology report.     Spoke with Brenda at [facility] and requested slides to be shipped from [place] to Dr Mcmanus. She stated she will request the slides today and hopes to have them to Dr Yonathan STRICKLAND.     Anum Reynolds made aware.

## 2025-04-03 ENCOUNTER — INFUSION (OUTPATIENT)
Dept: INFUSION THERAPY | Facility: HOSPITAL | Age: 85
End: 2025-04-03
Attending: FAMILY MEDICINE
Payer: MEDICARE

## 2025-04-03 VITALS
HEIGHT: 63 IN | RESPIRATION RATE: 17 BRPM | HEART RATE: 80 BPM | SYSTOLIC BLOOD PRESSURE: 123 MMHG | OXYGEN SATURATION: 97 % | DIASTOLIC BLOOD PRESSURE: 60 MMHG | BODY MASS INDEX: 21.79 KG/M2 | WEIGHT: 123 LBS | TEMPERATURE: 97 F

## 2025-04-03 DIAGNOSIS — Z85.3 HISTORY OF BILATERAL BREAST CANCER: Primary | ICD-10-CM

## 2025-04-03 PROCEDURE — A4216 STERILE WATER/SALINE, 10 ML: HCPCS | Performed by: FAMILY MEDICINE

## 2025-04-03 PROCEDURE — 25000003 PHARM REV CODE 250: Performed by: FAMILY MEDICINE

## 2025-04-03 PROCEDURE — 96523 IRRIG DRUG DELIVERY DEVICE: CPT

## 2025-04-03 PROCEDURE — 63600175 PHARM REV CODE 636 W HCPCS: Performed by: FAMILY MEDICINE

## 2025-04-03 RX ORDER — SODIUM CHLORIDE 0.9 % (FLUSH) 0.9 %
10 SYRINGE (ML) INJECTION
Status: COMPLETED | OUTPATIENT
Start: 2025-04-03 | End: 2025-04-03

## 2025-04-03 RX ORDER — HEPARIN 100 UNIT/ML
500 SYRINGE INTRAVENOUS
Status: COMPLETED | OUTPATIENT
Start: 2025-04-03 | End: 2025-04-03

## 2025-04-03 RX ORDER — HEPARIN 100 UNIT/ML
500 SYRINGE INTRAVENOUS
OUTPATIENT
Start: 2025-04-03

## 2025-04-03 RX ORDER — SODIUM CHLORIDE 0.9 % (FLUSH) 0.9 %
10 SYRINGE (ML) INJECTION
OUTPATIENT
Start: 2025-04-03

## 2025-04-03 RX ADMIN — Medication 10 ML: at 07:04

## 2025-04-03 RX ADMIN — HEPARIN 500 UNITS: 100 SYRINGE at 07:04

## 2025-04-03 NOTE — PLAN OF CARE
Problem: Fatigue  Goal: Improved Activity Tolerance  4/3/2025 0741 by Reyna Duke, RN  Outcome: Progressing  4/3/2025 0723 by Reyna Duke, RN  Outcome: Progressing

## 2025-04-08 ENCOUNTER — TELEPHONE (OUTPATIENT)
Facility: CLINIC | Age: 85
End: 2025-04-08
Payer: MEDICARE

## 2025-04-08 NOTE — TELEPHONE ENCOUNTER
Spoke with Brenda to confirm if slides had been sent. She states she will pull slides and get them mailed over to [facility] for Dr Mcmanus to review.

## 2025-04-08 NOTE — TELEPHONE ENCOUNTER
Notified by [facility] that testing for CTDNA had come back as quantity not sufficient. Called and made patient's daughter aware that ctDNA testing was not possible as there was not enough tumor specimen to make a profile to complete the testing off of. Patient's daughter verbalized understanding of above.

## 2025-04-10 ENCOUNTER — DOCUMENT SCAN (OUTPATIENT)
Dept: HOME HEALTH SERVICES | Facility: HOSPITAL | Age: 85
End: 2025-04-10
Payer: MEDICARE

## 2025-04-17 ENCOUNTER — TELEPHONE (OUTPATIENT)
Facility: CLINIC | Age: 85
End: 2025-04-17
Payer: MEDICARE

## 2025-04-17 NOTE — TELEPHONE ENCOUNTER
Result received from [facility] that patient's testing was unable to be completed. Patient's daughter, Lucita, made aware that testing could not be completed and she would still be monitored through imaging as scheduled. Lucita verbalized understanding of above.

## 2025-04-22 ENCOUNTER — TELEPHONE (OUTPATIENT)
Facility: CLINIC | Age: 85
End: 2025-04-22
Payer: MEDICARE

## 2025-04-29 ENCOUNTER — OFFICE VISIT (OUTPATIENT)
Facility: CLINIC | Age: 85
End: 2025-04-29
Payer: MEDICARE

## 2025-04-29 VITALS
OXYGEN SATURATION: 98 % | TEMPERATURE: 98 F | HEIGHT: 63 IN | SYSTOLIC BLOOD PRESSURE: 162 MMHG | BODY MASS INDEX: 22.21 KG/M2 | HEART RATE: 71 BPM | DIASTOLIC BLOOD PRESSURE: 72 MMHG | WEIGHT: 125.38 LBS | RESPIRATION RATE: 18 BRPM

## 2025-04-29 DIAGNOSIS — Z85.3 HISTORY OF BILATERAL BREAST CANCER: Primary | ICD-10-CM

## 2025-04-29 DIAGNOSIS — C78.00 MALIGNANT NEOPLASM METASTATIC TO LUNG, UNSPECIFIED LATERALITY: ICD-10-CM

## 2025-04-29 PROCEDURE — 99215 OFFICE O/P EST HI 40 MIN: CPT | Mod: PBBFAC,PN | Performed by: INTERNAL MEDICINE

## 2025-04-29 PROCEDURE — 99999 PR PBB SHADOW E&M-EST. PATIENT-LVL V: CPT | Mod: PBBFAC,,, | Performed by: INTERNAL MEDICINE

## 2025-04-29 NOTE — PROGRESS NOTES
[facility] [address] Hematology Oncology In Office Subsequent Encounter Note    25    Subjective:      Patient ID:                                      Daughters phone # for scheduling is [phone]  Ursula Rodríguze  85 y.o. female  1940  Chacha Gutierrez MD      Chief Complaint:   Breast cancer follow up    HPI:  85 y.o. female with diagnosis of R Breast cancer,2013.  R breast partial mastectomy followed by [facility].  Sentinal node +.  Stage II dx.  CTA x's 3/4, arimidex, Tamoxifen 2015.    Hx of L breast cancer , had partial mastectomy and reconstruction.    Was on Periactin, weight increased from 122 to 125#, off periactin now.  122# now.    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  .  On Eliquis 2.5 mg BID prophylaxis.    Previously she had decreased appetite, weight down 15#.  Began trial of Periactin 4 mg BID for appetite, weight gain.  Off periactin now 125#.  She stopped her periactin, says appetite is improved, her daughter agrees with her.    On Eliquis 2.5 mg 1 BID.  Refill antivert for vertigo sx prn.   Bone Density, osteoporosis.     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  Hx HTN, DM, GERD,cholesterol, DJD, CVA, TIA., thyroid dx, bradycardia.    Appendectomy, Partial hystectomy, L knee surgery several times, hernia repair, Tumor removed L ear drum .  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  S/P pacemaker placement    Allergy iodine, IVP dye.    Dad COPD, DM.  Mom Breast cancer, DM, increased HR.  Sister DM.  Brother DM, PVD.  Sister colon cancer, heart dx.  Sister Parkinson's Dx.  Daughter colon cancer.   of 64 years, recently passed away.  [facility].  86 y/o.    [id number]  4.2 cm , multifocal DCIS.   LN +.  M3    New port placed for lab draws.    She has a knee stimulator for pain control in the knees, status post ablation procedure at the nerves of the left knee.  Dr. Stubbs.  Weight is 124 lb, height is 5 ft 2 in, she has a pacemaker in place, history of TI age, age dementia and takes Eliquis  2.5 mg b.i.d..  She does not smoke but she does have exposure to secondhand smoke with her .    She has been found to have a 2.4 cm x 1.6 cm mass in the left upper lung concerning for primary lung malignancy.  PET scan is being done.  Cataract surgery on the left was planned for November 5, 2024.  Her daughter is Lucita [phone].    PET SCAN showed 2.4 cm mass with SUV 14 with hilar and mediastinal LN.  No distant metastatic Dx.  Old fx at pelvis.   Bronchoscopy with EBUS procedure was done, the pathology report was negative for cancer.  Recheck CT scan of the chest at [facility] on January 15th and see me on the 16th, 2025.  Plan to refer her to radiation therapy to see if she would be a candidate for radiation with for clinical lung malignancy without a tissue diagnosis?    Bx of lung mass + for metastatic breast cancer.  Per Dr. Rodríguez, Rad Rx not needed unless she is symptomatic.  Begin Arimidex 1 mg 1 po daily.  Check CT in 3-4 months.  Check CTDNA monitoring for breast cancer.    She has been on Arimidex 1 mg p.o. daily for approximately 1 month.  Hot flash symptoms and joint pain symptoms have not been a problem.  She does have some headaches symptoms which she attributes to Arimidex.  She is to take Tylenol PRN.  Plan to check her CAT scan of the chest towards the last week of July at [facility] and she will follow up with myself in early August, 2025.      ROS:   GEN: normal without any fever, night sweats or weight loss  HEENT: See HPI  CV: normal with no CP, SOB, PND, BUSTOS or orthopnea  PULM: normal with no SOB, cough, hemoptysis, sputum or pleuritic pain  GI: normal with no abdominal pain, nausea, vomiting, constipation, diarrhea, melanotic stools, BRBPR, or hematemesis  : normal with no hematuria, dysuria  BREAST: See HPI  SKIN: normal with no rash, erythema, bruising, or swelling     Past Medical History:   Diagnosis Date    Anemia 1995    Cancer      breast    Diabetes mellitus     Factor V Leiden     Hyperlipidemia 2017    Hypertension 2017    Hypothyroidism 2019    Lung cancer     left    Osteoporosis     UNKNOWN DATE OF DX    Pacemaker 10/28/2021    Recurrent urinary tract infection     Status post insertion of nerve stimulator 2024    Vertigo      Past Surgical History:   Procedure Laterality Date    BLOCK, NERVE, GENICULAR Left 2024    Procedure: GENICULAR NERVE BLOCK LEFT KNEE X3 PERIPHERAL;  Surgeon: Serene Stubbs MD;  Location: [facility] OR;  Service: Pain Management;  Laterality: Left;    BREAST SURGERY   and     CARDIAC VALVE REPLACEMENT       SECTION      ENDOBRONCHIAL ULTRASOUND N/A 2024    Procedure: ENDOBRONCHIAL ULTRASOUND (EBUS);  Surgeon: Raleigh Valenzuela MD;  Location: [facility] ENDO;  Service: Pulmonary;  Laterality: N/A;    EYE SURGERY      HEMORRHOID SURGERY      HERNIA REPAIR      HYSTERECTOMY      INSERTION OF PACEMAKER      INSERTION OF TUNNELED CENTRAL VENOUS CATHETER (CVC) WITH SUBCUTANEOUS PORT N/A 2023    Procedure: [id number]-AEEV-I-CATH;  Surgeon: Carl Conrad MD;  Location: [facility] OR;  Service: General;  Laterality: N/A;    MASTECTOMY Right     MASTECTOMY Left     BREAST CANCER    MEDIPORT REMOVAL Left 2023    Procedure: REMOVAL, CATHETER, CENTRAL VENOUS, TUNNELED, WITH PORT;  Surgeon: Carl Conrad MD;  Location: [facility] OR;  Service: General;  Laterality: Left;    PORTACATH PLACEMENT      RADIOFREQUENCY ABLATION, NERVE, GENICULAR, KNEE Left 10/08/2024    Procedure: GENICULAR NERVE BLOCK LEFT KNEE PERIPHERAL;  Surgeon: Serene Stubbs MD;  Location: [facility];  Service: Pain Management;  Laterality: Left;    TUMOR REMOVAL Left     EAR       Review of patient's allergies indicates:   Allergen Reactions    Iodine and iodide containing products     Ditropan [oxybutynin chloride] Palpitations and Other (See Comments)     Made patient  weak     Social History     Socioeconomic History    Marital status:     Highest education level: Master's degree (e.g., MA, MS, [place], MEd, MSW, ANDRE)   Occupational History    Occupation: Phd x 3-   Tobacco Use    Smoking status: Never    Smokeless tobacco: Never   Substance and Sexual Activity    Alcohol use: No     Comment: SPECIAL OCCASIONS LIKE ZACHERY    Drug use: No    Sexual activity: Not Currently     Partners: Male     Social Drivers of Health     Financial Resource Strain: Low Risk  (1/24/2025)    Overall Financial Resource Strain (CARDIA)     Difficulty of Paying Living Expenses: Not hard at all   Food Insecurity: No Food Insecurity (1/24/2025)    Hunger Vital Sign     Worried About Running Out of Food in the Last Year: Never true     Ran Out of Food in the Last Year: Never true   Transportation Needs: No Transportation Needs (12/28/2023)    PRAPARE - Transportation     Lack of Transportation (Medical): No     Lack of Transportation (Non-Medical): No   Physical Activity: Unknown (1/24/2025)    Exercise Vital Sign     Days of Exercise per Week: Patient declined     Minutes of Exercise per Session: 0 min   Stress: No Stress Concern Present (1/24/2025)    [place] [employer] - Occupational Stress Questionnaire     Feeling of Stress : Only a little   Housing Stability: Low Risk  (12/28/2023)    Housing Stability Vital Sign     Unable to Pay for Housing in the Last Year: No     Number of Places Lived in the Last Year: 1     Unstable Housing in the Last Year: No         Current Outpatient Medications:     alendronate (FOSAMAX) 70 MG tablet, TAKE 1 TABLET BY MOUTH EVERY WEEK AS DIRECTED, Disp: 12 tablet, Rfl: 1    anastrozole (ARIMIDEX) 1 mg Tab, Take 1 tablet (1 mg total) by mouth once daily., Disp: 30 tablet, Rfl: 12    azelastine (ASTELIN) 137 mcg (0.1 %) nasal spray, 1 spray (137 mcg total) by Nasal route 2 (two) times daily., Disp: 18.2 mL, Rfl: 5    bacitracin-polymyxin b  (POLYSPORIN) ophthalmic ointment, APPLY TO BOTH EYELASHES EVERY NIGHT AT BEDTIME, Disp: , Rfl:     cloNIDine (CATAPRES) 0.1 MG tablet, Take 1 tablet (0.1 mg total) by mouth 2 (two) times daily as needed (BP > 160/90)., Disp: 180 tablet, Rfl: 3    cyanocobalamin (VITAMIN B-12) 1000 MCG tablet, Take 100 mcg by mouth once daily., Disp: , Rfl:     ELIQUIS 2.5 mg Tab, TAKE 1 TABLET(2.5 MG) BY MOUTH TWICE DAILY, Disp: 180 tablet, Rfl: 3    fluticasone propionate (FLONASE) 50 mcg/actuation nasal spray, 1 spray (50 mcg total) by Each Nostril route once daily., Disp: 48 g, Rfl: 3    levothyroxine (SYNTHROID) 50 MCG tablet, TAKE 1 TABLET(50 MCG) BY MOUTH EVERY DAY, Disp: 90 tablet, Rfl: 0    losartan (COZAAR) 25 MG tablet, TAKE 1/2 TABLET(12.5 MG) BY MOUTH EVERY DAY, Disp: 45 tablet, Rfl: 3    magnesium oxide (MAG-OX) 400 mg (241.3 mg magnesium) tablet, Take 400 mg by mouth once daily., Disp: , Rfl:     meclizine (ANTIVERT) 25 mg tablet, Take 1 tablet (25 mg total) by mouth 2 (two) times daily as needed for Dizziness or Nausea., Disp: 90 tablet, Rfl: 1    metFORMIN (GLUCOPHAGE-XR) 500 MG ER 24hr tablet, TAKE 1 TABLET BY MOUTH EVERY MORNING WITH FOOD FOR 2 WEEKS THEN INCREASE TO 1 TABLET BY MOUTH TWICE DAILY WITH FOOD, Disp: 180 tablet, Rfl: 3    metoprolol tartrate (LOPRESSOR) 25 MG tablet, Take 0.5 tablets (12.5 mg total) by mouth 2 (two) times daily., Disp: 90 tablet, Rfl: 3    multivitamin capsule, Take 1 capsule by mouth once daily., Disp: , Rfl:     NIFEdipine (PROCARDIA-XL) 30 MG (OSM) 24 hr tablet, TAKE 1 TABLET(30 MG) BY MOUTH TWICE DAILY, Disp: 180 tablet, Rfl: 3    sertraline (ZOLOFT) 25 MG tablet, TAKE 1 TABLET(25 MG) BY MOUTH EVERY DAY, Disp: 90 tablet, Rfl: 3    simvastatin (ZOCOR) 20 MG tablet, Take 1 tablet (20 mg total) by mouth every evening., Disp: 90 tablet, Rfl: 3    spironolactone (ALDACTONE) 25 MG tablet, Take 1 tablet (25 mg total) by mouth once daily., Disp: 90 tablet, Rfl: 3    fluconazole (DIFLUCAN)  "150 MG Tab, Take 1 tablet (150 mg total) by mouth once daily. for 3 days, Disp: 3 tablet, Rfl: 1  No current facility-administered medications for this visit.    Facility-Administered Medications Ordered in Other Visits:     sodium chloride 0.9% flush 10 mL, 10 mL, Intravenous, 1 time in Clinic/HOD, Chacha Gutierrez MD          Objective:   Vitals:  Blood pressure (!) 162/72, pulse 71, temperature 98.2 °F (36.8 °C), temperature source Temporal, resp. rate 18, height 5' 3" (1.6 m), weight 56.9 kg (125 lb 6.4 oz), SpO2 98%.    Physical Examination:   GEN: no apparent distress, comfortable  HEAD: atraumatic and normocephalic  EYES: no pallor, no icterus  ENT:  no pharyngeal erythema, external ears WNL; no nasal discharge  NECK: no masses, thyroid normal, trachea midline, no LAD/LN's, supple  CV: RRR with no murmur; normal pulse; normal S1 and S2; no pedal edema  CHEST: Normal respiratory effort; CTAB; normal breath sounds; no wheeze or crackles  ABDOM: nontender and nondistended; soft;  no rebound/guarding  MUSC/Skeletal: ROM normal; no crepitus; joints normal; no deformities   EXTREM: multiple spider veins L & R leg  SKIN: multiple ecchemoses on forearms, poor skin turgor.  : no cvat  NEURO: grossly intact; motor/sensory WNL;  no tremors  PSYCH: normal mood, affect and behavior  LYMPH: normal cervical, supraclavicular, axillary and groin LN's  BREASTS: L 'breast" post op change, without palpable mass.  R chest NT, post operative change, no palpable mass    CAT head small vessel dx.  MRI of L/S spine DDD, DJD.    Current studies:  Bone Scan, no metastases seen.  But intense uptake at R foot.  Concern for occult fx or osteomyelitis, she is following up with Dr. Almaguer on this.    CT of chest abdomin and pelvis, multiple 2-3 mm nodules in lungs, no previous CT for comparison.  Significance?  Will plan to repeat CT of chest in 3 months.  No definitive metastatic dx seen.    B 12 372.  Assessment:   (1) 85 y.o. female " with diagnosis of  Bilateral breast cancer, S/P treatment as above.  CISCO.  Observe for now.     (2)Weight loss 15#, decreased appetite.  Periactin 4 mg BID.  Now weight better, up to 124#.  She stopped periactin.    (3)S/P pacemaker placement for bradycardia.    (4)Hx TIA, F5L, on Eliquis 2.5 mg prophylaxis long term.    (5) 2.6 cm left upper lobe lung mass concerning for lung malignancy.    (6) PET scan showed 2.4 cm hypermetabolic mass with an SUV of 14.  Hilar and mediastinal lymphadenopathy was seen.  No distant metastatic disease was seen.  Old fracture was noted at the pelvis.    (7) Dr. Valenzuela for EBUS procedure for biopsy of the mass or lymph node areas for tissue diagnosis  Returned negative per the pathology report.    (8)Repeat Bx positive for ERP+ metastatic breast cancer.  On Arimidex 1 mg 1 po daily. X's 1 month.  Per Dr. Rodríguez, Rad Rx to mass not indicated unless pt is symptomatic.  RTC early 8/2025.  Check CT of chest end 7/2025.    Eddy Padgett MD  [facility]  4/29/25

## 2025-04-30 ENCOUNTER — OFFICE VISIT (OUTPATIENT)
Dept: FAMILY MEDICINE | Facility: CLINIC | Age: 85
End: 2025-04-30
Payer: MEDICARE

## 2025-04-30 VITALS
SYSTOLIC BLOOD PRESSURE: 132 MMHG | RESPIRATION RATE: 15 BRPM | BODY MASS INDEX: 22.54 KG/M2 | OXYGEN SATURATION: 96 % | HEART RATE: 69 BPM | WEIGHT: 127.19 LBS | DIASTOLIC BLOOD PRESSURE: 62 MMHG | HEIGHT: 63 IN

## 2025-04-30 DIAGNOSIS — B35.4 TINEA CORPORIS: ICD-10-CM

## 2025-04-30 DIAGNOSIS — E11.49 TYPE II DIABETES MELLITUS WITH NEUROLOGICAL MANIFESTATIONS: Primary | ICD-10-CM

## 2025-04-30 PROCEDURE — 99999 PR PBB SHADOW E&M-EST. PATIENT-LVL V: CPT | Mod: PBBFAC,,, | Performed by: FAMILY MEDICINE

## 2025-04-30 PROCEDURE — 99215 OFFICE O/P EST HI 40 MIN: CPT | Mod: PBBFAC | Performed by: FAMILY MEDICINE

## 2025-04-30 PROCEDURE — 99214 OFFICE O/P EST MOD 30 MIN: CPT | Mod: S$PBB,,, | Performed by: FAMILY MEDICINE

## 2025-04-30 PROCEDURE — G2211 COMPLEX E/M VISIT ADD ON: HCPCS | Mod: S$PBB,,, | Performed by: FAMILY MEDICINE

## 2025-04-30 RX ORDER — FLUCONAZOLE 150 MG/1
150 TABLET ORAL DAILY
Qty: 3 TABLET | Refills: 1 | Status: SHIPPED | OUTPATIENT
Start: 2025-04-30 | End: 2025-05-03

## 2025-04-30 NOTE — PROGRESS NOTES
Patient ID: Ursula Rodríguez is a 85 y.o. female.    Chief Complaint: Follow-up (3 month follow up)    History of Present Illness    CHIEF COMPLAINT:  Ursula presents today for follow up of elevated blood sugar    DIABETES:  She reports blood sugar of 248. She continues Metformin for diabetes management. Recent change from milk to cola for nighttime beverages may have contributed to elevated blood sugar. She reports polyuria with approximately 6 voids nightly and 11-12 voids total throughout a 24-hour period, noting large volume with each void.    BREAST CANCER:  She has history of breast cancer from 1990s with lung mass found to be metastatic breast cancer tissue. She continues anastrozole for estrogen therapy taken at 7pm daily.    DERMATOLOGIC:  She presents with a new pink, sticky rash that appeared suddenly this morning. She denies presence of the rash prior to today.    MEDICATIONS:  She reports being on most medications long-term. She is not currently taking Aricept.      ROS:  ROS as indicated in HPI.         Physical Exam  Constitutional:       Appearance: Normal appearance.   HENT:      Head: Normocephalic and atraumatic.      Nose: Nose normal.   Eyes:      Extraocular Movements: Extraocular movements intact.      Pupils: Pupils are equal, round, and reactive to light.   Cardiovascular:      Rate and Rhythm: Normal rate and regular rhythm.      Pulses: Normal pulses.   Pulmonary:      Effort: Pulmonary effort is normal. No respiratory distress.      Breath sounds: Normal breath sounds. No wheezing or rhonchi.   Musculoskeletal:      Comments: Ambulates with walker   Skin:     General: Skin is warm and dry.      Findings: Erythema and rash present.      Comments: Tinea cruris type rash under the breast folds   Neurological:      General: No focal deficit present.      Mental Status: She is alert and oriented to person, place, and time.   Psychiatric:         Mood and Affect: Mood normal.         Behavior:  Behavior normal.         Thought Content: Thought content normal.          Assessment & Plan    C78.01 Secondary malignant neoplasm of right lung  E11.9 Type 2 diabetes mellitus without complications  B37.89 Other sites of candidiasis  R35.0 Frequency of micturition  Z79.84 Long term (current) use of oral hypoglycemic drugs  Z79.818 Long term (current) use of other agents affecting estrogen receptors and estrogen levels  Z85.3 Personal history of malignant neoplasm of breast    IMPRESSION:  - Assessed glucose control given recent high reading of 248.  - Considered potential impact of recent dietary changes (drinking Coke instead of milk) on glucose levels.  - Evaluated skin rash under breast, likely fungal infection due to heat and sweating.  - Considered drug interactions before prescribing antifungal medication.  - Noted increased urination frequency as potential sign of elevated glucose.    ## METASTATIC BREAST CANCER:  - Confirmed the patient has a lung mass that is metastatic breast cancer tissue from her breast cancer in the 1990s, not primary lung cancer.  - Continued anastrozole as estrogen therapy for the metastatic breast cancer.  - This will be maintained as a long-term therapy.    ## TYPE 2 DIABETES:  - Noted the patient's blood sugar was elevated at 248 with reports of frequent urination.  - Reviewed the last hemoglobin A1C from May which was within acceptable range.  - Discussed target HbA1c levels for diabetics (under 7%) and non-diabetics (4-5.6%).  - Continued Metformin for diabetes management.  - Instructed the patient to check glucose at home, especially fasting levels in the morning, and to contact the office if readings are in the upper 200s before next Thursday.  - Ordered HbA1c to evaluate long-term glycemic control.  - Recommend reducing soda intake to help manage glucose levels.    ## CANDIDIASIS:  - Identified a fungal rash under the patient's breast that appeared suddenly.  - Explained  that the infection is likely due to sweating and high blood sugar, as sweating can lead to sugar excretion, creating an environment conducive to yeast growth.  - Prescribed Diflucan (fluconazole) 1 pill daily for 3 days with a refill provided in case of recurrence.  - Recommend using an OTC antifungal powder.    ## FREQUENT URINATION:  - Ursula reports urinating approximately 11-12 times between night and day.  - Explained the relationship between elevated blood sugar and increased urination.  - Advised increasing water intake to prevent dehydration.    ## FOLLOW-UP:  - Scheduled follow-up next Thursday for lab work through [facility].         Plan:          Type II diabetes mellitus with neurological manifestations  -     Hemoglobin A1C; Future; Expected date: 04/30/2025    Tinea corporis  -     fluconazole (DIFLUCAN) 150 MG Tab; Take 1 tablet (150 mg total) by mouth once daily. for 3 days  Dispense: 3 tablet; Refill: 1        Follow up in about 3 months (around 7/30/2025), or if symptoms worsen or fail to improve.    This note was generated with the assistance of ambient listening technology. Verbal consent was obtained by the patient and accompanying visitor(s) for the recording of patient appointment to facilitate this note. I attest to having reviewed and edited the generated note for accuracy, though some syntax or spelling errors may persist. Please contact the author of this note for any clarification.

## 2025-05-07 RX ORDER — SODIUM CHLORIDE 0.9 % (FLUSH) 0.9 %
10 SYRINGE (ML) INJECTION
Status: CANCELLED | OUTPATIENT
Start: 2025-05-07

## 2025-05-07 RX ORDER — HEPARIN 100 UNIT/ML
500 SYRINGE INTRAVENOUS
Status: CANCELLED | OUTPATIENT
Start: 2025-05-07

## 2025-05-08 ENCOUNTER — RESULTS FOLLOW-UP (OUTPATIENT)
Dept: FAMILY MEDICINE | Facility: CLINIC | Age: 85
End: 2025-05-08
Payer: MEDICARE

## 2025-05-08 ENCOUNTER — INFUSION (OUTPATIENT)
Dept: INFUSION THERAPY | Facility: HOSPITAL | Age: 85
End: 2025-05-08
Attending: FAMILY MEDICINE
Payer: MEDICARE

## 2025-05-08 ENCOUNTER — LAB VISIT (OUTPATIENT)
Dept: LAB | Facility: HOSPITAL | Age: 85
End: 2025-05-08
Attending: FAMILY MEDICINE
Payer: MEDICARE

## 2025-05-08 VITALS
RESPIRATION RATE: 16 BRPM | HEART RATE: 94 BPM | HEIGHT: 63 IN | WEIGHT: 127 LBS | BODY MASS INDEX: 22.5 KG/M2 | DIASTOLIC BLOOD PRESSURE: 73 MMHG | SYSTOLIC BLOOD PRESSURE: 163 MMHG | OXYGEN SATURATION: 97 % | TEMPERATURE: 98 F

## 2025-05-08 DIAGNOSIS — E11.49 TYPE II DIABETES MELLITUS WITH NEUROLOGICAL MANIFESTATIONS: ICD-10-CM

## 2025-05-08 DIAGNOSIS — Z85.3 HISTORY OF BILATERAL BREAST CANCER: Primary | ICD-10-CM

## 2025-05-08 LAB
EAG (OHS): 114 MG/DL (ref 68–131)
HBA1C MFR BLD: 5.6 % (ref 4–5.6)

## 2025-05-08 PROCEDURE — 63600175 PHARM REV CODE 636 W HCPCS: Performed by: FAMILY MEDICINE

## 2025-05-08 PROCEDURE — 36415 COLL VENOUS BLD VENIPUNCTURE: CPT

## 2025-05-08 PROCEDURE — 83036 HEMOGLOBIN GLYCOSYLATED A1C: CPT

## 2025-05-08 PROCEDURE — 36591 DRAW BLOOD OFF VENOUS DEVICE: CPT

## 2025-05-08 PROCEDURE — A4216 STERILE WATER/SALINE, 10 ML: HCPCS | Performed by: FAMILY MEDICINE

## 2025-05-08 PROCEDURE — 96523 IRRIG DRUG DELIVERY DEVICE: CPT

## 2025-05-08 PROCEDURE — 25000003 PHARM REV CODE 250: Performed by: FAMILY MEDICINE

## 2025-05-08 RX ORDER — HEPARIN 100 UNIT/ML
500 SYRINGE INTRAVENOUS
Status: COMPLETED | OUTPATIENT
Start: 2025-05-08 | End: 2025-05-08

## 2025-05-08 RX ORDER — HEPARIN 100 UNIT/ML
500 SYRINGE INTRAVENOUS
OUTPATIENT
Start: 2025-05-08

## 2025-05-08 RX ORDER — SODIUM CHLORIDE 0.9 % (FLUSH) 0.9 %
10 SYRINGE (ML) INJECTION
Status: COMPLETED | OUTPATIENT
Start: 2025-05-08 | End: 2025-05-08

## 2025-05-08 RX ORDER — SODIUM CHLORIDE 0.9 % (FLUSH) 0.9 %
10 SYRINGE (ML) INJECTION
OUTPATIENT
Start: 2025-05-08

## 2025-05-08 RX ADMIN — Medication 10 ML: at 08:05

## 2025-05-08 RX ADMIN — HEPARIN 500 UNITS: 100 SYRINGE at 08:05

## 2025-05-13 ENCOUNTER — HOSPITAL ENCOUNTER (OUTPATIENT)
Facility: HOSPITAL | Age: 85
Discharge: HOME | End: 2025-05-13
Attending: ANESTHESIOLOGY | Admitting: ANESTHESIOLOGY
Payer: MEDICARE

## 2025-05-13 VITALS
BODY MASS INDEX: 22.15 KG/M2 | WEIGHT: 125 LBS | HEIGHT: 63 IN | RESPIRATION RATE: 14 BRPM | TEMPERATURE: 99 F | OXYGEN SATURATION: 97 % | SYSTOLIC BLOOD PRESSURE: 123 MMHG | HEART RATE: 57 BPM | DIASTOLIC BLOOD PRESSURE: 59 MMHG

## 2025-05-13 DIAGNOSIS — M25.562 CHRONIC PAIN OF LEFT KNEE: Primary | ICD-10-CM

## 2025-05-13 DIAGNOSIS — G89.29 CHRONIC PAIN OF LEFT KNEE: Primary | ICD-10-CM

## 2025-05-13 PROCEDURE — 63600175 PHARM REV CODE 636 W HCPCS: Performed by: ANESTHESIOLOGY

## 2025-05-13 PROCEDURE — 64624 DSTRJ NULYT AGT GNCLR NRV: CPT | Mod: LT | Performed by: ANESTHESIOLOGY

## 2025-05-13 PROCEDURE — 99152 MOD SED SAME PHYS/QHP 5/>YRS: CPT | Performed by: ANESTHESIOLOGY

## 2025-05-13 RX ORDER — LIDOCAINE HYDROCHLORIDE 20 MG/ML
INJECTION, SOLUTION INFILTRATION; PERINEURAL
Status: DISCONTINUED | OUTPATIENT
Start: 2025-05-13 | End: 2025-05-13 | Stop reason: HOSPADM

## 2025-05-13 RX ORDER — BUPIVACAINE HYDROCHLORIDE 5 MG/ML
INJECTION, SOLUTION EPIDURAL; INTRACAUDAL; PERINEURAL
Status: DISCONTINUED | OUTPATIENT
Start: 2025-05-13 | End: 2025-05-13 | Stop reason: HOSPADM

## 2025-05-13 RX ORDER — MIDAZOLAM HYDROCHLORIDE 5 MG/ML
INJECTION INTRAMUSCULAR; INTRAVENOUS
Status: DISCONTINUED | OUTPATIENT
Start: 2025-05-13 | End: 2025-05-13 | Stop reason: HOSPADM

## 2025-05-13 RX ORDER — DEXAMETHASONE SODIUM PHOSPHATE 4 MG/ML
INJECTION, SOLUTION INTRA-ARTICULAR; INTRALESIONAL; INTRAMUSCULAR; INTRAVENOUS; SOFT TISSUE
Status: DISCONTINUED | OUTPATIENT
Start: 2025-05-13 | End: 2025-05-13 | Stop reason: HOSPADM

## 2025-05-13 RX ORDER — SODIUM CHLORIDE, SODIUM LACTATE, POTASSIUM CHLORIDE, CALCIUM CHLORIDE 600; 310; 30; 20 MG/100ML; MG/100ML; MG/100ML; MG/100ML
INJECTION, SOLUTION INTRAVENOUS CONTINUOUS
Status: DISCONTINUED | OUTPATIENT
Start: 2025-05-13 | End: 2025-05-13 | Stop reason: HOSPADM

## 2025-05-13 RX ORDER — FENTANYL CITRATE 50 UG/ML
INJECTION, SOLUTION INTRAMUSCULAR; INTRAVENOUS
Status: DISCONTINUED | OUTPATIENT
Start: 2025-05-13 | End: 2025-05-13 | Stop reason: HOSPADM

## 2025-05-13 NOTE — H&P
FOLLOW UP NOTE:     CHIEF COMPLAINT: knee pain    INTERVAL HISTORY OF PRESENT ILLNESS: Ursula Rodríguez is a 85 y.o. female who presents for LEFT KNEE GENICULAR NERVE ABLATION . The patient denies of any significant changes in her health since her last appointment. The patient also denies of any changes in the character of her pain since her last appointment.     ROS:  Review of Systems   Constitutional:  Negative for chills and fever.   HENT:  Negative for sore throat.    Eyes:  Negative for visual disturbance.   Respiratory:  Negative for shortness of breath.    Cardiovascular:  Negative for chest pain.   Gastrointestinal:  Negative for nausea and vomiting.   Genitourinary:  Negative for difficulty urinating.   Musculoskeletal:  Positive for arthralgias.   Skin:  Negative for rash.   Allergic/Immunologic: Negative for immunocompromised state.   Neurological:  Negative for syncope.   Hematological:  Does not bruise/bleed easily.   Psychiatric/Behavioral:  Negative for suicidal ideas.         MEDICAL, SURGICAL, FAMILY, SOCIAL HX: reviewed    MEDICATIONS/ALLERGIES: reviewed    PHYSICAL EXAM:    VITALS: Vitals reviewed.   Vitals:    05/13/25 0724   BP: 139/65   Pulse: 64   Resp: 10   Temp: 96.8 °F (36 °C)   TempSrc: Temporal       Physical Exam  Vitals and nursing note reviewed.   Constitutional:       Appearance: Normal appearance. She is not toxic-appearing or diaphoretic.   HENT:      Head: Normocephalic and atraumatic.   Eyes:      General:         Right eye: No discharge.         Left eye: No discharge.      Extraocular Movements: Extraocular movements intact.      Conjunctiva/sclera: Conjunctivae normal.   Cardiovascular:      Rate and Rhythm: Normal rate.   Pulmonary:      Effort: Pulmonary effort is normal. No respiratory distress.      Breath sounds: Normal breath sounds.   Abdominal:      Palpations: Abdomen is soft.   Skin:     General: Skin is warm and dry.      Findings: No rash.   Neurological:       Mental Status: She is alert and oriented to person, place, and time.   Psychiatric:         Mood and Affect: Mood and affect normal.         Cognition and Memory: Memory normal.         Judgment: Judgment normal.          EXTREMITIES:    Gen: No cyanosis, edema, varicosities, or tenderness to palpation BLE   Skin: Warm, pink, dry, no rashes, no lesions BLE   Strength: 5/5 motor strength BLE   ROM: hips, knees and ankles without pain or instability.     NEUROLOGICAL:    Gen: No clonus or spasticity.   Gait: Normal without antalgic lean   DTR's: 2+ in bilateral patellar, and ankle   BABINSKI: Absent bilaterally  Sensory: Intact to light touch and proprioception BLE    ASSESSMENT: Ursula Rodríguez is a 85 y.o. female who presents for LEFT KNEE GENICULAR NERVE ABLATION .     PLAN:  Proceed with LEFT KNEE GENICULAR NERVE ABLATION  as previously discussed.    This patient has been cleared for surgery in an ambulatory surgical facility    ASA 3,  Mallampatti Score 3  No history of anesthetic complications  Plan for RN IV sedation    Serene Stubbs MD  Pain Management

## 2025-05-13 NOTE — OP NOTE
DATE: 5/13/2025    PROCEDURE: Radiofrequency Ablations of Superior lateral, Superior medial and Inferior medial genicular nerves, left-sided     Diagnosis: Chronic left knee pain     Post Op Diagnosis: Same     PHYSICIAN: Serene Stubbs M.D.     LOCAL ANESTHESIA: Lidocaine 1%, 3 ml total.     MEDICATION INJECTED: Mixture of 2 ml of 0.5% bupivacaine and 10 mg of dexamethasone    SEDATION MEDICATIONS: RN IV sedation    COMPLICATIONS: None     ESTIMATED BLOOD LOSS: None     TECHNIQUE: A time-out was taken to identify patient and procedure side prior to starting procedure.   With the patient laying supine and the knees semi-flexed, the leftknee was prepped and draped in the usual sterile fashion using ChloraPrep and a fenestrated drape. Knee joint line was determined under fluoroscopic guidance. The targets included the superior lateral (SL), superior medial (SM) and inferior medial (IM) genicular nerves which past periosteal areas connecting the shaft of the femur to bilateral epicondyles and the shaft of tibia to the medial epicondyle. The local anesthetic was given using a 25-gauge 1.5 inch needle. 50 mm RF needle was introduced into each target area. Motor stimulation up to 2 Volts at each level confirmed no motor nerve involvement. Impedance was less than 800 ohms at each level.   1ml of 2% lidocaine was instilled prior to lesioning. Ablation was performed per level utilizing radiofrequency generator approximately 60°C for 150 seconds. The above noted medication was then injected slowly. The patient tolerated the procedure well    Patient was given post procedure and discharge instructions to follow at home. The patient was discharged in a stable condition

## 2025-05-13 NOTE — DISCHARGE SUMMARY
[place] - Surgery  Discharge Note  Short Stay    Procedure(s) (LRB):  REPEAT LEFT KNEE GENICULAR NERVE ABLATION WITH COOLIEF (Left)      OUTCOME: Patient tolerated treatment/procedure well without complication and is now ready for discharge.    DISPOSITION: Home or Self Care    FINAL DIAGNOSIS: Chronic left knee pain    FOLLOWUP: In clinic    DISCHARGE INSTRUCTIONS:    Discharge Procedure Orders   Diet general     Call MD for:  temperature >100.4     Call MD for:  persistent nausea and vomiting     Call MD for:  severe uncontrolled pain     Call MD for:  difficulty breathing, headache or visual disturbances     Call MD for:  redness, tenderness, or signs of infection (pain, swelling, redness, odor or green/yellow discharge around incision site)     Call MD for:  hives     Call MD for:  persistent dizziness or light-headedness     Call MD for:  extreme fatigue        TIME SPENT ON DISCHARGE: 30 minutes

## 2025-05-13 NOTE — PLAN OF CARE
Nursing Disharge Note    Discharge To: Home    Transfer via wheelchair to car    Transfer with Belongings    Transported by PACU staff    Medicines sent: None    Notified: Daughter at bedside    Discharge instructions sent with patient and patient understands instructions. Instructed to call hospital for concerns, phone numbers given.    IV removed before discharge.    Patient awake, alert, and oriented x4  Denies pain or discomfort at this time.

## 2025-05-20 ENCOUNTER — EXTERNAL HOME HEALTH (OUTPATIENT)
Dept: HOME HEALTH SERVICES | Facility: HOSPITAL | Age: 85
End: 2025-05-20
Payer: MEDICARE

## 2025-05-22 ENCOUNTER — TELEPHONE (OUTPATIENT)
Facility: CLINIC | Age: 85
End: 2025-05-22
Payer: MEDICARE

## 2025-05-22 NOTE — TELEPHONE ENCOUNTER
Spoke to Lucita, Patient's daughter. I explained she should contact her Mom's PCP for advisement. She verbally demonstrated understanding.

## 2025-05-22 NOTE — TELEPHONE ENCOUNTER
----- Message from Michelle sent at 5/22/2025  1:21 PM CDT -----  Lucita the patient's daughter called and said the patient has been coughing since yesterday. They want to know if she should see her PCP or Dr. Padgett. Please call her back to advise at [phone].

## 2025-05-23 ENCOUNTER — OFFICE VISIT (OUTPATIENT)
Dept: FAMILY MEDICINE | Facility: CLINIC | Age: 85
End: 2025-05-23
Payer: MEDICARE

## 2025-05-23 VITALS
DIASTOLIC BLOOD PRESSURE: 74 MMHG | OXYGEN SATURATION: 100 % | SYSTOLIC BLOOD PRESSURE: 132 MMHG | BODY MASS INDEX: 22.11 KG/M2 | RESPIRATION RATE: 16 BRPM | WEIGHT: 124.81 LBS | HEIGHT: 63 IN | HEART RATE: 98 BPM

## 2025-05-23 DIAGNOSIS — R05.9 COUGH IN ADULT: Primary | ICD-10-CM

## 2025-05-23 DIAGNOSIS — E11.49 TYPE II DIABETES MELLITUS WITH NEUROLOGICAL MANIFESTATIONS: ICD-10-CM

## 2025-05-23 PROCEDURE — 99999 PR PBB SHADOW E&M-EST. PATIENT-LVL IV: CPT | Mod: PBBFAC,,, | Performed by: FAMILY MEDICINE

## 2025-05-23 PROCEDURE — 99214 OFFICE O/P EST MOD 30 MIN: CPT | Mod: PBBFAC | Performed by: FAMILY MEDICINE

## 2025-05-23 RX ORDER — PREDNISONE 20 MG/1
20 TABLET ORAL DAILY
Qty: 5 TABLET | Refills: 0 | Status: SHIPPED | OUTPATIENT
Start: 2025-05-23

## 2025-05-23 RX ORDER — PROMETHAZINE HYDROCHLORIDE AND DEXTROMETHORPHAN HYDROBROMIDE 6.25; 15 MG/5ML; MG/5ML
5 SYRUP ORAL EVERY 6 HOURS PRN
Qty: 160 ML | Refills: 0 | Status: SHIPPED | OUTPATIENT
Start: 2025-05-23 | End: 2025-06-02

## 2025-05-23 RX ORDER — ALBUTEROL SULFATE 90 UG/1
2 INHALANT RESPIRATORY (INHALATION) EVERY 6 HOURS PRN
Qty: 18 G | Refills: 1 | Status: SHIPPED | OUTPATIENT
Start: 2025-05-23

## 2025-05-23 NOTE — PROGRESS NOTES
"Patient ID: Ursula Rodríguez is a 85 y.o. female.    Chief Complaint: Cough (X 4 days and has gotten worse over the last 4 days)    History of Present Illness    CHIEF COMPLAINT:  Ursula presents today with worsening cough    HISTORY OF PRESENT ILLNESS:  She presents with a 4-day history of persistent, worsening dry cough. The cough is described as hacking and primarily located in the throat area with an associated tickling sensation. She experiences coughing episodes both during the day and night. She denies fever, significant ear pain, or sore throat. She has not tried any treatments for her cough and has no previous experience with inhalers.    MEDICATIONS:  She takes metformin at a low dose.      ROS:  ROS as indicated in HPI.         Physical Exam  Constitutional:       Appearance: Normal appearance.   HENT:      Head: Normocephalic and atraumatic.      Nose: Nose normal.   Eyes:      Extraocular Movements: Extraocular movements intact.      Pupils: Pupils are equal, round, and reactive to light.   Cardiovascular:      Rate and Rhythm: Normal rate and regular rhythm.      Pulses: Normal pulses.   Pulmonary:      Effort: Pulmonary effort is normal. No respiratory distress.      Breath sounds: Normal breath sounds. No wheezing or rhonchi.   Musculoskeletal:         General: Normal range of motion.   Skin:     General: Skin is warm and dry.   Neurological:      General: No focal deficit present.      Mental Status: She is alert and oriented to person, place, and time.   Psychiatric:         Mood and Affect: Mood normal.         Behavior: Behavior normal.         Thought Content: Thought content normal.          Assessment & Plan    J06.9 Acute upper respiratory infection, unspecified    ACUTE UPPER RESPIRATORY INFECTION:  - Assessed symptoms as likely viral, consistent with a "spring cold" circulating in the community.  - Ursula reports a dry, hacking cough in the throat area with constant post-nasal drainage.  - " "Confirmed absence of fever, otalgia, or pharyngitis.  - Although considered bacterial superinfection as possibility given worsening symptoms over 4 days, evaluated the condition as a viral infection with negative tests for other conditions.         Review of patient's allergies indicates:   Allergen Reactions    Iodine Other (See Comments)    Iodine and iodide containing products     Ditropan [oxybutynin chloride] Palpitations and Other (See Comments)     Made patient weak      Vitals:    05/23/25 1623   BP: 132/74   BP Location: Left arm   Patient Position: Sitting   Pulse: 98   Resp: 16   SpO2: 100%   Weight: 56.6 kg (124 lb 12.8 oz)   Height: 5' 3" (1.6 m)           Results for orders placed or performed in visit on 05/23/25   POCT COVID-19 Rapid Screening    Collection Time: 05/23/25  5:05 PM   Result Value Ref Range    POC Rapid COVID Negative Negative     Acceptable Yes    POCT Influenza A/B Molecular    Collection Time: 05/23/25  5:07 PM   Result Value Ref Range    POC Molecular Influenza A Ag Negative Negative    POC Molecular Influenza B Ag Negative Negative     Acceptable Yes      *Note: Due to a large number of results and/or encounters for the requested time period, some results have not been displayed. A complete set of results can be found in Results Review.      Plan:          Cough in adult  -     POCT COVID-19 Rapid Screening  -     POCT Influenza A/B Molecular  -     promethazine-dextromethorphan (PROMETHAZINE-DM) 6.25-15 mg/5 mL Syrp; Take 5 mLs by mouth every 6 (six) hours as needed (cough).  Dispense: 160 mL; Refill: 0  -     predniSONE (DELTASONE) 20 MG tablet; Take 1 tablet (20 mg total) by mouth once daily.  Dispense: 5 tablet; Refill: 0  -     albuterol (VENTOLIN HFA) 90 mcg/actuation inhaler; Inhale 2 puffs into the lungs every 6 (six) hours as needed for Wheezing. 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    This note was generated with the assistance of ambient listening technology. Verbal consent was obtained by the patient and accompanying visitor(s) for the recording of patient appointment to facilitate this note. I attest to having reviewed and edited the generated note for accuracy, though some syntax or spelling errors may persist. Please contact the author of this note for any clarification.

## 2025-05-26 DIAGNOSIS — E03.9 ACQUIRED HYPOTHYROIDISM: ICD-10-CM

## 2025-05-26 NOTE — TELEPHONE ENCOUNTER
Care Due:                  Date            Visit Type   Department     Provider  --------------------------------------------------------------------------------                                EP -                              PRIMARY      [facility] 2ND FLOOR  Last Visit: 05-      CARE ([facility])   FAMILY Michael Gutierrez                              EP -                              PRIMARY      [facility] 2ND FLOOR  Next Visit: 07-      CARE ([facility])   FAMILY Michael Gutierrez                                                            Last  Test          Frequency    Reason                     Performed    Due Date  --------------------------------------------------------------------------------    Lipid Panel.  12 months..  simvastatin..............  05- 05-    Mg Level....  12 months..  alendronate..............  Not Found    Overdue    Phosphate...  12 months..  alendronate..............  Not Found    Overdue    TSH.........  12 months..  levothyroxine............  05- 05-    [facility] Embedded Care Due Messages. Reference number: [id number].   5/26/2025 10:25:39 AM CDT

## 2025-05-27 RX ORDER — LEVOTHYROXINE SODIUM 50 UG/1
50 TABLET ORAL
Qty: 90 TABLET | Refills: 0 | Status: SHIPPED | OUTPATIENT
Start: 2025-05-27

## 2025-05-27 NOTE — TELEPHONE ENCOUNTER
Refill Routing Note   Medication(s) are not appropriate for processing by [facility] for the following reason(s):        Required labs outdated    [facility] action(s):  Defer   Requires labs : Yes             Appointments  past 12m or future 3m with PCP    Date Provider   Last Visit   5/23/2025 Chacha Gutierrez MD   Next Visit   7/7/2025 Chacha Gutierrez MD   ED visits in past 90 days: 0        Note composed:8:31 PM 05/26/2025

## 2025-06-11 ENCOUNTER — TELEPHONE (OUTPATIENT)
Facility: CLINIC | Age: 85
End: 2025-06-11
Payer: MEDICARE

## 2025-06-11 NOTE — TELEPHONE ENCOUNTER
Kari Johnson Staff  Lucita, Pt's daughter calling to check on the orders for CT Scan that will be done at [facility]. Also, she needs to get it scheduled.      CB# [phone]    Spoke to Patient's daughter, Lucita.  This RN explained that per Dr. Padgett's order she is to be scheduled for her CT scan in July of 2025. I confirmed the order is in our Epic system. Lucita verbally demonstrated understanding.

## 2025-06-12 ENCOUNTER — INFUSION (OUTPATIENT)
Dept: INFUSION THERAPY | Facility: HOSPITAL | Age: 85
End: 2025-06-12
Attending: FAMILY MEDICINE
Payer: MEDICARE

## 2025-06-12 VITALS
WEIGHT: 124.75 LBS | OXYGEN SATURATION: 98 % | TEMPERATURE: 97 F | HEIGHT: 63 IN | DIASTOLIC BLOOD PRESSURE: 66 MMHG | RESPIRATION RATE: 17 BRPM | HEART RATE: 82 BPM | SYSTOLIC BLOOD PRESSURE: 145 MMHG | BODY MASS INDEX: 22.11 KG/M2

## 2025-06-12 DIAGNOSIS — Z85.3 HISTORY OF BILATERAL BREAST CANCER: Primary | ICD-10-CM

## 2025-06-12 PROCEDURE — 63600175 PHARM REV CODE 636 W HCPCS: Performed by: FAMILY MEDICINE

## 2025-06-12 PROCEDURE — 96523 IRRIG DRUG DELIVERY DEVICE: CPT

## 2025-06-12 RX ORDER — SODIUM CHLORIDE 0.9 % (FLUSH) 0.9 %
10 SYRINGE (ML) INJECTION
OUTPATIENT
Start: 2025-06-12

## 2025-06-12 RX ORDER — HEPARIN 100 UNIT/ML
500 SYRINGE INTRAVENOUS
OUTPATIENT
Start: 2025-06-12

## 2025-06-12 RX ORDER — SODIUM CHLORIDE 0.9 % (FLUSH) 0.9 %
10 SYRINGE (ML) INJECTION
Status: DISCONTINUED | OUTPATIENT
Start: 2025-06-12 | End: 2025-06-12 | Stop reason: HOSPADM

## 2025-06-12 RX ORDER — HEPARIN 100 UNIT/ML
500 SYRINGE INTRAVENOUS
Status: COMPLETED | OUTPATIENT
Start: 2025-06-12 | End: 2025-06-12

## 2025-06-12 RX ADMIN — HEPARIN 500 UNITS: 100 SYRINGE at 08:06

## 2025-06-12 NOTE — PLAN OF CARE
Problem: Fatigue  Goal: Improved Activity Tolerance  6/12/2025 0811 by Reyna Duke, RN  Outcome: Progressing  6/12/2025 0806 by Reyna Duke, RN  Outcome: Progressing

## 2025-07-10 ENCOUNTER — INFUSION (OUTPATIENT)
Dept: INFUSION THERAPY | Facility: HOSPITAL | Age: 85
End: 2025-07-10
Attending: FAMILY MEDICINE
Payer: MEDICARE

## 2025-07-10 VITALS
TEMPERATURE: 98 F | RESPIRATION RATE: 16 BRPM | HEART RATE: 99 BPM | HEIGHT: 63 IN | BODY MASS INDEX: 22.11 KG/M2 | OXYGEN SATURATION: 97 % | WEIGHT: 124.75 LBS | SYSTOLIC BLOOD PRESSURE: 169 MMHG | DIASTOLIC BLOOD PRESSURE: 75 MMHG

## 2025-07-10 DIAGNOSIS — Z85.3 HISTORY OF BILATERAL BREAST CANCER: Primary | ICD-10-CM

## 2025-07-10 PROCEDURE — 63600175 PHARM REV CODE 636 W HCPCS: Performed by: FAMILY MEDICINE

## 2025-07-10 PROCEDURE — 25000003 PHARM REV CODE 250: Performed by: FAMILY MEDICINE

## 2025-07-10 PROCEDURE — 96523 IRRIG DRUG DELIVERY DEVICE: CPT

## 2025-07-10 PROCEDURE — A4216 STERILE WATER/SALINE, 10 ML: HCPCS | Performed by: FAMILY MEDICINE

## 2025-07-10 RX ORDER — HEPARIN 100 UNIT/ML
500 SYRINGE INTRAVENOUS
Status: COMPLETED | OUTPATIENT
Start: 2025-07-10 | End: 2025-07-10

## 2025-07-10 RX ORDER — SODIUM CHLORIDE 0.9 % (FLUSH) 0.9 %
10 SYRINGE (ML) INJECTION
Status: COMPLETED | OUTPATIENT
Start: 2025-07-10 | End: 2025-07-10

## 2025-07-10 RX ORDER — HEPARIN 100 UNIT/ML
500 SYRINGE INTRAVENOUS
OUTPATIENT
Start: 2025-07-10

## 2025-07-10 RX ORDER — SODIUM CHLORIDE 0.9 % (FLUSH) 0.9 %
10 SYRINGE (ML) INJECTION
OUTPATIENT
Start: 2025-07-10

## 2025-07-10 RX ADMIN — Medication 10 ML: at 08:07

## 2025-07-10 RX ADMIN — HEPARIN 500 UNITS: 100 SYRINGE at 08:07

## 2025-07-10 NOTE — PLAN OF CARE
Problem: Fatigue  Goal: Improved Activity Tolerance  Intervention: Promote Improved Energy  Flowsheets (Taken 7/10/2025 9848)  Environmental Support:   calm environment promoted   caregiver consistency promoted   environmental consistency promoted   rest periods encouraged

## 2025-07-11 ENCOUNTER — DOCUMENT SCAN (OUTPATIENT)
Dept: HOME HEALTH SERVICES | Facility: HOSPITAL | Age: 85
End: 2025-07-11
Payer: MEDICARE

## 2025-07-16 ENCOUNTER — OFFICE VISIT (OUTPATIENT)
Dept: FAMILY MEDICINE | Facility: CLINIC | Age: 85
End: 2025-07-16
Payer: MEDICARE

## 2025-07-16 VITALS
DIASTOLIC BLOOD PRESSURE: 66 MMHG | OXYGEN SATURATION: 96 % | BODY MASS INDEX: 22.04 KG/M2 | HEIGHT: 63 IN | SYSTOLIC BLOOD PRESSURE: 132 MMHG | RESPIRATION RATE: 15 BRPM | HEART RATE: 69 BPM | WEIGHT: 124.38 LBS

## 2025-07-16 DIAGNOSIS — E11.49 TYPE II DIABETES MELLITUS WITH NEUROLOGICAL MANIFESTATIONS: ICD-10-CM

## 2025-07-16 DIAGNOSIS — E55.9 VITAMIN D DEFICIENCY: ICD-10-CM

## 2025-07-16 DIAGNOSIS — Z78.0 ASYMPTOMATIC MENOPAUSAL STATE: ICD-10-CM

## 2025-07-16 DIAGNOSIS — Z13.820 OSTEOPOROSIS SCREENING: Primary | ICD-10-CM

## 2025-07-16 PROCEDURE — 99213 OFFICE O/P EST LOW 20 MIN: CPT | Mod: PBBFAC | Performed by: FAMILY MEDICINE

## 2025-07-16 PROCEDURE — G2211 COMPLEX E/M VISIT ADD ON: HCPCS | Mod: ,,, | Performed by: FAMILY MEDICINE

## 2025-07-16 PROCEDURE — 99999 PR PBB SHADOW E&M-EST. PATIENT-LVL III: CPT | Mod: PBBFAC,,, | Performed by: FAMILY MEDICINE

## 2025-07-16 PROCEDURE — 99214 OFFICE O/P EST MOD 30 MIN: CPT | Mod: S$PBB,,, | Performed by: FAMILY MEDICINE

## 2025-07-16 NOTE — PROGRESS NOTES
Patient ID: Ursula Rodríguez is a 85 y.o. female.    Chief Complaint: Follow-up (6 month follow up)    History of Present Illness    CHIEF COMPLAINT:  Ursula presents today for six-month follow-up    HISTORY OF PRESENT ILLNESS:  She reports feeling overall okay but notes experiencing increased bruising. She denies any current symptoms or shortness of breath.    MEDICAL HISTORY:  She has a history of bilateral osteoarthritis of knees, previously treated with injections by Dr. Stubbs. She underwent a lung biopsy on January 29th.    MEDICATIONS:  She is currently on Eliquis, having been switched from Coumadin years ago. Her Fosamax prescription is due for refill in September. She is not currently taking magnesium, bacitracin, prednisone, or Vitamin D.    LABS / TEST RESULTS:  Labs were performed in January and May. Vitamin D level in January was 66, previously 94 in prior year. Her last CT chest was on May 1st. Next labs are scheduled for August 7th with port flush and will include CBC, CMP, and A1C.      ROS:  ROS as indicated in HPI.         Physical Exam  Constitutional:       Appearance: Normal appearance.   HENT:      Head: Normocephalic and atraumatic.      Nose: Nose normal.   Eyes:      Extraocular Movements: Extraocular movements intact.      Pupils: Pupils are equal, round, and reactive to light.   Cardiovascular:      Rate and Rhythm: Normal rate and regular rhythm.      Pulses: Normal pulses.   Pulmonary:      Effort: Pulmonary effort is normal. No respiratory distress.      Breath sounds: Normal breath sounds. No wheezing or rhonchi.   Musculoskeletal:      Comments: Ambulates with walker due to unsteady gait   Skin:     General: Skin is warm and dry.   Neurological:      General: No focal deficit present.      Mental Status: She is alert and oriented to person, place, and time.   Psychiatric:         Mood and Affect: Mood normal.         Behavior: Behavior normal.         Thought Content: Thought content  "normal.          Assessment & Plan    M17.0 Bilateral primary osteoarthritis of knee  R23.3 Spontaneous ecchymoses  R79.89 Other specified abnormal findings of blood chemistry  Z79.01 Long term (current) use of anticoagulants  Z95.828 Presence of other vascular implants and grafts    BILATERAL OSTEOARTHRITIS OF KNEE:  - Dr. Stubbs injected the patient's knees for bilateral osteoarthritis.    SPONTANEOUS ECCHYMOSES:  - Ursula reports significant bruising, likely related to anticoagulant use.    ABNORMAL BLOOD CHEMISTRY FINDINGS:  - Evaluated Vitamin D levels from January (66) and previous year (94), noting previous high level approaching toxic range.  - Ordered CBC, CMP, A1C, and Vitamin D to be checked on August 7th.  - Reviewed medication list and recent lab results.    LONG-TERM ANTICOAGULANT USE:  - Continued Eliquis.  - Ursula no longer requires INR and PT monitoring due to this medication.    PRESENCE OF VASCULAR IMPLANT (PORT):  - Ursula has a port for lab draws with next port flush scheduled for August 7th, coinciding with the lab appointment.    OTHER MANAGEMENT:  - Continued Fosamax with refill to be processed in September.  - Ordered overdue DEXA scan (due since December).  - Vaccinations are up-to-date.  - Follow up in 6 months.         Review of patient's allergies indicates:   Allergen Reactions    Iodine Other (See Comments)    Iodine and iodide containing products     Ditropan [oxybutynin chloride] Palpitations and Other (See Comments)     Made patient weak      Vitals:    07/16/25 0915   BP: 132/66   BP Location: Right arm   Patient Position: Sitting   Pulse: 69   Resp: 15   SpO2: 96%   Weight: 56.4 kg (124 lb 6.4 oz)   Height: 5' 3" (1.6 m)           Results for orders placed or performed in visit on 05/23/25   POCT COVID-19 Rapid Screening    Collection Time: 05/23/25  5:05 PM   Result Value Ref Range    POC Rapid COVID Negative Negative     Acceptable Yes    POCT Influenza A/B Molecular "    Collection Time: 05/23/25  5:07 PM   Result Value Ref Range    POC Molecular Influenza A Ag Negative Negative    POC Molecular Influenza B Ag Negative Negative     Acceptable Yes      *Note: Due to a large number of results and/or encounters for the requested time period, some results have not been displayed. A complete set of results can be found in Results Review.      Plan:          Osteoporosis screening  -     DEXA Bone Density Axial Skeleton 1 or more Site W TBS XPD; Future; Expected date: 07/16/2025    Type II diabetes mellitus with neurological manifestations  -     CBC Auto Differential; Future; Expected date: 07/16/2025  -     Comprehensive Metabolic Panel; Future; Expected date: 07/16/2025  -     Hemoglobin A1C; Future; Expected date: 07/16/2025    Vitamin D deficiency  -     Vitamin D; Future; Expected date: 01/16/2026    Asymptomatic menopausal state  -     DEXA Bone Density Axial Skeleton 1 or more Site W TBS XPD; Future; Expected date: 07/16/2025        Follow up in about 6 months (around 1/16/2026), or if symptoms worsen or fail to improve.    This note was generated with the assistance of ambient listening technology. Verbal consent was obtained by the patient and accompanying visitor(s) for the recording of patient appointment to facilitate this note. I attest to having reviewed and edited the generated note for accuracy, though some syntax or spelling errors may persist. Please contact the author of this note for any clarification.

## 2025-07-29 ENCOUNTER — HOSPITAL ENCOUNTER (OUTPATIENT)
Dept: RADIOLOGY | Facility: HOSPITAL | Age: 85
Discharge: HOME | End: 2025-07-29
Attending: INTERNAL MEDICINE
Payer: MEDICARE

## 2025-07-29 DIAGNOSIS — C78.00 MALIGNANT NEOPLASM METASTATIC TO LUNG, UNSPECIFIED LATERALITY: ICD-10-CM

## 2025-07-29 DIAGNOSIS — Z85.3 HISTORY OF BILATERAL BREAST CANCER: ICD-10-CM

## 2025-07-29 PROCEDURE — 71250 CT THORAX DX C-: CPT | Mod: TC

## 2025-08-07 ENCOUNTER — HOSPITAL ENCOUNTER (OUTPATIENT)
Dept: RADIOLOGY | Facility: HOSPITAL | Age: 85
Discharge: HOME | End: 2025-08-07
Attending: FAMILY MEDICINE
Payer: MEDICARE

## 2025-08-07 ENCOUNTER — INFUSION (OUTPATIENT)
Dept: INFUSION THERAPY | Facility: HOSPITAL | Age: 85
End: 2025-08-07
Attending: FAMILY MEDICINE
Payer: MEDICARE

## 2025-08-07 ENCOUNTER — RESULTS FOLLOW-UP (OUTPATIENT)
Dept: FAMILY MEDICINE | Facility: CLINIC | Age: 85
End: 2025-08-07
Payer: MEDICARE

## 2025-08-07 VITALS
TEMPERATURE: 98 F | WEIGHT: 124.75 LBS | BODY MASS INDEX: 22.11 KG/M2 | HEIGHT: 63 IN | OXYGEN SATURATION: 98 % | SYSTOLIC BLOOD PRESSURE: 152 MMHG | RESPIRATION RATE: 16 BRPM | DIASTOLIC BLOOD PRESSURE: 78 MMHG | HEART RATE: 99 BPM

## 2025-08-07 DIAGNOSIS — Z13.820 OSTEOPOROSIS SCREENING: ICD-10-CM

## 2025-08-07 DIAGNOSIS — Z85.3 HISTORY OF BILATERAL BREAST CANCER: Primary | ICD-10-CM

## 2025-08-07 DIAGNOSIS — Z78.0 ASYMPTOMATIC MENOPAUSAL STATE: ICD-10-CM

## 2025-08-07 PROCEDURE — A4216 STERILE WATER/SALINE, 10 ML: HCPCS | Performed by: FAMILY MEDICINE

## 2025-08-07 PROCEDURE — 77091 TBS TECHL CALCULATION ONLY: CPT

## 2025-08-07 PROCEDURE — 25000003 PHARM REV CODE 250: Performed by: FAMILY MEDICINE

## 2025-08-07 PROCEDURE — 63600175 PHARM REV CODE 636 W HCPCS: Performed by: FAMILY MEDICINE

## 2025-08-07 PROCEDURE — 77092 TBS I&R FX RSK QHP: CPT | Mod: ,,, | Performed by: RADIOLOGY

## 2025-08-07 PROCEDURE — 77080 DXA BONE DENSITY AXIAL: CPT | Mod: 26,,, | Performed by: RADIOLOGY

## 2025-08-07 PROCEDURE — 96377 APPLICATON ON-BODY INJECTOR: CPT

## 2025-08-07 RX ORDER — HEPARIN 100 UNIT/ML
500 SYRINGE INTRAVENOUS
Status: COMPLETED | OUTPATIENT
Start: 2025-08-07 | End: 2025-08-07

## 2025-08-07 RX ORDER — SODIUM CHLORIDE 0.9 % (FLUSH) 0.9 %
10 SYRINGE (ML) INJECTION
Status: COMPLETED | OUTPATIENT
Start: 2025-08-07 | End: 2025-08-07

## 2025-08-07 RX ORDER — SODIUM CHLORIDE 0.9 % (FLUSH) 0.9 %
10 SYRINGE (ML) INJECTION
OUTPATIENT
Start: 2025-08-07

## 2025-08-07 RX ORDER — HEPARIN 100 UNIT/ML
500 SYRINGE INTRAVENOUS
OUTPATIENT
Start: 2025-08-07

## 2025-08-07 RX ADMIN — HEPARIN 500 UNITS: 100 SYRINGE at 07:08

## 2025-08-07 RX ADMIN — Medication 10 ML: at 07:08

## 2025-08-12 ENCOUNTER — OFFICE VISIT (OUTPATIENT)
Facility: CLINIC | Age: 85
End: 2025-08-12
Payer: MEDICARE

## 2025-08-12 VITALS
SYSTOLIC BLOOD PRESSURE: 149 MMHG | OXYGEN SATURATION: 98 % | HEART RATE: 66 BPM | HEIGHT: 63 IN | WEIGHT: 123 LBS | DIASTOLIC BLOOD PRESSURE: 72 MMHG | BODY MASS INDEX: 21.79 KG/M2 | TEMPERATURE: 98 F | RESPIRATION RATE: 16 BRPM

## 2025-08-12 DIAGNOSIS — C78.00 MALIGNANT NEOPLASM METASTATIC TO LUNG, UNSPECIFIED LATERALITY: ICD-10-CM

## 2025-08-12 DIAGNOSIS — Z85.3 HISTORY OF BILATERAL BREAST CANCER: Primary | ICD-10-CM

## 2025-08-12 PROCEDURE — 99215 OFFICE O/P EST HI 40 MIN: CPT | Mod: S$PBB,,, | Performed by: INTERNAL MEDICINE

## 2025-08-12 PROCEDURE — 99999 PR PBB SHADOW E&M-EST. PATIENT-LVL V: CPT | Mod: PBBFAC,,, | Performed by: INTERNAL MEDICINE

## 2025-08-12 PROCEDURE — G2211 COMPLEX E/M VISIT ADD ON: HCPCS | Mod: ,,, | Performed by: INTERNAL MEDICINE

## 2025-08-12 PROCEDURE — 99215 OFFICE O/P EST HI 40 MIN: CPT | Mod: PBBFAC,PN | Performed by: INTERNAL MEDICINE

## 2025-08-12 RX ORDER — LAMOTRIGINE 25 MG/1
250 TABLET ORAL
Qty: 5 ML | Refills: 11 | Status: CANCELLED | OUTPATIENT
Start: 2025-08-12 | End: 2026-08-12

## 2025-08-19 PROBLEM — C78.00 BREAST CANCER METASTASIZED TO LUNG: Status: ACTIVE | Noted: 2024-06-21

## 2025-08-19 RX ORDER — LAMOTRIGINE 25 MG/1
500 TABLET ORAL
OUTPATIENT
Start: 2025-08-19 | End: 2025-08-19

## 2025-08-26 ENCOUNTER — INFUSION (OUTPATIENT)
Dept: INFUSION THERAPY | Facility: HOSPITAL | Age: 85
End: 2025-08-26
Attending: INTERNAL MEDICINE
Payer: MEDICARE

## 2025-08-26 VITALS
BODY MASS INDEX: 21.99 KG/M2 | WEIGHT: 124.13 LBS | HEART RATE: 72 BPM | OXYGEN SATURATION: 96 % | DIASTOLIC BLOOD PRESSURE: 67 MMHG | SYSTOLIC BLOOD PRESSURE: 138 MMHG | HEIGHT: 63 IN | RESPIRATION RATE: 16 BRPM | TEMPERATURE: 97 F

## 2025-08-26 DIAGNOSIS — Z85.3 HISTORY OF BILATERAL BREAST CANCER: ICD-10-CM

## 2025-08-26 DIAGNOSIS — C78.00: Primary | ICD-10-CM

## 2025-08-26 PROCEDURE — 96402 CHEMO HORMON ANTINEOPL SQ/IM: CPT

## 2025-08-26 PROCEDURE — 63600175 PHARM REV CODE 636 W HCPCS: Mod: JZ,TB | Performed by: INTERNAL MEDICINE

## 2025-08-26 RX ORDER — LAMOTRIGINE 25 MG/1
500 TABLET ORAL
Status: COMPLETED | OUTPATIENT
Start: 2025-08-26 | End: 2025-08-26

## 2025-08-26 RX ADMIN — FULVESTRANT 500 MG: 50 INJECTION, SOLUTION INTRAMUSCULAR at 09:08

## 2025-08-28 DIAGNOSIS — R05.9 COUGH IN ADULT: ICD-10-CM

## 2025-08-29 RX ORDER — ALBUTEROL SULFATE 90 UG/1
INHALANT RESPIRATORY (INHALATION)
Qty: 54 G | Refills: 3 | Status: SHIPPED | OUTPATIENT
Start: 2025-08-29

## (undated) DEVICE — GLOVE SENSICARE PI SURG 7.5

## (undated) DEVICE — GLOVE SURG ULTRA TOUCH 7.5

## (undated) DEVICE — CANNULA SUPERSOFT CO2 7FT

## (undated) DEVICE — UNDERGLOVES BIOGEL PI SZ 7 LF

## (undated) DEVICE — GLOVE SURG ULTRA TOUCH 7

## (undated) DEVICE — CONTAINER SPECIMEN OR STER 4OZ

## (undated) DEVICE — PAD GROUNDING DISPER ELECTRODE

## (undated) DEVICE — COVER PROBE CIV-FLEX 96INCH

## (undated) DEVICE — ADHESIVE DERMABOND ADVANCED

## (undated) DEVICE — Device

## (undated) DEVICE — NDL SPINAL 25GX3.5 SPINOCAN

## (undated) DEVICE — NDL BLNT STD 1.5IN 18G

## (undated) DEVICE — DRAPE C ARM 42 X 120 10/BX

## (undated) DEVICE — APPLICATOR CHLORAPREP CLR 10.5

## (undated) DEVICE — GOWN POLY REINF X-LONG XL

## (undated) DEVICE — KIT RF COOLIEF MULTI COOL 50X4

## (undated) DEVICE — SYR 30CC LUER LOCK

## (undated) DEVICE — SYR LUER-LOCK STERILE 5ML

## (undated) DEVICE — KIT NERVE BLOCK PREP BAPTIST

## (undated) DEVICE — GLOVE SURG ULTRA TOUCH 6

## (undated) DEVICE — SOL NACL 0.9% INJ 250ML BG

## (undated) DEVICE — STRAP OR TABLE 5IN X 72IN

## (undated) DEVICE — SOL NACL IRR 1000ML BTL

## (undated) DEVICE — NDL BLUNT W/O FILTER 18GX1.5IN

## (undated) DEVICE — BLADE EZ CLEAN 2.5IN MODIFIED

## (undated) DEVICE — SYR DISP LL 5CC

## (undated) DEVICE — CANISTER SUCTION RIGID 3000CC

## (undated) DEVICE — PAD ALCOHOL PREP LG STRL 2PLY

## (undated) DEVICE — SYR LUER-LOCK STERILE 3ML

## (undated) DEVICE — BANDAGE CURAD ADH FABRIC 1X3IN

## (undated) DEVICE — SYR 3CC LUER LOC

## (undated) DEVICE — SET DECANTER MEDICHOICE

## (undated) DEVICE — NDL HYPODERMIC BLUNT 18G 1.5IN

## (undated) DEVICE — PAD SUREFIT GRND ELECTRD 10FT

## (undated) DEVICE — SUT CTD VICRYL 3-0 CR/SH

## (undated) DEVICE — TOWEL OR DISP STRL BLUE 4/PK

## (undated) DEVICE — SUT MONOCRYL 4-0 PS-2